# Patient Record
Sex: MALE | Race: WHITE | Employment: UNEMPLOYED | URBAN - METROPOLITAN AREA
[De-identification: names, ages, dates, MRNs, and addresses within clinical notes are randomized per-mention and may not be internally consistent; named-entity substitution may affect disease eponyms.]

---

## 2017-03-02 RX ORDER — BUDESONIDE AND FORMOTEROL FUMARATE DIHYDRATE 80; 4.5 UG/1; UG/1
2 AEROSOL RESPIRATORY (INHALATION) 2 TIMES DAILY
Qty: 1 INHALER | Refills: 4 | Status: SHIPPED | OUTPATIENT
Start: 2017-03-02 | End: 2017-05-24 | Stop reason: CLARIF

## 2017-05-15 ENCOUNTER — OFFICE VISIT (OUTPATIENT)
Dept: INTERNAL MEDICINE CLINIC | Age: 57
End: 2017-05-15

## 2017-05-15 VITALS
OXYGEN SATURATION: 94 % | HEIGHT: 70 IN | RESPIRATION RATE: 19 BRPM | TEMPERATURE: 97.1 F | SYSTOLIC BLOOD PRESSURE: 130 MMHG | HEART RATE: 78 BPM | WEIGHT: 124 LBS | DIASTOLIC BLOOD PRESSURE: 90 MMHG | BODY MASS INDEX: 17.75 KG/M2

## 2017-05-15 DIAGNOSIS — F17.200 TOBACCO DEPENDENCE: ICD-10-CM

## 2017-05-15 DIAGNOSIS — J44.9 CHRONIC OBSTRUCTIVE PULMONARY DISEASE, UNSPECIFIED COPD TYPE (HCC): Primary | ICD-10-CM

## 2017-05-15 DIAGNOSIS — H53.9 VISION CHANGES: ICD-10-CM

## 2017-05-15 DIAGNOSIS — R63.6 UNDERWEIGHT: ICD-10-CM

## 2017-05-15 DIAGNOSIS — K08.9 POOR DENTITION: ICD-10-CM

## 2017-05-15 DIAGNOSIS — G47.00 INSOMNIA, UNSPECIFIED TYPE: ICD-10-CM

## 2017-05-15 DIAGNOSIS — Z11.1 SCREENING-PULMONARY TB: ICD-10-CM

## 2017-05-15 DIAGNOSIS — N40.0 BENIGN PROSTATIC HYPERPLASIA WITHOUT LOWER URINARY TRACT SYMPTOMS, UNSPECIFIED MORPHOLOGY: ICD-10-CM

## 2017-05-15 DIAGNOSIS — F20.0 PARANOID SCHIZOPHRENIA (HCC): ICD-10-CM

## 2017-05-15 NOTE — PROGRESS NOTES
Reviewed record in preparation for visit and have obtained necessary documentation. Identified pt with two pt identifiers(name and ). Health Maintenance Due   Topic    Hepatitis C Screening     DTaP/Tdap/Td series (1 - Tdap)         Chief Complaint   Patient presents with    COPD          Learning Assessment:  :     Learning Assessment 2014   PRIMARY LEARNER Patient   PRIMARY LANGUAGE ENGLISH   LEARNER PREFERENCE PRIMARY DEMONSTRATION   ANSWERED BY patient   RELATIONSHIP SELF       Depression Screening:  :     No flowsheet data found. Fall Risk Assessment:  :     No flowsheet data found. Abuse Screening:  :     No flowsheet data found. Coordination of Care Questionnaire:  :     1) Have you been to an emergency room, urgent care clinic since your last visit? yes  Hospitalized since your last visit? no             2) Have you seen or consulted any other health care providers outside of [address] [place] [address] since your last visit? no  (Include any pap smears or colon screenings in this section.)    3) Do you have an Advance Directive on file? no    4) Are you interested in receiving information on Advance Directives? NO      Patient is accompanied by self I have received verbal consent from Vero Wallace to discuss any/all medical information while they are present in the room.

## 2017-05-15 NOTE — LETTER
5/19/2017 3:45 PM 
 
Mr. Mayito Valenzuela 58 
[phone] Dear Mayito Cee: 
 
Please find your most recent results below. Resulted Orders METABOLIC PANEL, COMPREHENSIVE Result Value Ref Range Glucose 54 (L) 65 - 99 mg/dL BUN 4 (L) 6 - 24 mg/dL Creatinine 0.68 (L) 0.76 - 1.27 mg/dL GFR est non- >59 mL/min/1.73 GFR est  >59 mL/min/1.73  
 BUN/Creatinine ratio 6 (L) 9 - 20 Sodium 140 134 - 144 mmol/L Potassium 4.5 3.5 - 5.2 mmol/L Chloride 96 96 - 106 mmol/L  
 CO2 29 18 - 29 mmol/L Calcium 9.0 8.7 - 10.2 mg/dL Protein, total 6.7 6.0 - 8.5 g/dL Albumin 4.1 3.5 - 5.5 g/dL GLOBULIN, TOTAL 2.6 1.5 - 4.5 g/dL A-G Ratio 1.6 1.2 - 2.2 Bilirubin, total 0.3 0.0 - 1.2 mg/dL Alk. phosphatase 99 39 - 117 IU/L  
 AST (SGOT) 18 0 - 40 IU/L  
 ALT (SGPT) 13 0 - 44 IU/L Narrative Performed at:  [address]  [id number] : Valarie Evans MD, Phone:  [phone] CBC W/O DIFF Result Value Ref Range WBC 6.4 3.4 - 10.8 x10E3/uL  
 RBC 4.83 4.14 - 5.80 x10E6/uL HGB 15.6 12.6 - 17.7 g/dL HCT 45.8 37.5 - 51.0 % MCV 95 79 - 97 fL  
 MCH 32.3 26.6 - 33.0 pg  
 MCHC 34.1 31.5 - 35.7 g/dL  
 RDW 14.7 12.3 - 15.4 % PLATELET 756 953 - 055 x10E3/uL Narrative Performed at:  [address]  [id number] : Valarie Evans MD, Phone:  [phone] LIPID PANEL Result Value Ref Range Cholesterol, total 102 100 - 199 mg/dL Triglyceride 41 0 - 149 mg/dL HDL Cholesterol 48 >39 mg/dL VLDL, calculated 8 5 - 40 mg/dL LDL, calculated 46 0 - 99 mg/dL Narrative Performed at:  [address]  [id number] : Valarie Evans MD, Phone:  [phone] TSH 3RD GENERATION Result Value Ref Range TSH 0.727 0.450 - 4.500 uIU/mL Narrative Performed at:  [address]  [id number] : Toni Walker MD, Phone:  [phone] PSA DIAGNOSTIC (PROSTATIC SPECIFIC AG) Result Value Ref Range Prostate Specific Ag 0.2 0.0 - 4.0 ng/mL Comment:  
   Roche ECLIA methodology. According to the American Urological Association, Serum PSA should 
decrease and remain at undetectable levels after radical 
prostatectomy. The AUA defines biochemical recurrence as an initial 
PSA value 0.2 ng/mL or greater followed by a subsequent confirmatory PSA value 0.2 ng/mL or greater. Values obtained with different assay methods or kits cannot be used 
interchangeably. Results cannot be interpreted as absolute evidence 
of the presence or absence of malignant disease. Narrative Performed at:  [address]  [id number] : Toni Walker MD, Phone:  [phone] CVD REPORT Result Value Ref Range INTERPRETATION Note Comment:  
   Supplement report is available. Narrative Performed at:  [address] [place] [address]  [id number] : Zenon Ortega PhD, Phone:  [phone] QUANTIFERON TB GOLD Result Value Ref Range QuantiFERON Incubation Incubated, specimen forwarded to [place], [place] for 
completion of the assay. Narrative Performed at:  [address]  [id number] : Toni Walker MD, Phone:  [phone] QUANTIFERON IN TUBE REFL Result Value Ref Range QuantiFERON TB Gold Negative Negative QUANTIFERON CRITERIA Comment Comment: To be considered positive a specimen should have a TB Ag minus Nil 
value greater than or equal to 0.35 IU/mL and in addition the TB Ag 
minus Nil value must be greater than or equal to 25% of the Nil value. There may be insufficient information in these values to 
differentiate between some negative and some indeterminate test 
values. QuantiFERON TB Ag Value 0.04 IU/mL QuantiFERON Nil Value 0.02 IU/mL QuantiFERON Mitogen Value >10.00 IU/mL  
 QFT TB Ag minus Nil Value 0.02 IU/mL Interpretation: Comment Comment:  
   The QuantiFERON TB Gold (in Tube) assay is intended for use as an aid 
in the diagnosis of TB infection. Negative results suggest that there 
is no TB infection. In patients with high suspicion of exposure, a 
negative test should be repeated. A positive test indicates infection 
with Mycobacterium tuberculosis. Among individuals without 
tuberculosis infection, a positive test may be due to exposure to 
[place], M. szulgai or M. marinum. On the Internet, go to 
cdc.gov/tb for further details. Narrative Performed at:  [address]  [id number] : Bri Sadler MD, Phone:  [phone] RECOMMENDATIONS: 
Negative TB test. 
 
Please call me if you have any questions: [phone] Sincerely, 
 
 
Charlie Bojorquez, DO

## 2017-05-15 NOTE — MR AVS SNAPSHOT
Visit Information Date & Time Provider Department Dept. Phone Encounter #  
 5/15/2017 10:15 AM Rama Malcomge [facility] Internal Medicine [phone] [id number] Follow-up Instructions Return in about 6 months (around 11/15/2017). Upcoming Health Maintenance Date Due Hepatitis C Screening 1960 DTaP/Tdap/Td series (1 - Tdap) 8/27/1980 INFLUENZA AGE 9 TO ADULT 8/1/2017 COLONOSCOPY 9/2/2024 Allergies as of 5/15/2017  Review Complete On: 5/15/2017 By: Yeni Cote, DO No Known Allergies Current Immunizations  Reviewed on 11/14/2016 Name Date Influenza Vaccine Intradermal PF 11/14/2016 PPD 1/31/2012 Pneumococcal Polysaccharide (PPSV-23) 11/14/2016 Not reviewed this visit You Were Diagnosed With   
  
 Codes Comments Chronic obstructive pulmonary disease, unspecified COPD type ([facility]    -  Primary ICD-10-CM: J44.9 ICD-9-CM: 781 Tobacco dependence     ICD-10-CM: I12.013 ICD-9-CM: 305.1 Underweight     ICD-10-CM: R63.6 ICD-9-CM: 783.22 Poor dentition     ICD-10-CM: K08.9 ICD-9-CM: 525.9 Insomnia, unspecified type     ICD-10-CM: G47.00 ICD-9-CM: 780.52 Paranoid schizophrenia ([facility]     ICD-10-CM: F20.0 ICD-9-CM: 295.30 Vision changes     ICD-10-CM: H53.9 ICD-9-CM: 368.9 Benign prostatic hyperplasia without lower urinary tract symptoms, unspecified morphology     ICD-10-CM: N40.0 ICD-9-CM: 600.00 Screening-pulmonary TB     ICD-10-CM: Z11.1 ICD-9-CM: V74.1 Vitals BP Pulse Temp Resp Height(growth percentile) Weight(growth percentile) 130/90 (BP 1 Location: Right arm, BP Patient Position: Sitting) 78 97.1 °F (36.2 °C) (Oral) 19 5' 10\" (1.778 m) 124 lb (56.2 kg) SpO2 BMI Smoking Status 94% 17.79 kg/m2 Current Every Day Smoker BMI and BSA Data Body Mass Index Body Surface Area  
 17.79 kg/m 2 1.67 m 2 Preferred Pharmacy Pharmacy Name Phone [address] [place] [address] [phone] Your Updated Medication List  
  
   
This list is accurate as of: 5/15/17 10:38 AM.  Always use your most recent med list.  
  
  
  
  
 * albuterol 2.5 mg /3 mL (0.083 %) nebulizer solution Commonly known as:  PROVENTIL VENTOLIN  
by Nebulization route every six (6) hours as needed for Wheezing. * VENTOLIN HFA 90 mcg/actuation inhaler Generic drug:  albuterol INHALE 2 PUFFS EVERY 4 TO 6 HOURS AS NEEDED FOR SHORTNESS OF BREATH  
  
 benztropine 2 mg tablet Commonly known as:  COGENTIN Take 2 mg by mouth nightly. budesonide-formoterol 80-4.5 mcg/actuation Hfaa inhaler Commonly known as:  SYMBICORT Take 2 Puffs by inhalation two (2) times a day. ibuprofen 800 mg tablet Commonly known as:  MOTRIN  
  
 PAXIL 40 mg tablet Generic drug:  PARoxetine Take 40 mg by mouth daily. RESTORIL 30 mg capsule Generic drug:  temazepam  
Take 30 mg by mouth nightly. RisperDAL 3 mg tablet Generic drug:  risperiDONE Take 3 mg by mouth nightly. XANAX 0.5 mg tablet Generic drug:  ALPRAZolam  
Take 0.5 mg by mouth two (2) times a day. * Notice: This list has 2 medication(s) that are the same as other medications prescribed for you. Read the directions carefully, and ask your doctor or other care provider to review them with you. We Performed the Following CBC W/O DIFF [38672 CPT(R)] LIPID PANEL [06909 CPT(R)] METABOLIC PANEL, COMPREHENSIVE [90209 CPT(R)] PROSTATE SPECIFIC AG (PSA) [id number] CPT(R)] QUANTIFERON TB GOLD [[id number] Custom] REFERRAL TO OPHTHALMOLOGY [REF57 Custom] TSH 3RD GENERATION [93952 CPT(R)] Follow-up Instructions Return in about 6 months (around 11/15/2017). Referral Information Referral ID Referred By Referred To  
  
 [id number] Guarav Hence [facility] [address], [address] Visits Status Start Date End Date 1 New Request 5/15/17 5/15/18 If your referral has a status of pending review or denied, additional information will be sent to support the outcome of this decision. Introducing [facility]! Justen Vera introduces Matchbook patient portal. Now you can access parts of your medical record, email your doctor's office, and request medication refills online. 1. In your internet browser, go to [email or web address] [email or web address] 2. Click on the First Time User? Click Here link in the Sign In box. You will see the New Member Sign Up page. 3. Enter your Matchbook Access Code exactly as it appears below. You will not need to use this code after youve completed the sign-up process. If you do not sign up before the expiration date, you must request a new code. · Matchbook Access Code: [email or web address] Expires: 8/13/2017 10:37 AM 
 
4. Enter the last four digits of your Social Security Number (xxxx) and Date of Birth (mm/dd/yyyy) as indicated and click Submit. You will be taken to the next sign-up page. 5. Create a Matchbook ID. This will be your Matchbook login ID and cannot be changed, so think of one that is secure and easy to remember. 6. Create a Matchbook password. You can change your password at any time. 7. Enter your Password Reset Question and Answer. This can be used at a later time if you forget your password. 8. Enter your e-mail address. You will receive e-mail notification when new information is available in [address] 9. Click Sign Up. You can now view and download portions of your medical record. 10. Click the Download Summary menu link to download a portable copy of your medical information. If you have questions, please visit the Frequently Asked Questions section of the Matchbook website. Remember, Matchbook is NOT to be used for urgent needs. For medical emergencies, dial 911. Now available from your iPhone and Android! Please provide this summary of care documentation to your next provider. Your primary care clinician is listed as Melissa Elias. If you have any questions after today's visit, please call [phone].

## 2017-05-16 LAB
ALBUMIN SERPL-MCNC: 4.1 G/DL (ref 3.5–5.5)
ALBUMIN/GLOB SERPL: 1.6 {RATIO} (ref 1.2–2.2)
ALP SERPL-CCNC: 99 IU/L (ref 39–117)
ALT SERPL-CCNC: 13 IU/L (ref 0–44)
AST SERPL-CCNC: 18 IU/L (ref 0–40)
BILIRUB SERPL-MCNC: 0.3 MG/DL (ref 0–1.2)
BUN SERPL-MCNC: 4 MG/DL (ref 6–24)
BUN/CREAT SERPL: 6 (ref 9–20)
CALCIUM SERPL-MCNC: 9 MG/DL (ref 8.7–10.2)
CHLORIDE SERPL-SCNC: 96 MMOL/L (ref 96–106)
CHOLEST SERPL-MCNC: 102 MG/DL (ref 100–199)
CO2 SERPL-SCNC: 29 MMOL/L (ref 18–29)
CREAT SERPL-MCNC: 0.68 MG/DL (ref 0.76–1.27)
ERYTHROCYTE [DISTWIDTH] IN BLOOD BY AUTOMATED COUNT: 14.7 % (ref 12.3–15.4)
GLOBULIN SER CALC-MCNC: 2.6 G/DL (ref 1.5–4.5)
GLUCOSE SERPL-MCNC: 54 MG/DL (ref 65–99)
HCT VFR BLD AUTO: 45.8 % (ref 37.5–51)
HDLC SERPL-MCNC: 48 MG/DL
HGB BLD-MCNC: 15.6 G/DL (ref 12.6–17.7)
INTERPRETATION, 910389: NORMAL
LDLC SERPL CALC-MCNC: 46 MG/DL (ref 0–99)
MCH RBC QN AUTO: 32.3 PG (ref 26.6–33)
MCHC RBC AUTO-ENTMCNC: 34.1 G/DL (ref 31.5–35.7)
MCV RBC AUTO: 95 FL (ref 79–97)
PLATELET # BLD AUTO: 191 X10E3/UL (ref 150–379)
POTASSIUM SERPL-SCNC: 4.5 MMOL/L (ref 3.5–5.2)
PROT SERPL-MCNC: 6.7 G/DL (ref 6–8.5)
PSA SERPL-MCNC: 0.2 NG/ML (ref 0–4)
RBC # BLD AUTO: 4.83 X10E6/UL (ref 4.14–5.8)
SODIUM SERPL-SCNC: 140 MMOL/L (ref 134–144)
TRIGL SERPL-MCNC: 41 MG/DL (ref 0–149)
TSH SERPL DL<=0.005 MIU/L-ACNC: 0.73 UIU/ML (ref 0.45–4.5)
VLDLC SERPL CALC-MCNC: 8 MG/DL (ref 5–40)
WBC # BLD AUTO: 6.4 X10E3/UL (ref 3.4–10.8)

## 2017-05-17 LAB
ANNOTATION COMMENT IMP: NORMAL
GAMMA INTERFERON BACKGROUND BLD IA-ACNC: 0.02 IU/ML
M TB IFN-G BLD-IMP: NEGATIVE
M TB IFN-G CD4+ BCKGRND COR BLD-ACNC: 0.02 IU/ML
M TB IFN-G CD4+ T-CELLS BLD-ACNC: 0.04 IU/ML
MITOGEN IGNF BLD-ACNC: >10 IU/ML
QUANTIFERON INCUBATION: NORMAL
SERVICE CMNT-IMP: NORMAL

## 2017-05-19 NOTE — PROGRESS NOTES
Letter mailed to patient with lab results and recommendations from provider. T B test negative and also, BS low, o/w stable labs.

## 2017-05-23 ENCOUNTER — PATIENT OUTREACH (OUTPATIENT)
Dept: INTERNAL MEDICINE CLINIC | Age: 57
End: 2017-05-23

## 2017-05-24 RX ORDER — FLUTICASONE FUROATE AND VILANTEROL 100; 25 UG/1; UG/1
1 POWDER RESPIRATORY (INHALATION) DAILY
Qty: 1 INHALER | Refills: 5 | Status: SHIPPED | OUTPATIENT
Start: 2017-05-24 | End: 2017-07-12

## 2017-05-30 NOTE — PROGRESS NOTES
HISTORY OF PRESENT ILLNESS  Helio Guillory is a 61 y.o. male. Pt. comes in for f/u. Has multiple medical problems. H/o asthma/COPD. Smokes daily. Has chronic cough with congestion and occasional sputum. Some SOB/MALDONADO. Reports poor vision. Followed by psychiatrist. Reports compliance with medications and diet. Med list and most recent labs/studies reviewed with pt. Trying to be active physically. Needs labs. Needs TB test. Reports no other new c/o. COPD   Associated symptoms include shortness of breath. Pertinent negatives include no chest pain, no abdominal pain and no headaches. Nicotine Dependence   Associated symptoms include shortness of breath. Pertinent negatives include no chest pain, no abdominal pain and no headaches. Mental Health Problem   Associated symptoms include shortness of breath. Pertinent negatives include no chest pain, no abdominal pain and no headaches. Asthma   Associated symptoms include shortness of breath. Pertinent negatives include no chest pain, no abdominal pain and no headaches. Follow Up Chronic Condition   Associated symptoms include shortness of breath. Pertinent negatives include no chest pain, no abdominal pain and no headaches. Review of Systems   Constitutional: Negative. Eyes: Positive for blurred vision. Respiratory: Positive for cough and shortness of breath. Negative for hemoptysis, sputum production and wheezing. Cardiovascular: Negative for chest pain, palpitations and leg swelling. Gastrointestinal: Negative for abdominal pain. Genitourinary: Negative. Musculoskeletal: Negative. Skin: Negative. Neurological: Negative for dizziness, tingling, tremors and headaches. Endo/Heme/Allergies: Negative. Psychiatric/Behavioral: Positive for depression. Negative for suicidal ideas. The patient is nervous/anxious and has insomnia. Physical Exam   Constitutional: He is oriented to person, place, and time.  He appears well-developed and well-nourished. No distress. HENT:   Head: Normocephalic and atraumatic. Mouth/Throat: Oropharynx is clear and moist.   Eyes: Conjunctivae and EOM are normal. Pupils are equal, round, and reactive to light. No scleral icterus. Neck: Normal range of motion. Neck supple. No JVD present. No thyromegaly present. Cardiovascular: Normal rate, regular rhythm, normal heart sounds and intact distal pulses. No murmur heard. Pulmonary/Chest: Effort normal. No respiratory distress. He has wheezes (few). He has no rales. He exhibits no tenderness. Abdominal: Soft. Bowel sounds are normal. He exhibits no distension. There is no tenderness. Musculoskeletal: Normal range of motion. He exhibits no edema or tenderness. Neurological: He is alert and oriented to person, place, and time. He has normal reflexes. He exhibits normal muscle tone. Coordination normal.   Skin: Skin is warm and dry. No rash noted. Psychiatric: He has a normal mood and affect. His behavior is normal.   Nursing note and vitals reviewed. ASSESSMENT and James Rosalia was seen today for copd, nicotine dependence and mental health problem.     Diagnoses and all orders for this visit:    Chronic obstructive pulmonary disease, unspecified COPD type ([address]  -     CBC W/O DIFF  -     LIPID PANEL  -     TSH 3RD GENERATION  -     METABOLIC PANEL, COMPREHENSIVE    Tobacco dependence    Underweight  -     CBC W/O DIFF  -     LIPID PANEL  -     TSH 3RD GENERATION  -     METABOLIC PANEL, COMPREHENSIVE    Poor dentition    Insomnia, unspecified type    Paranoid schizophrenia ([address]    Vision changes  -     REFERRAL TO OPHTHALMOLOGY    Benign prostatic hyperplasia without lower urinary tract symptoms, unspecified morphology  -     PSA - PROSTATE SPECIFIC AG    Screening-pulmonary TB  -     QUANTIFERON TB GOLD    Other orders  -     METABOLIC PANEL, COMPREHENSIVE  -     CBC W/O DIFF  -     LIPID PANEL  -     TSH 3RD GENERATION  -     PROSTATE SPECIFIC AG  - CVD REPORT  -     QUANTIFERON TB GOLD  -     QUANTIFERON IN TUBE REFL      Follow-up Disposition:  Return in about 6 months (around 11/15/2017).   lab results and schedule of future lab studies reviewed with patient  reviewed diet, exercise and weight control  reviewed medications and side effects in detail  Advised to quit smoking  Overall stable  F/u with other MD's as scheduled

## 2017-06-20 ENCOUNTER — OFFICE VISIT (OUTPATIENT)
Dept: INTERNAL MEDICINE CLINIC | Age: 57
End: 2017-06-20

## 2017-06-20 VITALS
TEMPERATURE: 98.1 F | BODY MASS INDEX: 17.98 KG/M2 | HEIGHT: 70 IN | WEIGHT: 125.6 LBS | RESPIRATION RATE: 22 BRPM | HEART RATE: 60 BPM | OXYGEN SATURATION: 96 % | SYSTOLIC BLOOD PRESSURE: 126 MMHG | DIASTOLIC BLOOD PRESSURE: 79 MMHG

## 2017-06-20 DIAGNOSIS — F20.0 PARANOID SCHIZOPHRENIA (HCC): ICD-10-CM

## 2017-06-20 DIAGNOSIS — F17.200 TOBACCO DEPENDENCE: ICD-10-CM

## 2017-06-20 DIAGNOSIS — N40.0 BENIGN PROSTATIC HYPERPLASIA WITHOUT LOWER URINARY TRACT SYMPTOMS, UNSPECIFIED MORPHOLOGY: ICD-10-CM

## 2017-06-20 DIAGNOSIS — J44.9 CHRONIC OBSTRUCTIVE PULMONARY DISEASE, UNSPECIFIED COPD TYPE (HCC): Primary | ICD-10-CM

## 2017-06-20 RX ORDER — GUAIFENESIN 100 MG/5ML
200 SOLUTION ORAL
COMMUNITY
End: 2017-07-12

## 2017-06-20 NOTE — PROGRESS NOTES
HISTORY OF PRESENT ILLNESS  Adam Cruz is a 61 y.o. male. This is a patient of Dr. Adilson Henderson who presents today for physical exam and completion of [facility] yearly physical form for [facility]. The patient states that he is generally feeling well at the time of today's visit. He denies chest pain, shortness of breath, dizziness, and GI/ problems. Patient's labs from May 2017 reviewed and stable including TB screen. Visit Vitals    /79    Pulse 60    Temp 98.1 °F (36.7 °C) (Oral)    Resp 22    Ht 5' 10\" (1.778 m)    Wt 125 lb 9.6 oz (57 kg)    SpO2 96%    BMI 18.02 kg/m2     HPI    Review of Systems   Constitutional: Negative for chills, fever and malaise/fatigue. HENT: Negative for congestion. Eyes: Negative for blurred vision. Respiratory: Negative for cough, shortness of breath and wheezing. Cardiovascular: Negative for chest pain, palpitations and leg swelling. Gastrointestinal: Negative for abdominal pain. Genitourinary: Negative. Musculoskeletal: Negative. Skin: Negative. Neurological: Negative for dizziness, tingling, tremors, weakness and headaches. Endo/Heme/Allergies: Negative. Psychiatric/Behavioral: Negative. Physical Exam   Constitutional: He is oriented to person, place, and time. He appears well-developed and well-nourished. No distress. HENT:   Head: Normocephalic and atraumatic. Eyes: Conjunctivae are normal.   Neck: Neck supple. Cardiovascular: Normal rate and regular rhythm. No murmur heard. Pulmonary/Chest: Effort normal and breath sounds normal.   Abdominal: Soft. Bowel sounds are normal. He exhibits no distension. There is no tenderness. Musculoskeletal: Normal range of motion. He exhibits no edema. Neurological: He is alert and oriented to person, place, and time. Skin: Skin is warm and dry. Psychiatric: He has a normal mood and affect. His behavior is normal.   Nursing note and vitals reviewed.       ASSESSMENT and PLAN ICD-10-CM ICD-9-CM   1. Chronic obstructive pulmonary disease, unspecified COPD type ([email or web address] J44.9 496   2. Tobacco dependence F17.200 305.1   3. Benign prostatic hyperplasia without lower urinary tract symptoms, unspecified morphology N40.0 600.00   4. Paranoid schizophrenia ([facility] [[email or web address] F20.0 295.30     Completed Man[facility] Bean [facility] form per request.  Continue to follow with psychiatry. Lab results and schedule of future lab studies reviewed with patient  Very strongly urged to quit smoking to reduce cardiovascular risk  Reviewed medications and side effects in detail  Patient encouraged to call or return to office if symptoms do not improve or worsen. Reviewed plan of care with patient who acknowledges understanding and agrees. Follow-up with Dr. Vinnie Matthews in 5 months, as scheduled, or sooner as needed.

## 2017-06-20 NOTE — PROGRESS NOTES
Patient is accompanied here today by Katrin Luis, casework. 1. Have you been to the ER, urgent care clinic since your last visit? Hospitalized since your last visit? No    2. Have you seen or consulted any other health care providers outside of the [address] [place] [address] since your last visit? Include any pap smears or colon screening. PSYCH. Used 2 patient I. D. 's  Reviewed record  In preparation for visit and have obtained necessary documentation.

## 2017-06-20 NOTE — MR AVS SNAPSHOT
Visit Information Date & Time Provider Department Dept. Phone Encounter #  
 6/20/2017  1:30 PM Anca Dickey, [address] [facility] Internal Medicine [phone] [id number] Your Appointments 11/17/2017 10:15 AM  
ROUTINE CARE with Ike Infante DO [facility] Internal Medicine ([facility]) Appt Note: 6 month f/u  
 [address] [place] [place] [id number]  
[phone]  
  
   
 [address] [facility] [address] Upcoming Health Maintenance Date Due Hepatitis C Screening 1960 DTaP/Tdap/Td series (1 - Tdap) 8/27/1980 INFLUENZA AGE 9 TO ADULT 8/1/2017 COLONOSCOPY 9/2/2024 Allergies as of 6/20/2017  Review Complete On: 6/20/2017 By: Anca Dickey NP No Known Allergies Current Immunizations  Reviewed on 11/14/2016 Name Date Influenza Vaccine Intradermal PF 11/14/2016 PPD 1/31/2012 Pneumococcal Polysaccharide (PPSV-23) 11/14/2016 Not reviewed this visit You Were Diagnosed With   
  
 Codes Comments Chronic obstructive pulmonary disease, unspecified COPD type ([facility].)    -  Primary ICD-10-CM: J44.9 ICD-9-CM: 195 Benign prostatic hyperplasia without lower urinary tract symptoms, unspecified morphology     ICD-10-CM: N40.0 ICD-9-CM: 600.00 Tobacco dependence     ICD-10-CM: Y45.651 ICD-9-CM: 305.1 Paranoid schizophrenia ([facility].)     ICD-10-CM: F20.0 ICD-9-CM: 295.30 Vitals BP Pulse Temp Resp Height(growth percentile) Weight(growth percentile) 126/79 60 98.1 °F (36.7 °C) (Oral) 22 5' 10\" (1.778 m) 125 lb 9.6 oz (57 kg) SpO2 BMI Smoking Status 96% 18.02 kg/m2 Current Every Day Smoker BMI and BSA Data Body Mass Index Body Surface Area 18.02 kg/m 2 1.68 m 2 Preferred Pharmacy Pharmacy Name Phone [address] [facility], [place] [address] [phone] Your Updated Medication List  
  
   
 This list is accurate as of: 6/20/17  1:47 PM.  Always use your most recent med list.  
  
  
  
  
 * albuterol 2.5 mg /3 mL (0.083 %) nebulizer solution Commonly known as:  PROVENTIL VENTOLIN  
by Nebulization route every six (6) hours as needed for Wheezing. * VENTOLIN HFA 90 mcg/actuation inhaler Generic drug:  albuterol INHALE 2 PUFFS EVERY 4 TO 6 HOURS AS NEEDED FOR SHORTNESS OF BREATH  
  
 benztropine 2 mg tablet Commonly known as:  COGENTIN Take 2 mg by mouth nightly. fluticasone-vilanterol 100-25 mcg/dose inhaler Commonly known as:  BREO ELLIPTA Take 1 Puff by inhalation daily. guaiFENesin 100 mg/5 mL liquid Commonly known as:  ROBITUSSIN Take 200 mg by mouth every four (4) hours as needed for Cough. ibuprofen 800 mg tablet Commonly known as:  MOTRIN  
800 mg every eight (8) hours as needed. PAXIL 40 mg tablet Generic drug:  PARoxetine Take 40 mg by mouth daily. RESTORIL 30 mg capsule Generic drug:  temazepam  
Take 30 mg by mouth nightly. RisperDAL 3 mg tablet Generic drug:  risperiDONE Take 3 mg by mouth nightly. XANAX 0.5 mg tablet Generic drug:  ALPRAZolam  
Take 0.5 mg by mouth two (2) times a day. * Notice: This list has 2 medication(s) that are the same as other medications prescribed for you. Read the directions carefully, and ask your doctor or other care provider to review them with you. Introducing [facility]! [facility] introduces [employer] patient portal. Now you can access parts of your medical record, email your doctor's office, and request medication refills online. 1. In your internet browser, go to [email or web address] 2. Click on the First Time User? Click Here link in the Sign In box. You will see the New Member Sign Up page. 3. Enter your iCopyright Access Code exactly as it appears below.  You will not need to use this code after youve completed the sign-up process. If you do not sign up before the expiration date, you must request a new code. · [employer] Access Code: [email or web address] Expires: 8/13/2017 10:37 AM 
 
4. Enter the last four digits of your Social Security Number (xxxx) and Date of Birth (mm/dd/yyyy) as indicated and click Submit. You will be taken to the next sign-up page. 5. Create a [employer] ID. This will be your [employer] login ID and cannot be changed, so think of one that is secure and easy to remember. 6. Create a [employer] password. You can change your password at any time. 7. Enter your Password Reset Question and Answer. This can be used at a later time if you forget your password. 8. Enter your e-mail address. You will receive e-mail notification when new information is available in [address] 9. Click Sign Up. You can now view and download portions of your medical record. 10. Click the Download Summary menu link to download a portable copy of your medical information. If you have questions, please visit the Frequently Asked Questions section of the [employer] website. Remember, [employer] is NOT to be used for urgent needs. For medical emergencies, dial 911. Now available from your iPhone and Android! Please provide this summary of care documentation to your next provider. Your primary care clinician is listed as Chandana Lashonda. If you have any questions after today's visit, please call [phone].

## 2017-07-04 ENCOUNTER — APPOINTMENT (OUTPATIENT)
Dept: GENERAL RADIOLOGY | Age: 57
DRG: 425 | End: 2017-07-04
Attending: EMERGENCY MEDICINE
Payer: MEDICAID

## 2017-07-04 ENCOUNTER — APPOINTMENT (OUTPATIENT)
Dept: CT IMAGING | Age: 57
DRG: 425 | End: 2017-07-04
Attending: EMERGENCY MEDICINE
Payer: MEDICAID

## 2017-07-04 ENCOUNTER — HOSPITAL ENCOUNTER (INPATIENT)
Age: 57
LOS: 3 days | Discharge: HOME | DRG: 425 | End: 2017-07-07
Attending: EMERGENCY MEDICINE | Admitting: INTERNAL MEDICINE
Payer: MEDICAID

## 2017-07-04 DIAGNOSIS — J44.1 COPD EXACERBATION (HCC): ICD-10-CM

## 2017-07-04 DIAGNOSIS — E87.1 HYPONATREMIA: Primary | ICD-10-CM

## 2017-07-04 DIAGNOSIS — G93.40 ACUTE ENCEPHALOPATHY: ICD-10-CM

## 2017-07-04 LAB
ALBUMIN SERPL BCP-MCNC: 3.4 G/DL (ref 3.5–5)
ALBUMIN/GLOB SERPL: 1 {RATIO} (ref 1.1–2.2)
ALP SERPL-CCNC: 99 U/L (ref 45–117)
ALT SERPL-CCNC: 21 U/L (ref 12–78)
AMPHET UR QL SCN: NEGATIVE
ANION GAP BLD CALC-SCNC: 12 MMOL/L (ref 5–15)
ANION GAP BLD CALC-SCNC: 15 MMOL/L (ref 5–15)
APPEARANCE UR: CLEAR
ARTERIAL PATENCY WRIST A: ABNORMAL
AST SERPL W P-5'-P-CCNC: 40 U/L (ref 15–37)
BACTERIA URNS QL MICRO: NEGATIVE /HPF
BARBITURATES UR QL SCN: NEGATIVE
BASE EXCESS BLD CALC-SCNC: 10 MMOL/L
BASOPHILS # BLD AUTO: 0 K/UL (ref 0–0.1)
BASOPHILS # BLD: 0 % (ref 0–1)
BDY SITE: ABNORMAL
BENZODIAZ UR QL: NEGATIVE
BILIRUB SERPL-MCNC: 1.2 MG/DL (ref 0.2–1)
BILIRUB UR QL: NEGATIVE
BUN SERPL-MCNC: 2 MG/DL (ref 6–20)
BUN SERPL-MCNC: 3 MG/DL (ref 6–20)
BUN/CREAT SERPL: 4 (ref 12–20)
BUN/CREAT SERPL: 5 (ref 12–20)
CALCIUM SERPL-MCNC: 8 MG/DL (ref 8.5–10.1)
CALCIUM SERPL-MCNC: 8.5 MG/DL (ref 8.5–10.1)
CANNABINOIDS UR QL SCN: NEGATIVE
CHLORIDE SERPL-SCNC: 76 MMOL/L (ref 97–108)
CHLORIDE SERPL-SCNC: 80 MMOL/L (ref 97–108)
CO2 SERPL-SCNC: 24 MMOL/L (ref 21–32)
CO2 SERPL-SCNC: 26 MMOL/L (ref 21–32)
COCAINE UR QL SCN: NEGATIVE
COLOR UR: ABNORMAL
CREAT SERPL-MCNC: 0.43 MG/DL (ref 0.7–1.3)
CREAT SERPL-MCNC: 0.69 MG/DL (ref 0.7–1.3)
DIFFERENTIAL METHOD BLD: ABNORMAL
DRUG SCRN COMMENT,DRGCM: NORMAL
EOSINOPHIL # BLD: 0 K/UL (ref 0–0.4)
EOSINOPHIL NFR BLD: 0 % (ref 0–7)
EPITH CASTS URNS QL MICRO: ABNORMAL /LPF
ERYTHROCYTE [DISTWIDTH] IN BLOOD BY AUTOMATED COUNT: 12.3 % (ref 11.5–14.5)
GAS FLOW.O2 O2 DELIVERY SYS: ABNORMAL L/MIN
GLOBULIN SER CALC-MCNC: 3.3 G/DL (ref 2–4)
GLUCOSE BLD STRIP.AUTO-MCNC: 132 MG/DL (ref 65–100)
GLUCOSE SERPL-MCNC: 118 MG/DL (ref 65–100)
GLUCOSE SERPL-MCNC: 163 MG/DL (ref 65–100)
GLUCOSE UR STRIP.AUTO-MCNC: NEGATIVE MG/DL
HCO3 BLD-SCNC: 33.8 MMOL/L (ref 22–26)
HCT VFR BLD AUTO: 38.6 % (ref 36.6–50.3)
HGB BLD-MCNC: 14.1 G/DL (ref 12.1–17)
HGB UR QL STRIP: ABNORMAL
HYALINE CASTS URNS QL MICRO: ABNORMAL /LPF (ref 0–5)
INR PPP: 1.1 (ref 0.9–1.1)
KETONES UR QL STRIP.AUTO: 15 MG/DL
LACTATE SERPL-SCNC: 1.6 MMOL/L (ref 0.4–2)
LEUKOCYTE ESTERASE UR QL STRIP.AUTO: NEGATIVE
LYMPHOCYTES # BLD AUTO: 6 % (ref 12–49)
LYMPHOCYTES # BLD: 0.7 K/UL (ref 0.8–3.5)
MAGNESIUM SERPL-MCNC: 1.6 MG/DL (ref 1.6–2.4)
MCH RBC QN AUTO: 32.6 PG (ref 26–34)
MCHC RBC AUTO-ENTMCNC: 36.5 G/DL (ref 30–36.5)
MCV RBC AUTO: 89.1 FL (ref 80–99)
METHADONE UR QL: NEGATIVE
MONOCYTES # BLD: 0.8 K/UL (ref 0–1)
MONOCYTES NFR BLD AUTO: 7 % (ref 5–13)
NEUTS SEG # BLD: 9.9 K/UL (ref 1.8–8)
NEUTS SEG NFR BLD AUTO: 87 % (ref 32–75)
NITRITE UR QL STRIP.AUTO: NEGATIVE
O2/TOTAL GAS SETTING VFR VENT: 0.21 %
OPIATES UR QL: NEGATIVE
OSMOLALITY SERPL: 237 MOSM/KG H2O
OSMOLALITY UR: 137 MOSM/KG H2O
PCO2 BLDC: 45.8 MMHG (ref 45–55)
PCP UR QL: NEGATIVE
PH BLDC: 7.48 [PH] (ref 7.32–7.42)
PH UR STRIP: 7 [PH] (ref 5–8)
PLATELET # BLD AUTO: 224 K/UL (ref 150–400)
PO2 BLDC: 37 MMHG (ref 40–50)
POTASSIUM SERPL-SCNC: 2.9 MMOL/L (ref 3.5–5.1)
POTASSIUM SERPL-SCNC: 3.7 MMOL/L (ref 3.5–5.1)
PROT SERPL-MCNC: 6.7 G/DL (ref 6.4–8.2)
PROT UR STRIP-MCNC: NEGATIVE MG/DL
PROTHROMBIN TIME: 11.3 SEC (ref 9–11.1)
RBC # BLD AUTO: 4.33 M/UL (ref 4.1–5.7)
RBC #/AREA URNS HPF: ABNORMAL /HPF (ref 0–5)
RBC MORPH BLD: ABNORMAL
SAO2 % BLD: 74 % (ref 92–97)
SERVICE CMNT-IMP: ABNORMAL
SODIUM SERPL-SCNC: 114 MMOL/L (ref 136–145)
SODIUM SERPL-SCNC: 119 MMOL/L (ref 136–145)
SODIUM UR-SCNC: 16 MMOL/L
SP GR UR REFRACTOMETRY: 1.01 (ref 1–1.03)
SPECIMEN TYPE: ABNORMAL
TROPONIN I SERPL-MCNC: <0.04 NG/ML
TROPONIN I SERPL-MCNC: <0.04 NG/ML
UROBILINOGEN UR QL STRIP.AUTO: 0.2 EU/DL (ref 0.2–1)
WBC # BLD AUTO: 11.4 K/UL (ref 4.1–11.1)
WBC URNS QL MICRO: ABNORMAL /HPF (ref 0–4)

## 2017-07-04 PROCEDURE — 85610 PROTHROMBIN TIME: CPT | Performed by: EMERGENCY MEDICINE

## 2017-07-04 PROCEDURE — 65610000006 HC RM INTENSIVE CARE

## 2017-07-04 PROCEDURE — 83935 ASSAY OF URINE OSMOLALITY: CPT | Performed by: INTERNAL MEDICINE

## 2017-07-04 PROCEDURE — 74011000250 HC RX REV CODE- 250: Performed by: EMERGENCY MEDICINE

## 2017-07-04 PROCEDURE — 83930 ASSAY OF BLOOD OSMOLALITY: CPT | Performed by: INTERNAL MEDICINE

## 2017-07-04 PROCEDURE — 84300 ASSAY OF URINE SODIUM: CPT | Performed by: INTERNAL MEDICINE

## 2017-07-04 PROCEDURE — 74011000258 HC RX REV CODE- 258: Performed by: INTERNAL MEDICINE

## 2017-07-04 PROCEDURE — 80048 BASIC METABOLIC PNL TOTAL CA: CPT | Performed by: INTERNAL MEDICINE

## 2017-07-04 PROCEDURE — 36415 COLL VENOUS BLD VENIPUNCTURE: CPT | Performed by: EMERGENCY MEDICINE

## 2017-07-04 PROCEDURE — 77030029684 HC NEB SM VOL KT MONA -A

## 2017-07-04 PROCEDURE — 74011000250 HC RX REV CODE- 250: Performed by: INTERNAL MEDICINE

## 2017-07-04 PROCEDURE — 96374 THER/PROPH/DIAG INJ IV PUSH: CPT

## 2017-07-04 PROCEDURE — 84484 ASSAY OF TROPONIN QUANT: CPT | Performed by: EMERGENCY MEDICINE

## 2017-07-04 PROCEDURE — 71010 XR CHEST PORT: CPT

## 2017-07-04 PROCEDURE — 82962 GLUCOSE BLOOD TEST: CPT

## 2017-07-04 PROCEDURE — 74011250636 HC RX REV CODE- 250/636: Performed by: NURSE PRACTITIONER

## 2017-07-04 PROCEDURE — 80053 COMPREHEN METABOLIC PANEL: CPT | Performed by: EMERGENCY MEDICINE

## 2017-07-04 PROCEDURE — 74011250636 HC RX REV CODE- 250/636: Performed by: EMERGENCY MEDICINE

## 2017-07-04 PROCEDURE — 83605 ASSAY OF LACTIC ACID: CPT | Performed by: EMERGENCY MEDICINE

## 2017-07-04 PROCEDURE — 74011250636 HC RX REV CODE- 250/636: Performed by: INTERNAL MEDICINE

## 2017-07-04 PROCEDURE — 93005 ELECTROCARDIOGRAM TRACING: CPT

## 2017-07-04 PROCEDURE — 51701 INSERT BLADDER CATHETER: CPT

## 2017-07-04 PROCEDURE — 94640 AIRWAY INHALATION TREATMENT: CPT

## 2017-07-04 PROCEDURE — 83735 ASSAY OF MAGNESIUM: CPT | Performed by: NURSE PRACTITIONER

## 2017-07-04 PROCEDURE — 77030005563 HC CATH URETH INT MMGH -A

## 2017-07-04 PROCEDURE — 70450 CT HEAD/BRAIN W/O DYE: CPT

## 2017-07-04 PROCEDURE — 85025 COMPLETE CBC W/AUTO DIFF WBC: CPT | Performed by: EMERGENCY MEDICINE

## 2017-07-04 PROCEDURE — 74011000258 HC RX REV CODE- 258: Performed by: NURSE PRACTITIONER

## 2017-07-04 PROCEDURE — 81001 URINALYSIS AUTO W/SCOPE: CPT | Performed by: EMERGENCY MEDICINE

## 2017-07-04 PROCEDURE — 80307 DRUG TEST PRSMV CHEM ANLYZR: CPT | Performed by: EMERGENCY MEDICINE

## 2017-07-04 PROCEDURE — 82803 BLOOD GASES ANY COMBINATION: CPT

## 2017-07-04 PROCEDURE — 99285 EMERGENCY DEPT VISIT HI MDM: CPT

## 2017-07-04 PROCEDURE — P9612 CATHETERIZE FOR URINE SPEC: HCPCS

## 2017-07-04 RX ORDER — SODIUM CHLORIDE 0.9 % (FLUSH) 0.9 %
5-10 SYRINGE (ML) INJECTION AS NEEDED
Status: DISCONTINUED | OUTPATIENT
Start: 2017-07-04 | End: 2017-07-07 | Stop reason: HOSPADM

## 2017-07-04 RX ORDER — SODIUM CHLORIDE 0.9 % (FLUSH) 0.9 %
5-10 SYRINGE (ML) INJECTION EVERY 8 HOURS
Status: DISCONTINUED | OUTPATIENT
Start: 2017-07-04 | End: 2017-07-07 | Stop reason: HOSPADM

## 2017-07-04 RX ORDER — IPRATROPIUM BROMIDE AND ALBUTEROL SULFATE 2.5; .5 MG/3ML; MG/3ML
3 SOLUTION RESPIRATORY (INHALATION)
Status: DISCONTINUED | OUTPATIENT
Start: 2017-07-04 | End: 2017-07-07 | Stop reason: HOSPADM

## 2017-07-04 RX ORDER — SODIUM CHLORIDE 9 MG/ML
75 INJECTION, SOLUTION INTRAVENOUS CONTINUOUS
Status: DISCONTINUED | OUTPATIENT
Start: 2017-07-04 | End: 2017-07-05

## 2017-07-04 RX ORDER — 3% SODIUM CHLORIDE 3 G/100ML
30 INJECTION, SOLUTION INTRAVENOUS CONTINUOUS
Status: DISCONTINUED | OUTPATIENT
Start: 2017-07-04 | End: 2017-07-05

## 2017-07-04 RX ORDER — VANCOMYCIN HYDROCHLORIDE
1250 ONCE
Status: COMPLETED | OUTPATIENT
Start: 2017-07-04 | End: 2017-07-04

## 2017-07-04 RX ORDER — IBUPROFEN 200 MG
1 TABLET ORAL DAILY
Status: DISCONTINUED | OUTPATIENT
Start: 2017-07-05 | End: 2017-07-07 | Stop reason: HOSPADM

## 2017-07-04 RX ORDER — HALOPERIDOL 5 MG/ML
2 INJECTION INTRAMUSCULAR
Status: DISCONTINUED | OUTPATIENT
Start: 2017-07-04 | End: 2017-07-07 | Stop reason: HOSPADM

## 2017-07-04 RX ORDER — LORAZEPAM 2 MG/ML
1 INJECTION INTRAMUSCULAR
Status: DISCONTINUED | OUTPATIENT
Start: 2017-07-04 | End: 2017-07-07 | Stop reason: HOSPADM

## 2017-07-04 RX ORDER — DEXAMETHASONE SODIUM PHOSPHATE 4 MG/ML
10 INJECTION, SOLUTION INTRA-ARTICULAR; INTRALESIONAL; INTRAMUSCULAR; INTRAVENOUS; SOFT TISSUE
Status: COMPLETED | OUTPATIENT
Start: 2017-07-04 | End: 2017-07-04

## 2017-07-04 RX ORDER — POTASSIUM CHLORIDE 7.45 MG/ML
10 INJECTION INTRAVENOUS
Status: COMPLETED | OUTPATIENT
Start: 2017-07-04 | End: 2017-07-05

## 2017-07-04 RX ORDER — ACETAMINOPHEN 650 MG/1
650 SUPPOSITORY RECTAL
Status: DISCONTINUED | OUTPATIENT
Start: 2017-07-04 | End: 2017-07-07 | Stop reason: HOSPADM

## 2017-07-04 RX ORDER — POTASSIUM CHLORIDE 14.9 MG/ML
10 INJECTION INTRAVENOUS
Status: DISCONTINUED | OUTPATIENT
Start: 2017-07-04 | End: 2017-07-04

## 2017-07-04 RX ORDER — ONDANSETRON 2 MG/ML
4 INJECTION INTRAMUSCULAR; INTRAVENOUS
Status: DISCONTINUED | OUTPATIENT
Start: 2017-07-04 | End: 2017-07-07 | Stop reason: HOSPADM

## 2017-07-04 RX ADMIN — LORAZEPAM 1 MG: 2 INJECTION INTRAMUSCULAR; INTRAVENOUS at 21:39

## 2017-07-04 RX ADMIN — ALBUTEROL SULFATE 1 DOSE: 2.5 SOLUTION RESPIRATORY (INHALATION) at 17:06

## 2017-07-04 RX ADMIN — DEXAMETHASONE SODIUM PHOSPHATE 10 MG: 4 INJECTION, SOLUTION INTRAMUSCULAR; INTRAVENOUS at 17:17

## 2017-07-04 RX ADMIN — POTASSIUM CHLORIDE 10 MEQ: 10 INJECTION, SOLUTION INTRAVENOUS at 21:51

## 2017-07-04 RX ADMIN — VANCOMYCIN HYDROCHLORIDE 1250 MG: 10 INJECTION, POWDER, LYOPHILIZED, FOR SOLUTION INTRAVENOUS at 21:25

## 2017-07-04 RX ADMIN — IPRATROPIUM BROMIDE AND ALBUTEROL SULFATE 3 ML: .5; 3 SOLUTION RESPIRATORY (INHALATION) at 20:39

## 2017-07-04 RX ADMIN — POTASSIUM CHLORIDE 10 MEQ: 10 INJECTION, SOLUTION INTRAVENOUS at 21:00

## 2017-07-04 RX ADMIN — ALBUTEROL SULFATE 1 DOSE: 2.5 SOLUTION RESPIRATORY (INHALATION) at 16:48

## 2017-07-04 RX ADMIN — Medication 10 ML: at 21:00

## 2017-07-04 RX ADMIN — POTASSIUM CHLORIDE 10 MEQ: 10 INJECTION, SOLUTION INTRAVENOUS at 22:51

## 2017-07-04 RX ADMIN — SODIUM CHLORIDE 75 ML/HR: 900 INJECTION, SOLUTION INTRAVENOUS at 20:44

## 2017-07-04 RX ADMIN — POTASSIUM CHLORIDE 10 MEQ: 10 INJECTION, SOLUTION INTRAVENOUS at 23:58

## 2017-07-04 RX ADMIN — SODIUM CHLORIDE 30 ML/HR: 3 INJECTION, SOLUTION INTRAVENOUS at 19:48

## 2017-07-04 RX ADMIN — SODIUM CHLORIDE 1000 MG: 900 INJECTION, SOLUTION INTRAVENOUS at 20:55

## 2017-07-04 RX ADMIN — ALBUTEROL SULFATE 1 DOSE: 2.5 SOLUTION RESPIRATORY (INHALATION) at 17:25

## 2017-07-04 RX ADMIN — PIPERACILLIN SODIUM AND TAZOBACTAM SODIUM 3.38 G: 3; .375 INJECTION, POWDER, LYOPHILIZED, FOR SOLUTION INTRAVENOUS at 21:01

## 2017-07-04 RX ADMIN — HALOPERIDOL LACTATE 2 MG: 5 INJECTION, SOLUTION INTRAMUSCULAR at 22:56

## 2017-07-04 NOTE — ED TRIAGE NOTES
Pt arrives via EMS from [facility] staff reports change in pt behavior for past hour. Per EMS pt is A&O x4. Pt arrives grunting, diaphoretic and unable to sit still.

## 2017-07-04 NOTE — ED NOTES
Patient standing up at end of stretcher. Appears to be walking to sink in attempt to use restroom. Assisted back into bed and assisted with urinal with zero success. Hospitalist at bedside.

## 2017-07-04 NOTE — IP AVS SNAPSHOT
[address] [facility] [address] [place] [address] 
[phone] Patient: Silvia Marc MRN: [id number] EFN:1/60/4346 You are allergic to the following No active allergies Recent Documentation Height Weight BMI Smoking Status 1.778 m 50.9 kg 16.1 kg/m2 Current Every Day Smoker Unresulted Labs Order Current Status POST EXPOSURE PROFILE Preliminary result Emergency Contacts Name Discharge Info Relation Home Work Mobile Jhonatan Munguia  Other Relative [6] [phone] Everardo Valdes YES [1] Sister [23] [phone] About your hospitalization You were admitted on:  July 4, 2017 You last received care in the:  [facility] 2N MED SURG You were discharged on:  July 7, 2017 Unit phone number:  [phone] Why you were hospitalized Your primary diagnosis was:  Hyponatremia Your diagnoses also included:  Benign Prostatic Hyperplasia Without Lower Urinary Tract Symptoms, Chronic Obstructive Pulmonary Disease (Hcc), Poor Dentition, Tobacco Dependence, Paranoid Schizophrenia (Hcc), Underweight, Depression, Acute Encephalopathy Providers Seen During Your Hospitalizations Provider Role Specialty Primary office phone Catarina Romo DO Attending Provider Emergency Medicine [phone] Gilbert Salazar MD Attending Provider Internal Medicine [phone] Your Primary Care Physician (PCP) Primary Care Physician Office Phone Office Fax Yogi [place] [phone] Follow-up Information Follow up With Details Comments Contact Info Geovany Hartman DO In 1 week F/u after hospitalization  [facility] [address] [place] [address] 
[phone] Current Discharge Medication List  
  
START taking these medications Dose & Instructions Dispensing Information Comments Morning Noon Evening Bedtime  
 azithromycin 250 mg tablet Commonly known as:  Jennifer Barraza Start taking on:  7/8/2017 Your last dose was: Your next dose is:    
   
   
 Dose:  250 mg Take 1 Tab by mouth daily. Quantity:  3 Tab Refills:  0  
     
   
   
   
  
 predniSONE 20 mg tablet Commonly known as:  Cristian Fast Start taking on:  7/8/2017 Your last dose was: Your next dose is:    
   
   
 Dose:  20 mg Take 1 Tab by mouth daily (with breakfast). Quantity:  3 Tab Refills:  0 CONTINUE these medications which have NOT CHANGED Dose & Instructions Dispensing Information Comments Morning Noon Evening Bedtime * albuterol 2.5 mg /3 mL (0.083 %) nebulizer solution Commonly known as:  PROVENTIL VENTOLIN Your last dose was: Your next dose is:    
   
   
 by Nebulization route every six (6) hours as needed for Wheezing. Refills:  0  
     
   
   
   
  
 * VENTOLIN HFA 90 mcg/actuation inhaler Generic drug:  albuterol Your last dose was: Your next dose is:    
   
   
 INHALE 2 PUFFS EVERY 4 TO 6 HOURS AS NEEDED FOR SHORTNESS OF BREATH Quantity:  1 Inhaler Refills:  0  
     
   
   
   
  
 benztropine 2 mg tablet Commonly known as:  COGENTIN Your last dose was: Your next dose is:    
   
   
 Dose:  2 mg Take 2 mg by mouth nightly. Refills:  0  
     
   
   
   
  
 fluticasone-vilanterol 100-25 mcg/dose inhaler Commonly known as:  BREO ELLIPTA Your last dose was: Your next dose is:    
   
   
 Dose:  1 Puff Take 1 Puff by inhalation daily. Quantity:  1 Inhaler Refills:  5  
     
   
   
   
  
 guaiFENesin 100 mg/5 mL liquid Commonly known as:  ROBITUSSIN Your last dose was: Your next dose is:    
   
   
 Dose:  200 mg Take 200 mg by mouth every four (4) hours as needed for Cough. Refills:  0  
     
   
   
   
  
 ibuprofen 800 mg tablet Commonly known as:  MOTRIN  
   
 Your last dose was: Your next dose is:    
   
   
 Dose:  800 mg  
800 mg every eight (8) hours as needed. Refills:  0 PAXIL 40 mg tablet Generic drug:  PARoxetine Your last dose was: Your next dose is:    
   
   
 Dose:  40 mg Take 40 mg by mouth daily. Refills:  0 RESTORIL 30 mg capsule Generic drug:  temazepam  
   
Your last dose was: Your next dose is:    
   
   
 Dose:  30 mg Take 30 mg by mouth nightly. Refills:  0 RisperDAL 3 mg tablet Generic drug:  risperiDONE Your last dose was: Your next dose is:    
   
   
 Dose:  3 mg Take 3 mg by mouth nightly. Refills:  0  
     
   
   
   
  
 XANAX 0.5 mg tablet Generic drug:  ALPRAZolam  
   
Your last dose was: Your next dose is:    
   
   
 Dose:  0.5 mg Take 0.5 mg by mouth two (2) times a day. Refills:  0  
     
   
   
   
  
 * Notice: This list has 2 medication(s) that are the same as other medications prescribed for you. Read the directions carefully, and ask your doctor or other care provider to review them with you. Where to Get Your Medications Information on where to get these meds will be given to you by the nurse or doctor. ! Ask your nurse or doctor about these medications  
  azithromycin 250 mg tablet  
 predniSONE 20 mg tablet Discharge Instructions Discharge Instructions PATIENT ID: Val Samson MRN: [id number] YOB: 1960 DATE OF ADMISSION: 7/4/2017  4:20 PM   
DATE OF DISCHARGE: 7/7/2017 PRIMARY CARE PROVIDER: Cassie Hill DO  
 
ATTENDING PHYSICIAN: Rashard Jolly MD 
DISCHARGING PROVIDER: Rashard Jolly MD   
To contact this individual call [phone] and ask the  to page. If unavailable ask to be transferred the Adult Hospitalist Department. DISCHARGE DIAGNOSES Hyponatremia CONSULTATIONS: None PROCEDURES/SURGERIES: * No surgery found * PENDING TEST RESULTS:  
At the time of discharge the following test results are still pending: None FOLLOW UP APPOINTMENTS:  
Follow-up Information Follow up With Details Comments Contact Alessandra Felisa Hammans, DO In 1 week F/u after hospitalization  [address] [place] [address] 
[phone] ADDITIONAL CARE RECOMMENDATIONS:  
Please limit your fluid intake to 8 cups per day. Please do not smoke. DIET: Regular Diet ACTIVITY: Activity as tolerated WOUND CARE: N/A 
 
EQUIPMENT needed: N/A 
 
 
 
DISCHARGE MEDICATIONS: 
 See Medication Reconciliation Form · It is important that you take the medication exactly as they are prescribed. · Keep your medication in the bottles provided by the pharmacist and keep a list of the medication names, dosages, and times to be taken in your wallet. · Do not take other medications without consulting your doctor. NOTIFY YOUR PHYSICIAN FOR ANY OF THE FOLLOWING:  
Fever over 101 degrees for 24 hours. Chest pain, shortness of breath, fever, chills, nausea, vomiting, diarrhea, change in mentation, falling, weakness, bleeding. Severe pain or pain not relieved by medications. Or, any other signs or symptoms that you may have questions about. DISPOSITION: 
x  Home With: 
 OT  PT  [facility]  RN  
  
 SNF/Inpatient Rehab/LTAC Independent/assisted living Hospice Other: CDMP Checked:  
Yes IK PROBLEM LIST Updated: 
Yes IK Signed:  
David Jay MD 
7/7/2017 
11:36 AM 
 
Discharge Orders None [facility] Announcement We are excited to announce that we are making your provider's discharge notes available to you in [facility] [employer]. You will see these notes when they are completed and signed by the physician that discharged you from your recent hospital stay.   If you have any questions or concerns about any information you see in [employer], please call the Health Information Department where you were seen or reach out to your Primary Care Provider for more information about your plan of care. Introducing [facility]! Meeta Perdue introduces [employer] patient portal. Now you can access parts of your medical record, email your doctor's office, and request medication refills online. 1. In your internet browser, go to [email or web address] 2. Click on the First Time User? Click Here link in the Sign In box. You will see the New Member Sign Up page. 3. Enter your [employer] Access Code exactly as it appears below. You will not need to use this code after youve completed the sign-up process. If you do not sign up before the expiration date, you must request a new code. · [employer] Access Code: [email or web address] Expires: 8/13/2017 10:37 AM 
 
4. Enter the last four digits of your Social Security Number (xxxx) and Date of Birth (mm/dd/yyyy) as indicated and click Submit. You will be taken to the next sign-up page. 5. Create a [employer] ID. This will be your [employer] login ID and cannot be changed, so think of one that is secure and easy to remember. 6. Create a [employer] password. You can change your password at any time. 7. Enter your Password Reset Question and Answer. This can be used at a later time if you forget your password. 8. Enter your e-mail address. You will receive e-mail notification when new information is available in [address] 9. Click Sign Up. You can now view and download portions of your medical record. 10. Click the Download Summary menu link to download a portable copy of your medical information. If you have questions, please visit the Frequently Asked Questions section of the [employer] website. Remember, [employer] is NOT to be used for urgent needs. For medical emergencies, dial 911. Now available from your iPhone and Android! General Information Please provide this summary of care documentation to your next provider. Patient Signature:  ____________________________________________________________ Date:  ____________________________________________________________  
  
Kelley Mineral Provider Signature:  ____________________________________________________________ Date:  ____________________________________________________________

## 2017-07-04 NOTE — CONSULTS
Patient with acute symptomatic hyponatremia. Will start low rate 3% saline. Obtain BMP at 8 pm or after 2 hours of 3% saline infusion whichever is first.  D/W Dr. Mary Isidro.

## 2017-07-04 NOTE — ROUTINE PROCESS
TRANSFER - OUT REPORT:    Verbal report given to [facility], RN(name) on Brandon Parra  being transferred to ICU(unit) for routine progression of care       Report consisted of patients Situation, Background, Assessment and   Recommendations(SBAR). Information from the following report(s) SBAR, ED Summary, [facility] and Recent Results was reviewed with the receiving nurse. Lines:   Peripheral IV 07/04/17 Right Forearm (Active)   Site Assessment Clean, dry, & intact 7/4/2017  4:52 PM   Phlebitis Assessment 0 7/4/2017  4:52 PM   Infiltration Assessment 0 7/4/2017  4:52 PM   Dressing Status Clean, dry, & intact 7/4/2017  4:52 PM   Dressing Type Transparent 7/4/2017  4:52 PM   Hub Color/Line Status Pink;Flushed;Patent 7/4/2017  4:52 PM   Action Taken Blood drawn 7/4/2017  4:52 PM        Opportunity for questions and clarification was provided.       Patient transported with:   Monitor  Registered Nurse

## 2017-07-04 NOTE — IP AVS SNAPSHOT
Current Discharge Medication List  
  
START taking these medications Dose & Instructions Dispensing Information Comments Morning Noon Evening Bedtime  
 azithromycin 250 mg tablet Commonly known as:  Amanda Sarah Start taking on:  7/8/2017 Your last dose was: Your next dose is:    
   
   
 Dose:  250 mg Take 1 Tab by mouth daily. Quantity:  3 Tab Refills:  0  
     
   
   
   
  
 predniSONE 20 mg tablet Commonly known as:  [facility] Start taking on:  7/8/2017 Your last dose was: Your next dose is:    
   
   
 Dose:  20 mg Take 1 Tab by mouth daily (with breakfast). Quantity:  3 Tab Refills:  0 CONTINUE these medications which have NOT CHANGED Dose & Instructions Dispensing Information Comments Morning Noon Evening Bedtime * albuterol 2.5 mg /3 mL (0.083 %) nebulizer solution Commonly known as:  PROVENTIL VENTOLIN Your last dose was: Your next dose is:    
   
   
 by Nebulization route every six (6) hours as needed for Wheezing. Refills:  0  
     
   
   
   
  
 * VENTOLIN HFA 90 mcg/actuation inhaler Generic drug:  albuterol Your last dose was: Your next dose is:    
   
   
 INHALE 2 PUFFS EVERY 4 TO 6 HOURS AS NEEDED FOR SHORTNESS OF BREATH Quantity:  1 Inhaler Refills:  0  
     
   
   
   
  
 benztropine 2 mg tablet Commonly known as:  COGENTIN Your last dose was: Your next dose is:    
   
   
 Dose:  2 mg Take 2 mg by mouth nightly. Refills:  0  
     
   
   
   
  
 fluticasone-vilanterol 100-25 mcg/dose inhaler Commonly known as:  BREO ELLIPTA Your last dose was: Your next dose is:    
   
   
 Dose:  1 Puff Take 1 Puff by inhalation daily. Quantity:  1 Inhaler Refills:  5  
     
   
   
   
  
 guaiFENesin 100 mg/5 mL liquid Commonly known as:  ROBITUSSIN Your last dose was: Your next dose is:    
   
   
 Dose:  200 mg Take 200 mg by mouth every four (4) hours as needed for Cough. Refills:  0  
     
   
   
   
  
 ibuprofen 800 mg tablet Commonly known as:  MOTRIN Your last dose was: Your next dose is:    
   
   
 Dose:  800 mg  
800 mg every eight (8) hours as needed. Refills:  0 PAXIL 40 mg tablet Generic drug:  PARoxetine Your last dose was: Your next dose is:    
   
   
 Dose:  40 mg Take 40 mg by mouth daily. Refills:  0 RESTORIL 30 mg capsule Generic drug:  temazepam  
   
Your last dose was: Your next dose is:    
   
   
 Dose:  30 mg Take 30 mg by mouth nightly. Refills:  0 RisperDAL 3 mg tablet Generic drug:  risperiDONE Your last dose was: Your next dose is:    
   
   
 Dose:  3 mg Take 3 mg by mouth nightly. Refills:  0  
     
   
   
   
  
 XANAX 0.5 mg tablet Generic drug:  ALPRAZolam  
   
Your last dose was: Your next dose is:    
   
   
 Dose:  0.5 mg Take 0.5 mg by mouth two (2) times a day. Refills:  0  
     
   
   
   
  
 * Notice: This list has 2 medication(s) that are the same as other medications prescribed for you. Read the directions carefully, and ask your doctor or other care provider to review them with you. Where to Get Your Medications Information on where to get these meds will be given to you by the nurse or doctor. ! Ask your nurse or doctor about these medications  
  azithromycin 250 mg tablet  
 predniSONE 20 mg tablet

## 2017-07-04 NOTE — ED PROVIDER NOTES
HPI Comments: 61 y.o. male with past medical history significant for paranoid schizophrenia, COPD who presents from [facility] via EMS with AMS. EMS reports that around 3 PM today the pt became nonverbal and would only make grunting noises. He voices no complaints. Pt is otherwise unable to provide any history    PCP: Deonte Duffy,     Full history, physical exam, and ROS unable to be obtained due to:  Confusion. 4:28 PM  Urmila Campos DO reviewed electronic medical record system for any past medical records that were available that may contribute to the patients current condition. Pt's PCP is Trista Butler NP and he was recently seen by her for a yearly physical.     Note written by Fabian Clarke, as dictated by Urmila Campos DO 7:13 PM     .         The history is provided by the patient, the EMS personnel and a relative. The history is limited by the condition of the patient. No  was used. Past Medical History:   Diagnosis Date    Asthma     seldom,use inhaler    Bronchitis     Chronic obstructive pulmonary disease ([facility] [address]     Depression     anxiety    Psychiatric disorder     paranoid schizophrenia    Psychiatric disorder     extrapyramidal disease    Psychotic disorder     mental retardation       Past Surgical History:   Procedure Laterality Date    HX ORTHOPAEDIC      right knee         Family History:   Problem Relation Age of Onset    Diabetes Mother     Heart Disease Father      CHF       Social History     Social History    Marital status: SINGLE     Spouse name: N/A    Number of children: N/A    Years of education: N/A     Occupational History    Not on file.      Social History Main Topics    Smoking status: Current Every Day Smoker     Packs/day: 1.00     Years: 25.00    Smokeless tobacco: Never Used    Alcohol use No      Comment: socially    Drug use: Yes     Special: Sedatives/Hypnotics, Prescription    Sexual activity: Not Currently     Birth control/ protection: None      Comment: lives in [place]     Other Topics Concern    Not on file     Social History Narrative         ALLERGIES: Review of patient's allergies indicates no known allergies. Review of Systems   Unable to perform ROS: Mental status change       Vitals:    07/04/17 1632 07/04/17 1647 07/04/17 1700 07/04/17 1730   BP: 138/83 125/74 130/79 117/81   Pulse: 84 85 87 93   Resp: 20 21 24 24   Temp: 97.4 °F (36.3 °C)  98.6 °F (37 °C)    SpO2: (!) 87% 96% 95% 94%   Weight: 63.5 kg (140 lb)      Height: 5' 10\" (1.778 m)               Physical Exam      Constitutional: Pt has soiled himself. Pt is delirous, confused. Barely conscious. Combative. HENT:   Head: Normocephalic and atraumatic. Nose: Nose normal.   Mouth/Throat: Oropharynx is clear and moist. No oropharyngeal exudate. Eyes: Pupils are 8mm and dilated. Conjunctivae and extraocular motions are normal. Right eye exhibits no discharge. Left eye exhibits no discharge. No scleral icterus. Neck: No tracheal deviation present. Supple neck. Cardiovascular: Normal rate, regular rhythm, normal heart sounds and intact distal pulses. Exam reveals no gallop and no friction rub. No murmur heard. Pulmonary/Chest: Inspiratory crackles in the right base. Wheezing right upper lobe. Breath sounds difficult to hear due to grunting. Tachypneic. Moderate resp distress. Abdominal: Soft. Pt  exhibits no distension and no mass. No tenderness. Pt  has no rebound and no guarding. Musculoskeletal:  Pt  exhibits no edema and no tenderness. Ext: Normal ROM in all four extremities; not tender to palpation; distal pulses are normal, no edema. Neurological: Moves all extremities. Skin: Slightly diaphoretic  Psychiatric:  Pt  has a normal mood and affect.  Behavior is normal.     Note written by Fabian Romero, as dictated by Cyrus Hodgson DO 7:15 PM     MDM  Number of Diagnoses or Management Options  Critical Care  Total time providing critical care: 30-74 minutes    35 minutes      ED Course       Procedures    ED EKG interpretation:  Rhythm: normal sinus rhythm; and regular . Rate (approx.): 83 bpm; Axis: normal; nl intervals; ST/T wave: no acute changes; large amount of artifact    Note written by Fabian Flores, as dictated by Audelia Aguiar DO 7:14 PM     Progress notes:  6:14 PM Pt's sister at bedside at this time. Discussed available lab and imaging results with her. Pt still groaning, not verbally communicating. Pt's sister says this is out of the ordinary and that he's usually alert, speaks clearly. Consults:  Hospitalist    Disposition: Admit to Dr. Karel Oshea, hospitalist service  7:15 PM  Patient is being admitted to the hospital.  The results of their tests and reasons for their admission have been discussed with them and/or available family. They convey agreement and understanding for the need to be admitted and for their admission diagnosis. Consultation will be made now with the inpatient physician for hospitalization. d/w Dr Meryle Erb - will see.   Agrees with hypertonic saline, 3% at 30 ml/hr for now    Nebs given  Lung sounds better  Steroids  Cxr reviewed    ICU admit    Recent Results (from the past 12 hour(s))   GLUCOSE, POC    Collection Time: 07/04/17  4:37 PM   Result Value Ref Range    Glucose (POC) 132 (H) 65 - 100 mg/dL    Performed by Kylee Chance    LACTIC ACID    Collection Time: 07/04/17  4:51 PM   Result Value Ref Range    Lactic acid 1.6 0.4 - 2.0 MMOL/L   CBC WITH AUTOMATED DIFF    Collection Time: 07/04/17  4:51 PM   Result Value Ref Range    WBC 11.4 (H) 4.1 - 11.1 K/uL    RBC 4.33 4.10 - 5.70 M/uL    HGB 14.1 12.1 - 17.0 g/dL    HCT 38.6 36.6 - 50.3 %    MCV 89.1 80.0 - 99.0 FL    MCH 32.6 26.0 - 34.0 PG    MCHC 36.5 30.0 - 36.5 g/dL    RDW 12.3 11.5 - 14.5 %    PLATELET 105 971 - 009 K/uL    NEUTROPHILS 87 (H) 32 - 75 %    LYMPHOCYTES 6 (L) 12 - 49 %    MONOCYTES 7 5 - 13 %    EOSINOPHILS 0 0 - 7 %    BASOPHILS 0 0 - 1 %    ABS. NEUTROPHILS 9.9 (H) 1.8 - 8.0 K/UL    ABS. LYMPHOCYTES 0.7 (L) 0.8 - 3.5 K/UL    ABS. MONOCYTES 0.8 0.0 - 1.0 K/UL    ABS. EOSINOPHILS 0.0 0.0 - 0.4 K/UL    ABS. BASOPHILS 0.0 0.0 - 0.1 K/UL    DF SMEAR SCANNED      RBC COMMENTS OVALOCYTES  PRESENT       METABOLIC PANEL, COMPREHENSIVE    Collection Time: 07/04/17  4:51 PM   Result Value Ref Range    Sodium 114 (LL) 136 - 145 mmol/L    Potassium 3.7 3.5 - 5.1 mmol/L    Chloride 76 (L) 97 - 108 mmol/L    CO2 26 21 - 32 mmol/L    Anion gap 12 5 - 15 mmol/L    Glucose 118 (H) 65 - 100 mg/dL    BUN 2 (L) 6 - 20 MG/DL    Creatinine 0.43 (L) 0.70 - 1.30 MG/DL    BUN/Creatinine ratio 5 (L) 12 - 20      GFR est AA >60 >60 ml/min/1.73m2    GFR est non-AA >60 >60 ml/min/1.73m2    Calcium 8.0 (L) 8.5 - 10.1 MG/DL    Bilirubin, total 1.2 (H) 0.2 - 1.0 MG/DL    ALT (SGPT) 21 12 - 78 U/L    AST (SGOT) 40 (H) 15 - 37 U/L    Alk.  phosphatase 99 45 - 117 U/L    Protein, total 6.7 6.4 - 8.2 g/dL    Albumin 3.4 (L) 3.5 - 5.0 g/dL    Globulin 3.3 2.0 - 4.0 g/dL    A-G Ratio 1.0 (L) 1.1 - 2.2     TROPONIN I    Collection Time: 07/04/17  4:51 PM   Result Value Ref Range    Troponin-I, Qt. <0.04 <0.05 ng/mL   URINALYSIS W/ RFLX MICROSCOPIC    Collection Time: 07/04/17  4:51 PM   Result Value Ref Range    Color YELLOW/STRAW      Appearance CLEAR CLEAR      Specific gravity 1.007 1.003 - 1.030      pH (UA) 7.0 5.0 - 8.0      Protein NEGATIVE  NEG mg/dL    Glucose NEGATIVE  NEG mg/dL    Ketone 15 (A) NEG mg/dL    Bilirubin NEGATIVE  NEG      Blood MODERATE (A) NEG      Urobilinogen 0.2 0.2 - 1.0 EU/dL    Nitrites NEGATIVE  NEG      Leukocyte Esterase NEGATIVE  NEG      WBC 0-4 0 - 4 /hpf    RBC 10-20 0 - 5 /hpf    Epithelial cells FEW FEW /lpf    Bacteria NEGATIVE  NEG /hpf    Hyaline cast 0-2 0 - 5 /lpf   PROTHROMBIN TIME + INR    Collection Time: 07/04/17  4:51 PM   Result Value Ref Range    INR 1.1 0.9 - 1.1      Prothrombin time 11.3 (H) 9.0 - 11.1 sec   OSMOLALITY, SERUM/PLASMA    Collection Time: 07/04/17  4:51 PM   Result Value Ref Range    Osmolality, serum/plasma 237 mOsm/kg H2O   MAGNESIUM    Collection Time: 07/04/17  4:51 PM   Result Value Ref Range    Magnesium 1.6 1.6 - 2.4 mg/dL   POC G3 CAPILLARY    Collection Time: 07/04/17  4:55 PM   Result Value Ref Range    FIO2 (POC) 0.21 %    pH, capillary (POC) 7.476 (H) 7.32 - 7.42      pCO2, capillary (POC) 45.8 45 - 55 MMHG    pO2, capillary (POC) 37 (L) 40 - 50 MMHG    HCO3 (POC) 33.8 (H) 22 - 26 MMOL/L    sO2 (POC) 74 (L) 92 - 97 %    Base excess (POC) 10 mmol/L    Site OTHER      Device: ROOM AIR      Allens test (POC) N/A      Specimen type (POC) CAPILLARY     EKG, 12 LEAD, INITIAL    Collection Time: 07/04/17  5:04 PM   Result Value Ref Range    Ventricular Rate 83 BPM    Atrial Rate 83 BPM    P-R Interval 156 ms    QRS Duration 84 ms    Q-T Interval 406 ms    QTC Calculation (Bezet) 477 ms    Calculated P Axis 101 degrees    Calculated R Axis 87 degrees    Calculated T Axis 75 degrees    Diagnosis       Normal sinus rhythm  When compared with ECG of 26-AUG-2013 19:10,  No significant change was found     DRUG SCREEN, URINE    Collection Time: 07/04/17  6:21 PM   Result Value Ref Range    AMPHETAMINE NEGATIVE  NEG      BARBITURATES NEGATIVE  NEG      BENZODIAZEPINE NEGATIVE  NEG      COCAINE NEGATIVE  NEG      METHADONE NEGATIVE  NEG      OPIATES NEGATIVE  NEG      PCP(PHENCYCLIDINE) NEGATIVE  NEG      THC (TH-CANNABINOL) NEGATIVE  NEG      Drug screen comment (NOTE)    OSMOLALITY, UR    Collection Time: 07/04/17  6:21 PM   Result Value Ref Range    Osmolality,urine 137 MOSM/kg H2O   SODIUM, UR, RANDOM    Collection Time: 07/04/17  6:21 PM   Result Value Ref Range    Sodium urine, random 16 MMOL/L   METABOLIC PANEL, BASIC    Collection Time: 07/04/17  7:04 PM   Result Value Ref Range    Sodium 119 (LL) 136 - 145 mmol/L    Potassium 2.9 (L) 3.5 - 5.1 mmol/L    Chloride 80 (L) 97 - 108 mmol/L    CO2 24 21 - 32 mmol/L    Anion gap 15 5 - 15 mmol/L    Glucose 163 (H) 65 - 100 mg/dL    BUN 3 (L) 6 - 20 MG/DL    Creatinine 0.69 (L) 0.70 - 1.30 MG/DL    BUN/Creatinine ratio 4 (L) 12 - 20      GFR est AA >60 >60 ml/min/1.73m2    GFR est non-AA >60 >60 ml/min/1.73m2    Calcium 8.5 8.5 - 10.1 MG/DL   TROPONIN I    Collection Time: 07/04/17  7:04 PM   Result Value Ref Range    Troponin-I, Qt. <0.04 <0.05 ng/mL

## 2017-07-04 NOTE — H&P
History & Physical  Dictated #[id number]      Assessment and Plan:     Principal Problem:    Hyponatremia (7/4/2017)    Active Problems:    Depression (1/31/2012)      Chronic obstructive pulmonary disease ([facility] [address] (3/29/2016)      Tobacco dependence (3/29/2016)      Underweight (3/29/2016)      Paranoid schizophrenia ([facility] [address] (3/29/2016)      Poor dentition (3/29/2016)      Benign prostatic hyperplasia without lower urinary tract symptoms (5/15/2017)    Leukocytosis:  Empiric ABx for a possible sepsis.               Signed By: Jaxson Alvarez MD     July 4, 2017

## 2017-07-05 PROBLEM — G93.40 ACUTE ENCEPHALOPATHY: Status: ACTIVE | Noted: 2017-07-05

## 2017-07-05 LAB
ANION GAP BLD CALC-SCNC: 10 MMOL/L (ref 5–15)
ANION GAP BLD CALC-SCNC: 4 MMOL/L (ref 5–15)
ANION GAP BLD CALC-SCNC: 5 MMOL/L (ref 5–15)
ANION GAP BLD CALC-SCNC: 8 MMOL/L (ref 5–15)
ATRIAL RATE: 83 BPM
BASOPHILS # BLD AUTO: 0 K/UL (ref 0–0.1)
BASOPHILS # BLD: 0 % (ref 0–1)
BUN SERPL-MCNC: 10 MG/DL (ref 6–20)
BUN SERPL-MCNC: 3 MG/DL (ref 6–20)
BUN SERPL-MCNC: 5 MG/DL (ref 6–20)
BUN SERPL-MCNC: 7 MG/DL (ref 6–20)
BUN/CREAT SERPL: 10 (ref 12–20)
BUN/CREAT SERPL: 11 (ref 12–20)
BUN/CREAT SERPL: 5 (ref 12–20)
BUN/CREAT SERPL: 8 (ref 12–20)
CALCIUM SERPL-MCNC: 8.2 MG/DL (ref 8.5–10.1)
CALCIUM SERPL-MCNC: 8.3 MG/DL (ref 8.5–10.1)
CALCIUM SERPL-MCNC: 8.7 MG/DL (ref 8.5–10.1)
CALCIUM SERPL-MCNC: 8.8 MG/DL (ref 8.5–10.1)
CALCULATED P AXIS, ECG09: 101 DEGREES
CALCULATED R AXIS, ECG10: 87 DEGREES
CALCULATED T AXIS, ECG11: 75 DEGREES
CHLORIDE SERPL-SCNC: 91 MMOL/L (ref 97–108)
CHLORIDE SERPL-SCNC: 94 MMOL/L (ref 97–108)
CHLORIDE SERPL-SCNC: 98 MMOL/L (ref 97–108)
CHLORIDE SERPL-SCNC: 98 MMOL/L (ref 97–108)
CO2 SERPL-SCNC: 24 MMOL/L (ref 21–32)
CO2 SERPL-SCNC: 30 MMOL/L (ref 21–32)
CO2 SERPL-SCNC: 34 MMOL/L (ref 21–32)
CO2 SERPL-SCNC: 35 MMOL/L (ref 21–32)
CREAT SERPL-MCNC: 0.6 MG/DL (ref 0.7–1.3)
CREAT SERPL-MCNC: 0.66 MG/DL (ref 0.7–1.3)
CREAT SERPL-MCNC: 0.66 MG/DL (ref 0.7–1.3)
CREAT SERPL-MCNC: 0.97 MG/DL (ref 0.7–1.3)
DIAGNOSIS, 93000: NORMAL
EOSINOPHIL # BLD: 0 K/UL (ref 0–0.4)
EOSINOPHIL NFR BLD: 0 % (ref 0–7)
ERYTHROCYTE [DISTWIDTH] IN BLOOD BY AUTOMATED COUNT: 12.5 % (ref 11.5–14.5)
GLUCOSE SERPL-MCNC: 100 MG/DL (ref 65–100)
GLUCOSE SERPL-MCNC: 118 MG/DL (ref 65–100)
GLUCOSE SERPL-MCNC: 121 MG/DL (ref 65–100)
GLUCOSE SERPL-MCNC: 93 MG/DL (ref 65–100)
HCT VFR BLD AUTO: 40.4 % (ref 36.6–50.3)
HGB BLD-MCNC: 14.7 G/DL (ref 12.1–17)
LYMPHOCYTES # BLD AUTO: 7 % (ref 12–49)
LYMPHOCYTES # BLD: 0.7 K/UL (ref 0.8–3.5)
MAGNESIUM SERPL-MCNC: 1.9 MG/DL (ref 1.6–2.4)
MCH RBC QN AUTO: 32.7 PG (ref 26–34)
MCHC RBC AUTO-ENTMCNC: 36.4 G/DL (ref 30–36.5)
MCV RBC AUTO: 89.8 FL (ref 80–99)
MONOCYTES # BLD: 1.1 K/UL (ref 0–1)
MONOCYTES NFR BLD AUTO: 10 % (ref 5–13)
NEUTS SEG # BLD: 8.7 K/UL (ref 1.8–8)
NEUTS SEG NFR BLD AUTO: 83 % (ref 32–75)
OSMOLALITY UR: 127 MOSM/KG H2O
P-R INTERVAL, ECG05: 156 MS
PHOSPHATE SERPL-MCNC: 3.6 MG/DL (ref 2.6–4.7)
PLATELET # BLD AUTO: 211 K/UL (ref 150–400)
POTASSIUM SERPL-SCNC: 3.5 MMOL/L (ref 3.5–5.1)
POTASSIUM SERPL-SCNC: 3.8 MMOL/L (ref 3.5–5.1)
POTASSIUM SERPL-SCNC: 4 MMOL/L (ref 3.5–5.1)
POTASSIUM SERPL-SCNC: 4.2 MMOL/L (ref 3.5–5.1)
Q-T INTERVAL, ECG07: 406 MS
QRS DURATION, ECG06: 84 MS
QTC CALCULATION (BEZET), ECG08: 477 MS
RBC # BLD AUTO: 4.5 M/UL (ref 4.1–5.7)
SODIUM SERPL-SCNC: 125 MMOL/L (ref 136–145)
SODIUM SERPL-SCNC: 132 MMOL/L (ref 136–145)
SODIUM SERPL-SCNC: 137 MMOL/L (ref 136–145)
SODIUM SERPL-SCNC: 137 MMOL/L (ref 136–145)
SODIUM UR-SCNC: 24 MMOL/L
VENTRICULAR RATE, ECG03: 83 BPM
WBC # BLD AUTO: 10.4 K/UL (ref 4.1–11.1)

## 2017-07-05 PROCEDURE — 74011250636 HC RX REV CODE- 250/636: Performed by: NURSE PRACTITIONER

## 2017-07-05 PROCEDURE — 65270000032 HC RM SEMIPRIVATE

## 2017-07-05 PROCEDURE — 83935 ASSAY OF URINE OSMOLALITY: CPT | Performed by: INTERNAL MEDICINE

## 2017-07-05 PROCEDURE — 77010033678 HC OXYGEN DAILY

## 2017-07-05 PROCEDURE — 74011250636 HC RX REV CODE- 250/636: Performed by: INTERNAL MEDICINE

## 2017-07-05 PROCEDURE — 80048 BASIC METABOLIC PNL TOTAL CA: CPT | Performed by: INTERNAL MEDICINE

## 2017-07-05 PROCEDURE — 77030032490 HC SLV COMPR SCD KNE COVD -B

## 2017-07-05 PROCEDURE — 74011250637 HC RX REV CODE- 250/637: Performed by: NURSE PRACTITIONER

## 2017-07-05 PROCEDURE — 85025 COMPLETE CBC W/AUTO DIFF WBC: CPT | Performed by: INTERNAL MEDICINE

## 2017-07-05 PROCEDURE — 94640 AIRWAY INHALATION TREATMENT: CPT

## 2017-07-05 PROCEDURE — 74011000250 HC RX REV CODE- 250: Performed by: NURSE PRACTITIONER

## 2017-07-05 PROCEDURE — 77030034850

## 2017-07-05 PROCEDURE — 36415 COLL VENOUS BLD VENIPUNCTURE: CPT | Performed by: INTERNAL MEDICINE

## 2017-07-05 PROCEDURE — 74011000258 HC RX REV CODE- 258: Performed by: INTERNAL MEDICINE

## 2017-07-05 PROCEDURE — 74011000258 HC RX REV CODE- 258: Performed by: NURSE PRACTITIONER

## 2017-07-05 PROCEDURE — 83735 ASSAY OF MAGNESIUM: CPT | Performed by: INTERNAL MEDICINE

## 2017-07-05 PROCEDURE — 74011000250 HC RX REV CODE- 250: Performed by: INTERNAL MEDICINE

## 2017-07-05 PROCEDURE — 84300 ASSAY OF URINE SODIUM: CPT | Performed by: INTERNAL MEDICINE

## 2017-07-05 PROCEDURE — 74011250637 HC RX REV CODE- 250/637: Performed by: INTERNAL MEDICINE

## 2017-07-05 PROCEDURE — 84100 ASSAY OF PHOSPHORUS: CPT | Performed by: INTERNAL MEDICINE

## 2017-07-05 RX ORDER — BUDESONIDE 0.5 MG/2ML
500 INHALANT ORAL
Status: DISCONTINUED | OUTPATIENT
Start: 2017-07-05 | End: 2017-07-07 | Stop reason: HOSPADM

## 2017-07-05 RX ORDER — ARFORMOTEROL TARTRATE 15 UG/2ML
15 SOLUTION RESPIRATORY (INHALATION)
Status: DISCONTINUED | OUTPATIENT
Start: 2017-07-05 | End: 2017-07-07 | Stop reason: HOSPADM

## 2017-07-05 RX ORDER — DEXTROSE MONOHYDRATE 50 MG/ML
75 INJECTION, SOLUTION INTRAVENOUS CONTINUOUS
Status: DISCONTINUED | OUTPATIENT
Start: 2017-07-05 | End: 2017-07-06

## 2017-07-05 RX ORDER — BENZTROPINE MESYLATE 1 MG/1
2 TABLET ORAL
Status: DISCONTINUED | OUTPATIENT
Start: 2017-07-05 | End: 2017-07-07 | Stop reason: HOSPADM

## 2017-07-05 RX ORDER — TEMAZEPAM 15 MG/1
30 CAPSULE ORAL
Status: DISCONTINUED | OUTPATIENT
Start: 2017-07-05 | End: 2017-07-07 | Stop reason: HOSPADM

## 2017-07-05 RX ORDER — GUAIFENESIN 100 MG/5ML
200 SOLUTION ORAL
Status: DISCONTINUED | OUTPATIENT
Start: 2017-07-05 | End: 2017-07-07 | Stop reason: HOSPADM

## 2017-07-05 RX ORDER — PAROXETINE HYDROCHLORIDE 20 MG/1
40 TABLET, FILM COATED ORAL DAILY
Status: DISCONTINUED | OUTPATIENT
Start: 2017-07-06 | End: 2017-07-07 | Stop reason: HOSPADM

## 2017-07-05 RX ORDER — RISPERIDONE 1 MG/1
3 TABLET, FILM COATED ORAL
Status: DISCONTINUED | OUTPATIENT
Start: 2017-07-05 | End: 2017-07-07 | Stop reason: HOSPADM

## 2017-07-05 RX ORDER — FUROSEMIDE 10 MG/ML
40 INJECTION INTRAMUSCULAR; INTRAVENOUS ONCE
Status: COMPLETED | OUTPATIENT
Start: 2017-07-05 | End: 2017-07-05

## 2017-07-05 RX ADMIN — DEXTROSE MONOHYDRATE 75 ML/HR: 5 INJECTION, SOLUTION INTRAVENOUS at 09:21

## 2017-07-05 RX ADMIN — VANCOMYCIN HYDROCHLORIDE 750 MG: 750 INJECTION, POWDER, LYOPHILIZED, FOR SOLUTION INTRAVENOUS at 05:49

## 2017-07-05 RX ADMIN — IPRATROPIUM BROMIDE AND ALBUTEROL SULFATE 3 ML: .5; 3 SOLUTION RESPIRATORY (INHALATION) at 02:08

## 2017-07-05 RX ADMIN — TEMAZEPAM 30 MG: 15 CAPSULE ORAL at 22:27

## 2017-07-05 RX ADMIN — BENZTROPINE MESYLATE 2 MG: 1 TABLET ORAL at 22:27

## 2017-07-05 RX ADMIN — IPRATROPIUM BROMIDE AND ALBUTEROL SULFATE 3 ML: .5; 3 SOLUTION RESPIRATORY (INHALATION) at 20:09

## 2017-07-05 RX ADMIN — BUDESONIDE 500 MCG: 0.5 INHALANT RESPIRATORY (INHALATION) at 20:09

## 2017-07-05 RX ADMIN — SODIUM CHLORIDE 0.4 MCG/KG/HR: 900 INJECTION, SOLUTION INTRAVENOUS at 02:37

## 2017-07-05 RX ADMIN — DESMOPRESSIN ACETATE 1 MCG: 4 SOLUTION INTRAVENOUS at 15:44

## 2017-07-05 RX ADMIN — Medication 10 ML: at 05:54

## 2017-07-05 RX ADMIN — FUROSEMIDE 40 MG: 10 INJECTION, SOLUTION INTRAMUSCULAR; INTRAVENOUS at 02:34

## 2017-07-05 RX ADMIN — Medication 10 ML: at 15:46

## 2017-07-05 RX ADMIN — IPRATROPIUM BROMIDE AND ALBUTEROL SULFATE 3 ML: .5; 3 SOLUTION RESPIRATORY (INHALATION) at 13:18

## 2017-07-05 RX ADMIN — Medication 10 ML: at 22:31

## 2017-07-05 RX ADMIN — IPRATROPIUM BROMIDE AND ALBUTEROL SULFATE 3 ML: .5; 3 SOLUTION RESPIRATORY (INHALATION) at 08:24

## 2017-07-05 RX ADMIN — PIPERACILLIN SODIUM AND TAZOBACTAM SODIUM 3.38 G: 3; .375 INJECTION, POWDER, LYOPHILIZED, FOR SOLUTION INTRAVENOUS at 05:49

## 2017-07-05 RX ADMIN — Medication 10 ML: at 15:45

## 2017-07-05 RX ADMIN — RISPERIDONE 3 MG: 1 TABLET ORAL at 22:27

## 2017-07-05 RX ADMIN — SODIUM CHLORIDE 500 MG: 900 INJECTION, SOLUTION INTRAVENOUS at 08:54

## 2017-07-05 NOTE — ROUTINE PROCESS
[id number] received verbal bedside report from [address]- alarms checked    [facility] Interdisciplinary team rounds were held 7/5/2017 with the following team members:Care Management, Nursing, Pharmacy, Physical Therapy, Physician and Respiratory Therapy and the patient. Plan of care discussed. See clinical pathway and/or care plan for interventions and desired outcomes. TRANSFER - OUT REPORT:    Verbal report given to toñito dalal(name) on Ankit Ragland  being transferred to [address] [place] [address](unit) for routine progression of care       Report consisted of patients Situation, Background, Assessment and   Recommendations(SBAR). Information from the following report(s) SBAR, Kardex, Intake/Output, MAR, Recent Results and Cardiac Rhythm nsr was reviewed with the receiving nurse. Lines:   Peripheral IV 07/04/17 Right Forearm (Active)   Site Assessment Clean, dry, & intact 7/5/2017 12:00 PM   Phlebitis Assessment 0 7/5/2017 12:00 PM   Infiltration Assessment 0 7/5/2017 12:00 PM   Dressing Status Clean, dry, & intact 7/5/2017 12:00 PM   Dressing Type Transparent 7/5/2017 12:00 PM   Hub Color/Line Status Pink;Capped; End cap changed; Flushed 7/5/2017 12:00 PM   Action Taken Open ports on tubing capped 7/5/2017 12:00 PM   Alcohol Cap Used Yes 7/5/2017 12:00 PM       Peripheral IV 07/04/17 Right Arm (Active)   Site Assessment Clean, dry, & intact 7/5/2017 12:00 PM   Phlebitis Assessment 0 7/5/2017 12:00 PM   Infiltration Assessment 0 7/5/2017 12:00 PM   Dressing Status Clean, dry, & intact 7/5/2017 12:00 PM   Dressing Type Transparent 7/5/2017 12:00 PM   Hub Color/Line Status Pink; Infusing 7/5/2017 12:00 PM   Action Taken Open ports on tubing capped 7/5/2017 12:00 PM   Alcohol Cap Used Yes 7/5/2017 12:00 PM        Opportunity for questions and clarification was provided.       Patient transported with:   O2 @ 2 liters

## 2017-07-05 NOTE — PROGRESS NOTES
Pharmacist Note - Vancomycin Dosing    Consult provided for this 61 y.o. male for indication of sepsis. Antibiotic regimen(s): Zosyn and Vancomycin    Recent Labs      17   0052  17   1904  17   1651   WBC   --    --   11.4*   CREA  0.60*  0.69*  0.43*   BUN  3*  3*  2*     Frequency of BMP: daily    Est CrCl: >100 ml/min  Temp (24hrs), Av °F (36.7 °C), Min:97.4 °F (36.3 °C), Max:98.6 °F (37 °C)    Goal trough = 15 - 20 mcg/mL    Therapy will be initiated with a loading dose of 1250 mg IV x 1 to be followed by a maintenance dose of 750 mg IV every 8 hours. Pharmacy to follow patient daily and order levels / make dose adjustments as appropriate.

## 2017-07-05 NOTE — PROGRESS NOTES
0800 assumed care of pt  0900 pharmcist in and inquiring about the NA- Dr Pratik Mckeon  called - Dr Kayli Conroy in and notified- fluids changed to d5w-precedex reduced to 0.3  0915 Dr Pratik Mckeon in and is aware of fluid change and the na level- another BMP will be drawn at 1100 today to reassess the na level  1030 Dr Tita Seveirno in  1100 bedside swallow done- restraints and precedex stopped  1240 call placed to Dr Pratik Mckeon to give her the lab results- Dr Ramon Spear notified  1300 Dr Pratik Mckeon called in and order to be placed for medication  [phone] message left for sister that pt had been move- no answer on brother's phone

## 2017-07-05 NOTE — CONSULTS
PULMONARY/CRITICAL CARE/SLEEP MEDICINE    Initial Physician Consultation Note    Name: Angelina Wadsworth   : 1960   MRN: [id number]   Date: 2017      Subjective:   Consult Note: 2017     This patient has been seen and evaluated as a new patient admitted to ICU. Medical records and data reviewed. Patient is a 61 y.o. male who presented to the hospital with AMS. He was found to have severe hyponatremia which is now corrected. He is hemodynamically stable and on minimal O2. Nephrology, Hospitalist, Psychiatry and Neurology are following      Assessment:   Metabolic encephalopathy  Severe hyponatremia  COPD  H/O Schizophrenia    Recommendations:     Management per consulting teams  On empiric zosyn- no cultures  Psych meds adjustment ongoing  On nicoderm, bronchodilators  Wean O2  Critical care needs seem to have resolved  Transfer out of ICU and we will be available to see him again as needed    Active Problem List:     Problem List  Date Reviewed: 2017          Codes Class    Acute encephalopathy ICD-10-CM: G93.40  ICD-9-CM: 348.30         * (Principal)Hyponatremia ICD-10-CM: E87.1  ICD-9-CM: 276.1         Benign prostatic hyperplasia without lower urinary tract symptoms ICD-10-CM: N40.0  ICD-9-CM: 600.00         Chronic obstructive pulmonary disease ([facility] ICD-10-CM: J44.9  ICD-9-CM: 496         Tobacco dependence ICD-10-CM: F17.200  ICD-9-CM: 305.1         Underweight ICD-10-CM: R63.6  ICD-9-CM: 783.22         Paranoid schizophrenia ([facility] ICD-10-CM: F20.0  ICD-9-CM: 295.30         Poor dentition ICD-10-CM: K08.9  ICD-9-CM: 525.9         Insomnia ICD-10-CM: G47.00  ICD-9-CM: 780.52         Bronchitis, chronic ([facility] ICD-10-CM: J42  ICD-9-CM: 491.9         Depression ICD-10-CM: F32.9  ICD-9-CM: 210               Past Medical History:      has a past medical history of Asthma; Bronchitis; Chronic obstructive pulmonary disease ([facility]  Depression; Psychiatric disorder; Psychiatric disorder; and Psychotic disorder. Past Surgical History:      has a past surgical history that includes orthopaedic. Home Medications:     Prior to Admission medications    Medication Sig Start Date End Date Taking? Authorizing Provider   guaiFENesin (ROBITUSSIN) 100 mg/5 mL liquid Take 200 mg by mouth every four (4) hours as needed for Cough. Yes Historical Provider   fluticasone-vilanterol (BREO ELLIPTA) 100-25 mcg/dose inhaler Take 1 Puff by inhalation daily. 5/24/17  Yes Tom Kaminski DO   ibuprofen (MOTRIN) 800 mg tablet 800 mg every eight (8) hours as needed. 8/22/16  Yes Historical Provider   VENTOLIN HFA 90 mcg/actuation inhaler INHALE 2 PUFFS EVERY 4 TO 6 HOURS AS NEEDED FOR SHORTNESS OF BREATH 10/6/16  Yes Francisco Pollard NP   albuterol (PROVENTIL VENTOLIN) 2.5 mg /3 mL (0.083 %) nebulizer solution by Nebulization route every six (6) hours as needed for Wheezing. Yes Historical Provider   ALPRAZolam Fay Craft) 0.5 mg tablet Take 0.5 mg by mouth two (2) times a day. Yes Historical Provider   PARoxetine (PAXIL) 40 mg tablet Take 40 mg by mouth daily. Yes Historical Provider   risperiDONE (RISPERDAL) 3 mg tablet Take 3 mg by mouth nightly. Yes Historical Provider   benztropine (COGENTIN) 2 mg tablet Take 2 mg by mouth nightly. Yes Historical Provider   temazepam (RESTORIL) 30 mg capsule Take 30 mg by mouth nightly. Yes Historical Provider       Allergies/Social/Family History:     No Known Allergies   Social History   Substance Use Topics    Smoking status: Current Every Day Smoker     Packs/day: 1.00     Years: 25.00    Smokeless tobacco: Never Used    Alcohol use No      Comment: socially      Family History   Problem Relation Age of Onset    Diabetes Mother     Heart Disease Father      CHF        Review of Systems:     Review of systems not obtained due to patient factors.     Objective:   Vital Signs:  Visit Vitals    /88 (BP 1 Location: Left arm, BP Patient Position: At rest)    Pulse 65    Temp 97 °F (36.1 °C)    Resp 23    Ht 5' 10\" (1.778 m)    Wt 50.9 kg (112 lb 3.4 oz)    SpO2 95%    BMI 16.1 kg/m2    O2 Flow Rate (L/min): 4 l/min O2 Device: Nasal cannula Temp (24hrs), Av.7 °F (36.5 °C), Min:97 °F (36.1 °C), Max:98.6 °F (37 °C)           Intake/Output:     Intake/Output Summary (Last 24 hours) at 17 1321  Last data filed at 17 1200   Gross per 24 hour   Intake          1904.42 ml   Output             7050 ml   Net         -5145.58 ml       Physical Exam:   General:  Alert, cooperative, no distress, appears stated age. Head:  Normocephalic, without obvious abnormality, atraumatic. Eyes:  Conjunctivae/corneas clear. PERRL, EOMs intact. Neck: Supple, symmetrical, trachea midline, no adenopathy, thyroid: no enlargment/tenderness/nodules, no carotid bruit and no JVD. Lungs:   Clear to auscultation bilaterally. Chest wall:  No tenderness or deformity. Heart:  Regular rate and rhythm, S1, S2 normal, no murmur, click, rub or gallop. Abdomen:   Soft, non-tender. Bowel sounds normal. No masses,  No organomegaly. Extremities: Extremities normal, atraumatic, no cyanosis or edema. Pulses: 2+ and symmetric all extremities. Skin: Skin color, texture, turgor normal. No rashes or lesions   Neurologic: Grossly nonfocal         LABS AND  DATA: Personally reviewed  Recent Labs      17   0452  17   1651   WBC  10.4  11.4*   HGB  14.7  14.1   HCT  40.4  38.6   PLT  211  224     Recent Labs      17   1109  17   0452  17   0052   17   1651   NA  137  132*  125*   < >  114*   K  4.0  3.8  4.2   < >  3.7   CL  98  94*  91*   < >  76*   CO2  34*  30  24   < >  26   BUN  7  5*  3*   < >  2*   CREA  0.66*  0.66*  0.60*   < >  0.43*   GLU  100  93  118*   < >  118*   CA  8.8  8.7  8.2*   < >  8.0*   MG   --    --   1.9   --   1.6   PHOS   --    --   3.6   --    --     < > = values in this interval not displayed.      Recent Labs      17   9199 SGOT  40*   AP  99   TP  6.7   ALB  3.4*   GLOB  3.3     Recent Labs      07/04/17   1651   INR  1.1   PTP  11.3*      Recent Labs      07/04/17   1655   FIO2I  0.21     Recent Labs      07/04/17   1904  07/04/17   1651   TROIQ  <0.04  <0.04       MEDS: Reviewed    Chest X-Ray: personally reviewed and report checked    EKG/ Tele      Thank you for allowing me to participate in this patient's care.     MD Tenzin Welch MD, [facility]  [facility]

## 2017-07-05 NOTE — CONSULTS
NEUROLOGY CONSULT NOTE    Name Nicole Paris Age 61 y.o. MRN [id number]  1960     Consulting Physician: Raúl Jovel MD      Chief Complaint:  AMS     Assessment:     Principal Problem:    Hyponatremia (2017)    Active Problems:    Depression (2012)      Chronic obstructive pulmonary disease ([facility] [address] (3/29/2016)      Tobacco dependence (3/29/2016)      Underweight (3/29/2016)      Paranoid schizophrenia ([facility] [address] (3/29/2016)      Poor dentition (3/29/2016)      Benign prostatic hyperplasia without lower urinary tract symptoms (5/15/2017)      Acute encephalopathy (2017)      61year old AAM h/o COPD, anxiety, EPS, schizophrenia, MR presenting with acute encephalopathy, likely metabolic related to severe hyponatremia. He is back to his baseline today without focal deficits. Head CT from admission reviewed and no acute abnormalities. Recommendations:   D/C LEV  Correction of hyponatremia- nephrology following  Please call with any further questions/concerns     Thank you very much for this referral. I appreciate the opportunity to participate in this patient's care. History of Present Illness: This is a 61 y.o.   male, we were asked to see for AMS. PMH notable for COPD, anxiety, EPS, schizophrenia, MR. He resides in a group home and was brought in acutely for altered mental status. Patient does not recall events leading to admission, therefore details are obtained primarily through review of the medical record. Patient was apparently found by staff at the facility non-responsive and grunting. No focal weakness identified but he remained non-verbal. Head CT on admission did not reveal acute ischemia or hemorrhage. Admission labs were notable for severe hyponatremia. He was empirically started on LEV, although no clear reports of seizure activity. This AM, he is talkative and appears back to his baseline.       No Known Allergies     Prior to Admission medications    Medication Sig Start Date End Date Taking? Authorizing Provider   guaiFENesin (ROBITUSSIN) 100 mg/5 mL liquid Take 200 mg by mouth every four (4) hours as needed for Cough. Yes Historical Provider   fluticasone-vilanterol (BREO ELLIPTA) 100-25 mcg/dose inhaler Take 1 Puff by inhalation daily. 5/24/17  Yes Tom Kaminski DO   ibuprofen (MOTRIN) 800 mg tablet 800 mg every eight (8) hours as needed. 8/22/16  Yes Historical Provider   VENTOLIN HFA 90 mcg/actuation inhaler INHALE 2 PUFFS EVERY 4 TO 6 HOURS AS NEEDED FOR SHORTNESS OF BREATH 10/6/16  Yes Anitha Agudelo NP   albuterol (PROVENTIL VENTOLIN) 2.5 mg /3 mL (0.083 %) nebulizer solution by Nebulization route every six (6) hours as needed for Wheezing. Yes Historical Provider   ALPRAZolam [place] Drone) 0.5 mg tablet Take 0.5 mg by mouth two (2) times a day. Yes Historical Provider   PARoxetine (PAXIL) 40 mg tablet Take 40 mg by mouth daily. Yes Historical Provider   risperiDONE (RISPERDAL) 3 mg tablet Take 3 mg by mouth nightly. Yes Historical Provider   benztropine (COGENTIN) 2 mg tablet Take 2 mg by mouth nightly. Yes Historical Provider   temazepam (RESTORIL) 30 mg capsule Take 30 mg by mouth nightly.    Yes Historical Provider       Past Medical History:   Diagnosis Date    Asthma     seldom,use inhaler    Bronchitis     Chronic obstructive pulmonary disease ([facility] [address]     Depression     anxiety    Psychiatric disorder     paranoid schizophrenia    Psychiatric disorder     extrapyramidal disease    Psychotic disorder     mental retardation        Past Surgical History:   Procedure Laterality Date    HX ORTHOPAEDIC      right knee        Social History   Substance Use Topics    Smoking status: Current Every Day Smoker     Packs/day: 1.00     Years: 25.00    Smokeless tobacco: Never Used    Alcohol use No      Comment: socially        Family History   Problem Relation Age of Onset    Diabetes Mother     Heart Disease Father      CHF Review of Systems:   Comprehensive review of systems performed and negative except for as listed above. Exam:     Visit Vitals    /88    Pulse 65    Temp 97.4 °F (36.3 °C)    Resp 23    Ht 5' 10\" (1.778 m)    Wt 50.9 kg (112 lb 3.4 oz)    SpO2 97%    BMI 16.1 kg/m2        General: Well developed, well nourished. Patient in no apparent distress   Head: Normocephalic, atraumatic, anicteric sclera   Lungs:  Clear to auscultation bilaterally, No wheezes or rubs   Cardiac: Regular rate and rhythm with no murmurs. Abd: Bowel sounds were audible. No tenderness on palpation   Ext: No pedal edema   Skin: No overt signs of rash     Neurological Exam:  Mental Status: Alert and oriented to \"hospital\" and month/date not year   Speech: Fluent no aphasia or dysarthria. Cranial Nerves:   Intact visual fields. Facial sensation is normal. Facial movement is symmetric. Palate is midline. Normal sternocleidomastoid strength. Tongue is midline. Hearing is intact bilaterally. Eyes: PERRL, EOM's full, no nystagmus, no ptosis. Motor:  Full and symmetric strength of upper and lower proximal and distal muscles. Normal bulk and tone. Reflexes:   Deep tendon reflexes 3+/4 and symmetrical.  Plantar response is downgoing b/l. Sensory:   Symmetrically intact  with no perceived deficits modalities involving small or large fibers. Gait:  Gait is deferred   Tremor:   No tremor noted. Cerebellar:  Finger to nose and heel over shin to knee was demonstrated competently. Neurovascular: No carotid bruits. No JVD       Imaging  CT Results (maximum last 3): Results from [place] encounter on 07/04/17   CT HEAD WO CONT   Narrative EXAM:  CT HEAD WO CONT    INDICATION:   ams    COMPARISON: None. TECHNIQUE: Unenhanced CT of the head was performed using 5 mm images. Brain and  bone windows were generated.   CT dose reduction was achieved through use of a  standardized protocol tailored for this examination and automatic exposure  control for dose modulation. FINDINGS:  The ventricles and sulci are normal in size, shape and configuration and  midline. There is no significant white matter disease. There is no intracranial  hemorrhage, extra-axial collection, mass, mass effect or midline shift. The  basilar cisterns are open. No acute infarct is identified. The bone windows  demonstrate no abnormalities. The visualized portions of the paranasal sinuses  and mastoid air cells are clear. Impression IMPRESSION: No significant abnormalities. MRI Results (maximum last 3): No results found for this or any previous visit. Lab Review  Lab Results   Component Value Date/Time    WBC 10.4 07/05/2017 04:52 AM    HCT 40.4 07/05/2017 04:52 AM    HGB 14.7 07/05/2017 04:52 AM    PLATELET 550 58/59/0446 04:52 AM     Lab Results   Component Value Date/Time    Sodium 137 07/05/2017 11:09 AM    Potassium 4.0 07/05/2017 11:09 AM    Chloride 98 07/05/2017 11:09 AM    CO2 34 07/05/2017 11:09 AM    Glucose 100 07/05/2017 11:09 AM    BUN 7 07/05/2017 11:09 AM    Creatinine 0.66 07/05/2017 11:09 AM    Calcium 8.8 07/05/2017 11:09 AM     No components found for: TROPQUANT  No results found for: MOISÉS    Signed:  Ankur Patient.  Samm Patrick, DO  7/5/2017  12:38 PM

## 2017-07-05 NOTE — PROGRESS NOTES
Name: Campos Troy MRN: [id number]   : 1960 Hospital: [facility]   Date: 2017        IMPRESSION:   · Hyponatremia from total na deficit. Patient does not have features of SIADH. His na is corrected fast without using hypertonic saline. · Normal renal function. PLAN:   · D/C saline and start hypoosmotic IVF infusion. · Check BMP at 11 AM- if na remains above 130- will give one dose of desmopressin. Subjective/Interval History:   I have reviewed the flowsheet and previous days notes. ROS:Review of systems not obtained due to patient factors. Objective:   Vital Signs:    Visit Vitals    /83    Pulse 77    Temp 97.1 °F (36.2 °C)    Resp 29    Ht 5' 10\" (1.778 m)    Wt 50.9 kg (112 lb 3.4 oz)    SpO2 91%    BMI 16.1 kg/m2       O2 Device: Nasal cannula   O2 Flow Rate (L/min): 4 l/min   Temp (24hrs), Av.9 °F (36.6 °C), Min:97.1 °F (36.2 °C), Max:98.6 °F (37 °C)       Intake/Output:   Last shift:         Last 3 shifts:  1901 -  0700  In: 1930 [I.V.:1580]  Out: 5800 [Urine:5800]    Intake/Output Summary (Last 24 hours) at 17 0921  Last data filed at 17 0700   Gross per 24 hour   Intake             1580 ml   Output             5800 ml   Net            -4220 ml        Physical Exam:  General:    Lethargic, emaciated, chronically ill looking. Not in acute distress. Head:   Normocephalic, without obvious abnormality, atraumatic. Eyes:   Conjunctivae/corneas clear. Nose:  Nares normal. No drainage or sinus tenderness. Throat:    Lips, mucosa, and tongue dry. Neck:  Supple, symmetrical,  no adenopathy, thyroid: non tender    no carotid bruit and no JVD. Lungs:   Decreased to auscultation bilaterally. No Wheezing or Rhonchi. No rales. Heart:   Regular rate and rhythm,  no murmur, rub or gallop. Abdomen:   Soft, non-tender. Not distended. Extremities: Extremities normal, atraumatic, No cyanosis. No edema.  No clubbing, wasted muscle mass.  Skin:     Texture, turgor normal. No rashes or lesions. Not Jaundiced  Psych:  Poor insight. Not depressed. Not anxious or agitated.       DATA:  Labs:  Recent Labs      07/05/17   0452  07/05/17   0052  07/04/17   1904  07/04/17   1651   NA  132*  125*  119*  114*   K  3.8  4.2  2.9*  3.7   CL  94*  91*  80*  76*   CO2  30  24  24  26   BUN  5*  3*  3*  2*   CREA  0.66*  0.60*  0.69*  0.43*   CA  8.7  8.2*  8.5  8.0*   ALB   --    --    --   3.4*   PHOS   --   3.6   --    --    MG   --   1.9   --   1.6     Recent Labs      07/05/17   0452  07/04/17   1651   WBC  10.4  11.4*   HGB  14.7  14.1   HCT  40.4  38.6   PLT  211  224     Recent Labs      07/05/17   0452  07/04/17   1821   ZECHARIAH  24  16       Total time spent with patient:  35 minutes    [] Critical Care Provided    Care Plan discussed with:   Yonathan Cervantes Team    Mike Fragoso MD

## 2017-07-05 NOTE — PROGRESS NOTES
Hospitalist f/u:  Patient's twin brother visiting today. Discussed with him. Patient had one episode of hyponatremia about a year ago, for which he was hospitalized. No new medications in the recent past.  He takes all his medications under supervision and has been at the current group home for about 10 years. It is unlikely that he received/ was given any illicit substances. Patient's current mentation is very close to his baseline.      Suzanne Oliver M.D.

## 2017-07-05 NOTE — PROGRESS NOTES
Hospitalist Progress Note  Meme Barone MD  Office: [phone]        Date of Service:  2017  NAME:  Tim Mcmullen  :  1960  MRN:  [id number]      Admission Summary:   The patient is a 79-year-old   -American male with an extensive psychiatric history of   paranoid schizophrenia, some mental retardation and anxiety and   extrapyramidal disease, on multiple psychiatric medications who was   brought into the emergency room from a group home due to altered   mental status. Per report from the group home, the patient was feeling   fine and walking until about an hour ago. No further history is obtained   from the patient himself or from his sister at the bedside. The last time   his sister saw him was about a month ago. The patient himself is not   answering any questions. He opens his eyes and looks at me but   does not voice anything. In the emergency room the patient was found   to have a sodium of 114 and the hospitalist team has been called for   admission. Interval history / Subjective:   Mentation much improved overnight and patient Na corrected very quickly. He never received 3% NaCl (or no more than 20 ml), because the repeat Na yesterday was 119. Patient had episodes of agitation overnight and received some IV Haldol. He continues to sleep a lot, but opens his eyes and said \"Good morning doctor\". He does not recall any events from yesterday or last night. Assessment & Plan:     Critical hyponatremia:  - possibly contributing to patient's altered mental status. - suppose some of the hyponatremia may be chronic, with an acute component;   - appreciate Nephrology consult and recommendations. - continue to monitor BMP;     Hypokalemia:   - resolved, continue to monitor.       Extensive psychiatric history:  - all psychiatric medications are on hold;  - Psychiatry consult to review medications and for additional recommendations;     Urinary retention, POA:  - bladder scan in the emergency room showed 900 mL, s/p straight cath. AMS, cannot r/o sepsis on admission:  - empirically started on broad-spectrum IV Abx, deescalate if cx negative.       Code status: full   DVT prophylaxis: B SCD's    Care Plan discussed with: Patient/Family and Nurse  Disposition: TBD   Patient lives in a group home. Hospital Problems  Date Reviewed: 7/4/2017          Codes Class Noted POA    * (Principal)Hyponatremia ICD-10-CM: E87.1  ICD-9-CM: 276.1  7/4/2017 Unknown        Benign prostatic hyperplasia without lower urinary tract symptoms ICD-10-CM: N40.0  ICD-9-CM: 600.00  5/15/2017 Yes        Chronic obstructive pulmonary disease ([facility] ICD-10-CM: J44.9  ICD-9-CM: 976  3/29/2016 Yes        Tobacco dependence ICD-10-CM: F17.200  ICD-9-CM: 305.1  3/29/2016 Yes        Underweight ICD-10-CM: R63.6  ICD-9-CM: 783.22  3/29/2016 Yes        [facility]an[facility] ICD-10-CM: F20.0  ICD-9-CM: 295.30  3/29/2016 Yes        Poor dentition ICD-10-CM: K08.9  ICD-9-CM: 525.9  3/29/2016 Yes        Depression ICD-10-CM: F32.9  ICD-9-CM: 921  1/31/2012 Yes                Review of Systems:   Review of systems not obtained due to patient factors. Vital Signs:    Last 24hrs VS reviewed since prior progress note.  Most recent are:  Visit Vitals    /83    Pulse 77    Temp 97.1 °F (36.2 °C)    Resp 29    Ht 5' 10\" (1.778 m)    Wt 50.9 kg (112 lb 3.4 oz)    SpO2 91%    BMI 16.1 kg/m2         Intake/Output Summary (Last 24 hours) at 07/05/17 9399  Last data filed at 07/05/17 0700   Gross per 24 hour   Intake             1580 ml   Output             5800 ml   Net            -4220 ml        Physical Examination:             Constitutional:  No acute distress, sleeping, but wakes up, opens his eyes, smiles;    Cachectic; asymmetry of the rib cage;    ENT:  poor dentition    Resp:  Coarse breath sounds B, diffuse rhonchi, no rales, no wheezing;    CV:  Regular rhythm, normal rate, no murmurs, gallops, rubs, no tachycardia;    GI:  Soft, non distended, non tender. normoactive bowel sounds;     Musculoskeletal:  No edema, warm;    Neurologic:  Moves all extremities. Skin:  dry, thin;       Data Review:    Review and/or order of clinical lab test  Review and/or order of tests in the radiology section of CPT  Review and/or order of tests in the medicine section of CPT      Labs:     Recent Labs      07/05/17   0452 07/04/17   1651   WBC  10.4  11.4*   HGB  14.7  14.1   HCT  40.4  38.6   PLT  211  224     Recent Labs      07/05/17   0452  07/05/17   0052  07/04/17   1904  07/04/17   1651   NA  132*  125*  119*  114*   K  3.8  4.2  2.9*  3.7   CL  94*  91*  80*  76*   CO2  30  24  24  26   BUN  5*  3*  3*  2*   CREA  0.66*  0.60*  0.69*  0.43*   GLU  93  118*  163*  118*   CA  8.7  8.2*  8.5  8.0*   MG   --   1.9   --   1.6   PHOS   --   3.6   --    --      Recent Labs      07/04/17 1651   SGOT  40*   ALT  21   AP  99   TBILI  1.2*   TP  6.7   ALB  3.4*   GLOB  3.3     Recent Labs      07/04/17   1651   INR  1.1   PTP  11.3*      No results for input(s): FE, TIBC, PSAT, FERR in the last 72 hours. No results found for: FOL, RBCF   No results for input(s): PH, PCO2, PO2 in the last 72 hours.   Recent Labs      07/04/17   1904 07/04/17 1651   TROIQ  <0.04  <0.04     Lab Results   Component Value Date/Time    Cholesterol, total 102 05/15/2017 11:11 AM    HDL Cholesterol 48 05/15/2017 11:11 AM    LDL, calculated 46 05/15/2017 11:11 AM    Triglyceride 41 05/15/2017 11:11 AM     Lab Results   Component Value Date/Time    Glucose (POC) 132 07/04/2017 04:37 PM     Lab Results   Component Value Date/Time    Color YELLOW/STRAW 07/04/2017 04:51 PM    Appearance CLEAR 07/04/2017 04:51 PM    Specific gravity 1.007 07/04/2017 04:51 PM    pH (UA) 7.0 07/04/2017 04:51 PM    Protein NEGATIVE  07/04/2017 04:51 PM    Glucose NEGATIVE  07/04/2017 04:51 PM    Ketone 15 07/04/2017 04:51 PM    Bilirubin NEGATIVE  07/04/2017 04:51 PM    Urobilinogen 0.2 07/04/2017 04:51 PM    Nitrites NEGATIVE  07/04/2017 04:51 PM    Leukocyte Esterase NEGATIVE  07/04/2017 04:51 PM    Epithelial cells FEW 07/04/2017 04:51 PM    Bacteria NEGATIVE  07/04/2017 04:51 PM    WBC 0-4 07/04/2017 04:51 PM    RBC 10-20 07/04/2017 04:51 PM         Medications Reviewed:     Current Facility-Administered Medications   Medication Dose Route Frequency    dexmedetomidine (PRECEDEX) 400 mcg in 0.9% sodium chloride 100 mL infusion  0.2-0.7 mcg/kg/hr IntraVENous TITRATE    vancomycin (VANCOCIN) 750 mg in 0.9% sodium chloride (MBP/ADV) 250 mL  750 mg IntraVENous Q8H    sodium chloride (NS) flush 5-10 mL  5-10 mL IntraVENous Q8H    sodium chloride (NS) flush 5-10 mL  5-10 mL IntraVENous PRN    acetaminophen (TYLENOL) suppository 650 mg  650 mg Rectal Q4H PRN    ondansetron (ZOFRAN) injection 4 mg  4 mg IntraVENous Q4H PRN    sodium chloride (NS) flush 5-10 mL  5-10 mL IntraVENous Q8H    sodium chloride (NS) flush 5-10 mL  5-10 mL IntraVENous PRN    3% sodium chloride (*HIGH ALERT*CONCENTRATED IV*) infusion  30 mL/hr IntraVENous CONTINUOUS    albuterol-ipratropium (DUO-NEB) 2.5 MG-0.5 MG/3 ML  3 mL Nebulization Q6H RT    levETIRAcetam (KEPPRA) 500 mg in 0.9% sodium chloride IVPB  500 mg IntraVENous Q12H    LORazepam (ATIVAN) injection 1 mg  1 mg IntraVENous Q2H PRN    nicotine (NICODERM CQ) 14 mg/24 hr patch 1 Patch  1 Patch TransDERmal DAILY    albuterol-ipratropium (DUO-NEB) 2.5 MG-0.5 MG/3 ML  3 mL Nebulization Q4H PRN    piperacillin-tazobactam (ZOSYN) 3.375 g in 0.9% sodium chloride (MBP/ADV) 100 mL  3.375 g IntraVENous Q8H    0.9% sodium chloride infusion  75 mL/hr IntraVENous CONTINUOUS    vancomycin pharmacy dosing   Other PRN    haloperidol lactate (HALDOL) injection 2 mg  2 mg IntraVENous Q6H PRN ______________________________________________________________________  EXPECTED LENGTH OF STAY: - - -  ACTUAL LENGTH OF STAY:          1                 Jonathan Bashir MD

## 2017-07-05 NOTE — PROGRESS NOTES
Pharmacist Note - Vancomycin Dosing    Consult provided for this 61 y.o. male for indication of sepsis. Antibiotic regimen(s): Zosyn, vancomycin    Recent Labs      17   1904  17   1651   WBC   --   11.4*   CREA  0.69*  0.43*   BUN  3*  2*     Frequency of BMP: Q2H (hypertonic saline)  Height: 177.8 cm  Weight: 58.5 kg  Est CrCl: 98 ml/min  Temp (24hrs), Av °F (36.7 °C), Min:97.4 °F (36.3 °C), Max:98.6 °F (37 °C)    Cultures:  None    Goal trough = 15 - 20 mcg/mL    Therapy will be initiated with a loading dose of 1250 mg IV x 1. Pharmacy to follow patient daily and order maintenance dose based on repeat BMP.

## 2017-07-05 NOTE — H&P
[address]   174 [facility], [address] [place] [address]   HISTORY AND PHYSICAL       Name:  Lucinda Frank   MR#:  [id number]   :  1960   Account #:  [de-identified]        Date of Adm:  2017       CHIEF COMPLAINT: Altered mental status. HISTORY OF PRESENT ILLNESS: The patient is a 50-year-old    male with an extensive psychiatric history of   paranoid schizophrenia, some mental retardation, anxiety and   extrapyramidal disease, on multiple psychiatric medications who was   brought into the emergency room from a group home due to altered   mental status. Per report from the group home, the patient was feeling   fine and walking until about an hour ago. No further history is obtained   from the patient himself or from his sister at the bedside. The last time   his sister saw him was about a month ago. The patient himself is not   answering any questions. He opens his eyes and looks at me but   does not speak at all. In the emergency room the patient was found   to have a sodium of 114 and the hospitalist team has been called for   admission. PAST MEDICAL HISTORY:   1. Depression. 2.  Anxiety. 3.  Paranoid schizophrenia. 4.  Extrapyramidal disease. 5.  Mental retardation. 6.  COPD. 7.  Asthma. 8.  Chronic bronchitis. ALLERGIES: NKA. MEDICATIONS ON ADMISSION:   1. BREO one puff inhaled daily. 2.  Motrin 800 mg every 8 hours as needed. 3.  Ventolin HFA 90 mcg, two puffs every 4 to 6 hours as needed for   shortness of breath. 4.  Albuterol 2.5 mg nebulized every 6 hours as needed for wheezing. 5.  Xanax 0.5 mg p.o. b.i.d.   6.  Paxil 40 mg p.o. daily. 7.  Risperdal 3 mg p.o. at bedtime. 8.  Cogentin 2 mg p.o. at bedtime. 9.  Temazepam 30 mg p.o. at bedtime. FAMILY HISTORY: Pertinent for diabetes in his mother and heart   disease and CHF in his father. SOCIAL HISTORY:  Unobtainable from the patient. Chart reviewed.     The patient is a current and chronic every day smoker and occasionally   drinks alcohol. REVIEW OF SYSTEMS: Unobtainable from the patient. PHYSICAL EXAMINATION:   VITAL SIGNS ON ADMISSION: Blood pressure 116/72. Pulse 94. Temperature 98. 6. Respiratory rate 27. O2 saturation 98%. BMI 20. GENERAL: The patient is awake, but essentially unresponsive. He   does not communicate. He opens his eyes in response to voice and   turns his head towards me but otherwise does not answer any   questions. He is resting in bed with his arms over his head and under   his head. He does not follow any commands. He is not combative at   the time of my examination. He is shaking slightly. Per sister, he is   probably cold. HEENT: Otherwise normocephalic, atraumatic. There is no scleral   icterus. Unable to assess extraocular movements. Unable to assess   mouth or oral cavity. NECK: Supple although again difficult to examine. There is no   lymphadenopathy and no thyromegaly. LUNGS: Clear to auscultation bilaterally until laterally coarse breath   sounds, no wheezing. CARDIOVASCULAR: Reveals tachycardia. Regular rhythm and rate. S1 and S2.   ABDOMEN: Soft. Nontender, nondistended. Bowel sounds are present   throughout. EXTREMITIES: No peripheral edema. SKIN: No rashes. Patient overall appears malnourished and   somewhat cachectic. DIAGNOSTIC DATA: CBC shows white blood cell count of 11.4. Hemoglobin is 14.1. Hematocrit is 38.6. Platelets 050 with a percent of   neutrophils 87%. CHEMISTRY: Sodium is 114. Potassium 3.7. Chloride 76. CO2 76. Anion gap 12. Blood glucose 118. BUN 2, creatinine 0.43. Total   bilirubin is 1.2. LFT's within normal range. Lactic acid 1.6. Troponin is   less than 0.04. Urinalysis shows clear yellow urine, unknown nitrites, negative   leukocyte esterase, 10 to 20 red blood cells, 0 to 4 white blood cells,   no bacteria. INR is 1.1.   Urine tox screen is negative. A CT scan of his head shows no significant abnormality. Chest x-ray shows no acute cardiopulmonary process. EKG shows normal sinus rhythm with a ventricular rate of 83 beats per   minute. No ST segment changes. ASSESSMENT AND PLAN:   1. Critical hyponatremia, possibly contributing to patient's altered   mental status. At this time no history is available and it is not clear   whether the hyponatremia is acute versus chronic. The patient will be   started on hypertonic saline at 30 mL per hour. Monitor BMP every 2   hours for now to prevent over-correction. Nephrology was consulted   by the ED physician. 2.  Hypokalemia, replete. 3.  Extensive psychiatric history. At the time of admission secondary to   altered mental status, I will hold all of his psychiatric medications. Psychiatry consult once patient wakes up slightly more to review   medications and give additional recommendations. 4.  Urinary retention. The patient's initial bladder scan in the emergency   room showed 900 mL. The patient underwent a straight cath. Continue to   monitor closely. 5.  Deep venous thrombosis prophylaxis with bilateral SCD's.   6.  The patient's condition is critical and at high risk for deterioration. He will be admitted to ICU for close monitoring.               MD Bria Amaral / Broderick Gonzalez   D:  07/04/2017   20:07   T:  07/04/2017   20:39   Job #:  [id number]

## 2017-07-05 NOTE — PROGRESS NOTES
2000 Bedside and Verbal shift change report given to Sonia Gibson (oncoming nurse) by Lima Fox, APOLONIA (offgoing nurse). Report included the following information SBAR, Kardex, ED Summary, Procedure Summary, Intake/Output, MAR and Recent Results. 31 Fior Peterson NP notified of events. Orders received for Keppra and Ativan. Primary Nurse Leona Perez and Lima Fox RN performed a dual skin assessment on this patient No impairment noted. Sanya score is 18.  2030 Dr. Meenakshi Zhang at bedside to evaluate pt. Notified of ?seizure. Orders received for antibiotics and K+ replacement. 2035 Dr. Rosas Mccrary notified of Na 119. Orders received to HOLD 3% Saline and to start NS @ 75cc/hr. 2145 Luana Mike NP notified of intermittent agitation, restlessness, pt getting out of bed. Orders received for restraints and to perform STAND assessment. Sitter at bedside. 31 Fior Peterson NP notified that pt failed STAND assessment. Orders received for PRN haldol. 31 Fior Peterson NP notified of continued restlessness, agitation, orders received for Precedex gtt. Also notified of Na 125. OK to STOP NS gtt per NP. Will call results to Nephrology. 6056 Dr. Rosas Mccrary updated on pt status, Na 125, wet lung sounds, continued restlessness and agitation. Orders received to STOP NS, 40mg Lasix IV one time, and Singh placement. 0800 Bedside and Verbal shift change report given to Gonzalo Santillan (oncoming nurse) by Claudette Picket, RN (offgoing nurse). Report included the following information SBAR, Kardex, ED Summary, Procedure Summary, Intake/Output, MAR and Recent Results.      Problem: General Medical Care Plan  Goal: *Optimize nutritional status  Outcome: Not Progressing Towards Goal  NPO, failed STAND  Goal: *Anxiety reduced or absent  Outcome: Not Progressing Towards Goal  Restlessness, agitated

## 2017-07-05 NOTE — PROGRESS NOTES
Day #2 of Vancomycin  Indication:  Empiric for AMS, r/o sepsis  Current regimen:  750 mg IV Q 8 hr  Abx regimen:  Zosyn + Vancomycin  ID Following ?: NO  Concomitant nephrotoxic drugs (requires more frequent monitoring): None  Frequency of BMP?: Every 6 hours    Recent Labs      17   1109  17   0452  17   0052   17   1651   WBC   --   10.4   --    --   11.4*   CREA  0.66*  0.66*  0.60*   < >  0.43*   BUN  7  5*  3*   < >  2*    < > = values in this interval not displayed. Est CrCl: ~80-90 ml/min; UO: >1 ml/kg/hr  Temp (24hrs), Av.7 °F (36.5 °C), Min:97 °F (36.1 °C), Max:98.6 °F (37 °C)    Cultures: None    Goal trough = 15 - 20 mcg/mL    Recent trough history (date/time/level/dose/action taken):  None    Plan: After evaluating the patient, I believe a dose of 1000 mg IV Q 12 hr would be more appropriate for this patient and would avoid a trough of almost 20 mcg/ml. Pharmacy will continue to monitor patient daily and will make dosage adjustments based upon changing renal function.

## 2017-07-05 NOTE — CONSULTS
1500 [facility]   611 [facility], [address] [place], [place] [address]   1930 [facility]       Name:  Milton Apley   MR#:  [id number]   :  1960   Account #:  [de-identified]    Date of Consultation:  2017   Date of Adm:  2017       REFERRING PHYSICIAN: Dr. Chiki Vieyra from the emergency department. REASON FOR CONSULTATION: Critical hyponatremia. HISTORY OF PRESENT ILLNESS: The patient is a 57-year-  old  man who has as a baseline mental retardation and   paranoid schizophrenia. The patient presented to the emergency room   with worsening shortness of breath. He was found on initial evaluation   to have a sodium of 114 which is very low and this is the first   hyponatremia measurement in comparison with previous data in the   electronic medical record. The patient is not able to provide any history. He is just moaning and hyperventilating. The patient has underlying   COPD. It is not clear what kind of medications he has been taking. He   is confused and agitated. His baseline mental status is also not   clear. Our service was consulted to address hyponatremia. I already   spoke to Dr. Chiki Vieyra and a drip with 3% saline was ordered based on   the patient's low weight. Unfortunately, the patient is so agitated, he is   not allowing the nurses and techs to initiate his drip. PAST MEDICAL HISTORY: COPD, mental retardation, asthma,   depression, anxiety, paranoid schizophrenia with extrapyramidal   disease. PAST SURGICAL HISTORY: Orthopedic surgery of right knee. ALLERGIES: NO KNOWN MEDICAL ALLERGIES. CODE STATUS: The patient is a FULL CODE. MEDICATION LIST AT HOME: Not known, but as per records from   , . I was not able to locate any information   about the patient's current medication list.    Social history, family history, review of systems are unobtainable. PHYSICAL EXAMINATION   GENERAL: The patient is a middle-aged [de-identified] man who is   emaciated, acutely ill. He is very agitated, hyperventilating, nonverbal.   VITAL SIGNS: Blood pressure 155/79, heart rate is 109, temperature   is 98.0 Fahrenheit. HEAD: Normocephalic. The patient has bitemporal muscle wasting. Eyes   are open widely. Mouth with poor oral dentition. NECK: Supple. LUNGS: Reveal diminished breath sounds, but otherwise clear. HEART: S1 and S2, regular rate and rhythm. ABDOMEN: Flat, soft. EXTREMITIES: With no edema. The patient has wasted muscle mass. LABORATORY DATA: Hemoglobin is 14.1, white blood count 11.4. Sodium is 114, potassium is 3.7, CO2 is 26, chloride 76, BUN is 2,   creatinine is 0.4. Total cholesterol 102, HDL 48, triglycerides only 41. Chest x-ray shows presently no acute cardiopulmonary process. The   lungs are well expanded. IMPRESSION   1. Critical hyponatremia in a patient with unknown medication regimen   at home. This is acute hyponatremia in comparison with previous data. Syndrome of inappropriate secretion of antidiuretic hormone is highly   possible since the patient has advanced chronic obstructive pulmonary   disease, but given his psychiatric history, the patient may be on a   medication inducing hyponatremia and mimicking syndrome of   inappropriate secretion of antidiuretic hormone. The patient's   hyponatremia seems to be acute. 2. Normal renal function. 3. Severe malnutrition which is reflected in low body weight, muscle   wasting and extremely low BUN and creatinine in combination with a   lipid profile with low indices as well. RECOMMENDATIONS: Start 3% saline at a low rate 30 mL and   monitor sodium every 2 hours. As soon as the patient's sodium level is   120, the 3% saline needs to be discontinued. The patient will have   hyponatremia workup with serum and urine sodium and serum and   urine osmolality. When his sodium is in a more stable range above 120,   the patient may be a candidate for Tolvaptan.  The rest of the patient's   management will be deferred to the hospitalists and the medical team.    Thank you very much for the opportunity to be a part of this patient's   care. Our service will follow up with you closely.         MD Reema Allen / [facility]   D:  07/04/2017   19:53   T:  07/04/2017   20:42   Job #:  [id number]

## 2017-07-05 NOTE — PROGRESS NOTES
Asked to see patient re hyponatremia and possible medication etiology. Pt sleeping quietly. Precedex being tapered. Did have brief verbal exchange asking for coffee. No hx per 42 [facility] record of hyponatremia. Spoke with staff at [facility] and reported that he has not been observed drinking excessive fluids. Clinical picture more of an acute hyponatremia. Could still be from excessive fluid intake though report from Adult Home needs to be considered. Not the picture seen with medication with such rapid correction of sodium. Medication may be involved in bladder retention with anticholinergic properties of benztropine and risperidone. Perhaps when he is able to hold a conversation more information may be gleaned.

## 2017-07-06 LAB
ANION GAP BLD CALC-SCNC: 6 MMOL/L (ref 5–15)
ANION GAP BLD CALC-SCNC: 8 MMOL/L (ref 5–15)
BASOPHILS # BLD AUTO: 0 K/UL (ref 0–0.1)
BASOPHILS # BLD: 0 % (ref 0–1)
BUN SERPL-MCNC: 10 MG/DL (ref 6–20)
BUN SERPL-MCNC: 11 MG/DL (ref 6–20)
BUN SERPL-MCNC: 6 MG/DL (ref 6–20)
BUN SERPL-MCNC: 8 MG/DL (ref 6–20)
BUN/CREAT SERPL: 10 (ref 12–20)
BUN/CREAT SERPL: 14 (ref 12–20)
BUN/CREAT SERPL: 14 (ref 12–20)
BUN/CREAT SERPL: 9 (ref 12–20)
CALCIUM SERPL-MCNC: 8.1 MG/DL (ref 8.5–10.1)
CALCIUM SERPL-MCNC: 8.1 MG/DL (ref 8.5–10.1)
CALCIUM SERPL-MCNC: 8.5 MG/DL (ref 8.5–10.1)
CALCIUM SERPL-MCNC: 8.6 MG/DL (ref 8.5–10.1)
CHLORIDE SERPL-SCNC: 100 MMOL/L (ref 97–108)
CHLORIDE SERPL-SCNC: 101 MMOL/L (ref 97–108)
CHLORIDE SERPL-SCNC: 96 MMOL/L (ref 97–108)
CHLORIDE SERPL-SCNC: 97 MMOL/L (ref 97–108)
CO2 SERPL-SCNC: 30 MMOL/L (ref 21–32)
CO2 SERPL-SCNC: 32 MMOL/L (ref 21–32)
CO2 SERPL-SCNC: 32 MMOL/L (ref 21–32)
CO2 SERPL-SCNC: 35 MMOL/L (ref 21–32)
CREAT SERPL-MCNC: 0.59 MG/DL (ref 0.7–1.3)
CREAT SERPL-MCNC: 0.74 MG/DL (ref 0.7–1.3)
CREAT SERPL-MCNC: 0.76 MG/DL (ref 0.7–1.3)
CREAT SERPL-MCNC: 0.85 MG/DL (ref 0.7–1.3)
EOSINOPHIL # BLD: 0 K/UL (ref 0–0.4)
EOSINOPHIL NFR BLD: 0 % (ref 0–7)
ERYTHROCYTE [DISTWIDTH] IN BLOOD BY AUTOMATED COUNT: 12.9 % (ref 11.5–14.5)
GLUCOSE SERPL-MCNC: 161 MG/DL (ref 65–100)
GLUCOSE SERPL-MCNC: 182 MG/DL (ref 65–100)
GLUCOSE SERPL-MCNC: 90 MG/DL (ref 65–100)
GLUCOSE SERPL-MCNC: 91 MG/DL (ref 65–100)
HCT VFR BLD AUTO: 36.7 % (ref 36.6–50.3)
HGB BLD-MCNC: 12.9 G/DL (ref 12.1–17)
LYMPHOCYTES # BLD AUTO: 16 % (ref 12–49)
LYMPHOCYTES # BLD: 1.5 K/UL (ref 0.8–3.5)
MAGNESIUM SERPL-MCNC: 2 MG/DL (ref 1.6–2.4)
MCH RBC QN AUTO: 32.4 PG (ref 26–34)
MCHC RBC AUTO-ENTMCNC: 35.1 G/DL (ref 30–36.5)
MCV RBC AUTO: 92.2 FL (ref 80–99)
MONOCYTES # BLD: 0.9 K/UL (ref 0–1)
MONOCYTES NFR BLD AUTO: 10 % (ref 5–13)
NEUTS SEG # BLD: 6.6 K/UL (ref 1.8–8)
NEUTS SEG NFR BLD AUTO: 74 % (ref 32–75)
PHOSPHATE SERPL-MCNC: 2.7 MG/DL (ref 2.6–4.7)
PLATELET # BLD AUTO: 177 K/UL (ref 150–400)
POTASSIUM SERPL-SCNC: 3.4 MMOL/L (ref 3.5–5.1)
POTASSIUM SERPL-SCNC: 3.5 MMOL/L (ref 3.5–5.1)
POTASSIUM SERPL-SCNC: 4 MMOL/L (ref 3.5–5.1)
POTASSIUM SERPL-SCNC: 4.1 MMOL/L (ref 3.5–5.1)
RBC # BLD AUTO: 3.98 M/UL (ref 4.1–5.7)
SODIUM SERPL-SCNC: 135 MMOL/L (ref 136–145)
SODIUM SERPL-SCNC: 137 MMOL/L (ref 136–145)
SODIUM SERPL-SCNC: 138 MMOL/L (ref 136–145)
SODIUM SERPL-SCNC: 139 MMOL/L (ref 136–145)
WBC # BLD AUTO: 9 K/UL (ref 4.1–11.1)

## 2017-07-06 PROCEDURE — 74011250637 HC RX REV CODE- 250/637: Performed by: INTERNAL MEDICINE

## 2017-07-06 PROCEDURE — 97116 GAIT TRAINING THERAPY: CPT | Performed by: PHYSICAL THERAPIST

## 2017-07-06 PROCEDURE — 74011636637 HC RX REV CODE- 636/637: Performed by: INTERNAL MEDICINE

## 2017-07-06 PROCEDURE — 84100 ASSAY OF PHOSPHORUS: CPT | Performed by: INTERNAL MEDICINE

## 2017-07-06 PROCEDURE — 74011250637 HC RX REV CODE- 250/637: Performed by: NURSE PRACTITIONER

## 2017-07-06 PROCEDURE — 51798 US URINE CAPACITY MEASURE: CPT

## 2017-07-06 PROCEDURE — 65270000032 HC RM SEMIPRIVATE

## 2017-07-06 PROCEDURE — 97161 PT EVAL LOW COMPLEX 20 MIN: CPT | Performed by: PHYSICAL THERAPIST

## 2017-07-06 PROCEDURE — 80048 BASIC METABOLIC PNL TOTAL CA: CPT | Performed by: INTERNAL MEDICINE

## 2017-07-06 PROCEDURE — 83735 ASSAY OF MAGNESIUM: CPT | Performed by: INTERNAL MEDICINE

## 2017-07-06 PROCEDURE — 36415 COLL VENOUS BLD VENIPUNCTURE: CPT | Performed by: INTERNAL MEDICINE

## 2017-07-06 PROCEDURE — 74011250636 HC RX REV CODE- 250/636: Performed by: INTERNAL MEDICINE

## 2017-07-06 PROCEDURE — 74011000250 HC RX REV CODE- 250: Performed by: INTERNAL MEDICINE

## 2017-07-06 PROCEDURE — 94640 AIRWAY INHALATION TREATMENT: CPT

## 2017-07-06 PROCEDURE — 85025 COMPLETE CBC W/AUTO DIFF WBC: CPT | Performed by: INTERNAL MEDICINE

## 2017-07-06 RX ORDER — AZITHROMYCIN 250 MG/1
250 TABLET, FILM COATED ORAL DAILY
Status: DISCONTINUED | OUTPATIENT
Start: 2017-07-07 | End: 2017-07-07 | Stop reason: HOSPADM

## 2017-07-06 RX ORDER — AZITHROMYCIN 250 MG/1
500 TABLET, FILM COATED ORAL
Status: COMPLETED | OUTPATIENT
Start: 2017-07-06 | End: 2017-07-06

## 2017-07-06 RX ORDER — PREDNISONE 20 MG/1
40 TABLET ORAL ONCE
Status: COMPLETED | OUTPATIENT
Start: 2017-07-06 | End: 2017-07-06

## 2017-07-06 RX ORDER — PREDNISONE 20 MG/1
20 TABLET ORAL
Status: DISCONTINUED | OUTPATIENT
Start: 2017-07-07 | End: 2017-07-07 | Stop reason: HOSPADM

## 2017-07-06 RX ORDER — POTASSIUM CHLORIDE 750 MG/1
40 TABLET, FILM COATED, EXTENDED RELEASE ORAL
Status: COMPLETED | OUTPATIENT
Start: 2017-07-06 | End: 2017-07-06

## 2017-07-06 RX ADMIN — Medication 10 ML: at 05:22

## 2017-07-06 RX ADMIN — IPRATROPIUM BROMIDE AND ALBUTEROL SULFATE 3 ML: .5; 3 SOLUTION RESPIRATORY (INHALATION) at 21:14

## 2017-07-06 RX ADMIN — Medication 10 ML: at 14:00

## 2017-07-06 RX ADMIN — BENZTROPINE MESYLATE 2 MG: 1 TABLET ORAL at 20:57

## 2017-07-06 RX ADMIN — IPRATROPIUM BROMIDE AND ALBUTEROL SULFATE 3 ML: .5; 3 SOLUTION RESPIRATORY (INHALATION) at 09:17

## 2017-07-06 RX ADMIN — POTASSIUM CHLORIDE 40 MEQ: 750 TABLET, FILM COATED, EXTENDED RELEASE ORAL at 09:57

## 2017-07-06 RX ADMIN — IPRATROPIUM BROMIDE AND ALBUTEROL SULFATE 3 ML: .5; 3 SOLUTION RESPIRATORY (INHALATION) at 00:50

## 2017-07-06 RX ADMIN — PAROXETINE HYDROCHLORIDE 40 MG: 20 TABLET, FILM COATED ORAL at 09:57

## 2017-07-06 RX ADMIN — TEMAZEPAM 30 MG: 15 CAPSULE ORAL at 20:57

## 2017-07-06 RX ADMIN — AZITHROMYCIN 500 MG: 250 TABLET, FILM COATED ORAL at 09:56

## 2017-07-06 RX ADMIN — Medication 10 ML: at 20:58

## 2017-07-06 RX ADMIN — RISPERIDONE 3 MG: 1 TABLET ORAL at 20:58

## 2017-07-06 RX ADMIN — BUDESONIDE 500 MCG: 0.5 INHALANT RESPIRATORY (INHALATION) at 21:14

## 2017-07-06 RX ADMIN — BUDESONIDE 500 MCG: 0.5 INHALANT RESPIRATORY (INHALATION) at 09:17

## 2017-07-06 RX ADMIN — IPRATROPIUM BROMIDE AND ALBUTEROL SULFATE 3 ML: .5; 3 SOLUTION RESPIRATORY (INHALATION) at 14:43

## 2017-07-06 RX ADMIN — PREDNISONE 40 MG: 20 TABLET ORAL at 09:57

## 2017-07-06 RX ADMIN — DEXTROSE MONOHYDRATE 75 ML/HR: 5 INJECTION, SOLUTION INTRAVENOUS at 00:27

## 2017-07-06 NOTE — PROGRESS NOTES
Pt. Has put out 5,950ml of clear, yellow urine since 5:20AM. Called Nephrologist at 11:15AM. Orders to remove velazco and start voiding trial.

## 2017-07-06 NOTE — PROGRESS NOTES
Name: Rayne Watkins MRN: [id number]   : 1960 [facility]   Date: 2017        IMPRESSION:   · Hyponatremia from total Na deficit. Patient does not have features of SIADH. His Na is corrected as per guidelines with the use of hypo osmotic solution and one dose of desmopressin. · Normal renal function. PLAN:   · D/C  hypoosmotic IVF infusion. · Check BMP in AM.     Subjective/Interval History:   I have reviewed the flowsheet and previous days notes. ROS:Review of systems not obtained due to patient factors. Objective:   Vital Signs:    Visit Vitals    /68 (BP 1 Location: Left arm)    Pulse (!) 107    Temp 98.4 °F (36.9 °C)    Resp 18    Ht 5' 10\" (1.778 m)    Wt 50.9 kg (112 lb 3.4 oz)    SpO2 94%    BMI 16.1 kg/m2       O2 Device: Nasal cannula   O2 Flow Rate (L/min): 4 l/min   Temp (24hrs), Av.1 °F (36.7 °C), Min:96.2 °F (35.7 °C), Max:100 °F (37.8 °C)       Intake/Output:   Last shift:       07 -  1900  In: -   Out: 2550 [Urine:2550]  Last 3 shifts: 1901 -  0700  In: 3408.4 [P.O.:1504; I.V.:1904.4]  Out: 9450 [Urine:9450]    Intake/Output Summary (Last 24 hours) at 17 1035  Last data filed at 17 0912   Gross per 24 hour   Intake           1662.8 ml   Output             6275 ml   Net          -4612.2 ml        Physical Exam:  General:    Looks calm and comfortable, emaciated, chronically ill looking. Head:   Normocephalic, without obvious abnormality, atraumatic. Eyes:   Conjunctivae/corneas clear. Nose:  Nares normal. No drainage or sinus tenderness. Throat:    Lips, mucosa, and tongue dry. Neck:  Supple, symmetrical,  no adenopathy, thyroid: non tender    no carotid bruit and no JVD. Lungs:   Decreased to auscultation bilaterally. No Wheezing or Rhonchi. No rales. Heart:   Regular rate and rhythm,  no murmur, rub or gallop. Abdomen:   Soft, non-tender. Not distended.     Extremities: Extremities normal, atraumatic, No cyanosis. No edema. No clubbing, wasted muscle mass. Skin:     Texture, turgor normal. No rashes or lesions. Not Jaundiced  Psych:  Poor insight. Not depressed. Not anxious or agitated. DATA:  Labs:  Recent Labs      07/06/17   0533  07/06/17   0025  07/05/17   1836   07/05/17   0052   07/04/17   1651   NA  135*  137  137   < >  125*   < >  114*   K  3.4*  3.5  3.5   < >  4.2   < >  3.7   CL  97  96*  98   < >  91*   < >  76*   CO2  32  35*  35*   < >  24   < >  26   BUN  6  10  10   < >  3*   < >  2*   CREA  0.59*  0.74  0.97   < >  0.60*   < >  0.43*   CA  8.1*  8.1*  8.3*   < >  8.2*   < >  8.0*   ALB   --    --    --    --    --    --   3.4*   PHOS   --   2.7   --    --   3.6   --    --    MG   --   2.0   --    --   1.9   --   1.6    < > = values in this interval not displayed.      Recent Labs      07/06/17   0025  07/05/17   0452  07/04/17   1651   WBC  9.0  10.4  11.4*   HGB  12.9  14.7  14.1   HCT  36.7  40.4  38.6   PLT  177  211  224     Recent Labs      07/05/17   0452  07/04/17   1821   ZECHARIAH  24  16       Total time spent with patient:  35 minutes    [] Critical Care Provided    Care Plan discussed with:   Percy Banks MD

## 2017-07-06 NOTE — PROGRESS NOTES
NUTRITION brief    Recommendations:   1. Include Ensure Enlive TID on discharge paperwork  2. Trial appetite stimulant  3. Daily MVI on discharge     Diet: Cardiac, 1800ml FR    Chart reviewed for low weight. Poor intake an issue since 2014. Question if at least partially due to psych hx. Spoke with [facility] staff who report pt consuming only 2 meals per day. Will eat breakfast so that he can drink 5 cups of coffee and then with either eat lunch OR dinner, never both. Supplements not covered by insurance, per facility staff, and pt refusing to use his own money to buy. Chart review shows non-compliance with diet per PCP notes. 67% IBW. BMI 16.1  Meets Criteria for Chronic Malnutrition   [x] Severe Malnutrition, as evidenced by:   [] Severe muscle wasting, loss of subcutaneous fat   [x] Nutritional intake of <75% of recommended intake for >1 month   [x] Weight loss of  >5% in 1 month, >7.5% in 3 months, >10% in 6 months, >20% in 1 year   [] Severe edema     With underweight status and pt meeting criteria for malnutrition question if appetite stimulant may be beneficial. Could consider trial of remeron with hx depression vs Megace/Marinol. Will add Ensure Enlive TID (1050kcal, 60g protein) for now. Plans for possible discharge tomorrow per  notes - if not discharged will continue to follow for PO intake and wt trends.      Estimated Nutrition Needs:   Kcals/day: 1785-2040kcal (35-40kcal/kg)  Protein: 71-82g (1.4-1.6g/kg)  Fluid: 1800ml (35ml/kg)  Weight Used: [address], [address]

## 2017-07-06 NOTE — PROGRESS NOTES
Problem: Mobility Impaired (Adult and Pediatric)  Goal: *Acute Goals and Plan of Care (Insert Text)  Physical Therapy Goals  Initiated 7/6/2017  1. Patient will start,stop and turn without loss of balance within 7 day(s). 2. Patient will ambulate with modified independence for 300 feet with the least restrictive device within 7 day(s). 3. Patient will ascend/descend 12 stairs with 1 handrail(s) with modified independence within 7 day(s). PHYSICAL THERAPY EVALUATION  Patient: Napoleon Hassan (47 y.o. male)  Date: 7/6/2017  Primary Diagnosis: Hyponatremia        Precautions: fall         ASSESSMENT :  Based on the objective data described below, the patient presents with good strength and mobility but somewhat unsteady gait. He reports no falls in the last year but did sustain loss of balance x 1 whil avoiding obstacle s when walking. He demonstrates path deviaitions and increased trunk sway when walking. He would benefit from a short course of in hospital PT but should not require [facility] PT or DME for discharge. .     Patient will benefit from skilled intervention to address the above impairments.   Patients rehabilitation potential is considered to be Excellent  Factors which may influence rehabilitation potential include:   [X]         None noted  [ ]         Mental ability/status  [ ]         Medical condition  [ ]         Home/family situation and support systems  [ ]         Safety awareness  [ ]         Pain tolerance/management  [ ]         Other:        PLAN :  Recommendations and Planned Interventions:  [ ]           Bed Mobility Training             [ ]    Neuromuscular Re-Education  [ ]           Transfer Training                   [ ]    Orthotic/Prosthetic Training  [X]           Gait Training                         [ ]    Modalities  [X]           Therapeutic Exercises           [ ]    Edema Management/Control  [X]           Therapeutic Activities            [X]    Patient and Family Training/Education  [ ]           Other (comment):     Frequency/Duration: Patient will be followed by physical therapy  5 times a week to address goals. Discharge Recommendations: None  Further Equipment Recommendations for Discharge: None       SUBJECTIVE:   Patient stated My TV won't work.       OBJECTIVE DATA SUMMARY:   HISTORY:    Past Medical History:   Diagnosis Date    Asthma       seldom,use inhaler    Bronchitis      Chronic obstructive pulmonary disease ([facility]      Depression       anxiety    Psychiatric disorder       paranoid schizophrenia    Psychiatric disorder       extrapyramidal disease    Psychotic disorder       mental retardation     Past Surgical History:   Procedure Laterality Date    HX ORTHOPAEDIC         right knee     Prior Level of Function/Home Situation: Independent  Personal factors and/or comorbidities impacting plan of care:            EXAMINATION/PRESENTATION/DECISION MAKING:   Critical Behavior:  Neurologic State: Alert  Orientation Level: Oriented X4  Cognition: Decreased attention/concentration, Memory loss     Hearing:     Skin:  Intact     Range Of Motion:  PROM: Within functional limits  Strength:    Strength: Within functional limits     Tone & Sensation:   Sensation: Intact     Coordination:  Fair     Functional Mobility:  Bed Mobility:  Rolling: Modified independent  Supine to Sit: Modified independent  Sit to Supine: Modified independent  Scooting: Modified independent  Transfers:  Sit to Stand: Modified independent  Stand to Sit: Modified independent  Stand Pivot Transfers: Modified independent     Bed to Chair: Modified independent  Balance:   Sitting: Intact  Standing: Impaired  Standing - Static: Fair  Standing - Dynamic : Fair  Ambulation/Gait Training:  Distance (ft): 150 Feet (ft)     Ambulation - Level of Assistance: Stand-by asssistance     Gait Description (WDL): Exceptions to WDL  Gait Abnormalities: Trunk sway increased; Path deviations; Altered arm swing        Base of Support: Widened     Speed/Sabina: Pace decreased (<100 feet/min)  Step Length:  (variable)  Swing Pattern:  (variable)     Functional Measure:  Tinetti test:      Sitting Balance: 1  Arises: 2  Attempts to Rise: 2  Immediate Standing Balance: 1  Standing Balance: 1  Nudged: 1  Eyes Closed: 1  Turn 360 Degrees - Continuous/Discontinuous: 1  Turn 360 Degrees - Steady/Unsteady: 0  Sitting Down: 2  Balance Score: 12  Indication of Gait: 1  R Step Length/Height: 1  L Step Length/Height: 1  R Foot Clearance: 1  L Foot Clearance: 1  Step Symmetry: 0  Step Continuity: 0  Path: 1  Trunk: 1  Walking Time: 0  Gait Score: 7  Total Score: 19                Tinetti Tool Score Risk of Falls  <19 = High Fall Risk  19-24 = Moderate Fall Risk  25-28 = Low Fall Risk  Tinetti ME. Performance-Oriented Assessment of Mobility Problems in Elderly Patients. [place] 66; [id number]. (Scoring Description: PT Bulletin Feb. 10, 1993)         G codes: In compliance with CMSs Claims Based Outcome Reporting, the following G-code set was chosen for this patient based on their primary functional limitation being treated: The outcome measure chosen to determine the severity of the functional limitation was the Tinetti with a score of 19/28 which was correlated with the impairment scale.       · Mobility - Walking and Moving Around:               - CURRENT STATUS:    CJ - 20%-39% impaired, limited or restricted               - GOAL STATUS:           CI - 1%-19% impaired, limited or restricted               - D/C STATUS:                       ---------------To be determined---------------      Physical Therapy Evaluation Charge Determination   History Examination Presentation Decision-Making   LOW Complexity : Zero comorbidities / personal factors that will impact the outcome / POC LOW Complexity : 1-2 Standardized tests and measures addressing body structure, function, activity limitation and / or participation in recreation  LOW Complexity : Stable, uncomplicated  LOW Complexity : FOTO score of       Based on the above components, the patient evaluation is determined to be of the following complexity level: LOW      Pain:  Pain Scale 1: Numeric (0 - 10)  Pain Intensity 1: 0  Activity Tolerance:   HR: 84 At rest on 2 lit/min O2  O2 SATs: 97% at rest on 2 lit/min O2     HR: 120 post ambulation on room air  O2 SATs: 89% post ambulation on room air     After treatment:   [X]         Patient left in no apparent distress sitting up in chair  [ ]         Patient left in no apparent distress in bed  [X]         Call bell left within reach  [X]         Nursing notified  [ ]         Caregiver present  [X]         Bed alarm activated      COMMUNICATION/EDUCATION:   The patients plan of care was discussed with: Respiratory Therapist.  [X]         Fall prevention education was provided and the patient/caregiver indicated understanding. [X]         Patient/family have participated as able in goal setting and plan of care. [X]         Patient/family agree to work toward stated goals and plan of care. [ ]         Patient understands intent and goals of therapy, but is neutral about his/her participation. [ ]         Patient is unable to participate in goal setting and plan of care.      Thank you for this referral.  Ryan Enciso, PT   Time Calculation: 25 mins

## 2017-07-06 NOTE — PROGRESS NOTES
Care Management Interventions  PCP Verified by CM: Yes  Last Visit to PCP: 06/20/17  Mode of Transport at Discharge: Other (see comment) ([facility])  Discharge Durable Medical Equipment: No  Physical Therapy Consult: Yes  Occupational Therapy Consult: No  Speech Therapy Consult: No  Current Support Network: Adult Group Home, Assisted Living [facility]  Confirm Follow Up Transport: Cab ([employer])  Plan discussed with Pt/Family/Caregiver: Yes  Freedom of Choice Offered: Yes  Discharge Location  Discharge Placement: Assisted Living ([facility])    CM reviewed chart. Pt has been a resident at [facility] for about 10 years. [facility] is at [place] [id number], Phone: [phone], Fax [phone]. CM called and left message for pt's sister. CM also called and spoke with Inocencia Martin from [facility]. Plan is for possible discharge tomorrow. Inocencia Martin from [facility] confirmed that pt could return to them at time of discharge, cm faxed updated clinicals today, they will want AVS and Prescriptions faxed to them at time of discharge. Inocencia Martin confirmed that pt transports by [facility] ([facility]), cm will call to set up transortation at time of discharge. Pt's Medicaid number is [id number]. CM will continue to follow.   Hattie Ureña, BSW, ACM

## 2017-07-06 NOTE — PROGRESS NOTES
Hospitalist Progress Note  Leighann Ch MD  Office: [phone]        Date of Service:  2017  NAME:  Russell June  :  1960  MRN:  [id number]      Admission Summary:   The patient is a 79-year-old   -American male with an extensive psychiatric history of   paranoid schizophrenia, some mental retardation and anxiety and   extrapyramidal disease, on multiple psychiatric medications who was   brought into the emergency room from a group home due to altered   mental status. Per report from the group home, the patient was feeling   fine and walking until about an hour ago. No further history is obtained   from the patient himself or from his sister at the bedside. The last time   his sister saw him was about a month ago. The patient himself is not   answering any questions. He opens his eyes and looks at me but   does not voice anything. In the emergency room the patient was found   to have a sodium of 114 and the hospitalist team has been called for   admission. Interval history / Subjective:   Patient is calm and cooperative. He slept well last night, but has been coughing and has some SOB. Assessment & Plan:     Critical hyponatremia:  - possibly contributing to patient's altered mental status. - suppose some of the hyponatremia may be chronic, with an acute component;   - appreciate Nephrology consult and recommendations. - continue to monitor BMP;   - : Na normalized, discontinue D5W, continue fluid restriction. Hypokalemia:   - resolved, continue to monitor.       Extensive psychiatric history:  - all psychiatric medications are on hold on admission;  - Psychiatry consult to review medications and for additional recommendations;   - appreciate Psychiatry consult and recommendations: considering acuity of hyponatremia and how quickly it corrected, it is unlikely that the medications alone are responsible for the hyponatremia. I restarted all the Psych medications last night 7/5. Urinary retention, POA:  - bladder scan in the emergency room showed 900 mL, s/p straight cath. - monitor. AMS, cannot r/o sepsis on admission:  - empirically started on broad-spectrum IV Abx, deescalate if cx negative. - 7/5: afebrile and no leukocytosis, no cultures, discontinued all IV Abx.  - 7/6: patient has some coughing and increased secretions, will treat with a Z-clayton and short course of prednisone for acute bronchitis. Acute COPD exacerbation with acute bronchitis:   - mild hypoxia, increased cough, increased secretions, slight wheezing: continue Nebs, short course of prednisone and Abx (Z-clayton).     Code status: full   DVT prophylaxis: B SCD's    Care Plan discussed with: Patient/Family and Nurse  Disposition: TBD   Patient lives in a group home. Anticipate discharge tomorrow. Hospital Problems  Date Reviewed: 7/4/2017          Codes Class Noted POA    Acute encephalopathy ICD-10-CM: G93.40  ICD-9-CM: 348.30  7/5/2017 Unknown        * (Principal)Hyponatremia ICD-10-CM: E87.1  ICD-9-CM: 276.1  7/4/2017 Unknown        Benign prostatic hyperplasia without lower urinary tract symptoms ICD-10-CM: N40.0  ICD-9-CM: 600.00  5/15/2017 Yes        Chronic obstructive pulmonary disease ([facility] ICD-10-CM: J44.9  ICD-9-CM: 526  3/29/2016 Yes        Tobacco dependence ICD-10-CM: F17.200  ICD-9-CM: 305.1  3/29/2016 Yes        Underweight ICD-10-CM: R63.6  ICD-9-CM: 783.22  3/29/2016 Yes        Paranoid schizophrenia ([facility] ICD-10-CM: F20.0  ICD-9-CM: 295.30  3/29/2016 Yes        Poor dentition ICD-10-CM: K08.9  ICD-9-CM: 525.9  3/29/2016 Yes        Depression ICD-10-CM: F32.9  ICD-9-CM: 544  1/31/2012 Yes                Review of Systems:   Review of systems not obtained due to patient factors. Vital Signs:    Last 24hrs VS reviewed since prior progress note.  Most recent are:  Visit Vitals    /68 (BP 1 Location: Left arm)    Pulse 99    Temp 98.4 °F (36.9 °C)    Resp 18    Ht 5' 10\" (1.778 m)    Wt 50.9 kg (112 lb 3.4 oz)    SpO2 93%    BMI 16.1 kg/m2         Intake/Output Summary (Last 24 hours) at 07/06/17 9869  Last data filed at 07/06/17 3002   Gross per 24 hour   Intake          1819.62 ml   Output             4200 ml   Net         -2380.38 ml        Physical Examination:             Constitutional:  No acute distress, sleeping, but wakes up, opens his eyes, smiles;    Cachectic; asymmetry of the rib cage;    ENT:  poor dentition    Resp:  Coarse breath sounds B, diffuse rhonchi, minimal expiratory wheezing;    CV:  Regular rhythm, normal rate, no murmurs, gallops, rubs, no tachycardia;    GI:  Soft, non distended, non tender. normoactive bowel sounds;     Musculoskeletal:  No edema, warm;    Neurologic:  Moves all extremities. Skin:  dry, thin;       Data Review:    Review and/or order of clinical lab test  Review and/or order of tests in the radiology section of CPT  Review and/or order of tests in the medicine section of CPT      Labs:     Recent Labs      07/06/17   0025  07/05/17   0452   WBC  9.0  10.4   HGB  12.9  14.7   HCT  36.7  40.4   PLT  177  211     Recent Labs      07/06/17   0533  07/06/17   0025  07/05/17   1836   07/05/17   0052   07/04/17   1651   NA  135*  137  137   < >  125*   < >  114*   K  3.4*  3.5  3.5   < >  4.2   < >  3.7   CL  97  96*  98   < >  91*   < >  76*   CO2  32  35*  35*   < >  24   < >  26   BUN  6  10  10   < >  3*   < >  2*   CREA  0.59*  0.74  0.97   < >  0.60*   < >  0.43*   GLU  91  90  121*   < >  118*   < >  118*   CA  8.1*  8.1*  8.3*   < >  8.2*   < >  8.0*   MG   --   2.0   --    --   1.9   --   1.6   PHOS   --   2.7   --    --   3.6   --    --     < > = values in this interval not displayed.      Recent Labs      07/04/17   1651   SGOT  40*   ALT  21   AP  99   TBILI  1.2*   TP  6.7   ALB  3.4*   GLOB  3.3     Recent Labs      07/04/17   1651 INR  1.1   PTP  11.3*      No results for input(s): FE, TIBC, PSAT, FERR in the last 72 hours. No results found for: FOL, RBCF   No results for input(s): PH, PCO2, PO2 in the last 72 hours.   Recent Labs      07/04/17   1904  07/04/17   1651   TROIQ  <0.04  <0.04     Lab Results   Component Value Date/Time    Cholesterol, total 102 05/15/2017 11:11 AM    HDL Cholesterol 48 05/15/2017 11:11 AM    LDL, calculated 46 05/15/2017 11:11 AM    Triglyceride 41 05/15/2017 11:11 AM     Lab Results   Component Value Date/Time    Glucose (POC) 132 07/04/2017 04:37 PM     Lab Results   Component Value Date/Time    Color YELLOW/STRAW 07/04/2017 04:51 PM    Appearance CLEAR 07/04/2017 04:51 PM    Specific gravity 1.007 07/04/2017 04:51 PM    pH (UA) 7.0 07/04/2017 04:51 PM    Protein NEGATIVE  07/04/2017 04:51 PM    Glucose NEGATIVE  07/04/2017 04:51 PM    Ketone 15 07/04/2017 04:51 PM    Bilirubin NEGATIVE  07/04/2017 04:51 PM    Urobilinogen 0.2 07/04/2017 04:51 PM    Nitrites NEGATIVE  07/04/2017 04:51 PM    Leukocyte Esterase NEGATIVE  07/04/2017 04:51 PM    Epithelial cells FEW 07/04/2017 04:51 PM    Bacteria NEGATIVE  07/04/2017 04:51 PM    WBC 0-4 07/04/2017 04:51 PM    RBC 10-20 07/04/2017 04:51 PM         Medications Reviewed:     Current Facility-Administered Medications   Medication Dose Route Frequency    predniSONE (DELTASONE) tablet 40 mg  40 mg Oral ONCE    [START ON 7/7/2017] predniSONE (DELTASONE) tablet 20 mg  20 mg Oral DAILY WITH BREAKFAST    azithromycin (ZITHROMAX) tablet 500 mg  500 mg Oral NOW    [START ON 7/7/2017] azithromycin (ZITHROMAX) tablet 250 mg  250 mg Oral DAILY    guaiFENesin (ROBITUSSIN) 100 mg/5 mL oral liquid 200 mg  200 mg Oral Q4H PRN    benztropine (COGENTIN) tablet 2 mg  2 mg Oral QHS    PARoxetine (PAXIL) tablet 40 mg  40 mg Oral DAILY    risperiDONE (RisperDAL) tablet 3 mg  3 mg Oral QHS    temazepam (RESTORIL) capsule 30 mg  30 mg Oral QHS    budesonide (PULMICORT) 500 mcg/2 ml nebulizer suspension  500 mcg Nebulization BID RT    And    arformoterol (BROVANA) neb solution 15 mcg  15 mcg Nebulization BID RT    sodium chloride (NS) flush 5-10 mL  5-10 mL IntraVENous Q8H    sodium chloride (NS) flush 5-10 mL  5-10 mL IntraVENous PRN    acetaminophen (TYLENOL) suppository 650 mg  650 mg Rectal Q4H PRN    ondansetron (ZOFRAN) injection 4 mg  4 mg IntraVENous Q4H PRN    sodium chloride (NS) flush 5-10 mL  5-10 mL IntraVENous Q8H    sodium chloride (NS) flush 5-10 mL  5-10 mL IntraVENous PRN    albuterol-ipratropium (DUO-NEB) 2.5 MG-0.5 MG/3 ML  3 mL Nebulization Q6H RT    LORazepam (ATIVAN) injection 1 mg  1 mg IntraVENous Q2H PRN    nicotine (NICODERM CQ) 14 mg/24 hr patch 1 Patch  1 Patch TransDERmal DAILY    albuterol-ipratropium (DUO-NEB) 2.5 MG-0.5 MG/3 ML  3 mL Nebulization Q4H PRN    haloperidol lactate (HALDOL) injection 2 mg  2 mg IntraVENous Q6H PRN     ______________________________________________________________________  EXPECTED LENGTH OF STAY: 3d 7h  ACTUAL LENGTH OF STAY:          2                 Garry Briscoe MD

## 2017-07-06 NOTE — PROGRESS NOTES
Bedside shift change report given to Salomon Ford RN (oncoming nurse) by Amaris Waldron RN (offgoing nurse). Report included the following information SBAR, Intake/Output, MAR and Recent Results.

## 2017-07-07 ENCOUNTER — PATIENT OUTREACH (OUTPATIENT)
Dept: INTERNAL MEDICINE CLINIC | Age: 57
End: 2017-07-07

## 2017-07-07 VITALS
TEMPERATURE: 98.3 F | BODY MASS INDEX: 16.06 KG/M2 | HEIGHT: 70 IN | OXYGEN SATURATION: 92 % | RESPIRATION RATE: 16 BRPM | WEIGHT: 112.21 LBS | SYSTOLIC BLOOD PRESSURE: 114 MMHG | HEART RATE: 96 BPM | DIASTOLIC BLOOD PRESSURE: 73 MMHG

## 2017-07-07 LAB
ANION GAP BLD CALC-SCNC: 5 MMOL/L (ref 5–15)
ANION GAP BLD CALC-SCNC: 8 MMOL/L (ref 5–15)
BASOPHILS # BLD AUTO: 0 K/UL (ref 0–0.1)
BASOPHILS # BLD: 0 % (ref 0–1)
BUN SERPL-MCNC: 11 MG/DL (ref 6–20)
BUN SERPL-MCNC: 7 MG/DL (ref 6–20)
BUN/CREAT SERPL: 12 (ref 12–20)
BUN/CREAT SERPL: 17 (ref 12–20)
CALCIUM SERPL-MCNC: 8.2 MG/DL (ref 8.5–10.1)
CALCIUM SERPL-MCNC: 8.3 MG/DL (ref 8.5–10.1)
CHLORIDE SERPL-SCNC: 102 MMOL/L (ref 97–108)
CHLORIDE SERPL-SCNC: 103 MMOL/L (ref 97–108)
CO2 SERPL-SCNC: 31 MMOL/L (ref 21–32)
CO2 SERPL-SCNC: 34 MMOL/L (ref 21–32)
CREAT SERPL-MCNC: 0.58 MG/DL (ref 0.7–1.3)
CREAT SERPL-MCNC: 0.66 MG/DL (ref 0.7–1.3)
EOSINOPHIL # BLD: 0 K/UL (ref 0–0.4)
EOSINOPHIL NFR BLD: 0 % (ref 0–7)
ERYTHROCYTE [DISTWIDTH] IN BLOOD BY AUTOMATED COUNT: 13.5 % (ref 11.5–14.5)
GLUCOSE SERPL-MCNC: 72 MG/DL (ref 65–100)
GLUCOSE SERPL-MCNC: 89 MG/DL (ref 65–100)
HBV SURFACE AG SER QL: <0.1 INDEX
HBV SURFACE AG SER QL: NEGATIVE
HCT VFR BLD AUTO: 39 % (ref 36.6–50.3)
HCV AB SERPL QL IA: NONREACTIVE
HCV COMMENT,HCGAC: NORMAL
HGB BLD-MCNC: 13.3 G/DL (ref 12.1–17)
HIV1 P24 AG SERPL QL IA: NONREACTIVE
HIV1+2 AB SERPL QL IA: NONREACTIVE
LYMPHOCYTES # BLD AUTO: 16 % (ref 12–49)
LYMPHOCYTES # BLD: 1.6 K/UL (ref 0.8–3.5)
MAGNESIUM SERPL-MCNC: 2.2 MG/DL (ref 1.6–2.4)
MCH RBC QN AUTO: 32.1 PG (ref 26–34)
MCHC RBC AUTO-ENTMCNC: 34.1 G/DL (ref 30–36.5)
MCV RBC AUTO: 94.2 FL (ref 80–99)
MONOCYTES # BLD: 1.2 K/UL (ref 0–1)
MONOCYTES NFR BLD AUTO: 12 % (ref 5–13)
NEUTS SEG # BLD: 7.2 K/UL (ref 1.8–8)
NEUTS SEG NFR BLD AUTO: 72 % (ref 32–75)
PHOSPHATE SERPL-MCNC: 2.9 MG/DL (ref 2.6–4.7)
PLATELET # BLD AUTO: 182 K/UL (ref 150–400)
POTASSIUM SERPL-SCNC: 3.3 MMOL/L (ref 3.5–5.1)
POTASSIUM SERPL-SCNC: 3.5 MMOL/L (ref 3.5–5.1)
RBC # BLD AUTO: 4.14 M/UL (ref 4.1–5.7)
SODIUM SERPL-SCNC: 141 MMOL/L (ref 136–145)
SODIUM SERPL-SCNC: 142 MMOL/L (ref 136–145)
WBC # BLD AUTO: 10 K/UL (ref 4.1–11.1)

## 2017-07-07 PROCEDURE — 74011000250 HC RX REV CODE- 250: Performed by: INTERNAL MEDICINE

## 2017-07-07 PROCEDURE — 36415 COLL VENOUS BLD VENIPUNCTURE: CPT | Performed by: INTERNAL MEDICINE

## 2017-07-07 PROCEDURE — 74011636637 HC RX REV CODE- 636/637: Performed by: INTERNAL MEDICINE

## 2017-07-07 PROCEDURE — 84100 ASSAY OF PHOSPHORUS: CPT | Performed by: INTERNAL MEDICINE

## 2017-07-07 PROCEDURE — 74011250637 HC RX REV CODE- 250/637: Performed by: INTERNAL MEDICINE

## 2017-07-07 PROCEDURE — 74011250637 HC RX REV CODE- 250/637: Performed by: NURSE PRACTITIONER

## 2017-07-07 PROCEDURE — 83735 ASSAY OF MAGNESIUM: CPT | Performed by: INTERNAL MEDICINE

## 2017-07-07 PROCEDURE — 94640 AIRWAY INHALATION TREATMENT: CPT

## 2017-07-07 PROCEDURE — 85025 COMPLETE CBC W/AUTO DIFF WBC: CPT | Performed by: INTERNAL MEDICINE

## 2017-07-07 PROCEDURE — 80048 BASIC METABOLIC PNL TOTAL CA: CPT | Performed by: INTERNAL MEDICINE

## 2017-07-07 RX ORDER — POTASSIUM CHLORIDE 750 MG/1
40 TABLET, FILM COATED, EXTENDED RELEASE ORAL
Status: COMPLETED | OUTPATIENT
Start: 2017-07-07 | End: 2017-07-07

## 2017-07-07 RX ORDER — PREDNISONE 20 MG/1
20 TABLET ORAL
Qty: 3 TAB | Refills: 0 | Status: SHIPPED | OUTPATIENT
Start: 2017-07-08 | End: 2017-07-12

## 2017-07-07 RX ORDER — AZITHROMYCIN 250 MG/1
250 TABLET, FILM COATED ORAL DAILY
Qty: 3 TAB | Refills: 0 | Status: SHIPPED | OUTPATIENT
Start: 2017-07-08 | End: 2017-07-12

## 2017-07-07 RX ADMIN — PREDNISONE 20 MG: 20 TABLET ORAL at 06:18

## 2017-07-07 RX ADMIN — Medication 10 ML: at 06:18

## 2017-07-07 RX ADMIN — PAROXETINE HYDROCHLORIDE 40 MG: 20 TABLET, FILM COATED ORAL at 08:22

## 2017-07-07 RX ADMIN — POTASSIUM CHLORIDE 40 MEQ: 750 TABLET, FILM COATED, EXTENDED RELEASE ORAL at 10:04

## 2017-07-07 RX ADMIN — ARFORMOTEROL TARTRATE 15 MCG: 15 SOLUTION RESPIRATORY (INHALATION) at 07:35

## 2017-07-07 RX ADMIN — BUDESONIDE 500 MCG: 0.5 INHALANT RESPIRATORY (INHALATION) at 07:35

## 2017-07-07 RX ADMIN — AZITHROMYCIN 250 MG: 250 TABLET, FILM COATED ORAL at 08:22

## 2017-07-07 NOTE — DISCHARGE SUMMARY
Discharge Summary       PATIENT ID: Campos Troy  MRN: [id number]   YOB: 1960    DATE OF ADMISSION: 7/4/2017  4:20 PM    DATE OF DISCHARGE: 7/7/2017   PRIMARY CARE PROVIDER: John Staton DO     ATTENDING PHYSICIAN: Larry Vaughn M.D.   DISCHARGING PROVIDER: Larry Vaughn MD    To contact this individual call [phone] and ask the  to page. If unavailable ask to be transferred the Adult Hospitalist Department. CONSULTATIONS: None    PROCEDURES/SURGERIES: * No surgery found *    ADMITTING DIAGNOSES & HOSPITAL COURSE:   The patient is a 69-year-old CM with an extensive psychiatric history of   paranoid schizophrenia, some mental retardation and anxiety and   extrapyramidal disease, on multiple psychiatric medications who was   brought into the emergency room from a group home due to altered   mental status.  Per report from the group home, the patient was feeling   fine and walking until about an hour ago.  No further history is obtained   from the patient himself or from his sister at the bedside.  The last time   his sister saw him was about a month ago.  The patient himself is not   answering any questions. Cole Calderon opens his eyes and looks at me but   does not voice anything.  In the emergency room the patient was found   to have a sodium of 114 and the hospitalist team has been called for   admission.      Critical hyponatremia:  - possibly contributing to patient's altered mental status.    - suppose some of the hyponatremia may be chronic, with an acute component;   - appreciate Nephrology consult and recommendations.   - continue to monitor BMP;   - 7/6: Na normalized, discontinue D5W, continue fluid restriction.      Hypokalemia:   - resolved, continue to monitor.      Extensive psychiatric history:  - all psychiatric medications are on hold on admission;  - Psychiatry consult to review medications and for additional recommendations;   - appreciate Psychiatry consult and recommendations: considering acuity of hyponatremia and how quickly it corrected, it is unlikely that the medications alone are responsible for the hyponatremia. I restarted all the Psych medications last night 7/5.       Urinary retention, POA:  - bladder scan in the emergency room showed 900 mL, s/p straight cath.    - monitor.     AMS, cannot r/o sepsis on admission:  - empirically started on broad-spectrum IV Abx, deescalate if cx negative. - 7/5: afebrile and no leukocytosis, no cultures, discontinued all IV Abx.  - 7/6: patient has some coughing and increased secretions, will treat with a Z-clayton and short course of prednisone for acute bronchitis.      Acute COPD exacerbation with acute bronchitis:   - mild hypoxia, increased cough, increased secretions, slight wheezing: continue Nebs, short course of prednisone and Abx (Z-clayton).     Patient was discharged back to group home in stable condition. DISCHARGE DIAGNOSES / PLAN:      · Critical hyponatremia: possibly acute, resolved. Resume all home medications. Fluid restriction. · Extensive psychiatric history: resume all home medications as prior to admission. · Urinary retention, POA: resolved. · AMS, attributed to metabolic encephalopathy from hyponatremia. Resolved, mentation at baseline according to brother. · Acute COPD exacerbation with acute bronchitis: short course of prednisone and Abx (Z-clayton) in addition to usual Nebs.           PENDING TEST RESULTS:   At the time of discharge the following test results are still pending: None    FOLLOW UP APPOINTMENTS:    Follow-up Information     Follow up With Details Comments [address], DO In 1 week F/u after hospitalization  [address] [facility] [address] [facility]  [phone]             ADDITIONAL CARE RECOMMENDATIONS:   Please limit your fluid intake to 8 cups per day. Please do not smoke.      DIET: Regular Diet    ACTIVITY: Activity as tolerated    WOUND CARE: N/A    EQUIPMENT needed: N/A      DISCHARGE MEDICATIONS:  Current Discharge Medication List      START taking these medications    Details   azithromycin (ZITHROMAX) 250 mg tablet Take 1 Tab by mouth daily. Qty: 3 Tab, Refills: 0      predniSONE (DELTASONE) 20 mg tablet Take 1 Tab by mouth daily (with breakfast). Qty: 3 Tab, Refills: 0         CONTINUE these medications which have NOT CHANGED    Details   guaiFENesin (ROBITUSSIN) 100 mg/5 mL liquid Take 200 mg by mouth every four (4) hours as needed for Cough. fluticasone-vilanterol (BREO ELLIPTA) 100-25 mcg/dose inhaler Take 1 Puff by inhalation daily. Qty: 1 Inhaler, Refills: 5      ibuprofen (MOTRIN) 800 mg tablet 800 mg every eight (8) hours as needed. VENTOLIN HFA 90 mcg/actuation inhaler INHALE 2 PUFFS EVERY 4 TO 6 HOURS AS NEEDED FOR SHORTNESS OF BREATH  Qty: 1 Inhaler, Refills: 0      albuterol (PROVENTIL VENTOLIN) 2.5 mg /3 mL (0.083 %) nebulizer solution by Nebulization route every six (6) hours as needed for Wheezing. ALPRAZolam (XANAX) 0.5 mg tablet Take 0.5 mg by mouth two (2) times a day. PARoxetine (PAXIL) 40 mg tablet Take 40 mg by mouth daily. risperiDONE (RISPERDAL) 3 mg tablet Take 3 mg by mouth nightly. benztropine (COGENTIN) 2 mg tablet Take 2 mg by mouth nightly. temazepam (RESTORIL) 30 mg capsule Take 30 mg by mouth nightly. NOTIFY YOUR PHYSICIAN FOR ANY OF THE FOLLOWING:   Fever over 101 degrees for 24 hours. Chest pain, shortness of breath, fever, chills, nausea, vomiting, diarrhea, change in mentation, falling, weakness, bleeding. Severe pain or pain not relieved by medications. Or, any other signs or symptoms that you may have questions about.     DISPOSITION:  x  Home With:   OT  PT  HH  RN       Long term SNF/Inpatient Rehab    Independent/assisted living    Hospice    Other:  Group home       PATIENT CONDITION AT DISCHARGE:     Functional status    Poor     Deconditioned    x Independent      Cognition   x Lucid     Forgetful     Dementia      Catheters/lines (plus indication)    Singh     PICC     PEG    x None      Code status   x  Full code     DNR      PHYSICAL EXAMINATION AT DISCHARGE:     07/07/17 0925 98.3 °F (36.8 °C) 96 114/73 -- At rest 16 92 %            Constitutional:  No acute distress, sleeping, but wakes up, opens his eyes, smiles;    Cachectic; asymmetry of the rib cage;    ENT:  poor dentition    Resp:  Coarse breath sounds B, diffuse rhonchi, minimal expiratory wheezing;    CV:  Regular rhythm, normal rate, no murmurs, gallops, rubs, no tachycardia;    GI:  Soft, non distended, non tender. normoactive bowel sounds;     Musculoskeletal:  No edema, warm;    Neurologic:  Moves all extremities.                                                Skin:  dry, thin;            CHRONIC MEDICAL DIAGNOSES:  Problem List as of 7/7/2017  Date Reviewed: 7/4/2017          Codes Class Noted - Resolved    Acute encephalopathy ICD-10-CM: G93.40  ICD-9-CM: 348.30  7/5/2017 - Present        * (Principal)Hyponatremia ICD-10-CM: E87.1  ICD-9-CM: 276.1  7/4/2017 - Present        Benign prostatic hyperplasia without lower urinary tract symptoms ICD-10-CM: N40.0  ICD-9-CM: 600.00  5/15/2017 - Present        Chronic obstructive pulmonary disease ([facility] ICD-10-CM: J44.9  ICD-9-CM: 247  3/29/2016 - Present        Tobacco dependence ICD-10-CM: F17.200  ICD-9-CM: 305.1  3/29/2016 - Present        Underweight ICD-10-CM: R63.6  ICD-9-CM: 783.22  3/29/2016 - Present        Paranoid schizophrenia ([facility] ICD-10-CM: F20.0  ICD-9-CM: 295.30  3/29/2016 - Present        Poor dentition ICD-10-CM: K08.9  ICD-9-CM: 525.9  3/29/2016 - Present        Insomnia ICD-10-CM: G47.00  ICD-9-CM: 780.52  3/29/2016 - Present        Bronchitis, chronic ([facility] ICD-10-CM: J42  ICD-9-CM: 491.9  5/22/2014 - Present        Depression ICD-10-CM: F32.9  ICD-9-CM: 381  1/31/2012 - Present              Greater than 35 minutes were spent with the patient on counseling and coordination of care    Signed:   Doreen Wilkerson MD  7/7/2017  11:38 AM

## 2017-07-07 NOTE — PROGRESS NOTES
Bedside shift change report given to Teddy Gil RN (oncoming nurse) by Ty Cee RN (offgoing nurse). Report included the following information SBAR, Kardex, Intake/Output, MAR and Recent Results.

## 2017-07-07 NOTE — DISCHARGE INSTRUCTIONS
Discharge Instructions       PATIENT ID: Angelina Wadsworth  MRN: [id number]   YOB: 1960    DATE OF ADMISSION: 7/4/2017  4:20 PM    DATE OF DISCHARGE: 7/7/2017    PRIMARY CARE PROVIDER: Jamal Flores DO     ATTENDING PHYSICIAN: Mulu Moreira MD  DISCHARGING PROVIDER: Mulu Moreira MD    To contact this individual call [phone] and ask the  to page. If unavailable ask to be transferred the Adult Hospitalist Department. DISCHARGE DIAGNOSES   Hyponatremia     CONSULTATIONS: None    PROCEDURES/SURGERIES: * No surgery found *    PENDING TEST RESULTS:   At the time of discharge the following test results are still pending: None    FOLLOW UP APPOINTMENTS:   Follow-up Information     Follow up With Details Comments [address], DO In 1 week F/u after hospitalization  [address] [facility] [address] [facility]  [phone]             ADDITIONAL CARE RECOMMENDATIONS:   Please limit your fluid intake to 8 cups per day. Please do not smoke. DIET: Regular Diet    ACTIVITY: Activity as tolerated    WOUND CARE: N/A    EQUIPMENT needed: N/A        DISCHARGE MEDICATIONS:   See Medication Reconciliation Form    · It is important that you take the medication exactly as they are prescribed. · Keep your medication in the bottles provided by the pharmacist and keep a list of the medication names, dosages, and times to be taken in your wallet. · Do not take other medications without consulting your doctor. NOTIFY YOUR PHYSICIAN FOR ANY OF THE FOLLOWING:   Fever over 101 degrees for 24 hours. Chest pain, shortness of breath, fever, chills, nausea, vomiting, diarrhea, change in mentation, falling, weakness, bleeding. Severe pain or pain not relieved by medications. Or, any other signs or symptoms that you may have questions about.       DISPOSITION:  x  Home With:   OT  PT  HH  RN       SNF/Inpatient Rehab/LTAC    Independent/assisted living    Hospice    Other:     CDMP Checked:   Yes ROHIT     PROBLEM LIST Updated:  Yes ROHIT       Signed:   Karis Morocho MD  7/7/2017  11:36 AM

## 2017-07-07 NOTE — PROGRESS NOTES
Call to make CHRIS RUBIN appointment by [facility]. Pt discharged today. Pt resides in [facility]. LPN Panel Manager calling that facility today re: another patient and will make RIGO call on this patient as well.     Appt made for 7/10 @ 11am w/ Chiquis Ceron NP.

## 2017-07-07 NOTE — PROGRESS NOTES
Received notification of patient's discharged -2017 dx hyponatremia  from Romulo Olivas (case management ) Per patient's EMR patient was discharged from [facility]. Per patient's EMR patient's past medical history includes COPD, paranoid schizophrenia, depression, anxiety, mental retardation extra pyramidal disease and chronic bronchitis. Patient lives at [address] [place] [address] . Outgoing call placed spoke to ST. LUCIA WONG medication nurse at [facility]. Patient identified utilizing name and . Reintroduced self and reason for the call given. ST. LUCIA WONG is well acquainted with this Lisa Bell verified patient's returned to the facility medications reviewed confirmed hospital follow up appointment on Monday 7/10/2017. ST. LUCIA WONG report patient is doing well and was transported home by . ST. LUCIA WONG confirmed receipt of the prescriptions of azithromycin 250 mg and prednisone 20 mg daily times 3 days. Will follow up with patient at office visit with Marilee Kaye NP on Monday.

## 2017-07-07 NOTE — PROGRESS NOTES
Discharge noted. Pt's mental health worker visiting pt and is agreeable to transport him back to his assisted living ([facility]). I had tried to arrange transportation via medicaid and was told he had no transportation with given medicaid number. 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 Pt agreeable with transportation arrangements. Received call from [facility] office representative indicating pt has medicaid via [address] [place] [address]  (claims telephone [phone]). Requested copy of card be faxed to me to place on chart. Attempted to call brother and sister ge on unsuccessfully. Left message on sister's cell phone.   Sudha Gallego, MEREDITHW

## 2017-07-10 ENCOUNTER — OFFICE VISIT (OUTPATIENT)
Dept: INTERNAL MEDICINE CLINIC | Age: 57
End: 2017-07-10

## 2017-07-10 VITALS
TEMPERATURE: 98.3 F | WEIGHT: 124 LBS | RESPIRATION RATE: 20 BRPM | SYSTOLIC BLOOD PRESSURE: 141 MMHG | HEART RATE: 78 BPM | BODY MASS INDEX: 17.75 KG/M2 | DIASTOLIC BLOOD PRESSURE: 95 MMHG | HEIGHT: 70 IN

## 2017-07-10 DIAGNOSIS — J44.9 CHRONIC OBSTRUCTIVE PULMONARY DISEASE, UNSPECIFIED COPD TYPE (HCC): ICD-10-CM

## 2017-07-10 DIAGNOSIS — F17.200 TOBACCO DEPENDENCE: ICD-10-CM

## 2017-07-10 DIAGNOSIS — F20.0 PARANOID SCHIZOPHRENIA (HCC): ICD-10-CM

## 2017-07-10 DIAGNOSIS — E87.1 HYPONATREMIA: Primary | ICD-10-CM

## 2017-07-10 NOTE — PATIENT INSTRUCTIONS

## 2017-07-10 NOTE — PROGRESS NOTES
Subjective:     Chief Complaint   Patient presents with   [facility] Follow Up       History of Present Illness    Darren Paulino is a 61y.o. year old male who presents for a hospital follow-up. He has a hx of COPD, depression, tobacco use, paranoid schizophrenia, and acute encephalopathy. On July 4, 2017 the patient was taken to the ED from [facility] for complaints of AMS and agitation. He was found to have a . Pt was given 3% saline via IV and was admitted to the ICU. Pt was consulted by Nephrology, neurology, and pulmonary while in the ICU. He was discharged home with PCP follow-up and fluid restriction. He denies any new complaints today. Pt states he is unsure why he was in the hospital.  Pt arrived with . Pt is alert and oriented x4. Per the social work, the patient spends a lot of time outside and drinks a lot of water. The patient is cachectic. He states that he ate grits and peaches for breakfast this morning. He states he takes all of his medications. + smoker. Current Outpatient Prescriptions on File Prior to Visit   Medication Sig Dispense Refill    azithromycin (ZITHROMAX) 250 mg tablet Take 1 Tab by mouth daily. 3 Tab 0    predniSONE (DELTASONE) 20 mg tablet Take 1 Tab by mouth daily (with breakfast). 3 Tab 0    guaiFENesin (ROBITUSSIN) 100 mg/5 mL liquid Take 200 mg by mouth every four (4) hours as needed for Cough.  fluticasone-vilanterol (BREO ELLIPTA) 100-25 mcg/dose inhaler Take 1 Puff by inhalation daily. 1 Inhaler 5    ibuprofen (MOTRIN) 800 mg tablet 800 mg every eight (8) hours as needed.  VENTOLIN HFA 90 mcg/actuation inhaler INHALE 2 PUFFS EVERY 4 TO 6 HOURS AS NEEDED FOR SHORTNESS OF BREATH 1 Inhaler 0    albuterol (PROVENTIL VENTOLIN) 2.5 mg /3 mL (0.083 %) nebulizer solution by Nebulization route every six (6) hours as needed for Wheezing.  ALPRAZolam (XANAX) 0.5 mg tablet Take 0.5 mg by mouth two (2) times a day.       PARoxetine (PAXIL) 40 mg tablet Take 40 mg by mouth daily.  risperiDONE (RISPERDAL) 3 mg tablet Take 3 mg by mouth nightly.  benztropine (COGENTIN) 2 mg tablet Take 2 mg by mouth nightly.  temazepam (RESTORIL) 30 mg capsule Take 30 mg by mouth nightly. No current facility-administered medications on file prior to visit. No Known Allergies   Past Medical History:   Diagnosis Date    Asthma     seldom,use inhaler    Bronchitis     Chronic obstructive pulmonary disease (HCC)     Depression     anxiety    Psychiatric disorder     paranoid schizophrenia    Psychiatric disorder     extrapyramidal disease    Psychotic disorder     mental retardation      Past Surgical History:   Procedure Laterality Date    HX ORTHOPAEDIC      right knee      Social History   Substance Use Topics    Smoking status: Current Every Day Smoker     Packs/day: 1.00     Years: 25.00    Smokeless tobacco: Never Used    Alcohol use No      Comment: socially      Family History   Problem Relation Age of Onset    Diabetes Mother     Heart Disease Father      CHF        Objective:     Vitals:    07/10/17 1102   BP: (!) 141/95   Pulse: 78   Resp: 20   Temp: 98.3 °F (36.8 °C)   TempSrc: Oral   Weight: 124 lb (56.2 kg)   Height: 5' 10\" (1.778 m)       Review of Systems   Constitutional: Negative. HENT: Negative. Eyes: Negative. Respiratory: Negative. Cardiovascular: Negative. Gastrointestinal: Negative. Genitourinary: Negative. Musculoskeletal: Negative. Skin: Negative. Neurological: Negative. Psychiatric/Behavioral: Negative. Physical Exam   Constitutional: He is oriented to person, place, and time. Vital signs are normal. He appears cachectic. He is cooperative. HENT:   Head: Normocephalic and atraumatic. Eyes: Pupils are equal, round, and reactive to light. Neck: Normal range of motion. Neck supple. Cardiovascular: Normal rate, regular rhythm and normal heart sounds. Pulmonary/Chest: Effort normal. He has wheezes in the right upper field, the right middle field, the left upper field and the left middle field. Abdominal: Soft. Bowel sounds are normal. He exhibits no distension. There is no tenderness. Musculoskeletal: Normal range of motion. He exhibits no edema or deformity. Neurological: He is alert and oriented to person, place, and time. Skin: Skin is warm and dry. Psychiatric: His mood appears anxious. Nursing note and vitals reviewed. Assessment/ Plan:   Currie Siemens was seen today for hospital follow up. Diagnoses and all orders for this visit:    Hyponatremia   Fluid restriction. No more than 72 ounces of water per day. Chronic obstructive pulmonary disease, unspecified COPD type ([address]    Tobacco dependence   Provided education on the importance of smoking cessation. Paranoid schizophrenia ([facility]    Patient encouraged to call or return to office if symptoms do not improve or worsen. Reviewed plan of care with patient. Patient verbalizes understanding. Patient without questions or concerns at this time.

## 2017-07-10 NOTE — PROGRESS NOTES
Chief Complaint   Patient presents with   [facility] Follow Up     Reviewed record  In preparation for visit and have obtained necessary documentation. 1. Have you been to the ER, urgent care clinic since your last visit? Hospitalized since your last visit? No    2. Have you seen or consulted any other health care providers outside of the [address] [place] [address] since your last visit? Include any pap smears or colon screening.  No      Used 2 patient I. D. 's

## 2017-07-12 ENCOUNTER — APPOINTMENT (OUTPATIENT)
Dept: GENERAL RADIOLOGY | Age: 57
End: 2017-07-12
Attending: EMERGENCY MEDICINE
Payer: MEDICAID

## 2017-07-12 ENCOUNTER — HOSPITAL ENCOUNTER (EMERGENCY)
Age: 57
Discharge: HOME | End: 2017-07-12
Attending: EMERGENCY MEDICINE
Payer: MEDICAID

## 2017-07-12 VITALS
RESPIRATION RATE: 24 BRPM | TEMPERATURE: 98 F | SYSTOLIC BLOOD PRESSURE: 101 MMHG | HEART RATE: 89 BPM | DIASTOLIC BLOOD PRESSURE: 75 MMHG | BODY MASS INDEX: 17.9 KG/M2 | OXYGEN SATURATION: 95 % | WEIGHT: 125 LBS | HEIGHT: 70 IN

## 2017-07-12 DIAGNOSIS — J44.1 CHRONIC OBSTRUCTIVE PULMONARY DISEASE WITH ACUTE EXACERBATION (HCC): Primary | ICD-10-CM

## 2017-07-12 PROCEDURE — 77030008771 HC TU NG SALEM SUMP -A

## 2017-07-12 PROCEDURE — 77030019563 HC DEV ATTCH FEED HOLL -A

## 2017-07-12 PROCEDURE — 93005 ELECTROCARDIOGRAM TRACING: CPT

## 2017-07-12 PROCEDURE — 77030034848

## 2017-07-12 PROCEDURE — 99285 EMERGENCY DEPT VISIT HI MDM: CPT

## 2017-07-12 PROCEDURE — 71020 XR CHEST PA LAT: CPT

## 2017-07-12 RX ORDER — RISPERIDONE 3 MG/1
3 TABLET, FILM COATED ORAL EVERY EVENING
COMMUNITY
End: 2017-08-04

## 2017-07-12 RX ORDER — PAROXETINE HYDROCHLORIDE 40 MG/1
40 TABLET, FILM COATED ORAL DAILY
COMMUNITY
End: 2017-08-04

## 2017-07-12 RX ORDER — ALBUTEROL SULFATE 90 UG/1
2 AEROSOL, METERED RESPIRATORY (INHALATION)
Status: ON HOLD | COMMUNITY
End: 2017-11-07

## 2017-07-12 RX ORDER — IBUPROFEN 800 MG/1
800 TABLET ORAL
COMMUNITY
End: 2017-07-20

## 2017-07-12 RX ORDER — FLUTICASONE FUROATE AND VILANTEROL 100; 25 UG/1; UG/1
1 POWDER RESPIRATORY (INHALATION) DAILY
COMMUNITY
End: 2017-07-13

## 2017-07-12 RX ORDER — PREDNISONE 10 MG/1
TABLET ORAL
Qty: 21 TAB | Refills: 0 | Status: SHIPPED | OUTPATIENT
Start: 2017-07-12 | End: 2017-07-20

## 2017-07-12 RX ORDER — GUAIFENESIN 100 MG/5ML
200 SOLUTION ORAL
Status: ON HOLD | COMMUNITY
End: 2017-08-16

## 2017-07-12 RX ORDER — BENZTROPINE MESYLATE 2 MG/1
2 TABLET ORAL
COMMUNITY
End: 2017-08-04

## 2017-07-12 RX ORDER — ALPRAZOLAM 0.5 MG/1
0.5 TABLET ORAL
COMMUNITY
End: 2017-08-04

## 2017-07-12 RX ORDER — FLUTICASONE FUROATE AND VILANTEROL 100; 25 UG/1; UG/1
1 POWDER RESPIRATORY (INHALATION) DAILY
Qty: 1 INHALER | Refills: 0 | Status: SHIPPED | OUTPATIENT
Start: 2017-07-12 | End: 2017-08-08 | Stop reason: CLARIF

## 2017-07-12 RX ORDER — PREDNISONE 20 MG/1
20 TABLET ORAL
COMMUNITY
Start: 2017-07-07 | End: 2017-07-12

## 2017-07-12 RX ORDER — TEMAZEPAM 30 MG/1
30 CAPSULE ORAL EVERY EVENING
COMMUNITY
End: 2017-08-04

## 2017-07-12 RX ORDER — ALBUTEROL SULFATE 0.83 MG/ML
2.5 SOLUTION RESPIRATORY (INHALATION)
COMMUNITY
End: 2017-11-08

## 2017-07-12 RX ORDER — AZITHROMYCIN 250 MG/1
250 TABLET, FILM COATED ORAL DAILY
COMMUNITY
Start: 2017-07-07 | End: 2017-07-12

## 2017-07-12 NOTE — DISCHARGE INSTRUCTIONS
We hope that we have addressed all of your medical concerns. The examination and treatment you received in the Emergency Department were for an emergent problem and were not intended as complete care. It is important that you follow up with your healthcare provider(s) for ongoing care. If your symptoms worsen or do not improve as expected, and you are unable to reach your usual health care provider(s), you should return to the Emergency Department. Today's healthcare is undergoing tremendous change, and patient satisfaction surveys are one of the many tools to assess the quality of medical care. You may receive a survey from the [employer] regarding your experience in the Emergency Department. I hope that your experience has been completely positive, particularly the medical care that I provided. As such, please participate in the survey; anything less than excellent does not meet my expectations or intentions. Replaced by [facility] and [address] [place] [address] participate in nationally recognized quality of care measures. If your blood pressure is greater than 120/80, as reported below, we urge that you seek medical care to address the potential of high blood pressure, commonly known as hypertension. Hypertension can be hereditary or can be caused by certain medical conditions, pain, stress, or \"white coat syndrome. \"       Please make an appointment with your health care provider(s) for follow up of your Emergency Department visit. VITALS:   Patient Vitals for the past 8 hrs:   Temp Pulse Resp BP SpO2   07/12/17 0947 97.8 °F (36.6 °C) 91 18 132/80 96 %          Thank you for allowing us to provide you with medical care today. We realize that you have many choices for your emergency care needs. Please choose us in the future for any continued health care needs. Carlos Rivera M.D.  [facility].   Office: [phone]            Recent Results (from the past 24 hour(s))   EKG, 12 LEAD, INITIAL    Collection Time: 07/12/17  9:49 AM   Result Value Ref Range    Ventricular Rate 91 BPM    Atrial Rate 91 BPM    P-R Interval 138 ms    QRS Duration 86 ms    Q-T Interval 380 ms    QTC Calculation (Bezet) 467 ms    Calculated P Axis 82 degrees    Calculated R Axis 77 degrees    Calculated T Axis 72 degrees    Diagnosis       Sinus rhythm with frequent premature ventricular complexes  Right atrial enlargement  When compared with ECG of 04-JUL-2017 17:04,  premature ventricular complexes are now present         Xr Chest Pa Lat    Result Date: 7/12/2017  INDICATION:  shortness of breath. EXAM: 2 VIEW CHEST RADIOGRAPH. COMPARISON: 7/4/2017. FINDINGS: Frontal and lateral views of the chest show mild prominence of lung markings in the lung bases, right greater than left. . There is no focal infiltrate or effusion. The heart, mediastinum and pulmonary vasculature are stable. The bony thorax is unremarkable for age. .. IMPRESSION:  Mild prominence of interstitial markings in the lung bases, without a larry CHF pattern. .  . Learning About Chronic Bronchitis  What is chronic bronchitis? Chronic bronchitis is long-term swelling and the buildup of mucus in the airways of your lungs. The airways (bronchial tubes) get inflamed and make a lot of mucus. This can narrow or block the airways, making it hard for you to breathe. It is a form of COPD (chronic obstructive pulmonary disease). Chronic bronchitis is usually caused by smoking. But chemical fumes, dust, or air pollution also can cause it over time. What can you expect when you have chronic bronchitis? Chronic bronchitis gets worse over time. You cannot undo the damage to your lungs.   Over time, you may find that:  · You get short of breath even when you do simple things like get dressed or fix a meal.  · It is hard to eat or exercise. · You lose weight and feel weaker. Over many years, the swelling and mucus from chronic bronchitis make it more likely that you will get lung infections. But there are things you can do to prevent more damage and feel better. What are the symptoms? The main symptoms of chronic bronchitis are:  · A cough that will not go away. · Mucus that comes up when you cough. · Shortness of breath that gets worse when you exercise. At times, your symptoms may suddenly flare up and get much worse. This is a called an exacerbation (say \"egg-AIXA-jani-BAY-daniela\"). When this happens, your usual symptoms quickly get worse and stay bad. This can be dangerous. You may have to go to the hospital.  How can you keep chronic bronchitis from getting worse? Don't smoke. That is the best way to keep chronic bronchitis from getting worse. If you already smoke, it is never too late to stop. If you need help quitting, talk to your doctor about stop-smoking programs and medicines. These can increase your chances of quitting for good. You can do other things to keep chronic bronchitis from getting worse:  · Avoid bad air. Air pollution, chemical fumes, and dust also can make chronic bronchitis worse. · Get a flu shot every year. A shot may keep the flu from turning into something more serious, like pneumonia. A flu shot also may lower your chances of having a flare-up. · Get a pneumococcal shot. A shot can prevent some of the serious complications of pneumonia. Ask your doctor how often you should get this shot. How is chronic bronchitis treated? Chronic bronchitis is treated with medicines and oxygen. You also can take steps at home to stay healthy and keep your condition from getting worse. Medicines and oxygen therapy  · You may be taking medicines such as:  ¨ Bronchodilators. These help open your airways and make breathing easier.  Bronchodilators are either short-acting (work for 6 to 9 hours) or long-acting (work for 25 hours). You inhale most bronchodilators, so they start to act quickly. Always carry your quick-relief inhaler with you in case you need it while you are away from home. ¨ Corticosteroids. These reduce airway inflammation. They come in pill or inhaled form. You must take these medicines every day for them to work well. ¨ Antibiotics. These medicines are used when you have a bacterial lung infection. · Take your medicines exactly as prescribed. Call your doctor if you think you are having a problem with your medicine. · Oxygen therapy boosts the amount of oxygen in your blood and helps you breathe easier. Use the flow rate your doctor has recommended, and do not change it without talking to your doctor first.  Other care at home  · If your doctor recommends it, get more exercise. Walking is a good choice. Bit by bit, increase the amount you walk every day. Try for at least 30 minutes on most days of the week. · Learn breathing methods--such as breathing through pursed lips--to help you become less short of breath. · If your doctor has not set you up with a pulmonary rehabilitation program, talk to him or her about whether rehab is right for you. Rehab includes exercise programs, education about your disease and how to manage it, help with diet and other changes, and emotional support. · Eat regular, healthy meals. Use bronchodilators about 1 hour before you eat to make it easier to eat. Eat several small meals instead of three large ones. Drink beverages at the end of the meal. Avoid foods that are hard to chew. Follow-up care is a key part of your treatment and safety. Be sure to make and go to all appointments, and call your doctor if you are having problems. It's also a good idea to know your test results and keep a list of the medicines you take. Where can you learn more? Go to http://leila-kodak.info/.   Enter J562 in the search box to learn more about \"Learning About Chronic Bronchitis. \"  Current as of: March 25, 2017  Content Version: 11.3  © 6179-1772 [employer], [facility]. Care instructions adapted under license by [employer] (which disclaims liability or warranty for this information). If you have questions about a medical condition or this instruction, always ask your healthcare professional. [place] [address] any warranty or liability for your use of this information.

## 2017-07-12 NOTE — ED TRIAGE NOTES
Pt  arrives from [facility] via EMS for complaint of difficulty breathing. Pt. is currently on Prednisone and E-mycin, unknown why.   Note congested cough upon arrival.

## 2017-07-12 NOTE — ED NOTES
Patients  has let patients medicaid lapse. RN spoke with lizzy reynolds  and patients  is coming to pick patient up at [phone].

## 2017-07-12 NOTE — ED PROVIDER NOTES
HPI Comments: 61 y.o. male with past medical history significant for bronchitis, mental retardation, asthma, anxiety, paranoid schizophrenia, COPD, and extrapyramidal disease who presents via EMS with chief complaint of difficulty breathing. Pt states that he has had increased difficulty breathing and SOB for the past few weeks. The SOB feels similar to his previous bouts of bronchitis. He has also had a productive cough with white phlegm. He normally takes Breo for his bronchitis but ran out and has not been taking it. He denies any fever, chills, nausea, or vomiting. He has taken prednisone before. There are no other acute medical concerns at this time. PCP: Blanca Monroy DO    Note written by Fabian Rojas, as dictated by Tennille Gonzalez MD 10:58 AM      The history is provided by the patient. No  was used. Past Medical History:   Diagnosis Date    Asthma     seldom,use inhaler    Bronchitis     Chronic obstructive pulmonary disease ([address]     Depression     anxiety    Psychiatric disorder     paranoid schizophrenia    Psychiatric disorder     extrapyramidal disease    Psychotic disorder     mental retardation       Past Surgical History:   Procedure Laterality Date    HX ORTHOPAEDIC      right knee         Family History:   Problem Relation Age of Onset    Diabetes Mother     Heart Disease Father      CHF       Social History     Social History    Marital status: SINGLE     Spouse name: N/A    Number of children: N/A    Years of education: N/A     Occupational History    Not on file.      Social History Main Topics    Smoking status: Current Every Day Smoker     Packs/day: 1.00     Years: 25.00    Smokeless tobacco: Never Used    Alcohol use No      Comment: socially    Drug use: Yes     Special: Sedatives/Hypnotics, Prescription    Sexual activity: Not Currently     Birth control/ protection: None      Comment: lives in [place]     Other Topics Concern    Not on file     Social History Narrative         ALLERGIES: Review of patient's allergies indicates no known allergies. Review of Systems   Constitutional: Negative for chills, diaphoresis and fever. HENT: Negative for congestion, postnasal drip, rhinorrhea and sore throat. Eyes: Negative for photophobia, discharge, redness and visual disturbance. Respiratory: Positive for cough and shortness of breath. Negative for chest tightness and wheezing. Cardiovascular: Negative for chest pain, palpitations and leg swelling. Gastrointestinal: Negative for abdominal distention, abdominal pain, blood in stool, constipation, diarrhea, nausea and vomiting. Genitourinary: Negative for difficulty urinating, dysuria, frequency, hematuria and urgency. Musculoskeletal: Negative for arthralgias, back pain, joint swelling and myalgias. Skin: Negative for color change and rash. Neurological: Negative for dizziness, speech difficulty, weakness, light-headedness, numbness and headaches. Psychiatric/Behavioral: Negative for confusion. The patient is not nervous/anxious. All other systems reviewed and are negative. Vitals:    07/12/17 0947   BP: 132/80   Pulse: 91   Resp: 18   Temp: 97.8 °F (36.6 °C)   SpO2: 96%   Weight: 56.7 kg (125 lb)   Height: 5' 10\" (1.778 m)            Physical Exam   Constitutional: He is oriented to person, place, and time. He appears well-developed and well-nourished. No distress. HENT:   Head: Normocephalic and atraumatic. Right Ear: External ear normal.   Left Ear: External ear normal.   Nose: Nose normal.   Mouth/Throat: Oropharynx is clear and moist.   Eyes: Conjunctivae and EOM are normal. Pupils are equal, round, and reactive to light. No scleral icterus. Neck: Normal range of motion. Neck supple. No JVD present. No tracheal deviation present. No thyromegaly present. Cardiovascular: Normal rate, regular rhythm and normal heart sounds.   Exam reveals no gallop and no friction rub. No murmur heard. Pulmonary/Chest: Effort normal and breath sounds normal. No respiratory distress. He has no wheezes. He has no rales. He exhibits no tenderness. Abdominal: Soft. Bowel sounds are normal. He exhibits no distension and no mass. There is no tenderness. There is no rebound and no guarding. Musculoskeletal: Normal range of motion. He exhibits no edema or tenderness. Lymphadenopathy:     He has no cervical adenopathy. Neurological: He is alert and oriented to person, place, and time. He has normal strength. He displays no atrophy and no tremor. No cranial nerve deficit. He exhibits normal muscle tone. Coordination and gait normal.   Skin: Skin is warm and dry. No rash noted. He is not diaphoretic. No erythema. Psychiatric: He has a normal mood and affect. His behavior is normal. Judgment and thought content normal.   Nursing note and vitals reviewed. Note written by Fabian Damian, as dictated by Mar Chow MD 11:00 AM      MDM  Number of Diagnoses or Management Options  Chronic obstructive pulmonary disease with acute exacerbation [facility]):   Diagnosis management comments: Impression: 23-year-old male presenting to the emergency department with complaints of shortness of breath, on further questioning this is his baseline bronchitis states he is out of his breo. Patient has no further complaints at this time. Plan of care will be chest x-ray if negative will discharge home and refill his Prelone medication. I do not feel he needs antibiotics or further laboratory studies at this     ED Course       Procedures  ED EKG interpretation:  Rhythm: normal sinus rhythm with frequent PVC's. Rate (approx.): 91;  Axis: normal; ST/T wave: normal; R atrial enlargement    Note written by Fabian Damian, as dictated by Mar Chow MD 9:53 AM

## 2017-07-12 NOTE — ED NOTES
Patient moved to [address] to wait for ride. Jessica Reeves nurse notified. Patient verbalizes understanding of discharge instructions. Pt alert and oriented, appears in no acute distress, respirations equal and unlabored. Ambulatory upon discharge with steady gait.

## 2017-07-12 NOTE — PROGRESS NOTES
Spoke with nursing and she had already called [facility] and they informed nurse that Medicaid had lapsed. Nurse called [facility], where pt lives and was advised that pt has [address] alternative medicaid product. However, upon calling [address], was told that [employer] better health policy had also lapsed.     is now picking up pt at 12:30pm.    CM reported this to [facility] of [phone] to advise of pt living in adult home without active medicaid coverage. Filed report with Freddie Renee, APS .     Kimmie Benedict, MSW

## 2017-07-13 ENCOUNTER — APPOINTMENT (OUTPATIENT)
Dept: CT IMAGING | Age: 57
DRG: 720 | End: 2017-07-13
Attending: EMERGENCY MEDICINE
Payer: MEDICAID

## 2017-07-13 ENCOUNTER — HOSPITAL ENCOUNTER (INPATIENT)
Age: 57
LOS: 7 days | Discharge: HOME | DRG: 720 | End: 2017-07-20
Attending: EMERGENCY MEDICINE | Admitting: FAMILY MEDICINE
Payer: MEDICAID

## 2017-07-13 ENCOUNTER — APPOINTMENT (OUTPATIENT)
Dept: GENERAL RADIOLOGY | Age: 57
DRG: 720 | End: 2017-07-13
Attending: EMERGENCY MEDICINE
Payer: MEDICAID

## 2017-07-13 ENCOUNTER — APPOINTMENT (OUTPATIENT)
Dept: ULTRASOUND IMAGING | Age: 57
DRG: 720 | End: 2017-07-13
Attending: FAMILY MEDICINE
Payer: MEDICAID

## 2017-07-13 ENCOUNTER — APPOINTMENT (OUTPATIENT)
Dept: CT IMAGING | Age: 57
DRG: 720 | End: 2017-07-13
Attending: FAMILY MEDICINE
Payer: MEDICAID

## 2017-07-13 ENCOUNTER — PATIENT OUTREACH (OUTPATIENT)
Dept: INTERNAL MEDICINE CLINIC | Age: 57
End: 2017-07-13

## 2017-07-13 DIAGNOSIS — J44.9 CHRONIC OBSTRUCTIVE PULMONARY DISEASE, UNSPECIFIED COPD TYPE (HCC): ICD-10-CM

## 2017-07-13 DIAGNOSIS — G93.40 ACUTE ENCEPHALOPATHY: ICD-10-CM

## 2017-07-13 DIAGNOSIS — R56.9 SEIZURE (HCC): Primary | ICD-10-CM

## 2017-07-13 DIAGNOSIS — R41.0 DELIRIUM: ICD-10-CM

## 2017-07-13 DIAGNOSIS — E87.1 HYPONATREMIA: ICD-10-CM

## 2017-07-13 DIAGNOSIS — J96.02 ACUTE RESPIRATORY FAILURE WITH HYPERCAPNIA (HCC): ICD-10-CM

## 2017-07-13 PROBLEM — J96.00 ACUTE RESPIRATORY FAILURE (HCC): Status: ACTIVE | Noted: 2017-07-13

## 2017-07-13 LAB
ALBUMIN SERPL BCP-MCNC: 2.9 G/DL (ref 3.5–5)
ALBUMIN/GLOB SERPL: 1.2 {RATIO} (ref 1.1–2.2)
ALP SERPL-CCNC: 73 U/L (ref 45–117)
ALT SERPL-CCNC: 51 U/L (ref 12–78)
AMMONIA PLAS-SCNC: 71 UMOL/L
AMPHET UR QL SCN: NEGATIVE
ANION GAP BLD CALC-SCNC: 11 MMOL/L (ref 5–15)
ANION GAP BLD CALC-SCNC: 14 MMOL/L (ref 5–15)
APPEARANCE UR: ABNORMAL
ARTERIAL PATENCY WRIST A: ABNORMAL
ARTERIAL PATENCY WRIST A: YES
AST SERPL W P-5'-P-CCNC: 91 U/L (ref 15–37)
ATRIAL RATE: 91 BPM
ATRIAL RATE: 96 BPM
BACTERIA URNS QL MICRO: NEGATIVE /HPF
BARBITURATES UR QL SCN: NEGATIVE
BASE EXCESS BLD CALC-SCNC: 3 MMOL/L
BASE EXCESS BLD CALC-SCNC: 7 MMOL/L
BASOPHILS # BLD AUTO: 0 K/UL (ref 0–0.1)
BASOPHILS # BLD: 0 % (ref 0–1)
BDY SITE: ABNORMAL
BDY SITE: ABNORMAL
BENZODIAZ UR QL: NEGATIVE
BILIRUB SERPL-MCNC: 1.2 MG/DL (ref 0.2–1)
BILIRUB UR QL: NEGATIVE
BNP SERPL-MCNC: 206 PG/ML (ref 0–100)
BUN SERPL-MCNC: 4 MG/DL (ref 6–20)
BUN SERPL-MCNC: 5 MG/DL (ref 6–20)
BUN/CREAT SERPL: 10 (ref 12–20)
BUN/CREAT SERPL: 12 (ref 12–20)
CA-I BLD-SCNC: 1.1 MMOL/L (ref 1.12–1.32)
CALCIUM SERPL-MCNC: 6.7 MG/DL (ref 8.5–10.1)
CALCIUM SERPL-MCNC: 6.9 MG/DL (ref 8.5–10.1)
CALCULATED P AXIS, ECG09: 82 DEGREES
CALCULATED P AXIS, ECG09: 90 DEGREES
CALCULATED R AXIS, ECG10: 77 DEGREES
CALCULATED R AXIS, ECG10: 87 DEGREES
CALCULATED T AXIS, ECG11: 72 DEGREES
CALCULATED T AXIS, ECG11: 77 DEGREES
CANNABINOIDS UR QL SCN: NEGATIVE
CHLORIDE SERPL-SCNC: 68 MMOL/L (ref 97–108)
CHLORIDE SERPL-SCNC: 71 MMOL/L (ref 97–108)
CO2 SERPL-SCNC: 24 MMOL/L (ref 21–32)
CO2 SERPL-SCNC: 27 MMOL/L (ref 21–32)
COCAINE UR QL SCN: NEGATIVE
COLOR UR: ABNORMAL
CREAT SERPL-MCNC: 0.41 MG/DL (ref 0.7–1.3)
CREAT SERPL-MCNC: 0.43 MG/DL (ref 0.7–1.3)
D DIMER PPP FEU-MCNC: 0.92 MG/L FEU (ref 0–0.65)
DIAGNOSIS, 93000: NORMAL
DIAGNOSIS, 93000: NORMAL
DIFFERENTIAL METHOD BLD: ABNORMAL
DRUG SCRN COMMENT,DRGCM: NORMAL
EOSINOPHIL # BLD: 0 K/UL (ref 0–0.4)
EOSINOPHIL NFR BLD: 0 % (ref 0–7)
EPITH CASTS URNS QL MICRO: ABNORMAL /LPF
ETHANOL SERPL-MCNC: <10 MG/DL
GAS FLOW.O2 O2 DELIVERY SYS: ABNORMAL L/MIN
GAS FLOW.O2 O2 DELIVERY SYS: ABNORMAL L/MIN
GAS FLOW.O2 SETTING OXYMISER: 14 BPM
GAS FLOW.O2 SETTING OXYMISER: 14 BPM
GLOBULIN SER CALC-MCNC: 2.5 G/DL (ref 2–4)
GLUCOSE SERPL-MCNC: 119 MG/DL (ref 65–100)
GLUCOSE SERPL-MCNC: 89 MG/DL (ref 65–100)
GLUCOSE UR STRIP.AUTO-MCNC: 250 MG/DL
HCO3 BLD-SCNC: 30.5 MMOL/L (ref 22–26)
HCO3 BLD-SCNC: 34.3 MMOL/L (ref 22–26)
HCT VFR BLD AUTO: 31.5 % (ref 36.6–50.3)
HGB BLD-MCNC: 11.7 G/DL (ref 12.1–17)
HGB UR QL STRIP: ABNORMAL
INSPIRATION.DURATION SETTING TIME VENT: 1.1 SEC
KETONES UR QL STRIP.AUTO: 15 MG/DL
LACTATE SERPL-SCNC: 1 MMOL/L (ref 0.4–2)
LACTATE SERPL-SCNC: 2.2 MMOL/L (ref 0.4–2)
LEUKOCYTE ESTERASE UR QL STRIP.AUTO: NEGATIVE
LYMPHOCYTES # BLD AUTO: 3 % (ref 12–49)
LYMPHOCYTES # BLD: 0.5 K/UL (ref 0.8–3.5)
MCH RBC QN AUTO: 31.6 PG (ref 26–34)
MCHC RBC AUTO-ENTMCNC: 37.1 G/DL (ref 30–36.5)
MCV RBC AUTO: 85.1 FL (ref 80–99)
METHADONE UR QL: NEGATIVE
MONOCYTES # BLD: 1.2 K/UL (ref 0–1)
MONOCYTES NFR BLD AUTO: 8 % (ref 5–13)
NEUTS SEG # BLD: 13.5 K/UL (ref 1.8–8)
NEUTS SEG NFR BLD AUTO: 89 % (ref 32–75)
NITRITE UR QL STRIP.AUTO: NEGATIVE
O2/TOTAL GAS SETTING VFR VENT: 0.5 %
O2/TOTAL GAS SETTING VFR VENT: 50 %
OPIATES UR QL: NEGATIVE
OSMOLALITY SERPL: 227 MOSM/KG H2O
P-R INTERVAL, ECG05: 138 MS
P-R INTERVAL, ECG05: 160 MS
PCO2 BLD: 77.3 MMHG (ref 35–45)
PCO2 BLD: 84.9 MMHG (ref 35–45)
PCP UR QL: NEGATIVE
PEEP RESPIRATORY: 5 CMH2O
PEEP RESPIRATORY: 5 CMH2O
PH BLD: 7.2 [PH] (ref 7.35–7.45)
PH BLD: 7.21 [PH] (ref 7.35–7.45)
PH UR STRIP: 6 [PH] (ref 5–8)
PIP ISTAT,IPIP: 30
PLATELET # BLD AUTO: 191 K/UL (ref 150–400)
PLATELET COMMENTS,PCOM: ABNORMAL
PO2 BLD: 43 MMHG (ref 80–100)
PO2 BLD: 96 MMHG (ref 80–100)
POTASSIUM SERPL-SCNC: 2.7 MMOL/L (ref 3.5–5.1)
POTASSIUM SERPL-SCNC: 3 MMOL/L (ref 3.5–5.1)
PROT SERPL-MCNC: 5.4 G/DL (ref 6.4–8.2)
PROT UR STRIP-MCNC: 100 MG/DL
Q-T INTERVAL, ECG07: 380 MS
Q-T INTERVAL, ECG07: 438 MS
QRS DURATION, ECG06: 100 MS
QRS DURATION, ECG06: 86 MS
QTC CALCULATION (BEZET), ECG08: 467 MS
QTC CALCULATION (BEZET), ECG08: 553 MS
RBC # BLD AUTO: 3.7 M/UL (ref 4.1–5.7)
RBC #/AREA URNS HPF: ABNORMAL /HPF (ref 0–5)
RBC MORPH BLD: ABNORMAL
SAO2 % BLD: 65 % (ref 92–97)
SAO2 % BLD: 95 % (ref 92–97)
SODIUM SERPL-SCNC: 106 MMOL/L (ref 136–145)
SODIUM SERPL-SCNC: 109 MMOL/L (ref 136–145)
SODIUM UR-SCNC: 9 MMOL/L
SP GR UR REFRACTOMETRY: 1.02 (ref 1–1.03)
SPECIMEN TYPE: ABNORMAL
SPECIMEN TYPE: ABNORMAL
SPERM URNS QL MICRO: ABNORMAL
TOTAL RESP. RATE, ITRR: 14
TOTAL RESP. RATE, ITRR: 27
TROPONIN I SERPL-MCNC: 0.06 NG/ML
TROPONIN I SERPL-MCNC: <0.04 NG/ML
TSH SERPL DL<=0.05 MIU/L-ACNC: 0.75 UIU/ML (ref 0.36–3.74)
UA: UC IF INDICATED,UAUC: ABNORMAL
UROBILINOGEN UR QL STRIP.AUTO: 0.2 EU/DL (ref 0.2–1)
VENTILATION MODE VENT: ABNORMAL
VENTILATION MODE VENT: ABNORMAL
VENTRICULAR RATE, ECG03: 91 BPM
VENTRICULAR RATE, ECG03: 96 BPM
VT SETTING VENT: 400 ML
WBC # BLD AUTO: 15.2 K/UL (ref 4.1–11.1)
WBC URNS QL MICRO: ABNORMAL /HPF (ref 0–4)

## 2017-07-13 PROCEDURE — 83930 ASSAY OF BLOOD OSMOLALITY: CPT | Performed by: FAMILY MEDICINE

## 2017-07-13 PROCEDURE — 5A1945Z RESPIRATORY VENTILATION, 24-96 CONSECUTIVE HOURS: ICD-10-PCS | Performed by: EMERGENCY MEDICINE

## 2017-07-13 PROCEDURE — 85025 COMPLETE CBC W/AUTO DIFF WBC: CPT | Performed by: EMERGENCY MEDICINE

## 2017-07-13 PROCEDURE — 94002 VENT MGMT INPAT INIT DAY: CPT

## 2017-07-13 PROCEDURE — 74011000258 HC RX REV CODE- 258: Performed by: EMERGENCY MEDICINE

## 2017-07-13 PROCEDURE — 87086 URINE CULTURE/COLONY COUNT: CPT | Performed by: FAMILY MEDICINE

## 2017-07-13 PROCEDURE — 51702 INSERT TEMP BLADDER CATH: CPT

## 2017-07-13 PROCEDURE — 71275 CT ANGIOGRAPHY CHEST: CPT

## 2017-07-13 PROCEDURE — 80048 BASIC METABOLIC PNL TOTAL CA: CPT | Performed by: INTERNAL MEDICINE

## 2017-07-13 PROCEDURE — 65620000000 HC RM CCU GENERAL

## 2017-07-13 PROCEDURE — 83880 ASSAY OF NATRIURETIC PEPTIDE: CPT | Performed by: EMERGENCY MEDICINE

## 2017-07-13 PROCEDURE — 93005 ELECTROCARDIOGRAM TRACING: CPT

## 2017-07-13 PROCEDURE — 74011250636 HC RX REV CODE- 250/636: Performed by: EMERGENCY MEDICINE

## 2017-07-13 PROCEDURE — 96376 TX/PRO/DX INJ SAME DRUG ADON: CPT

## 2017-07-13 PROCEDURE — 36600 WITHDRAWAL OF ARTERIAL BLOOD: CPT

## 2017-07-13 PROCEDURE — 80307 DRUG TEST PRSMV CHEM ANLYZR: CPT | Performed by: EMERGENCY MEDICINE

## 2017-07-13 PROCEDURE — 84484 ASSAY OF TROPONIN QUANT: CPT | Performed by: EMERGENCY MEDICINE

## 2017-07-13 PROCEDURE — 82803 BLOOD GASES ANY COMBINATION: CPT

## 2017-07-13 PROCEDURE — 74011000250 HC RX REV CODE- 250: Performed by: EMERGENCY MEDICINE

## 2017-07-13 PROCEDURE — 74011636320 HC RX REV CODE- 636/320: Performed by: EMERGENCY MEDICINE

## 2017-07-13 PROCEDURE — 84443 ASSAY THYROID STIM HORMONE: CPT | Performed by: FAMILY MEDICINE

## 2017-07-13 PROCEDURE — 74011000250 HC RX REV CODE- 250: Performed by: FAMILY MEDICINE

## 2017-07-13 PROCEDURE — 74011000258 HC RX REV CODE- 258: Performed by: FAMILY MEDICINE

## 2017-07-13 PROCEDURE — 95816 EEG AWAKE AND DROWSY: CPT | Performed by: FAMILY MEDICINE

## 2017-07-13 PROCEDURE — 74011250636 HC RX REV CODE- 250/636: Performed by: FAMILY MEDICINE

## 2017-07-13 PROCEDURE — 36415 COLL VENOUS BLD VENIPUNCTURE: CPT | Performed by: EMERGENCY MEDICINE

## 2017-07-13 PROCEDURE — 84300 ASSAY OF URINE SODIUM: CPT | Performed by: FAMILY MEDICINE

## 2017-07-13 PROCEDURE — 80053 COMPREHEN METABOLIC PANEL: CPT | Performed by: EMERGENCY MEDICINE

## 2017-07-13 PROCEDURE — 96365 THER/PROPH/DIAG IV INF INIT: CPT

## 2017-07-13 PROCEDURE — 87040 BLOOD CULTURE FOR BACTERIA: CPT | Performed by: EMERGENCY MEDICINE

## 2017-07-13 PROCEDURE — 99285 EMERGENCY DEPT VISIT HI MDM: CPT

## 2017-07-13 PROCEDURE — 74011250636 HC RX REV CODE- 250/636

## 2017-07-13 PROCEDURE — 80048 BASIC METABOLIC PNL TOTAL CA: CPT | Performed by: FAMILY MEDICINE

## 2017-07-13 PROCEDURE — 71010 XR CHEST PORT: CPT

## 2017-07-13 PROCEDURE — 0BH17EZ INSERTION OF ENDOTRACHEAL AIRWAY INTO TRACHEA, VIA NATURAL OR ARTIFICIAL OPENING: ICD-10-PCS | Performed by: EMERGENCY MEDICINE

## 2017-07-13 PROCEDURE — 71250 CT THORAX DX C-: CPT

## 2017-07-13 PROCEDURE — 74000 XR ABD PORT  1 V: CPT

## 2017-07-13 PROCEDURE — 83605 ASSAY OF LACTIC ACID: CPT | Performed by: EMERGENCY MEDICINE

## 2017-07-13 PROCEDURE — 81001 URINALYSIS AUTO W/SCOPE: CPT | Performed by: EMERGENCY MEDICINE

## 2017-07-13 PROCEDURE — 76705 ECHO EXAM OF ABDOMEN: CPT

## 2017-07-13 PROCEDURE — 82140 ASSAY OF AMMONIA: CPT | Performed by: FAMILY MEDICINE

## 2017-07-13 PROCEDURE — 74011250636 HC RX REV CODE- 250/636: Performed by: INTERNAL MEDICINE

## 2017-07-13 PROCEDURE — 70450 CT HEAD/BRAIN W/O DYE: CPT

## 2017-07-13 PROCEDURE — 96375 TX/PRO/DX INJ NEW DRUG ADDON: CPT

## 2017-07-13 PROCEDURE — 85379 FIBRIN DEGRADATION QUANT: CPT | Performed by: EMERGENCY MEDICINE

## 2017-07-13 PROCEDURE — 87086 URINE CULTURE/COLONY COUNT: CPT | Performed by: EMERGENCY MEDICINE

## 2017-07-13 PROCEDURE — 82570 ASSAY OF URINE CREATININE: CPT | Performed by: FAMILY MEDICINE

## 2017-07-13 PROCEDURE — 74011000258 HC RX REV CODE- 258: Performed by: INTERNAL MEDICINE

## 2017-07-13 PROCEDURE — 31500 INSERT EMERGENCY AIRWAY: CPT

## 2017-07-13 RX ORDER — LORAZEPAM 2 MG/ML
2 INJECTION INTRAMUSCULAR
Status: DISPENSED | OUTPATIENT
Start: 2017-07-13 | End: 2017-07-14

## 2017-07-13 RX ORDER — ZIPRASIDONE MESYLATE 20 MG/ML
10 INJECTION, POWDER, LYOPHILIZED, FOR SOLUTION INTRAMUSCULAR
Status: DISPENSED | OUTPATIENT
Start: 2017-07-13 | End: 2017-07-14

## 2017-07-13 RX ORDER — IPRATROPIUM BROMIDE AND ALBUTEROL SULFATE 2.5; .5 MG/3ML; MG/3ML
3 SOLUTION RESPIRATORY (INHALATION)
Status: DISCONTINUED | OUTPATIENT
Start: 2017-07-13 | End: 2017-07-14

## 2017-07-13 RX ORDER — RISPERIDONE 3 MG/1
3 TABLET, FILM COATED ORAL EVERY EVENING
Status: CANCELLED | OUTPATIENT
Start: 2017-07-13

## 2017-07-13 RX ORDER — BUDESONIDE 0.5 MG/2ML
500 INHALANT ORAL
Status: DISCONTINUED | OUTPATIENT
Start: 2017-07-13 | End: 2017-07-20 | Stop reason: HOSPADM

## 2017-07-13 RX ORDER — ALBUTEROL SULFATE 0.83 MG/ML
5 SOLUTION RESPIRATORY (INHALATION)
Status: DISCONTINUED | OUTPATIENT
Start: 2017-07-13 | End: 2017-07-14

## 2017-07-13 RX ORDER — LORAZEPAM 2 MG/ML
2 INJECTION INTRAMUSCULAR AS NEEDED
Status: DISCONTINUED | OUTPATIENT
Start: 2017-07-13 | End: 2017-07-20 | Stop reason: HOSPADM

## 2017-07-13 RX ORDER — CALCIUM GLUCONATE 94 MG/ML
1 INJECTION, SOLUTION INTRAVENOUS ONCE
Status: DISCONTINUED | OUTPATIENT
Start: 2017-07-13 | End: 2017-07-13 | Stop reason: CLARIF

## 2017-07-13 RX ORDER — WATER FOR INJECTION,STERILE
VIAL (ML) INJECTION
Status: DISPENSED
Start: 2017-07-13 | End: 2017-07-14

## 2017-07-13 RX ORDER — SODIUM CHLORIDE 9 MG/ML
75 INJECTION, SOLUTION INTRAVENOUS CONTINUOUS
Status: DISCONTINUED | OUTPATIENT
Start: 2017-07-13 | End: 2017-07-14

## 2017-07-13 RX ORDER — SODIUM CHLORIDE AND POTASSIUM CHLORIDE .9; .15 G/100ML; G/100ML
SOLUTION INTRAVENOUS CONTINUOUS
Status: DISCONTINUED | OUTPATIENT
Start: 2017-07-13 | End: 2017-07-14

## 2017-07-13 RX ORDER — ETOMIDATE 2 MG/ML
10 INJECTION INTRAVENOUS
Status: COMPLETED | OUTPATIENT
Start: 2017-07-13 | End: 2017-07-13

## 2017-07-13 RX ORDER — IPRATROPIUM BROMIDE 0.5 MG/2.5ML
0.5 SOLUTION RESPIRATORY (INHALATION)
Status: DISCONTINUED | OUTPATIENT
Start: 2017-07-13 | End: 2017-07-14

## 2017-07-13 RX ORDER — PROPOFOL 10 MG/ML
5-50 VIAL (ML) INTRAVENOUS
Status: DISCONTINUED | OUTPATIENT
Start: 2017-07-13 | End: 2017-07-13

## 2017-07-13 RX ORDER — SUCCINYLCHOLINE CHLORIDE 20 MG/ML
80 INJECTION INTRAMUSCULAR; INTRAVENOUS
Status: COMPLETED | OUTPATIENT
Start: 2017-07-13 | End: 2017-07-13

## 2017-07-13 RX ORDER — SODIUM CHLORIDE 0.9 % (FLUSH) 0.9 %
10 SYRINGE (ML) INJECTION
Status: COMPLETED | OUTPATIENT
Start: 2017-07-13 | End: 2017-07-13

## 2017-07-13 RX ORDER — POTASSIUM CHLORIDE 7.45 MG/ML
10 INJECTION INTRAVENOUS ONCE
Status: COMPLETED | OUTPATIENT
Start: 2017-07-13 | End: 2017-07-16

## 2017-07-13 RX ORDER — SODIUM CHLORIDE 0.9 % (FLUSH) 0.9 %
5-10 SYRINGE (ML) INJECTION EVERY 8 HOURS
Status: DISCONTINUED | OUTPATIENT
Start: 2017-07-13 | End: 2017-07-20 | Stop reason: HOSPADM

## 2017-07-13 RX ORDER — LORAZEPAM 2 MG/ML
INJECTION INTRAMUSCULAR
Status: COMPLETED
Start: 2017-07-13 | End: 2017-07-13

## 2017-07-13 RX ORDER — MIDAZOLAM HYDROCHLORIDE 1 MG/ML
2 INJECTION, SOLUTION INTRAMUSCULAR; INTRAVENOUS
Status: DISPENSED | OUTPATIENT
Start: 2017-07-13 | End: 2017-07-14

## 2017-07-13 RX ORDER — SODIUM CHLORIDE 0.9 % (FLUSH) 0.9 %
5-10 SYRINGE (ML) INJECTION AS NEEDED
Status: DISCONTINUED | OUTPATIENT
Start: 2017-07-13 | End: 2017-07-20 | Stop reason: HOSPADM

## 2017-07-13 RX ORDER — PROPOFOL 10 MG/ML
INJECTION, EMULSION INTRAVENOUS
Status: COMPLETED
Start: 2017-07-13 | End: 2017-07-13

## 2017-07-13 RX ORDER — PROPOFOL 10 MG/ML
5-50 VIAL (ML) INTRAVENOUS
Status: DISCONTINUED | OUTPATIENT
Start: 2017-07-13 | End: 2017-07-16

## 2017-07-13 RX ORDER — PAROXETINE HYDROCHLORIDE 20 MG/1
40 TABLET, FILM COATED ORAL DAILY
Status: CANCELLED | OUTPATIENT
Start: 2017-07-14

## 2017-07-13 RX ADMIN — SODIUM CHLORIDE 1000 ML: 900 INJECTION, SOLUTION INTRAVENOUS at 17:16

## 2017-07-13 RX ADMIN — SODIUM CHLORIDE 1000 ML: 900 INJECTION, SOLUTION INTRAVENOUS at 18:10

## 2017-07-13 RX ADMIN — PROPOFOL 30 MCG/KG/MIN: 10 INJECTION, EMULSION INTRAVENOUS at 19:15

## 2017-07-13 RX ADMIN — PROPOFOL 40 MCG/KG/MIN: 10 INJECTION, EMULSION INTRAVENOUS at 21:42

## 2017-07-13 RX ADMIN — IOPAMIDOL 140 ML: 755 INJECTION, SOLUTION INTRAVENOUS at 22:29

## 2017-07-13 RX ADMIN — SODIUM CHLORIDE 100 ML: 900 INJECTION, SOLUTION INTRAVENOUS at 22:30

## 2017-07-13 RX ADMIN — Medication 10 ML: at 18:08

## 2017-07-13 RX ADMIN — SODIUM CHLORIDE 75 ML/HR: 900 INJECTION, SOLUTION INTRAVENOUS at 18:35

## 2017-07-13 RX ADMIN — ETOMIDATE 10 MG: 2 INJECTION, SOLUTION INTRAVENOUS at 15:37

## 2017-07-13 RX ADMIN — PIPERACILLIN SODIUM,TAZOBACTAM SODIUM 3.38 G: 3; .375 INJECTION, POWDER, FOR SOLUTION INTRAVENOUS at 21:00

## 2017-07-13 RX ADMIN — PROPOFOL 20 MCG/KG/MIN: 10 INJECTION, EMULSION INTRAVENOUS at 18:04

## 2017-07-13 RX ADMIN — PROPOFOL 30 MCG/KG/MIN: 10 INJECTION, EMULSION INTRAVENOUS at 16:24

## 2017-07-13 RX ADMIN — LORAZEPAM 2 MG: 2 INJECTION INTRAMUSCULAR; INTRAVENOUS at 15:05

## 2017-07-13 RX ADMIN — IPRATROPIUM BROMIDE AND ALBUTEROL SULFATE 3 ML: .5; 3 SOLUTION RESPIRATORY (INHALATION) at 20:49

## 2017-07-13 RX ADMIN — LORAZEPAM 2 MG: 2 INJECTION INTRAMUSCULAR; INTRAVENOUS at 15:14

## 2017-07-13 RX ADMIN — METHYLPREDNISOLONE SODIUM SUCCINATE 125 MG: 125 INJECTION, POWDER, FOR SOLUTION INTRAMUSCULAR; INTRAVENOUS at 18:36

## 2017-07-13 RX ADMIN — PROPOFOL 20 MCG/KG/MIN: 10 INJECTION, EMULSION INTRAVENOUS at 16:20

## 2017-07-13 RX ADMIN — PROPOFOL 40 MCG/KG/MIN: 10 INJECTION, EMULSION INTRAVENOUS at 22:38

## 2017-07-13 RX ADMIN — Medication 10 ML: at 23:08

## 2017-07-13 RX ADMIN — Medication 10 ML: at 22:30

## 2017-07-13 RX ADMIN — PROPOFOL 10 MCG/KG/MIN: 10 INJECTION, EMULSION INTRAVENOUS at 17:16

## 2017-07-13 RX ADMIN — Medication 10 ML: at 18:09

## 2017-07-13 RX ADMIN — CALCIUM GLUCONATE 1 G: 94 INJECTION, SOLUTION INTRAVENOUS at 19:17

## 2017-07-13 RX ADMIN — POTASSIUM CHLORIDE AND SODIUM CHLORIDE: 900; 150 INJECTION, SOLUTION INTRAVENOUS at 23:30

## 2017-07-13 RX ADMIN — SUCCINYLCHOLINE CHLORIDE 80 MG: 20 INJECTION, SOLUTION INTRAMUSCULAR; INTRAVENOUS at 15:40

## 2017-07-13 RX ADMIN — POTASSIUM CHLORIDE 10 MEQ: 10 INJECTION, SOLUTION INTRAVENOUS at 20:59

## 2017-07-13 NOTE — PROGRESS NOTES
Patient noted on the the [place] report discharged from [facility] 2017 dx SOB. Per patient EMR patient past medical history includes COPD, asthma, depression, paranoid schizophrenia. It is noted patient was discharge from [facility] -2017 dx hyponatremia. Per EMR sodium level was 141 mmol/L. Patient did attend hospital follow up appointment with Kate Busch NP. Per patient's EMR patient lives at [facility]. It is noted patient's insurance has . Outgoing call placed spoke to patient's medication nurse Asia Kitchen. Patient identified utilizing name and . Asia Kitchen reports patient is taking the Medrol dose pack as directed, but does not have the Breo inhaler. Per Asia Kitchen patient is not having any difficulty at the present time but she will contact the pharmacy. Asia Kitchen states the patient  is working to reactivate [employer]. Reviewed medications due to patient's lapse in insurance. Asia Kitchen request to delay patient follow up appointment. This writer will contact Rohan.       Goals      Identification of barriers to adherence to a plan of care such as inability to afford medications, lack of insurance, lack of transportation, etc.      Supportive resources in place to maintain patient in the community (ie., home health, equipment, DME, refer to, etc.)

## 2017-07-13 NOTE — CONSULTS
[facility] [address]   [address] [facility]       Name:  Mahad Betancourt   MR#:  [id number]   :  1960   Account #:  [de-identified]    Date of Consultation:  2017   Date of Adm:  2017       REFERRING PHYSICIAN: Dr. Aileen Don. REASON FOR CONSULTATION: Critical hypokalemia and   hyponatremia. HISTORY OF PRESENT ILLNESS: The patient is well known to me   from a previous admission. He is a 25-year-old  man who   has chronic paranoid schizophrenia with extrapyramidal disease and   anxiety. He was seen by me 2 weeks ago in a renal consultation for acute  of hyponatremia. At   that time, although patient's sodium was very low, he responded very   well to normal saline only and we never needed to use hypertonic   solution saline. Patient corrected so fast that he needed the use of   hypoosmotic IV solution and use desmopressin. This admission was   prompted by the notice that the patient is not acting himself. He was   staring only and mumbling. He was off his baseline mental status. Patient was sent to the emergency room. He was found to be in   respiratory distress, cyanotic and with low oxygen saturation that   required emergency intubation. I am not able to obtain any history from   patient himself. All of the data is gathered for accompanying medical   records for Dr. Aileen Don and from patient's registered nurse. The initial   electrolytes obtained in the emergency room revealed sodium of 106   and potassium of 2.7. Patient is being admitted in the intensive care   unit. PAST MEDICAL HISTORY: As outlined above, which includes   schizophrenia, mitral regurgitation, severe COPD, chronic alcohol   drinker, chronic smoker. PAST SURGICAL HISTORY: None. FAMILY HISTORY: Mother with CAD. Father with CHF and heart   disease. SOCIAL HISTORY: Patient is single. He is not employed. He smokes   one pack cigarettes per day.  It is not clear if he is using alcohol now   since he is a facility resident. ALLERGIES: NOT KNOWN MEDICAL ALLERGIES. MEDICATION LIST: Should consist of:    1. Prednisone. 2. Robitussin. 3. Ellipta. 4. Ibuprofen. 5. Ventolin. 6. Zithromax. 7. Proventil. 8. Xanax. 9. Paxil. 10. Risperdal.   11. Cogentin. 12. Restoril. REVIEW OF SYSTEMS: Not obtainable due to patient's condition and   his intubated status. PHYSICAL EXAMINATION   GENERAL: Middle-aged white man who is emaciated, thin, acutely ill. He is intubated, being sedated. VITAL SIGNS: Blood pressure is 129/69, heart rate is 99, respirations   14, temperature 99.0 Fahrenheit. HEENT: Eyes are closed. Mouth, the patient has an ET tube in place. Ears with no abnormal discharge. Head is with bitemporal muscle   wasting. NECK: Supple. LUNGS: Clear diminished breath sounds. Patient is tight. He is   hypoventilating. HEART: S1 and S2. Regular rate and rhythm. ABDOMEN: Flat, soft, with positive bowel sounds. EXTREMITIES: No edema. Patient has long toenails with   onychomycosis. Peripheral pulses are preserved. NEUROLOGICAL: Patient is agitated. He is not complete sedated. LABORATORY DATA: Sodium is 106, potassium is 2.7, CO2 is 27,   BUN is 5, creatinine 0.4. Hemoglobin is 11.4, white blood count is 15.2. Urinalysis with glucose of 250, ketones of 15, protein of 100, specific   gravity 1.016. During his previous admission, patient's sodium was   very low, 16, and his urine osmolality was 127. We still are waiting for   urine sodium and osmolality to result today. Albumin is 2.9, calcium is   6.5, corrected calcium is 7.3, lactic acid is 2.2. IMPRESSION: Critical hyponatremia in a patient with poor nutritional   status with ketones in the urine would suggest starvation ketosis and likely complex   etiology including syndrome of inappropriate antidiuretic hormone   given patient's severe chronic obstructive pulmonary disease. Severe hypokalemia of undetermined etiology. I do not see any   diuretics in patient's regimen. Acute respiratory failure on vent with underlying severe chronic   obstructive pulmonary disease. RECOMMENDATIONS: DO NOT USE HYPERTONIC SALINE! Patient will be started on moderate fluid resuscitation with normotonic   saline with 20 mEq of potassium chloride. He will be given 1 dose   of potassium chloride as well. Patient will have his BMP checked   at 8:00 and 10:00 tonight. The goal is gradual correction of his   hyponatremia with not more than 12 mEq over the next 12 hours. Should patient have significant increase in his sodium, the IV fluids   needs to be changed to hypoosmotic solution. Two weeks ago, patient   even required use of desmopressin. Patient is critically ill. He is at risk   of acute deterioration. Limited workup of hyponatremia and serum and urine   osmolality. Serum and urine sodium will be sent at the same time. Thank you very much for the opportunity to participate in this patient's   care.           Audrey Rodgers MD      LTD / Grover Chaudhary   D:  07/13/2017   18:31   T:  07/13/2017   19:14   Job #:  [id number]

## 2017-07-13 NOTE — IP AVS SNAPSHOT
[address] [facility] [address] [place] [address] 
[phone] Patient: Pamela Herzog MRN: [id number]:[id number] Current Discharge Medication List  
  
START taking these medications Dose & Instructions Dispensing Information Comments Morning Noon Evening Bedtime  
 predniSONE 10 mg tablet Commonly known as:  Santos Esteves Replaces:  predniSONE 10 mg dose pack Your last dose was: Your next dose is:    
   
   
 Prednisone 10 mg:Take 4 tablets for 3 days,then 3 tablets for 3 days,then 2 tablets for 3 days,1 tablet for 3 days,1/2 tablet for 3 days. Quantity:  32 Tab Refills:  0 CONTINUE these medications which have NOT CHANGED Dose & Instructions Dispensing Information Comments Morning Noon Evening Bedtime  
 benztropine 2 mg tablet Commonly known as:  COGENTIN Your last dose was: Your next dose is:    
   
   
 Dose:  2 mg Take 2 mg by mouth nightly as needed. Refills:  0  
     
   
   
   
  
 fluticasone-vilanterol 100-25 mcg/dose inhaler Commonly known as:  BREO ELLIPTA Your last dose was: Your next dose is:    
   
   
 Dose:  1 Puff Take 1 Puff by inhalation daily. Quantity:  1 Inhaler Refills:  0 PAXIL 40 mg tablet Generic drug:  PARoxetine Your last dose was: Your next dose is:    
   
   
 Dose:  40 mg Take 40 mg by mouth daily. Indications: major depressive disorder Refills:  0  
     
   
   
   
  
 Q-TUSSIN 100 mg/5 mL liquid Generic drug:  guaiFENesin Your last dose was: Your next dose is:    
   
   
 Dose:  200 mg Take 200 mg by mouth every four (4) hours as needed for Cough. Refills:  0 RESTORIL 30 mg capsule Generic drug:  temazepam  
   
Your last dose was: Your next dose is:    
   
   
 Dose:  30 mg Take 30 mg by mouth every evening. Refills:  0 RisperDAL 3 mg tablet Generic drug:  risperiDONE Your last dose was: Your next dose is:    
   
   
 Dose:  3 mg Take 3 mg by mouth every evening. Refills:  0  
     
   
   
   
  
 * VENTOLIN HFA 90 mcg/actuation inhaler Generic drug:  albuterol Your last dose was: Your next dose is:    
   
   
 Dose:  2 Puff Take 2 Puffs by inhalation every four (4) hours as needed for Shortness of Breath. Refills:  0  
     
   
   
   
  
 * albuterol 2.5 mg /3 mL (0.083 %) nebulizer solution Commonly known as:  PROVENTIL VENTOLIN Your last dose was: Your next dose is:    
   
   
 Dose:  2.5 mg  
2.5 mg by Nebulization route every six (6) hours as needed for Wheezing. Refills:  0  
     
   
   
   
  
 XANAX 0.5 mg tablet Generic drug:  ALPRAZolam  
   
Your last dose was: Your next dose is:    
   
   
 Dose:  0.5 mg Take 0.5 mg by mouth two (2) times daily as needed for Anxiety. Refills:  0  
     
   
   
   
  
 * Notice: This list has 2 medication(s) that are the same as other medications prescribed for you. Read the directions carefully, and ask your doctor or other care provider to review them with you. STOP taking these medications   
 ibuprofen 800 mg tablet Commonly known as:  MOTRIN  
   
  
 predniSONE 10 mg dose pack Commonly known as:  STERAPRED DS Replaced by:  predniSONE 10 mg tablet Where to Get Your Medications Information on where to get these meds will be given to you by the nurse or doctor. ! Ask your nurse or doctor about these medications  
  predniSONE 10 mg tablet

## 2017-07-13 NOTE — ED NOTES
Patient moaning/grunting continuously, very diaphoretic, clenching both arms. Will respond to name when called.

## 2017-07-13 NOTE — H&P
[address]   [address], [address]   HISTORY AND PHYSICAL       Name:  Tab Ashby   MR#:  [id number]   :  1960   Account #:  [de-identified]        Date of Adm:  2017       CHIEF COMPLAINT:  Altered mental status. HISTORY OF PRESENT ILLNESS:   The patient is a 69-year-old   gentleman with a past medical history of paranoid schizophrenia, MR,   extrapyramidal disease, anxiety, COPD and hypernatremia, recently   admitted for the same, history of tobacco abuse, depression,   encephalopathy, who presents to the hospital with the above-  mentioned symptoms. The patient is currently intubated. No history could be obtained from   the patient. I see from the previous charts that the patient was taken to   unknown ED from [facility] with complaints of altered mental   status and . The patient was found to have a sodium of 114, was   given 3% saline by IV and was admitted to the ICU. He was   discharged home on fluid restriction and was seen by his primary care   physician. The patient was seen yesterday for COPD exacerbation   and was placed on prednisone and erythromycin and was alert x3 at   that point in time. Today, per the ER chart, the patient was at [facility] and \"sitting at the nurses station standing up and mumbling. \"    The patient was sent to the ER, on route his saturation was 96%. On   arrival to the ER the patient was found to be miming smoking a   cigarette, very confused, shaking and having seizure-like activity and   was groaning and not answering questions. The patient coded and   was intubated and the hospitalist service was requested to admit the   patient. Currently, the patient is intubated and no further history could   be obtained. I could not find any relative at bedside. PAST MEDICAL HISTORY:  See above. HOME MEDICATIONS:  Per ER chart. Currently the patient is on:   1. Breo-Ellipta. 2. Prednisone.    3. Paxil 40 mg daily. 4. Risperdal.   5. Restoril 30 mg in the evening. 6. Xanax 0.5 mg b.i.d.   7. Cogentin 2 mg nightly as needed. 8. Ibuprofen as needed. 9.    10. Albuterol. SOCIAL HISTORY:  The patient is a smoker. He smokes 1 pack per   day, and has been doing that for about 35 years. Social alcohol. No IV   drug abuse per chart. REVIEW OF SYSTEMS:  Could not be obtained from the patient. ALLERGIES:  NO KNOWN DRUG ALLERGIES PER CHART. FAMILY HISTORY:  Could not be obtained from the patient. PHYSICAL EXAMINATION   VITAL SIGNS:  Temperature 99, pulse 99, respiratory rate 14, blood   pressure . Pulse ox 97% intubated. GENERAL:  Currently intubated. HEENT:  Pupils are equal and reactive to light. Dry mucous   membranes. NECK:  Supple. CHEST:  Diffuse wheezes throughout the lung fields. HEART:  S1, S2 were heard. ABDOMEN:  Soft, nontender, nondistended. Bowel sounds are   physiological.   EXTREMITIES:  No clubbing, cyanosis or edema. NEUROPSYCH:  Limited exam:  DTRs 1+/4. The rest of the exam   could not be performed due to the patient being intubated. LABS:  Sodium 106, potassium 3.7, chloride 68, bicarb 27, BUN 5,   creatinine 0.53. Calcium 6.7, bilirubin total 1.2, albumin 2.9, ALT 51,   AST 91, alk phos 73. A CBC is pending. A urine shows 10 to 20 WBCs   but no signs of infection. D-dimer is 0.92. Alcohol level is less than   10. Drug screen urine:  Pending. Blood culture and urine culture has   been ordered. ABG was not obtained initially, and this is a post   intubation ABG. It shows a pH of 7.204, PCO2 of 77.3, PO2 of 96. CT   of the chest shows prominent chronic lung changes, some lung   nodularity and incidental gas and distention. CT of the head shows   no acute findings. EKG shows sinus rhythm with premature ventricular contractions. The   rest of the labs are pending. Troponin is pending and other labs are   pending. ASSESSMENT/PLAN   1.  Altered mental status. It could be secondary to acute respiratory   failure with hypercapnia versus severe hyponatremia versus other   etiology. The patient will be admitted to the Intensive Care Unit. I will   start the patient on IV steroids, IV antibiotics. Currently he is intubated,   DuoNebs and will get a pulmonary/intensivist consult. Will monitor   ABG. Provide oxygen support and continue to closely monitor. Further   intervention will be per hospital course. I spoke with Dr. Zac Allen from   nephrology who will evaluate the patient and recommend not starting   the patient on 3 percent saline at this point in time. Will provide   neurovascular checks. Will get an MRI of the brain. Will hold all   psych medications as per Dr. Zac Allen. Those were the medications that   caused the patient to be significantly hyponatremic. I will get ammonia   level, TSH and continue to closely monitor. Further intervention will be   per hospital course. Will reassess as needed. 2. Acute respiratory failure with hypercapnia. See above. The patient   will be on IV antibiotics, IV steroids, currently intubated, on DuoNebs   and will monitor. Further intervention will be per hospital course. Will   repeat an ABG. A pulmonary/intensivist consult has been obtained. Continue to closely monitor. Reassess as needed. 3. Severe hyponatremia. See above. I spoke with Dr. Zac Allen who   recommends holding 3% saline at this point in time. Will start the   patient on normal saline, neurovascular checks, BNP every 4 hours. Will provide fluid restriction and further intervention will be per   hospital course. Await Pathology input. Continue to monitor. 4. Hypokalemia. Will defer to nephrology to replace the same in light of   severe hyponatremia. 5. Hypocalcemia. Will replace calcium. Will get an ionized calcium   level. 6. Gastrointestinal and deep vein thrombosis prophylaxis.   The patient   will be on sequential compression devices and Pepcid.         Valarie Mendenhall MD      MM / DV   D:  07/13/2017   18:04   T:  07/13/2017   19:06   Job #:  [id number]

## 2017-07-13 NOTE — ED NOTES
Bedside and Verbal shift change report given to Cha Sexton RN (oncoming nurse) by Iliana Villarreal RN (offgoing nurse). Report included the following information ED Summary.

## 2017-07-13 NOTE — ED NOTES
1534: Pt became unresponsive & began agonal breathing, + posturing, incontinent of urine & exhibited jerking motions lasting approx 20 seconds. Being bagged at this time. 1535: Dr. Fredy Galindo at bedside preparing to intubate pt to protect airway. Resp at bedside. 1536: Etomidate & succinylcholine given, Dr. Fredy Galindo intubated with 7.5 ET tube. + ausculation, + color change. Taped 24\" at the teeth. 1546: Pt transported to CT on monitor with resp & 2 RNs accompanying.

## 2017-07-13 NOTE — ED PROVIDER NOTES
HPI Comments: 61 y.o. male with past medical history significant for paranoid schizophrenia, MR, extrapyramidal disease, anxiety who presents from [facility] via EMS with chief complaint of altered mental status. EMS reports they were called because the patient had been \"sitting at the nurse's manor, staring up and mumbling. \" EMS reports patient was 96% on RA en route. Patient in the room is groaning and not answering questions. Patient is trying to bite blood pressure cords and is miming smoking a cigarette. There are no other acute medical concerns at this time. Social hx: current smoker, social alcohol drinker  PCP: Naye Ugarte DO    Per chart review: Patient was seen yesterday for COPD exacerbation and placed on Prednisone and Azithromycin. Patient was alert and oriented to person, place, and time. Full history, physical exam, and ROS unable to be obtained due to:  patient altered. Note written by Fabian Welch, as dictated by Avelino Mckinley MD 2:52 PM     The history is provided by the EMS personnel. The history is limited by the condition of the patient. No  was used. Past Medical History:   Diagnosis Date    Asthma     seldom,use inhaler    Bronchitis     Chronic obstructive pulmonary disease ([address]     Depression     anxiety    Psychiatric disorder     paranoid schizophrenia    Psychiatric disorder     extrapyramidal disease    Psychotic disorder     mental retardation       Past Surgical History:   Procedure Laterality Date    HX ORTHOPAEDIC      right knee         Family History:   Problem Relation Age of Onset    Diabetes Mother     Heart Disease Father      CHF       Social History     Social History    Marital status: SINGLE     Spouse name: N/A    Number of children: N/A    Years of education: N/A     Occupational History    Not on file.      Social History Main Topics    Smoking status: Current Every Day Smoker     Packs/day: 1.00 Years: 25.00    Smokeless tobacco: Never Used    Alcohol use No      Comment: socially    Drug use: Yes     Special: Sedatives/Hypnotics, Prescription    Sexual activity: Not Currently     Birth control/ protection: None      Comment: lives in [place]     Other Topics Concern    Not on file     Social History Narrative         ALLERGIES: Review of patient's allergies indicates no known allergies. Review of Systems   Unable to perform ROS: Mental status change       Vitals:    07/13/17 1505   BP: (!) 191/106   Pulse: (!) 114   Resp: 26   Temp: 97.7 °F (36.5 °C)   SpO2: 94%   Weight: 59 kg (130 lb)   Height: 6' (1.829 m)            Physical Exam   Constitutional: No distress. HENT:   Head: Normocephalic and atraumatic. Nose: Nose normal.   Eyes: Conjunctivae are normal. No scleral icterus. Pupils 5 mm b/l   Neck: Normal range of motion. Neck supple. No JVD present. No tracheal deviation present. No thyromegaly present. Cardiovascular: Normal rate, regular rhythm and normal heart sounds. No murmur heard. Pulmonary/Chest: Effort normal. No respiratory distress. He has wheezes (expiratory). He has no rales. Abdominal: Soft. Bowel sounds are normal. He exhibits no mass. There is no tenderness. There is no rebound and no guarding. Musculoskeletal: Normal range of motion. He exhibits no edema. Neurological: He is alert. No cranial nerve deficit. Coordination normal.   Patient is delirious and confused  Answers simple questions, like his name  Miming smoking a cigarette  Appears to be praying and looking up  Good strength in all extremities   Skin: Skin is warm. No rash noted. He is diaphoretic. No erythema. Psychiatric: He has a normal mood and affect. His behavior is normal.   Nursing note and vitals reviewed.   Note written by Fabian Jackson, as dictated by Laure Fitch MD 2:56 PM      MDM  Number of Diagnoses or Management Options  new and requires workup  established and worsening  new and requires workup  new and requires workup  new and requires workup     Amount and/or Complexity of Data Reviewed  Clinical lab tests: ordered and reviewed  Tests in the radiology section of CPT®: ordered and reviewed  Tests in the medicine section of CPT®: ordered and reviewed  Discussion of test results with the performing providers: yes  Decide to obtain previous medical records or to obtain history from someone other than the patient: yes  Obtain history from someone other than the patient: yes (Review the   Ed visit from yesterday)  Review and summarize past medical records: yes  Discuss the patient with other providers: yes  Independent visualization of images, tracings, or specimens: yes    Risk of Complications, Morbidity, and/or Mortality  Presenting problems: high  Diagnostic procedures: high  Management options: high  General comments: Total critical care time was 104 min    Critical Care  Total time providing critical care:  minutes    ED Course       Intubation  Date/Time: 7/13/2017 3:42 PM  Performed by: Binu Bland  Authorized by: Binu Bland     Consent:     Consent obtained:  Emergent situation  Pre-procedure details:     Patient status:  Unresponsive    Mallampati score:  II    Pretreatment meds: Etomidate 10; smaller dose used because 4mg ativan already admnistered.     Paralytics:  Succinylcholine (80)  Procedure details:     Preoxygenation:  Nonrebreather mask    CPR in progress: yes      Intubation method:  Oral    Oral intubation technique:  Video-assisted    Laryngoscope blade:  Mac 2    Tube size (mm):  7.5    Tube type:  Cuffed    Number of attempts:  1    Cricoid pressure: yes      Tube visualized through cords: yes    Placement assessment:     ETT to teeth:  23 cm to gums    Tube secured with:  ETT barton    Breath sounds:  Equal (bilaterally)    Placement verification: CXR verification      Placement verification comment:  Read by Dr. Mlii Bucio findings:  ETT in proper place  Comments:      Positive ausculation and color change        Note written by Fabian Saini, as dictated by Dakota Lyle MD 3:42 PM       CONSULT NOTE:  4:22 PM Dakota Lyle MD spoke with Dr. Ashly Samson, Consult for Hospitalist.  Discussed available diagnostic tests and clinical findings. He is in agreement with care plans as outlined. Dr. Ashly Samson will admit for altered mental status and s/p seizure. ED EKG interpretation:  Rhythm: sinus rhythm with frequent PVC's. Rate (approx.): 96; Prolonged QT.   Note written by Fabian Saini, as dictated by Dakota Lyle MD 4:42 PM

## 2017-07-13 NOTE — PROGRESS NOTES
07/13/17 1600   Ventilator Initiate/Discontinue   Ventilator Initiate Yes   [employer] ID # [id number]   Vent Settings   FIO2 (%) 50 %   CMV Rate Set 14   Back-Up Rate 14   Vt Set (ml) 400 ml   PEEP/VENT (cm H2O) 5 cm H20   I:E Ratio 1:3.5   Insp Time (sec) 1 sec   Insp Flow (l/min) 45 l/min   Flow Trigger 3.0   Ventilator Measurements   Resp Rate Observed 14   Vt Exhaled (Machine Breath) (ml) 407 ml   Ve Observed (l/min) 5.3 l/min   PIP Observed (cm H2O) 32 cm H2O   Plateau Pressure (cm H2O) 14 cm H2O   MAP (cm H2O) 9   Static Compliance (ml/cm H20) 0.44 ml/cm H20   Vent Method/Mode   Ventilation Method Conventional   Ventilator  Mode Assist control;Volume control   $$ Ventilator Initial Initial Vent Day

## 2017-07-14 ENCOUNTER — APPOINTMENT (OUTPATIENT)
Dept: GENERAL RADIOLOGY | Age: 57
DRG: 720 | End: 2017-07-14
Attending: FAMILY MEDICINE
Payer: MEDICAID

## 2017-07-14 ENCOUNTER — APPOINTMENT (OUTPATIENT)
Dept: GENERAL RADIOLOGY | Age: 57
DRG: 720 | End: 2017-07-14
Attending: HOSPITALIST
Payer: MEDICAID

## 2017-07-14 PROBLEM — R56.9 SEIZURE (HCC): Status: ACTIVE | Noted: 2017-07-14

## 2017-07-14 LAB
ANION GAP BLD CALC-SCNC: 10 MMOL/L (ref 5–15)
ANION GAP BLD CALC-SCNC: 5 MMOL/L (ref 5–15)
ANION GAP BLD CALC-SCNC: 9 MMOL/L (ref 5–15)
ARTERIAL PATENCY WRIST A: ABNORMAL
BASE EXCESS BLD CALC-SCNC: 10 MMOL/L
BASOPHILS # BLD AUTO: 0 K/UL (ref 0–0.1)
BASOPHILS # BLD: 0 % (ref 0–1)
BDY SITE: ABNORMAL
BUN SERPL-MCNC: 3 MG/DL (ref 6–20)
BUN SERPL-MCNC: 4 MG/DL (ref 6–20)
BUN SERPL-MCNC: 5 MG/DL (ref 6–20)
BUN/CREAT SERPL: 6 (ref 12–20)
BUN/CREAT SERPL: 8 (ref 12–20)
BUN/CREAT SERPL: 8 (ref 12–20)
BUN/CREAT SERPL: 9 (ref 12–20)
BUN/CREAT SERPL: 9 (ref 12–20)
CALCIUM SERPL-MCNC: 7.4 MG/DL (ref 8.5–10.1)
CALCIUM SERPL-MCNC: 7.7 MG/DL (ref 8.5–10.1)
CALCIUM SERPL-MCNC: 7.9 MG/DL (ref 8.5–10.1)
CALCIUM SERPL-MCNC: 8 MG/DL (ref 8.5–10.1)
CALCIUM SERPL-MCNC: 8 MG/DL (ref 8.5–10.1)
CHLORIDE SERPL-SCNC: 81 MMOL/L (ref 97–108)
CHLORIDE SERPL-SCNC: 83 MMOL/L (ref 97–108)
CHLORIDE SERPL-SCNC: 84 MMOL/L (ref 97–108)
CHLORIDE SERPL-SCNC: 85 MMOL/L (ref 97–108)
CHLORIDE SERPL-SCNC: 85 MMOL/L (ref 97–108)
CO2 SERPL-SCNC: 25 MMOL/L (ref 21–32)
CO2 SERPL-SCNC: 29 MMOL/L (ref 21–32)
CO2 SERPL-SCNC: 30 MMOL/L (ref 21–32)
CO2 SERPL-SCNC: 32 MMOL/L (ref 21–32)
CO2 SERPL-SCNC: 33 MMOL/L (ref 21–32)
CREAT SERPL-MCNC: 0.32 MG/DL (ref 0.7–1.3)
CREAT SERPL-MCNC: 0.39 MG/DL (ref 0.7–1.3)
CREAT SERPL-MCNC: 0.49 MG/DL (ref 0.7–1.3)
CREAT SERPL-MCNC: 0.51 MG/DL (ref 0.7–1.3)
CREAT SERPL-MCNC: 0.54 MG/DL (ref 0.7–1.3)
DIFFERENTIAL METHOD BLD: ABNORMAL
EOSINOPHIL # BLD: 0 K/UL (ref 0–0.4)
EOSINOPHIL NFR BLD: 0 % (ref 0–7)
GAS FLOW.O2 O2 DELIVERY SYS: ABNORMAL L/MIN
GAS FLOW.O2 SETTING OXYMISER: 14 BPM
GLUCOSE SERPL-MCNC: 113 MG/DL (ref 65–100)
GLUCOSE SERPL-MCNC: 121 MG/DL (ref 65–100)
GLUCOSE SERPL-MCNC: 145 MG/DL (ref 65–100)
GLUCOSE SERPL-MCNC: 174 MG/DL (ref 65–100)
GLUCOSE SERPL-MCNC: 96 MG/DL (ref 65–100)
HCO3 BLD-SCNC: 34.8 MMOL/L (ref 22–26)
HCT VFR BLD AUTO: 36.7 % (ref 36.6–50.3)
HGB BLD-MCNC: 13.7 G/DL (ref 12.1–17)
INSPIRATION.DURATION SETTING TIME VENT: 1.1 SEC
LYMPHOCYTES # BLD AUTO: 2 % (ref 12–49)
LYMPHOCYTES # BLD: 0.4 K/UL (ref 0.8–3.5)
MCH RBC QN AUTO: 32.2 PG (ref 26–34)
MCHC RBC AUTO-ENTMCNC: 37.3 G/DL (ref 30–36.5)
MCV RBC AUTO: 86.2 FL (ref 80–99)
MONOCYTES # BLD: 0.4 K/UL (ref 0–1)
MONOCYTES NFR BLD AUTO: 2 % (ref 5–13)
NEUTS BAND NFR BLD MANUAL: 4 %
NEUTS SEG # BLD: 19.5 K/UL (ref 1.8–8)
NEUTS SEG NFR BLD AUTO: 92 % (ref 32–75)
O2/TOTAL GAS SETTING VFR VENT: 50 %
PCO2 BLD: 59.8 MMHG (ref 35–45)
PEEP RESPIRATORY: 5 CMH2O
PH BLD: 7.37 [PH] (ref 7.35–7.45)
PIP ISTAT,IPIP: 24
PLATELET # BLD AUTO: 169 K/UL (ref 150–400)
PLATELET COMMENTS,PCOM: ABNORMAL
PO2 BLD: 171 MMHG (ref 80–100)
POTASSIUM SERPL-SCNC: 3.2 MMOL/L (ref 3.5–5.1)
POTASSIUM SERPL-SCNC: 3.4 MMOL/L (ref 3.5–5.1)
POTASSIUM SERPL-SCNC: 3.6 MMOL/L (ref 3.5–5.1)
POTASSIUM SERPL-SCNC: 3.6 MMOL/L (ref 3.5–5.1)
POTASSIUM SERPL-SCNC: 3.9 MMOL/L (ref 3.5–5.1)
RBC # BLD AUTO: 4.26 M/UL (ref 4.1–5.7)
RBC MORPH BLD: ABNORMAL
SAO2 % BLD: 99 % (ref 92–97)
SODIUM SERPL-SCNC: 118 MMOL/L (ref 136–145)
SODIUM SERPL-SCNC: 119 MMOL/L (ref 136–145)
SODIUM SERPL-SCNC: 123 MMOL/L (ref 136–145)
SODIUM SERPL-SCNC: 123 MMOL/L (ref 136–145)
SODIUM SERPL-SCNC: 126 MMOL/L (ref 136–145)
SPECIMEN TYPE: ABNORMAL
TOTAL RESP. RATE, ITRR: 14
TROPONIN I SERPL-MCNC: 0.05 NG/ML
VENTILATION MODE VENT: ABNORMAL
WBC # BLD AUTO: 20.3 K/UL (ref 4.1–11.1)

## 2017-07-14 PROCEDURE — 77030032490 HC SLV COMPR SCD KNE COVD -B

## 2017-07-14 PROCEDURE — 74011250636 HC RX REV CODE- 250/636: Performed by: FAMILY MEDICINE

## 2017-07-14 PROCEDURE — 74011250636 HC RX REV CODE- 250/636: Performed by: INTERNAL MEDICINE

## 2017-07-14 PROCEDURE — 74011250637 HC RX REV CODE- 250/637: Performed by: INTERNAL MEDICINE

## 2017-07-14 PROCEDURE — 94640 AIRWAY INHALATION TREATMENT: CPT

## 2017-07-14 PROCEDURE — 84484 ASSAY OF TROPONIN QUANT: CPT | Performed by: FAMILY MEDICINE

## 2017-07-14 PROCEDURE — 71010 XR CHEST PORT: CPT

## 2017-07-14 PROCEDURE — 74011000250 HC RX REV CODE- 250: Performed by: INTERNAL MEDICINE

## 2017-07-14 PROCEDURE — 65620000000 HC RM CCU GENERAL

## 2017-07-14 PROCEDURE — 74011250636 HC RX REV CODE- 250/636: Performed by: EMERGENCY MEDICINE

## 2017-07-14 PROCEDURE — 80048 BASIC METABOLIC PNL TOTAL CA: CPT | Performed by: FAMILY MEDICINE

## 2017-07-14 PROCEDURE — 36600 WITHDRAWAL OF ARTERIAL BLOOD: CPT

## 2017-07-14 PROCEDURE — 74011000258 HC RX REV CODE- 258: Performed by: FAMILY MEDICINE

## 2017-07-14 PROCEDURE — 87070 CULTURE OTHR SPECIMN AEROBIC: CPT | Performed by: INTERNAL MEDICINE

## 2017-07-14 PROCEDURE — 94003 VENT MGMT INPAT SUBQ DAY: CPT

## 2017-07-14 PROCEDURE — 74011250636 HC RX REV CODE- 250/636: Performed by: HOSPITALIST

## 2017-07-14 PROCEDURE — 85025 COMPLETE CBC W/AUTO DIFF WBC: CPT | Performed by: FAMILY MEDICINE

## 2017-07-14 PROCEDURE — 36415 COLL VENOUS BLD VENIPUNCTURE: CPT | Performed by: FAMILY MEDICINE

## 2017-07-14 PROCEDURE — 74011250637 HC RX REV CODE- 250/637: Performed by: FAMILY MEDICINE

## 2017-07-14 PROCEDURE — 82803 BLOOD GASES ANY COMBINATION: CPT

## 2017-07-14 PROCEDURE — 74011000250 HC RX REV CODE- 250: Performed by: FAMILY MEDICINE

## 2017-07-14 RX ORDER — ALBUTEROL SULFATE 0.83 MG/ML
2.5 SOLUTION RESPIRATORY (INHALATION)
Status: DISCONTINUED | OUTPATIENT
Start: 2017-07-14 | End: 2017-07-20 | Stop reason: HOSPADM

## 2017-07-14 RX ORDER — POTASSIUM CHLORIDE 20MEQ/15ML
40 LIQUID (ML) ORAL ONCE
Status: COMPLETED | OUTPATIENT
Start: 2017-07-14 | End: 2017-07-14

## 2017-07-14 RX ORDER — DESMOPRESSIN ACETATE 4 UG/ML
2 INJECTION, SOLUTION INTRAVENOUS; SUBCUTANEOUS ONCE
Status: COMPLETED | OUTPATIENT
Start: 2017-07-14 | End: 2017-07-14

## 2017-07-14 RX ORDER — CHLORHEXIDINE GLUCONATE 1.2 MG/ML
15 RINSE ORAL 2 TIMES DAILY
Status: DISCONTINUED | OUTPATIENT
Start: 2017-07-14 | End: 2017-07-20 | Stop reason: HOSPADM

## 2017-07-14 RX ORDER — DEXTROSE MONOHYDRATE 50 MG/ML
100 INJECTION, SOLUTION INTRAVENOUS CONTINUOUS
Status: DISCONTINUED | OUTPATIENT
Start: 2017-07-14 | End: 2017-07-14

## 2017-07-14 RX ORDER — IPRATROPIUM BROMIDE AND ALBUTEROL SULFATE 2.5; .5 MG/3ML; MG/3ML
3 SOLUTION RESPIRATORY (INHALATION)
Status: DISCONTINUED | OUTPATIENT
Start: 2017-07-14 | End: 2017-07-18

## 2017-07-14 RX ORDER — ENOXAPARIN SODIUM 100 MG/ML
40 INJECTION SUBCUTANEOUS EVERY 24 HOURS
Status: DISCONTINUED | OUTPATIENT
Start: 2017-07-14 | End: 2017-07-20 | Stop reason: HOSPADM

## 2017-07-14 RX ORDER — ARFORMOTEROL TARTRATE 15 UG/2ML
15 SOLUTION RESPIRATORY (INHALATION)
Status: DISCONTINUED | OUTPATIENT
Start: 2017-07-14 | End: 2017-07-20 | Stop reason: HOSPADM

## 2017-07-14 RX ADMIN — PIPERACILLIN SODIUM,TAZOBACTAM SODIUM 3.38 G: 3; .375 INJECTION, POWDER, FOR SOLUTION INTRAVENOUS at 04:38

## 2017-07-14 RX ADMIN — PROPOFOL 30 MCG/KG/MIN: 10 INJECTION, EMULSION INTRAVENOUS at 04:36

## 2017-07-14 RX ADMIN — DESMOPRESSIN ACETATE 2 MCG: 4 SOLUTION INTRAVENOUS at 11:54

## 2017-07-14 RX ADMIN — IPRATROPIUM BROMIDE AND ALBUTEROL SULFATE 3 ML: .5; 3 SOLUTION RESPIRATORY (INHALATION) at 07:21

## 2017-07-14 RX ADMIN — PHENYLEPHRINE HYDROCHLORIDE 30 MCG/MIN: 10 INJECTION INTRAVENOUS at 10:03

## 2017-07-14 RX ADMIN — ENOXAPARIN SODIUM 40 MG: 40 INJECTION SUBCUTANEOUS at 13:34

## 2017-07-14 RX ADMIN — IPRATROPIUM BROMIDE AND ALBUTEROL SULFATE 3 ML: .5; 3 SOLUTION RESPIRATORY (INHALATION) at 11:10

## 2017-07-14 RX ADMIN — METHYLPREDNISOLONE SODIUM SUCCINATE 125 MG: 125 INJECTION, POWDER, FOR SOLUTION INTRAMUSCULAR; INTRAVENOUS at 03:06

## 2017-07-14 RX ADMIN — METHYLPREDNISOLONE SODIUM SUCCINATE 60 MG: 125 INJECTION, POWDER, FOR SOLUTION INTRAMUSCULAR; INTRAVENOUS at 18:38

## 2017-07-14 RX ADMIN — CHLORHEXIDINE GLUCONATE 15 ML: 1.2 RINSE ORAL at 20:02

## 2017-07-14 RX ADMIN — BUDESONIDE 500 MCG: 0.5 INHALANT RESPIRATORY (INHALATION) at 07:21

## 2017-07-14 RX ADMIN — IPRATROPIUM BROMIDE AND ALBUTEROL SULFATE 3 ML: .5; 3 SOLUTION RESPIRATORY (INHALATION) at 19:35

## 2017-07-14 RX ADMIN — BUDESONIDE 500 MCG: 0.5 INHALANT RESPIRATORY (INHALATION) at 19:35

## 2017-07-14 RX ADMIN — PIPERACILLIN SODIUM,TAZOBACTAM SODIUM 3.38 G: 3; .375 INJECTION, POWDER, FOR SOLUTION INTRAVENOUS at 21:21

## 2017-07-14 RX ADMIN — PROPOFOL 30 MCG/KG/MIN: 10 INJECTION, EMULSION INTRAVENOUS at 12:21

## 2017-07-14 RX ADMIN — Medication 10 ML: at 04:38

## 2017-07-14 RX ADMIN — PROPOFOL 30 MCG/KG/MIN: 10 INJECTION, EMULSION INTRAVENOUS at 18:38

## 2017-07-14 RX ADMIN — POTASSIUM CHLORIDE 40 MEQ: 40 SOLUTION ORAL at 20:32

## 2017-07-14 RX ADMIN — IPRATROPIUM BROMIDE AND ALBUTEROL SULFATE 3 ML: .5; 3 SOLUTION RESPIRATORY (INHALATION) at 03:40

## 2017-07-14 RX ADMIN — PIPERACILLIN SODIUM,TAZOBACTAM SODIUM 3.38 G: 3; .375 INJECTION, POWDER, FOR SOLUTION INTRAVENOUS at 13:34

## 2017-07-14 RX ADMIN — METHYLPREDNISOLONE SODIUM SUCCINATE 125 MG: 125 INJECTION, POWDER, FOR SOLUTION INTRAMUSCULAR; INTRAVENOUS at 11:54

## 2017-07-14 RX ADMIN — Medication 10 ML: at 13:34

## 2017-07-14 RX ADMIN — CHLORHEXIDINE GLUCONATE 15 ML: 1.2 RINSE ORAL at 10:07

## 2017-07-14 RX ADMIN — IPRATROPIUM BROMIDE AND ALBUTEROL SULFATE 3 ML: .5; 3 SOLUTION RESPIRATORY (INHALATION) at 15:23

## 2017-07-14 RX ADMIN — ALBUTEROL SULFATE 2.5 MG: 2.5 SOLUTION RESPIRATORY (INHALATION) at 18:04

## 2017-07-14 RX ADMIN — IPRATROPIUM BROMIDE AND ALBUTEROL SULFATE 3 ML: .5; 3 SOLUTION RESPIRATORY (INHALATION) at 00:36

## 2017-07-14 RX ADMIN — Medication 10 ML: at 21:21

## 2017-07-14 RX ADMIN — DEXTROSE MONOHYDRATE 75 ML/HR: 5 INJECTION, SOLUTION INTRAVENOUS at 01:23

## 2017-07-14 NOTE — PROGRESS NOTES
Renal Progress Note    NAME:  Etienne Ramirez   :   1960   MRN:   [id number]     Date/Time:  2017 10:53 AM      Assessment:   1. Hyponatremia ---> ? Aetiology (improving) . The serum osmo was 227 with a Urine Na of 9                                           and Urine Osmo 127.     2.   Respiratory Failure  3. COPD  4. Hypotension - ? aetiology       Plan:   The aetiology of hyponatremia is not clear. Though the urine sodium was only 9, the Urine osmo was 127. I would have expected a higher Urine Osmo with volume depletion and ADH excess. Poor solute intake could explain the \" relatively \" low urine osmo. This is not unusual in older/elderly patients, who have a \" tea and toast \" diet. COPD may be associated with ADH release ---> hyponatremia. The usual rate of correction is 6 to 10 meq/litre per day. He is \" ahead \" of the rate now. It would be important to \" decrease\" the rate of correction. I would discontinue N/Saline now and give a dose of DDAVP (2 micrograms). Check the renal panel at 1 pm and 3 pm this afternoon. He is on IV steroids. This may affect the interpretation of the serum cortisol level.           Subjective:         Review of Systems:  Y  N       Y  N  []         []          Fever/chills                                               []         []          Chest Pain  []         []          Cough                                                       []         []          Diarrhea   []         []          Sputum                                                     []         []          Constipation  []         []          SOB/MALDONADO                                                []         []          Nausea/Vomit  []         []          Abd Pain                                                    []         []          Tolerating PT  []         []          Dysuria                                                      []         []          Tolerating Diet     [x] Unable to obtain  ROS due to  []        mental status change  [x]        sedated   [x]        intubated    Medications reviewed:  Current Facility-Administered Medications   Medication Dose Route Frequency    chlorhexidine (PERIDEX) 0.12 % mouthwash 15 mL  15 mL Oral BID    dextrose 5% infusion  75 mL/hr IntraVENous CONTINUOUS    PHENYLephrine (NEOSYNEPHRINE) 30,000 mcg in 0.9% sodium chloride 250 mL infusion   mcg/min IntraVENous TITRATE    desmopressin (DDAVP) 4 mcg/mL injection 2 mcg  2 mcg IntraVENous ONCE    LORazepam (ATIVAN) injection 2 mg  2 mg IntraVENous PRN    propofol (DIPRIVAN) infusion  5-50 mcg/kg/min IntraVENous TITRATE    sodium chloride (NS) flush 5-10 mL  5-10 mL IntraVENous Q8H    sodium chloride (NS) flush 5-10 mL  5-10 mL IntraVENous PRN    budesonide (PULMICORT) 500 mcg/2 ml nebulizer suspension  500 mcg Nebulization BID RT    methylPREDNISolone (PF) (SOLU-MEDROL) injection 125 mg  125 mg IntraVENous Q8H    albuterol-ipratropium (DUO-NEB) 2.5 MG-0.5 MG/3 ML  3 mL Nebulization Q4H RT    piperacillin-tazobactam (ZOSYN) 3.375 g in 0.9% sodium chloride (MBP/ADV) 100 mL  3.375 g IntraVENous Q8H    sodium chloride 0.9 % bolus infusion 1,000 mL  1,000 mL IntraVENous ONCE        Objective:   Vitals:  Visit Vitals    BP (!) 61/47    Pulse 80    Temp 97 °F (36.1 °C)    Resp 14    Ht 6' (1.829 m)    Wt 53 kg (116 lb 13.5 oz)    SpO2 98%    BMI 15.85 kg/m2     Temp (24hrs), Av.1 °F (36.7 °C), Min:97 °F (36.1 °C), Max:99 °F (37.2 °C)      O2 Device: Endotracheal tube, Ventilator    Last 24hr Input/Output:    Intake/Output Summary (Last 24 hours) at 17 1053  Last data filed at 17 1000   Gross per 24 hour   Intake           961.44 ml   Output             6350 ml   Net         -5388.56 ml        PHYSICAL EXAM:      General:    On the vent. Head:   Bitemporal wasting. Lungs:    No rales. CVS                 No S 3 gallop    Abdomen:    Not distended. Extremities:  No leg oedema. .  Neurologic: Non-verbal.        []        Telemetry Reviewed     []        NSR []        PAC/PVCs   []        Afib  []        Paced   []        NSVT   []        Singh []        NGT  []        Intubated on vent    Lab Data Reviewed:    Recent Results (from the past 24 hour(s))   ETHYL ALCOHOL    Collection Time: 07/13/17  3:04 PM   Result Value Ref Range    ALCOHOL(ETHYL),SERUM <10 <10 MG/DL   URINALYSIS W/ REFLEX CULTURE    Collection Time: 07/13/17  4:11 PM   Result Value Ref Range    Color YELLOW/STRAW      Appearance CLOUDY (A) CLEAR      Specific gravity 1.016 1.003 - 1.030      pH (UA) 6.0 5.0 - 8.0      Protein 100 (A) NEG mg/dL    Glucose 250 (A) NEG mg/dL    Ketone 15 (A) NEG mg/dL    Bilirubin NEGATIVE  NEG      Blood LARGE (A) NEG      Urobilinogen 0.2 0.2 - 1.0 EU/dL    Nitrites NEGATIVE  NEG      Leukocyte Esterase NEGATIVE  NEG      WBC 0-4 0 - 4 /hpf    RBC 10-20 0 - 5 /hpf    Epithelial cells FEW FEW /lpf    Bacteria NEGATIVE  NEG /hpf    UA:UC IF INDICATED URINE CULTURE ORDERED (A) CNI      Spermatozoa CLUMPING IS PRESENT.      EKG, 12 LEAD, INITIAL    Collection Time: 07/13/17  4:34 PM   Result Value Ref Range    Ventricular Rate 96 BPM    Atrial Rate 96 BPM    P-R Interval 160 ms    QRS Duration 100 ms    Q-T Interval 438 ms    QTC Calculation (Bezet) 553 ms    Calculated P Axis 90 degrees    Calculated R Axis 87 degrees    Calculated T Axis 77 degrees    Diagnosis       Sinus rhythm with frequent premature ventricular complexes  Prolonged QT  When compared with ECG of 12-JUL-2017 09:49,  QT has lengthened  Confirmed by Jade Rueda MD, Arben Line [id number]) on 7/13/2017 7:15:01 PM     DRUG SCREEN, URINE    Collection Time: 07/13/17  4:41 PM   Result Value Ref Range    AMPHETAMINES NEGATIVE  NEG      BARBITURATES NEGATIVE  NEG      BENZODIAZEPINE NEGATIVE  NEG      COCAINE NEGATIVE  NEG      METHADONE NEGATIVE  NEG      OPIATES NEGATIVE  NEG      PCP(PHENCYCLIDINE) NEGATIVE  NEG      THC (TH-CANNABINOL) NEGATIVE  NEG      Drug screen comment (NOTE)    CBC WITH AUTOMATED DIFF    Collection Time: 07/13/17  4:41 PM   Result Value Ref Range    WBC 15.2 (H) 4.1 - 11.1 K/uL    RBC 3.70 (L) 4.10 - 5.70 M/uL    HGB 11.7 (L) 12.1 - 17.0 g/dL    HCT 31.5 (L) 36.6 - 50.3 %    MCV 85.1 80.0 - 99.0 FL    MCH 31.6 26.0 - 34.0 PG    MCHC 37.1 (H) 30.0 - 36.5 g/dL    PLATELET 876 975 - 269 K/uL    NEUTROPHILS 89 (H) 32 - 75 %    LYMPHOCYTES 3 (L) 12 - 49 %    MONOCYTES 8 5 - 13 %    EOSINOPHILS 0 0 - 7 %    BASOPHILS 0 0 - 1 %    ABS. NEUTROPHILS 13.5 (H) 1.8 - 8.0 K/UL    ABS. LYMPHOCYTES 0.5 (L) 0.8 - 3.5 K/UL    ABS. MONOCYTES 1.2 (H) 0.0 - 1.0 K/UL    ABS. EOSINOPHILS 0.0 0.0 - 0.4 K/UL    ABS. BASOPHILS 0.0 0.0 - 0.1 K/UL    DF SMEAR SCANNED      PLATELET COMMENTS LARGE PLATELETS      RBC COMMENTS NORMOCYTIC, NORMOCHROMIC     METABOLIC PANEL, COMPREHENSIVE    Collection Time: 07/13/17  4:41 PM   Result Value Ref Range    Sodium 106 (LL) 136 - 145 mmol/L    Potassium 2.7 (LL) 3.5 - 5.1 mmol/L    Chloride 68 (L) 97 - 108 mmol/L    CO2 27 21 - 32 mmol/L    Anion gap 11 5 - 15 mmol/L    Glucose 119 (H) 65 - 100 mg/dL    BUN 5 (L) 6 - 20 MG/DL    Creatinine 0.43 (L) 0.70 - 1.30 MG/DL    BUN/Creatinine ratio 12 12 - 20      GFR est AA >60 >60 ml/min/1.73m2    GFR est non-AA >60 >60 ml/min/1.73m2    Calcium 6.7 (L) 8.5 - 10.1 MG/DL    Bilirubin, total 1.2 (H) 0.2 - 1.0 MG/DL    ALT (SGPT) 51 12 - 78 U/L    AST (SGOT) 91 (H) 15 - 37 U/L    Alk.  phosphatase 73 45 - 117 U/L    Protein, total 5.4 (L) 6.4 - 8.2 g/dL    Albumin 2.9 (L) 3.5 - 5.0 g/dL    Globulin 2.5 2.0 - 4.0 g/dL    A-G Ratio 1.2 1.1 - 2.2     BNP    Collection Time: 07/13/17  4:41 PM   Result Value Ref Range     (H) 0 - 100 pg/mL   TROPONIN I    Collection Time: 07/13/17  4:41 PM   Result Value Ref Range    Troponin-I, Qt. <0.04 <0.05 ng/mL   D DIMER    Collection Time: 07/13/17  4:41 PM   Result Value Ref Range    D-dimer 0.92 (H) 0.00 - 0.65 mg/L FEU   LACTIC ACID    Collection Time: 07/13/17  4:50 PM   Result Value Ref Range    Lactic acid 2.2 (HH) 0.4 - 2.0 MMOL/L   CULTURE, BLOOD, PAIRED    Collection Time: 07/13/17  4:50 PM   Result Value Ref Range    Special Requests: NO SPECIAL REQUESTS      Culture result: NO GROWTH AFTER 11 HOURS     POC G3 - PUL    Collection Time: 07/13/17  4:53 PM   Result Value Ref Range    FIO2 (POC) 50 %    pH (POC) 7.204 (LL) 7.35 - 7.45      pCO2 (POC) 77.3 (H) 35.0 - 45.0 MMHG    pO2 (POC) 96 80 - 100 MMHG    HCO3 (POC) 30.5 (H) 22 - 26 MMOL/L    sO2 (POC) 95 92 - 97 %    Base excess (POC) 3 mmol/L    Site LEFT BRACHIAL      Device: VENT      Mode ASSIST CONTROL      Tidal volume 400 ml    Set Rate 14 bpm    PEEP/CPAP (POC) 5 cmH2O    Allens test (POC) YES      Specimen type (POC) ARTERIAL      Total resp.  rate 14     SODIUM, UR, RANDOM    Collection Time: 07/13/17  6:39 PM   Result Value Ref Range    Sodium urine, random 9 MMOL/L   OSMOLALITY, SERUM/PLASMA    Collection Time: 07/13/17  6:39 PM   Result Value Ref Range    Osmolality, serum/plasma 227 mOsm/kg H2O   TROPONIN I    Collection Time: 07/13/17  6:39 PM   Result Value Ref Range    Troponin-I, Qt. 0.06 (H) <8.93 ng/mL   METABOLIC PANEL, BASIC    Collection Time: 07/13/17  6:39 PM   Result Value Ref Range    Sodium 109 (LL) 136 - 145 mmol/L    Potassium 3.0 (L) 3.5 - 5.1 mmol/L    Chloride 71 (L) 97 - 108 mmol/L    CO2 24 21 - 32 mmol/L    Anion gap 14 5 - 15 mmol/L    Glucose 89 65 - 100 mg/dL    BUN 4 (L) 6 - 20 MG/DL    Creatinine 0.41 (L) 0.70 - 1.30 MG/DL    BUN/Creatinine ratio 10 (L) 12 - 20      GFR est AA >60 >60 ml/min/1.73m2    GFR est non-AA >60 >60 ml/min/1.73m2    Calcium 6.9 (L) 8.5 - 10.1 MG/DL   AMMONIA    Collection Time: 07/13/17  6:39 PM   Result Value Ref Range    Ammonia 71 (H) <32 UMOL/L   TSH 3RD GENERATION    Collection Time: 07/13/17  6:39 PM   Result Value Ref Range    TSH 0.75 0.36 - 3.74 uIU/mL   POC EG7 Collection Time: 07/13/17  7:07 PM   Result Value Ref Range    Calcium, ionized (POC) 1.10 (L) 1.12 - 1.32 mmol/L    FIO2 (POC) 0.50 %    pH (POC) 7.214 (LL) 7.35 - 7.45      pCO2 (POC) 84.9 (H) 35.0 - 45.0 MMHG    pO2 (POC) 43 (LL) 80 - 100 MMHG    HCO3 (POC) 34.3 (H) 22 - 26 MMOL/L    Base excess (POC) 7 mmol/L    sO2 (POC) 65 (L) 92 - 97 %    Site OTHER      Device: VENT      Mode ASSIST CONTROL      Set Rate 14 bpm    PEEP/CPAP (POC) 5 cmH2O    PIP (POC) 30      Allens test (POC) N/A      Inspiratory Time 1.10 sec    Specimen type (POC) VENOUS BLOOD      Total resp. rate 27     LACTIC ACID    Collection Time: 07/13/17  8:48 PM   Result Value Ref Range    Lactic acid 1.0 0.4 - 2.0 MMOL/L   TROPONIN I    Collection Time: 07/14/17 12:10 AM   Result Value Ref Range    Troponin-I, Qt. 0.05 (H) <8.84 ng/mL   METABOLIC PANEL, BASIC    Collection Time: 07/14/17 12:10 AM   Result Value Ref Range    Sodium 119 (LL) 136 - 145 mmol/L    Potassium 3.2 (L) 3.5 - 5.1 mmol/L    Chloride 81 (L) 97 - 108 mmol/L    CO2 29 21 - 32 mmol/L    Anion gap 9 5 - 15 mmol/L    Glucose 96 65 - 100 mg/dL    BUN 3 (L) 6 - 20 MG/DL    Creatinine 0.32 (L) 0.70 - 1.30 MG/DL    BUN/Creatinine ratio 9 (L) 12 - 20      GFR est AA >60 >60 ml/min/1.73m2    GFR est non-AA >60 >60 ml/min/1.73m2    Calcium 7.4 (L) 8.5 - 10.1 MG/DL   CBC WITH AUTOMATED DIFF    Collection Time: 07/14/17  4:20 AM   Result Value Ref Range    WBC 20.3 (H) 4.1 - 11.1 K/uL    RBC 4.26 4.10 - 5.70 M/uL    HGB 13.7 12.1 - 17.0 g/dL    HCT 36.7 36.6 - 50.3 %    MCV 86.2 80.0 - 99.0 FL    MCH 32.2 26.0 - 34.0 PG    MCHC 37.3 (H) 30.0 - 36.5 g/dL    PLATELET 030 807 - 691 K/uL    NEUTROPHILS 92 (H) 32 - 75 %    BAND NEUTROPHILS 4 %    LYMPHOCYTES 2 (L) 12 - 49 %    MONOCYTES 2 (L) 5 - 13 %    EOSINOPHILS 0 0 - 7 %    BASOPHILS 0 0 - 1 %    ABS. NEUTROPHILS 19.5 (H) 1.8 - 8.0 K/UL    ABS. LYMPHOCYTES 0.4 (L) 0.8 - 3.5 K/UL    ABS. MONOCYTES 0.4 0.0 - 1.0 K/UL    ABS.  EOSINOPHILS 0.0 0.0 - 0.4 K/UL    ABS.  BASOPHILS 0.0 0.0 - 0.1 K/UL    DF MANUAL      PLATELET COMMENTS LARGE PLATELETS      RBC COMMENTS NORMOCYTIC, NORMOCHROMIC     METABOLIC PANEL, BASIC    Collection Time: 07/14/17  4:20 AM   Result Value Ref Range    Sodium 118 (LL) 136 - 145 mmol/L    Potassium 3.9 3.5 - 5.1 mmol/L    Chloride 83 (L) 97 - 108 mmol/L    CO2 25 21 - 32 mmol/L    Anion gap 10 5 - 15 mmol/L    Glucose 113 (H) 65 - 100 mg/dL    BUN 3 (L) 6 - 20 MG/DL    Creatinine 0.39 (L) 0.70 - 1.30 MG/DL    BUN/Creatinine ratio 8 (L) 12 - 20      GFR est AA >60 >60 ml/min/1.73m2    GFR est non-AA >60 >60 ml/min/1.73m2    Calcium 7.9 (L) 8.5 - 91.4 MG/DL   METABOLIC PANEL, BASIC    Collection Time: 07/14/17  8:48 AM   Result Value Ref Range    Sodium 123 (L) 136 - 145 mmol/L    Potassium 3.6 3.5 - 5.1 mmol/L    Chloride 85 (L) 97 - 108 mmol/L    CO2 33 (H) 21 - 32 mmol/L    Anion gap 5 5 - 15 mmol/L    Glucose 121 (H) 65 - 100 mg/dL    BUN 3 (L) 6 - 20 MG/DL    Creatinine 0.49 (L) 0.70 - 1.30 MG/DL    BUN/Creatinine ratio 6 (L) 12 - 20      GFR est AA >60 >60 ml/min/1.73m2    GFR est non-AA >60 >60 ml/min/1.73m2    Calcium 8.0 (L) 8.5 - 10.1 MG/DL       Total time spent with patient:  []        15   []        25   []        35   []         __ minutes    []        Critical Care Provided    Care Plan discussed with:    CCU Nursing      []        Patient   []        Family    []        Care Manager   []        Consultant/Specialist :      []          >50% of visit spent in counseling and coordination of care   (Discussed []        CODE status,  []        Care Plan, []        D/C Planning)    ___________________________________________________    Attending Physician: Marcelino Hernandez MD

## 2017-07-14 NOTE — PROGRESS NOTES
Care Management Interventions  PCP Verified by CM: Yes (Dr Merritt Koenig)  Palliative Care Consult (Criteria: CHF and RRAT>21): Yes (patient's rrat indicative )  Mode of Transport at Discharge:  (tbd)  Transition of Care Consult (CM Consult): Group Home (tbd may need home health)  MyChart Signup: No  Discharge Durable Medical Equipment: No  Physical Therapy Consult: No  Occupational Therapy Consult: No  Speech Therapy Consult: No  Current Support Network: Adult Group Home ([facility] ph [phone] Manuelito Solano contact )  Confirm Follow Up Transport:  (tbd)  Plan discussed with Pt/Family/Caregiver: Yes (sister Darci Book  [phone] )  Discharge Location  Discharge Placement:  (tbd)    reviewed chart and notes that patient was in the ED this Wed 7/12/2017. Patient's sister Huyen Tipton has spoken with patient's nurse Jillian Grover and was given update. I have a call into [facility] phone [phone] and Manuelito Solano who has been working to reinstate benefits. The alternate ph for above is  [phone].  did follow up and spoke with Donnelly nurse navigator at [facility]. Her ph is [phone] and she notes that a  and Manuelito Solano were working on re-instating patient back with his benefits. Patient in the past has had medicare and medicaid. I have now re-called [facility] and spoke with Phoebe Dickson and Manuelito Solano unavailable at this time. She suggests that I follow up on Monday July 17th and ask for Angelina in the business office. I did attempt to call sister with no avail. Care management will continue to monitor for transitions of care needs.

## 2017-07-14 NOTE — CONSULTS
Initial Pulmonary / Critical Care Consultation    Assessment / Plan:      1. Acute on chronic resp failure with hypercapnia - underlying copd - intubated for hypercapnia and ams - some bronchospasm on exam.  CTA with basilar asdz vs atx - oxygenation OK - on a PCV mode of ventilation currently  2. AMS - hypercapnia and symptomatic hyponatremia (Na 106)  3. Hyponatremia - Na 106 (was 142 on 7/7/17) - renal following - Urine Na low previous urine osm low - ? Low solute / water toxicity - no clearly SIADH - has emphysema but no obvious mass on chest CT - slowing rate of correction and saline is decreased and a dose of DDAVP was given by renal -  4. Paranoid schizophrenia  5. Hypotension  6. Tobacco abuse    --Vent support  --vent bundle  --mental status and bronchospasm limit SBT today - hope to start sbt in am  --EEG pnd  --MRI brain ordered  --Na correction per renal  --Nebs  --solumedrol  --on zosyn empirically  --protonix / peridex / lovenox  --wean bhavya as able    The patient is critically ill and is at significant risk of decompensation. Critical Care time spent exclusive of procedures 30 minutes. 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 admitted 7/13/2017 with AMS and resp distress. Was found to be profoundly hyponatremic and was intubated for resp distress and airway protection. ? If he may have had Sz (also has a h/o extrapyramidal effects and antipsychotic therapy). The patient is intubated and sedated and is unable to provide any history. The history was obtained from review of the medical record and d/w RN.     No Known Allergies  Past Medical History:   Diagnosis Date    Asthma     seldom,use inhaler    Bronchitis     Chronic obstructive pulmonary disease (HCC)     Depression     anxiety    Psychiatric disorder     paranoid schizophrenia    Psychiatric disorder     extrapyramidal disease    Psychotic disorder     mental retardation      Past Surgical History:   Procedure Laterality Date    HX ORTHOPAEDIC      right knee      Prior to Admission medications    Medication Sig Start Date End Date Taking? Authorizing Provider   fluticasone-vilanterol (BREO ELLIPTA) 100-25 mcg/dose inhaler Take 1 Puff by inhalation daily. 7/12/17  Yes Jaqueline Moreno MD   predniSONE (STERAPRED DS) 10 mg dose pack Take 6 tablets po on day 1  Take 5 tablets po on day 2  Take 4 tablets po on day 3  Take 3 tablets po on day 4  Take 2 tablets po on day 5   Take 1 tablet po on day 6 7/12/17  Yes Jaqueline Moreno MD   PARoxetine (PAXIL) 40 mg tablet Take 40 mg by mouth daily. Indications: major depressive disorder   Yes Historical Provider   risperiDONE (RISPERDAL) 3 mg tablet Take 3 mg by mouth every evening. Yes Historical Provider   temazepam (RESTORIL) 30 mg capsule Take 30 mg by mouth every evening. Yes Historical Provider   ALPRAZolam (XANAX) 0.5 mg tablet Take 0.5 mg by mouth two (2) times daily as needed for Anxiety. Yes Historical Provider   benztropine (COGENTIN) 2 mg tablet Take 2 mg by mouth nightly as needed. Yes Historical Provider   ibuprofen (MOTRIN) 800 mg tablet Take 800 mg by mouth every eight (8) hours as needed for Pain. Yes Historical Provider   guaiFENesin (Q-TUSSIN) 100 mg/5 mL liquid Take 200 mg by mouth every four (4) hours as needed for Cough. Yes Historical Provider   albuterol (VENTOLIN HFA) 90 mcg/actuation inhaler Take 2 Puffs by inhalation every four (4) hours as needed for Shortness of Breath. Yes Historical Provider   albuterol (PROVENTIL VENTOLIN) 2.5 mg /3 mL (0.083 %) nebulizer solution 2.5 mg by Nebulization route every six (6) hours as needed for Wheezing.    Yes Historical Provider      Family History   Problem Relation Age of Onset    Diabetes Mother     Heart Disease Father      CHF     Social History   Substance Use Topics    Smoking status: Current Every Day Smoker     Packs/day: 1.00     Years: 25.00    Smokeless tobacco: Never Used    Alcohol use No      Comment: socially      ROS:  Review of systems not obtained due to patient factors. Objective:  Patient Vitals for the past 4 hrs:   BP Pulse Resp SpO2   17 1118 - - - 98 %   17 1110 - 80 14 98 %   17 1000 (!) 61/47 80 14 98 %   17 0900 109/80 71 22 98 %   17 0800 (!) 84/59 74 20 97 %     Temp (24hrs), Av.1 °F (36.7 °C), Min:97 °F (36.1 °C), Max:99 °F (37.2 °C)    CVP:          Intake/Output Summary (Last 24 hours) at 17 1147  Last data filed at 17 1000   Gross per 24 hour   Intake           961.44 ml   Output             6350 ml   Net         -5388.56 ml     Blood Sugar:  Glucose   Date Value Ref Range Status   2017 121 (H) 65 - 100 mg/dL Final   2017 113 (H) 65 - 100 mg/dL Final   2017 96 65 - 100 mg/dL Final   2017 89 65 - 100 mg/dL Final   2017 119 (H) 65 - 100 mg/dL Final     Exam:  Sedate  RASS -3  Oral ETT  No accessory use  Symmetrical chest expansion  Coarse bs with exp wheezes  RRR  Soft NT  Warm and dry  No edema    Lab data was reviewed. Radiology images were independently viewed and available reports were reviewed.     CXR - ETT, basilar atx    CTA - no PE, emphysema, basilar atx vs patchy asdz    Lab:  Recent Labs      17   0848  17   0420  17   0010  17   2048  17   1839  17   1650  17   1641   WBC   --   20.3*   --    --    --    --   15.2*   HGB   --   13.7   --    --    --    --   11.7*   PLT   --   169   --    --    --    --   191   NA  123*  118*  119*   --   109*   --   106*   K  3.6  3.9  3.2*   --   3.0*   --   2.7*   CL  85*  83*  81*   --   71*   --   68*   CO2  33*  25  29   --   24   --   27   BUN  3*  3*  3*   -- 4*   --   5*   CREA  0.49*  0.39*  0.32*   --   0.41*   --   0.43*   GLU  121*  113*  96   --   89   --   119*   CA  8.0*  7.9*  7.4*   --   6.9*   --   6.7*   TROIQ   --    --   0.05*   --   0.06*   --   <0.04   TBILI   --    --    --    --    --    --   1.2*   SGOT   --    --    --    --    --    --   91*   LAC   --    --    --   1.0   --   2.2*   --      ABG:  Recent Labs      07/13/17   1907  07/13/17   1653   PHI  7.214*  7.204*   PCO2I  84.9*  77.3*   PO2I  43*  96   HCO3I  34.3*  30.5*   SO2I  65*  95   FIO2I  0.50  50     All Micro Results     Procedure Component Value Units Date/Time    CULTURE, BLOOD, PAIRED [[id number]] Collected:  07/13/17 1650    Order Status:  Completed Specimen:  Blood Updated:  07/14/17 0509     Special Requests: NO SPECIAL REQUESTS        Culture result: NO GROWTH AFTER 11 HOURS       CULTURE, URINE [[id number]] Collected:  07/13/17 1839    Order Status:  Completed Specimen:  Urine from Clean catch Updated:  07/13/17 2130    CULTURE, URINE [[id number]] Collected:  07/13/17 1611    Order Status:  Completed Updated:  07/13/17 1858    CULTURE, BLOOD, PAIRED [[id number]]     Order Status:  Sent Specimen:  Blood             Diana Breen MD

## 2017-07-14 NOTE — ED NOTES
NG tube noted to be draining brown colored drainage. Patient is on low-intermittent suction. Patient is resting in bed, minimal agitation, restraints in place, will continue to monitor patient.

## 2017-07-14 NOTE — PROGRESS NOTES
Hospitalist Progress Note  Alethea Britton MD  Office: [phone]        Date of Service:  2017  NAME:  Erick Rubin  :  1960  MRN:  [id number]      Admission Summary:   The patient is a 42-year-old gentleman with a past medical history of paranoid schizophrenia, MR,   extrapyramidal disease, anxiety, COPD and hypernatremia, recently   admitted for the same, history of tobacco abuse, depression,   encephalopathy, who presents to the hospital with AMS    Interval history / Subjective:   He was intubated in ER      Assessment & Plan: 1. Metabolic encephalopathy due to possibly hyponatremia  Vs AHRF : Hyponatremia being corrected as per nephrology guideline improving slowly MRI when able and stable consider EEG     2. Sepsis from PNA with patchy airspace disease will broaden abx coverage support BP with bhavya     2. Acute respiratory failure with hypercapnia. currently intubated, on DuoNebs   and will monitor. steroid and abx wean per PCCM   - wbc bump from steroid     3. Severe hyponatremia unknown cause  Slow correction of 6-10 meq / day per nephrology     4. Hypokalemia. Will defer to nephrology to replace the same in light of   severe hyponatremia. 5. Hypocalcemia. Will replace calcium.       Code status: Full  DVT prophylaxis: [facility]    Care Plan discussed with: Patient/Family and Nurse  Disposition: Home w/Family and TBD     Guarded prognosis     Hospital Problems  Date Reviewed: 7/10/2017          Codes Class Noted POA    * (Principal)Acute respiratory failure ([address] ICD-10-CM: J96.00  ICD-9-CM: 518.81  2017 Unknown                Review of Systems:   A comprehensive review of systems was negative. Vital Signs:    Last 24hrs VS reviewed since prior progress note.  Most recent are:  Visit Vitals    BP (!) 61/47    Pulse 80    Temp 97 °F (36.1 °C)    Resp 14    Ht 6' (1.829 m)    Wt 53 kg (116 lb 13.5 oz)    SpO2 98%  BMI 15.85 kg/m2         Intake/Output Summary (Last 24 hours) at 07/14/17 1111  Last data filed at 07/14/17 1000   Gross per 24 hour   Intake           961.44 ml   Output             6350 ml   Net         -5388.56 ml        Physical Examination:             Constitutional:  Sedated on vent   ENT:  Oral mucous moist   Resp:  CTA bilaterally   CV:  Regular rhythm, low BP    GI:  Soft, non distended, bs+    Musculoskeletal:  No edema, warm, 2+ pulses throughout    Neurologic:  sedated on vent     Psych:  none       Data Review:    I personally reviewed  Image and labs      Labs:     Recent Labs      07/14/17   0420  07/13/17   1641   WBC  20.3*  15.2*   HGB  13.7  11.7*   HCT  36.7  31.5*   PLT  169  191     Recent Labs      07/14/17   0848  07/14/17   0420  07/14/17   0010   NA  123*  118*  119*   K  3.6  3.9  3.2*   CL  85*  83*  81*   CO2  33*  25  29   BUN  3*  3*  3*   CREA  0.49*  0.39*  0.32*   GLU  121*  113*  96   CA  8.0*  7.9*  7.4*     Recent Labs      07/13/17   1641   SGOT  91*   ALT  51   AP  73   TBILI  1.2*   TP  5.4*   ALB  2.9*   GLOB  2.5     No results for input(s): INR, PTP, APTT in the last 72 hours. No lab exists for component: INREXT   No results for input(s): FE, TIBC, PSAT, FERR in the last 72 hours. No results found for: FOL, RBCF   No results for input(s): PH, PCO2, PO2 in the last 72 hours.   Recent Labs      07/14/17   0010  07/13/17   1839  07/13/17   1641   TROIQ  0.05*  0.06*  <0.04     Lab Results   Component Value Date/Time    Cholesterol, total 102 05/15/2017 11:11 AM    HDL Cholesterol 48 05/15/2017 11:11 AM    LDL, calculated 46 05/15/2017 11:11 AM    Triglyceride 41 05/15/2017 11:11 AM     Lab Results   Component Value Date/Time    Glucose (POC) 132 07/04/2017 04:37 PM     Lab Results   Component Value Date/Time    Color YELLOW/STRAW 07/13/2017 04:11 PM    Appearance CLOUDY 07/13/2017 04:11 PM    Specific gravity 1.016 07/13/2017 04:11 PM    pH (UA) 6.0 07/13/2017 04:11 PM    Protein 100 07/13/2017 04:11 PM    Glucose 250 07/13/2017 04:11 PM    Ketone 15 07/13/2017 04:11 PM    Bilirubin NEGATIVE  07/13/2017 04:11 PM    Urobilinogen 0.2 07/13/2017 04:11 PM    Nitrites NEGATIVE  07/13/2017 04:11 PM    Leukocyte Esterase NEGATIVE  07/13/2017 04:11 PM    Epithelial cells FEW 07/13/2017 04:11 PM    Bacteria NEGATIVE  07/13/2017 04:11 PM    WBC 0-4 07/13/2017 04:11 PM    RBC 10-20 07/13/2017 04:11 PM         Medications Reviewed:     Current Facility-Administered Medications   Medication Dose Route Frequency    chlorhexidine (PERIDEX) 0.12 % mouthwash 15 mL  15 mL Oral BID    dextrose 5% infusion  75 mL/hr IntraVENous CONTINUOUS    PHENYLephrine (NEOSYNEPHRINE) 30,000 mcg in 0.9% sodium chloride 250 mL infusion   mcg/min IntraVENous TITRATE    desmopressin (DDAVP) 4 mcg/mL injection 2 mcg  2 mcg IntraVENous ONCE    LORazepam (ATIVAN) injection 2 mg  2 mg IntraVENous PRN    propofol (DIPRIVAN) infusion  5-50 mcg/kg/min IntraVENous TITRATE    sodium chloride (NS) flush 5-10 mL  5-10 mL IntraVENous Q8H    sodium chloride (NS) flush 5-10 mL  5-10 mL IntraVENous PRN    budesonide (PULMICORT) 500 mcg/2 ml nebulizer suspension  500 mcg Nebulization BID RT    methylPREDNISolone (PF) (SOLU-MEDROL) injection 125 mg  125 mg IntraVENous Q8H    albuterol-ipratropium (DUO-NEB) 2.5 MG-0.5 MG/3 ML  3 mL Nebulization Q4H RT    piperacillin-tazobactam (ZOSYN) 3.375 g in 0.9% sodium chloride (MBP/ADV) 100 mL  3.375 g IntraVENous Q8H    sodium chloride 0.9 % bolus infusion 1,000 mL  1,000 mL IntraVENous ONCE     ______________________________________________________________________  EXPECTED LENGTH OF STAY: 4d 21h  ACTUAL LENGTH OF STAY:          1                 Pascual Ding MD

## 2017-07-14 NOTE — PROGRESS NOTES
TRANSFER - IN REPORT:    Verbal report received from ED(name) on Campos Troy  being received from [facility] (unit) for wood    Report consisted of patients Situation, Background, Assessment and   Recommendations(SBAR). Information from the following report(s) SBAR, Kardex, MAR, Accordion and Recent Results was reviewed with the receiving nurse. Opportunity for questions and clarification was provided. Assessment completed upon patients arrival to unit and care assumed.

## 2017-07-14 NOTE — PROGRESS NOTES
0100: Spoke with Dr. Moira Crowell about systolic B/P in the 67'W and repeat sodium of 119. Orders changed to D5 75ml/hr. Primary Nurse Alondra Villatoro RN and Rick Bustamante RN performed a dual skin assessment on this patient No impairment noted  Sanya score is 18    0800: Bedside and Verbal shift change report given to Librado Moreland (oncoming nurse) by Libia Hernandez (offgoing nurse).  Report included the following information SBAR, Kardex, ED Summary, MAR, Accordion, Recent Results, Med Rec Status and Cardiac Rhythm SR.

## 2017-07-14 NOTE — ROUTINE PROCESS
Patient left department with RN on cardiac monitor for transportation to inpatient bed. Patient's VS at the time of transfer were /79, 100 on ventilator, no sigs pain at this time, 98.2 via temp sensing velazco, HR 78 rhythm on the monitor. Pt left the floor with fluids infusing. TRANSFER - OUT REPORT:    Verbal report given to Beth Peña RN (name) on Lili Drum  being transferred to [facility](unit) for routine progression of care       Report consisted of patients Situation, Background, Assessment and   Recommendations(SBAR). Information from the following report(s) SBAR, Kardex, ED Summary, [facility] and Recent Results was reviewed with the receiving nurse. Opportunity for questions and clarification was provided.

## 2017-07-14 NOTE — INTERDISCIPLINARY ROUNDS
IDR/SLIDR Summary          Patient: Darren Paulino MRN: [id number]    Age: 61 y.o. YOB: 1960 Room/Bed: [address] [place] [address]   Admit Diagnosis: Acute respiratory failure (HCC)  Principal Diagnosis: Acute respiratory failure ([address]   Goals: NA correction  Readmission: YES  Quality Measure: Not applicable  VTE Prophylaxis: Mechanical  Influenza Vaccine screening completed? NO  Pneumococcal Vaccine screening completed? YES  Mobility needs: No   Nutrition plan:No  Consults:Respiratory    Financial concerns:Yes  Escalated to CM? YES  RRAT Score: 10   Interventions: Case management  Testing due for pt today?  YES  LOS: 1 days Expected length of stay 4.9 days  Discharge plan: back to [facility]   PCP: Felisa Hammans, DO  Transportation needs: Yes    Days before discharge:two or more days before discharge   Discharge disposition: SNF    Signed:     Mine Dickey RN  7/14/2017  6:15 AM

## 2017-07-14 NOTE — PROGRESS NOTES
0730: Report received from [place], [facility]. Called family to update since they have not been to hospital on this admission. No answers. 0930: EEG at bedside. 0945: Pt's BP 69/52 (55) Dr. Loulou Tran, hospitalist, at bedside. Orders received for neosynephrine. 1330: Pt's sister, Rocío Fuentes, called. Update given. 1930: Bedside shift change report given to Pierre Tristan RN (oncoming nurse) by David Mary RN (offgoing nurse). Report included the following information SBAR, Kardex, ED Summary, Procedure Summary, Intake/Output, MAR, Recent Results and Cardiac Rhythm NSR.

## 2017-07-14 NOTE — CDMP QUERY
Please clarify if this patient is being treated/managed for:    => Metabolic Encephalopathy in the setting of  acute respiratory failure and severe hyponatremia. =>Other Explanation of clinical findings  =>Unable to Determine (no explanation of clinical findings)    The medical record reflects the following clinical findings, treatment, and risk factors:    Risk Factors:  COPD,  hyponatremia  Clinical Indicators: presented with AMS, confused, mumbling and groaning. Treatment: Pt intubated,  3% NS IV     Please clarify and document your clinical opinion in the progress notes and discharge summary including the definitive and/or presumptive diagnosis, (suspected or probable), related to the above clinical findings. Please include clinical findings supporting your diagnosis.     Thank you,  Oz Martinez RN BSN [address]  # [phone]

## 2017-07-15 ENCOUNTER — APPOINTMENT (OUTPATIENT)
Dept: GENERAL RADIOLOGY | Age: 57
DRG: 720 | End: 2017-07-15
Attending: INTERNAL MEDICINE
Payer: MEDICAID

## 2017-07-15 LAB
ALBUMIN SERPL BCP-MCNC: 2.5 G/DL (ref 3.5–5)
ALBUMIN SERPL BCP-MCNC: 2.7 G/DL (ref 3.5–5)
ALBUMIN SERPL BCP-MCNC: 2.7 G/DL (ref 3.5–5)
ALBUMIN/GLOB SERPL: 1 {RATIO} (ref 1.1–2.2)
ALP SERPL-CCNC: 67 U/L (ref 45–117)
ALT SERPL-CCNC: 47 U/L (ref 12–78)
ANION GAP BLD CALC-SCNC: 2 MMOL/L (ref 5–15)
ANION GAP BLD CALC-SCNC: 6 MMOL/L (ref 5–15)
ANION GAP BLD CALC-SCNC: 8 MMOL/L (ref 5–15)
ARTERIAL PATENCY WRIST A: YES
AST SERPL W P-5'-P-CCNC: 46 U/L (ref 15–37)
BACTERIA SPEC CULT: NORMAL
BACTERIA SPEC CULT: NORMAL
BASE EXCESS BLD CALC-SCNC: 11 MMOL/L
BASOPHILS # BLD AUTO: 0 K/UL (ref 0–0.1)
BASOPHILS # BLD: 0 % (ref 0–1)
BDY SITE: ABNORMAL
BILIRUB SERPL-MCNC: 0.5 MG/DL (ref 0.2–1)
BUN SERPL-MCNC: 4 MG/DL (ref 6–20)
BUN SERPL-MCNC: 4 MG/DL (ref 6–20)
BUN SERPL-MCNC: 5 MG/DL (ref 6–20)
BUN/CREAT SERPL: 10 (ref 12–20)
BUN/CREAT SERPL: 8 (ref 12–20)
BUN/CREAT SERPL: 9 (ref 12–20)
CALCIUM SERPL-MCNC: 7.6 MG/DL (ref 8.5–10.1)
CALCIUM SERPL-MCNC: 7.8 MG/DL (ref 8.5–10.1)
CALCIUM SERPL-MCNC: 7.9 MG/DL (ref 8.5–10.1)
CC UR VC: NORMAL
CC UR VC: NORMAL
CHLORIDE SERPL-SCNC: 87 MMOL/L (ref 97–108)
CHLORIDE SERPL-SCNC: 93 MMOL/L (ref 97–108)
CHLORIDE SERPL-SCNC: 93 MMOL/L (ref 97–108)
CO2 SERPL-SCNC: 32 MMOL/L (ref 21–32)
COLLECT DURATION TIME UR: 24 HR
CORTIS AM PEAK SERPL-MCNC: 10.9 UG/DL (ref 4.3–22.4)
CREAT 24H UR-MRATE: 2485 MG/24HR (ref 950–2490)
CREAT SERPL-MCNC: 0.45 MG/DL (ref 0.7–1.3)
CREAT SERPL-MCNC: 0.48 MG/DL (ref 0.7–1.3)
CREAT SERPL-MCNC: 0.5 MG/DL (ref 0.7–1.3)
DIFFERENTIAL METHOD BLD: ABNORMAL
EOSINOPHIL # BLD: 0 K/UL (ref 0–0.4)
EOSINOPHIL NFR BLD: 0 % (ref 0–7)
ERYTHROCYTE [DISTWIDTH] IN BLOOD BY AUTOMATED COUNT: 12.6 % (ref 11.5–14.5)
GAS FLOW.O2 O2 DELIVERY SYS: ABNORMAL L/MIN
GAS FLOW.O2 SETTING OXYMISER: 14 BPM
GLOBULIN SER CALC-MCNC: 2.8 G/DL (ref 2–4)
GLUCOSE SERPL-MCNC: 111 MG/DL (ref 65–100)
GLUCOSE SERPL-MCNC: 149 MG/DL (ref 65–100)
GLUCOSE SERPL-MCNC: 95 MG/DL (ref 65–100)
HCO3 BLD-SCNC: 35.2 MMOL/L (ref 22–26)
HCT VFR BLD AUTO: 34 % (ref 36.6–50.3)
HGB BLD-MCNC: 12.3 G/DL (ref 12.1–17)
LYMPHOCYTES # BLD AUTO: 2 % (ref 12–49)
LYMPHOCYTES # BLD: 0.4 K/UL (ref 0.8–3.5)
MAGNESIUM SERPL-MCNC: 2.3 MG/DL (ref 1.6–2.4)
MCH RBC QN AUTO: 32.1 PG (ref 26–34)
MCHC RBC AUTO-ENTMCNC: 36.2 G/DL (ref 30–36.5)
MCV RBC AUTO: 88.8 FL (ref 80–99)
MONOCYTES # BLD: 1 K/UL (ref 0–1)
MONOCYTES NFR BLD AUTO: 5 % (ref 5–13)
NEUTS SEG # BLD: 19.1 K/UL (ref 1.8–8)
NEUTS SEG NFR BLD AUTO: 93 % (ref 32–75)
O2/TOTAL GAS SETTING VFR VENT: 0.5 %
PCO2 BLD: 55.1 MMHG (ref 35–45)
PEEP RESPIRATORY: 5 CMH2O
PH BLD: 7.41 [PH] (ref 7.35–7.45)
PHOSPHATE SERPL-MCNC: 1.8 MG/DL (ref 2.6–4.7)
PHOSPHATE SERPL-MCNC: 3 MG/DL (ref 2.6–4.7)
PHOSPHATE SERPL-MCNC: 3 MG/DL (ref 2.6–4.7)
PIP ISTAT,IPIP: 32
PLATELET # BLD AUTO: 202 K/UL (ref 150–400)
PO2 BLD: 90 MMHG (ref 80–100)
POTASSIUM SERPL-SCNC: 4 MMOL/L (ref 3.5–5.1)
POTASSIUM SERPL-SCNC: 4.1 MMOL/L (ref 3.5–5.1)
POTASSIUM SERPL-SCNC: 4.8 MMOL/L (ref 3.5–5.1)
PROT SERPL-MCNC: 5.5 G/DL (ref 6.4–8.2)
RBC # BLD AUTO: 3.83 M/UL (ref 4.1–5.7)
RBC MORPH BLD: ABNORMAL
SAO2 % BLD: 97 % (ref 92–97)
SERVICE CMNT-IMP: NORMAL
SERVICE CMNT-IMP: NORMAL
SODIUM SERPL-SCNC: 125 MMOL/L (ref 136–145)
SODIUM SERPL-SCNC: 127 MMOL/L (ref 136–145)
SODIUM SERPL-SCNC: 133 MMOL/L (ref 136–145)
SPECIMEN TYPE: ABNORMAL
SPECIMEN VOL ?TM UR: 4100 ML
TOTAL RESP. RATE, ITRR: 14
VENTILATION MODE VENT: ABNORMAL
VT SETTING VENT: 500 ML
WBC # BLD AUTO: 20.5 K/UL (ref 4.1–11.1)

## 2017-07-15 PROCEDURE — 74011250637 HC RX REV CODE- 250/637: Performed by: INTERNAL MEDICINE

## 2017-07-15 PROCEDURE — 85025 COMPLETE CBC W/AUTO DIFF WBC: CPT | Performed by: INTERNAL MEDICINE

## 2017-07-15 PROCEDURE — 74011000250 HC RX REV CODE- 250: Performed by: FAMILY MEDICINE

## 2017-07-15 PROCEDURE — 74011250636 HC RX REV CODE- 250/636: Performed by: INTERNAL MEDICINE

## 2017-07-15 PROCEDURE — 84100 ASSAY OF PHOSPHORUS: CPT | Performed by: INTERNAL MEDICINE

## 2017-07-15 PROCEDURE — 74011250636 HC RX REV CODE- 250/636: Performed by: HOSPITALIST

## 2017-07-15 PROCEDURE — 80053 COMPREHEN METABOLIC PANEL: CPT | Performed by: INTERNAL MEDICINE

## 2017-07-15 PROCEDURE — 83735 ASSAY OF MAGNESIUM: CPT | Performed by: INTERNAL MEDICINE

## 2017-07-15 PROCEDURE — 74011250636 HC RX REV CODE- 250/636: Performed by: EMERGENCY MEDICINE

## 2017-07-15 PROCEDURE — 36600 WITHDRAWAL OF ARTERIAL BLOOD: CPT

## 2017-07-15 PROCEDURE — 74011250636 HC RX REV CODE- 250/636: Performed by: STUDENT IN AN ORGANIZED HEALTH CARE EDUCATION/TRAINING PROGRAM

## 2017-07-15 PROCEDURE — 80069 RENAL FUNCTION PANEL: CPT | Performed by: INTERNAL MEDICINE

## 2017-07-15 PROCEDURE — 74011000250 HC RX REV CODE- 250: Performed by: INTERNAL MEDICINE

## 2017-07-15 PROCEDURE — 94640 AIRWAY INHALATION TREATMENT: CPT

## 2017-07-15 PROCEDURE — 74011000258 HC RX REV CODE- 258: Performed by: FAMILY MEDICINE

## 2017-07-15 PROCEDURE — 36415 COLL VENOUS BLD VENIPUNCTURE: CPT | Performed by: INTERNAL MEDICINE

## 2017-07-15 PROCEDURE — 74011000250 HC RX REV CODE- 250: Performed by: STUDENT IN AN ORGANIZED HEALTH CARE EDUCATION/TRAINING PROGRAM

## 2017-07-15 PROCEDURE — 82533 TOTAL CORTISOL: CPT | Performed by: INTERNAL MEDICINE

## 2017-07-15 PROCEDURE — 74011250636 HC RX REV CODE- 250/636: Performed by: FAMILY MEDICINE

## 2017-07-15 PROCEDURE — 82803 BLOOD GASES ANY COMBINATION: CPT

## 2017-07-15 PROCEDURE — 71010 XR CHEST PORT: CPT

## 2017-07-15 PROCEDURE — 94003 VENT MGMT INPAT SUBQ DAY: CPT

## 2017-07-15 PROCEDURE — 65620000000 HC RM CCU GENERAL

## 2017-07-15 RX ORDER — DEXTROSE MONOHYDRATE 50 MG/ML
75 INJECTION, SOLUTION INTRAVENOUS CONTINUOUS
Status: DISCONTINUED | OUTPATIENT
Start: 2017-07-15 | End: 2017-07-16

## 2017-07-15 RX ORDER — SCOLOPAMINE TRANSDERMAL SYSTEM 1 MG/1
1.5 PATCH, EXTENDED RELEASE TRANSDERMAL
Status: DISCONTINUED | OUTPATIENT
Start: 2017-07-15 | End: 2017-07-20 | Stop reason: HOSPADM

## 2017-07-15 RX ORDER — DESMOPRESSIN ACETATE 4 UG/ML
2 INJECTION, SOLUTION INTRAVENOUS; SUBCUTANEOUS ONCE
Status: COMPLETED | OUTPATIENT
Start: 2017-07-15 | End: 2017-07-15

## 2017-07-15 RX ADMIN — PROPOFOL 30 MCG/KG/MIN: 10 INJECTION, EMULSION INTRAVENOUS at 01:00

## 2017-07-15 RX ADMIN — IPRATROPIUM BROMIDE AND ALBUTEROL SULFATE 3 ML: .5; 3 SOLUTION RESPIRATORY (INHALATION) at 11:16

## 2017-07-15 RX ADMIN — Medication 10 ML: at 13:13

## 2017-07-15 RX ADMIN — Medication 10 ML: at 23:13

## 2017-07-15 RX ADMIN — Medication 10 ML: at 17:10

## 2017-07-15 RX ADMIN — PHENYLEPHRINE HYDROCHLORIDE 30 MCG/MIN: 10 INJECTION INTRAVENOUS at 01:39

## 2017-07-15 RX ADMIN — BUDESONIDE 500 MCG: 0.5 INHALANT RESPIRATORY (INHALATION) at 19:38

## 2017-07-15 RX ADMIN — PROPOFOL 30 MCG/KG/MIN: 10 INJECTION, EMULSION INTRAVENOUS at 05:17

## 2017-07-15 RX ADMIN — PROPOFOL 45 MCG/KG/MIN: 10 INJECTION, EMULSION INTRAVENOUS at 22:07

## 2017-07-15 RX ADMIN — Medication 10 ML: at 18:52

## 2017-07-15 RX ADMIN — Medication 10 ML: at 05:21

## 2017-07-15 RX ADMIN — DEXTROSE MONOHYDRATE 100 ML/HR: 5 INJECTION, SOLUTION INTRAVENOUS at 17:00

## 2017-07-15 RX ADMIN — IPRATROPIUM BROMIDE AND ALBUTEROL SULFATE 3 ML: .5; 3 SOLUTION RESPIRATORY (INHALATION) at 07:28

## 2017-07-15 RX ADMIN — PIPERACILLIN SODIUM,TAZOBACTAM SODIUM 3.38 G: 3; .375 INJECTION, POWDER, FOR SOLUTION INTRAVENOUS at 13:12

## 2017-07-15 RX ADMIN — IPRATROPIUM BROMIDE AND ALBUTEROL SULFATE 3 ML: .5; 3 SOLUTION RESPIRATORY (INHALATION) at 15:07

## 2017-07-15 RX ADMIN — SODIUM PHOSPHATE, MONOBASIC, MONOHYDRATE: 276; 142 INJECTION, SOLUTION INTRAVENOUS at 08:42

## 2017-07-15 RX ADMIN — METHYLPREDNISOLONE SODIUM SUCCINATE 60 MG: 125 INJECTION, POWDER, FOR SOLUTION INTRAMUSCULAR; INTRAVENOUS at 13:15

## 2017-07-15 RX ADMIN — PHENYLEPHRINE HYDROCHLORIDE 20 MCG/MIN: 10 INJECTION INTRAVENOUS at 18:41

## 2017-07-15 RX ADMIN — PROPOFOL 40 MCG/KG/MIN: 10 INJECTION, EMULSION INTRAVENOUS at 12:44

## 2017-07-15 RX ADMIN — METHYLPREDNISOLONE SODIUM SUCCINATE 60 MG: 125 INJECTION, POWDER, FOR SOLUTION INTRAMUSCULAR; INTRAVENOUS at 02:02

## 2017-07-15 RX ADMIN — PIPERACILLIN SODIUM,TAZOBACTAM SODIUM 3.38 G: 3; .375 INJECTION, POWDER, FOR SOLUTION INTRAVENOUS at 05:14

## 2017-07-15 RX ADMIN — ENOXAPARIN SODIUM 40 MG: 40 INJECTION SUBCUTANEOUS at 12:46

## 2017-07-15 RX ADMIN — DEXTROSE MONOHYDRATE 100 ML/HR: 5 INJECTION, SOLUTION INTRAVENOUS at 22:57

## 2017-07-15 RX ADMIN — IPRATROPIUM BROMIDE AND ALBUTEROL SULFATE 3 ML: .5; 3 SOLUTION RESPIRATORY (INHALATION) at 19:38

## 2017-07-15 RX ADMIN — CHLORHEXIDINE GLUCONATE 15 ML: 1.2 RINSE ORAL at 08:46

## 2017-07-15 RX ADMIN — BUDESONIDE 500 MCG: 0.5 INHALANT RESPIRATORY (INHALATION) at 07:28

## 2017-07-15 RX ADMIN — PIPERACILLIN SODIUM,TAZOBACTAM SODIUM 3.38 G: 3; .375 INJECTION, POWDER, FOR SOLUTION INTRAVENOUS at 22:07

## 2017-07-15 RX ADMIN — CHLORHEXIDINE GLUCONATE 15 ML: 1.2 RINSE ORAL at 17:10

## 2017-07-15 RX ADMIN — DESMOPRESSIN ACETATE 2 MCG: 4 SOLUTION INTRAVENOUS at 17:03

## 2017-07-15 NOTE — PROGRESS NOTES
Hospitalist Progress Note  Kenny Lee MD  Office: [phone]  Cell: [phone]      Date of Service:  7/15/2017  NAME:  Campos Troy  :  1960  MRN:  [id number]      Admission Summary:   61year old male with history of paranoid schizophrenia, MR, extrapyramidal disease, COPD, hyponatremia, anxiety, tobacco dependence presented on 17 with altered mental status. He had recently been hospitalized from 17-17 with similar severe hyponatremia. He had seemingly developed seizure-like activity at his group home ([facility]) and was subsequently coded and intubated. He was subsequently brought to [facility] for further management. Interval history / Subjective:    He is intubated and sedated. Unable to provide any meaningful history. Assessment & Plan:         1. Acute on chronic hypercapneic respiratory failure   - with underlying COPD, possibly with element of COPD exacerbation / bronchospasm contributing   - CT chest  - Prominent chronic lung changes, some lung nodularity and pleural plaquing. Incidental gastric distention.   - CTA chest  - No evidence of acute pulmonary embolus. Patchy bilateral lower lobe airspace disease may represent atelectasis, pneumonia, or aspiration. Bilateral bronchial wall thickening is compatible with asthma or bronchitis; radiographic follow-up is recommended to assure resolution. Emphysema. - currently intubated, failed SBT on , repeat today   - continue bronchodilator therapy with DuoNeb, Pulmicort   - on empiric Solu-Medrol   - empiric antibiotic coverage with Zosyn    2. Acute metabolic encephalopathy   - suspect this is secondary to his hyponatremia, possibly with hyponatremia-induced seizures   - CT head  - No acute findings suspected. - MRI brain ordered   - EEG completed, pending interpretation   - correcting hyponatremia as below    3.   Critical hyponatremia   - underlying etiology is unclear, likely multifactorial with poor solute intake, likely some element of excess ADH release; given previous rapid improvement on prior admission, doubtful that his psychiatric medications are the culprit alone   - Na+ 106 upon admission   - slow correction of Na+, targeting 6-10 mEq increase per day   - AM cortisol 10.9, but he is also on Solu-Medrol   - nephrology following    4. Hypotension, presumed sepsis syndrome   - suspect this is secondary to propofol and relative volume depletion, though alternatively could be secondary to sepsis in setting of pneumonia with leukocytosis   - blood cultures 7/13 - no growth thus far   - respiratory culture 7/14 - pending   - on empiric coverage with Zosyn    5. Hypokalemia   - replete as needed    6. Paranoid schizophrenia   - he was on risperidone, paroxetine, benztropine prior to admission   - holding now while he is intubated, sedated    7. Moderate protein-calorie malnutrition   - BMI 14.5   - nutrition consult    8.   Leukocytosis   - suspect some of this may be due to steroid effect   - see above, also treating empirically for sepsis   - taper steroids (reduced to 60 mg Q12H)    Code status: FULL  DVT prophylaxis: Lovenox    Care Plan discussed with: Nurse  Disposition: TBD, his prognosis is guarded     Hospital Problems  Date Reviewed: 7/10/2017          Codes Class Noted POA    * (Principal)Acute respiratory failure ([facility] ICD-10-CM: J96.00  ICD-9-CM: 518.81  7/13/2017 Unknown        Acute encephalopathy ICD-10-CM: G93.40  ICD-9-CM: 348.30  7/5/2017 Yes        Hyponatremia ICD-10-CM: E87.1  ICD-9-CM: 276.1  7/4/2017 Yes        Chronic obstructive pulmonary disease ([facility] ICD-10-CM: J44.9  ICD-9-CM: 496  3/29/2016 Yes        Tobacco dependence ICD-10-CM: F17.200  ICD-9-CM: 305.1  3/29/2016 Yes        Paranoid schizophrenia ([facility] ICD-10-CM: F20.0  ICD-9-CM: 295.30  3/29/2016 Yes                Review of Systems:   Review of systems not obtained due to patient factors. Vital Signs:    Last 24hrs VS reviewed since prior progress note. Most recent are:  Visit Vitals    /70 (BP 1 Location: Left arm, BP Patient Position: At rest)    Pulse 61    Temp 98.6 °F (37 °C)    Resp 17    Ht 6' 0.01\" (1.829 m)    Wt 48.8 kg (107 lb 9.4 oz)    SpO2 97%    BMI 14.59 kg/m2         Intake/Output Summary (Last 24 hours) at 07/15/17 0739  Last data filed at 07/15/17 0700   Gross per 24 hour   Intake           841.29 ml   Output             2175 ml   Net         -1333.71 ml        Physical Examination:             Constitutional:  Intubated, sedated   ENT:  Oral mucous moist, oropharynx benign. Neck supple,    Resp:  CTA bilaterally. No wheezing/rhonchi/rales. No accessory muscle use   CV:  Regular rhythm, normal rate, no murmurs, gallops, rubs    GI:  Soft, non distended, non tender. normoactive bowel sounds, no hepatosplenomegaly     Musculoskeletal:  No edema, warm, 2+ pulses throughout    Neurologic: Intubated, sedated     Skin:  Good turgor, no rashes or ulcers  Hematologic/Lymphatic/Immunlogic:  No jaundice nor lymph node swelling       Data Review:    Review and/or order of clinical lab test      Labs:     Recent Labs      07/15/17   0413  07/14/17   0420   WBC  20.5*  20.3*   HGB  12.3  13.7   HCT  34.0*  36.7   PLT  202  169     Recent Labs      07/15/17   0413  07/14/17   1830  07/14/17   1416   NA  125*  123*  126*   K  4.1  3.6  3.4*   CL  87*  84*  85*   CO2  32  30  32   BUN  4*  5*  4*   CREA  0.45*  0.54*  0.51*   GLU  111*  174*  145*   CA  7.8*  7.7*  8.0*   MG  2.3   --    --    PHOS  1.8*   --    --      Recent Labs      07/15/17   0413  07/13/17   1641   SGOT  46*  91*   ALT  47  51   AP  67  73   TBILI  0.5  1.2*   TP  5.5*  5.4*   ALB  2.7*  2.9*   GLOB  2.8  2.5     No results for input(s): INR, PTP, APTT in the last 72 hours. No lab exists for component: INREXT   No results for input(s): FE, TIBC, PSAT, FERR in the last 72 hours.    No results found for: FOL, RBCF   No results for input(s): PH, PCO2, PO2 in the last 72 hours.   Recent Labs      07/14/17   0010  07/13/17   1839  07/13/17   1641   TROIQ  0.05*  0.06*  <0.04     Lab Results   Component Value Date/Time    Cholesterol, total 102 05/15/2017 11:11 AM    HDL Cholesterol 48 05/15/2017 11:11 AM    LDL, calculated 46 05/15/2017 11:11 AM    Triglyceride 41 05/15/2017 11:11 AM     Lab Results   Component Value Date/Time    Glucose (POC) 132 07/04/2017 04:37 PM     Lab Results   Component Value Date/Time    Color YELLOW/STRAW 07/13/2017 04:11 PM    Appearance CLOUDY 07/13/2017 04:11 PM    Specific gravity 1.016 07/13/2017 04:11 PM    pH (UA) 6.0 07/13/2017 04:11 PM    Protein 100 07/13/2017 04:11 PM    Glucose 250 07/13/2017 04:11 PM    Ketone 15 07/13/2017 04:11 PM    Bilirubin NEGATIVE  07/13/2017 04:11 PM    Urobilinogen 0.2 07/13/2017 04:11 PM    Nitrites NEGATIVE  07/13/2017 04:11 PM    Leukocyte Esterase NEGATIVE  07/13/2017 04:11 PM    Epithelial cells FEW 07/13/2017 04:11 PM    Bacteria NEGATIVE  07/13/2017 04:11 PM    WBC 0-4 07/13/2017 04:11 PM    RBC 10-20 07/13/2017 04:11 PM         Medications Reviewed:     Current Facility-Administered Medications   Medication Dose Route Frequency    chlorhexidine (PERIDEX) 0.12 % mouthwash 15 mL  15 mL Oral BID    PHENYLephrine (NEOSYNEPHRINE) 30,000 mcg in 0.9% sodium chloride 250 mL infusion   mcg/min IntraVENous TITRATE    arformoterol (BROVANA) neb solution 15 mcg  15 mcg Nebulization BID RT    albuterol-ipratropium (DUO-NEB) 2.5 MG-0.5 MG/3 ML  3 mL Nebulization QID RT    albuterol (PROVENTIL VENTOLIN) nebulizer solution 2.5 mg  2.5 mg Nebulization Q4H PRN    methylPREDNISolone (PF) (SOLU-MEDROL) injection 60 mg  60 mg IntraVENous Q8H    enoxaparin (LOVENOX) injection 40 mg  40 mg SubCUTAneous Q24H    LORazepam (ATIVAN) injection 2 mg  2 mg IntraVENous PRN    propofol (DIPRIVAN) infusion  5-50 mcg/kg/min IntraVENous TITRATE    sodium chloride (NS) flush 5-10 mL  5-10 mL IntraVENous Q8H    sodium chloride (NS) flush 5-10 mL  5-10 mL IntraVENous PRN    budesonide (PULMICORT) 500 mcg/2 ml nebulizer suspension  500 mcg Nebulization BID RT    piperacillin-tazobactam (ZOSYN) 3.375 g in 0.9% sodium chloride (MBP/ADV) 100 mL  3.375 g IntraVENous Q8H     ______________________________________________________________________  EXPECTED LENGTH OF STAY: 4d 21h  ACTUAL LENGTH OF STAY:          2                 Arianne Rodriguez MD

## 2017-07-15 NOTE — PROGRESS NOTES
Subjective   No acute events overnight. Did well on an SBT this morning but requiring suctioning at least once an hour. Remains on propofol 40 and bhavya 20. Wakes up and looks at me but doesn't answer questions or follow commands. Temp:  [97 °F (36.1 °C)-99.1 °F (37.3 °C)]   Pulse (Heart Rate):  []   BP: ()/()   Resp Rate:  [12-44]   O2 Sat (%):  [87 %-100 %]   Weight:  [48.8 kg (107 lb 9.4 oz)-69.4 kg (153 lb)]   07/15 0701 - 07/15 1900  In: 146.4 [I.V.:146.4]  Out: 550 [Urine:550]  07/13 1901 - 07/15 0700  In: 1777.7 [I.V.:1717.7]  Out: 9252 [Urine:7075]      Objective:  General Appearance:  Comfortable and in no acute distress. Vital signs: (most recent): Blood pressure 132/77, pulse (!) 53, temperature 98.2 °F (36.8 °C), resp. rate 15, height 6' 0.01\" (1.829 m), weight 48.8 kg (107 lb 9.4 oz), SpO2 99 %. HEENT: Normal HEENT exam.    Lungs:  (Sedated on vent. Lungs coarse with few wheezes throughout)  Heart: Normal rate. Regular rhythm. No murmur. Extremities: There is no dependent edema. Neurological: (RASS -2 to -3). Skin:  Warm and dry. Abdomen: Abdomen is soft and non-distended. There is no abdominal tenderness. Principal Problem:    Acute respiratory failure ([address] (7/13/2017)    Active Problems:    Chronic obstructive pulmonary disease ([address] (3/29/2016)      Tobacco dependence (3/29/2016)      Paranoid schizophrenia ([address] (3/29/2016)      Hyponatremia (7/4/2017)      Acute encephalopathy (7/5/2017)    Labs, imaging personally reviewed. Assessment & Plan    61year old male with COPD, paranoid schizophrenia admitted with acute respiratory failure requiring intubation. 1. Acute on chronic resp failure with hypercapnia - underlying copd - intubated for hypercapnia and ams - some bronchospasm on exam.  CTA with basilar asdz vs atx - oxygenation OK - on a PSV mode of ventilation currently  2.  AMS - hypercapnia and symptomatic hyponatremia (Na 106)  3. Hyponatremia - Na 106 (was 142 on 7/7/17) - renal following - Urine Na low previous urine osm low - ? Low solute / water toxicity - no clearly SIADH - has emphysema but no obvious mass on chest CT - slowing rate of correction and saline is decreased and a dose of DDAVP was given by renal -  4. Paranoid schizophrenia  5. Hypotension  6. Tobacco abuse     --Vent support. Wean to PSV as tolerated. Repeat SBT in AM--secretions preventing extubation today  --vent bundle  --EEG pnd  --MRI brain ordered  --Na correction per renal  --Nebs  --solumedrol  --on zosyn empirically  --protonix / peridex / lovenox  --wean bhavya as able  --start tube feeds if not able to extubate tomorrow     The patient is critically ill and is at significant risk of decompensation. Critical Care time spent exclusive of procedures 32 minutes.

## 2017-07-15 NOTE — PROGRESS NOTES
Renal Progress Note    NAME:  Speedy Moore   :   1960   MRN:   [id number]     Date/Time:  7/15/2017 10:53 AM      Assessment:   1. Hyponatremia ---> ? Aetiology (improving) . The serum osmo was 227 with a Urine Na of 9                                           and Urine Osmo 127.     2.   Respiratory Failure  3. COPD  4. Hypotension - ? Aetiology  5. Hypophosphatemia - on supplements       Plan:   The aetiology of hyponatremia is not clear. Though the urine sodium was only 9, the Urine osmo was 127. I would have expected a higher Urine Osmo with volume depletion and ADH excess. Poor solute intake could explain the \" relatively \" low urine osmo. This is not unusual in older/elderly patients, who have a \" tea and toast \" diet. The TSH and serum cortisol levels are within acceptable/normal limits. COPD may be associated with ADH release ---> hyponatremia. The patient \" is dropping into \" the usual rate of correction (i.e. 6 to 10 meq/litre per day). He received D 5 W and  DDAVP yesterday. Will repeat the renal panel this afternoon (2pm).                 Subjective:         Review of Systems:  Y  N       Y  N  []         []          Fever/chills                                               []         []          Chest Pain  []         []          Cough                                                       []         []          Diarrhea   []         []          Sputum                                                     []         []          Constipation  []         []          SOB/MALDONADO                                                []         []          Nausea/Vomit  []         []          Abd Pain                                                    []         []          Tolerating PT  []         []          Dysuria                                                      []         []          Tolerating Diet     [x]        Unable to obtain  ROS due to  []        mental status change  [x] sedated   [x]        intubated    Medications reviewed:  Current Facility-Administered Medications   Medication Dose Route Frequency    methylPREDNISolone (PF) (SOLU-MEDROL) injection 60 mg  60 mg IntraVENous Q12H    sodium phosphate 20 mmol in 0.9% sodium chloride 250 mL infusion   IntraVENous ONCE    chlorhexidine (PERIDEX) 0.12 % mouthwash 15 mL  15 mL Oral BID    PHENYLephrine (NEOSYNEPHRINE) 30,000 mcg in 0.9% sodium chloride 250 mL infusion   mcg/min IntraVENous TITRATE    arformoterol (BROVANA) neb solution 15 mcg  15 mcg Nebulization BID RT    albuterol-ipratropium (DUO-NEB) 2.5 MG-0.5 MG/3 ML  3 mL Nebulization QID RT    albuterol (PROVENTIL VENTOLIN) nebulizer solution 2.5 mg  2.5 mg Nebulization Q4H PRN    enoxaparin (LOVENOX) injection 40 mg  40 mg SubCUTAneous Q24H    LORazepam (ATIVAN) injection 2 mg  2 mg IntraVENous PRN    propofol (DIPRIVAN) infusion  5-50 mcg/kg/min IntraVENous TITRATE    sodium chloride (NS) flush 5-10 mL  5-10 mL IntraVENous Q8H    sodium chloride (NS) flush 5-10 mL  5-10 mL IntraVENous PRN    budesonide (PULMICORT) 500 mcg/2 ml nebulizer suspension  500 mcg Nebulization BID RT    piperacillin-tazobactam (ZOSYN) 3.375 g in 0.9% sodium chloride (MBP/ADV) 100 mL  3.375 g IntraVENous Q8H        Objective:   Vitals:  Visit Vitals    /69    Pulse 72    Temp 98.6 °F (37 °C)    Resp 14    Ht 6' 0.01\" (1.829 m)    Wt 48.8 kg (107 lb 9.4 oz)    SpO2 96%    BMI 14.59 kg/m2     Temp (24hrs), Av.5 °F (36.9 °C), Min:98.1 °F (36.7 °C), Max:99.1 °F (37.3 °C)    O2 Flow Rate (L/min): 50 l/min O2 Device: Endotracheal tube, Ventilator    Last 24hr Input/Output:    Intake/Output Summary (Last 24 hours) at 07/15/17 1043  Last data filed at 07/15/17 1000   Gross per 24 hour   Intake           899.27 ml   Output             1625 ml   Net          -725.73 ml        PHYSICAL EXAM:      General:    On the vent. Head:   Bitemporal wasting. Feeding tube     Lungs:    No rales. Rhonchi - noted      CVS                 No S 3 gallop    Abdomen:    Not distended. Extremities:  No leg oedema. .  Neurologic: Non-verbal.        []        Telemetry Reviewed     []        NSR []        PAC/PVCs   []        Afib  []        Paced   []        NSVT   []        Singh []        NGT  []        Intubated on vent    Lab Data Reviewed:    Recent Results (from the past 24 hour(s))   POC G3 - PUL    Collection Time: 07/14/17 11:06 AM   Result Value Ref Range    FIO2 (POC) 50 %    pH (POC) 7.373 7.35 - 7.45      pCO2 (POC) 59.8 (H) 35.0 - 45.0 MMHG    pO2 (POC) 171 (H) 80 - 100 MMHG    HCO3 (POC) 34.8 (H) 22 - 26 MMOL/L    sO2 (POC) 99 (H) 92 - 97 %    Base excess (POC) 10 mmol/L    Site LEFT RADIAL      Device: VENT      Mode ASSIST CONTROL      Set Rate 14 bpm    PEEP/CPAP (POC) 5 cmH2O    PIP (POC) 24      Allens test (POC) N/A      Inspiratory Time 1.10 sec    Specimen type (POC) ARTERIAL      Total resp.  rate 14     METABOLIC PANEL, BASIC    Collection Time: 07/14/17  2:16 PM   Result Value Ref Range    Sodium 126 (L) 136 - 145 mmol/L    Potassium 3.4 (L) 3.5 - 5.1 mmol/L    Chloride 85 (L) 97 - 108 mmol/L    CO2 32 21 - 32 mmol/L    Anion gap 9 5 - 15 mmol/L    Glucose 145 (H) 65 - 100 mg/dL    BUN 4 (L) 6 - 20 MG/DL    Creatinine 0.51 (L) 0.70 - 1.30 MG/DL    BUN/Creatinine ratio 8 (L) 12 - 20      GFR est AA >60 >60 ml/min/1.73m2    GFR est non-AA >60 >60 ml/min/1.73m2    Calcium 8.0 (L) 8.5 - 10.1 MG/DL   CULTURE, RESPIRATORY/SPUTUM/BRONCH W GRAM STAIN    Collection Time: 07/14/17  3:34 PM   Result Value Ref Range    Special Requests: NO SPECIAL REQUESTS      GRAM STAIN RARE NO ORGANISMS SEEN      GRAM STAIN RARE EPITHELIAL CELLS SEEN      GRAM STAIN NO ORGANISMS SEEN      Culture result: HEAVY NORMAL RESPIRATORY RAJESH SO FAR     METABOLIC PANEL, BASIC    Collection Time: 07/14/17  6:30 PM   Result Value Ref Range Sodium 123 (L) 136 - 145 mmol/L    Potassium 3.6 3.5 - 5.1 mmol/L    Chloride 84 (L) 97 - 108 mmol/L    CO2 30 21 - 32 mmol/L    Anion gap 9 5 - 15 mmol/L    Glucose 174 (H) 65 - 100 mg/dL    BUN 5 (L) 6 - 20 MG/DL    Creatinine 0.54 (L) 0.70 - 1.30 MG/DL    BUN/Creatinine ratio 9 (L) 12 - 20      GFR est AA >60 >60 ml/min/1.73m2    GFR est non-AA >60 >60 ml/min/1.73m2    Calcium 7.7 (L) 8.5 - 10.1 MG/DL   CORTISOL, AM    Collection Time: 07/15/17  4:13 AM   Result Value Ref Range    Cortisol, a.m. 10.9 4.3 - 22.4 ug/dL   CBC WITH AUTOMATED DIFF    Collection Time: 07/15/17  4:13 AM   Result Value Ref Range    WBC 20.5 (H) 4.1 - 11.1 K/uL    RBC 3.83 (L) 4.10 - 5.70 M/uL    HGB 12.3 12.1 - 17.0 g/dL    HCT 34.0 (L) 36.6 - 50.3 %    MCV 88.8 80.0 - 99.0 FL    MCH 32.1 26.0 - 34.0 PG    MCHC 36.2 30.0 - 36.5 g/dL    RDW 12.6 11.5 - 14.5 %    PLATELET 869 871 - 808 K/uL    NEUTROPHILS 93 (H) 32 - 75 %    LYMPHOCYTES 2 (L) 12 - 49 %    MONOCYTES 5 5 - 13 %    EOSINOPHILS 0 0 - 7 %    BASOPHILS 0 0 - 1 %    ABS. NEUTROPHILS 19.1 (H) 1.8 - 8.0 K/UL    ABS. LYMPHOCYTES 0.4 (L) 0.8 - 3.5 K/UL    ABS. MONOCYTES 1.0 0.0 - 1.0 K/UL    ABS. EOSINOPHILS 0.0 0.0 - 0.4 K/UL    ABS.  BASOPHILS 0.0 0.0 - 0.1 K/UL    DF SMEAR SCANNED      RBC COMMENTS NORMOCYTIC, NORMOCHROMIC     MAGNESIUM    Collection Time: 07/15/17  4:13 AM   Result Value Ref Range    Magnesium 2.3 1.6 - 2.4 mg/dL   METABOLIC PANEL, COMPREHENSIVE    Collection Time: 07/15/17  4:13 AM   Result Value Ref Range    Sodium 125 (L) 136 - 145 mmol/L    Potassium 4.1 3.5 - 5.1 mmol/L    Chloride 87 (L) 97 - 108 mmol/L    CO2 32 21 - 32 mmol/L    Anion gap 6 5 - 15 mmol/L    Glucose 111 (H) 65 - 100 mg/dL    BUN 4 (L) 6 - 20 MG/DL    Creatinine 0.45 (L) 0.70 - 1.30 MG/DL    BUN/Creatinine ratio 9 (L) 12 - 20      GFR est AA >60 >60 ml/min/1.73m2    GFR est non-AA >60 >60 ml/min/1.73m2    Calcium 7.8 (L) 8.5 - 10.1 MG/DL    Bilirubin, total 0.5 0.2 - 1.0 MG/DL    ALT (SGPT) 47 12 - 78 U/L    AST (SGOT) 46 (H) 15 - 37 U/L    Alk. phosphatase 67 45 - 117 U/L    Protein, total 5.5 (L) 6.4 - 8.2 g/dL    Albumin 2.7 (L) 3.5 - 5.0 g/dL    Globulin 2.8 2.0 - 4.0 g/dL    A-G Ratio 1.0 (L) 1.1 - 2.2     PHOSPHORUS    Collection Time: 07/15/17  4:13 AM   Result Value Ref Range    Phosphorus 1.8 (L) 2.6 - 4.7 MG/DL   POC G3 - PUL    Collection Time: 07/15/17  5:43 AM   Result Value Ref Range    FIO2 (POC) 0.50 %    pH (POC) 7.414 7.35 - 7.45      pCO2 (POC) 55.1 (H) 35.0 - 45.0 MMHG    pO2 (POC) 90 80 - 100 MMHG    HCO3 (POC) 35.2 (H) 22 - 26 MMOL/L    sO2 (POC) 97 92 - 97 %    Base excess (POC) 11 mmol/L    Site RIGHT RADIAL      Device: VENT      Mode ASSIST CONTROL      Tidal volume 500 ml    Set Rate 14 bpm    PEEP/CPAP (POC) 5 cmH2O    PIP (POC) 32      Allens test (POC) YES      Specimen type (POC) ARTERIAL      Total resp.  rate 14         Total time spent with patient:  []        15   []        25   []        35   []         __ minutes    []        Critical Care Provided    Care Plan discussed with:        []        Patient   []        Family    []        Care Manager   []        Consultant/Specialist :      []          >50% of visit spent in counseling and coordination of care   (Discussed []        CODE status,  []        Care Plan, []        D/C Planning)    ___________________________________________________    Attending Physician: Kirk Castro MD

## 2017-07-15 NOTE — ROUTINE PROCESS
08:00 SBAR from [place]     08:15 Suctioned for copious secretions    09:00 Brother Nicho at bedside, given update. He is aware his brother has many thick secretions and may not get extubated today. He is ok with that just wants him kept safe. 09:30 Suctioned for copious secretions, attempted SBT, vitals stable and volumes wnl but his secretions both oral and ETT    10:08 He coughs so violently he disconnects the vent. Propofol increased     14:00 He coughed the NG tube out to 60 cm, replaced without difficulty. june disconnects vent with violent coughing    17:10 On spontaneous but copious secretions continue and requiring much propofol so returned to rate on vent  DDAVP and D5 W per orders    18:50 Scopolamine patch for oral secretions    19:30 Bedside shift change report given to Tayo (oncoming nurse) by Vijay Hansen (offgoing nurse). Report included the following information SBAR, Kardex, Procedure Summary, Intake/Output, MAR, Accordion, Recent Results, Med Rec Status, Cardiac Rhythm NSR/SB and Alarm Parameters .

## 2017-07-15 NOTE — PROGRESS NOTES
Patient completed SBT; results below. Patient back on previous vent settings. Patient became anxious during SBT reaching for ET tube. Also has moderate to copious secretions.        07/15/17 0904 07/15/17 0905   Patient Observations   Pulse (Heart Rate) 70 --    Resp Rate 23 --    O2 Sat (%) 96 % --    Vent Settings   FIO2 (%) 50 % --    Pressure Support (cm H2O) 5 cm H2O --    PEEP/VENT (cm H2O) 5 cm H20 --    Ventilator Measurements   Resp Rate Observed 23 --    Weaning Parameters   Spontaneous Breathing Trial Complete Yes --    Resp Rate Observed 23 --    Ve 7.8 --     --    RSBI 62 --    Vent Method/Mode   Ventilation Method --  Conventional   Ventilator  Mode Spontaneous Assist control;Volume control  (patient back on previous settings)

## 2017-07-15 NOTE — PROGRESS NOTES
Problem: Ventilator Management  Goal: *Patient maintains clear airway/free of aspiration  Outcome: Not Progressing Towards Goal  Copious secretions today    Problem: Falls - Risk of  Goal: *Absence of falls  Outcome: Progressing Towards Goal  Safe, on restraints and unable to fall    Problem: Infection - Risk of, Urinary Catheter-Associated Urinary Tract Infection  Goal: *Absence of infection signs and symptoms  Outcome: Not Progressing Towards Goal  Wbc 20,000

## 2017-07-15 NOTE — PROGRESS NOTES
1950 rcd report from . Kyle 89 is intubated sedated on Profopol  Sister at bedside  Pt is agitated, coughing violently, secretions thick tenacious clear-white secretions orally and via ET  Suctioned  Diprivan titrated for sedation  Alex drip titrated     2305 Dr Hannah Morris ordered D5W decreased rate to 50cc/H d/t Na 127    0246 Dr Hannah Morris ordered D5W increased rate 75cc/H d/t Na 132    0407 BP stable Alex weaned off              0800 Bedside and Verbal shift change report given to [facility]. (oncoming nurse) by Rosalba Austin (offgoing nurse). Report included the following information SBAR, Kardex, Intake/Output, MAR, Recent Results and Cardiac Rhythm ,DODIE Navarro.

## 2017-07-15 NOTE — PROGRESS NOTES
Bedside shift change report given to Vickie rodriguez, by Joshua Stanford. Report included the following information SBAR, Kardex, ED Summary, Intake/Output, MAR, Recent Results, Med Rec Status and Cardiac Rhythm NSR.   2200 Cardiac: NSR, PAC/PVC/s. Tolerating mechanical vetillation; intermittently agitated with noxious stimuli. 0700 No other changes. Bedisde shift change report given Bertha, by Vickie rodriguez. Report included the following information SBAR, Kardex, Intake/Output, MAR, Recent Results and Cardiac Rhythm NSR, PACs/PVCs.   0800 Patient more responsive, sitting up; required redirection. Refer to Lab reports, vitals flow sheet for additional information.

## 2017-07-16 ENCOUNTER — APPOINTMENT (OUTPATIENT)
Dept: GENERAL RADIOLOGY | Age: 57
DRG: 720 | End: 2017-07-16
Attending: INTERNAL MEDICINE
Payer: MEDICAID

## 2017-07-16 LAB
ALBUMIN SERPL BCP-MCNC: 2.4 G/DL (ref 3.5–5)
ALBUMIN SERPL BCP-MCNC: 2.5 G/DL (ref 3.5–5)
ALBUMIN SERPL BCP-MCNC: 2.7 G/DL (ref 3.5–5)
ALBUMIN SERPL BCP-MCNC: 2.7 G/DL (ref 3.5–5)
ALBUMIN/GLOB SERPL: 1 {RATIO} (ref 1.1–2.2)
ALP SERPL-CCNC: 58 U/L (ref 45–117)
ALT SERPL-CCNC: 40 U/L (ref 12–78)
ANION GAP BLD CALC-SCNC: 3 MMOL/L (ref 5–15)
ANION GAP BLD CALC-SCNC: 4 MMOL/L (ref 5–15)
ANION GAP BLD CALC-SCNC: 4 MMOL/L (ref 5–15)
ANION GAP BLD CALC-SCNC: 5 MMOL/L (ref 5–15)
ANION GAP BLD CALC-SCNC: 5 MMOL/L (ref 5–15)
ANION GAP BLD CALC-SCNC: 7 MMOL/L (ref 5–15)
ARTERIAL PATENCY WRIST A: ABNORMAL
AST SERPL W P-5'-P-CCNC: 24 U/L (ref 15–37)
BACTERIA SPEC CULT: NORMAL
BASE EXCESS BLD CALC-SCNC: 13 MMOL/L
BASE EXCESS BLDV CALC-SCNC: 12 MMOL/L
BASE EXCESS BLDV CALC-SCNC: 13 MMOL/L
BASOPHILS # BLD AUTO: 0 K/UL (ref 0–0.1)
BASOPHILS # BLD: 0 % (ref 0–1)
BDY SITE: ABNORMAL
BILIRUB SERPL-MCNC: 0.5 MG/DL (ref 0.2–1)
BUN SERPL-MCNC: 4 MG/DL (ref 6–20)
BUN SERPL-MCNC: 4 MG/DL (ref 6–20)
BUN SERPL-MCNC: 5 MG/DL (ref 6–20)
BUN/CREAT SERPL: 12 (ref 12–20)
BUN/CREAT SERPL: 13 (ref 12–20)
BUN/CREAT SERPL: 6 (ref 12–20)
BUN/CREAT SERPL: 8 (ref 12–20)
BUN/CREAT SERPL: 9 (ref 12–20)
BUN/CREAT SERPL: 9 (ref 12–20)
CALCIUM SERPL-MCNC: 7.3 MG/DL (ref 8.5–10.1)
CALCIUM SERPL-MCNC: 7.4 MG/DL (ref 8.5–10.1)
CALCIUM SERPL-MCNC: 7.7 MG/DL (ref 8.5–10.1)
CALCIUM SERPL-MCNC: 7.9 MG/DL (ref 8.5–10.1)
CHLORIDE SERPL-SCNC: 86 MMOL/L (ref 97–108)
CHLORIDE SERPL-SCNC: 91 MMOL/L (ref 97–108)
CHLORIDE SERPL-SCNC: 91 MMOL/L (ref 97–108)
CHLORIDE SERPL-SCNC: 93 MMOL/L (ref 97–108)
CHLORIDE SERPL-SCNC: 94 MMOL/L (ref 97–108)
CHLORIDE SERPL-SCNC: 94 MMOL/L (ref 97–108)
CO2 SERPL-SCNC: 32 MMOL/L (ref 21–32)
CO2 SERPL-SCNC: 33 MMOL/L (ref 21–32)
CO2 SERPL-SCNC: 34 MMOL/L (ref 21–32)
CO2 SERPL-SCNC: 35 MMOL/L (ref 21–32)
CO2 SERPL-SCNC: 35 MMOL/L (ref 21–32)
CO2 SERPL-SCNC: 36 MMOL/L (ref 21–32)
CREAT SERPL-MCNC: 0.38 MG/DL (ref 0.7–1.3)
CREAT SERPL-MCNC: 0.41 MG/DL (ref 0.7–1.3)
CREAT SERPL-MCNC: 0.47 MG/DL (ref 0.7–1.3)
CREAT SERPL-MCNC: 0.54 MG/DL (ref 0.7–1.3)
CREAT SERPL-MCNC: 0.63 MG/DL (ref 0.7–1.3)
CREAT SERPL-MCNC: 0.64 MG/DL (ref 0.7–1.3)
EOSINOPHIL # BLD: 0 K/UL (ref 0–0.4)
EOSINOPHIL NFR BLD: 0 % (ref 0–7)
ERYTHROCYTE [DISTWIDTH] IN BLOOD BY AUTOMATED COUNT: 13.4 % (ref 11.5–14.5)
GAS FLOW.O2 O2 DELIVERY SYS: ABNORMAL L/MIN
GLOBULIN SER CALC-MCNC: 2.6 G/DL (ref 2–4)
GLUCOSE SERPL-MCNC: 116 MG/DL (ref 65–100)
GLUCOSE SERPL-MCNC: 117 MG/DL (ref 65–100)
GLUCOSE SERPL-MCNC: 130 MG/DL (ref 65–100)
GLUCOSE SERPL-MCNC: 133 MG/DL (ref 65–100)
GLUCOSE SERPL-MCNC: 141 MG/DL (ref 65–100)
GLUCOSE SERPL-MCNC: 328 MG/DL (ref 65–100)
GRAM STN SPEC: NORMAL
HCO3 BLD-SCNC: 37.7 MMOL/L (ref 22–26)
HCO3 BLDV-SCNC: 37.8 MMOL/L (ref 23–28)
HCO3 BLDV-SCNC: 38.6 MMOL/L (ref 23–28)
HCT VFR BLD AUTO: 35.1 % (ref 36.6–50.3)
HGB BLD-MCNC: 12.2 G/DL (ref 12.1–17)
LYMPHOCYTES # BLD AUTO: 4 % (ref 12–49)
LYMPHOCYTES # BLD: 0.5 K/UL (ref 0.8–3.5)
MCH RBC QN AUTO: 32.1 PG (ref 26–34)
MCHC RBC AUTO-ENTMCNC: 34.8 G/DL (ref 30–36.5)
MCV RBC AUTO: 92.4 FL (ref 80–99)
MONOCYTES # BLD: 0.8 K/UL (ref 0–1)
MONOCYTES NFR BLD AUTO: 6 % (ref 5–13)
NEUTS SEG # BLD: 13 K/UL (ref 1.8–8)
NEUTS SEG NFR BLD AUTO: 90 % (ref 32–75)
O2/TOTAL GAS SETTING VFR VENT: 50 %
PCO2 BLD: 62.1 MMHG (ref 35–45)
PCO2 BLDV: 66.1 MMHG (ref 41–51)
PCO2 BLDV: 68 MMHG (ref 41–51)
PEEP RESPIRATORY: 5 CMH2O
PH BLD: 7.39 [PH] (ref 7.35–7.45)
PH BLDV: 7.36 [PH] (ref 7.32–7.42)
PH BLDV: 7.37 [PH] (ref 7.32–7.42)
PHOSPHATE SERPL-MCNC: 1.5 MG/DL (ref 2.6–4.7)
PHOSPHATE SERPL-MCNC: 1.5 MG/DL (ref 2.6–4.7)
PHOSPHATE SERPL-MCNC: 2.2 MG/DL (ref 2.6–4.7)
PHOSPHATE SERPL-MCNC: 2.2 MG/DL (ref 2.6–4.7)
PHOSPHATE SERPL-MCNC: 2.3 MG/DL (ref 2.6–4.7)
PHOSPHATE SERPL-MCNC: 2.3 MG/DL (ref 2.6–4.7)
PLATELET # BLD AUTO: 157 K/UL (ref 150–400)
PO2 BLD: 87 MMHG (ref 80–100)
PO2 BLDV: 46 MMHG (ref 25–40)
PO2 BLDV: 49 MMHG (ref 25–40)
POTASSIUM SERPL-SCNC: 3.6 MMOL/L (ref 3.5–5.1)
POTASSIUM SERPL-SCNC: 3.7 MMOL/L (ref 3.5–5.1)
POTASSIUM SERPL-SCNC: 3.8 MMOL/L (ref 3.5–5.1)
POTASSIUM SERPL-SCNC: 3.9 MMOL/L (ref 3.5–5.1)
PRESSURE SUPPORT SETTING VENT: 5 CMH2O
PROT SERPL-MCNC: 5.1 G/DL (ref 6.4–8.2)
RBC # BLD AUTO: 3.8 M/UL (ref 4.1–5.7)
SAO2 % BLD: 96 % (ref 92–97)
SAO2 % BLDV: 78 % (ref 65–88)
SAO2 % BLDV: 81 % (ref 65–88)
SERVICE CMNT-IMP: NORMAL
SODIUM SERPL-SCNC: 123 MMOL/L (ref 136–145)
SODIUM SERPL-SCNC: 130 MMOL/L (ref 136–145)
SODIUM SERPL-SCNC: 131 MMOL/L (ref 136–145)
SODIUM SERPL-SCNC: 132 MMOL/L (ref 136–145)
SODIUM SERPL-SCNC: 132 MMOL/L (ref 136–145)
SODIUM SERPL-SCNC: 134 MMOL/L (ref 136–145)
SPECIMEN TYPE: ABNORMAL
TOTAL RESP. RATE, ITRR: 23
TOTAL RESP. RATE, ITRR: 24
TOTAL RESP. RATE, ITRR: 27
VENTILATION MODE VENT: ABNORMAL
WBC # BLD AUTO: 14.4 K/UL (ref 4.1–11.1)

## 2017-07-16 PROCEDURE — 94640 AIRWAY INHALATION TREATMENT: CPT

## 2017-07-16 PROCEDURE — 94003 VENT MGMT INPAT SUBQ DAY: CPT

## 2017-07-16 PROCEDURE — 74011000250 HC RX REV CODE- 250: Performed by: INTERNAL MEDICINE

## 2017-07-16 PROCEDURE — 85025 COMPLETE CBC W/AUTO DIFF WBC: CPT | Performed by: STUDENT IN AN ORGANIZED HEALTH CARE EDUCATION/TRAINING PROGRAM

## 2017-07-16 PROCEDURE — 71010 XR CHEST PORT: CPT

## 2017-07-16 PROCEDURE — 74011000250 HC RX REV CODE- 250: Performed by: FAMILY MEDICINE

## 2017-07-16 PROCEDURE — 74011250636 HC RX REV CODE- 250/636: Performed by: INTERNAL MEDICINE

## 2017-07-16 PROCEDURE — 77030029684 HC NEB SM VOL KT MONA -A

## 2017-07-16 PROCEDURE — 74011250636 HC RX REV CODE- 250/636: Performed by: EMERGENCY MEDICINE

## 2017-07-16 PROCEDURE — 36600 WITHDRAWAL OF ARTERIAL BLOOD: CPT

## 2017-07-16 PROCEDURE — 77010033678 HC OXYGEN DAILY

## 2017-07-16 PROCEDURE — 74011250636 HC RX REV CODE- 250/636: Performed by: FAMILY MEDICINE

## 2017-07-16 PROCEDURE — 82803 BLOOD GASES ANY COMBINATION: CPT

## 2017-07-16 PROCEDURE — 65620000000 HC RM CCU GENERAL

## 2017-07-16 PROCEDURE — 74011250637 HC RX REV CODE- 250/637: Performed by: NURSE PRACTITIONER

## 2017-07-16 PROCEDURE — 74011000258 HC RX REV CODE- 258: Performed by: FAMILY MEDICINE

## 2017-07-16 PROCEDURE — 74011000250 HC RX REV CODE- 250: Performed by: STUDENT IN AN ORGANIZED HEALTH CARE EDUCATION/TRAINING PROGRAM

## 2017-07-16 PROCEDURE — 74011250637 HC RX REV CODE- 250/637: Performed by: STUDENT IN AN ORGANIZED HEALTH CARE EDUCATION/TRAINING PROGRAM

## 2017-07-16 PROCEDURE — 36415 COLL VENOUS BLD VENIPUNCTURE: CPT | Performed by: STUDENT IN AN ORGANIZED HEALTH CARE EDUCATION/TRAINING PROGRAM

## 2017-07-16 PROCEDURE — 74011250636 HC RX REV CODE- 250/636: Performed by: STUDENT IN AN ORGANIZED HEALTH CARE EDUCATION/TRAINING PROGRAM

## 2017-07-16 PROCEDURE — 80069 RENAL FUNCTION PANEL: CPT | Performed by: INTERNAL MEDICINE

## 2017-07-16 PROCEDURE — 84100 ASSAY OF PHOSPHORUS: CPT | Performed by: STUDENT IN AN ORGANIZED HEALTH CARE EDUCATION/TRAINING PROGRAM

## 2017-07-16 PROCEDURE — 80053 COMPREHEN METABOLIC PANEL: CPT | Performed by: STUDENT IN AN ORGANIZED HEALTH CARE EDUCATION/TRAINING PROGRAM

## 2017-07-16 RX ORDER — RISPERIDONE 1 MG/1
3 TABLET, FILM COATED ORAL EVERY EVENING
Status: DISCONTINUED | OUTPATIENT
Start: 2017-07-16 | End: 2017-07-20 | Stop reason: HOSPADM

## 2017-07-16 RX ORDER — DEXTROSE MONOHYDRATE 50 MG/ML
125 INJECTION, SOLUTION INTRAVENOUS CONTINUOUS
Status: DISCONTINUED | OUTPATIENT
Start: 2017-07-16 | End: 2017-07-19

## 2017-07-16 RX ORDER — PAROXETINE HYDROCHLORIDE 20 MG/1
40 TABLET, FILM COATED ORAL DAILY
Status: DISCONTINUED | OUTPATIENT
Start: 2017-07-17 | End: 2017-07-20 | Stop reason: HOSPADM

## 2017-07-16 RX ORDER — FAMOTIDINE 20 MG/1
20 TABLET, FILM COATED ORAL 2 TIMES DAILY
Status: DISCONTINUED | OUTPATIENT
Start: 2017-07-16 | End: 2017-07-17

## 2017-07-16 RX ORDER — MIDAZOLAM HYDROCHLORIDE 5 MG/ML
2 INJECTION INTRAMUSCULAR; INTRAVENOUS ONCE
Status: DISCONTINUED | OUTPATIENT
Start: 2017-07-16 | End: 2017-07-16

## 2017-07-16 RX ADMIN — PIPERACILLIN SODIUM,TAZOBACTAM SODIUM 3.38 G: 3; .375 INJECTION, POWDER, FOR SOLUTION INTRAVENOUS at 13:50

## 2017-07-16 RX ADMIN — IPRATROPIUM BROMIDE AND ALBUTEROL SULFATE 3 ML: .5; 3 SOLUTION RESPIRATORY (INHALATION) at 16:15

## 2017-07-16 RX ADMIN — POTASSIUM PHOSPHATE, MONOBASIC AND POTASSIUM PHOSPHATE, DIBASIC: 224; 236 INJECTION, SOLUTION INTRAVENOUS at 20:47

## 2017-07-16 RX ADMIN — IPRATROPIUM BROMIDE AND ALBUTEROL SULFATE 3 ML: .5; 3 SOLUTION RESPIRATORY (INHALATION) at 19:34

## 2017-07-16 RX ADMIN — METHYLPREDNISOLONE SODIUM SUCCINATE 30 MG: 40 INJECTION, POWDER, FOR SOLUTION INTRAMUSCULAR; INTRAVENOUS at 13:50

## 2017-07-16 RX ADMIN — ENOXAPARIN SODIUM 40 MG: 40 INJECTION SUBCUTANEOUS at 12:31

## 2017-07-16 RX ADMIN — CHLORHEXIDINE GLUCONATE 15 ML: 1.2 RINSE ORAL at 08:00

## 2017-07-16 RX ADMIN — PROPOFOL 45 MCG/KG/MIN: 10 INJECTION, EMULSION INTRAVENOUS at 04:07

## 2017-07-16 RX ADMIN — Medication 10 ML: at 05:11

## 2017-07-16 RX ADMIN — DEXTROSE MONOHYDRATE 75 ML/HR: 5 INJECTION, SOLUTION INTRAVENOUS at 07:44

## 2017-07-16 RX ADMIN — DEXTROSE MONOHYDRATE 75 ML/HR: 5 INJECTION, SOLUTION INTRAVENOUS at 16:02

## 2017-07-16 RX ADMIN — BUDESONIDE 500 MCG: 0.5 INHALANT RESPIRATORY (INHALATION) at 07:46

## 2017-07-16 RX ADMIN — BUDESONIDE 500 MCG: 0.5 INHALANT RESPIRATORY (INHALATION) at 19:34

## 2017-07-16 RX ADMIN — METHYLPREDNISOLONE SODIUM SUCCINATE 60 MG: 125 INJECTION, POWDER, FOR SOLUTION INTRAMUSCULAR; INTRAVENOUS at 01:48

## 2017-07-16 RX ADMIN — PIPERACILLIN SODIUM,TAZOBACTAM SODIUM 3.38 G: 3; .375 INJECTION, POWDER, FOR SOLUTION INTRAVENOUS at 05:10

## 2017-07-16 RX ADMIN — FAMOTIDINE 20 MG: 10 INJECTION, SOLUTION INTRAVENOUS at 09:51

## 2017-07-16 RX ADMIN — Medication 10 ML: at 23:22

## 2017-07-16 RX ADMIN — PIPERACILLIN SODIUM,TAZOBACTAM SODIUM 3.38 G: 3; .375 INJECTION, POWDER, FOR SOLUTION INTRAVENOUS at 21:33

## 2017-07-16 RX ADMIN — IPRATROPIUM BROMIDE AND ALBUTEROL SULFATE 3 ML: .5; 3 SOLUTION RESPIRATORY (INHALATION) at 11:41

## 2017-07-16 RX ADMIN — IPRATROPIUM BROMIDE AND ALBUTEROL SULFATE 3 ML: .5; 3 SOLUTION RESPIRATORY (INHALATION) at 07:46

## 2017-07-16 RX ADMIN — FAMOTIDINE 20 MG: 20 TABLET ORAL at 22:16

## 2017-07-16 RX ADMIN — RISPERIDONE 3 MG: 3 TABLET ORAL at 22:17

## 2017-07-16 RX ADMIN — DEXTROSE MONOHYDRATE 125 ML/HR: 5 INJECTION, SOLUTION INTRAVENOUS at 22:00

## 2017-07-16 NOTE — PROGRESS NOTES
Hospitalist Progress Note  Adrienne Hunt MD  Office: [phone]  Cell: [phone]      Date of Service:  2017  NAME:  Jacek Stephenson  :  1960  MRN:  [id number]      Admission Summary:   61year old male with history of paranoid schizophrenia, MR, extrapyramidal disease, COPD, hyponatremia, anxiety, tobacco dependence presented on 17 with altered mental status. He had recently been hospitalized from 17-17 with similar severe hyponatremia. He had seemingly developed seizure-like activity at his group home ([facility]) and was subsequently coded and intubated. He was subsequently brought to [facility] [place] for further management. Interval history / Subjective:    He remains intubated and sedated. Failed SBT due to excessive secretions yesterday, but was wakeful when sedation weaned, attempting to pull NG tube / ETT. Assessment & Plan:     1. Acute on chronic hypercapneic respiratory failure   - with underlying COPD, possibly with element of COPD exacerbation / bronchospasm contributing   - CT chest  - Prominent chronic lung changes, some lung nodularity and pleural plaquing. Incidental gastric distention.   - CTA chest  - No evidence of acute pulmonary embolus. Patchy bilateral lower lobe airspace disease may represent atelectasis, pneumonia, or aspiration. Bilateral bronchial wall thickening is compatible with asthma or bronchitis; radiographic follow-up is recommended to assure resolution. Emphysema. - currently intubated, failed SBT on , 7/15; daily SBT until extubated   - continue bronchodilator therapy with DuoNeb, Pulmicort   - on empiric Solu-Medrol, continue to wean   - started on scopolamine for secretions   - empiric antibiotic coverage with Zosyn    2.   Acute metabolic encephalopathy   - suspect this is secondary to his hyponatremia, possibly with hyponatremia-induced seizures   - CT head  - No acute findings suspected. - MRI brain ordered, but suspect this is secondary to seizures, if he is unable to be extubated soon, pursue MRI brain   - EEG completed, pending interpretation   - correcting hyponatremia as below    3. Critical hyponatremia   - underlying etiology is unclear, likely multifactorial with poor solute intake, likely some element of excess ADH release; given previous rapid improvement on prior admission, doubtful that his psychiatric medications are the culprit alone   - Na+ 106 upon admission, improving   - slow correction of Na+, targeting 6-10 mEq increase per day   - AM cortisol 10.9, but he is also on Solu-Medrol   - nephrology following    4. Hypotension, presumed sepsis syndrome   - suspect this is secondary to propofol, though alternatively could be secondary to sepsis in setting of pneumonia with leukocytosis   - blood cultures 7/13 - no growth thus far   - respiratory culture 7/14 - heavy normal respiratory denise thus far   - on empiric coverage with Zosyn    5. Hypokalemia   - replete as needed    6. Paranoid schizophrenia   - he was on risperidone, paroxetine, benztropine prior to admission   - holding now while he is intubated, sedated    7. Moderate protein-calorie malnutrition   - BMI 14.5   - nutrition consult    8.   Leukocytosis   - suspect some of this may be due to steroid effect, improving   - see above, also treating empirically for sepsis   - taper steroids (reduced to 60 mg Q12H)    Code status: FULL  DVT prophylaxis: Lovenox    Care Plan discussed with: Nurse  Disposition: TBD, his prognosis is guarded     Hospital Problems  Date Reviewed: 7/10/2017          Codes Class Noted POA    * (Principal)Acute respiratory failure ([facility] [address] ICD-10-CM: J96.00  ICD-9-CM: 518.81  7/13/2017 Unknown        Acute encephalopathy ICD-10-CM: G93.40  ICD-9-CM: 348.30  7/5/2017 Yes        Hyponatremia ICD-10-CM: E87.1  ICD-9-CM: 276.1  7/4/2017 Yes        Chronic obstructive pulmonary disease [facility] ICD-10-CM: J44.9  ICD-9-CM: 644  3/29/2016 Yes        Tobacco dependence ICD-10-CM: F17.200  ICD-9-CM: 305.1  3/29/2016 Yes        Paran[facility] ([facility] ICD-10-CM: F20.0  ICD-9-CM: 295.30  3/29/2016 Yes                Review of Systems:   Review of systems not obtained due to patient factors. Vital Signs:    Last 24hrs VS reviewed since prior progress note. Most recent are:  Visit Vitals    BP (!) 89/67    Pulse (!) 52    Temp 96.6 °F (35.9 °C)    Resp 14    Ht 6' 0.01\" (1.829 m)    Wt 51.5 kg (113 lb 8.6 oz)    SpO2 97%    BMI 15.39 kg/m2         Intake/Output Summary (Last 24 hours) at 07/16/17 0250  Last data filed at 07/16/17 0600   Gross per 24 hour   Intake           1659.7 ml   Output             1960 ml   Net           -300.3 ml        Physical Examination:             Constitutional:  Intubated, sedated   ENT:  Oral mucous moist, oropharynx benign. Neck supple,    Resp:  CTA bilaterally. No wheezing/rhonchi/rales. No accessory muscle use   CV:  Regular rhythm, normal rate, no murmurs, gallops, rubs    GI:  Soft, non distended, non tender.  normoactive bowel sounds, no hepatosplenomegaly     Musculoskeletal:  No edema, warm, 2+ pulses throughout    Neurologic: Intubated, sedated     Skin:  Good turgor, no rashes or ulcers  Hematologic/Lymphatic/Immunlogic:  No jaundice nor lymph node swelling       Data Review:    Review and/or order of clinical lab test      Labs:     Recent Labs      07/16/17   0445  07/15/17   0413   WBC  14.4*  20.5*   HGB  12.2  12.3   HCT  35.1*  34.0*   PLT  157  202     Recent Labs      07/16/17   0445  07/16/17   0105  07/15/17   2030   07/15/17   0413   NA  132*  132*  127*   < >  125*   K  3.8  3.8  4.8   < >  4.1   CL  93*  94*  93*   < >  87*   CO2  32  33*  32   < >  32   BUN  5*  5*  5*   < >  4*   CREA  0.38*  0.41*  0.50*   < >  0.45*   GLU  133*  130*  149*   < >  111*   CA  7.7*  7.4*  7.6*   < >  7.8*   MG   --    --    --    --   2.3   PHOS 2.2*  2.2*  3.0   < >  1.8*    < > = values in this interval not displayed. Recent Labs      07/16/17   0445  07/16/17   0105  07/15/17   2030   07/15/17   0413  07/13/17   1641   SGOT  24   --    --    --   46*  91*   ALT  40   --    --    --   47  51   AP  58   --    --    --   67  73   TBILI  0.5   --    --    --   0.5  1.2*   TP  5.1*   --    --    --   5.5*  5.4*   ALB  2.5*  2.5*  2.5*   < >  2.7*  2.9*   GLOB  2.6   --    --    --   2.8  2.5    < > = values in this interval not displayed. No results for input(s): INR, PTP, APTT in the last 72 hours. No lab exists for component: INREXT, INREXT   No results for input(s): FE, TIBC, PSAT, FERR in the last 72 hours. No results found for: FOL, RBCF   No results for input(s): PH, PCO2, PO2 in the last 72 hours.   Recent Labs      07/14/17   0010  07/13/17   1839  07/13/17   1641   TROIQ  0.05*  0.06*  <0.04     Lab Results   Component Value Date/Time    Cholesterol, total 102 05/15/2017 11:11 AM    HDL Cholesterol 48 05/15/2017 11:11 AM    LDL, calculated 46 05/15/2017 11:11 AM    Triglyceride 41 05/15/2017 11:11 AM     Lab Results   Component Value Date/Time    Glucose (POC) 132 07/04/2017 04:37 PM     Lab Results   Component Value Date/Time    Color YELLOW/STRAW 07/13/2017 04:11 PM    Appearance CLOUDY 07/13/2017 04:11 PM    Specific gravity 1.016 07/13/2017 04:11 PM    pH (UA) 6.0 07/13/2017 04:11 PM    Protein 100 07/13/2017 04:11 PM    Glucose 250 07/13/2017 04:11 PM    Ketone 15 07/13/2017 04:11 PM    Bilirubin NEGATIVE  07/13/2017 04:11 PM    Urobilinogen 0.2 07/13/2017 04:11 PM    Nitrites NEGATIVE  07/13/2017 04:11 PM    Leukocyte Esterase NEGATIVE  07/13/2017 04:11 PM    Epithelial cells FEW 07/13/2017 04:11 PM    Bacteria NEGATIVE  07/13/2017 04:11 PM    WBC 0-4 07/13/2017 04:11 PM    RBC 10-20 07/13/2017 04:11 PM         Medications Reviewed:     Current Facility-Administered Medications   Medication Dose Route Frequency    methylPREDNISolone (PF) (SOLU-MEDROL) injection 60 mg  60 mg IntraVENous Q12H    dextrose 5% infusion  75 mL/hr IntraVENous CONTINUOUS    scopolamine (TRANSDERM-SCOP) 1.5 mg  1.5 mg TransDERmal Q72H    chlorhexidine (PERIDEX) 0.12 % mouthwash 15 mL  15 mL Oral BID    PHENYLephrine (NEOSYNEPHRINE) 30,000 mcg in 0.9% sodium chloride 250 mL infusion   mcg/min IntraVENous TITRATE    arformoterol (BROVANA) neb solution 15 mcg  15 mcg Nebulization BID RT    albuterol-ipratropium (DUO-NEB) 2.5 MG-0.5 MG/3 ML  3 mL Nebulization QID RT    albuterol (PROVENTIL VENTOLIN) nebulizer solution 2.5 mg  2.5 mg Nebulization Q4H PRN    enoxaparin (LOVENOX) injection 40 mg  40 mg SubCUTAneous Q24H    LORazepam (ATIVAN) injection 2 mg  2 mg IntraVENous PRN    propofol (DIPRIVAN) infusion  5-50 mcg/kg/min IntraVENous TITRATE    sodium chloride (NS) flush 5-10 mL  5-10 mL IntraVENous Q8H    sodium chloride (NS) flush 5-10 mL  5-10 mL IntraVENous PRN    budesonide (PULMICORT) 500 mcg/2 ml nebulizer suspension  500 mcg Nebulization BID RT    piperacillin-tazobactam (ZOSYN) 3.375 g in 0.9% sodium chloride (MBP/ADV) 100 mL  3.375 g IntraVENous Q8H     ______________________________________________________________________  EXPECTED LENGTH OF STAY: 4d 21h  ACTUAL LENGTH OF STAY:          MD Vidhya

## 2017-07-16 NOTE — PROGRESS NOTES
1930 rcd report from [facility]  Denies and SOB    2000 sister here visiting    Pt is frequently asking for Ginger Ale to drink through the night   congested cough, not much sputum expectorated    0800 Bedside and Verbal shift change report given to Oneyda Stahl Dr (oncoming nurse) by Abdirahman Donnelly (offgoing nurse). Report included the following information SBAR, Kardex, Intake/Output, MAR, Recent Results and Cardiac Rhythm NSR.

## 2017-07-16 NOTE — PROGRESS NOTES
Renal Progress Note    NAME:  Russell June   :   1960   MRN:   [id number]     Date/Time:  2017       Assessment:   1. Hyponatremia ---> ? Aetiology (improving) . The serum osmo was 227 with a Urine Na of 9 and Urine Osmo 127.     2.   Respiratory Failure --> extubated  3. COPD  4. Hypotension - ? Aetiology  5. Hypophosphatemia - improving       Plan:   The aetiology of hyponatremia is not clear. Though the urine sodium was only 9, the Urine osmo was 127. I would have expected a higher Urine Osmo with volume depletion and ADH excess. Poor solute intake could explain the \" relatively \" low urine osmo. This is not unusual in older/elderly patients, who have a \" tea and toast \" diet. The TSH and serum cortisol levels are within acceptable/normal limits. COPD may be associated with ADH release ---> hyponatremia. The patient has dropped into the recommended rate of correction (i.e. 6 to 10 meq/litre per day). He received D 5 W and  DDAVP yesterday in an effort to \" slow the rate of correction\" . The latest (corrected) serum sodium was 127. Will discontinue the D 5 W now. The renal panel will be repeated. Subjective: The events were noted. He received DDAVP and D 5 W over the past 24 hours. The serum sodium levels were reviewed (7/15 - ) ---> 135 --> 133 --> 127 --> 132 --> 132 --> 123  (corrected sodium approximately 127). He was extubated earlier this morning.  He offered no new complaints      Review of Systems:  Y  N       Y  N  []         []          Fever/chills                                               []         []          Chest Pain  []         []          Cough                                                       []         []          Diarrhea   []         []          Sputum                                                     []         []          Constipation  []         []          SOB/MALDONADO []         []          Nausea/Vomit  []         []          Abd Pain                                                    []         []          Tolerating PT  []         []          Dysuria                                                      []         []          Tolerating Diet     []        Unable to obtain  ROS due to  []        mental status change  []        sedated   []        intubated    Medications reviewed:  Current Facility-Administered Medications   Medication Dose Route Frequency    famotidine (PF) (PEPCID) 20 mg in sodium chloride 0.9 % 10 mL injection  20 mg IntraVENous Q12H    methylPREDNISolone (PF) (SOLU-MEDROL) injection 30 mg  30 mg IntraVENous Q12H    scopolamine (TRANSDERM-SCOP) 1.5 mg  1.5 mg TransDERmal Q72H    chlorhexidine (PERIDEX) 0.12 % mouthwash 15 mL  15 mL Oral BID    PHENYLephrine (NEOSYNEPHRINE) 30,000 mcg in 0.9% sodium chloride 250 mL infusion   mcg/min IntraVENous TITRATE    arformoterol (BROVANA) neb solution 15 mcg  15 mcg Nebulization BID RT    albuterol-ipratropium (DUO-NEB) 2.5 MG-0.5 MG/3 ML  3 mL Nebulization QID RT    albuterol (PROVENTIL VENTOLIN) nebulizer solution 2.5 mg  2.5 mg Nebulization Q4H PRN    enoxaparin (LOVENOX) injection 40 mg  40 mg SubCUTAneous Q24H    LORazepam (ATIVAN) injection 2 mg  2 mg IntraVENous PRN    propofol (DIPRIVAN) infusion  5-50 mcg/kg/min IntraVENous TITRATE    sodium chloride (NS) flush 5-10 mL  5-10 mL IntraVENous Q8H    sodium chloride (NS) flush 5-10 mL  5-10 mL IntraVENous PRN    budesonide (PULMICORT) 500 mcg/2 ml nebulizer suspension  500 mcg Nebulization BID RT    piperacillin-tazobactam (ZOSYN) 3.375 g in 0.9% sodium chloride (MBP/ADV) 100 mL  3.375 g IntraVENous Q8H        Objective:   Vitals:  Visit Vitals    /57    Pulse 64    Temp 96.8 °F (36 °C)    Resp 22    Ht 6' 0.01\" (1.829 m)    Wt 51.5 kg (113 lb 8.6 oz)    SpO2 98%    BMI 15.39 kg/m2 Temp (24hrs), Av.6 °F (36.4 °C), Min:96.6 °F (35.9 °C), Max:98.2 °F (36.8 °C)    O2 Flow Rate (L/min): 4 l/min O2 Device: Nasal cannula    Last 24hr Input/Output:    Intake/Output Summary (Last 24 hours) at 17 1118  Last data filed at 17 0700   Gross per 24 hour   Intake          1576.72 ml   Output             1610 ml   Net           -33.28 ml        PHYSICAL EXAM:      General:    Propped up in bed. He was calm. Head:   Bitemporal wasting. Feeding tube     Lungs:    No rales. No wheezes. CVS                 No S 3 gallop    Abdomen:    Not distended. Extremities:  No leg oedema. .  Neurologic:  Responding appropriately.         []        Telemetry Reviewed     []        NSR []        PAC/PVCs   []        Afib  []        Paced   []        NSVT   []        Singh []        NGT  []        Intubated on vent    Lab Data Reviewed:    Recent Results (from the past 24 hour(s))   RENAL FUNCTION PANEL    Collection Time: 07/15/17  3:17 PM   Result Value Ref Range    Sodium 133 (L) 136 - 145 mmol/L    Potassium 4.0 3.5 - 5.1 mmol/L    Chloride 93 (L) 97 - 108 mmol/L    CO2 32 21 - 32 mmol/L    Anion gap 8 5 - 15 mmol/L    Glucose 95 65 - 100 mg/dL    BUN 4 (L) 6 - 20 MG/DL    Creatinine 0.48 (L) 0.70 - 1.30 MG/DL    BUN/Creatinine ratio 8 (L) 12 - 20      GFR est AA >60 >60 ml/min/1.73m2    GFR est non-AA >60 >60 ml/min/1.73m2    Calcium 7.9 (L) 8.5 - 10.1 MG/DL    Phosphorus 3.0 2.6 - 4.7 MG/DL    Albumin 2.7 (L) 3.5 - 5.0 g/dL   RENAL FUNCTION PANEL    Collection Time: 07/15/17  8:30 PM   Result Value Ref Range    Sodium 127 (L) 136 - 145 mmol/L    Potassium 4.8 3.5 - 5.1 mmol/L    Chloride 93 (L) 97 - 108 mmol/L    CO2 32 21 - 32 mmol/L    Anion gap 2 (L) 5 - 15 mmol/L    Glucose 149 (H) 65 - 100 mg/dL    BUN 5 (L) 6 - 20 MG/DL    Creatinine 0.50 (L) 0.70 - 1.30 MG/DL    BUN/Creatinine ratio 10 (L) 12 - 20      GFR est AA >60 >60 ml/min/1.73m2    GFR est non-AA >60 >60 ml/min/1.73m2    Calcium 7.6 (L) 8.5 - 10.1 MG/DL    Phosphorus 3.0 2.6 - 4.7 MG/DL    Albumin 2.5 (L) 3.5 - 5.0 g/dL   RENAL FUNCTION PANEL    Collection Time: 07/16/17  1:05 AM   Result Value Ref Range    Sodium 132 (L) 136 - 145 mmol/L    Potassium 3.8 3.5 - 5.1 mmol/L    Chloride 94 (L) 97 - 108 mmol/L    CO2 33 (H) 21 - 32 mmol/L    Anion gap 5 5 - 15 mmol/L    Glucose 130 (H) 65 - 100 mg/dL    BUN 5 (L) 6 - 20 MG/DL    Creatinine 0.41 (L) 0.70 - 1.30 MG/DL    BUN/Creatinine ratio 12 12 - 20      GFR est AA >60 >60 ml/min/1.73m2    GFR est non-AA >60 >60 ml/min/1.73m2    Calcium 7.4 (L) 8.5 - 10.1 MG/DL    Phosphorus 2.2 (L) 2.6 - 4.7 MG/DL    Albumin 2.5 (L) 3.5 - 5.0 g/dL   CBC WITH AUTOMATED DIFF    Collection Time: 07/16/17  4:45 AM   Result Value Ref Range    WBC 14.4 (H) 4.1 - 11.1 K/uL    RBC 3.80 (L) 4.10 - 5.70 M/uL    HGB 12.2 12.1 - 17.0 g/dL    HCT 35.1 (L) 36.6 - 50.3 %    MCV 92.4 80.0 - 99.0 FL    MCH 32.1 26.0 - 34.0 PG    MCHC 34.8 30.0 - 36.5 g/dL    RDW 13.4 11.5 - 14.5 %    PLATELET 495 574 - 413 K/uL    NEUTROPHILS 90 (H) 32 - 75 %    LYMPHOCYTES 4 (L) 12 - 49 %    MONOCYTES 6 5 - 13 %    EOSINOPHILS 0 0 - 7 %    BASOPHILS 0 0 - 1 %    ABS. NEUTROPHILS 13.0 (H) 1.8 - 8.0 K/UL    ABS. LYMPHOCYTES 0.5 (L) 0.8 - 3.5 K/UL    ABS. MONOCYTES 0.8 0.0 - 1.0 K/UL    ABS. EOSINOPHILS 0.0 0.0 - 0.4 K/UL    ABS.  BASOPHILS 0.0 0.0 - 0.1 K/UL   METABOLIC PANEL, COMPREHENSIVE    Collection Time: 07/16/17  4:45 AM   Result Value Ref Range    Sodium 132 (L) 136 - 145 mmol/L    Potassium 3.8 3.5 - 5.1 mmol/L    Chloride 93 (L) 97 - 108 mmol/L    CO2 32 21 - 32 mmol/L    Anion gap 7 5 - 15 mmol/L    Glucose 133 (H) 65 - 100 mg/dL    BUN 5 (L) 6 - 20 MG/DL    Creatinine 0.38 (L) 0.70 - 1.30 MG/DL    BUN/Creatinine ratio 13 12 - 20      GFR est AA >60 >60 ml/min/1.73m2    GFR est non-AA >60 >60 ml/min/1.73m2    Calcium 7.7 (L) 8.5 - 10.1 MG/DL    Bilirubin, total 0.5 0.2 - 1.0 MG/DL    ALT (SGPT) 40 12 - 78 U/L    AST (SGOT) 24 15 - 37 U/L    Alk. phosphatase 58 45 - 117 U/L    Protein, total 5.1 (L) 6.4 - 8.2 g/dL    Albumin 2.5 (L) 3.5 - 5.0 g/dL    Globulin 2.6 2.0 - 4.0 g/dL    A-G Ratio 1.0 (L) 1.1 - 2.2     PHOSPHORUS    Collection Time: 07/16/17  4:45 AM   Result Value Ref Range    Phosphorus 2.2 (L) 2.6 - 4.7 MG/DL   POC VENOUS BLOOD GAS    Collection Time: 07/16/17  9:09 AM   Result Value Ref Range    Device: VENT      FIO2 (POC) 50 %    pH, venous (POC) 7.363 7.32 - 7.42      pCO2, venous (POC) 68.0 (H) 41 - 51 MMHG    pO2, venous (POC) 46 (H) 25 - 40 mmHg    HCO3, venous (POC) 38.6 (H) 23.0 - 28.0 MMOL/L    sO2, venous (POC) 78 65 - 88 %    Base excess, venous (POC) 13 mmol/L    Mode CPAP/SPON      PEEP/CPAP (POC) 5 cmH2O    Pressure support 5 cmH2O    Allens test (POC) N/A      Total resp. rate 27      Site RIGHT RADIAL      Specimen type (POC) VENOUS BLOOD     POC VENOUS BLOOD GAS    Collection Time: 07/16/17  9:13 AM   Result Value Ref Range    Device: VENT      FIO2 (POC) 50 %    pH, venous (POC) 7.365 7.32 - 7.42      pCO2, venous (POC) 66.1 (H) 41 - 51 MMHG    pO2, venous (POC) 49 (H) 25 - 40 mmHg    HCO3, venous (POC) 37.8 (H) 23.0 - 28.0 MMOL/L    sO2, venous (POC) 81 65 - 88 %    Base excess, venous (POC) 12 mmol/L    Mode CPAP/SPON      PEEP/CPAP (POC) 5 cmH2O    Pressure support 5 cmH2O    Allens test (POC) N/A      Total resp.  rate 24      Site RIGHT RADIAL      Specimen type (POC) VENOUS BLOOD     POC G3 - PUL    Collection Time: 07/16/17  9:20 AM   Result Value Ref Range    FIO2 (POC) 50 %    pH (POC) 7.391 7.35 - 7.45      pCO2 (POC) 62.1 (H) 35.0 - 45.0 MMHG    pO2 (POC) 87 80 - 100 MMHG    HCO3 (POC) 37.7 (H) 22 - 26 MMOL/L    sO2 (POC) 96 92 - 97 %    Base excess (POC) 13 mmol/L    Site LEFT RADIAL      Device: VENT      Mode CPAP/SPON      PEEP/CPAP (POC) 5 cmH2O    Pressure support 5 cmH2O    Allens test (POC) N/A      Specimen type (POC) ARTERIAL Total resp.  rate 23     RENAL FUNCTION PANEL    Collection Time: 07/16/17 10:15 AM   Result Value Ref Range    Sodium 123 (L) 136 - 145 mmol/L    Potassium 3.7 3.5 - 5.1 mmol/L    Chloride 86 (L) 97 - 108 mmol/L    CO2 34 (H) 21 - 32 mmol/L    Anion gap 3 (L) 5 - 15 mmol/L    Glucose 328 (H) 65 - 100 mg/dL    BUN 5 (L) 6 - 20 MG/DL    Creatinine 0.54 (L) 0.70 - 1.30 MG/DL    BUN/Creatinine ratio 9 (L) 12 - 20      GFR est AA >60 >60 ml/min/1.73m2    GFR est non-AA >60 >60 ml/min/1.73m2    Calcium 7.3 (L) 8.5 - 10.1 MG/DL    Phosphorus 2.3 (L) 2.6 - 4.7 MG/DL    Albumin 2.4 (L) 3.5 - 5.0 g/dL       Total time spent with patient:  []        15   []        25   []        35   []         __ minutes    []        Critical Care Provided    Care Plan discussed with:        []        Patient   []        Family    []        Care Manager   []        Consultant/Specialist :      []          >50% of visit spent in counseling and coordination of care   (Discussed []        CODE status,  []        Care Plan, []        D/C Planning)    ___________________________________________________    Attending Physician: Nidhi Coffey MD

## 2017-07-16 NOTE — PROGRESS NOTES
Problem: Discharge Planning  Goal: *Discharge to safe environment  Outcome: Not Progressing Towards Goal  Not ready for DC

## 2017-07-16 NOTE — PROGRESS NOTES
Subjective   No acute events overnight. Secretions have decreased significantly. Passed SBT again this morning. On my interview is awake, calm, answering questions and following commands. Temp:  [96.6 °F (35.9 °C)-99.1 °F (37.3 °C)]   Pulse (Heart Rate):  []   BP: ()/()   Resp Rate:  [12-44]   O2 Sat (%):  [87 %-100 %]   Weight:  [48.8 kg (107 lb 9.4 oz)-69.4 kg (153 lb)]      07/14 1901 - 07/16 0700  In: 2272.2 [I.V.:2212.2]  Out: 2400 [Urine:2905]      Objective:  General Appearance:  Comfortable and in no acute distress. Vital signs: (most recent): Blood pressure 97/61, pulse 70, temperature 96.8 °F (36 °C), resp. rate 21, height 6' 0.01\" (1.829 m), weight 51.5 kg (113 lb 8.6 oz), SpO2 96 %. HEENT: Normal HEENT exam.    Lungs:  (Few coarse breath sounds, good air movement)  Heart: Normal rate. Regular rhythm. No murmur. Extremities: There is no dependent edema. Neurological: (RASS 0). Skin:  Warm and dry. Abdomen: Abdomen is soft and non-distended. There is no abdominal tenderness. Principal Problem:    Acute respiratory failure ([address] (7/13/2017)    Active Problems:    Chronic obstructive pulmonary disease ([address] (3/29/2016)      Tobacco dependence (3/29/2016)      Paranoid schizophrenia ([facility] (3/29/2016)      Hyponatremia (7/4/2017)      Acute encephalopathy (7/5/2017)    Labs, imaging personally reviewed. Assessment & Plan    61year old male with suspected COPD, paranoid schizophrenia admitted with acute respiratory failure requiring intubation. 1. Acute on chronic resp failure with hypercapnia - underlying copd - intubated for hypercapnia and ams - some bronchospasm on exam.  CTA with basilar asdz vs atx - oxygenation OK - on a PSV mode of ventilation currently  2. AMS - hypercapnia and symptomatic hyponatremia (Na 106)  3. Hyponatremia - Na 106 (was 142 on 7/7/17) - renal following - Urine Na low previous urine osm low - ?  Low solute / water toxicity - no clearly SIADH - has emphysema but no obvious mass on chest CT - slowing rate of correction and saline is decreased and a dose of DDAVP was given by renal -  4. Paranoid schizophrenia  5. Hypotension  6. Tobacco abuse     --Passed SBT, secretions have improved. Will extubate  --Na correction per renal, started on D5 due to rapid correction  --Nebs  --solumedrol, decrease to 30 mg BID today  --on zosyn empirically, day #4  --protonix / peridex / lovenox  --wean bhavya as able, on 10 mcg now  --may need to DC scopolamine after extubation     The patient is critically ill and is at significant risk of decompensation. Critical Care time spent exclusive of procedures 30 minutes.

## 2017-07-16 NOTE — PROGRESS NOTES
08:00 SBAR from Miles VILLA    08:30 Suctioned for thick secretions, not as copious as yesterday, propofol decreased for SBT. He does not follow commands    09:00 Propofol decreased     10:15 Propofol off    10:25 Extubated to NC, he speaks and is not sure where he is but is calm. 12:20 Chemistry redrawn, called to Dr. Addy Calix. See orders    12:25 Passed swallow eval, asking for food. 18:00 Uneventful afternoon    19:30 Bedside shift change report given to Miles Armijo (oncoming nurse) by Jannetta Koyanagi (offgoing nurse). Report included the following information SBAR, Kardex, Procedure Summary, Intake/Output, MAR, Accordion, Recent Results, Med Rec Status, Cardiac Rhythm NSR and Alarm Parameters .

## 2017-07-17 LAB
ANION GAP BLD CALC-SCNC: 5 MMOL/L (ref 5–15)
BASOPHILS # BLD AUTO: 0 K/UL (ref 0–0.1)
BASOPHILS # BLD: 0 % (ref 0–1)
BUN SERPL-MCNC: 3 MG/DL (ref 6–20)
BUN/CREAT SERPL: 6 (ref 12–20)
CALCIUM SERPL-MCNC: 7.6 MG/DL (ref 8.5–10.1)
CHLORIDE SERPL-SCNC: 98 MMOL/L (ref 97–108)
CO2 SERPL-SCNC: 35 MMOL/L (ref 21–32)
CREAT SERPL-MCNC: 0.52 MG/DL (ref 0.7–1.3)
EOSINOPHIL # BLD: 0 K/UL (ref 0–0.4)
EOSINOPHIL NFR BLD: 0 % (ref 0–7)
ERYTHROCYTE [DISTWIDTH] IN BLOOD BY AUTOMATED COUNT: 13.5 % (ref 11.5–14.5)
GLUCOSE SERPL-MCNC: 103 MG/DL (ref 65–100)
HCT VFR BLD AUTO: 33.4 % (ref 36.6–50.3)
HGB BLD-MCNC: 11.5 G/DL (ref 12.1–17)
LYMPHOCYTES # BLD AUTO: 5 % (ref 12–49)
LYMPHOCYTES # BLD: 0.6 K/UL (ref 0.8–3.5)
MAGNESIUM SERPL-MCNC: 2 MG/DL (ref 1.6–2.4)
MCH RBC QN AUTO: 32.3 PG (ref 26–34)
MCHC RBC AUTO-ENTMCNC: 34.4 G/DL (ref 30–36.5)
MCV RBC AUTO: 93.8 FL (ref 80–99)
MONOCYTES # BLD: 0.7 K/UL (ref 0–1)
MONOCYTES NFR BLD AUTO: 6 % (ref 5–13)
NEUTS SEG # BLD: 9.6 K/UL (ref 1.8–8)
NEUTS SEG NFR BLD AUTO: 89 % (ref 32–75)
PHOSPHATE SERPL-MCNC: 2.1 MG/DL (ref 2.6–4.7)
PLATELET # BLD AUTO: 189 K/UL (ref 150–400)
POTASSIUM SERPL-SCNC: 3.3 MMOL/L (ref 3.5–5.1)
RBC # BLD AUTO: 3.56 M/UL (ref 4.1–5.7)
SODIUM SERPL-SCNC: 138 MMOL/L (ref 136–145)
WBC # BLD AUTO: 10.8 K/UL (ref 4.1–11.1)

## 2017-07-17 PROCEDURE — 80048 BASIC METABOLIC PNL TOTAL CA: CPT | Performed by: STUDENT IN AN ORGANIZED HEALTH CARE EDUCATION/TRAINING PROGRAM

## 2017-07-17 PROCEDURE — 36415 COLL VENOUS BLD VENIPUNCTURE: CPT | Performed by: STUDENT IN AN ORGANIZED HEALTH CARE EDUCATION/TRAINING PROGRAM

## 2017-07-17 PROCEDURE — 74011250637 HC RX REV CODE- 250/637: Performed by: NURSE PRACTITIONER

## 2017-07-17 PROCEDURE — 74011250636 HC RX REV CODE- 250/636: Performed by: INTERNAL MEDICINE

## 2017-07-17 PROCEDURE — 74011000258 HC RX REV CODE- 258: Performed by: FAMILY MEDICINE

## 2017-07-17 PROCEDURE — 65660000000 HC RM CCU STEPDOWN

## 2017-07-17 PROCEDURE — 74011000250 HC RX REV CODE- 250: Performed by: FAMILY MEDICINE

## 2017-07-17 PROCEDURE — 94640 AIRWAY INHALATION TREATMENT: CPT

## 2017-07-17 PROCEDURE — 85025 COMPLETE CBC W/AUTO DIFF WBC: CPT | Performed by: STUDENT IN AN ORGANIZED HEALTH CARE EDUCATION/TRAINING PROGRAM

## 2017-07-17 PROCEDURE — 74011250637 HC RX REV CODE- 250/637: Performed by: FAMILY MEDICINE

## 2017-07-17 PROCEDURE — 74011250636 HC RX REV CODE- 250/636: Performed by: FAMILY MEDICINE

## 2017-07-17 PROCEDURE — 84100 ASSAY OF PHOSPHORUS: CPT | Performed by: PEDIATRICS

## 2017-07-17 PROCEDURE — 77030032490 HC SLV COMPR SCD KNE COVD -B

## 2017-07-17 PROCEDURE — 83735 ASSAY OF MAGNESIUM: CPT | Performed by: STUDENT IN AN ORGANIZED HEALTH CARE EDUCATION/TRAINING PROGRAM

## 2017-07-17 PROCEDURE — 74011000250 HC RX REV CODE- 250: Performed by: INTERNAL MEDICINE

## 2017-07-17 PROCEDURE — 74011250637 HC RX REV CODE- 250/637: Performed by: STUDENT IN AN ORGANIZED HEALTH CARE EDUCATION/TRAINING PROGRAM

## 2017-07-17 RX ORDER — POTASSIUM CHLORIDE 14.9 MG/ML
10 INJECTION INTRAVENOUS
Status: COMPLETED | OUTPATIENT
Start: 2017-07-17 | End: 2017-07-17

## 2017-07-17 RX ORDER — DESMOPRESSIN ACETATE 4 UG/ML
2 INJECTION, SOLUTION INTRAVENOUS; SUBCUTANEOUS ONCE
Status: COMPLETED | OUTPATIENT
Start: 2017-07-17 | End: 2017-07-17

## 2017-07-17 RX ORDER — PREDNISONE 20 MG/1
40 TABLET ORAL
Status: DISCONTINUED | OUTPATIENT
Start: 2017-07-18 | End: 2017-07-20 | Stop reason: HOSPADM

## 2017-07-17 RX ORDER — TEMAZEPAM 15 MG/1
30 CAPSULE ORAL
Status: DISCONTINUED | OUTPATIENT
Start: 2017-07-17 | End: 2017-07-20 | Stop reason: HOSPADM

## 2017-07-17 RX ADMIN — RISPERIDONE 3 MG: 3 TABLET ORAL at 17:44

## 2017-07-17 RX ADMIN — PIPERACILLIN SODIUM,TAZOBACTAM SODIUM 3.38 G: 3; .375 INJECTION, POWDER, FOR SOLUTION INTRAVENOUS at 21:32

## 2017-07-17 RX ADMIN — PAROXETINE HYDROCHLORIDE 40 MG: 20 TABLET, FILM COATED ORAL at 08:47

## 2017-07-17 RX ADMIN — IPRATROPIUM BROMIDE AND ALBUTEROL SULFATE 3 ML: .5; 3 SOLUTION RESPIRATORY (INHALATION) at 08:16

## 2017-07-17 RX ADMIN — DEXTROSE MONOHYDRATE 125 ML/HR: 5 INJECTION, SOLUTION INTRAVENOUS at 07:21

## 2017-07-17 RX ADMIN — IPRATROPIUM BROMIDE AND ALBUTEROL SULFATE 3 ML: .5; 3 SOLUTION RESPIRATORY (INHALATION) at 20:00

## 2017-07-17 RX ADMIN — FAMOTIDINE 20 MG: 20 TABLET ORAL at 08:47

## 2017-07-17 RX ADMIN — ENOXAPARIN SODIUM 40 MG: 40 INJECTION SUBCUTANEOUS at 14:31

## 2017-07-17 RX ADMIN — TEMAZEPAM 30 MG: 15 CAPSULE ORAL at 21:33

## 2017-07-17 RX ADMIN — IPRATROPIUM BROMIDE AND ALBUTEROL SULFATE 3 ML: .5; 3 SOLUTION RESPIRATORY (INHALATION) at 15:41

## 2017-07-17 RX ADMIN — BUDESONIDE 500 MCG: 0.5 INHALANT RESPIRATORY (INHALATION) at 08:16

## 2017-07-17 RX ADMIN — METHYLPREDNISOLONE SODIUM SUCCINATE 30 MG: 40 INJECTION, POWDER, FOR SOLUTION INTRAMUSCULAR; INTRAVENOUS at 02:14

## 2017-07-17 RX ADMIN — POTASSIUM CHLORIDE 10 MEQ: 200 INJECTION, SOLUTION INTRAVENOUS at 06:03

## 2017-07-17 RX ADMIN — Medication 10 ML: at 15:24

## 2017-07-17 RX ADMIN — POTASSIUM CHLORIDE 10 MEQ: 200 INJECTION, SOLUTION INTRAVENOUS at 08:47

## 2017-07-17 RX ADMIN — Medication 10 ML: at 21:33

## 2017-07-17 RX ADMIN — POTASSIUM CHLORIDE 10 MEQ: 200 INJECTION, SOLUTION INTRAVENOUS at 07:21

## 2017-07-17 RX ADMIN — BUDESONIDE 500 MCG: 0.5 INHALANT RESPIRATORY (INHALATION) at 20:00

## 2017-07-17 RX ADMIN — PIPERACILLIN SODIUM,TAZOBACTAM SODIUM 3.38 G: 3; .375 INJECTION, POWDER, FOR SOLUTION INTRAVENOUS at 15:24

## 2017-07-17 RX ADMIN — DEXTROSE MONOHYDRATE 125 ML/HR: 5 INJECTION, SOLUTION INTRAVENOUS at 02:09

## 2017-07-17 RX ADMIN — IPRATROPIUM BROMIDE AND ALBUTEROL SULFATE 3 ML: .5; 3 SOLUTION RESPIRATORY (INHALATION) at 12:47

## 2017-07-17 RX ADMIN — PIPERACILLIN SODIUM,TAZOBACTAM SODIUM 3.38 G: 3; .375 INJECTION, POWDER, FOR SOLUTION INTRAVENOUS at 05:08

## 2017-07-17 RX ADMIN — CHLORHEXIDINE GLUCONATE 15 ML: 1.2 RINSE ORAL at 17:44

## 2017-07-17 RX ADMIN — DESMOPRESSIN ACETATE 2 MCG: 4 SOLUTION INTRAVENOUS at 07:20

## 2017-07-17 NOTE — PROGRESS NOTES
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 Patient admitted to drinking \"a lot of water\". In the past he was drinking 12-24 beers a day. That explains the brisk diuresis and overcorrection of hyponatremia after patient's two admissions. · Normal renal function. PLAN:   · No need of further hypo osmotic IVF's. · D/C frequent blood draws. · Patient was counseled regarding risks of \"water poisoning\". Given his psychiatric history- patient is at risk of readmission for the same condition unless he is in a highly controlled environment. Subjective/Interval History:   I have reviewed the flowsheet and previous days notes. ROS:A comprehensive review of systems was negative. Objective:   Vital Signs:    Visit Vitals    /75    Pulse 77    Temp 99.8 °F (37.7 °C)    Resp 21    Ht 6' 0.01\" (1.829 m)    Wt 50.5 kg (111 lb 5.3 oz)    SpO2 96%    BMI 15.1 kg/m2       O2 Device: Nasal cannula   O2 Flow Rate (L/min): 4 l/min   Temp (24hrs), Av.8 °F (37.1 °C), Min:98 °F (36.7 °C), Max:99.8 °F (37.7 °C)       Intake/Output:   Last shift:       07 - 1900  In: 850 [I.V.:850]  Out: 1350 [Urine:1350]  Last 3 shifts: 07/15 1901 -  0700  In: 5002.2 [P.O.:1790; I.V.:3212.2]  Out: 7835 [Urine:7835]    Intake/Output Summary (Last 24 hours) at 17 1159  Last data filed at 17 0800   Gross per 24 hour   Intake           4492.5 ml   Output             8560 ml   Net          -4067.5 ml        Physical Exam:  General:    Alert, cooperative, no distress, appears stated age. Thin. Head:   Normocephalic, without obvious abnormality, atraumatic. Eyes:   Conjunctivae/corneas clear. Lungs:   Clear to auscultation bilaterally. No Wheezing or Rhonchi. No rales. Chest wall:  No tenderness or deformity. No Accessory muscle use.   Heart:   Regular rate and rhythm,  no murmur, rub or gallop. Abdomen:   Soft, non-tender. Not distended. Bowel sounds normal. No masses  Extremities: Extremities normal, atraumatic, No cyanosis. No edema. No clubbing  Skin:     Texture, turgor normal. No rashes or lesions. Not Jaundiced  Psych:  Poor insight. Not depressed. Not anxious or agitated. Neurologic: Alert and oriented X 3. DATA:  Labs:  Recent Labs      07/17/17   0350  07/16/17   1900  07/16/17   1500  07/16/17   1124   07/15/17   0413   NA  138  134*  131*  130*   < >  125*   K  3.3*  3.6  3.8  3.9   < >  4.1   CL  98  94*  91*  91*   < >  87*   CO2  35*  36*  35*  35*   < >  32   BUN  3*  5*  4*  4*   < >  4*   CREA  0.52*  0.64*  0.63*  0.47*   < >  0.45*   CA  7.6*  7.7*  7.9*  7.7*   < >  7.8*   ALB   --   2.7*  2.7*  2.5*   < >  2.7*   PHOS  2.1*  1.5*  1.5*  2.3*   < >  1.8*   MG  2.0   --    --    --    --   2.3    < > = values in this interval not displayed. Recent Labs      07/17/17   0350  07/16/17   0445  07/15/17   0413   WBC  10.8  14.4*  20.5*   HGB  11.5*  12.2  12.3   HCT  33.4*  35.1*  34.0*   PLT  189  157  202     No results for input(s): ZECHARIAH, KU, CLU, CREAU in the last 72 hours.     No lab exists for component: PROU    Total time spent with patient:  35 minutes    [] Critical Care Provided    Care Plan discussed with:   Audra Ortega MD

## 2017-07-17 NOTE — PROGRESS NOTES
0730 Bedside and Verbal shift change report given to Nayeli Orta (oncoming nurse) by Yared Faust (offgoing nurse). Report included the following information SBAR, Kardex, ED Summary, Procedure Summary, Intake/Output, MAR, Accordion and Recent Results. Iraida with Dr. Mini Arthur, Pt OK to go to telemetry    1200 Dr. Gayle Hidalgo @ bedside, orders received.

## 2017-07-17 NOTE — PROGRESS NOTES
Problem: Pressure Injury - Risk of  Goal: *Prevention of pressure ulcer  Outcome: Progressing Towards Goal  Turn Q2H

## 2017-07-17 NOTE — PROGRESS NOTES
Clinical Pharmacy Note: Stress Ulcer Prophylaxis Protocol (ICU patients)    Please note that stress ulcer prophylaxis is NOT indicated for patients outside of the ICU. Piyush Morris has an order for famotidine for stress ulcer prophylaxis and therefore it has been automatically discontinued per the [facility] Stress Ulcer Prophylaxis Protocol for eligible patients based on the following criteria:     Patient is tolerating enteral nutrition (tube feeds, full liquids, mechanical soft/regular diet)   Has resolution of the indications / risk factors (i.e., no longer mechanically ventilated, resolution of coagulopathy, no longer requiring vasopressor therapy, steroids dose is less than 250 mg-equivalents of hydrocortisone)  OR   Has transfer orders out of the ICU  o This applies to patients still receiving high doses of steroids as there is no data to support routinely using SUP in patients receiving steroids outside of the critical care environment  o Exceptions: patients with an intracranial hemorrhage or thermal injury to >35% of their body surface are    Of note, the following populations are excluded from this protocol:   Continuing a proton pump inhibitor (PPI) from prior to admission   Receiving a PPI for an indication other than SUP  o GI bleeding, symptomatic GERD, gastritis, PUD, proximal GI fistula, erosive esophagitis, Burgoss esophagitis, hypersecretory conditions (e.g., Zollinger-White Syndrome), H.pylori eradication, NSAID-associated ulcer (prevention or treatment)   Cardiac surgery patients   Receiving a PPI to prevent GI bleeding while on dual antiplatelet therapy   Receiving a PPI for prevention of marginal ulcers after gastric bypass surgery (Milli-en-Y, sleeve gastrectomy, etc)    Please call with any questions.

## 2017-07-17 NOTE — PROGRESS NOTES
CM reviewed chart and noted patient lives at [address] [place] [address] [phone] and Carol Garcia, nurse, is the main . CM talked with Angelina,direct # [phone], at the group home who is assisting with having patient's medicaid reinstated through the [facility] [employer]. Angelina said that  Carol Garcia (nurse) and patient's   have completed all paper work for reinstatement and it was sent to the [facility] for reinstatement. Patient  Informed CM today that he has lived at [facility] for [facility]  years. He was independent prior to admission and plan to return to the adult home when medically stable. Snf will not be possible as he has no insurance. His choice for rehab if needed would be inpatient rehab with 100% financial assistance. If he return directly back to the adult home, he will need cab ticket as he has no insurance at this time. He is now extubated and on 02. He does not use 02 at the adult home. Patient's main family contact is patient's sister, Federico Castanon [phone]. Cm called and left message for her. Patient told Cm that [facility] does come to visit. Joshua Gottron is patient's nurse navigator in Dr Hermilo Alvarado office. Her number is [phone]. He has regular appointments with Dr Marisol Castelan. .        CM will continue to follow for transition of care needs. No therapy evaluations ordered at this time. Mallika Noe

## 2017-07-17 NOTE — INTERDISCIPLINARY ROUNDS
IDR/SLIDR Summary          Patient: Jovanna Sawant MRN: [id number]    Age: 61 y.o. YOB: 1960 Room/Bed: [address] [place] [address]   Admit Diagnosis: Acute respiratory failure (HCC)  Principal Diagnosis: Acute respiratory failure (HCC)   Goals: normal electrolytes  Readmission: YES  Quality Measure: Not applicable  VTE Prophylaxis: Mechanical  Influenza Vaccine screening completed? YES  Pneumococcal Vaccine screening completed? YES  Mobility needs: No   Nutrition plan:Yes  Consults:Case Management    Financial concerns:Yes  Escalated to ? YES  RRAT Score: 12   Interventions:H2H  Testing due for pt today? NO  LOS: 4 days Expected length of stay ?  days  Discharge plan: back to [facility]   PCP: Maribel Matute DO  Transportation needs: Yes    Days before discharge:two or more days before discharge   Discharge disposition: Rehab    Signed:     Kaylah Machado RN  7/17/2017  2:45 AM

## 2017-07-17 NOTE — PROGRESS NOTES
Hospitalist Progress Note  Emiliano Ortiz MD  Office: [phone]  Cell: [phone]      Date of Service:  2017  NAME:  Jen Jenkins  :  1960  MRN:  [id number]      Admission Summary:   61year old male with history of paranoid schizophrenia, MR, extrapyramidal disease, COPD, hyponatremia, anxiety, tobacco dependence presented on 17 with altered mental status. He had recently been hospitalized from 17-17 with similar severe hyponatremia. He had seemingly developed seizure-like activity at his group home ([facility]) and was subsequently coded and intubated. He was subsequently brought to [facility] for further management. Interval history / Subjective:   S/P intubation. Was extubated today. Feeling fine     Assessment & Plan:     1. Acute on chronic hypercapneic respiratory failure   - with underlying COPD, possibly with element of COPD exacerbation / bronchospasm contributing   - CT chest  - Prominent chronic lung changes, some lung nodularity and pleural plaquing. Incidental gastric distention.   - CTA chest  - No evidence of acute pulmonary embolus. Patchy bilateral lower lobe airspace disease may represent atelectasis, pneumonia, or aspiration. Bilateral bronchial wall thickening is compatible with asthma or bronchitis; radiographic follow-up is recommended to assure resolution. Emphysema. - extubated    - continue bronchodilator therapy with DuoNeb, Pulmicort   - on empiric Solu-Medrol, continue to wean   - started on scopolamine for secretions   - empiric antibiotic coverage with Zosyn    2. Acute metabolic encephalopathy   - suspect this is secondary to his hyponatremia, possibly with hyponatremia-induced seizures   - CT head  - No acute findings suspected.    - MRI brain ordered, but suspect this is secondary to seizures, if he is unable to be extubated soon, pursue MRI brain   - EEG completed, pending interpretation   - corrected hyponatremia     3. Critical hyponatremia   - underlying etiology is unclear, likely multifactorial with poor solute intake, likely some element of excess ADH release; given previous rapid improvement on prior admission, doubtful that his psychiatric medications are the culprit alone   - Na+ 106 upon admission, improving   - slow correction of Na+, targeting 6-10 mEq increase per day   - AM cortisol 10.9, but he is also on Solu-Medrol   - nephrology following and d/c iv fluid today    4. Hypotension, presumed sepsis syndrome   - suspect this is secondary to propofol, though alternatively could be secondary to sepsis in setting of pneumonia with leukocytosis   - blood cultures 7/13 - no growth thus far   - respiratory culture 7/14 - heavy normal respiratory denise thus far   - on empiric coverage with Zosyn    5. Hypokalemia   - replete as needed    6. Paranoid schizophrenia   - he was on risperidone, paroxetine, benztropine prior to admission   - holding now while he is intubated, sedated    7. Moderate protein-calorie malnutrition   - BMI 14.5   - nutrition consult    8.   Leukocytosis   - suspect some of this may be due to steroid effect, improving   - see above, also treating empirically for sepsis   - taper steroids (reduced to 60 mg Q12H)    Code status: FULL  DVT prophylaxis: Lovenox    Care Plan discussed with: Nurse  Disposition: TBD, his prognosis is guarded     Hospital Problems  Date Reviewed: 7/10/2017          Codes Class Noted POA    * (Principal)Acute respiratory failure ([facility] ICD-10-CM: J96.00  ICD-9-CM: 518.81  7/13/2017 Unknown        Acute encephalopathy ICD-10-CM: G93.40  ICD-9-CM: 348.30  7/5/2017 Yes        Hyponatremia ICD-10-CM: E87.1  ICD-9-CM: 276.1  7/4/2017 Yes        Chronic obstructive pulmonary disease ([facility] ICD-10-CM: J44.9  ICD-9-CM: 496  3/29/2016 Yes        Tobacco dependence ICD-10-CM: P27.981  ICD-9-CM: 305.1  3/29/2016 Yes        Paranoid schizophrenia [facility]) ICD-10-CM: F20.0  ICD-9-CM: 295.30  3/29/2016 Yes                Review of Systems:   Review of systems not obtained due to patient factors. Vital Signs:    Last 24hrs VS reviewed since prior progress note. Most recent are:  Visit Vitals    /78 (BP 1 Location: Right arm, BP Patient Position: At rest)    Pulse 85    Temp 98.4 °F (36.9 °C)    Resp 22    Ht 6' 0.01\" (1.829 m)    Wt 50.5 kg (111 lb 5.3 oz)    SpO2 94%    BMI 15.1 kg/m2         Intake/Output Summary (Last 24 hours) at 07/17/17 1836  Last data filed at 07/17/17 1608   Gross per 24 hour   Intake             4270 ml   Output             8985 ml   Net            -4715 ml        Physical Examination:             Constitutional:  Intubated, sedated   ENT:  Oral mucous moist, oropharynx benign. Neck supple,    Resp:  CTA bilaterally. No wheezing/rhonchi/rales. No accessory muscle use   CV:  Regular rhythm, normal rate, no murmurs, gallops, rubs    GI:  Soft, non distended, non tender. normoactive bowel sounds, no hepatosplenomegaly     Musculoskeletal:  No edema, warm, 2+ pulses throughout    Neurologic: Intubated, sedated     Skin:  Good turgor, no rashes or ulcers  Hematologic/Lymphatic/Immunlogic:  No jaundice nor lymph node swelling       Data Review:    Review and/or order of clinical lab test      Labs:     Recent Labs      07/17/17   0350  07/16/17   0445   WBC  10.8  14.4*   HGB  11.5*  12.2   HCT  33.4*  35.1*   PLT  189  157     Recent Labs      07/17/17   0350  07/16/17   1900  07/16/17   1500   07/15/17   0413   NA  138  134*  131*   < >  125*   K  3.3*  3.6  3.8   < >  4.1   CL  98  94*  91*   < >  87*   CO2  35*  36*  35*   < >  32   BUN  3*  5*  4*   < >  4*   CREA  0.52*  0.64*  0.63*   < >  0.45*   GLU  103*  141*  116*   < >  111*   CA  7.6*  7.7*  7.9*   < >  7.8*   MG  2.0   --    --    --   2.3   PHOS  2.1*  1.5*  1.5*   < >  1.8*    < > = values in this interval not displayed.      Recent Labs 07/16/17   1900  07/16/17   1500  07/16/17   1124   07/16/17   0445   07/15/17   0413   SGOT   --    --    --    --   24   --   46*   ALT   --    --    --    --   40   --   47   AP   --    --    --    --   58   --   67   TBILI   --    --    --    --   0.5   --   0.5   TP   --    --    --    --   5.1*   --   5.5*   ALB  2.7*  2.7*  2.5*   < >  2.5*   < >  2.7*   GLOB   --    --    --    --   2.6   --   2.8    < > = values in this interval not displayed. No results for input(s): INR, PTP, APTT in the last 72 hours. No lab exists for component: INREXT, INREXT   No results for input(s): FE, TIBC, PSAT, FERR in the last 72 hours. No results found for: FOL, RBCF   No results for input(s): PH, PCO2, PO2 in the last 72 hours. No results for input(s): CPK, CKNDX, TROIQ in the last 72 hours.     No lab exists for component: CPKMB  Lab Results   Component Value Date/Time    Cholesterol, total 102 05/15/2017 11:11 AM    HDL Cholesterol 48 05/15/2017 11:11 AM    LDL, calculated 46 05/15/2017 11:11 AM    Triglyceride 41 05/15/2017 11:11 AM     Lab Results   Component Value Date/Time    Glucose (POC) 132 07/04/2017 04:37 PM     Lab Results   Component Value Date/Time    Color YELLOW/STRAW 07/13/2017 04:11 PM    Appearance CLOUDY 07/13/2017 04:11 PM    Specific gravity 1.016 07/13/2017 04:11 PM    pH (UA) 6.0 07/13/2017 04:11 PM    Protein 100 07/13/2017 04:11 PM    Glucose 250 07/13/2017 04:11 PM    Ketone 15 07/13/2017 04:11 PM    Bilirubin NEGATIVE  07/13/2017 04:11 PM    Urobilinogen 0.2 07/13/2017 04:11 PM    Nitrites NEGATIVE  07/13/2017 04:11 PM    Leukocyte Esterase NEGATIVE  07/13/2017 04:11 PM    Epithelial cells FEW 07/13/2017 04:11 PM    Bacteria NEGATIVE  07/13/2017 04:11 PM    WBC 0-4 07/13/2017 04:11 PM    RBC 10-20 07/13/2017 04:11 PM         Medications Reviewed:     Current Facility-Administered Medications   Medication Dose Route Frequency    [START ON 7/18/2017] predniSONE (DELTASONE) tablet 40 mg  40 mg Oral DAILY WITH BREAKFAST    dextrose 5% infusion  125 mL/hr IntraVENous CONTINUOUS    PARoxetine (PAXIL) tablet 40 mg  40 mg Oral DAILY    risperiDONE (RisperDAL) tablet 3 mg  3 mg Oral QPM    scopolamine (TRANSDERM-SCOP) 1.5 mg  1.5 mg TransDERmal Q72H    chlorhexidine (PERIDEX) 0.12 % mouthwash 15 mL  15 mL Oral BID    arformoterol (BROVANA) neb solution 15 mcg  15 mcg Nebulization BID RT    albuterol-ipratropium (DUO-NEB) 2.5 MG-0.5 MG/3 ML  3 mL Nebulization QID RT    albuterol (PROVENTIL VENTOLIN) nebulizer solution 2.5 mg  2.5 mg Nebulization Q4H PRN    enoxaparin (LOVENOX) injection 40 mg  40 mg SubCUTAneous Q24H    LORazepam (ATIVAN) injection 2 mg  2 mg IntraVENous PRN    sodium chloride (NS) flush 5-10 mL  5-10 mL IntraVENous Q8H    sodium chloride (NS) flush 5-10 mL  5-10 mL IntraVENous PRN    budesonide (PULMICORT) 500 mcg/2 ml nebulizer suspension  500 mcg Nebulization BID RT    piperacillin-tazobactam (ZOSYN) 3.375 g in 0.9% sodium chloride (MBP/ADV) 100 mL  3.375 g IntraVENous Q8H     ______________________________________________________________________  EXPECTED LENGTH OF STAY: 4d 21h  ACTUAL LENGTH OF STAY:          4                 Nemesio Murphy MD

## 2017-07-17 NOTE — PROGRESS NOTES
1610: TRANSFER - IN REPORT:    [facility] on Ivania Crenshaw  being received from CCU (unit) for routine progression of care      Report consisted of patients Situation, Background, Assessment and   Recommendations(SBAR). Information from the following report(s) SBAR, Kardex, Intake/Output, MAR, Recent Results, Med Rec Status and Cardiac Rhythm NSR was reviewed with the receiving nurse. Opportunity for questions and clarification was provided. Assessment completed upon patients arrival to unit and care assumed. 1930: Bedside and Verbal shift change report given to APOLONIA Valdivia (oncoming nurse) by APOLONIA LOGAN (offgoing nurse). Report included the following information SBAR, Kardex, Intake/Output, MAR, Recent Results, Med Rec Status and Cardiac Rhythm NSR. Problem: Pressure Injury - Risk of  Goal: *Prevention of pressure ulcer  Outcome: Progressing Towards Goal  Pt turns and repositions self frequently. No s/sx of pressure ulcer noted. Problem: Falls - Risk of  Goal: *Absence of falls  Outcome: Progressing Towards Goal  Pt's bed in low/locked position. All personal items and call bell within reach. Side rails up x 3. Bathroom light on at all times. Goal: *Knowledge of fall prevention  Outcome: Progressing Towards Goal  Pt aware to call out when in need of assistance.

## 2017-07-17 NOTE — PROGRESS NOTES
Subjective   Extubated   Offers no complaints   On NC o2      Temp:  [96.6 °F (35.9 °C)-99.8 °F (37.7 °C)]   Pulse (Heart Rate):  []   BP: ()/(52-84)   Resp Rate:  [12-25]   O2 Sat (%):  [87 %-100 %]   Weight:  [48.8 kg (107 lb 9.4 oz)-51.5 kg (113 lb 8.6 oz)]   07/17 0701 - 07/17 1900  In: 850 [I.V.:850]  Out: 1350 [HMXTE:0951]  07/15 1901 - 07/17 0700  In: 5002.2 [P.O.:1790; I.V.:3212.2]  Out: 7835 [Urine:7835]      Objective:  General Appearance:  Comfortable and in no acute distress. Vital signs: (most recent): Blood pressure 136/80, pulse 93, temperature 99.8 °F (37.7 °C), resp. rate 19, height 6' 0.01\" (1.829 m), weight 50.5 kg (111 lb 5.3 oz), SpO2 94 %. HEENT: Normal HEENT exam.    Lungs:  Normal effort. No stridor. No wheezes or rhonchi. (Few coarse breath sounds, good air movement)  Heart: Normal rate. Regular rhythm. No murmur. Extremities: There is no dependent edema. Neurological: Patient is alert. (RASS 0). Skin:  Warm and dry. Abdomen: Abdomen is soft and non-distended. There is no abdominal tenderness. Principal Problem:    Acute respiratory failure ([address] (7/13/2017)    Active Problems:    Chronic obstructive pulmonary disease ([address] (3/29/2016)      Tobacco dependence (3/29/2016)      Paranoid schizophrenia ([address] (3/29/2016)      Hyponatremia (7/4/2017)      Acute encephalopathy (7/5/2017)    Labs, imaging personally reviewed. Assessment & Plan    61year old male with suspected COPD, paranoid schizophrenia admitted with acute respiratory failure requiring intubation. 1. Acute on chronic resp failure with hypercapnia - underlying copd - intubated for hypercapnia and ams - some bronchospasm on exam.  CTA with basilar asdz vs atx - oxygenation OK -   2. AMS - hypercapnia and symptomatic hyponatremia (Na 106)  3. Hyponatremia - Na 106 (was 142 on 7/7/17) - renal following - Urine Na low previous urine osm low - ?  Low solute / water toxicity - no clearly SIADH - has emphysema but no obvious mass on chest CT - slowing rate of correction and saline is decreased and a dose of DDAVP was given by renal -  4. Paranoid schizophrenia  5. Hypotension  6. Tobacco abuse  7.  ETOH excess currently admits to 6 beers daily     --    -  --solumedrol, decrease to 40 mg daily  --on zosyn empirically  --protonix / peridex / lovenox  Mobilize   Transfer out of ICU per hospitalist  --

## 2017-07-18 LAB
ANION GAP BLD CALC-SCNC: 6 MMOL/L (ref 5–15)
BACTERIA SPEC CULT: NORMAL
BASOPHILS # BLD AUTO: 0 K/UL (ref 0–0.1)
BASOPHILS # BLD: 0 % (ref 0–1)
BUN SERPL-MCNC: 6 MG/DL (ref 6–20)
BUN/CREAT SERPL: 10 (ref 12–20)
CALCIUM SERPL-MCNC: 8.1 MG/DL (ref 8.5–10.1)
CHLORIDE SERPL-SCNC: 97 MMOL/L (ref 97–108)
CO2 SERPL-SCNC: 33 MMOL/L (ref 21–32)
CREAT SERPL-MCNC: 0.6 MG/DL (ref 0.7–1.3)
EOSINOPHIL # BLD: 0.2 K/UL (ref 0–0.4)
EOSINOPHIL NFR BLD: 1 % (ref 0–7)
ERYTHROCYTE [DISTWIDTH] IN BLOOD BY AUTOMATED COUNT: 13.7 % (ref 11.5–14.5)
GLUCOSE SERPL-MCNC: 105 MG/DL (ref 65–100)
HCT VFR BLD AUTO: 41.5 % (ref 36.6–50.3)
HGB BLD-MCNC: 14.2 G/DL (ref 12.1–17)
LYMPHOCYTES # BLD AUTO: 20 % (ref 12–49)
LYMPHOCYTES # BLD: 2 K/UL (ref 0.8–3.5)
MCH RBC QN AUTO: 32.6 PG (ref 26–34)
MCHC RBC AUTO-ENTMCNC: 34.2 G/DL (ref 30–36.5)
MCV RBC AUTO: 95.2 FL (ref 80–99)
MONOCYTES # BLD: 0.9 K/UL (ref 0–1)
MONOCYTES NFR BLD AUTO: 8 % (ref 5–13)
NEUTS SEG # BLD: 7.4 K/UL (ref 1.8–8)
NEUTS SEG NFR BLD AUTO: 71 % (ref 32–75)
PLATELET # BLD AUTO: 214 K/UL (ref 150–400)
POTASSIUM SERPL-SCNC: 3.5 MMOL/L (ref 3.5–5.1)
RBC # BLD AUTO: 4.36 M/UL (ref 4.1–5.7)
SERVICE CMNT-IMP: NORMAL
SODIUM SERPL-SCNC: 136 MMOL/L (ref 136–145)
WBC # BLD AUTO: 10.4 K/UL (ref 4.1–11.1)

## 2017-07-18 PROCEDURE — 74011000258 HC RX REV CODE- 258: Performed by: FAMILY MEDICINE

## 2017-07-18 PROCEDURE — 74011000250 HC RX REV CODE- 250: Performed by: INTERNAL MEDICINE

## 2017-07-18 PROCEDURE — 74011250636 HC RX REV CODE- 250/636: Performed by: FAMILY MEDICINE

## 2017-07-18 PROCEDURE — 74011250637 HC RX REV CODE- 250/637: Performed by: FAMILY MEDICINE

## 2017-07-18 PROCEDURE — G8978 MOBILITY CURRENT STATUS: HCPCS

## 2017-07-18 PROCEDURE — 97165 OT EVAL LOW COMPLEX 30 MIN: CPT

## 2017-07-18 PROCEDURE — 80048 BASIC METABOLIC PNL TOTAL CA: CPT | Performed by: INTERNAL MEDICINE

## 2017-07-18 PROCEDURE — 36415 COLL VENOUS BLD VENIPUNCTURE: CPT | Performed by: INTERNAL MEDICINE

## 2017-07-18 PROCEDURE — 74011636637 HC RX REV CODE- 636/637: Performed by: INTERNAL MEDICINE

## 2017-07-18 PROCEDURE — G8979 MOBILITY GOAL STATUS: HCPCS

## 2017-07-18 PROCEDURE — 97161 PT EVAL LOW COMPLEX 20 MIN: CPT

## 2017-07-18 PROCEDURE — 85025 COMPLETE CBC W/AUTO DIFF WBC: CPT | Performed by: INTERNAL MEDICINE

## 2017-07-18 PROCEDURE — 94640 AIRWAY INHALATION TREATMENT: CPT

## 2017-07-18 PROCEDURE — 97116 GAIT TRAINING THERAPY: CPT

## 2017-07-18 PROCEDURE — 97535 SELF CARE MNGMENT TRAINING: CPT

## 2017-07-18 PROCEDURE — 74011250637 HC RX REV CODE- 250/637: Performed by: INTERNAL MEDICINE

## 2017-07-18 PROCEDURE — 74011000250 HC RX REV CODE- 250: Performed by: HOSPITALIST

## 2017-07-18 PROCEDURE — 77010033678 HC OXYGEN DAILY

## 2017-07-18 PROCEDURE — 74011250636 HC RX REV CODE- 250/636: Performed by: INTERNAL MEDICINE

## 2017-07-18 PROCEDURE — 74011250637 HC RX REV CODE- 250/637: Performed by: NURSE PRACTITIONER

## 2017-07-18 PROCEDURE — 74011250637 HC RX REV CODE- 250/637: Performed by: STUDENT IN AN ORGANIZED HEALTH CARE EDUCATION/TRAINING PROGRAM

## 2017-07-18 PROCEDURE — 65660000000 HC RM CCU STEPDOWN

## 2017-07-18 PROCEDURE — 74011000250 HC RX REV CODE- 250: Performed by: FAMILY MEDICINE

## 2017-07-18 RX ORDER — IPRATROPIUM BROMIDE AND ALBUTEROL SULFATE 2.5; .5 MG/3ML; MG/3ML
3 SOLUTION RESPIRATORY (INHALATION)
Status: DISCONTINUED | OUTPATIENT
Start: 2017-07-18 | End: 2017-07-20

## 2017-07-18 RX ADMIN — PIPERACILLIN SODIUM,TAZOBACTAM SODIUM 3.38 G: 3; .375 INJECTION, POWDER, FOR SOLUTION INTRAVENOUS at 06:32

## 2017-07-18 RX ADMIN — Medication 10 ML: at 13:19

## 2017-07-18 RX ADMIN — BUDESONIDE 500 MCG: 0.5 INHALANT RESPIRATORY (INHALATION) at 19:53

## 2017-07-18 RX ADMIN — CHLORHEXIDINE GLUCONATE 15 ML: 1.2 RINSE ORAL at 17:32

## 2017-07-18 RX ADMIN — IPRATROPIUM BROMIDE AND ALBUTEROL SULFATE 3 ML: .5; 3 SOLUTION RESPIRATORY (INHALATION) at 07:38

## 2017-07-18 RX ADMIN — PREDNISONE 40 MG: 20 TABLET ORAL at 08:50

## 2017-07-18 RX ADMIN — TEMAZEPAM 30 MG: 15 CAPSULE ORAL at 22:16

## 2017-07-18 RX ADMIN — Medication 10 ML: at 22:02

## 2017-07-18 RX ADMIN — IPRATROPIUM BROMIDE AND ALBUTEROL SULFATE 3 ML: .5; 3 SOLUTION RESPIRATORY (INHALATION) at 19:52

## 2017-07-18 RX ADMIN — CHLORHEXIDINE GLUCONATE 15 ML: 1.2 RINSE ORAL at 08:51

## 2017-07-18 RX ADMIN — RISPERIDONE 3 MG: 3 TABLET ORAL at 17:31

## 2017-07-18 RX ADMIN — IPRATROPIUM BROMIDE AND ALBUTEROL SULFATE 3 ML: .5; 3 SOLUTION RESPIRATORY (INHALATION) at 13:07

## 2017-07-18 RX ADMIN — PIPERACILLIN SODIUM,TAZOBACTAM SODIUM 3.38 G: 3; .375 INJECTION, POWDER, FOR SOLUTION INTRAVENOUS at 22:01

## 2017-07-18 RX ADMIN — PAROXETINE HYDROCHLORIDE 40 MG: 20 TABLET, FILM COATED ORAL at 08:51

## 2017-07-18 RX ADMIN — ENOXAPARIN SODIUM 40 MG: 40 INJECTION SUBCUTANEOUS at 12:36

## 2017-07-18 RX ADMIN — PIPERACILLIN SODIUM,TAZOBACTAM SODIUM 3.38 G: 3; .375 INJECTION, POWDER, FOR SOLUTION INTRAVENOUS at 13:17

## 2017-07-18 RX ADMIN — BUDESONIDE 500 MCG: 0.5 INHALANT RESPIRATORY (INHALATION) at 07:38

## 2017-07-18 RX ADMIN — Medication 10 ML: at 06:32

## 2017-07-18 NOTE — PROGRESS NOTES
Spiritual Care Partner Volunteer visited patient in [place] [id number] on 7/18/17. Documented by:  Alfredo Cuevas M.Div.    Paging Service [facility] ([phone])

## 2017-07-18 NOTE — PROGRESS NOTES
Pulmonary, Critical Care, and Sleep Medicine~Progress Note    Name: Cyrus Martinez MRN: [id number]   : 1960 [facility]   Date: 2017 10:50 AM Admission: 2017     IMPRESSION:   · Acute on chronic hypercapnic resp failure  · AE of COPD   · Hyponatremia, beer potomania? · Metabolic encephalopathy  · Paranoid schizophrenia   · EtOH abuse       PLAN:   · Duonebs/pulmicort/brovana  · Zosyn   · lovenox  · O2 titration above 90%   · Prednisone taper  · Question if he will be complaint with long term NIV with his paranoid schizophrenia      Daily Progression:    Breathing better. No acute complaints     OBJECTIVE:     Vital Signs:       Visit Vitals    BP (!) 145/99 (BP 1 Location: Right arm, BP Patient Position: At rest)    Pulse 68    Temp 97.5 °F (36.4 °C)    Resp 20    Ht 6' 0.01\" (1.829 m)    Wt 53.5 kg (117 lb 15.1 oz)    SpO2 98%    BMI 15.99 kg/m2      Temp (24hrs), Av.7 °F (37.1 °C), Min:97.5 °F (36.4 °C), Max:100.4 °F (38 °C)     Intake/Output:     Last shift:  07 - 1900  In: 340 [P.O.:240; I.V.:100]  Out: 800 [Urine:800]    Last 3 shifts: 1901 -  07  In: 9278 [P.O.:2630;  I.V.:2460]  Out: 60368 [Urine:01259]        Intake/Output Summary (Last 24 hours) at 17 1050  Last data filed at 17 0800   Gross per 24 hour   Intake             1480 ml   Output             4100 ml   Net            -2620 ml       Physical Exam:                                        Exam Findings Other   General: No resp distress noted, appears stated age    [de-identified]:  No ulcers, JVD not elevated, no cervical LAD    Chest: No pectus deformity, normal chest rise b/l    HEART:  RRR, no murmurs/rubs/gallops    Lungs:  CTA b/l, no rhonchi/crackles/wheeze, diminished BS at bases    ABD: Soft/NT, non rigid mildly distended    EXT: No cyanosis/clubbing/edema, normal peripheral pulses    Skin: No rashes or ulcers, no mottling    Neuro: A/O        Medications:  Current Facility-Administered Medications   Medication Dose Route Frequency    albuterol-ipratropium (DUO-NEB) 2.5 MG-0.5 MG/3 ML  3 mL Nebulization TID RT    predniSONE (DELTASONE) tablet 40 mg  40 mg Oral DAILY WITH BREAKFAST    temazepam (RESTORIL) capsule 30 mg  30 mg Oral QHS PRN    dextrose 5% infusion  125 mL/hr IntraVENous CONTINUOUS    PARoxetine (PAXIL) tablet 40 mg  40 mg Oral DAILY    risperiDONE (RisperDAL) tablet 3 mg  3 mg Oral QPM    scopolamine (TRANSDERM-SCOP) 1.5 mg  1.5 mg TransDERmal Q72H    chlorhexidine (PERIDEX) 0.12 % mouthwash 15 mL  15 mL Oral BID    arformoterol (BROVANA) neb solution 15 mcg  15 mcg Nebulization BID RT    albuterol (PROVENTIL VENTOLIN) nebulizer solution 2.5 mg  2.5 mg Nebulization Q4H PRN    enoxaparin (LOVENOX) injection 40 mg  40 mg SubCUTAneous Q24H    LORazepam (ATIVAN) injection 2 mg  2 mg IntraVENous PRN    sodium chloride (NS) flush 5-10 mL  5-10 mL IntraVENous Q8H    sodium chloride (NS) flush 5-10 mL  5-10 mL IntraVENous PRN    budesonide (PULMICORT) 500 mcg/2 ml nebulizer suspension  500 mcg Nebulization BID RT    piperacillin-tazobactam (ZOSYN) 3.375 g in 0.9% sodium chloride (MBP/ADV) 100 mL  3.375 g IntraVENous Q8H       Labs:  ABG Recent Labs      07/16/17   0920  07/16/17   0913  07/16/17   0909   PHI  7.391   --    --    PCO2I  62.1*   --    --    PO2I  87   --    --    HCO3I  37.7*   --    --    SO2I  96   --    --    FIO2I  50  50  50        CBC Recent Labs      07/18/17   0358  07/17/17   0350  07/16/17   0445   WBC  10.4  10.8  14.4*   HGB  14.2  11.5*  12.2   HCT  41.5  33.4*  35.1*   PLT  214  189  157   MCV  95.2  93.8  92.4   MCH  32.6  32.3  38.6        Metabolic  Panel Recent Labs      07/18/17   0358  07/17/17   0350  07/16/17   1900  07/16/17   1500  07/16/17   1124   07/16/17   0445   NA  136  138  134*  131*  130*   < >  132*   K  3.5  3.3*  3.6  3.8  3.9   < >  3.8 CL  97  98  94*  91*  91*   < >  93*   CO2  33*  35*  36*  35*  35*   < >  32   GLU  105*  103*  141*  116*  117*   < >  133*   BUN  6  3*  5*  4*  4*   < >  5*   CREA  0.60*  0.52*  0.64*  0.63*  0.47*   < >  0.38*   CA  8.1*  7.6*  7.7*  7.9*  7.7*   < >  7.7*   MG   --   2.0   --    --    --    --    --    PHOS   --   2.1*  1.5*  1.5*  2.3*   < >  2.2*   ALB   --    --   2.7*  2.7*  2.5*   < >  2.5*   SGOT   --    --    --    --    --    --   24   ALT   --    --    --    --    --    --   40    < > = values in this interval not displayed.         Pertinent Labs                Rohan Chaudhary, [id number]  7/18/2017

## 2017-07-18 NOTE — PROGRESS NOTES
ADULT PROTOCOL: JET AEROSOL ASSESSMENT    Patient  Tai Willis     61 y.o.   male     7/18/2017  8:02 AM    Breath Sounds Pre Procedure: Right Breath Sounds: Diminished                               Left Breath Sounds: Diminished    Breath Sounds Post Procedure: Right Breath Sounds: Diminished                                 Left Breath Sounds: Diminished    Breathing pattern: Pre procedure Breathing Pattern: Regular          Post procedure Breathing Pattern: Regular    Heart Rate: Pre procedure Pulse: 80           Post procedure Pulse: 72    Resp Rate: Pre procedure Respirations: 18           Post procedure Respirations: 18    Peak Flow: Pre bronchodilator             Post bronchodilator       FVC/FEV1:      Incentive Spirometry:  Actual Volume (ml): 750 ml          Cough: Pre procedure Cough: Non-productive               Post procedure Cough: Non-productive    Suctioned: NO    Sputum: Pre procedure                   Post procedure      Oxygen: O2 Device: Nasal cannula   Flow rate (L/min) 3L     Changed: NO    SpO2: Pre procedure SpO2: 94 %   with oxygen              Post procedure SpO2: 95 %  with oxygen    Nebulizer Therapy: Current medications Aerosolized Medications: DuoNeb, Pulmicort      Changed: NO    Smoking History:     Problem List:   Patient Active Problem List   Diagnosis Code    Depression F32.9    Bronchitis, chronic ([facility]) J42    Chronic obstructive pulmonary disease (HCC) J44.9    Tobacco dependence F17.200    Underweight R63.6    Paranoid schizophrenia ([facility] F20.0    Poor dentition K08.9    Insomnia G47.00    Benign prostatic hyperplasia without lower urinary tract symptoms N40.0    Hyponatremia E87.1    Acute encephalopathy G93.40    Acute respiratory failure ([facility]) J96.00    Seizure ([facility] R56.9       Respiratory Therapist: Rick Rushing RT

## 2017-07-18 NOTE — PROGRESS NOTES
Problem: Self Care Deficits Care Plan (Adult)  Goal: *Acute Goals and Plan of Care (Insert Text)  Occupational Therapy Goals  Initiated 7/18/2017  1. Patient will perform showering ADLs with modified independence within 7 day(s). 2. Patient will perform functional mobility in room to gather ADL items high and low 4/4 without LOB with modified independence within 7 day(s). 3. Patient will perform shower transfer with supervision/set-up within 7 day(s). 4. Patient will perform standing cardiopulmonary therapeutic exercise/activities to increase independence with ADLs with modified independence for 5 minutes within 7 day(s). OCCUPATIONAL THERAPY EVALUATION  Patient: Yobani Lr (89 y.o. male)  Date: 7/18/2017  Primary Diagnosis: Acute respiratory failure (HCC)        Precautions:   Seizure RECOMMEND BED ALARM      ASSESSMENT :  Based on the objective data described below, the patient presents with independent to min A ADLs. ADLs limited by cardiopulmonary tolerance (93% sats room air, HR , intubated 3 days), standing tolerance, standing balance, overall endurance/weakness and cognition (STM loss after 5 mins, safety awareness). Recommend shower orders. Recommend with nursing patient to complete as able in order to maintain strength, endurance and independence: ADLs with supervision/setup, OOB to chair 3x/day and mobilizing to the bathroom for toileting with 1 assist. Thank you for your assistance. Patient will benefit from skilled intervention to address the above impairments.   Patients rehabilitation potential is considered to be Good  Factors which may influence rehabilitation potential include:   [X]             None noted  [ ]             Mental ability/status  [ ]             Medical condition  [ ]             Home/family situation and support systems  [ ]             Safety awareness  [ ]             Pain tolerance/management  [ ]             Other:        PLAN :  Recommendations and Planned Interventions:  [X]               Self Care Training                  [X]        Therapeutic Activities  [X]               Functional Mobility Training    [ ]        Cognitive Retraining  [X]               Therapeutic Exercises           [X]        Endurance Activities  [X]               Balance Training                   [ ]        Neuromuscular Re-Education  [ ]               Visual/Perceptual Training     [X]   Home Safety Training  [X]               Patient Education                 [X]        Family Training/Education  [ ]               Other (comment):     Frequency/Duration: Patient will be followed by occupational therapy 5 times a week to address goals. Discharge Recommendations: None  Further Equipment Recommendations for Discharge: none noted       SUBJECTIVE:   Patient stated I felt like I did good.  (LOB requiring A)      OBJECTIVE DATA SUMMARY:   HISTORY:   Past Medical History:   Diagnosis Date    Asthma       seldom,use inhaler    Bronchitis      Chronic obstructive pulmonary disease ([facility] [address]      Depression       anxiety    Psychiatric disorder       paranoid schizophrenia    Psychiatric disorder       extrapyramidal disease    Psychotic disorder       mental retardation     Past Surgical History:   Procedure Laterality Date    HX ORTHOPAEDIC         right knee        Prior Level of Function/Home Situation: independent with all basic ADLs, lives at group home in which states staff A with all instrumental ADLs, favorite and only activity is walking the neighborhood and smoking cigarrettes  Expanded or extensive additional review of patient history:      Home Situation  Home Environment: Other (comment) ([address] [facility])  # Steps to Enter: 0  One/Two Story Residence: One story  Living Alone: No  Support Systems: Other (comments) (Luma [facility] ([address] [facility]))  Patient Expects to be Discharged to[de-identified] Other (comment) ([facility] (Group Home))  Current DME Used/Available at Home: None  Tub or Shower Type: Shower  [X]  Right hand dominant             [ ]  Left hand dominant     EXAMINATION OF PERFORMANCE DEFICITS:  Cognitive/Behavioral Status:  Neurologic State: Appropriate for age; Alert  Orientation Level: Oriented X4  Cognition: Appropriate decision making  Perception: Appears intact  Perseveration: No perseveration noted  Safety/Judgement: Awareness of environment; Fall prevention     Skin: intact PIV     Edema: intact as seen     Hearing: Auditory  Auditory Impairment: None     Vision/Perceptual:                           Acuity: Impaired near vision; Impaired far vision          Range of Motion:     AROM: Within functional limits                          Strength:  +3/5  Strength: Generally decreased, functional                 Coordination:  Coordination: Generally decreased, functional  Fine Motor Skills-Upper: Left Intact; Right Intact    Gross Motor Skills-Upper: Left Intact; Right Intact     Tone & Sensation:     Tone: Normal  Sensation: Intact                       Balance:  Sitting: Impaired; Without support  Sitting - Static: Good (unsupported)  Sitting - Dynamic: Fair (occasional) (two loss of balances during turning min A to correct)  Standing: Intact; With support     Functional Mobility and Transfers for ADLs:  Bed Mobility:  Supine to Sit: Independent  Sit to Supine: Independent  Scooting: Independent (With definitive use of hands)     Transfers:  Sit to Stand: Supervision  Stand to Sit: Supervision  Toilet Transfer : Supervision  Shower Transfer: Minimum assistance (LOB coming out requiring physical A )     ADL Assessment:  Feeding: Independent     Oral Facial Hygiene/Grooming: Supervision standing at sink     Bathing: min A infer from functional mobility     Upper Body Dressing: Supervision no LOB gathering     Lower Body Dressing: min A; tailor sitting L and flexion R, sitting EOB; fair standing balance     Toileting: Supervision standing to urinate         Patient left sitting EOB while I retrieved a drink for him. Upon return patient standing on opposite side of bed with IV reaching over and about to urinate in urinal. Patient was trying to obtain privacy. Patient did not recall instruction to ring out and always have staff with him when [address]. At end of session patient did not recall to have staff with him, even with pointing out safety of IV. ADL Intervention and task modifications:     Patient instructed and indicated understanding the benefits of maintaining activity tolerance, functional mobility, and independence with self care tasks during acute stay  to ensure safe return home and to baseline. Encouraged patient to increase frequency and duration OOB, be out of bed for all meals, perform daily ADLs (as approved by RN/MD regarding bathing etc), and performing functional mobility to/from bathroom. Cognitive Retraining  Safety/Judgement: Awareness of environment; Fall prevention       Pain:  Pain Scale 1: Numeric (0 - 10)  Pain Intensity 1: 0              Activity Tolerance:   93% sats room air, HR   Please refer to the flowsheet for vital signs taken during this treatment. After treatment:   [ ] Patient left in no apparent distress sitting up in chair  [X] Patient left in no apparent distress in bed  [X] Call bell left within reach  [X] Nursing notified  [ ] Caregiver present  [ ] Bed alarm activated      COMMUNICATION/EDUCATION:   The patients plan of care was discussed with: Registered Nurse.  [X] Home safety education was provided and the patient/caregiver indicated understanding. [X] Patient/family have participated as able in goal setting and plan of care. [X] Patient/family agree to work toward stated goals and plan of care. [ ] Patient understands intent and goals of therapy, but is neutral about his/her participation. [ ] Patient is unable to participate in goal setting and plan of care.   This patients plan of care is appropriate for delegation to [facility].      Thank you for this referral.  Judith Ragland  Time Calculation: 24 mins

## 2017-07-18 NOTE — PROGRESS NOTES
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 Patient admitted to drinking \"a lot of water\". In the past he was drinking 12-24 beers a day. That explains the brisk diuresis and overcorrection of hyponatremia after patient's two admissions. Now all electrolytes are at target. · Normal renal function. PLAN:   · No need of further renal follow up during this hospitalization. · Patient was counseled regarding risks of \"water poisoning\". Given his psychiatric history- patient is at risk of readmission for the same condition unless he is in a highly controlled environment. Subjective/Interval History:   I have reviewed the flowsheet and previous days notes. ROS:A comprehensive review of systems was negative. Objective:   Vital Signs:    Visit Vitals    BP (!) 150/97 (BP 1 Location: Right arm, BP Patient Position: Post activity; At rest)    Pulse 100    Temp 97.7 °F (36.5 °C)    Resp 20    Ht 6' 0.01\" (1.829 m)    Wt 53.5 kg (117 lb 15.1 oz)    SpO2 98%    BMI 15.99 kg/m2       O2 Device: Nasal cannula   O2 Flow Rate (L/min): 3 l/min   Temp (24hrs), Av.5 °F (36.9 °C), Min:97.5 °F (36.4 °C), Max:100.4 °F (38 °C)       Intake/Output:   Last shift:      701 - 1900  In: 340 [P.O.:240; I.V.:100]  Out: 800 [Urine:800]  Last 3 shifts: 1901 -  07  In: 1687 [P.O.:2630; I.V.:2460]  Out: 52632 [Urine:03291]    Intake/Output Summary (Last 24 hours) at 17 1541  Last data filed at 17 0800   Gross per 24 hour   Intake             1480 ml   Output             4100 ml   Net            -2620 ml        Physical Exam:  General:    Alert, cooperative, no distress, appears stated age. Thin. Head:   Normocephalic, without obvious abnormality, atraumatic. Eyes:   Conjunctivae/corneas clear. Lungs:   Clear to auscultation bilaterally.   No Wheezing or Rhonchi. No rales. Chest wall:  No tenderness or deformity. No Accessory muscle use. Heart:   Regular rate and rhythm,  no murmur, rub or gallop. Abdomen:   Soft, non-tender. Not distended. Bowel sounds normal. No masses  Extremities: Extremities normal, atraumatic, No cyanosis. No edema. No clubbing  Skin:     Texture, turgor normal. No rashes or lesions. Not Jaundiced  Psych:  Poor insight. Not depressed. Not anxious or agitated. Neurologic: Alert and oriented X 3. DATA:  Labs:  Recent Labs      07/18/17   0358  07/17/17   0350  07/16/17   1900  07/16/17   1500  07/16/17   1124   NA  136  138  134*  131*  130*   K  3.5  3.3*  3.6  3.8  3.9   CL  97  98  94*  91*  91*   CO2  33*  35*  36*  35*  35*   BUN  6  3*  5*  4*  4*   CREA  0.60*  0.52*  0.64*  0.63*  0.47*   CA  8.1*  7.6*  7.7*  7.9*  7.7*   ALB   --    --   2.7*  2.7*  2.5*   PHOS   --   2.1*  1.5*  1.5*  2.3*   MG   --   2.0   --    --    --      Recent Labs      07/18/17   0358  07/17/17   0350  07/16/17   0445   WBC  10.4  10.8  14.4*   HGB  14.2  11.5*  12.2   HCT  41.5  33.4*  35.1*   PLT  214  189  157     No results for input(s): ZECHARIAH, KU, CLU, CREAU in the last 72 hours.     No lab exists for component: PROU    Total time spent with patient:  35 minutes    [] Critical Care Provided    Care Plan discussed with:   Rohit Lucero MD

## 2017-07-18 NOTE — PROGRESS NOTES
Respiratory Educator      Pt provided with COPD education. Pt is a 60 yo with hospital primary admission for Acute respiratory failure, history of COPD, chronic bronchitis. Purpose is to review with pt current home resp. regimen and to better manage his COPD with goal to prevent readmission for the same. S: Pt appears timid and nervous. No signs or symptoms of resp. distress at this time. O/A: Pt is awake lying in bed. On RA with O2 Sats of 97%, HR: 81, RR: 18, BS: diminished. Cough non-productive and dry. Pt appears nervous and answers questions directly without elaboration. When asked how his breathing felt, pt stated, \"fine\". Pt lives in a group home and is a tobacco user. When asked how much he smokes, he stated \"1 carton a week\". Pt states his respiratory medications are:   Breo Ellipta once a day   Albuterol inhaler (states he uses once a day in the morning)   Albuterol neb. (States he does not use the nebulizer. Pt was able to demonstrate correct use of inhalers using placebo devices. When asked if he thought of quitting smoking, pt stated \"No\". P: Presented information for COPD management and exacerbation prevention  1. Discussed in basic terms what COPD is and how it affects the lungs  2. Discussed how his resp. meds help prevent exacerbations  3. Discussed when to use his resp. meds and how to apply they. 4. Discussed the harm of smoking (Pt stated he did not know this information). Pt stated he should stop smoking and I advised he ask his Dr for help in stopping smoking. 5. Discussed importance of adhering to Drs orders    Unable to determine if if pt was able to comprehend total discussion. Will f/up to reinforce today's discussion.     Yady Galloway, RRT  Respiratory Care  Pulmonary Disease Case Manager

## 2017-07-18 NOTE — PROGRESS NOTES
1930: Bedside and Verbal shift change report given to Goran Palafox RN (oncoming nurse) by APOLONIA LOGAN (offgoing nurse). Report included the following information SBAR, Kardex, Intake/Output, MAR, Recent Results and Cardiac Rhythm NSR.   0700: Uneventful shift. VSS. Patient denies pain and does not have any complaints at this time. 0730: Bedside and Verbal shift change report given to Elroy Chisholm RN (oncoming nurse) by Goran Palafox RN (offgoing nurse). Report included the following information SBAR, Kardex, Intake/Output, MAR, Recent Results and Cardiac Rhythm NSR. Problem: Pressure Injury - Risk of  Goal: *Prevention of pressure ulcer  Outcome: Progressing Towards Goal    07/17/17 1955   Wound Prevention and Protection Methods   Orientation of Wound Prevention Posterior   Location of Wound Prevention Sacrum/Coccyx   Dressing Present  No   Read Only, Retired: Wound Treatment (non-mechanical)   Wound Offloading (Prevention Methods) Bed, pressure redistribution/air;Bed, pressure reduction mattress;Turning;Repositioning         Problem: Falls - Risk of  Goal: *Absence of falls  Outcome: Progressing Towards Goal  Patient free of falls at this time. Fall prevention reinforced, call bell and belongings within reach, bed in low, locked position.

## 2017-07-18 NOTE — PROGRESS NOTES
CM spoke with Ofelia Mc and Ketty Auguste at [facility] Adult [facility] - they are still awaiting response from [facility] regarding patient's Medicaid benefits - they state all verifications have been turned in - provided Ofelia Mc with unit contact information if needed. Anticipate patient will return to Adult Home when medically ready for discharge.  CORNELIA Stout

## 2017-07-18 NOTE — PROGRESS NOTES
Hospitalist Progress Note  Molly Mabry MD  Office: [phone]        Date of Service:  2017  NAME:  Jen Jenkins  :  1960  MRN:  [id number]      Admission Summary:   61year old male with history of paranoid schizophrenia, MR, extrapyramidal disease, COPD, hyponatremia, anxiety, tobacco dependence presented on 17 with altered mental status. He had recently been hospitalized from 17-17 with similar severe hyponatremia. He had seemingly developed seizure-like activity at his group home ([facility]) and was subsequently coded and intubated. He was subsequently brought to [facility] for further management. Interval history / Subjective:   Seen in room 453,alert and oriented. No complaints. Assessment & Plan:     1. Acute on chronic hypercapneic respiratory failure   - with underlying COPD, possibly with element of COPD exacerbation / bronchospasm contributing   - CT chest  - Prominent chronic lung changes, some lung nodularity and pleural plaquing. Incidental gastric distention.   - CTA chest  - No evidence of acute pulmonary embolus. Patchy bilateral lower lobe airspace disease may represent atelectasis, pneumonia, or aspiration. Bilateral bronchial wall thickening is compatible with asthma or bronchitis; radiographic follow-up is recommended to assure resolution. Emphysema. - extubated    - continue bronchodilator therapy with DuoNeb, Pulmicort,predniosne. - started on scopolamine for secretions   - empiric antibiotic coverage with Zosyn    2. Acute metabolic encephalopathy   - suspect this is secondary to his hyponatremia, possibly with hyponatremia-induced seizures   - CT head  - No acute findings suspected. - EEG completed, pending interpretation   - corrected hyponatremia   -Alert and oriented     3.   Critical hyponatremia   - underlying etiology is unclear, likely multifactorial with poor solute intake, likely some element of excess ADH release; given previous rapid improvement on prior admission, doubtful that his psychiatric medications are the culprit alone   - Na+ 106 upon admission, improving   - slow correction of Na+, targeting 6-10 mEq increase per day   - AM cortisol 10.9, but he is also on Solu-Medrol   - nephrology following and d/c iv fluid  -Na normal stable for few days. 4.  Hypotension, presumed sepsis syndrome   - suspect this is secondary to propofol, though alternatively could be secondary to sepsis in setting of pneumonia with leukocytosis   - blood cultures 7/13 - no growth thus far   - respiratory culture 7/14 - heavy normal respiratory denise thus far   - on empiric coverage with Zosyn    5. Hypokalemia   - replete as needed    6. Paranoid schizophrenia   - he was on risperidone, paroxetine, benztropine prior to admission   - holding now while he is intubated, sedated    7. Moderate protein-calorie malnutrition   - BMI 14.5   - nutrition consult    8. Leukocytosis. Resolved   - suspect some of this may be due to steroid effect, improving   - see above, also treating empirically for sepsis      Code status: FULL  DVT prophylaxis: Lovenox    Care Plan discussed with: Nurse  Disposition: TBD,came from [place] [address] Problems  Date Reviewed: 7/10/2017          Codes Class Noted POA    * (Principal)Acute respiratory failure ([facility] ICD-10-CM: J96.00  ICD-9-CM: 518.81  7/13/2017 Unknown        Acute encephalopathy ICD-10-CM: G93.40  ICD-9-CM: 348.30  7/5/2017 Yes        Hyponatremia ICD-10-CM: E87.1  ICD-9-CM: 276.1  7/4/2017 Yes        Chronic obstructive pulmonary disease ([facility] ICD-10-CM: J44.9  ICD-9-CM: 496  3/29/2016 Yes        Tobacco dependence ICD-10-CM: F17.200  ICD-9-CM: 305.1  3/29/2016 Yes        Paranoid schizophrenia ([facility] ICD-10-CM: F20.0  ICD-9-CM: 295.30  3/29/2016 Yes                Review of Systems:   Review of systems not obtained due to patient factors. Vital Signs:    Last 24hrs VS reviewed since prior progress note. Most recent are:  Visit Vitals    BP (!) 150/97 (BP 1 Location: Right arm, BP Patient Position: Post activity; At rest)    Pulse (!) 122    Temp 97.7 °F (36.5 °C)    Resp 20    Ht 6' 0.01\" (1.829 m)    Wt 53.5 kg (117 lb 15.1 oz)    SpO2 93%    BMI 15.99 kg/m2         Intake/Output Summary (Last 24 hours) at 07/18/17 1809  Last data filed at 07/18/17 0800   Gross per 24 hour   Intake             1280 ml   Output             3100 ml   Net            -1820 ml        Physical Examination:             Constitutional:  Alert and oriented. Not in distress. ENT:  Oral mucous moist, oropharynx benign. Neck supple,    Resp:  CTA bilaterally. No wheezing/rhonchi/rales. No accessory muscle use   CV:  Regular rhythm, normal rate, no murmurs, gallops, rubs    GI:  Soft, non distended, non tender. normoactive bowel sounds, no hepatosplenomegaly     Musculoskeletal:  No edema, warm, 2+ pulses throughout    Neurologic: Alert and oriented. Non focal exam.     Skin:  Good turgor, no rashes or ulcers  Hematologic/Lymphatic/Immunlogic:  No jaundice nor lymph node swelling       Data Review:    Review and/or order of clinical lab test      Labs:     Recent Labs      07/18/17   0358  07/17/17   0350   WBC  10.4  10.8   HGB  14.2  11.5*   HCT  41.5  33.4*   PLT  214  189     Recent Labs      07/18/17   0358  07/17/17   0350  07/16/17   1900  07/16/17   1500   NA  136  138  134*  131*   K  3.5  3.3*  3.6  3.8   CL  97  98  94*  91*   CO2  33*  35*  36*  35*   BUN  6  3*  5*  4*   CREA  0.60*  0.52*  0.64*  0.63*   GLU  105*  103*  141*  116*   CA  8.1*  7.6*  7.7*  7.9*   MG   --   2.0   --    --    PHOS   --   2.1*  1.5*  1.5*     Recent Labs      07/16/17   1900  07/16/17   1500  07/16/17   1124   07/16/17   0445   SGOT   --    --    --    --   24   ALT   --    --    --    --   40   AP   --    --    --    --   58   TBILI   --    --    -- --   0.5   TP   --    --    --    --   5.1*   ALB  2.7*  2.7*  2.5*   < >  2.5*   GLOB   --    --    --    --   2.6    < > = values in this interval not displayed. No results for input(s): INR, PTP, APTT in the last 72 hours. No lab exists for component: INREXT, INREXT   No results for input(s): FE, TIBC, PSAT, FERR in the last 72 hours. No results found for: FOL, RBCF   No results for input(s): PH, PCO2, PO2 in the last 72 hours. No results for input(s): CPK, CKNDX, TROIQ in the last 72 hours.     No lab exists for component: CPKMB  Lab Results   Component Value Date/Time    Cholesterol, total 102 05/15/2017 11:11 AM    HDL Cholesterol 48 05/15/2017 11:11 AM    LDL, calculated 46 05/15/2017 11:11 AM    Triglyceride 41 05/15/2017 11:11 AM     Lab Results   Component Value Date/Time    Glucose (POC) 132 07/04/2017 04:37 PM     Lab Results   Component Value Date/Time    Color YELLOW/STRAW 07/13/2017 04:11 PM    Appearance CLOUDY 07/13/2017 04:11 PM    Specific gravity 1.016 07/13/2017 04:11 PM    pH (UA) 6.0 07/13/2017 04:11 PM    Protein 100 07/13/2017 04:11 PM    Glucose 250 07/13/2017 04:11 PM    Ketone 15 07/13/2017 04:11 PM    Bilirubin NEGATIVE  07/13/2017 04:11 PM    Urobilinogen 0.2 07/13/2017 04:11 PM    Nitrites NEGATIVE  07/13/2017 04:11 PM    Leukocyte Esterase NEGATIVE  07/13/2017 04:11 PM    Epithelial cells FEW 07/13/2017 04:11 PM    Bacteria NEGATIVE  07/13/2017 04:11 PM    WBC 0-4 07/13/2017 04:11 PM    RBC 10-20 07/13/2017 04:11 PM         Medications Reviewed:     Current Facility-Administered Medications   Medication Dose Route Frequency    albuterol-ipratropium (DUO-NEB) 2.5 MG-0.5 MG/3 ML  3 mL Nebulization TID RT    predniSONE (DELTASONE) tablet 40 mg  40 mg Oral DAILY WITH BREAKFAST    temazepam (RESTORIL) capsule 30 mg  30 mg Oral QHS PRN    dextrose 5% infusion  125 mL/hr IntraVENous CONTINUOUS    PARoxetine (PAXIL) tablet 40 mg  40 mg Oral DAILY    risperiDONE (RisperDAL) tablet 3 mg  3 mg Oral QPM    scopolamine (TRANSDERM-SCOP) 1.5 mg  1.5 mg TransDERmal Q72H    chlorhexidine (PERIDEX) 0.12 % mouthwash 15 mL  15 mL Oral BID    arformoterol (BROVANA) neb solution 15 mcg  15 mcg Nebulization BID RT    albuterol (PROVENTIL VENTOLIN) nebulizer solution 2.5 mg  2.5 mg Nebulization Q4H PRN    enoxaparin (LOVENOX) injection 40 mg  40 mg SubCUTAneous Q24H    LORazepam (ATIVAN) injection 2 mg  2 mg IntraVENous PRN    sodium chloride (NS) flush 5-10 mL  5-10 mL IntraVENous Q8H    sodium chloride (NS) flush 5-10 mL  5-10 mL IntraVENous PRN    budesonide (PULMICORT) 500 mcg/2 ml nebulizer suspension  500 mcg Nebulization BID RT    piperacillin-tazobactam (ZOSYN) 3.375 g in 0.9% sodium chloride (MBP/ADV) 100 mL  3.375 g IntraVENous Q8H     ______________________________________________________________________  EXPECTED LENGTH OF STAY: 4d 21h  ACTUAL LENGTH OF STAY:          5                 Darrion Mcbride MD

## 2017-07-18 NOTE — PROGRESS NOTES
Problem: Mobility Impaired (Adult and Pediatric)  Goal: *Acute Goals and Plan of Care (Insert Text)  Physical Therapy Goals  Initiated 7/18/2017  1. Patient will transfer from bed to chair and chair to bed with independence using the least restrictive device within 7 day(s). 2. Patient will perform sit to stand with independence within 7 day(s). 3. Patient will ambulate with supervision/set-up for 250 feet with the least restrictive device within 7 day(s). 4. Patient will improve Tinetti score by 4-5 points in order to illustrate decrease in fall risk within 7 day(s). PHYSICAL THERAPY EVALUATION  Patient: Terrell Santos (53 y.o. male)  Date: 7/18/2017  Primary Diagnosis: Acute respiratory failure (HCC)  Precautions:  Seizure, H/o ETOH and tobacco abuse      ASSESSMENT :  Based on the objective data described below, the patient presents with near-baseline functional mobility but illustrates minor balance deficits, problem-solving deficits, and generalized weakness from intubation time. Pt reports living at [facility] for the past 17 years and independent functional mobility at the group home. Pt intubated 7/13 for acute respiratory failure and was extubated 7/16. Upon arrival into pt's room, pt had removed his nasal cannula (3 L/min), however, O2 sats were 92% at rest in bed. Pt tachycardic and appeared to be in A. Fib throughout session (maximal HR noted: 140 bpm) - Pt asymptomatic. Pt achieved EOB sitting, independently. Vital signs stable with positional changes. Pt stood from EOB with supervision and ambulated a total distance of 150 ft with overall minimal assist (however required moderate assist to prevent fall following one LOB while turning corner). Pt's O2 sats remained between 90-92% on room air throughout mobility training. Pt with slow gait angelica. Noted tandem ambulation with occasional scissoring/cross-over.  Pt returned to bed at conclusion of session per pt's request. Will continue to follow in order to progress strength and to maximize safe/independent functional mobility. Anticipate return to group home once medically stable. Patient will benefit from skilled intervention to address the above impairments. Patients rehabilitation potential is considered to be Fair  Factors which may influence rehabilitation potential include:   [ ]         None noted  [X]         Mental ability/status  [X]         Medical condition  [ ]         Home/family situation and support systems  [ ]         Safety awareness  [ ]         Pain tolerance/management  [ ]         Other:        PLAN :  Recommendations and Planned Interventions:  [ ]           Bed Mobility Training             [ ]    Neuromuscular Re-Education  [X]           Transfer Training                   [ ]    Orthotic/Prosthetic Training  [X]           Gait Training                         [ ]    Modalities  [X]           Therapeutic Exercises           [ ]    Edema Management/Control  [X]           Therapeutic Activities            [X]    Patient and Family Training/Education  [ ]           Other (comment):     Frequency/Duration: Patient will be followed by physical therapy  5 times a week to address goals. Discharge Recommendations: [facility]  Further Equipment Recommendations for Discharge: None       SUBJECTIVE:   Patient stated I actually feel really strong.       OBJECTIVE DATA SUMMARY:   HISTORY:    Past Medical History:   Diagnosis Date    Asthma       seldom,use inhaler    Bronchitis      Chronic obstructive pulmonary disease [address] [address]      Depression       anxiety    Psychiatric disorder       paranoid schizophrenia    Psychiatric disorder       extrapyramidal disease    Psychotic disorder       mental retardation     Past Surgical History:   Procedure Laterality Date    HX ORTHOPAEDIC         right knee     Prior Level of Function/Home Situation: Resides at [facility] ([address]).  Reports independent functional mobility   Personal factors and/or comorbidities impacting plan of care: COPD, Anxiety, Psychiatric & Psychotic disorders     Home Situation  Home Environment: Other (comment) ([address])  # Steps to Enter: 0  One/Two Story Residence: One story  Living Alone: No  Support Systems: Other (comments) ([facility] (Group Home))  Patient Expects to be Discharged to[de-identified] Other (comment) ([facility] ([facility]))  Current DME Used/Available at Home: None     EXAMINATION/PRESENTATION/DECISION MAKING:   Critical Behavior:  Neurologic State: Appropriate for age, Alert  Orientation Level: Oriented X4  Cognition: Appropriate decision making  Safety/Judgement: Awareness of environment, Fall prevention  Hearing: Auditory  Auditory Impairment: None  Range Of Motion:  AROM: Within functional limits  Strength:    Strength: Generally decreased, functional  Tone & Sensation:   Tone: Normal  Sensation: Intact  Coordination:  Coordination: Generally decreased, functional     Functional Mobility:  Bed Mobility:  Supine to Sit: Independent  Sit to Supine: Independent  Scooting: Independent (With definitive use of hands)  Transfers:  Sit to Stand: Supervision  Stand to Sit: Supervision  Balance:   Sitting: Intact; Without support  Standing: Intact; With support  Ambulation/Gait Training:  Distance (ft): 150 Feet (ft)  Assistive Device: Gait belt  Ambulation - Level of Assistance: Assist x1;Minimal assistance (x 3 episodes of staggering; x 1 LOB episode)  Gait Description (WDL): Exceptions to WDL  Gait Abnormalities: Shuffling gait;Scissoring;Trunk sway increased;Lurching  Base of Support: Narrowed (At times, tandem gait with intermittent scissoring)  Speed/Sabina: Fluctuations (Increased forward lean with gait speed fluctuations)      Functional Measure:  Tinetti test:      Sitting Balance: 1  Arises: 1  Attempts to Rise: 2  Immediate Standing Balance: 1  Standing Balance: 1  Nudged: 1  Eyes Closed: 0  Turn 360 Degrees - Continuous/Discontinuous: 1  Turn 360 Degrees - Steady/Unsteady: 0  Sitting Down: 1  Balance Score: 9  Indication of Gait: 1  R Step Length/Height: 1  L Step Length/Height: 1  R Foot Clearance: 1  L Foot Clearance: 1  Step Symmetry: 1  Step Continuity: 1  Path: 0  Trunk: 1  Walking Time: 1  Gait Score: 9  Total Score: 18         Tinetti Test and G-code impairment scale:  Percentage of Impairment CH     0%    CI     1-19% CJ     20-39% CK     40-59% CL     60-79% CM     80-99% CN      100%   Tinetti  Score 0-28 28 23-27 17-22 12-16 6-11 1-5 0          Tinetti Tool Score Risk of Falls  <19 = High Fall Risk  19-24 = Moderate Fall Risk  25-28 = Low Fall Risk  Tinetti ME. Performance-Oriented Assessment of Mobility Problems in Elderly Patients. [facility]; [id number]. (Scoring Description: PT Bulletin Feb. 10, 1993)     Older adults: Kelly Nick et al, 2009; n = 1000 [facility] elderly evaluated with ABC, WILMA, ADL, and IADL)  · Mean WILMA score for males aged 69-68 years = 26.21(3.40)  · Mean WILMA score for females age 69-68 years = 25.16(4.30)  · Mean WILMA score for males over 80 years = 23.29(6.02)  · Mean WILMA score for females over 80 years = 17.20(8.32)      G codes: In compliance with CMSs Claims Based Outcome Reporting, the following G-code set was chosen for this patient based on their primary functional limitation being treated: The outcome measure chosen to determine the severity of the functional limitation was the Tinetti with a score of 18/28 which was correlated with the impairment scale.       · Mobility - Walking and Moving Around:               - CURRENT STATUS:    CJ - 20%-39% impaired, limited or restricted               - GOAL STATUS:           CI - 1%-19% impaired, limited or restricted               - D/C STATUS:                       ---------------To be determined---------------      Physical Therapy Evaluation Charge Determination   History Examination Presentation Decision-Making   HIGH Complexity :3+ comorbidities / personal factors will impact the outcome/ POC  MEDIUM Complexity : 3 Standardized tests and measures addressing body structure, function, activity limitation and / or participation in recreation  LOW Complexity : Stable, uncomplicated  Other outcome measures Tinetti  LOW       Based on the above components, the patient evaluation is determined to be of the following complexity level: LOW      Pain:  Pain Scale 1: Numeric (0 - 10)  Pain Intensity 1: 0     Activity Tolerance:   Please refer to the flowsheet for vital signs taken during this treatment. After treatment:   [X]         Patient left in no apparent distress sitting up in chair  [ ]         Patient left in no apparent distress in bed  [X]         Call bell left within reach  [X]         Nursing notified  [ ]         Caregiver present  [ ]         Bed alarm activated      COMMUNICATION/EDUCATION:   The patients plan of care was discussed with: Registered Nurse and Rehabilitation Attendant. [X]         Fall prevention education was provided and the patient/caregiver indicated understanding. [X]         Patient/family have participated as able in goal setting and plan of care. [X]         Patient/family agree to work toward stated goals and plan of care. [ ]         Patient understands intent and goals of therapy, but is neutral about his/her participation. [ ]         Patient is unable to participate in goal setting and plan of care.      Thank you for this referral.  Rashi Lowry PT, DPT   Time Calculation: 27 mins

## 2017-07-19 LAB
ANION GAP BLD CALC-SCNC: 7 MMOL/L (ref 5–15)
BUN SERPL-MCNC: 5 MG/DL (ref 6–20)
BUN/CREAT SERPL: 8 (ref 12–20)
CALCIUM SERPL-MCNC: 8.3 MG/DL (ref 8.5–10.1)
CHLORIDE SERPL-SCNC: 100 MMOL/L (ref 97–108)
CO2 SERPL-SCNC: 31 MMOL/L (ref 21–32)
CREAT SERPL-MCNC: 0.6 MG/DL (ref 0.7–1.3)
GLUCOSE SERPL-MCNC: 68 MG/DL (ref 65–100)
POTASSIUM SERPL-SCNC: 4.6 MMOL/L (ref 3.5–5.1)
SODIUM SERPL-SCNC: 138 MMOL/L (ref 136–145)

## 2017-07-19 PROCEDURE — 74011250636 HC RX REV CODE- 250/636: Performed by: FAMILY MEDICINE

## 2017-07-19 PROCEDURE — 74011250637 HC RX REV CODE- 250/637: Performed by: STUDENT IN AN ORGANIZED HEALTH CARE EDUCATION/TRAINING PROGRAM

## 2017-07-19 PROCEDURE — 36415 COLL VENOUS BLD VENIPUNCTURE: CPT | Performed by: HOSPITALIST

## 2017-07-19 PROCEDURE — 94640 AIRWAY INHALATION TREATMENT: CPT

## 2017-07-19 PROCEDURE — 74011250636 HC RX REV CODE- 250/636: Performed by: INTERNAL MEDICINE

## 2017-07-19 PROCEDURE — 97535 SELF CARE MNGMENT TRAINING: CPT

## 2017-07-19 PROCEDURE — 74011000250 HC RX REV CODE- 250: Performed by: HOSPITALIST

## 2017-07-19 PROCEDURE — 74011000258 HC RX REV CODE- 258: Performed by: FAMILY MEDICINE

## 2017-07-19 PROCEDURE — 74011636637 HC RX REV CODE- 636/637: Performed by: INTERNAL MEDICINE

## 2017-07-19 PROCEDURE — 74011000250 HC RX REV CODE- 250: Performed by: FAMILY MEDICINE

## 2017-07-19 PROCEDURE — 65270000029 HC RM PRIVATE

## 2017-07-19 PROCEDURE — 51798 US URINE CAPACITY MEASURE: CPT

## 2017-07-19 PROCEDURE — 80048 BASIC METABOLIC PNL TOTAL CA: CPT | Performed by: HOSPITALIST

## 2017-07-19 PROCEDURE — 74011250637 HC RX REV CODE- 250/637: Performed by: NURSE PRACTITIONER

## 2017-07-19 PROCEDURE — 97116 GAIT TRAINING THERAPY: CPT

## 2017-07-19 RX ADMIN — PREDNISONE 40 MG: 20 TABLET ORAL at 09:09

## 2017-07-19 RX ADMIN — BUDESONIDE 500 MCG: 0.5 INHALANT RESPIRATORY (INHALATION) at 07:03

## 2017-07-19 RX ADMIN — PIPERACILLIN SODIUM,TAZOBACTAM SODIUM 3.38 G: 3; .375 INJECTION, POWDER, FOR SOLUTION INTRAVENOUS at 21:23

## 2017-07-19 RX ADMIN — CHLORHEXIDINE GLUCONATE 15 ML: 1.2 RINSE ORAL at 09:09

## 2017-07-19 RX ADMIN — IPRATROPIUM BROMIDE AND ALBUTEROL SULFATE 3 ML: .5; 3 SOLUTION RESPIRATORY (INHALATION) at 07:03

## 2017-07-19 RX ADMIN — PIPERACILLIN SODIUM,TAZOBACTAM SODIUM 3.38 G: 3; .375 INJECTION, POWDER, FOR SOLUTION INTRAVENOUS at 14:29

## 2017-07-19 RX ADMIN — RISPERIDONE 3 MG: 3 TABLET ORAL at 20:18

## 2017-07-19 RX ADMIN — Medication 10 ML: at 21:24

## 2017-07-19 RX ADMIN — ENOXAPARIN SODIUM 40 MG: 40 INJECTION SUBCUTANEOUS at 14:26

## 2017-07-19 RX ADMIN — IPRATROPIUM BROMIDE AND ALBUTEROL SULFATE 3 ML: .5; 3 SOLUTION RESPIRATORY (INHALATION) at 13:04

## 2017-07-19 RX ADMIN — Medication 10 ML: at 14:29

## 2017-07-19 RX ADMIN — CHLORHEXIDINE GLUCONATE 15 ML: 1.2 RINSE ORAL at 20:20

## 2017-07-19 RX ADMIN — PIPERACILLIN SODIUM,TAZOBACTAM SODIUM 3.38 G: 3; .375 INJECTION, POWDER, FOR SOLUTION INTRAVENOUS at 05:59

## 2017-07-19 RX ADMIN — IPRATROPIUM BROMIDE AND ALBUTEROL SULFATE 3 ML: .5; 3 SOLUTION RESPIRATORY (INHALATION) at 20:27

## 2017-07-19 RX ADMIN — PAROXETINE HYDROCHLORIDE 40 MG: 20 TABLET, FILM COATED ORAL at 09:09

## 2017-07-19 RX ADMIN — Medication 10 ML: at 05:59

## 2017-07-19 RX ADMIN — TEMAZEPAM 30 MG: 15 CAPSULE ORAL at 22:25

## 2017-07-19 RX ADMIN — BUDESONIDE 500 MCG: 0.5 INHALANT RESPIRATORY (INHALATION) at 20:27

## 2017-07-19 NOTE — PROGRESS NOTES
Problem: Mobility Impaired (Adult and Pediatric)  Goal: *Acute Goals and Plan of Care (Insert Text)  Physical Therapy Goals  Initiated 7/18/2017  1. Patient will transfer from bed to chair and chair to bed with independence using the least restrictive device within 7 day(s). 2. Patient will perform sit to stand with independence within 7 day(s). 3. Patient will ambulate with supervision/set-up for 250 feet with the least restrictive device within 7 day(s). 4. Patient will improve Tinetti score by 4-5 points in order to illustrate decrease in fall risk within 7 day(s). PHYSICAL THERAPY TREATMENT  Patient: Ant Jang (57 y.o. male)  Date: 7/19/2017  Diagnosis: Acute respiratory failure ([address] Acute respiratory failure ([address]       Precautions: Seizure      ASSESSMENT:  Pt is progressing well with all goals. Pt agreeable and eager to complete bed mobility, transfers, and gait training with gait belt only (no DME). Pt on 3 L NC and SpO2 94-95% pre and post activity, respectively. Pt with 1 LOB during ambulation though no external support required to recover. Pt in chair in NAD when left awaiting lunch. Pt anticipated to return to [facility] once medically stable. Pt may benefit from HHPT, TBD. Progression toward goals:  [X]    Improving appropriately and progressing toward goals  [ ]    Improving slowly and progressing toward goals  [ ]    Not making progress toward goals and plan of care will be adjusted       PLAN:  Patient continues to benefit from skilled intervention to address the above impairments. Continue treatment per established plan of care. Discharge Recommendations:  Home Health, None and To Be Determined  Further Equipment Recommendations for Discharge:  NA       SUBJECTIVE:   Patient stated Ok, yeah. ..      OBJECTIVE DATA SUMMARY:   Critical Behavior:  Neurologic State: Alert  Orientation Level: Oriented to place, Oriented to person, Oriented to situation, Disoriented to time  Cognition: Follows commands, Poor safety awareness, Decreased attention/concentration  Safety/Judgement: Awareness of environment, Fall prevention  Functional Mobility Training:  Bed Mobility:  Supine to Sit: Independent  Sit to Supine:  (NT)  Scooting: Independent  Transfers:  Sit to Stand: Stand-by asssistance  Stand to Sit: Stand-by asssistance  Balance:  Sitting: Intact  Sitting - Static: Good (unsupported)  Sitting - Dynamic: Good (unsupported)  Standing: Impaired; Without support  Standing - Static: Good  Standing - Dynamic : Fair  Ambulation/Gait Training:  Distance (ft): 150 Feet (ft)  Assistive Device: Gait belt  Ambulation - Level of Assistance: Contact guard assistance  Gait Abnormalities: Decreased step clearance; Path deviations (1 LOB, no external assist to correct)  Base of Support: Center of gravity altered;Narrowed  Speed/Sabina: Fluctuations  Pain:  Pain Scale 1: Numeric (0 - 10)  Pain Intensity 1: 0  Activity Tolerance:   VSS  After treatment:   [X]    Patient left in no apparent distress sitting up in chair  [ ]    Patient left in no apparent distress in bed  [X]    Call bell left within reach  [X]    Nursing notified  [ ]    Caregiver present  [ ]    Bed alarm activated      COMMUNICATION/COLLABORATION:   The patients plan of care was discussed with: Registered Nurse     Daniel Lopez   Time Calculation: 13 mins

## 2017-07-19 NOTE — PROGRESS NOTES
Problem: Pressure Injury - Risk of  Goal: *Prevention of pressure ulcer  Outcome: Progressing Towards Goal  Turns self at appropriate intervals; skin assessed Q4H; blood glucose controlled        Problem: Falls - Risk of  Goal: *Absence of falls  Outcome: Progressing Towards Goal  Up with standby assistance to manage pump and pole. Call bell and personal effects within reach. Bed locked and in low position. Non skid footwear in place. Goal: *Knowledge of fall prevention  Outcome: Progressing Towards Goal  Patient uses call bell appropriately to request assistance. 0700: patient had an uneventful night. Up to bathroom for hygiene routine. Positive dyspnea with the activity. Recovered well on 2L NC and sitting in chair. 0730: Bedside and Verbal shift change report given to Nerissa Good (oncoming nurse) by Nya Mcmanus (offgoing nurse). Report included the following information SBAR, Kardex, Intake/Output, MAR, Recent Results and Cardiac Rhythm NSR/ST.

## 2017-07-19 NOTE — PROGRESS NOTES
1115: Patient ambulating with PT  1800: Confirmed with Dr. Thuy Bear that patient doesn't need remote tele  1815: TRANSFER - OUT REPORT:    Verbal report given to Pepe Orozco RN (name) on Lesley Cables  being transferred to  (unit) for routine progression of care       Report consisted of patients Situation, Background, Assessment and   Recommendations(SBAR). Information from the following report(s) SBAR, Kardex, ED Summary, Intake/Output, MAR, Accordion, Recent Results and Cardiac Rhythm NSR-ST was reviewed with the receiving nurse. Lines:   Peripheral IV 07/13/17 Right Arm (Active)   Site Assessment Clean, dry, & intact 7/19/2017  3:03 PM   Phlebitis Assessment 0 7/19/2017  3:03 PM   Infiltration Assessment 0 7/19/2017  3:03 PM   Dressing Status Clean, dry, & intact 7/19/2017  3:03 PM   Dressing Type Transparent;Tape 7/19/2017  3:03 PM   Hub Color/Line Status Pink;Flushed; Infusing 7/19/2017  3:03 PM   Action Taken Open ports on tubing capped 7/18/2017  8:00 AM   Alcohol Cap Used Yes 7/19/2017  3:03 PM        Opportunity for questions and clarification was provided. Problem: Pressure Injury - Risk of  Goal: *Prevention of pressure ulcer  Outcome: Progressing Towards Goal  Patient able to make frequent position changes independently; pressure redistribution mattress    Problem: Falls - Risk of  Goal: *Absence of falls  Outcome: Progressing Towards Goal  Patient remains free from falls; pt door left open; call bell and belongings within reach; side rails up x2  Goal: *Knowledge of fall prevention  Outcome: Progressing Towards Goal  Patient ambulates with stand by assist; Patient instructed to call for assistance.

## 2017-07-19 NOTE — PROGRESS NOTES
Pulmonary, Critical Care, and Sleep Medicine~Progress Note    Name: Jen Jenkins MRN: [id number]   : 1960 [facility]   Date: 2017 10:50 AM Admission: 2017     IMPRESSION:   · Acute on chronic hypercapnic resp failure  · AE of COPD   · Hyponatremia, beer potomania? · Metabolic encephalopathy  · Paranoid schizophrenia   · EtOH abuse       PLAN:   · Duonebs/pulmicort/brovana  · Zosyn   · lovenox  · O2 titration above 90%   · Prednisone taper  · Question if he will be complaint with long term NIV with his paranoid schizophrenia  · We will be available again to see if needed       Daily Progression:    Breathing better. No acute complaints     OBJECTIVE:     Vital Signs:       Visit Vitals    BP (!) 134/99 (BP 1 Location: Right arm, BP Patient Position: At rest)    Pulse 91    Temp 98.2 °F (36.8 °C)    Resp 16    Ht 6' 0.01\" (1.829 m)    Wt 53.1 kg (117 lb 1 oz)    SpO2 95%    BMI 15.87 kg/m2      Temp (24hrs), Av.1 °F (36.7 °C), Min:97.5 °F (36.4 °C), Max:98.7 °F (37.1 °C)     Intake/Output:     Last shift:      Last 3 shifts: 1901 -  0700  In:  [P.O.:1850;  I.V.:200]  Out: 4125 [Urine:4125]          Intake/Output Summary (Last 24 hours) at 17 0715  Last data filed at 17 0309   Gross per 24 hour   Intake              770 ml   Output             1025 ml   Net             -255 ml       Physical Exam:                                        Exam Findings Other   General: No resp distress noted, appears stated age    [de-identified]:  No ulcers, JVD not elevated, no cervical LAD    Chest: No pectus deformity, normal chest rise b/l    HEART:  RRR, no murmurs/rubs/gallops    Lungs:  CTA b/l, no rhonchi/crackles/wheeze, diminished BS at bases    ABD: Soft/NT, non rigid mildly distended    EXT: No cyanosis/clubbing/edema, normal peripheral pulses    Skin: No rashes or ulcers, no mottling Neuro: A/O        Medications:  Current Facility-Administered Medications   Medication Dose Route Frequency    albuterol-ipratropium (DUO-NEB) 2.5 MG-0.5 MG/3 ML  3 mL Nebulization TID RT    predniSONE (DELTASONE) tablet 40 mg  40 mg Oral DAILY WITH BREAKFAST    temazepam (RESTORIL) capsule 30 mg  30 mg Oral QHS PRN    dextrose 5% infusion  125 mL/hr IntraVENous CONTINUOUS    PARoxetine (PAXIL) tablet 40 mg  40 mg Oral DAILY    risperiDONE (RisperDAL) tablet 3 mg  3 mg Oral QPM    scopolamine (TRANSDERM-SCOP) 1.5 mg  1.5 mg TransDERmal Q72H    chlorhexidine (PERIDEX) 0.12 % mouthwash 15 mL  15 mL Oral BID    arformoterol (BROVANA) neb solution 15 mcg  15 mcg Nebulization BID RT    albuterol (PROVENTIL VENTOLIN) nebulizer solution 2.5 mg  2.5 mg Nebulization Q4H PRN    enoxaparin (LOVENOX) injection 40 mg  40 mg SubCUTAneous Q24H    LORazepam (ATIVAN) injection 2 mg  2 mg IntraVENous PRN    sodium chloride (NS) flush 5-10 mL  5-10 mL IntraVENous Q8H    sodium chloride (NS) flush 5-10 mL  5-10 mL IntraVENous PRN    budesonide (PULMICORT) 500 mcg/2 ml nebulizer suspension  500 mcg Nebulization BID RT    piperacillin-tazobactam (ZOSYN) 3.375 g in 0.9% sodium chloride (MBP/ADV) 100 mL  3.375 g IntraVENous Q8H       Labs:  ABG No results for input(s): PHI, PCO2I, PO2I, HCO3I, SO2I, FIO2I in the last 72 hours.      CBC Recent Labs      07/18/17   0358  07/17/17   0350   WBC  10.4  10.8   HGB  14.2  11.5*   HCT  41.5  33.4*   PLT  214  189   MCV  95.2  93.8   MCH  32.6  02.0        Metabolic  Panel Recent Labs      07/19/17   0608  07/18/17   0358  07/17/17   0350  07/16/17   1900  07/16/17   1500  07/16/17   1124   NA  138  136  138  134*  131*  130*   K  4.6  3.5  3.3*  3.6  3.8  3.9   CL  100  97  98  94*  91*  91*   CO2  31  33*  35*  36*  35*  35*   GLU  68  105*  103*  141*  116*  117*   BUN  5*  6  3*  5*  4*  4*   CREA  0.60*  0.60*  0.52*  0.64*  0.63*  0.47*   CA  8.3*  8.1*  7.6*  7.7*  7.9* 7.7*   MG   --    --   2.0   --    --    --    PHOS   --    --   2.1*  1.5*  1.5*  2.3*   ALB   --    --    --   2.7*  2.7*  2.5*        Pertinent Labs                RUDY Barr  7/19/2017

## 2017-07-19 NOTE — PROGRESS NOTES
NUTRITION- DIETETIC tECHnICIAN    Pt seen for:       [x]                  Rescreen  []                  Food preferences/tolerances  []                  Food Allergies  []                  PO intake check  []                  Supplements  []                  Diet order clarification  []                  Education  []                  Other     Rescreen:    [x]                  Not at Nutrition Risk, rescreen per screening protocol  []                  At Nutrition Risk- RD referral         SUBJECTIVE/OBJECTIVE:     Information obtained from:  patient & chart     Diet:  Regular    Intake: excellent    Patient Vitals for the past 100 hrs:   % Diet Eaten   07/19/17 0925 100 %   07/18/17 0800 80 %   07/17/17 1907 100 %   07/17/17 1608 100 %   07/17/17 1200 100 %   07/17/17 0800 100 %       Weight Changes: Wt Readings from Last 3 Encounters:   07/19/17 53.1 kg (117 lb 1 oz)   07/12/17 56.7 kg (125 lb)   07/10/17 56.2 kg (124 lb)       Problems Identified      [x]                  Patient eating 100%. Was at nutritional risk on previous visit earlier this month and weight now 117 lbs. Patient refused oral nutritional supplements but is willing to accept snacks between all meals. Suggest adding Magic Cup frozen supplement (9 grams protein 290 kcal) with lunch and dinner to assist with total kcal.  Patient assures me he is eating 3 meals per day.    []                  Specified food preferences   []                  Dislikes supplements              []                  Allergies:   []                  Difficulty chewing      []                  Dentition    []                  Nausea/Vomiting   []                  Constipation   []                  Diarrhea    PLAN:     [x]                   Continue current diet and encourage intake  [x]                   Add rotating snacks between all meals   [x]                   Add Magic Cup at lunch and dinner daily  []                   Modify diet for food allergies  [] Adjust texture due to difficulty chewing   []                   Educated patient  []                   RD Referral  [x]                   Rescreen per screening protocol          Lila León DTR

## 2017-07-19 NOTE — PROGRESS NOTES
Hospitalist Progress Note  Kusum Arrington MD  Office: [phone]        Date of Service:  2017  NAME:  Speedy Moore  :  1960  MRN:  [id number]      Admission Summary:   61year old male with history of paranoid schizophrenia, MR, extrapyramidal disease, COPD, hyponatremia, anxiety, tobacco dependence presented on 17 with altered mental status. He had recently been hospitalized from 17-17 with similar severe hyponatremia. He had seemingly developed seizure-like activity at his group home ([facility]) and was subsequently coded and intubated. He was subsequently brought to [facility] for further management. Interval history / Subjective:   Up working with PT. Asking if he can go home. Taken off oxygen     Assessment & Plan:     1. Acute on chronic hypercapneic respiratory failure   - with underlying COPD, possibly with element of COPD exacerbation / bronchospasm contributing   - CT chest  - Prominent chronic lung changes, some lung nodularity and pleural plaquing. Incidental gastric distention.   - CTA chest  - No evidence of acute pulmonary embolus. Patchy bilateral lower lobe airspace disease may represent atelectasis, pneumonia, or aspiration. Bilateral bronchial wall thickening is compatible with asthma or bronchitis; radiographic follow-up is recommended to assure resolution. Emphysema. - extubated    - continue bronchodilator therapy with DuoNeb, Pulmicort,predniosne. - started on scopolamine for secretions   - empiric antibiotic coverage with Zosyn  -Wean off oxygen. 2.  Acute metabolic encephalopathy   - suspect this is secondary to his hyponatremia, possibly with hyponatremia-induced seizures   - CT head  - No acute findings suspected. - EEG completed, pending interpretation   - corrected hyponatremia   -Alert and oriented     3.   Critical hyponatremia   - underlying etiology is unclear, likely multifactorial with poor solute intake, likely some element of excess ADH release; given previous rapid improvement on prior admission, doubtful that his psychiatric medications are the culprit alone   - Na+ 106 upon admission, improving   - slow correction of Na+, targeting 6-10 mEq increase per day   - AM cortisol 10.9, but he is also on Solu-Medrol   - nephrology following and d/c iv fluid  -Na normal stable for few days. 4.  Hypotension, presumed sepsis syndrome   - suspect this is secondary to propofol, though alternatively could be secondary to sepsis in setting of pneumonia with leukocytosis   - blood cultures 7/13 - no growth thus far   - respiratory culture 7/14 - heavy normal respiratory denise thus far   - on empiric coverage with Zosyn    5. Hypokalemia   - replete as needed    6. Paranoid schizophrenia   - he was on risperidone, paroxetine, benztropine prior to admission   - holding now while he is intubated, sedated    7. Moderate protein-calorie malnutrition   - BMI 14.5   - nutrition consult    8. Leukocytosis. Resolved   - suspect some of this may be due to steroid effect, improving   - see above, also treating empirically for sepsis      Code status: FULL  DVT prophylaxis: Lovenox    Care Plan discussed with: Nurse  Disposition: [facility] [place] Adult home hopefully tomorrow.      Hospital Problems  Date Reviewed: 7/10/2017          Codes Class Noted POA    * (Principal)Acute respiratory failure ([facility] ICD-10-CM: J96.00  ICD-9-CM: 518.81  7/13/2017 Unknown        Acute encephalopathy ICD-10-CM: G93.40  ICD-9-CM: 348.30  7/5/2017 Yes        Hyponatremia ICD-10-CM: E87.1  ICD-9-CM: 276.1  7/4/2017 Yes        Chronic obstructive pulmonary disease ([facility] ICD-10-CM: J44.9  ICD-9-CM: 403  3/29/2016 Yes        Tobacco dependence ICD-10-CM: F17.200  ICD-9-CM: 305.1  3/29/2016 Yes        Paranoid schizophrenia ([facility] ICD-10-CM: F20.0  ICD-9-CM: 295.30  3/29/2016 Yes                Review of Systems: Review of systems not obtained due to patient factors. Vital Signs:    Last 24hrs VS reviewed since prior progress note. Most recent are:  Visit Vitals    /84 (BP 1 Location: Right arm, BP Patient Position: At rest)    Pulse 97    Temp 98.1 °F (36.7 °C)    Resp 16    Ht 6' 0.01\" (1.829 m)    Wt 53.1 kg (117 lb 1 oz)    SpO2 97%    BMI 15.87 kg/m2         Intake/Output Summary (Last 24 hours) at 07/19/17 1603  Last data filed at 07/19/17 1424   Gross per 24 hour   Intake             1680 ml   Output             2250 ml   Net             -570 ml        Physical Examination:             Constitutional:  Alert and oriented. Not in distress. ENT:  Oral mucous moist, oropharynx benign. Neck supple,    Resp:  CTA bilaterally. No wheezing/rhonchi/rales. No accessory muscle use   CV:  Regular rhythm, normal rate, no murmurs, gallops, rubs    GI:  Soft, non distended, non tender. normoactive bowel sounds, no hepatosplenomegaly     Musculoskeletal:  No edema, warm, 2+ pulses throughout    Neurologic: Alert and oriented. Non focal exam.     Skin:  Good turgor, no rashes or ulcers  Hematologic/Lymphatic/Immunlogic:  No jaundice nor lymph node swelling       Data Review:    Review and/or order of clinical lab test      Labs:     Recent Labs      07/18/17   0358  07/17/17   0350   WBC  10.4  10.8   HGB  14.2  11.5*   HCT  41.5  33.4*   PLT  214  189     Recent Labs      07/19/17   0608  07/18/17   0358  07/17/17   0350  07/16/17   1900   NA  138  136  138  134*   K  4.6  3.5  3.3*  3.6   CL  100  97  98  94*   CO2  31  33*  35*  36*   BUN  5*  6  3*  5*   CREA  0.60*  0.60*  0.52*  0.64*   GLU  68  105*  103*  141*   CA  8.3*  8.1*  7.6*  7.7*   MG   --    --   2.0   --    PHOS   --    --   2.1*  1.5*     Recent Labs      07/16/17   1900   ALB  2.7*     No results for input(s): INR, PTP, APTT in the last 72 hours.     No lab exists for component: INREXT, INREXT   No results for input(s): FE, TIBC, PSAT, FERR in the last 72 hours. No results found for: FOL, RBCF   No results for input(s): PH, PCO2, PO2 in the last 72 hours. No results for input(s): CPK, CKNDX, TROIQ in the last 72 hours.     No lab exists for component: CPKMB  Lab Results   Component Value Date/Time    Cholesterol, total 102 05/15/2017 11:11 AM    HDL Cholesterol 48 05/15/2017 11:11 AM    LDL, calculated 46 05/15/2017 11:11 AM    Triglyceride 41 05/15/2017 11:11 AM     Lab Results   Component Value Date/Time    Glucose (POC) 132 07/04/2017 04:37 PM     Lab Results   Component Value Date/Time    Color YELLOW/STRAW 07/13/2017 04:11 PM    Appearance CLOUDY 07/13/2017 04:11 PM    Specific gravity 1.016 07/13/2017 04:11 PM    pH (UA) 6.0 07/13/2017 04:11 PM    Protein 100 07/13/2017 04:11 PM    Glucose 250 07/13/2017 04:11 PM    Ketone 15 07/13/2017 04:11 PM    Bilirubin NEGATIVE  07/13/2017 04:11 PM    Urobilinogen 0.2 07/13/2017 04:11 PM    Nitrites NEGATIVE  07/13/2017 04:11 PM    Leukocyte Esterase NEGATIVE  07/13/2017 04:11 PM    Epithelial cells FEW 07/13/2017 04:11 PM    Bacteria NEGATIVE  07/13/2017 04:11 PM    WBC 0-4 07/13/2017 04:11 PM    RBC 10-20 07/13/2017 04:11 PM         Medications Reviewed:     Current Facility-Administered Medications   Medication Dose Route Frequency    albuterol-ipratropium (DUO-NEB) 2.5 MG-0.5 MG/3 ML  3 mL Nebulization TID RT    predniSONE (DELTASONE) tablet 40 mg  40 mg Oral DAILY WITH BREAKFAST    temazepam (RESTORIL) capsule 30 mg  30 mg Oral QHS PRN    dextrose 5% infusion  125 mL/hr IntraVENous CONTINUOUS    PARoxetine (PAXIL) tablet 40 mg  40 mg Oral DAILY    risperiDONE (RisperDAL) tablet 3 mg  3 mg Oral QPM    scopolamine (TRANSDERM-SCOP) 1.5 mg  1.5 mg TransDERmal Q72H    chlorhexidine (PERIDEX) 0.12 % mouthwash 15 mL  15 mL Oral BID    arformoterol (BROVANA) neb solution 15 mcg  15 mcg Nebulization BID RT    albuterol (PROVENTIL VENTOLIN) nebulizer solution 2.5 mg  2.5 mg Nebulization Q4H PRN    enoxaparin (LOVENOX) injection 40 mg  40 mg SubCUTAneous Q24H    LORazepam (ATIVAN) injection 2 mg  2 mg IntraVENous PRN    sodium chloride (NS) flush 5-10 mL  5-10 mL IntraVENous Q8H    sodium chloride (NS) flush 5-10 mL  5-10 mL IntraVENous PRN    budesonide (PULMICORT) 500 mcg/2 ml nebulizer suspension  500 mcg Nebulization BID RT    piperacillin-tazobactam (ZOSYN) 3.375 g in 0.9% sodium chloride (MBP/ADV) 100 mL  3.375 g IntraVENous Q8H     ______________________________________________________________________  EXPECTED LENGTH OF STAY: 4d 21h  ACTUAL LENGTH OF STAY:          6                 Chavo Dacosta MD

## 2017-07-19 NOTE — PROGRESS NOTES
Primary Nurse Femi Anand RN and Jennifer Lind RN performed a dual skin assessment on this patient No impairment noted  Sanya score is 20

## 2017-07-19 NOTE — PROGRESS NOTES
Bedside and Written shift change report given to [facility]   (oncoming nurse) by Elijah Perez (offgoing nurse).  Report included the following information Kardex, OR Summary, Intake/Output, MAR, Accordion, Med Rec Status and Cardiac Rhythm ST.

## 2017-07-19 NOTE — PROGRESS NOTES
Problem: Self Care Deficits Care Plan (Adult)  Goal: *Acute Goals and Plan of Care (Insert Text)  Occupational Therapy Goals  Initiated 7/18/2017  1. Patient will perform showering ADLs with modified independence within 7 day(s). 2. Patient will perform functional mobility in room to gather ADL items high and low 4/4 without LOB with modified independence within 7 day(s). 3. Patient will perform shower transfer with supervision/set-up within 7 day(s). 4. Patient will perform standing cardiopulmonary therapeutic exercise/activities to increase independence with ADLs with modified independence for 5 minutes within 7 day(s). OCCUPATIONAL THERAPY TREATMENT  Patient: Lesley Hernandez (60 y.o. male)  Date: 7/19/2017  Diagnosis: Acute respiratory failure (HCC) Acute respiratory failure (HCC)       Precautions: Seizure      ASSESSMENT:  Patient demonstrated standing at sink to shave with modified independence, functional mobility in room with supervision no LOB this session, standing tolerance 17 mins, sats room air per physician request 94%, , /101 all during standing activity; at rest 97% room air, , /94. Patient to discharge back to group home and is very excited to do so. Progression toward goals:  [X]       Improving appropriately and progressing toward goals  [ ]       Improving slowly and progressing toward goals  [ ]       Not making progress toward goals and plan of care will be adjusted       PLAN:  Patient continues to benefit from skilled intervention to address the above impairments. Continue treatment per established plan of care. Discharge Recommendations:  None  Further Equipment Recommendations for Discharge:  None noted       SUBJECTIVE:   Patient stated Nonnie Parker would be good.       OBJECTIVE DATA SUMMARY:   Cognitive/Behavioral Status:  Neurologic State: Alert  Orientation Level: Oriented to place;Oriented to person;Oriented to situation;Disoriented to time  Cognition: Follows commands;Poor safety awareness;Decreased attention/concentration              Functional Mobility and Transfers for ADLs:  Bed Mobility:  Supine to Sit: Independent  Sit to Supine:  (NT)  Scooting: Independent     Transfers:  Sit to Stand: Supervision  Functional Transfers  Bathroom Mobility: Supervision/set up (no LOB this time with door or lights, increased time only) IV management only     Balance:  Sitting: Intact  Sitting - Static: Good (unsupported)  Sitting - Dynamic: Good (unsupported)  Standing: Impaired; Without support  Standing - Static: Good  Standing - Dynamic : Fair     ADL Intervention:        Grooming  Washing Face: Modified independent  Washing Hands: Modified independent  Brushing Teeth: Modified independent  Shaving: Modified independent   Patient demonstrated standing to speak with physician 3 mins and at sink 14 mins. Neuro Re-Education:           Therapeutic Exercises:      Pain:  Pain Scale 1: Numeric (0 - 10)  Pain Intensity 1: 0              Activity Tolerance:   sats room air per physician request 94%, , /101 all during standing activity; at rest 97% room air, , /94  Please refer to the flowsheet for vital signs taken during this treatment.   After treatment:   [X] Patient left in no apparent distress sitting up in chair  [ ] Patient left in no apparent distress in bed  [X] Call bell left within reach  [X] Nursing notified  [ ] Caregiver present  [ ] Bed alarm activated      COMMUNICATION/COLLABORATION:   The patients plan of care was discussed with: Registered Nurse and Franky Glass  Time Calculation: 29 mins

## 2017-07-20 VITALS
HEIGHT: 72 IN | HEART RATE: 105 BPM | OXYGEN SATURATION: 95 % | SYSTOLIC BLOOD PRESSURE: 140 MMHG | BODY MASS INDEX: 15.86 KG/M2 | WEIGHT: 117.06 LBS | RESPIRATION RATE: 18 BRPM | DIASTOLIC BLOOD PRESSURE: 87 MMHG | TEMPERATURE: 97.2 F

## 2017-07-20 PROBLEM — G93.40 ACUTE ENCEPHALOPATHY: Status: RESOLVED | Noted: 2017-07-05 | Resolved: 2017-07-20

## 2017-07-20 PROBLEM — J96.00 ACUTE RESPIRATORY FAILURE (HCC): Status: RESOLVED | Noted: 2017-07-13 | Resolved: 2017-07-20

## 2017-07-20 PROBLEM — E87.1 HYPONATREMIA: Status: RESOLVED | Noted: 2017-07-04 | Resolved: 2017-07-20

## 2017-07-20 PROCEDURE — 74011000250 HC RX REV CODE- 250: Performed by: HOSPITALIST

## 2017-07-20 PROCEDURE — 97116 GAIT TRAINING THERAPY: CPT

## 2017-07-20 PROCEDURE — 94640 AIRWAY INHALATION TREATMENT: CPT

## 2017-07-20 PROCEDURE — 74011250637 HC RX REV CODE- 250/637: Performed by: STUDENT IN AN ORGANIZED HEALTH CARE EDUCATION/TRAINING PROGRAM

## 2017-07-20 PROCEDURE — 74011000258 HC RX REV CODE- 258: Performed by: FAMILY MEDICINE

## 2017-07-20 PROCEDURE — 74011000250 HC RX REV CODE- 250: Performed by: FAMILY MEDICINE

## 2017-07-20 PROCEDURE — 74011636637 HC RX REV CODE- 636/637: Performed by: INTERNAL MEDICINE

## 2017-07-20 PROCEDURE — 74011250636 HC RX REV CODE- 250/636: Performed by: FAMILY MEDICINE

## 2017-07-20 RX ORDER — IPRATROPIUM BROMIDE AND ALBUTEROL SULFATE 2.5; .5 MG/3ML; MG/3ML
3 SOLUTION RESPIRATORY (INHALATION)
Status: DISCONTINUED | OUTPATIENT
Start: 2017-07-20 | End: 2017-07-20 | Stop reason: HOSPADM

## 2017-07-20 RX ORDER — PREDNISONE 10 MG/1
TABLET ORAL
Qty: 32 TAB | Refills: 0 | Status: SHIPPED | OUTPATIENT
Start: 2017-07-20 | End: 2017-08-04 | Stop reason: ALTCHOICE

## 2017-07-20 RX ADMIN — BUDESONIDE 500 MCG: 0.5 INHALANT RESPIRATORY (INHALATION) at 07:39

## 2017-07-20 RX ADMIN — PREDNISONE 40 MG: 20 TABLET ORAL at 07:23

## 2017-07-20 RX ADMIN — Medication 10 ML: at 06:19

## 2017-07-20 RX ADMIN — PAROXETINE HYDROCHLORIDE 40 MG: 20 TABLET, FILM COATED ORAL at 10:02

## 2017-07-20 RX ADMIN — CHLORHEXIDINE GLUCONATE 15 ML: 1.2 RINSE ORAL at 10:05

## 2017-07-20 RX ADMIN — PIPERACILLIN SODIUM,TAZOBACTAM SODIUM 3.38 G: 3; .375 INJECTION, POWDER, FOR SOLUTION INTRAVENOUS at 06:19

## 2017-07-20 RX ADMIN — IPRATROPIUM BROMIDE AND ALBUTEROL SULFATE 3 ML: .5; 3 SOLUTION RESPIRATORY (INHALATION) at 07:39

## 2017-07-20 NOTE — DISCHARGE SUMMARY
Discharge Summary       PATIENT ID: Jovanna Sawant  MRN: [id number]   YOB: 1960    DATE OF ADMISSION: 7/13/2017  2:42 PM    DATE OF DISCHARGE: 7/20/2017  PRIMARY CARE PROVIDER: Maribel Matute DO     ATTENDING PHYSICIAN: Darrion Mcbride MD  DISCHARGING PROVIDER: Darrion Mcbride MD    To contact this individual call [phone] and ask the  to page. If unavailable ask to be transferred the Adult Hospitalist Department. CONSULTATIONS: IP CONSULT TO PULMONOLOGY    PROCEDURES/SURGERIES: * No surgery found *    ADMITTING [address] [place] [address] COURSE:         Admission Summary:   61year old male with history of paranoid schizophrenia, MR, extrapyramidal disease, COPD, hyponatremia, anxiety, tobacco dependence presented on 7/13/17 with altered mental status. He had recently been hospitalized from 7/4/17-7/7/17 with similar severe hyponatremia. He had seemingly developed seizure-like activity at his group home ([facility]) and was subsequently coded and intubated. He was subsequently brought to [facility] for further management. Interval history / Subjective:   Feeling well,no complaints. Breathing fine. Aunenrique Saha to go home      Assessment & Plan:      1. Acute on chronic hypercapneic respiratory failure. Resolved   - with underlying COPD, possibly with element of COPD exacerbation / bronchospasm contributing   - CT chest 7/13 - Prominent chronic lung changes, some lung nodularity and pleural plaquing. Incidental gastric distention.   - CTA chest 7/13 - No evidence of acute pulmonary embolus. Patchy bilateral lower lobe airspace disease may represent atelectasis, pneumonia, or aspiration. Bilateral bronchial wall thickening is compatible with asthma or bronchitis; radiographic follow-up is recommended to assure resolution. Emphysema. - extubated 7/17   - continue bronchodilator therapy with DuoNeb, Pulmicort,predniosne. - Weaned off oxygen. Discharge on his usual nebs and taper off prednisone. 2.  Acute metabolic encephalopathy. Resolved   - suspect this is secondary to his hyponatremia, possibly with hyponatremia-induced seizures   - CT head 7/13 - No acute findings suspected. - EEG completed, pending interpretation   - corrected hyponatremia   -Alert and oriented 7/18     3. 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 resolved. Nephrology followed  Patient advised the dangerous of excessive water/fluids. Recommended PCP follow up in a week with blood work     4. Hypotension, presumed sepsis syndrome. Resolved   - Suspect this might have been from effect of propofol, though alternatively could be secondary to sepsis in setting of pneumonia with leukocytosis   - blood cultures 7/13 - no growth thus far   - respiratory culture 7/14 - heavy normal respiratory denise thus far   - Completed 7 days of zosyn. 5. Hypokalemia. Resolved   - replete as needed     6. Paranoid schizophrenia   - Resume home medications     7. Moderate protein-calorie malnutrition   - BMI 14.5   - nutrition consult     8. Leukocytosis. Resolved      Disposition:Discharged to  [address] [place] [address] Adult home today               [address] [place] [address] E / PLAN:      See above       PENDING TEST RESULTS:   At the time of discharge the following test results are still pending: none    FOLLOW UP APPOINTMENTS:    Follow-up Information     Follow up With Details Comments Contact Alessandra Hill DO In 1 week  [address] [facility]  [address] [facility]  [phone]             ADDITIONAL CARE RECOMMENDATIONS:     DIET: Cardiac Diet and Diabetic Diet    ACTIVITY: Activity as tolerated    WOUND CARE: NA    EQUIPMENT needed: NA      DISCHARGE MEDICATIONS:  Current Discharge Medication List      START taking these medications    Details   predniSONE (DELTASONE) 10 mg tablet Prednisone 10 mg:Take 4 tablets for 3 days,then 3 tablets for 3 days,then 2 tablets for 3 days,1 tablet for 3 days,1/2 tablet for 3 days.   Qty: 32 Tab, Refills: 0         CONTINUE these medications which have NOT CHANGED    Details   fluticasone-vilanterol (BREO ELLIPTA) 100-25 mcg/dose inhaler Take 1 Puff by inhalation daily. Qty: 1 Inhaler, Refills: 0      PARoxetine (PAXIL) 40 mg tablet Take 40 mg by mouth daily. Indications: major depressive disorder      risperiDONE (RISPERDAL) 3 mg tablet Take 3 mg by mouth every evening. temazepam (RESTORIL) 30 mg capsule Take 30 mg by mouth every evening. ALPRAZolam (XANAX) 0.5 mg tablet Take 0.5 mg by mouth two (2) times daily as needed for Anxiety. benztropine (COGENTIN) 2 mg tablet Take 2 mg by mouth nightly as needed. guaiFENesin (Q-TUSSIN) 100 mg/5 mL liquid Take 200 mg by mouth every four (4) hours as needed for Cough. albuterol (VENTOLIN HFA) 90 mcg/actuation inhaler Take 2 Puffs by inhalation every four (4) hours as needed for Shortness of Breath. albuterol (PROVENTIL VENTOLIN) 2.5 mg /3 mL (0.083 %) nebulizer solution 2.5 mg by Nebulization route every six (6) hours as needed for Wheezing. STOP taking these medications       predniSONE (STERAPRED DS) 10 mg dose pack Comments:   Reason for Stopping:         ibuprofen (MOTRIN) 800 mg tablet Comments:   Reason for Stopping:                 NOTIFY YOUR PHYSICIAN FOR ANY OF THE FOLLOWING:   Fever over 101 degrees for 24 hours. Chest pain, shortness of breath, fever, chills, nausea, vomiting, diarrhea, change in mentation, falling, weakness, bleeding. Severe pain or pain not relieved by medications. Or, any other signs or symptoms that you may have questions about.     DISPOSITION:   x Home With:group home,[facility]   OT  PT  HH  RN       Long term SNF/Inpatient Rehab    Independent/assisted living    Hospice    Other:       PATIENT CONDITION AT DISCHARGE:     Functional status    Poor     Deconditioned    x Independent      Cognition    x Lucid     Forgetful     Dementia      Catheters/lines (plus indication)    Samantha PICC     PEG    x None      Code status    x Full code     DNR      PHYSICAL EXAMINATION AT DISCHARGE:     Visit Vitals    /79 (BP 1 Location: Right arm, BP Patient Position: At rest)    Pulse (!) 108    Temp 97.7 °F (36.5 °C)    Resp 18    Ht 6' 0.01\" (1.829 m)    Wt 53.1 kg (117 lb 1 oz)    SpO2 94%    BMI 15.87 kg/m2    O2 Flow Rate (L/min): 3 l/min O2 Device: Room air    Temp (24hrs), Av.8 °F (36.6 °C), Min:97.3 °F (36.3 °C), Max:98.7 °F (37.1 °C)    701 -  190  In: 1940 [P.O.:1940]  Out: 2155 [Urine:2155]   1901 -  0700  In: 4923 [P.O.:1680]  Out: 0081 [BRHDJ:6039]        Constitutional:  Alert and oriented. Not in distress. ENT:  Oral mucous moist, oropharynx benign. Neck supple,    Resp:  CTA bilaterally. No wheezing/rhonchi/rales. No accessory muscle use   CV:  Regular rhythm, normal rate, no murmurs, gallops, rubs    GI:  Soft, non distended, non tender. normoactive bowel sounds, no hepatosplenomegaly     Musculoskeletal:  No edema, warm, 2+ pulses throughout    Neurologic: Alert and oriented. Non focal exam.                                             Skin:  Good turgor, no rashes or ulcers  Hematologic/Lymphatic/Immunlogic:  No jaundice nor lymph node swelling           CHRONIC MEDICAL DIAGNOSES:  Problem List as of 2017  Date Reviewed: 7/10/2017          Codes Class Noted - Resolved    Seizure ([facility] ICD-10-CM: R56.9  ICD-9-CM: 780.39  2017 - Present        Benign prostatic hyperplasia without lower urinary tract symptoms ICD-10-CM: N40.0  ICD-9-CM: 600.00  5/15/2017 - Present        Underweight ICD-10-CM: R63.6  ICD-9-CM: 783.22  3/29/2016 - Present        Poor dentition ICD-10-CM: K08.9  ICD-9-CM: 525.9  3/29/2016 - Present        Insomnia ICD-10-CM: G47.00  ICD-9-CM: 780.52  3/29/2016 - Present        Bronchitis, chronic ([facility] ICD-10-CM: Tadeo Police  ICD-9-CM: 491.9  2014 - Present        Depression ICD-10-CM: F32.9  ICD-9-CM: 900  2012 - Present * (Principal)RESOLVED: Acute respiratory failure ([facility] ICD-10-CM: J96.00  ICD-9-CM: 518.81  7/13/2017 - 7/20/2017        RESOLVED: Acute encephalopathy ICD-10-CM: G93.40  ICD-9-CM: 348.30  7/5/2017 - 7/20/2017        RESOLVED: Hyponatremia ICD-10-CM: E87.1  ICD-9-CM: 276.1  7/4/2017 - 7/20/2017        RESOLVED: Chronic obstructive pulmonary disease ([facility] ICD-10-CM: J44.9  ICD-9-CM: 496  3/29/2016 - 7/20/2017        RESOLVED: Tobacco dependence ICD-10-CM: F17.200  ICD-9-CM: 305.1  3/29/2016 - 7/20/2017        RESOLVED: Paranoid schizophrenia ([facility] ICD-10-CM: F20.0  ICD-9-CM: 295.30  3/29/2016 - 7/20/2017              Greater than 30 minutes were spent with the patient on counseling and coordination of care    Signed:    Teto Holt MD  7/20/2017  1:02 PM

## 2017-07-20 NOTE — PROGRESS NOTES
Hospitalist Progress Note  Tahir Mclean MD  Office: [phone]        Date of Service:  2017  NAME:  Ant Jang  :  1960  MRN:  [id number]      Admission Summary:   61year old male with history of paranoid schizophrenia, MR, extrapyramidal disease, COPD, hyponatremia, anxiety, tobacco dependence presented on 17 with altered mental status. He had recently been hospitalized from 17-17 with similar severe hyponatremia. He had seemingly developed seizure-like activity at his group home ([facility]) and was subsequently coded and intubated. He was subsequently brought to [facility] for further management. Interval history / Subjective:   Feeling well,no complaints. Breathing fine. Pallavi Distance to go home     Assessment & Plan:     1. Acute on chronic hypercapneic respiratory failure   - with underlying COPD, possibly with element of COPD exacerbation / bronchospasm contributing   - CT chest  - Prominent chronic lung changes, some lung nodularity and pleural plaquing. Incidental gastric distention.   - CTA chest  - No evidence of acute pulmonary embolus. Patchy bilateral lower lobe airspace disease may represent atelectasis, pneumonia, or aspiration. Bilateral bronchial wall thickening is compatible with asthma or bronchitis; radiographic follow-up is recommended to assure resolution. Emphysema. - extubated    - continue bronchodilator therapy with DuoNeb, Pulmicort,predniosne. - started on scopolamine for secretions   - empiric antibiotic coverage with Zosyn,7 days will stop on discharge.  -Wean off oxygen. 2.  Acute metabolic encephalopathy   - suspect this is secondary to his hyponatremia, possibly with hyponatremia-induced seizures   - CT head  - No acute findings suspected. - EEG completed, pending interpretation   - corrected hyponatremia   -Alert and oriented     3.   Critical hyponatremia   - underlying etiology is unclear, likely multifactorial with poor solute intake, likely some element of excess ADH release; given previous rapid improvement on prior admission, doubtful that his psychiatric medications are the culprit alone   - Na+ 106 upon admission, improving   - slow correction of Na+, targeting 6-10 mEq increase per day   - AM cortisol 10.9, but he is also on Solu-Medrol   - nephrology following and d/c iv fluid  -Na normal stable for few days. 4.  Hypotension, presumed sepsis syndrome   - suspect this is secondary to propofol, though alternatively could be secondary to sepsis in setting of pneumonia with leukocytosis   - blood cultures 7/13 - no growth thus far   - respiratory culture 7/14 - heavy normal respiratory denise thus far   - on empiric coverage with Zosyn    5. Hypokalemia   - replete as needed    6. Paranoid schizophrenia   - he was on risperidone, paroxetine, benztropine prior to admission   - holding now while he is intubated, sedated    7. Moderate protein-calorie malnutrition   - BMI 14.5   - nutrition consult    8. Leukocytosis. Resolved   - suspect some of this may be due to steroid effect, improving   - see above, also treating empirically for sepsis      Code status: FULL  DVT prophylaxis: Lovenox    Care Plan discussed with: Nurse  Disposition: [address] [place] [address] Adult home today . Taken off oxygen. Monitor his spo2 was mid 90 on room air,not sure why he was put back on oxygen,no recorded hypoxia on vitals.      Hospital Problems  Date Reviewed: 7/10/2017          Codes Class Noted POA    * (Principal)Acute respiratory failure ([facility] ICD-10-CM: J96.00  ICD-9-CM: 518.81  7/13/2017 Unknown        Acute encephalopathy ICD-10-CM: G93.40  ICD-9-CM: 348.30  7/5/2017 Yes        Hyponatremia ICD-10-CM: E87.1  ICD-9-CM: 276.1  7/4/2017 Yes        Chronic obstructive pulmonary disease ([facility] ICD-10-CM: J44.9  ICD-9-CM: 994  3/29/2016 Yes        Tobacco dependence ICD-10-CM: F17.200  ICD-9-CM: 305.1  3/29/2016 Yes        Paranoid schizophrenia ([facility] ICD-10-CM: F20.0  ICD-9-CM: 295.30  3/29/2016 Yes                Review of Systems:   Review of systems not obtained due to patient factors. Vital Signs:    Last 24hrs VS reviewed since prior progress note. Most recent are:  Visit Vitals    /62 (BP 1 Location: Left arm, BP Patient Position: At rest)    Pulse 88    Temp 97.9 °F (36.6 °C)    Resp 20    Ht 6' 0.01\" (1.829 m)    Wt 53.1 kg (117 lb 1 oz)    SpO2 95%    BMI 15.87 kg/m2         Intake/Output Summary (Last 24 hours) at 07/20/17 0803  Last data filed at 07/20/17 0724   Gross per 24 hour   Intake             1270 ml   Output             4105 ml   Net            -2835 ml        Physical Examination:             Constitutional:  Alert and oriented. Not in distress. ENT:  Oral mucous moist, oropharynx benign. Neck supple,    Resp:  CTA bilaterally. No wheezing/rhonchi/rales. No accessory muscle use   CV:  Regular rhythm, normal rate, no murmurs, gallops, rubs    GI:  Soft, non distended, non tender. normoactive bowel sounds, no hepatosplenomegaly     Musculoskeletal:  No edema, warm, 2+ pulses throughout    Neurologic: Alert and oriented. Non focal exam.     Skin:  Good turgor, no rashes or ulcers  Hematologic/Lymphatic/Immunlogic:  No jaundice nor lymph node swelling       Data Review:    Review and/or order of clinical lab test      Labs:     Recent Labs      07/18/17   0358   WBC  10.4   HGB  14.2   HCT  41.5   PLT  214     Recent Labs      07/19/17   0608  07/18/17   0358   NA  138  136   K  4.6  3.5   CL  100  97   CO2  31  33*   BUN  5*  6   CREA  0.60*  0.60*   GLU  68  105*   CA  8.3*  8.1*     No results for input(s): SGOT, GPT, ALT, AP, TBIL, TBILI, TP, ALB, GLOB, GGT, AML, LPSE in the last 72 hours. No lab exists for component: AMYP, HLPSE  No results for input(s): INR, PTP, APTT in the last 72 hours.     No lab exists for component: INREXT, INREXT   No results for input(s): FE, TIBC, PSAT, FERR in the last 72 hours. No results found for: FOL, RBCF   No results for input(s): PH, PCO2, PO2 in the last 72 hours. No results for input(s): CPK, CKNDX, TROIQ in the last 72 hours.     No lab exists for component: CPKMB  Lab Results   Component Value Date/Time    Cholesterol, total 102 05/15/2017 11:11 AM    HDL Cholesterol 48 05/15/2017 11:11 AM    LDL, calculated 46 05/15/2017 11:11 AM    Triglyceride 41 05/15/2017 11:11 AM     Lab Results   Component Value Date/Time    Glucose (POC) 132 07/04/2017 04:37 PM     Lab Results   Component Value Date/Time    Color YELLOW/STRAW 07/13/2017 04:11 PM    Appearance CLOUDY 07/13/2017 04:11 PM    Specific gravity 1.016 07/13/2017 04:11 PM    pH (UA) 6.0 07/13/2017 04:11 PM    Protein 100 07/13/2017 04:11 PM    Glucose 250 07/13/2017 04:11 PM    Ketone 15 07/13/2017 04:11 PM    Bilirubin NEGATIVE  07/13/2017 04:11 PM    Urobilinogen 0.2 07/13/2017 04:11 PM    Nitrites NEGATIVE  07/13/2017 04:11 PM    Leukocyte Esterase NEGATIVE  07/13/2017 04:11 PM    Epithelial cells FEW 07/13/2017 04:11 PM    Bacteria NEGATIVE  07/13/2017 04:11 PM    WBC 0-4 07/13/2017 04:11 PM    RBC 10-20 07/13/2017 04:11 PM         Medications Reviewed:     Current Facility-Administered Medications   Medication Dose Route Frequency    albuterol-ipratropium (DUO-NEB) 2.5 MG-0.5 MG/3 ML  3 mL Nebulization TID RT    predniSONE (DELTASONE) tablet 40 mg  40 mg Oral DAILY WITH BREAKFAST    temazepam (RESTORIL) capsule 30 mg  30 mg Oral QHS PRN    PARoxetine (PAXIL) tablet 40 mg  40 mg Oral DAILY    risperiDONE (RisperDAL) tablet 3 mg  3 mg Oral QPM    scopolamine (TRANSDERM-SCOP) 1.5 mg  1.5 mg TransDERmal Q72H    chlorhexidine (PERIDEX) 0.12 % mouthwash 15 mL  15 mL Oral BID    arformoterol (BROVANA) neb solution 15 mcg  15 mcg Nebulization BID RT    albuterol (PROVENTIL VENTOLIN) nebulizer solution 2.5 mg  2.5 mg Nebulization Q4H PRN    enoxaparin (LOVENOX) injection 40 mg  40 mg SubCUTAneous Q24H    LORazepam (ATIVAN) injection 2 mg  2 mg IntraVENous PRN    sodium chloride (NS) flush 5-10 mL  5-10 mL IntraVENous Q8H    sodium chloride (NS) flush 5-10 mL  5-10 mL IntraVENous PRN    budesonide (PULMICORT) 500 mcg/2 ml nebulizer suspension  500 mcg Nebulization BID RT    piperacillin-tazobactam (ZOSYN) 3.375 g in 0.9% sodium chloride (MBP/ADV) 100 mL  3.375 g IntraVENous Q8H     ______________________________________________________________________  EXPECTED LENGTH OF STAY: 4d 21h  ACTUAL LENGTH OF STAY:          7                 Airam Flynn MD

## 2017-07-20 NOTE — PROGRESS NOTES
I have reviewed discharge instructions with the patient. The patient verbalized understanding. RX x 1 given to patient. Discharged via wheelchair to [facility] ([phone]) in satisfactory condition.

## 2017-07-20 NOTE — PROGRESS NOTES
TRANSFER - IN REPORT:    Verbal report received from [employer]) on Daune Ellyn  being received from [facility](unit) for routine progression of care      Report consisted of patients Situation, Background, Assessment and   Recommendations(SBAR). Information from the following report(s) SBAR, Kardex, Intake/Output, MAR and Recent Results was reviewed with the receiving nurse. Opportunity for questions and clarification was provided. Assessment completed upon patients arrival to unit and care assumed.

## 2017-07-20 NOTE — DISCHARGE INSTRUCTIONS
Discharge Instructions       PATIENT ID: Tim Mcmullen  MRN: [id number]   YOB: 1960    DATE OF ADMISSION: 7/13/2017  2:42 PM    DATE OF DISCHARGE: 7/20/2017    PRIMARY CARE PROVIDER: Jeri Martin DO     ATTENDING PHYSICIAN: Cherie Herron MD  DISCHARGING PROVIDER: Cherie Herron MD    To contact this individual call [phone] and ask the  to page. If unavailable ask to be transferred the Adult Hospitalist Department. DISCHARGE DIAGNOSES  Acute respiratory failure  COPD exacerbation  Hyponatremia      CONSULTATIONS: IP CONSULT TO PULMONOLOGY    PROCEDURES/SURGERIES: * No surgery found *    PENDING TEST RESULTS:   At the time of discharge the following test results are still pending: none    FOLLOW UP APPOINTMENTS:   Follow-up Information     Follow up With Details Comments Contact Info    Jeri Martin DO In 1 week  [address] [place] [address] E  [phone]             ADDITIONAL CARE RECOMMENDATIONS:  You were found to have critically low,severe hyponatremia(low sodium) due most probably your consumption of excessive water. Please watch and do not drink excessive water and fluids. We recommended you have blood work to check Labs in 1 week when you see your primary doctor. Patient was counseled regarding risks of \"water poisoning\". Given his psychiatric history- patient is at risk of readmission for the same condition unless he is in a highly controlled environment. DIET: Cardiac Diet    ACTIVITY: Activity as tolerated    WOUND CARE: NA    EQUIPMENT needed: NA      DISCHARGE MEDICATIONS:   See Medication Reconciliation Form    · It is important that you take the medication exactly as they are prescribed. · Keep your medication in the bottles provided by the pharmacist and keep a list of the medication names, dosages, and times to be taken in your wallet. · Do not take other medications without consulting your doctor.        NOTIFY [address] [place] [address] THE FOLLOWING:   Fever over 101 degrees for 24 hours. Chest pain, shortness of breath, fever, chills, nausea, vomiting, diarrhea, change in mentation, falling, weakness, bleeding. Severe pain or pain not relieved by medications. Or, any other signs or symptoms that you may have questions about. DISPOSITION:  x  Home With:group home   OT  PT  [facility]  RN       SNF/Inpatient Rehab/LTAC    Independent/assisted living    Hospice    Other:           Signed:    Torrie Kenney MD  7/20/2017  12:54 PM

## 2017-07-20 NOTE — PROGRESS NOTES
CM reviewed chart and received discharge orders. Plan is to return to [facility]. CM called [facility] and spoke with aCrol Garcia who confirmed that patient could return to them today. She asked that CM faxed discharge instructions to them at [phone] and have the nurse call report to her at [phone]. Pt's Medicaid is currently inactive, but [facility] and  are working to [facility] it. Pt currently does not have logisitcare benefit, so cab ticket will be provided.     Molly Shearer, RAJINDERW, ACM

## 2017-07-20 NOTE — PROGRESS NOTES
Discharge summary/instructions called to Liza Rock @ [facility] ([phone]). All questions were answered. Copy of discharge instructions & RX x 1 sent w/ patient to [facility].

## 2017-07-20 NOTE — PROGRESS NOTES
Bedside shift change report given to Keely Galindo RN (oncoming nurse) by Speedy Hermosillo RN (offgoing nurse). Report included the following information SBAR, Kardex, Intake/Output, MAR and Recent Results.

## 2017-07-20 NOTE — ROUTINE PROCESS
TRANSFER - OUT REPORT:    Verbal report given to BAYLEE Garcia(name) on Napoleon Epp  being transferred to (unit) for routine progression of care       Report consisted of patients Situation, Background, Assessment and   Recommendations(SBAR). Information from the following report(s) SBAR was reviewed with the receiving CM.

## 2017-07-20 NOTE — PROGRESS NOTES
Bedside and Verbal shift change report given to Marie (oncoming nurse) by Nidia Landaverde (offgoing nurse). Report included the following information SBAR, Kardex and MAR.

## 2017-07-20 NOTE — PROCEDURES
[address] [place] [address]   EEG       Name:  Hermelinda Pizarro   MR#:  [id number]   :  1960   Account #:  [de-identified]    Date of Procedure:  2017   Date of Adm:  2017       HISTORY: This is a 48-year-old with past medical history of   depression, schizophrenia, hyponatremia, extrapyramidal disease and   anxiety, who became unresponsive with agonal breathing,   incontinence and jerking motions. EEG is performed to evaluate for   evidence of seizure as an etiology. MEDICATIONS: The patient was on propofol for the study. Propofol was noted to be lowered at the start of the   study, but still present. CONDITIONS: This is a routine 21-channel digital EEG recording, with   1 channel devoted to limited EKG, performed in accordance with the   International 10-20 system. The study was done on an intubated,   sedated patient. Photic stimulation was performed as an activating   procedure. DESCRIPTION: As the record opens, there appear to be symmetric   vertex waves and sleep spindles seen, consistent with stage 2 sleep and is continuous throughout the duration of the recording, with periods of relative suppression between these bursts of what   appear to be sleep spindles. There is 1 right temporal occipital and   parietooccipital sharp wave seen. No other focal abnormalities, epileptiform   discharges or electrographic seizures seen. INTERPRETATION: This is a normal asleep electroencephalogram in   the setting of propofol infusion, with 1 nonspecific sharp wave seen. CLINICAL CORRELATION: Sharp wave could indicate underlying   structural or functional abnormality, or could represent an epileptogenic   focus, but again, this is a nonspecific finding. Clinical correlation is   advised.         Renee Dewey MD      MR / [facility] FOZIA MORENO   D:  2017   17:06   T:  2017   09:43   Job #:  [id number]

## 2017-07-20 NOTE — PROGRESS NOTES
Problem: Mobility Impaired (Adult and Pediatric)  Goal: *Acute Goals and Plan of Care (Insert Text)  Physical Therapy Goals  Initiated 7/18/2017  1. Patient will transfer from bed to chair and chair to bed with independence using the least restrictive device within 7 day(s). 2. Patient will perform sit to stand with independence within 7 day(s). 3. Patient will ambulate with supervision/set-up for 250 feet with the least restrictive device within 7 day(s). 4. Patient will improve Tinetti score by 4-5 points in order to illustrate decrease in fall risk within 7 day(s). PHYSICAL THERAPY TREATMENT  INCLUDING 6 MINUTE WALK TEST RESULTS  Patient: Jacek Stephenson (13 y.o. male)  Date: 7/20/2017  Diagnosis: Acute respiratory failure (HCC) Acute respiratory failure ([address]       Precautions: Seizure      ASSESSMENT:  Chart reviewed. Patient cleared to be seen by nursing staff. Pt completed 6 MWT, keeping HR <133. Focus on pacing techniques and monitoring HR at home. Pt ambulating x 371 feet, SBA without device. Pt completed task on RA, keeping sats >93% throughout. Patient has a narrowed FELISHA, but no unsteadiness at this time. Cleared to return home approaching functional baseline. Progression toward goals  [X]    Improving appropriately and progressing toward goals  [ ]    Improving slowly and progressing toward goals  [ ]    Not making progress toward goals and plan of care will be adjusted       PLAN:  Patient continues to benefit from skilled intervention to address the above impairments. Continue treatment per established plan of care. Discharge Recommendations:  None, return to group home  Further Equipment Recommendations for Discharge:  none       SUBJECTIVE:   Patient stated [facility], [facility]! It's good to see you.       OBJECTIVE DATA SUMMARY:   Critical Behavior:  Neurologic State: Alert  Orientation Level: Oriented X4  Cognition: Appropriate decision making, Appropriate for age attention/concentration, Appropriate safety awareness, Follows commands  Safety/Judgement: Awareness of environment, Fall prevention  Functional Mobility Training:  Bed Mobility:     Supine to Sit: Independent  Sit to Supine: Independent     Transfers:  Sit to Stand: Supervision  Stand to Sit: Supervision        Balance:  Sitting: Intact  Standing: Intact  Ambulation/Gait Training:  Distance (ft): 371 Feet (ft)  Assistive Device: Gait belt  Ambulation - Level of Assistance: Supervision  Gait Abnormalities: Decreased step clearance  Base of Support: Narrowed  Speed/Sabina: Slow        6 MWT results:  Distance Walked in Feet (ft): 371 ft. Pre Heart Rate: 126   Pre O2 Saturation: 93       Post Heart Rate: 122   Post O2 Saturation: 94   Assistive device used: Assistive Device: Gait belt          Normative data: 30-57 years old = 0 feet; Men 39-80 years old = 1889 feet; Women 3680 years dcn=5053 feet  Modified 10 point Buffy RPE scale utilized where 0 = no breathlessness at all; 10 = maximum exertion  Please refer to the flowsheet for any additional vital signs taken during this treatment. Pain:  Pain Scale 1: Numeric (0 - 10)  Pain Intensity 1: 0  Activity Tolerance:  HR continues to be elevated, however not changing with 6 MWT. O2 >93% on RA.  Highest    After treatment:   [X]    Patient left in no apparent distress sitting up in chair  [ ]    Patient left in no apparent distress in bed  [X]    Call bell left within reach  [X]    Nursing notified  [ ]    Caregiver present  [X]    Bed alarm activated      COMMUNICATION/COLLABORATION:   The patients plan of care was discussed with: Registered Nurse     Linn Turner PT, DPT   Time Calculation: 15 mins

## 2017-08-04 ENCOUNTER — PATIENT OUTREACH (OUTPATIENT)
Dept: INTERNAL MEDICINE CLINIC | Age: 57
End: 2017-08-04

## 2017-08-04 RX ORDER — QUETIAPINE FUMARATE 50 MG/1
50 TABLET, FILM COATED ORAL
Status: ON HOLD | COMMUNITY
End: 2017-11-08

## 2017-08-04 RX ORDER — RISPERIDONE 3 MG/1
3 TABLET, FILM COATED ORAL DAILY
Status: ON HOLD | COMMUNITY
End: 2017-11-08

## 2017-08-04 RX ORDER — BUDESONIDE AND FORMOTEROL FUMARATE DIHYDRATE 80; 4.5 UG/1; UG/1
2 AEROSOL RESPIRATORY (INHALATION) 2 TIMES DAILY
COMMUNITY
End: 2017-08-08 | Stop reason: SDUPTHER

## 2017-08-04 RX ORDER — PAROXETINE HYDROCHLORIDE 20 MG/1
TABLET, FILM COATED ORAL DAILY
Status: ON HOLD | COMMUNITY
End: 2017-08-16

## 2017-08-04 RX ORDER — TRAZODONE HYDROCHLORIDE 100 MG/1
50 TABLET ORAL
Status: ON HOLD | COMMUNITY
End: 2017-11-08

## 2017-08-04 NOTE — PROGRESS NOTES
Hannah Leiva 61 y.o. was found out to have been hospitalized on 8/4/2017. Patient discharged from [facility] for [facility] and abdominal pain from dates: 7/27/17-8/2/17. Nurse Navigator(NN) contacted Carol Garcia at [facility] by telephone to perform post hospital discharge assessment. Verified as patient with 2 identifiers. Discussed the events leading up to the hospitalization. Recent prior hospitalizations:  1) Hospitalization 7/4/17-7/7/17: Pt was at group home and then had seemingly sudden onset of AMS. Was brought to ED. Sodium found to be 114 and pt was hospitalized for treatment. Admission dx's included:  1. Critical hyponatremia:  - possibly contributing to patient's altered mental status. - suppose some of the hyponatremia may be chronic, with an acute component;   - appreciate Nephrology consult and recommendations. - continue to monitor BMP;   - 7/6: Na normalized, discontinue D5W, continue fluid restriction. 2. Hypokalemia:   - resolved, continue to monitor. 3. Extensive psychiatric history:  - all psychiatric medications are on hold on admission;  - Psychiatry consult to review medications and for additional recommendations;   - appreciate Psychiatry consult and recommendations: considering acuity of hyponatremia and how quickly it corrected, it is unlikely that the medications alone are responsible for the hyponatremia. I restarted all the Psych medications last night 7/5.    4. Urinary retention, POA:  - bladder scan in the emergency room showed 900 mL, s/p straight cath. - monitor. 5. AMS, cannot r/o sepsis on admission:  - empirically started on broad-spectrum IV Abx, deescalate if cx negative. - 7/5: afebrile and no leukocytosis, no cultures, discontinued all IV Abx.  - 7/6: patient has some coughing and increased secretions, will treat with a Z-clayton and short course of prednisone for acute bronchitis.    6. Acute COPD exacerbation with acute bronchitis:   - mild hypoxia, increased cough, increased secretions, slight wheezing: continue Nebs, short course of prednisone and Abx (Z-clayton). 2) ED 7/12/17: SOB- Dx'd with COPD w/acute exacerbation. CXR completed and EKG. EKG with frequent PVC's and right atrial enlargement noted from EKG on 7/4/17. Pt had noted that he was out of his Breo Ellipta. This may have exacerbated the SOB. It was found that the pt's insurance had lapsed. Pt was considered stabilized and was discharged with script from [facility] and Steroid Dose Pack. Apparently the Breo Ellipta used in the ED was not given for pt to use upon discharge. With the insurance lapsing, the pt could not afford the medication. Pt continued to NOT get the medication he needed for his respiratory disease which lead to    3) Hospitalization 7/13/17-7/20/17: Pt with seizure like activity at group [place] and coded and intubated. Admission dx's included:  1. Acute on chronic hypercapneic respiratory failure. Resolved   - with underlying COPD, possibly with element of COPD exacerbation / bronchospasm contributing   - CT chest 7/13 - Prominent chronic lung changes, some lung nodularity and pleural plaquing. Incidental gastric distention.   - CTA chest 7/13 - No evidence of acute pulmonary embolus. Patchy bilateral lower lobe airspace disease may represent atelectasis, pneumonia, or aspiration. Bilateral bronchial wall thickening is compatible with asthma or bronchitis; radiographic follow-up is recommended to assure resolution. Emphysema. - extubated 7/17   - continue bronchodilator therapy with DuoNeb, Pulmicort,predniosne. - Weaned off oxygen. Discharge on his usual nebs and taper off prednisone. 2.  Acute metabolic encephalopathy. Resolved   - suspect this is secondary to his hyponatremia, possibly with hyponatremia-induced seizures   - CT head 7/13 - No acute findings suspected.    - EEG completed, pending interpretation   - corrected hyponatremia   -Alert and oriented 7/18  3. 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 resolved. Nephrology followed  Patient advised the dangerous of excessive water/fluids. Recommended PCP follow up in a week with blood work  4. Hypotension, presumed sepsis syndrome. Resolved   - Suspect this might have been from effect of propofol, though alternatively could be secondary to sepsis in setting of pneumonia with leukocytosis   - blood cultures 7/13 - no growth thus far   - respiratory culture 7/14 - heavy normal respiratory denise thus far   - Completed 7 days of zosyn. 5. Hypokalemia. Resolved   - replete as needed  6. Paranoid schizophrenia   - Resume home medications  7. Moderate protein-calorie malnutrition   - BMI 14.5   - nutrition consult  8. Leukocytosis. Resolved    Pt had f/u appointment with PCP scheduled, but it was canceled due to the insurance issue not yet having been resolved. Office mate Magda Mendoza LPN Panel Manager was in communications with Raeann Gutierrez at [facility] and keeping up to date with pt's status as he had returned to the facility. There had been ongoing issues with the patient wishing to drink water and other liquids in excess. Pt was becoming very upset and frustrated over being fluid managed due to his hyponatremia. During one conversation Magda had with Raeann Gutierrez, she was apprised of the pt's disappearance from the facility. She was told he was upset over the drinking issue and had left and did not return as he normally did. Raeann Gutierrez had filed a missing person's report. The insurance issue had since been dealt with, but the patient was [facility]. PCP office was waiting to hear back from Raeann Gutirerez w/an update when we noted that pt had an appointment for f/u from [facility] admission. Raeann Gutierrez states pt last saw Dr. Rc Rodney on 7/21/17. Pt sees him at his office and MD is only there on Fridays. Pt then eloped after that date.  Pt was missing several days until admitted in [facility] on 7/27/17 for SI and abd pain. Pt originally had reported to EMS he needed to go to ED because he was hungry. Then when that did not work, he stated his kidneys hurt x 1 year and pointed to his LLQ and RLQ. Also reports SI with the reason for SI \"well, because I am suicidal.\" Pt does have hx of paranoid schizophrenia and is an every day smoker. When labs were drawn, needless to say, all electrolytes were off. Na 125, K 2.6, Cl 91, GFR >60, WBC 17.8, UDS was negative. CXR - no acute cardiopulmonary process. COPD. They determined the electrolytes were off from not eating for several days. They were unsure of the reasoning for not having eaten. It was decided to admit to [facility] for the SI and would replete the NA and K while admitted. Asked questions of John Patricio regarding some of the CM statements from the [facility] charting. 1) The SS worker Viry Gooden suggested the [employer] was not that involved with the patient. John Patricio confirms this is accurate. 2) There was documentation about Dr. Darryl Khan and Dr. Priscila Braden making weekly rounds at the facility. Writer knows Dr. Darryl Khan does not do this, he sees the pt at the [facility] office location. But questioned how pt visits Dr. Priscila Braden. John Patricio informs he has office visits that are on Friday's only. There is someone that does come by the facility for group, but the actual MD is seen in his clinic. 3) Asked if John Patricio was aware that the twin brother and Viry Gooden were looking for another group home for the pt. John Patricio states pt has been there for 18 years. But if they wish to go elsewhere, that is their option. The argument is primarily over what the pt's wants to eat and drink. As a facility, they MUST abide by what the doctor suggests. If the doctor states he may eat and drink as he wishes, he can have whatever he wants. However, if the doctor says, no coffee or pepsi and limit water, they must, out of liability follow those instructions.  Writer voices understanding because if the MD states limit water and the patient has no cognitive ability to reason or rationalize that he should not be drinking water 24/7 and they allow it, they are responsible for the repercussions of whatever damage or hospitalization would take place. Thelma Ferraro states the patient is upset and has complained and fussed and the brother is wanting him to go somewhere they will not limit the patient. Virginia Carnes for her time and input. Will see pt when here at office on Monday. Red Flags:  AMS, irritability, chest pain, palpitations, weakness, spasms, cramps, N/V, tingling, numbness, fatigue     Discharge Instructions :  Reviewed discharge instructions with facility. Thelma Ferraro has reviewed D/C paperwork and verbalizes understanding and follow-up care. PCP/Specialist Follow Up:   Patient scheduled to follow up with Juan Luis Gao NP on 8/7/17. Opportunity given to ask questions. No other clinical/social/functional needs noted. Plan:  Reviewed plan of care. Verbalized understanding and agreement with plan. Thelmatuan Ferraro agrees to contact the PCP office for questions related to the pt's healthcare. NN contact information given to call prn. Goal:  Goals Addressed      Attends follow-up appointments as directed. PCP appt 8/7/17  [facility] 8//4/17       COMPLETED: Identification of barriers to adherence to a plan of care such as inability to afford medications, lack of insurance, lack of transportation, etc.                  Pt's insurance had lapsed somehow. That has since been addressed. Pt's medicaid is back in good standing. Pt's transportation is the 's that work with him. Transportation can also be arranged by [address] [place] [address] since pt is Medicaid if necessary. [facility] 8/4/17 *Goal Complete       Prevent complications post hospitalization.             Supportive resources in place to maintain patient in the community (ie., home health, equipment, DME, refer to, etc.)                  Pt has twin brother, Amadou Garcia CM (some involvement), [facility] staff, Psych MD and a counselor that rounds at the facility weekly. Pt does ask and want for drinks and foods that are not approved by the MD. Because of that, pt gets upset and will leave facility. Recently ran away for several days. Ricardo Enigma at [facility] had to put in a missing person's report because pt was gone missing several days because they were not allowing excessive water intake due to low sodium levels, coffee, or soda. These were doctor's orders and required to be followed by facility. Per [facility] CM/SW documentation, pt's twin brother now wants to move pt to another facility. It is a result of this issue.  [facility] 8/4/17            Medical History:     Past Medical History:   Diagnosis Date    Asthma     seldom,use inhaler    Bronchitis     Chronic obstructive pulmonary disease ([facility]     Depression     anxiety    Psychiatric disorder     paranoid schizophrenia    Psychiatric disorder     extrapyramidal disease    Psychotic disorder     mental retardation       Labs Reviewed:  [facility] documentation reviewed

## 2017-08-07 ENCOUNTER — OFFICE VISIT (OUTPATIENT)
Dept: INTERNAL MEDICINE CLINIC | Age: 57
End: 2017-08-07

## 2017-08-07 VITALS
RESPIRATION RATE: 24 BRPM | SYSTOLIC BLOOD PRESSURE: 98 MMHG | HEART RATE: 120 BPM | OXYGEN SATURATION: 93 % | TEMPERATURE: 97.5 F | WEIGHT: 116.4 LBS | DIASTOLIC BLOOD PRESSURE: 62 MMHG | HEIGHT: 72 IN | BODY MASS INDEX: 15.77 KG/M2

## 2017-08-07 DIAGNOSIS — F17.219 CIGARETTE NICOTINE DEPENDENCE WITH NICOTINE-INDUCED DISORDER: ICD-10-CM

## 2017-08-07 DIAGNOSIS — E88.09 HYPOALBUMINEMIA DUE TO PROTEIN-CALORIE MALNUTRITION (HCC): ICD-10-CM

## 2017-08-07 DIAGNOSIS — F32.A DEPRESSION, UNSPECIFIED DEPRESSION TYPE: ICD-10-CM

## 2017-08-07 DIAGNOSIS — E46 HYPOALBUMINEMIA DUE TO PROTEIN-CALORIE MALNUTRITION (HCC): ICD-10-CM

## 2017-08-07 DIAGNOSIS — F25.1 SCHIZOAFFECTIVE DISORDER, DEPRESSIVE TYPE (HCC): ICD-10-CM

## 2017-08-07 DIAGNOSIS — J43.9 PULMONARY EMPHYSEMA, UNSPECIFIED EMPHYSEMA TYPE (HCC): Primary | ICD-10-CM

## 2017-08-07 DIAGNOSIS — R63.6 UNDERWEIGHT: ICD-10-CM

## 2017-08-07 RX ORDER — IBUPROFEN 800 MG/1
TABLET ORAL
Status: ON HOLD | COMMUNITY
End: 2017-08-16

## 2017-08-07 RX ORDER — TEMAZEPAM 30 MG/1
30 CAPSULE ORAL
Status: ON HOLD | COMMUNITY
End: 2017-11-08

## 2017-08-07 RX ORDER — BENZTROPINE MESYLATE 2 MG/1
2 TABLET ORAL DAILY
Status: ON HOLD | COMMUNITY
End: 2017-08-16

## 2017-08-07 RX ORDER — ALPRAZOLAM 0.5 MG/1
TABLET ORAL
Status: ON HOLD | COMMUNITY
End: 2017-08-16

## 2017-08-07 RX ORDER — PREDNISONE 10 MG/1
20 TABLET ORAL SEE ADMIN INSTRUCTIONS
Qty: 21 TAB | Refills: 0 | Status: ON HOLD | OUTPATIENT
Start: 2017-08-07 | End: 2017-08-16

## 2017-08-07 NOTE — PROGRESS NOTES
HISTORY OF PRESENT ILLNESS  Lesley Hernandez is a 61 y.o. male. Pt. comes in with his  for f/u. Has multiple medical problems. H/oCOPD. Smokes daily. Recent hospitalization at [facility] with respiratory failure. He was intubated. I reviewed records in [facility]. Also had critically low sodium and potassium. Imaging studies are consistent with COPD and chronic changes in the lungs. Reports having chronic dyspnea and MALDONADO. Also has been wheezing. Remains on Breo twice daily and Ventolin as needed. Reports being hospitalized at [facility] for psychiatric issues and depression as well. Followed by psychiatrist for schizoaffective disorder. Reports compliance with medications and diet. Med list and most recent labs/studies reviewed with pt. albumin was 2.7. Trying to be active physically. Needs labs. Reports no other new c/o. Hospital Follow Up   Associated symptoms include shortness of breath. Pertinent negatives include no chest pain, no abdominal pain and no headaches. Altered mental status    Pertinent negatives include no tingling. His past medical history is significant for COPD. Abnormal Lab Results   Associated symptoms include shortness of breath. Pertinent negatives include no chest pain, no abdominal pain and no headaches. COPD   Associated symptoms include shortness of breath. Pertinent negatives include no chest pain, no abdominal pain and no headaches. Depression   Associated symptoms include shortness of breath. Pertinent negatives include no chest pain, no abdominal pain and no headaches. Review of Systems   Constitutional: Negative. Eyes: Negative. Respiratory: Positive for cough, shortness of breath and wheezing. Negative for hemoptysis and sputum production. Cardiovascular: Negative for chest pain, palpitations and leg swelling. Gastrointestinal: Negative for abdominal pain. Genitourinary: Negative. Musculoskeletal: Negative. Negative for falls.    Skin: Negative. Neurological: Negative for dizziness, tingling, tremors and headaches. Endo/Heme/Allergies: Negative. Psychiatric/Behavioral: Positive for depression. Negative for substance abuse and suicidal ideas. The patient is nervous/anxious and has insomnia. Physical Exam   Constitutional: He is oriented to person, place, and time. He appears well-developed and well-nourished. No distress. Cachectic man  Smell of cigarettes   HENT:   Head: Normocephalic and atraumatic. Mouth/Throat: Oropharynx is clear and moist.   Eyes: Conjunctivae and EOM are normal. Pupils are equal, round, and reactive to light. No scleral icterus. Neck: Normal range of motion. Neck supple. No JVD present. No thyromegaly present. Cardiovascular: Normal rate, regular rhythm, normal heart sounds and intact distal pulses. No murmur heard. Pulmonary/Chest: Effort normal. No respiratory distress. He has wheezes (few). He has no rales. He exhibits no tenderness. Diminished lung sounds   Abdominal: Soft. Bowel sounds are normal. He exhibits no distension. There is no tenderness. Musculoskeletal: Normal range of motion. He exhibits no edema or tenderness. Neurological: He is alert and oriented to person, place, and time. Coordination normal.   Skin: Skin is warm and dry. No rash noted. Psychiatric: He has a normal mood and affect. His behavior is normal.   Nursing note and vitals reviewed. ASSESSMENT and PLAN  Diagnoses and all orders for this visit:    1. Pulmonary emphysema, unspecified emphysema type ([address]  -     METABOLIC PANEL, COMPREHENSIVE  -     REFERRAL TO PULMONARY DISEASE    2. Underweight    3. Hypoalbuminemia due to protein-calorie malnutrition (HCC)  -     METABOLIC PANEL, COMPREHENSIVE    4. Depression, unspecified depression type    5. Schizoaffective disorder, depressive type ([address]    6.  Cigarette nicotine dependence with nicotine-induced disorder    Other orders  -     predniSONE (STERAPRED DS) 10 mg dose pack; Take 2 Tabs by mouth See Admin Instructions. See administration instruction per 10mg dose pack      Follow-up Disposition:  Return in about 2 weeks (around 8/21/2017).    lab results and schedule of future lab studies reviewed with patient  reviewed diet, exercise and weight control  reviewed medications and side effects in detail  Advised patient to quit smoking  Advised patient to increase calorie intake by using Ensure or other supplements  Field and sent note to AL  Patient referred to pulmonologist  We will recheck labs

## 2017-08-07 NOTE — MR AVS SNAPSHOT
Visit Information Date & Time Provider Department Dept. Phone Encounter #  
 8/7/2017 10:30 AM Adalbertodemetria DianaBonnie [facility] [phone] [id number] Follow-up Instructions Return in about 2 weeks (around 8/21/2017). Your Appointments 9/1/2017 10:30 AM  
ROUTINE CARE with Alex Ortiz DO [facility] ([address] [place] [address]) Appt Note: follow up from retreat [address] [facility]  
[phone]  
  
   
 [address]  
  
    
 10/10/2017 11:00 AM  
ROUTINE CARE with Meet Deleon NP [facility] ([address] [place] [address]) Appt Note: follow up [address] [facility]  
[phone]  
  
   
 [address]  
  
    
 11/17/2017 10:15 AM  
ROUTINE CARE with Alex Ortiz DO [facility] ([address] [place] [address]) Appt Note: 6 month f/u  
 [address] [facility]  
[phone] Upcoming Health Maintenance Date Due Hepatitis C Screening 1960 DTaP/Tdap/Td series (1 - Tdap) 8/27/1980 INFLUENZA AGE 9 TO ADULT 8/1/2017 COLONOSCOPY 9/2/2024 Allergies as of 8/7/2017  Review Complete On: 8/7/2017 By: Alex Ortiz DO No Known Allergies Current Immunizations  Reviewed on 7/17/2017 Name Date Influenza Vaccine Intradermal PF 11/14/2016 PPD 1/31/2012 Pneumococcal Polysaccharide (PPSV-23) 11/14/2016 Not reviewed this visit You Were Diagnosed With   
  
 Codes Comments Pulmonary emphysema, unspecified emphysema type ([facility]    -  Primary ICD-10-CM: J43.9 ICD-9-CM: 492.8 Underweight     ICD-10-CM: R63.6 ICD-9-CM: 783.22 Hypoalbuminemia due to protein-calorie malnutrition ([facility]     ICD-10-CM: E46 
ICD-9-CM: 263.9 Depression, unspecified depression type     ICD-10-CM: F32.9 ICD-9-CM: 236 Schizoaffective disorder, depressive type ([facility]     ICD-10-CM: F25.1 ICD-9-CM: 295.70 Cigarette nicotine dependence with nicotine-induced disorder     ICD-10-CM: F17.219 ICD-9-CM: 292.9 Vitals BP Pulse Temp Resp Height(growth percentile) Weight(growth percentile) 98/62 (BP 1 Location: Right arm, BP Patient Position: Sitting) (!) 120 97.5 °F (36.4 °C) (Oral) 24 6' 0.01\" (1.829 m) 116 lb 6.4 oz (52.8 kg) SpO2 BMI Smoking Status 93% 15.78 kg/m2 Current Every Day Smoker BMI and BSA Data Body Mass Index Body Surface Area 15.78 kg/m 2 1.64 m 2 Preferred Pharmacy Pharmacy Name Phone [address] [facility], [place] [address] [phone] Your Updated Medication List  
  
   
This list is accurate as of: 8/7/17 11:15 AM.  Always use your most recent med list.  
  
  
  
  
 ALPRAZolam 0.5 mg tablet Commonly known as:  Will Tommy Take  by mouth two (2) times daily as needed for Anxiety. Indications: anxiety  
  
 benztropine 2 mg tablet Commonly known as:  COGENTIN Take 2 mg by mouth daily. At bedtime   Indications: extrapyramidal disease  
  
 fluticasone-vilanterol 100-25 mcg/dose inhaler Commonly known as:  BREO ELLIPTA Take 1 Puff by inhalation daily. ibuprofen 800 mg tablet Commonly known as:  MOTRIN Take  by mouth. PAXIL 20 mg tablet Generic drug:  PARoxetine Take  by mouth daily. predniSONE 10 mg dose pack Commonly known as:  STERAPRED DS Take 2 Tabs by mouth See Admin Instructions. See administration instruction per 10mg dose pack Q-TUSSIN 100 mg/5 mL liquid Generic drug:  guaiFENesin Take 200 mg by mouth every four (4) hours as needed for Cough. risperiDONE 3 mg tablet Commonly known as:  RisperDAL Take 3 mg by mouth daily. SEROquel 50 mg tablet Generic drug:  QUEtiapine Take 50 mg by mouth nightly as needed (for agitation or anxiety (Use 1st)). SYMBICORT 80-4.5 mcg/actuation Hfaa inhaler Generic drug:  budesonide-formoterol Take 2 Puffs by inhalation two (2) times a day. temazepam 30 mg capsule Commonly known as:  RESTORIL Take  by mouth nightly as needed for Sleep.  
  
 traZODone 100 mg tablet Commonly known as:  Rico Harrier Take 50 mg by mouth nightly as needed for Sleep. * VENTOLIN HFA 90 mcg/actuation inhaler Generic drug:  albuterol Take 2 Puffs by inhalation every four (4) hours as needed for Shortness of Breath. * albuterol 2.5 mg /3 mL (0.083 %) nebulizer solution Commonly known as:  PROVENTIL VENTOLIN  
2.5 mg by Nebulization route every six (6) hours as needed for Wheezing. * Notice: This list has 2 medication(s) that are the same as other medications prescribed for you. Read the directions carefully, and ask your doctor or other care provider to review them with you. Prescriptions Sent to Pharmacy Refills  
 predniSONE (STERAPRED DS) 10 mg dose pack 0 Sig: Take 2 Tabs by mouth See Admin Instructions. See administration instruction per 10mg dose pack Class: Normal  
 Pharmacy: [address] [place] [address] #: [phone] Route: Oral  
  
We Performed the Following METABOLIC PANEL, COMPREHENSIVE [88659 CPT(R)] REFERRAL TO PULMONARY DISEASE [WMA20 Custom] Comments:  
 Please evaluate patient for advanced COPD/recent Resp failure. Kristopher Zimmerman Follow-up Instructions Return in about 2 weeks (around 8/21/2017). Referral Information Referral ID Referred By Referred To  
  
 [id number] Fran Chavez [facility] [address] [place] [facility] Visits Status Start Date End Date 1 New Request 8/7/17 8/7/18 If your referral has a status of pending review or denied, additional information will be sent to support the outcome of this decision. Please provide this summary of care documentation to your next provider. Your primary care clinician is listed as Theador Hence. If you have any questions after today's visit, please call [phone].

## 2017-08-07 NOTE — LETTER
8/8/2017 9:46 AM 
 
Mr. Cyrus Martinez 
[address] 
[address]nd Floor [facility] [address] Dear Cyrus Martinez: 
 
Please find your most recent results below. Resulted Orders METABOLIC PANEL, COMPREHENSIVE Result Value Ref Range Glucose 179 (H) 65 - 99 mg/dL BUN 6 6 - 24 mg/dL Creatinine 0.63 (L) 0.76 - 1.27 mg/dL GFR est non- >59 mL/min/1.73 GFR est  >59 mL/min/1.73  
 BUN/Creatinine ratio 10 9 - 20 Sodium 135 134 - 144 mmol/L Potassium 4.1 3.5 - 5.2 mmol/L Chloride 90 (L) 96 - 106 mmol/L  
 CO2 34 (H) 18 - 29 mmol/L Calcium 9.4 8.7 - 10.2 mg/dL Protein, total 6.0 6.0 - 8.5 g/dL Albumin 3.8 3.5 - 5.5 g/dL GLOBULIN, TOTAL 2.2 1.5 - 4.5 g/dL A-G Ratio 1.7 1.2 - 2.2 Bilirubin, total 0.4 0.0 - 1.2 mg/dL Alk. phosphatase 91 39 - 117 IU/L  
 AST (SGOT) 17 0 - 40 IU/L  
 ALT (SGPT) 22 0 - 44 IU/L Narrative Performed at:  [address]  [id number] : Maame Urrutia MD, Phone:  [phone] RECOMMENDATIONS: 
 
All labs are stable Please call me if you have any questions: [phone] Sincerely, 
 
 
Ira Biggs DO

## 2017-08-08 DIAGNOSIS — J45.902 ASTHMA WITH STATUS ASTHMATICUS, UNSPECIFIED ASTHMA SEVERITY: Primary | ICD-10-CM

## 2017-08-08 LAB
ALBUMIN SERPL-MCNC: 3.8 G/DL (ref 3.5–5.5)
ALBUMIN/GLOB SERPL: 1.7 {RATIO} (ref 1.2–2.2)
ALP SERPL-CCNC: 91 IU/L (ref 39–117)
ALT SERPL-CCNC: 22 IU/L (ref 0–44)
AST SERPL-CCNC: 17 IU/L (ref 0–40)
BILIRUB SERPL-MCNC: 0.4 MG/DL (ref 0–1.2)
BUN SERPL-MCNC: 6 MG/DL (ref 6–24)
BUN/CREAT SERPL: 10 (ref 9–20)
CALCIUM SERPL-MCNC: 9.4 MG/DL (ref 8.7–10.2)
CHLORIDE SERPL-SCNC: 90 MMOL/L (ref 96–106)
CO2 SERPL-SCNC: 34 MMOL/L (ref 18–29)
CREAT SERPL-MCNC: 0.63 MG/DL (ref 0.76–1.27)
GLOBULIN SER CALC-MCNC: 2.2 G/DL (ref 1.5–4.5)
GLUCOSE SERPL-MCNC: 179 MG/DL (ref 65–99)
POTASSIUM SERPL-SCNC: 4.1 MMOL/L (ref 3.5–5.2)
PROT SERPL-MCNC: 6 G/DL (ref 6–8.5)
SODIUM SERPL-SCNC: 135 MMOL/L (ref 134–144)

## 2017-08-08 RX ORDER — BUDESONIDE AND FORMOTEROL FUMARATE DIHYDRATE 80; 4.5 UG/1; UG/1
2 AEROSOL RESPIRATORY (INHALATION) 2 TIMES DAILY
Qty: 1 INHALER | Refills: 3 | Status: SHIPPED | OUTPATIENT
Start: 2017-08-08 | End: 2017-08-23

## 2017-08-08 RX ORDER — BUDESONIDE AND FORMOTEROL FUMARATE DIHYDRATE 80; 4.5 UG/1; UG/1
2 AEROSOL RESPIRATORY (INHALATION) 2 TIMES DAILY
Qty: 1 INHALER | Refills: 7 | Status: ON HOLD | OUTPATIENT
Start: 2017-08-08 | End: 2017-10-25 | Stop reason: SDUPTHER

## 2017-08-08 NOTE — TELEPHONE ENCOUNTER
Pharmacy called - patient's insurance switched back to medicaid and they don't cover the brio. currently on brio ellipta 110/25 mcg inhaler, needs to be switched back to symbicort 80/4.5 mcg, 2 puffs twice daily.

## 2017-08-16 ENCOUNTER — APPOINTMENT (OUTPATIENT)
Dept: GENERAL RADIOLOGY | Age: 57
DRG: 140 | End: 2017-08-16
Attending: EMERGENCY MEDICINE
Payer: MEDICAID

## 2017-08-16 ENCOUNTER — HOSPITAL ENCOUNTER (INPATIENT)
Age: 57
LOS: 7 days | Discharge: HOME | DRG: 140 | End: 2017-08-23
Attending: EMERGENCY MEDICINE | Admitting: HOSPITALIST
Payer: MEDICAID

## 2017-08-16 DIAGNOSIS — J96.01 ACUTE RESPIRATORY FAILURE WITH HYPOXIA AND HYPERCAPNIA (HCC): ICD-10-CM

## 2017-08-16 DIAGNOSIS — J44.1 COPD EXACERBATION (HCC): Primary | ICD-10-CM

## 2017-08-16 DIAGNOSIS — J96.02 ACUTE RESPIRATORY FAILURE WITH HYPOXIA AND HYPERCAPNIA (HCC): ICD-10-CM

## 2017-08-16 PROBLEM — R06.02 SHORTNESS OF BREATH: Status: ACTIVE | Noted: 2017-08-16

## 2017-08-16 LAB
ALBUMIN SERPL-MCNC: 3.6 G/DL (ref 3.5–5)
ALBUMIN/GLOB SERPL: 1.2 {RATIO} (ref 1.1–2.2)
ALP SERPL-CCNC: 94 U/L (ref 45–117)
ALT SERPL-CCNC: 35 U/L (ref 12–78)
ANION GAP SERPL CALC-SCNC: 6 MMOL/L (ref 5–15)
APTT PPP: 26.7 SEC (ref 22.1–32.5)
ARTERIAL PATENCY WRIST A: YES
AST SERPL-CCNC: 45 U/L (ref 15–37)
BASE EXCESS BLD CALC-SCNC: 12 MMOL/L
BASOPHILS # BLD: 0 K/UL (ref 0–0.1)
BASOPHILS NFR BLD: 0 % (ref 0–1)
BDY SITE: ABNORMAL
BILIRUB SERPL-MCNC: 0.8 MG/DL (ref 0.2–1)
BUN SERPL-MCNC: 3 MG/DL (ref 6–20)
BUN/CREAT SERPL: 8 (ref 12–20)
CA-I BLD-SCNC: 1.18 MMOL/L (ref 1.12–1.32)
CALCIUM SERPL-MCNC: 8.4 MG/DL (ref 8.5–10.1)
CHLORIDE SERPL-SCNC: 81 MMOL/L (ref 97–108)
CK MB CFR SERPL CALC: 8.6 % (ref 0–2.5)
CK MB SERPL-MCNC: 43.3 NG/ML (ref 5–25)
CK SERPL-CCNC: 501 U/L (ref 39–308)
CO2 SERPL-SCNC: 36 MMOL/L (ref 21–32)
CREAT SERPL-MCNC: 0.36 MG/DL (ref 0.7–1.3)
EOSINOPHIL # BLD: 0 K/UL (ref 0–0.4)
EOSINOPHIL NFR BLD: 0 % (ref 0–7)
ERYTHROCYTE [DISTWIDTH] IN BLOOD BY AUTOMATED COUNT: 13.2 % (ref 11.5–14.5)
GAS FLOW.O2 O2 DELIVERY SYS: ABNORMAL L/MIN
GAS FLOW.O2 SETTING OXYMISER: 6 L/M
GLOBULIN SER CALC-MCNC: 3 G/DL (ref 2–4)
GLUCOSE SERPL-MCNC: 91 MG/DL (ref 65–100)
HCO3 BLD-SCNC: 38.3 MMOL/L (ref 22–26)
HCT VFR BLD AUTO: 42.6 % (ref 36.6–50.3)
HGB BLD-MCNC: 15.3 G/DL (ref 12.1–17)
INR PPP: 1.2 (ref 0.9–1.1)
LACTATE SERPL-SCNC: 1.2 MMOL/L (ref 0.4–2)
LYMPHOCYTES # BLD: 1.5 K/UL (ref 0.8–3.5)
LYMPHOCYTES NFR BLD: 10 % (ref 12–49)
MAGNESIUM SERPL-MCNC: 1.9 MG/DL (ref 1.6–2.4)
MCH RBC QN AUTO: 33.8 PG (ref 26–34)
MCHC RBC AUTO-ENTMCNC: 35.9 G/DL (ref 30–36.5)
MCV RBC AUTO: 94.2 FL (ref 80–99)
MONOCYTES # BLD: 0.8 K/UL (ref 0–1)
MONOCYTES NFR BLD: 5 % (ref 5–13)
NEUTS SEG # BLD: 13.5 K/UL (ref 1.8–8)
NEUTS SEG NFR BLD: 85 % (ref 32–75)
OSMOLALITY UR: 57 MOSM/KG H2O
PCO2 BLD: 71 MMHG (ref 35–45)
PH BLD: 7.34 [PH] (ref 7.35–7.45)
PLATELET # BLD AUTO: 228 K/UL (ref 150–400)
PO2 BLD: 60 MMHG (ref 80–100)
POTASSIUM SERPL-SCNC: 4.2 MMOL/L (ref 3.5–5.1)
PROT SERPL-MCNC: 6.6 G/DL (ref 6.4–8.2)
PROTHROMBIN TIME: 12.4 SEC (ref 9–11.1)
RBC # BLD AUTO: 4.52 M/UL (ref 4.1–5.7)
SAO2 % BLD: 88 % (ref 92–97)
SODIUM SERPL-SCNC: 123 MMOL/L (ref 136–145)
SODIUM UR-SCNC: 7 MMOL/L
SPECIMEN TYPE: ABNORMAL
THERAPEUTIC RANGE,PTTT: NORMAL SECS (ref 58–77)
TOTAL RESP. RATE, ITRR: 19
TROPONIN I SERPL-MCNC: <0.04 NG/ML
TSH SERPL DL<=0.05 MIU/L-ACNC: 0.92 UIU/ML (ref 0.36–3.74)
WBC # BLD AUTO: 15.9 K/UL (ref 4.1–11.1)

## 2017-08-16 PROCEDURE — 94660 CPAP INITIATION&MGMT: CPT

## 2017-08-16 PROCEDURE — 93005 ELECTROCARDIOGRAM TRACING: CPT

## 2017-08-16 PROCEDURE — 77030013140 HC MSK NEB VYRM -A

## 2017-08-16 PROCEDURE — 74011250636 HC RX REV CODE- 250/636: Performed by: EMERGENCY MEDICINE

## 2017-08-16 PROCEDURE — 84484 ASSAY OF TROPONIN QUANT: CPT | Performed by: EMERGENCY MEDICINE

## 2017-08-16 PROCEDURE — 94640 AIRWAY INHALATION TREATMENT: CPT

## 2017-08-16 PROCEDURE — 74011250637 HC RX REV CODE- 250/637: Performed by: HOSPITALIST

## 2017-08-16 PROCEDURE — 82803 BLOOD GASES ANY COMBINATION: CPT

## 2017-08-16 PROCEDURE — 87040 BLOOD CULTURE FOR BACTERIA: CPT | Performed by: HOSPITALIST

## 2017-08-16 PROCEDURE — 65660000001 HC RM ICU INTERMED STEPDOWN

## 2017-08-16 PROCEDURE — 74011000250 HC RX REV CODE- 250: Performed by: EMERGENCY MEDICINE

## 2017-08-16 PROCEDURE — 36600 WITHDRAWAL OF ARTERIAL BLOOD: CPT

## 2017-08-16 PROCEDURE — 74011000250 HC RX REV CODE- 250: Performed by: HOSPITALIST

## 2017-08-16 PROCEDURE — 84300 ASSAY OF URINE SODIUM: CPT | Performed by: HOSPITALIST

## 2017-08-16 PROCEDURE — 80053 COMPREHEN METABOLIC PANEL: CPT | Performed by: EMERGENCY MEDICINE

## 2017-08-16 PROCEDURE — 85025 COMPLETE CBC W/AUTO DIFF WBC: CPT | Performed by: EMERGENCY MEDICINE

## 2017-08-16 PROCEDURE — 83935 ASSAY OF URINE OSMOLALITY: CPT | Performed by: HOSPITALIST

## 2017-08-16 PROCEDURE — 36415 COLL VENOUS BLD VENIPUNCTURE: CPT | Performed by: EMERGENCY MEDICINE

## 2017-08-16 PROCEDURE — 74011250636 HC RX REV CODE- 250/636: Performed by: HOSPITALIST

## 2017-08-16 PROCEDURE — 74011000258 HC RX REV CODE- 258: Performed by: HOSPITALIST

## 2017-08-16 PROCEDURE — 99285 EMERGENCY DEPT VISIT HI MDM: CPT

## 2017-08-16 PROCEDURE — 84443 ASSAY THYROID STIM HORMONE: CPT | Performed by: HOSPITALIST

## 2017-08-16 PROCEDURE — 85730 THROMBOPLASTIN TIME PARTIAL: CPT | Performed by: EMERGENCY MEDICINE

## 2017-08-16 PROCEDURE — 71010 XR CHEST PORT: CPT

## 2017-08-16 PROCEDURE — 83605 ASSAY OF LACTIC ACID: CPT | Performed by: EMERGENCY MEDICINE

## 2017-08-16 PROCEDURE — 82550 ASSAY OF CK (CPK): CPT | Performed by: EMERGENCY MEDICINE

## 2017-08-16 PROCEDURE — 85610 PROTHROMBIN TIME: CPT | Performed by: EMERGENCY MEDICINE

## 2017-08-16 PROCEDURE — 96374 THER/PROPH/DIAG INJ IV PUSH: CPT

## 2017-08-16 PROCEDURE — 83735 ASSAY OF MAGNESIUM: CPT | Performed by: FAMILY MEDICINE

## 2017-08-16 RX ORDER — SODIUM CHLORIDE 9 MG/ML
25 INJECTION, SOLUTION INTRAVENOUS CONTINUOUS
Status: DISCONTINUED | OUTPATIENT
Start: 2017-08-16 | End: 2017-08-18

## 2017-08-16 RX ORDER — IBUPROFEN 800 MG/1
800 TABLET ORAL
COMMUNITY
End: 2017-08-23

## 2017-08-16 RX ORDER — ENOXAPARIN SODIUM 100 MG/ML
40 INJECTION SUBCUTANEOUS EVERY 24 HOURS
Status: DISCONTINUED | OUTPATIENT
Start: 2017-08-16 | End: 2017-08-23 | Stop reason: HOSPADM

## 2017-08-16 RX ORDER — ALPRAZOLAM 0.5 MG/1
0.5 TABLET ORAL 2 TIMES DAILY
Status: DISCONTINUED | OUTPATIENT
Start: 2017-08-16 | End: 2017-08-23 | Stop reason: HOSPADM

## 2017-08-16 RX ORDER — ALPRAZOLAM 0.5 MG/1
0.5 TABLET ORAL
Status: ON HOLD | COMMUNITY
End: 2017-11-03

## 2017-08-16 RX ORDER — IPRATROPIUM BROMIDE AND ALBUTEROL SULFATE 2.5; .5 MG/3ML; MG/3ML
3 SOLUTION RESPIRATORY (INHALATION)
Status: DISCONTINUED | OUTPATIENT
Start: 2017-08-16 | End: 2017-08-23 | Stop reason: HOSPADM

## 2017-08-16 RX ORDER — SODIUM CHLORIDE 0.9 % (FLUSH) 0.9 %
5-10 SYRINGE (ML) INJECTION AS NEEDED
Status: DISCONTINUED | OUTPATIENT
Start: 2017-08-16 | End: 2017-08-23 | Stop reason: HOSPADM

## 2017-08-16 RX ORDER — PAROXETINE HYDROCHLORIDE 20 MG/1
20 TABLET, FILM COATED ORAL DAILY
Status: ON HOLD | COMMUNITY
End: 2017-11-08

## 2017-08-16 RX ORDER — RISPERIDONE 1 MG/1
3 TABLET, FILM COATED ORAL DAILY
Status: DISCONTINUED | OUTPATIENT
Start: 2017-08-17 | End: 2017-08-23 | Stop reason: HOSPADM

## 2017-08-16 RX ORDER — TRAZODONE HYDROCHLORIDE 50 MG/1
50 TABLET ORAL
Status: DISCONTINUED | OUTPATIENT
Start: 2017-08-16 | End: 2017-08-23 | Stop reason: HOSPADM

## 2017-08-16 RX ORDER — GUAIFENESIN 100 MG/5ML
200 SOLUTION ORAL
Status: DISCONTINUED | OUTPATIENT
Start: 2017-08-16 | End: 2017-08-23 | Stop reason: HOSPADM

## 2017-08-16 RX ORDER — QUETIAPINE FUMARATE 25 MG/1
50 TABLET, FILM COATED ORAL
Status: DISCONTINUED | OUTPATIENT
Start: 2017-08-16 | End: 2017-08-23 | Stop reason: HOSPADM

## 2017-08-16 RX ORDER — ALBUTEROL SULFATE 0.83 MG/ML
2.5 SOLUTION RESPIRATORY (INHALATION)
Status: COMPLETED | OUTPATIENT
Start: 2017-08-16 | End: 2017-08-16

## 2017-08-16 RX ORDER — TEMAZEPAM 15 MG/1
30 CAPSULE ORAL
Status: DISCONTINUED | OUTPATIENT
Start: 2017-08-16 | End: 2017-08-23 | Stop reason: HOSPADM

## 2017-08-16 RX ORDER — SODIUM CHLORIDE 0.9 % (FLUSH) 0.9 %
5-10 SYRINGE (ML) INJECTION EVERY 8 HOURS
Status: DISCONTINUED | OUTPATIENT
Start: 2017-08-16 | End: 2017-08-23 | Stop reason: HOSPADM

## 2017-08-16 RX ORDER — PAROXETINE HYDROCHLORIDE 20 MG/1
20 TABLET, FILM COATED ORAL DAILY
Status: DISCONTINUED | OUTPATIENT
Start: 2017-08-17 | End: 2017-08-23 | Stop reason: HOSPADM

## 2017-08-16 RX ORDER — BENZTROPINE MESYLATE 1 MG/1
2 TABLET ORAL DAILY
Status: DISCONTINUED | OUTPATIENT
Start: 2017-08-17 | End: 2017-08-23 | Stop reason: HOSPADM

## 2017-08-16 RX ORDER — BENZTROPINE MESYLATE 2 MG/1
2 TABLET ORAL
Status: ON HOLD | COMMUNITY
End: 2017-11-08

## 2017-08-16 RX ORDER — BUDESONIDE 0.5 MG/2ML
500 INHALANT ORAL
Status: DISCONTINUED | OUTPATIENT
Start: 2017-08-16 | End: 2017-08-23 | Stop reason: HOSPADM

## 2017-08-16 RX ADMIN — SODIUM CHLORIDE 75 ML/HR: 900 INJECTION, SOLUTION INTRAVENOUS at 17:47

## 2017-08-16 RX ADMIN — ALBUTEROL SULFATE 2.5 MG: 2.5 SOLUTION RESPIRATORY (INHALATION) at 15:34

## 2017-08-16 RX ADMIN — ALBUTEROL SULFATE 2.5 MG: 2.5 SOLUTION RESPIRATORY (INHALATION) at 15:15

## 2017-08-16 RX ADMIN — METHYLPREDNISOLONE SODIUM SUCCINATE 125 MG: 125 INJECTION, POWDER, FOR SOLUTION INTRAMUSCULAR; INTRAVENOUS at 15:03

## 2017-08-16 RX ADMIN — Medication 10 ML: at 19:06

## 2017-08-16 RX ADMIN — ALBUTEROL SULFATE 2.5 MG: 2.5 SOLUTION RESPIRATORY (INHALATION) at 15:06

## 2017-08-16 RX ADMIN — AZITHROMYCIN MONOHYDRATE 500 MG: 500 INJECTION, POWDER, LYOPHILIZED, FOR SOLUTION INTRAVENOUS at 17:47

## 2017-08-16 RX ADMIN — Medication 10 ML: at 20:30

## 2017-08-16 RX ADMIN — ALPRAZOLAM 0.5 MG: 0.5 TABLET ORAL at 19:00

## 2017-08-16 RX ADMIN — METHYLPREDNISOLONE SODIUM SUCCINATE 60 MG: 40 INJECTION, POWDER, FOR SOLUTION INTRAMUSCULAR; INTRAVENOUS at 20:28

## 2017-08-16 RX ADMIN — BUDESONIDE 500 MCG: 0.5 INHALANT RESPIRATORY (INHALATION) at 21:10

## 2017-08-16 RX ADMIN — ENOXAPARIN SODIUM 40 MG: 40 INJECTION SUBCUTANEOUS at 19:06

## 2017-08-16 RX ADMIN — CEFTRIAXONE 2 G: 2 INJECTION, POWDER, FOR SOLUTION INTRAMUSCULAR; INTRAVENOUS at 19:06

## 2017-08-16 NOTE — ED PROVIDER NOTES
HPI Comments: 61 y.o. male with past medical history significant for paranoid schiozphrenia, mental retardation, asthma, depression, bronchitis, and COPD who presents from [address] [place] [address] Road via EMS with chief complaint of SOB. Pt states sudden onset one hour ago of SOB with accompanying wheezing and cough. Per EMS, nursing staff found Pt this afternoon in fetal position and tachypneic. EMS states Pt had O2 sats of 72% on room air which increased to 98% on a non-rebreather. Pt also received a DuoNeb en route, with improvement of symptoms. Pt denies having CP or vomiting. There are no other acute medical concerns at this time. 2:40 PM  Karina Viera MD reviewed electronic medical record system for any past medical records that were available that may contribute to the patients current condition. Pt was admitted to [facility] from 7/13/17 to 7/20/17 for acute on chronic hypercapneic respiratory failure, acute metabolic encephalopathy, critical hyponatremia, hypotension, and paranoid schizophrenia. Pt was discharged with nebs and Prednisone. 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 Social hx: Yes tobacco    PCP: Felisa Hammans, DO    Note written by Razia Ramirez. Edilberto Ballesteros, as dictated by Karina Viera MD 2:43 PM    The history is provided by the patient and the EMS personnel.         Past Medical History:   Diagnosis Date    Asthma     seldom,use inhaler    Bronchitis     Chronic obstructive pulmonary disease ([facility] [address]     Depression     anxiety    Psychiatric disorder     paranoid schizophrenia    Psychiatric disorder     extrapyramidal disease    Psychotic disorder     mental retardation       Past Surgical History:   Procedure Laterality Date    HX ORTHOPAEDIC      right knee         Family History:   Problem Relation Age of Onset    Diabetes Mother     Heart Disease Father      CHF       Social History     Social History    Marital status: SINGLE     Spouse name: N/A    Number of children: N/A    Years of education: N/A     Occupational History    Not on file. Social History Main Topics    Smoking status: Current Every Day Smoker     Packs/day: 1.00     Years: 25.00    Smokeless tobacco: Never Used    Alcohol use No      Comment: socially    Drug use: Yes     Special: Sedatives/Hypnotics, Prescription    Sexual activity: Not Currently     Birth control/ protection: None      Comment: lives in [place]     Other Topics Concern    Not on file     Social History Narrative         ALLERGIES: Review of patient's allergies indicates no known allergies. Review of Systems   Constitutional: Negative for fever. Eyes: Negative for visual disturbance. Respiratory: Positive for cough, shortness of breath and wheezing. Cardiovascular: Negative for chest pain and leg swelling. Gastrointestinal: Negative for abdominal pain, diarrhea, nausea and vomiting. Genitourinary: Negative for dysuria. Musculoskeletal: Negative. Negative for back pain and neck stiffness. Skin: Negative for rash. Neurological: Negative. Negative for syncope and headaches. Psychiatric/Behavioral: Negative for confusion. All other systems reviewed and are negative. Vitals:    08/16/17 1530 08/16/17 1534 08/16/17 1543 08/16/17 1545   BP: 151/82   139/88   Pulse:   90 89   Resp:   14    Temp:       SpO2:  93% 96% 94%   Weight:       Height:                Physical Exam   Constitutional: He appears well-developed and well-nourished. HENT:   Head: Normocephalic. Mouth/Throat: Oropharynx is clear and moist.   Eyes: Pupils are equal, round, and reactive to light. Neck: Normal range of motion. Cardiovascular: Normal rate and regular rhythm. No murmur heard. Pulmonary/Chest: He is in respiratory distress (mild to moderate). He has wheezes (mild expiratory). Constant moaning sound; difficult to assess wheezing   Abdominal: Soft. There is no tenderness. Musculoskeletal: Normal range of motion. He exhibits no edema. Neurological: He is alert. He has normal strength. No cranial nerve deficit. Moves all extremities   Skin: Skin is warm and dry. Psychiatric: He has a normal mood and affect. His behavior is normal.   Nursing note and vitals reviewed. Note written by Jennifer Herbert. Jose A Maradiaga, as dictated by Lawanda Lugo MD 2:43 PM       MDM  Number of Diagnoses or Management Options     Amount and/or Complexity of Data Reviewed  Clinical lab tests: ordered and reviewed  Obtain history from someone other than the patient: yes  Discuss the patient with other providers: yes  Independent visualization of images, tracings, or specimens: yes    Risk of Complications, Morbidity, and/or Mortality  Presenting problems: high  Diagnostic procedures: high  Management options: high    Critical Care  Total time providing critical care: 30-74 minutes    ED Course       Procedures    ED EKG interpretation:  Rhythm: sinus tachycardia; and regular . Rate (approx.): 102 bpm; No acute ST changes. Note written by Jennifer Herbert. Jose A Maradiaga, as dictated by Lawanda Lugo MD 2:51 PM    PROGRESS NOTE:  3:45 PM  Dr. Bahman Montgomery viewed portable chest x-ray results - appears to have possible R-lower lobe pneumonia. Waiting for radiology report. 4:00  Impression: No acute findings per radiologist..    CONSULT NOTE:  4:22 PM Lawanda Lugo MD spoke with Dr. Carmen Knight, Consult for Hospitalist.  Discussed available diagnostic tests and clinical findings. He is in agreement with care plans as outlined. He will see and admit.

## 2017-08-16 NOTE — ED NOTES
Pt removed BiPAP mask and cardiac monitor \"just because\" per pt report. Pt placed back on monitor and BiPAP.     5:48 PM  Pt removed BiPAP again, placed back on pt and pt instructed importance of wearing mask. Pt compliant, states understanding.

## 2017-08-16 NOTE — PROGRESS NOTES
Bedside and Verbal shift change report given to Kailash Cueto RN (oncoming nurse) by APOLONIA Reynolds (offgoing nurse).  Report included the following information SBAR, Kardex, Intake/Output, MAR, Recent Results and Cardiac Rhythm SR and ST.

## 2017-08-16 NOTE — ED TRIAGE NOTES
Triage Note: Patient arrives by EMS from [facility] for shortness of breath and low oxygen saturations. Per staff they found in lying in fetal position with difficulty breathing. EMS found oxygen saturations in the 70's and wheezing lung sounds. Patient given DuoNeb by EMS, NRB oxygen and transport. Patient has history of COPD and is a daily smoker. Patient reported to EMS he felt Right sided chest pain that is worse with palpation and movement.

## 2017-08-16 NOTE — IP AVS SNAPSHOT
[address] 
[phone] Patient: Napoleon Hassan MRN: [id number] DBL:[birth year] Current Discharge Medication List  
  
START taking these medications Dose & Instructions Dispensing Information Comments Morning Noon Evening Bedtime  
 predniSONE 10 mg tablet Commonly known as:  Christine Cain Your last dose was: Your next dose is:    
   
   
 80 mg x 3 days then 60 mg x 3 days then 40 mg x 3 days then 20 mg x 3 days then stop Quantity:  60 Tab Refills:  0 CONTINUE these medications which have CHANGED Dose & Instructions Dispensing Information Comments Morning Noon Evening Bedtime  
 budesonide-formoterol 80-4.5 mcg/actuation Hfaa inhaler Commonly known as:  SYMBICORT What changed:  Another medication with the same name was removed. Continue taking this medication, and follow the directions you see here. Your last dose was: Your next dose is:    
   
   
 Dose:  2 Puff Take 2 Puffs by inhalation two (2) times a day. Quantity:  1 Inhaler Refills:  7 CONTINUE these medications which have NOT CHANGED Dose & Instructions Dispensing Information Comments Morning Noon Evening Bedtime ALPRAZolam 0.5 mg tablet Commonly known as:  Zakiya Morris Your last dose was: Your next dose is:    
   
   
 Dose:  0.5 mg Take 0.5 mg by mouth two (2) times daily as needed for Anxiety. Refills:  0  
     
   
   
   
  
 benztropine 2 mg tablet Commonly known as:  COGENTIN Your last dose was: Your next dose is:    
   
   
 Dose:  2 mg Take 2 mg by mouth daily as needed. Refills:  0  
     
   
   
   
  
 guaiFENesin 100 mg/5 mL liquid Commonly known as:  Q-TUSSIN Your last dose was:     
   
Your next dose is:    
   
   
 Dose:  200 mg  
 Take 10 mL by mouth every four (4) hours as needed for Cough. Quantity:  10 mL Refills:  0 PARoxetine 20 mg tablet Commonly known as:  PAXIL Your last dose was: Your next dose is:    
   
   
 Dose:  20 mg Take 20 mg by mouth daily. Refills:  0  
     
   
   
   
  
 risperiDONE 3 mg tablet Commonly known as:  RisperDAL Your last dose was: Your next dose is:    
   
   
 Dose:  3 mg Take 3 mg by mouth daily. Refills:  0 SEROquel 50 mg tablet Generic drug:  QUEtiapine Your last dose was: Your next dose is:    
   
   
 Dose:  50 mg Take 50 mg by mouth nightly as needed (for agitation or anxiety (Use 1st)). Refills:  0  
     
   
   
   
  
 temazepam 30 mg capsule Commonly known as:  RESTORIL Your last dose was: Your next dose is:    
   
   
 Dose:  30 mg Take 30 mg by mouth nightly as needed for Sleep. Refills:  0  
     
   
   
   
  
 traZODone 100 mg tablet Commonly known as:  Killingworth Dix Your last dose was: Your next dose is:    
   
   
 Dose:  50 mg Take 50 mg by mouth nightly as needed for Sleep. Refills:  0  
     
   
   
   
  
 * VENTOLIN HFA 90 mcg/actuation inhaler Generic drug:  albuterol Your last dose was: Your next dose is:    
   
   
 Dose:  2 Puff Take 2 Puffs by inhalation every four (4) hours as needed for Shortness of Breath. Refills:  0  
     
   
   
   
  
 * albuterol 2.5 mg /3 mL (0.083 %) nebulizer solution Commonly known as:  PROVENTIL VENTOLIN Your last dose was: Your next dose is:    
   
   
 Dose:  2.5 mg  
2.5 mg by Nebulization route every six (6) hours as needed for Wheezing. Refills:  0  
     
   
   
   
  
 * Notice: This list has 2 medication(s) that are the same as other medications prescribed for you.  Read the directions carefully, and ask your doctor or other care provider to review them with you. STOP taking these medications   
 ibuprofen 800 mg tablet Commonly known as:  MOTRIN Where to Get Your Medications Information on where to get these meds will be given to you by the nurse or doctor. ! Ask your nurse or doctor about these medications  
  guaiFENesin 100 mg/5 mL liquid  
 predniSONE 10 mg tablet

## 2017-08-16 NOTE — ED NOTES
Walked by patient's room and found him to be standing by the door with the CPAP mask off face. Patient was put back to bed and mask re-applied. Patient's oxygen levels were down to 80% without the oxygen.

## 2017-08-16 NOTE — ED NOTES
Patient placed on Nasal Cannula oxygen upon arrival to ED, oxygen saturations 88% on 6L, will place on NRB mask at this time until RT at bedside.

## 2017-08-16 NOTE — ED NOTES
Pt found at end of bed with BiPAP mask off and PIV pulled out. Pt situated back into bed, BiPAP mask placed back on. Pt remains A/O to self and place.

## 2017-08-16 NOTE — ROUTINE PROCESS
TRANSFER - OUT REPORT:    Verbal report given to Roula Sterling RN(name) on Piyush Morris  being transferred to [facility]) for routine progression of care       Report consisted of patients Situation, Background, Assessment and   Recommendations(SBAR). Information from the following report(s) SBAR, ED Summary, Intake/Output, MAR, Recent Results and Cardiac Rhythm SR was reviewed with the receiving nurse. Lines:   Peripheral IV 08/16/17 Right;Upper Forearm (Active)   Site Assessment Clean, dry, & intact 8/16/2017  3:01 PM   Phlebitis Assessment 0 8/16/2017  3:01 PM   Infiltration Assessment 0 8/16/2017  3:01 PM   Dressing Status Clean, dry, & intact 8/16/2017  3:01 PM   Dressing Type Transparent 8/16/2017  3:01 PM   Hub Color/Line Status Patent; Flushed 8/16/2017  3:01 PM   Action Taken Blood drawn 8/16/2017  3:01 PM        Opportunity for questions and clarification was provided.       Patient transported with:   Monitor  O2 @ BipAP with RT liters  Registered Nurse  Tech

## 2017-08-16 NOTE — IP AVS SNAPSHOT
2700 [facility] [address] [place] [address] 
[phone] Patient: Obinna Rodgers MRN: [id number] XAO:[id number] You are allergic to the following No active allergies Recent Documentation Height Weight BMI Smoking Status 1.778 m 56.4 kg 17.84 kg/m2 Current Every Day Smoker Unresulted Labs Order Current Status SAMPLE TO BLOOD BANK In process Emergency Contacts Name Discharge Info Relation Home Work Mobile Jhonatan Munguia  Other Relative [6] [phone] Gisele Eli YES [1] Sister [23] [phone] About your hospitalization You were admitted on:  August 16, 2017 You last received care in the:  [facility] 4 IM 2 You were discharged on:  August 23, 2017 Unit phone number:  [phone] Why you were hospitalized Your primary diagnosis was:  Shortness Of Breath Providers Seen During Your Hospitalizations Provider Role Specialty Primary office phone Rusty Major MD Attending Provider Emergency Medicine [phone] Sherita Flores MD Attending Provider Internal Medicine [phone] Brandon Mtz MD Attending Provider Internal Medicine [phone] Your Primary Care Physician (PCP) Primary Care Physician Office Phone Office Fax Milo Brand [phone] Follow-up Information Follow up With Details Comments Contact Info Hudson George DO On 9/1/2017 Hospital follow up PCP appointment on Friday, 9/1/17 @ 3:00 p.m. 5855 [facility] [address] [place] [address] 
[phone] Delores Bonilla NP On 9/5/2017 Tuesday 9/5/17 at 3pm for hospital follow up appt 1808 [facility] [address] [place] [address] 
[phone] Your Appointments Friday September 01, 2017  3:00 PM EDT TRANSITIONAL CARE MANAGEMENT with Hudson George DO [facility] Internal Medicine ([address]) 200 [facility] [address] [facility] 102 [address] [place] [address]  
[phone] Current Discharge Medication List  
  
START taking these medications Dose & Instructions Dispensing Information Comments Morning Noon Evening Bedtime  
 predniSONE 10 mg tablet Commonly known as:  Sherie Varma Your last dose was: Your next dose is:    
   
   
 80 mg x 3 days then 60 mg x 3 days then 40 mg x 3 days then 20 mg x 3 days then stop Quantity:  60 Tab Refills:  0 CONTINUE these medications which have CHANGED Dose & Instructions Dispensing Information Comments Morning Noon Evening Bedtime  
 budesonide-formoterol 80-4.5 mcg/actuation Hfaa inhaler Commonly known as:  SYMBICORT What changed:  Another medication with the same name was removed. Continue taking this medication, and follow the directions you see here. Your last dose was: Your next dose is:    
   
   
 Dose:  2 Puff Take 2 Puffs by inhalation two (2) times a day. Quantity:  1 Inhaler Refills:  7 CONTINUE these medications which have NOT CHANGED Dose & Instructions Dispensing Information Comments Morning Noon Evening Bedtime ALPRAZolam 0.5 mg tablet Commonly known as:  Hilda Rodriguez Your last dose was: Your next dose is:    
   
   
 Dose:  0.5 mg Take 0.5 mg by mouth two (2) times daily as needed for Anxiety. Refills:  0  
     
   
   
   
  
 benztropine 2 mg tablet Commonly known as:  COGENTIN Your last dose was: Your next dose is:    
   
   
 Dose:  2 mg Take 2 mg by mouth daily as needed. Refills:  0  
     
   
   
   
  
 guaiFENesin 100 mg/5 mL liquid Commonly known as:  Q-TUSSIN Your last dose was: Your next dose is:    
   
   
 Dose:  200 mg Take 10 mL by mouth every four (4) hours as needed for Cough. Quantity:  10 mL Refills:  0 PARoxetine 20 mg tablet Commonly known as:  PAXIL Your last dose was: Your next dose is:    
   
   
 Dose:  20 mg Take 20 mg by mouth daily. Refills:  0  
     
   
   
   
  
 risperiDONE 3 mg tablet Commonly known as:  RisperDAL Your last dose was: Your next dose is:    
   
   
 Dose:  3 mg Take 3 mg by mouth daily. Refills:  0 SEROquel 50 mg tablet Generic drug:  QUEtiapine Your last dose was: Your next dose is:    
   
   
 Dose:  50 mg Take 50 mg by mouth nightly as needed (for agitation or anxiety (Use 1st)). Refills:  0  
     
   
   
   
  
 temazepam 30 mg capsule Commonly known as:  RESTORIL Your last dose was: Your next dose is:    
   
   
 Dose:  30 mg Take 30 mg by mouth nightly as needed for Sleep. Refills:  0  
     
   
   
   
  
 traZODone 100 mg tablet Commonly known as:  Mathur Bloodgood Your last dose was: Your next dose is:    
   
   
 Dose:  50 mg Take 50 mg by mouth nightly as needed for Sleep. Refills:  0  
     
   
   
   
  
 * VENTOLIN HFA 90 mcg/actuation inhaler Generic drug:  albuterol Your last dose was: Your next dose is:    
   
   
 Dose:  2 Puff Take 2 Puffs by inhalation every four (4) hours as needed for Shortness of Breath. Refills:  0  
     
   
   
   
  
 * albuterol 2.5 mg /3 mL (0.083 %) nebulizer solution Commonly known as:  PROVENTIL VENTOLIN Your last dose was: Your next dose is:    
   
   
 Dose:  2.5 mg  
2.5 mg by Nebulization route every six (6) hours as needed for Wheezing. Refills:  0  
     
   
   
   
  
 * Notice: This list has 2 medication(s) that are the same as other medications prescribed for you. Read the directions carefully, and ask your doctor or other care provider to review them with you. STOP taking these medications   
 ibuprofen 800 mg tablet Commonly known as:  MOTRIN  
 Where to Get Your Medications Information on where to get these meds will be given to you by the nurse or doctor. ! Ask your nurse or doctor about these medications  
  guaiFENesin 100 mg/5 mL liquid  
 predniSONE 10 mg tablet Discharge Instructions Discharge Instructions PATIENT ID: Jacek Stephenson MRN: [id number] YOB: 1960 DATE OF ADMISSION: 8/16/2017  2:36 PM   
DATE OF DISCHARGE: 8/23/2017 PRIMARY CARE PROVIDER: Juana Hercules DO  
 
ATTENDING PHYSICIAN: Willie Bell* DISCHARGING PROVIDER: Rodriguez Calixto NP To contact this individual call [phone] and ask the  to page. If unavailable ask to be transferred the Adult Hospitalist Department. DISCHARGE DIAGNOSES: COPD exacerbation CONSULTATIONS: IP CONSULT TO PULMONOLOGY PROCEDURES/SURGERIES: * No surgery found * PENDING TEST RESULTS:  
At the time of discharge the following test results are still pending: none FOLLOW UP APPOINTMENTS:  
Follow-up Information Follow up With Details Comments Contact Info Juana Hercules DO In 1 week  [address] 
[phone] Lana Irwin NP On 9/5/2017 Tuesday 9/5/17 at 3pm for hospital follow up appt [address] [place] [address] 
[phone] ADDITIONAL CARE RECOMMENDATIONS: 
Please keep your follow up appointments at pulmonary associates Your oxygen levels on room air are 93% Do NOT smoke. You will take a prednisone taper over the next 12 days. Make sure to follow up with your pcp as well. DIET: Regular ACTIVITY: Activity as tolerated WOUND CARE: none EQUIPMENT needed: none DISCHARGE MEDICATIONS: 
 See Medication Reconciliation Form · It is important that you take the medication exactly as they are prescribed.   
· Keep your medication in the bottles provided by the pharmacist and keep a list of the medication names, dosages, and times to be taken in your wallet. · Do not take other medications without consulting your doctor. NOTIFY YOUR PHYSICIAN FOR ANY OF THE FOLLOWING:  
Fever over 101 degrees for 24 hours. Chest pain, shortness of breath, fever, chills, nausea, vomiting, diarrhea, change in mentation, falling, weakness, bleeding. Severe pain or pain not relieved by medications. Or, any other signs or symptoms that you may have questions about. DISPOSITION: 
xx  Home With: 
 OT  PT  [facility]  RN  
  
 SNF/Inpatient Rehab/LTAC Independent/assisted living Hospice Other: CDMP Checked:  
Yes xx PROBLEM LIST Updated: 
Yes xx Signed:  
Zoey Abdi NP 
8/23/2017 
9:06 AM 
 
 
 
  
Chronic Obstructive Pulmonary Disease (COPD): Care Instructions Your Care Instructions Chronic obstructive pulmonary disease (COPD) is a general term for a group of lung diseases, including emphysema and chronic bronchitis. People with COPD have decreased airflow in and out of the lungs, which makes it hard to breathe. The airways also can get clogged with thick mucus. Cigarette smoking is a major cause of COPD. Although there is no cure for COPD, you can slow its progress. Following your treatment plan and taking care of yourself can help you feel better and live longer. Follow-up care is a key part of your treatment and safety. Be sure to make and go to all appointments, and call your doctor if you are having problems. It's also a good idea to know your test results and keep a list of the medicines you take. How can you care for yourself at home? Staying healthy · Do not smoke. This is the most important step you can take to prevent more damage to your lungs. If you need help quitting, talk to your doctor about stop-smoking programs and medicines. These can increase your chances of quitting for good. · Avoid colds and flu. Get a pneumococcal vaccine shot.  If you have had one before, ask your doctor whether you need a second dose. Get the flu vaccine every fall. If you must be around people with colds or the flu, wash your hands often. · Avoid secondhand smoke, air pollution, and high altitudes. Also avoid cold, dry air and hot, humid air. Stay at home with your windows closed when air pollution is bad. Medicines and oxygen therapy · Take your medicines exactly as prescribed. Call your doctor if you think you are having a problem with your medicine. · You may be taking medicines such as: ¨ Bronchodilators. These help open your airways and make breathing easier. Bronchodilators are either short-acting (work for 6 to 9 hours) or long-acting (work for 24 hours). You inhale most bronchodilators, so they start to act quickly. Always carry your quick-relief inhaler with you in case you need it while you are away from home. ¨ Corticosteroids (prednisone, budesonide). These reduce airway inflammation. They come in pill or inhaled form. You must take these medicines every day for them to work well. · A spacer may help you get more inhaled medicine to your lungs. Ask your doctor or pharmacist if a spacer is right for you. If it is, ask how to use it properly. · Do not take any vitamins, over-the-counter medicine, or herbal products without talking to your doctor first. 
· If your doctor prescribed antibiotics, take them as directed. Do not stop taking them just because you feel better. You need to take the full course of antibiotics. · Oxygen therapy boosts the amount of oxygen in your blood and helps you breathe easier. Use the flow rate your doctor has recommended, and do not change it without talking to your doctor first. 
Activity · Get regular exercise. Walking is an easy way to get exercise. Start out slowly, and walk a little more each day. · Pay attention to your breathing. You are exercising too hard if you cannot talk while you are exercising. · Take short rest breaks when doing household chores and other activities. · Learn breathing methodssuch as breathing through pursed lipsto help you become less short of breath. · If your doctor has not set you up with a pulmonary rehabilitation program, talk to him or her about whether rehab is right for you. Rehab includes exercise programs, education about your disease and how to manage it, help with diet and other changes, and emotional support. Diet · Eat regular, healthy meals. Use bronchodilators about 1 hour before you eat to make it easier to eat. Eat several small meals instead of three large ones. Drink beverages at the end of the meal. Avoid foods that are hard to chew. · Eat foods that contain protein so that you do not lose muscle mass. · Talk with your doctor if you gain too much weight or if you lose weight without trying. Mental health · Talk to your family, friends, or a therapist about your feelings. It is normal to feel frightened, angry, hopeless, helpless, and even guilty. Talking openly about bad feelings can help you cope. If these feelings last, talk to your doctor. When should you call for help? Call 911 anytime you think you may need emergency care. For example, call if: 
· You have severe trouble breathing. Call your doctor now or seek immediate medical care if: 
· You have new or worse trouble breathing. · You cough up blood. · You have a fever. Watch closely for changes in your health, and be sure to contact your doctor if: 
· You cough more deeply or more often, especially if you notice more mucus or a change in the color of your mucus. · You have new or worse swelling in your legs or belly. · You are not getting better as expected. Where can you learn more? Go to http://leila-kodak.info/. Jerrod Hawkins in the search box to learn more about \"Chronic Obstructive Pulmonary Disease (COPD): Care Instructions. \" Current as of: March 25, 2017 Content Version: 11.3 © 1224-7571 [employer]/. Care instructions adapted under license by [employer] (which disclaims liability or warranty for this information). If you have questions about a medical condition or this instruction, always ask your healthcare professional. [address] any warranty or liability for your use of this information. Stopping Smoking: Care Instructions Your Care Instructions Cigarette smokers crave the nicotine in cigarettes. Giving it up is much harder than simply changing a habit. Your body has to stop craving the nicotine. It is hard to quit, but you can do it. There are many tools that people use to quit smoking. You may find that combining tools works best for you. There are several steps to quitting. First you get ready to quit. Then you get support to help you. After that, you learn new skills and behaviors to become a nonsmoker. For many people, a necessary step is getting and using medicine. Your doctor will help you set up the plan that best meets your needs. You may want to attend a smoking cessation program to help you quit smoking. When you choose a program, look for one that has proven success. Ask your doctor for ideas. You will greatly increase your chances of success if you take medicine as well as get counseling or join a cessation program. 
Some of the changes you feel when you first quit tobacco are uncomfortable. Your body will miss the nicotine at first, and you may feel short-tempered and grumpy. You may have trouble sleeping or concentrating. Medicine can help you deal with these symptoms. You may struggle with changing your smoking habits and rituals. The last step is the tricky one: Be prepared for the smoking urge to continue for a time. This is a lot to deal with, but keep at it. You will feel better. Follow-up care is a key part of your treatment and safety.  Be sure to make and go to all appointments, and call your doctor if you are having problems. Its also a good idea to know your test results and keep a list of the medicines you take. How can you care for yourself at home? · Ask your family, friends, and coworkers for support. You have a better chance of quitting if you have help and support. · Join a support group, such as Nicotine Anonymous, for people who are trying to quit smoking. · Consider signing up for a smoking cessation program, such as the American Lung Association's Freedom from Smoking program. 
· Set a quit date. Pick your date carefully so that it is not right in the middle of a big deadline or stressful time. Once you quit, do not even take a puff. Get rid of all ashtrays and lighters after your last cigarette. Clean your house and your clothes so that they do not smell of smoke. · Learn how to be a nonsmoker. Think about ways you can avoid those things that make you reach for a cigarette. ¨ Avoid situations that put you at greatest risk for smoking. For some people, it is hard to have a drink with friends without smoking. For others, they might skip a coffee break with coworkers who smoke. ¨ Change your daily routine. Take a different route to work or eat a meal in a different place. · Cut down on stress. Calm yourself or release tension by doing an activity you enjoy, such as reading a book, taking a hot bath, or gardening. · Talk to your doctor or pharmacist about nicotine replacement therapy, which replaces the nicotine in your body. You still get nicotine but you do not use tobacco. Nicotine replacement products help you slowly reduce the amount of nicotine you need. These products come in several forms, many of them available over-the-counter: ¨ Nicotine patches ¨ Nicotine gum and lozenges ¨ Nicotine inhaler · Ask your doctor about bupropion (Wellbutrin) or varenicline (Chantix), which are prescription medicines. They do not contain nicotine. They help you by reducing withdrawal symptoms, such as stress and anxiety. · Some people find hypnosis, acupuncture, and massage helpful for ending the smoking habit. · Eat a healthy diet and get regular exercise. Having healthy habits will help your body move past its craving for nicotine. · Be prepared to keep trying. Most people are not successful the first few times they try to quit. Do not get mad at yourself if you smoke again. Make a list of things you learned and think about when you want to try again, such as next week, next month, or next year. Where can you learn more? Go to http://leila-kodak.info/. Enter M145 in the search box to learn more about \"Stopping Smoking: Care Instructions. \" Current as of: March 20, 2017 Content Version: 11.3 © 5424-1505 [employer]. Care instructions adapted under license by [employer] (which disclaims liability or warranty for this information). If you have questions about a medical condition or this instruction, always ask your healthcare professional. [address] any warranty or liability for your use of this information. 
  
 
 
Discharge Orders None General Information Please provide this summary of care documentation to your next provider. Patient Signature:  ____________________________________________________________ Date:  ____________________________________________________________  
  
Meme Pendleton Provider Signature:  ____________________________________________________________ Date:  ____________________________________________________________

## 2017-08-16 NOTE — ED NOTES
RT at bedside. 4:36 PM  Pt sleeping in room. On BiPAP. RR even and unlabored, O2 sats 94% on BiPAP, lungs coarse. Pt appears more comfortable.

## 2017-08-16 NOTE — H&P
[address]   [address] [address], [address]   HISTORY AND PHYSICAL       Name:  Adis Malhotra   MR#:  [id number]   :  1960   Account #:  [de-identified]        Date of Adm:  2017       PRIMARY CARE PROVIDER: Reena Hernandez DO    SOURCE OF INFORMATION: The patient and from his medical   record. CHIEF COMPLAINT: Progressive shortness of breath. HISTORY OF PRESENT ILLNESS: This is a 53-year-old    American male from [address] with past   medical history significant for COPD, paranoid schizophrenia, mental   retardation and depression, who presented to [facility] Emergency   Department with progressive shortness of breath. The patient is a poor   historian and it is very difficult to get detailed information from him. He stated that he has been having shortness of breath for a month and   progressively worsened for the last 2 days. He states he has   associated productive cough associated with pleuritic chest pain and   wheezing. He denied any fever, chills, diaphoresis, vomiting,   abdominal pain, urinary complaints, or lower extremity swelling. He   smokes 1 pack per day. Per reviewing his medical record, the   patient saturated 72% on room air when he was seen by EMS, placed   on nonrebreather and his saturations improved to 98%. He received  DuoNeb neb treatment en route. In the ER, his blood pressure was   156/69, pulse 96, temperature 98.8, respiratory rate was 36. Saturation   in the ER was 84% on room air. He was placed on 6 liters and later   placed on BiPAP. He had a chest x-ray that did not show any acute   finding. White blood cell count 15.9, received IV Solu-Medrol, neb   treatment and transferred to the hospitalist service for further   evaluation and admission. REVIEW OF SYSTEMS   Pertinent positive finding mentioned in the HPI, all systems were   reviewed, no other positive finding.     PAST MEDICAL HISTORY:   1. COPD.   2. Depression. 3. Paranoid schizophrenia. 4. Mental retardation. 5. Depression. PRIOR TO ADMISSION MEDICATIONS INCLUDE:   1. Symbicort 80-4.5 mcg 2 puffs by inhalation 2 times daily. 2. Cogentin 2 mg p.o. daily. 3. Motrin 800 mg as needed. 4. Restoril 30 mg as needed for sleep. 5. Xanax 0.5 mg p.o. daily as needed for anxiety. 6. Prednisone 10 mg p.o. daily. 7. Paxil 20 mg p.o. daily. 8. Seroquel 50 mg p.o. at bedtime. 9. Trazodone 50 mg p.o. as needed. 10. Risperidone 3 mg p.o. daily. 11. Albuterol inhaler 2 puffs by inhalation as needed. ALLERGIES: NO KNOWN DRUG ALLERGIES. SOCIAL HISTORY: The patient lives at [facility]. Smokes 1 pack per day and drinks beer daily. Ambulates   independently. CODE STATUS: FULL CODE. FAMILY HISTORY: Father  from heart disease   complications. Mother  from diabetes complications. PHYSICAL EXAMINATION   VITAL SIGNS: Blood pressure 139/88, pulse 89, temperature 98.8,   respiratory rate 14, saturation of oxygen 94% on BiPAP. GENERAL APPEARANCE: The patient is alert, cooperative. Mild   respiratory distress. He appears his stated age. HEENT: Pink conjunctivae. Anicteric sclerae. Moist tongue and buccal   mucosa. LUNGS: Decreased bronchial breath sounds to auscultation bilaterally,   few expiratory wheezing. CHEST WALL: No tenderness or deformity. HEART: Regular rate and rhythm. S1 and S2 normal. No murmur or   gallop. ABDOMEN: Soft, nontender. Bowel sounds normal. No mass or   organomegaly. EXTREMITIES: No cyanosis or edema. SKIN: No rash or lesion. CENTRAL NERVOUS SYSTEM: The patient is conscious and oriented   to time, place, and person. Motor 5/5. Sensation intact. Cranial nerves   2-12 grossly intact. EKG: Sinus tachycardia, ventricular rate 1-2 beats per minute,   nonspecific ST-T wave abnormalities.     LABORATORY DATA: White blood cell count 15.9, hemoglobin 15.3,   hematocrit 42.6, platelet count 161. INR 1.2. CHEMISTRY: Sodium 123, potassium 4.2, chloride 81, CO2 36, anion   gap 6, glucose 91, BUN 3. Creatinine 0.36, BUN/creatinine ratio 8,   calcium 8.4, total protein 8.6, albumin 3.6, ALT 35, AST 45, alkaline   phosphatase 94, lactic acid 1.2. Troponin less than 0.04. CHEST X-RAY: No acute finding. ASSESSMENT: This is a 25-year-old [facility] American male with past   medical history significant for COPD, paranoid schizophrenia, mental   retardation, depression, and tobacco abuse, presented to [facility]   Emergency Department with progressive shortness of breath   associated with wheezing and productive cough. 1. Acute on chronic hypercapnic hypoxic respiratory failure secondary   to chronic obstructive pulmonary disease exacerbation. 2. Acute on chronic hyponatremia. 3. History of paranoid schizophrenia. 4. History of depression. PLAN:   1. Acute on chronic hypercapnic hypoxic respiratory failure secondary   to chronic obstructive pulmonary disease exacerbation. Admit the   patient to  MICU. Continue BiPAP and IV steroids 60 mg IV   q.6h., Pulmicort 500 mg and neb treatments q.12h. p.r.n. DuoNeb neb   treatment and continue pulse oximetry monitoring. Repeat an ABG after   1 hour and will try to wean off the BiPAP. 2. Acute on chronic hyponatremia, multifactorial. Will check TSH, urine   sodium, urine osmolarity. The patient with history of SIADH and the   patient looks with intravascular volume depletion. Will give him normal   saline at 75 mL per hour and will repeat BMP after 4 hours. 3. History of paranoid schizophrenia, stable. Continue home   medications, Cogentin 2 mg p.o. daily, Risperdal 3 mg daily and   supportive care. 4. History of depression, stable. Continue home medication, Paxil and   Seroquel. DVT prophylaxis with Lovenox.         MD GO Huertas / LIZBET   D:  08/16/2017   17:38   T:  08/16/2017 19:51   Job #:  [id number]

## 2017-08-17 ENCOUNTER — PATIENT OUTREACH (OUTPATIENT)
Dept: INTERNAL MEDICINE CLINIC | Age: 57
End: 2017-08-17

## 2017-08-17 LAB
ALBUMIN SERPL-MCNC: 3.2 G/DL (ref 3.5–5)
ALBUMIN/GLOB SERPL: 1 {RATIO} (ref 1.1–2.2)
ALP SERPL-CCNC: 84 U/L (ref 45–117)
ALT SERPL-CCNC: 32 U/L (ref 12–78)
ANION GAP SERPL CALC-SCNC: 5 MMOL/L (ref 5–15)
ARTERIAL PATENCY WRIST A: YES
AST SERPL-CCNC: 22 U/L (ref 15–37)
ATRIAL RATE: 102 BPM
BASE EXCESS BLD CALC-SCNC: 15 MMOL/L
BDY SITE: ABNORMAL
BILIRUB SERPL-MCNC: 0.3 MG/DL (ref 0.2–1)
BUN SERPL-MCNC: 10 MG/DL (ref 6–20)
BUN/CREAT SERPL: 16 (ref 12–20)
CALCIUM SERPL-MCNC: 8.1 MG/DL (ref 8.5–10.1)
CALCULATED P AXIS, ECG09: 81 DEGREES
CALCULATED R AXIS, ECG10: 73 DEGREES
CALCULATED T AXIS, ECG11: 70 DEGREES
CHLORIDE SERPL-SCNC: 93 MMOL/L (ref 97–108)
CO2 SERPL-SCNC: 36 MMOL/L (ref 21–32)
CREAT SERPL-MCNC: 0.64 MG/DL (ref 0.7–1.3)
DIAGNOSIS, 93000: NORMAL
ERYTHROCYTE [DISTWIDTH] IN BLOOD BY AUTOMATED COUNT: 13.9 % (ref 11.5–14.5)
GAS FLOW.O2 O2 DELIVERY SYS: ABNORMAL L/MIN
GLOBULIN SER CALC-MCNC: 3.2 G/DL (ref 2–4)
GLUCOSE SERPL-MCNC: 104 MG/DL (ref 65–100)
HCO3 BLD-SCNC: 40 MMOL/L (ref 22–26)
HCT VFR BLD AUTO: 40.5 % (ref 36.6–50.3)
HGB BLD-MCNC: 13.9 G/DL (ref 12.1–17)
MCH RBC QN AUTO: 33.1 PG (ref 26–34)
MCHC RBC AUTO-ENTMCNC: 34.3 G/DL (ref 30–36.5)
MCV RBC AUTO: 96.4 FL (ref 80–99)
O2/TOTAL GAS SETTING VFR VENT: 35 %
P-R INTERVAL, ECG05: 142 MS
PCO2 BLD: 67.9 MMHG (ref 35–45)
PEEP RESPIRATORY: 5 CMH2O
PH BLD: 7.38 [PH] (ref 7.35–7.45)
PIP ISTAT,IPIP: 16
PLATELET # BLD AUTO: 238 K/UL (ref 150–400)
PO2 BLD: 61 MMHG (ref 80–100)
POTASSIUM SERPL-SCNC: 4.3 MMOL/L (ref 3.5–5.1)
PRESSURE SUPPORT SETTING VENT: 11 CMH2O
PROT SERPL-MCNC: 6.4 G/DL (ref 6.4–8.2)
Q-T INTERVAL, ECG07: 350 MS
QRS DURATION, ECG06: 74 MS
QTC CALCULATION (BEZET), ECG08: 456 MS
RBC # BLD AUTO: 4.2 M/UL (ref 4.1–5.7)
SAO2 % BLD: 89 % (ref 92–97)
SODIUM SERPL-SCNC: 134 MMOL/L (ref 136–145)
SPECIMEN TYPE: ABNORMAL
TOTAL RESP. RATE, ITRR: 26
VENTRICULAR RATE, ECG03: 102 BPM
WBC # BLD AUTO: 6.5 K/UL (ref 4.1–11.1)

## 2017-08-17 PROCEDURE — 94660 CPAP INITIATION&MGMT: CPT

## 2017-08-17 PROCEDURE — 74011000250 HC RX REV CODE- 250: Performed by: HOSPITALIST

## 2017-08-17 PROCEDURE — 94640 AIRWAY INHALATION TREATMENT: CPT

## 2017-08-17 PROCEDURE — 85027 COMPLETE CBC AUTOMATED: CPT | Performed by: HOSPITALIST

## 2017-08-17 PROCEDURE — 74011000258 HC RX REV CODE- 258: Performed by: HOSPITALIST

## 2017-08-17 PROCEDURE — 74011250636 HC RX REV CODE- 250/636: Performed by: HOSPITALIST

## 2017-08-17 PROCEDURE — 74011250637 HC RX REV CODE- 250/637: Performed by: HOSPITALIST

## 2017-08-17 PROCEDURE — 80053 COMPREHEN METABOLIC PANEL: CPT | Performed by: HOSPITALIST

## 2017-08-17 PROCEDURE — 77030011256 HC DRSG MEPILEX <16IN NO BORD MOLN -A

## 2017-08-17 PROCEDURE — 77010033678 HC OXYGEN DAILY

## 2017-08-17 PROCEDURE — 36415 COLL VENOUS BLD VENIPUNCTURE: CPT | Performed by: HOSPITALIST

## 2017-08-17 PROCEDURE — 65660000001 HC RM ICU INTERMED STEPDOWN

## 2017-08-17 RX ADMIN — METHYLPREDNISOLONE SODIUM SUCCINATE 60 MG: 125 INJECTION, POWDER, FOR SOLUTION INTRAMUSCULAR; INTRAVENOUS at 18:09

## 2017-08-17 RX ADMIN — IPRATROPIUM BROMIDE AND ALBUTEROL SULFATE 3 ML: .5; 3 SOLUTION RESPIRATORY (INHALATION) at 19:37

## 2017-08-17 RX ADMIN — SODIUM CHLORIDE 75 ML/HR: 900 INJECTION, SOLUTION INTRAVENOUS at 07:56

## 2017-08-17 RX ADMIN — PAROXETINE HYDROCHLORIDE 20 MG: 20 TABLET, FILM COATED ORAL at 09:04

## 2017-08-17 RX ADMIN — Medication 10 ML: at 21:04

## 2017-08-17 RX ADMIN — BUDESONIDE 500 MCG: 0.5 INHALANT RESPIRATORY (INHALATION) at 19:37

## 2017-08-17 RX ADMIN — BUDESONIDE 500 MCG: 0.5 INHALANT RESPIRATORY (INHALATION) at 08:20

## 2017-08-17 RX ADMIN — ALPRAZOLAM 0.5 MG: 0.5 TABLET ORAL at 09:04

## 2017-08-17 RX ADMIN — METHYLPREDNISOLONE SODIUM SUCCINATE 60 MG: 40 INJECTION, POWDER, FOR SOLUTION INTRAMUSCULAR; INTRAVENOUS at 02:32

## 2017-08-17 RX ADMIN — AZITHROMYCIN MONOHYDRATE 500 MG: 500 INJECTION, POWDER, LYOPHILIZED, FOR SOLUTION INTRAVENOUS at 21:03

## 2017-08-17 RX ADMIN — IPRATROPIUM BROMIDE AND ALBUTEROL SULFATE 3 ML: .5; 3 SOLUTION RESPIRATORY (INHALATION) at 15:27

## 2017-08-17 RX ADMIN — Medication 10 ML: at 14:44

## 2017-08-17 RX ADMIN — ENOXAPARIN SODIUM 40 MG: 40 INJECTION SUBCUTANEOUS at 18:16

## 2017-08-17 RX ADMIN — BENZTROPINE MESYLATE 2 MG: 1 TABLET ORAL at 09:04

## 2017-08-17 RX ADMIN — Medication 10 ML: at 06:41

## 2017-08-17 RX ADMIN — RISPERIDONE 3 MG: 1 TABLET ORAL at 09:04

## 2017-08-17 RX ADMIN — ALPRAZOLAM 0.5 MG: 0.5 TABLET ORAL at 18:09

## 2017-08-17 RX ADMIN — CEFTRIAXONE 2 G: 2 INJECTION, POWDER, FOR SOLUTION INTRAMUSCULAR; INTRAVENOUS at 18:08

## 2017-08-17 RX ADMIN — METHYLPREDNISOLONE SODIUM SUCCINATE 60 MG: 40 INJECTION, POWDER, FOR SOLUTION INTRAMUSCULAR; INTRAVENOUS at 09:04

## 2017-08-17 NOTE — CDMP QUERY
There is noted documentation of \"history of paranoid schizophrenia, stable\" on the medical record. Could this be further specified as:     =>Chronic paranoid schizophrenia in the setting of history of treated with home meds cogentin and risperdal  =>Other Explanation of clinical findings  =>Unable to Determine (no explanation of clinical findings)    The medical record reflects the following clinical findings, treatment, and risk factors:    Risk Factors: h/o paranoid schizophrenia    Clinical Indicators: H&P: \"3. History of paranoid schizophrenia, stable. Continue home   medications, Cogentin 2 mg p.o. daily, Risperdal 3 mg daily and   supportive care\"    Treatment: Continue home meds cogentin and risperdal     Please clarify and document your clinical opinion in the progress notes and discharge summary including the definitive and/or presumptive diagnosis, (suspected or probable), related to the above clinical findings. Please include clinical findings supporting your diagnosis.     Thank Calin Najera [facility]  [phone]

## 2017-08-17 NOTE — PROGRESS NOTES
Patient lives at [facility], [phone]. Patient's main family contact is patient's sister, Alberto Allison [phone]. Marty Ortega is patient's nurse navigator in Dr Natalie Ortiz office. Her number is [phone]. He has regular appointments with Dr Cyndi Parrish. Will follow for discharge needs. Note NN's note regarding patient's inability to make rational decisions.

## 2017-08-17 NOTE — PROGRESS NOTES
Bedside shift change report given to [address] [place] [address] Anival (oncoming nurse) by Marilyn Lopez RN (offgoing nurse). Report included the following information SBAR, Kardex, MAR and Recent Results.

## 2017-08-17 NOTE — CDMP QUERY
Patient is noted to have a BMI of 17.14. Please clarify if this patient is:     =>Underweight  =>Cachexia  =>Failure to Thrive  =>Other explanation of clinical findings  =>Unable to determine (no explanation for clinical findings)    Presentation: 5'10\", 119 lbs = BMI 17.14    Please clarify and document your clinical opinion in the progress notes and discharge summary, including the definitive and or presumptive diagnosis, (suspected or probable), related to the above clinical findings. Please include clinical findings supporting your diagnosis.      Thank Aram Otoole [facility]  [phone]

## 2017-08-17 NOTE — PROGRESS NOTES
Hospitalist Progress Note  Aspen Amado MD  Office: [phone]  Cell: [phone]      Date of Service:  2017  NAME:  Aaliyah Martel  :  1960  MRN:  [id number]      Admission Summary: This is a 53-year-old [facility] American male from [address] with past medical history significant for COPD, paranoid schizophrenia, mental retardation and depression, who presented to [facility] Emergency Department with progressive shortness of breath. Interval history / Subjective:   He said he still has shortness of breath     Assessment & Plan:     Acute on chronic hypercapnic hypoxic respiratory failure secondary acute COPD exacerbation  -improving, off BiPAP, ABG pH  7.378 pCO2 67.9 pO2 61, now on 5 l/m via nasal canula  -continue iv solumedrol, pulmicort, prn duo neb, oxygen support and monitor pulse ox    Acute on chronic hyponatremia. -improved with normal saline, cut back IVF  -TSH normal  -normal creatinine  -monitor renal function    History of chronic paranoid schizophrenia. -stable, continue risperidone and cogentin    History of depression. -stable, continue paxil    Underweight  -BMI ~17  -consult to nutritionist      Code status: Full Code  DVT prophylaxis: Lovenox    Care Plan discussed with: Patient/Family, Nurse and   Disposition: TBD     Hospital Problems  Date Reviewed: 2017          Codes Class Noted POA    * (Principal)Shortness of breath ICD-10-CM: R06.02  ICD-9-CM: 786.05  2017 Unknown              Vital Signs:    Last 24hrs VS reviewed since prior progress note.  Most recent are:  Visit Vitals    BP (!) 134/92 (BP 1 Location: Left arm, BP Patient Position: At rest)    Pulse 97    Temp 98.2 °F (36.8 °C)    Resp 18    Ht 5' 10\" (1.778 m)    Wt 54.2 kg (119 lb 7.8 oz)    SpO2 98%    BMI 17.14 kg/m2         Intake/Output Summary (Last 24 hours) at 17 9918  Last data filed at 08/17/17 0017   Gross per 24 hour   Intake           491.25 ml   Output             1750 ml   Net         -1258.75 ml        Physical Examination:             Constitutional:  No acute distress, cooperative, pleasant    ENT:  Oral mucous moist, oropharynx benign. Neck supple,    Resp:  Expiratory wheezing bilaterally and decrease bronchial breath sound bilaterally, no rhonchi/rales. No accessory muscle use   CV:  Regular rhythm, normal rate, no murmurs, gallops, rubs    GI:  Soft, non distended, non tender. normoactive bowel sounds, no hepatosplenomegaly     Musculoskeletal:  No edema    Neurologic:  Conscious and alert, oriented to place and person, Moves all extremities. CN II-XII reviewed     Psych: Not anxious nor agitated. Data Review:    Review and/or order of clinical lab test      Labs:     Recent Labs      08/17/17   0239  08/16/17   1503   WBC  6.5  15.9*   HGB  13.9  15.3   HCT  40.5  42.6   PLT  238  228     Recent Labs      08/17/17   0239  08/16/17   1503   NA  134*  123*   K  4.3  4.2   CL  93*  81*   CO2  36*  36*   BUN  10  3*   CREA  0.64*  0.36*   GLU  104*  91   CA  8.1*  8.4*   MG   --   1.9     Recent Labs      08/17/17   0239  08/16/17   1503   SGOT  22  45*   ALT  32  35   AP  84  94   TBILI  0.3  0.8   TP  6.4  6.6   ALB  3.2*  3.6   GLOB  3.2  3.0     Recent Labs      08/16/17   1503   INR  1.2*   PTP  12.4*   APTT  26.7      No results for input(s): FE, TIBC, PSAT, FERR in the last 72 hours. No results found for: FOL, RBCF   No results for input(s): PH, PCO2, PO2 in the last 72 hours.   Recent Labs      08/16/17   1503   CPK  501*   CKNDX  8.6*   TROIQ  <0.04     Lab Results   Component Value Date/Time    Cholesterol, total 102 05/15/2017 11:11 AM    HDL Cholesterol 48 05/15/2017 11:11 AM    LDL, calculated 46 05/15/2017 11:11 AM    Triglyceride 41 05/15/2017 11:11 AM     Lab Results   Component Value Date/Time    Glucose (POC) 132 07/04/2017 04:37 PM Lab Results   Component Value Date/Time    Color YELLOW/STRAW 07/13/2017 04:11 PM    Appearance CLOUDY 07/13/2017 04:11 PM    Specific gravity 1.016 07/13/2017 04:11 PM    pH (UA) 6.0 07/13/2017 04:11 PM    Protein 100 07/13/2017 04:11 PM    Glucose 250 07/13/2017 04:11 PM    Ketone 15 07/13/2017 04:11 PM    Bilirubin NEGATIVE  07/13/2017 04:11 PM    Urobilinogen 0.2 07/13/2017 04:11 PM    Nitrites NEGATIVE  07/13/2017 04:11 PM    Leukocyte Esterase NEGATIVE  07/13/2017 04:11 PM    Epithelial cells FEW 07/13/2017 04:11 PM    Bacteria NEGATIVE  07/13/2017 04:11 PM    WBC 0-4 07/13/2017 04:11 PM    RBC 10-20 07/13/2017 04:11 PM         Medications Reviewed:     Current Facility-Administered Medications   Medication Dose Route Frequency    ALPRAZolam (XANAX) tablet 0.5 mg  0.5 mg Oral BID    benztropine (COGENTIN) tablet 2 mg  2 mg Oral DAILY    guaiFENesin (ROBITUSSIN) 100 mg/5 mL oral liquid 200 mg  200 mg Oral Q4H PRN    PARoxetine (PAXIL) tablet 20 mg  20 mg Oral DAILY    QUEtiapine (SEROquel) tablet 50 mg  50 mg Oral QHS PRN    risperiDONE (RisperDAL) tablet 3 mg  3 mg Oral DAILY    temazepam (RESTORIL) capsule 30 mg  30 mg Oral QHS PRN    traZODone (DESYREL) tablet 50 mg  50 mg Oral QHS PRN    sodium chloride (NS) flush 5-10 mL  5-10 mL IntraVENous Q8H    sodium chloride (NS) flush 5-10 mL  5-10 mL IntraVENous PRN    budesonide (PULMICORT) 500 mcg/2 ml nebulizer suspension  500 mcg Nebulization BID RT    methylPREDNISolone (PF) (SOLU-MEDROL) injection 60 mg  60 mg IntraVENous Q6H    enoxaparin (LOVENOX) injection 40 mg  40 mg SubCUTAneous Q24H    cefTRIAXone (ROCEPHIN) 2 g in 0.9% sodium chloride (MBP/ADV) 50 mL  2 g IntraVENous Q24H    azithromycin (ZITHROMAX) 500 mg in 0.9% sodium chloride (MBP/ADV) 250 mL  500 mg IntraVENous Q24H    albuterol-ipratropium (DUO-NEB) 2.5 MG-0.5 MG/3 ML  3 mL Nebulization Q4H PRN    0.9% sodium chloride infusion  75 mL/hr IntraVENous CONTINUOUS ______________________________________________________________________  EXPECTED LENGTH OF STAY: - - -  ACTUAL LENGTH OF STAY:          1                 Eileen Ramirez MD

## 2017-08-17 NOTE — PROGRESS NOTES
Admission Medication Reconciliation:    Information obtained from: Medication list from [facility] [address] [facility] and nurse    Significant PMH/Disease States:   Past Medical History:   Diagnosis Date    Asthma     seldom,use inhaler    Bronchitis     Chronic obstructive pulmonary disease ([address]     Depression     anxiety    Psychiatric disorder     paranoid schizophrenia    Psychiatric disorder     extrapyramidal disease    Psychotic disorder     mental retardation       Chief Complaint for this Admission:  SOB    Allergies:  Review of patient's allergies indicates no known allergies. Prior to Admission Medications:   Prior to Admission Medications   Prescriptions Last Dose Informant Patient Reported? Taking? ALPRAZolam (XANAX) 0.5 mg tablet Unknown at Unknown time  Yes No   Sig: Take 0.5 mg by mouth two (2) times daily as needed for Anxiety. PARoxetine (PAXIL) 20 mg tablet 2017 at Unknown time  Yes Yes   Sig: Take 20 mg by mouth daily. QUEtiapine (SEROQUEL) 50 mg tablet Unknown at Unknown time  Yes No   Sig: Take 50 mg by mouth nightly as needed (for agitation or anxiety (Use 1st)). albuterol (PROVENTIL VENTOLIN) 2.5 mg /3 mL (0.083 %) nebulizer solution Unknown at Unknown time  Yes No   Si.5 mg by Nebulization route every six (6) hours as needed for Wheezing. albuterol (VENTOLIN HFA) 90 mcg/actuation inhaler Unknown at Unknown time  Yes No   Sig: Take 2 Puffs by inhalation every four (4) hours as needed for Shortness of Breath. benztropine (COGENTIN) 2 mg tablet Unknown at Unknown time  Yes No   Sig: Take 2 mg by mouth daily as needed. budesonide-formoterol (SYMBICORT) 80-4.5 mcg/actuation HFAA inhaler Unknown at Unknown time  No No   Sig: Take 2 Puffs by inhalation two (2) times a day. budesonide-formoterol (SYMBICORT) 80-4.5 mcg/actuation HFAA inhaler   No No   Sig: Take 2 Puffs by inhalation two (2) times a day.    ibuprofen (MOTRIN) 800 mg tablet Unknown at Unknown time Yes No   Sig: Take 800 mg by mouth every eight (8) hours as needed for Pain. risperiDONE (RISPERDAL) 3 mg tablet 8/9/2017 at Unknown time  Yes Yes   Sig: Take 3 mg by mouth daily. temazepam (RESTORIL) 30 mg capsule Unknown at Unknown time  Yes No   Sig: Take 30 mg by mouth nightly as needed for Sleep.   traZODone (DESYREL) 100 mg tablet Unknown at Unknown time  Yes No   Sig: Take 50 mg by mouth nightly as needed for Sleep. Facility-Administered Medications: None         Comments/Recommendations: This medication history was obtained from medication list sent by [facility] for [address] [facility] and Mr. Jamir Lima nurse ([phone]). Of note, the nurse stated she does not know how adherent Mr. Barnabas Boast is to his medications as he leaves and comes back into the facility freely. The nurse did say that she knew for sure Mr. Barnabas Boast had taken his risperidone and paroxetine at some point this week, but does not know how often he takes the rest of his medications. Medications added: none  Medications deleted: guaifenesin, prednisone (finished steroid burst on 8/12/17)    Thank you for allowing me to participate in the care of this patient. Please contact the pharmacy () or the medication reconciliation pharmacy () with any questions.       Issa Snyder, Pharmacy Intern

## 2017-08-17 NOTE — PROGRESS NOTES
Pt's twin brother Kortney Mathew called to touch base with writer regarding the patient's location. States that he had called the hospital and they have informed him they do not show the patient in the hospital. Then he states that he spoke with Nichol Valdez at [facility] who says the pt is indeed in the hospital and he is on the 4th floor. She gave him this writer's name and phone number to contact. Since Nichol Valdez directed Cristina Hidalgo to this writer, and writer is aware that Cristina Hidalgo was involved with the patient's care upon discharge from [facility], [address] [place] [address] shared information with him. Explained that pt was listed as a no information patient and therefore the people answering the phone are not allowed to acknowledge that the patient is admitted to the hospital. That was the run around he was receiving. Explained that the patient was not doing well when he first arrived to the hospital, but appears to be stabilized in IMCU at the time. He asked did he require the tubes down his throat like he had when in [facility]. Writer explained that pt did not, but had Bipap where there was a mask that forced a breath of air into the lungs. Voices understanding. Wynatasha Hidalgo notes that his sister will be visiting pt in the hospital this evening. Asks will this no information tag be an issue. Explained that [address] [place] [address] did not feel it would be considering the patient would be able to recognize sister and state that he would like her to know information. She could then make a password so they would be able to get information. Asked how she would know the room number. Explained which floor the pt would be on. She should be able to ask for the room and staff may just tell her. Otherwise they can ask if he wishes to have her as a visitor. At which point he would be able to identify her as his sister. Brother notes he will be visiting tomorrow evening himself. Cristina Lemonam voices appreciation for the assistance.  Giovanny Olsen writer's direct number in any other questions or concerns came up. Also explained where Khush[facility] Richards is located, with PCP and PCP office is connected to the side of the hospital. Gave also the name of the PCP that manages patient outpatient. Explained that writer was willing to assist as needed. Michaelle Nunez is aware that family has not been aware of the limitations the patient needs to abide by for his condition and disease processes. There has actually has been arguments with the group home over this with the patient wanting more water and other items that have been limited per the discharge paperwork and per the PCP orders. Writer is hopeful that with family visiting pt in hospital, they will learn more of what the pt needs to be doing outpatient to be able to avoid another readmission. Now that writer has brother's updated number, Khushbu Richards will also review pt's discharge paperwork per Manju's direction so he can understand what will be needed for the patient to improve his health. This may help close some of the gap since brother has encouraged that pt should be able to drink and eat what he wishes per charting notes and reports writer has come across. In the group home, they follow the doctor's orders, however the pt has the freedom to leave and be gone during the day and roam around. He can go down the street and drink a gallon of water or beer or do whatever he wishes while he is not under their supervision. This is where the problems arise as the patient is not cognitively, nor psychologically capable of making appropriate, rational decisions that are in his best interest. There is little to no impulse control in these matters. Will continue to follow.

## 2017-08-18 LAB
ARTERIAL PATENCY WRIST A: NO
BASE EXCESS BLD CALC-SCNC: 7 MMOL/L
BDY SITE: ABNORMAL
GAS FLOW.O2 O2 DELIVERY SYS: ABNORMAL L/MIN
GAS FLOW.O2 SETTING OXYMISER: 2 L/M
HCO3 BLD-SCNC: 30.8 MMOL/L (ref 22–26)
PCO2 BLD: 41.4 MMHG (ref 35–45)
PH BLD: 7.48 [PH] (ref 7.35–7.45)
PO2 BLD: 49 MMHG (ref 80–100)
SAO2 % BLD: 86 % (ref 92–97)
SPECIMEN TYPE: ABNORMAL

## 2017-08-18 PROCEDURE — 74011250637 HC RX REV CODE- 250/637: Performed by: HOSPITALIST

## 2017-08-18 PROCEDURE — 82803 BLOOD GASES ANY COMBINATION: CPT

## 2017-08-18 PROCEDURE — 36600 WITHDRAWAL OF ARTERIAL BLOOD: CPT

## 2017-08-18 PROCEDURE — 74011250636 HC RX REV CODE- 250/636: Performed by: HOSPITALIST

## 2017-08-18 PROCEDURE — 94640 AIRWAY INHALATION TREATMENT: CPT

## 2017-08-18 PROCEDURE — 94660 CPAP INITIATION&MGMT: CPT

## 2017-08-18 PROCEDURE — 65660000001 HC RM ICU INTERMED STEPDOWN

## 2017-08-18 PROCEDURE — 74011000250 HC RX REV CODE- 250: Performed by: HOSPITALIST

## 2017-08-18 PROCEDURE — 74011000258 HC RX REV CODE- 258: Performed by: HOSPITALIST

## 2017-08-18 RX ORDER — SODIUM CHLORIDE 0.9 % (FLUSH) 0.9 %
5-10 SYRINGE (ML) INJECTION EVERY 8 HOURS
Status: DISCONTINUED | OUTPATIENT
Start: 2017-08-18 | End: 2017-08-23 | Stop reason: HOSPADM

## 2017-08-18 RX ORDER — SODIUM CHLORIDE 0.9 % (FLUSH) 0.9 %
5-10 SYRINGE (ML) INJECTION AS NEEDED
Status: DISCONTINUED | OUTPATIENT
Start: 2017-08-18 | End: 2017-08-23 | Stop reason: HOSPADM

## 2017-08-18 RX ORDER — AZITHROMYCIN 250 MG/1
500 TABLET, FILM COATED ORAL DAILY
Status: DISCONTINUED | OUTPATIENT
Start: 2017-08-18 | End: 2017-08-19

## 2017-08-18 RX ADMIN — BUDESONIDE 500 MCG: 0.5 INHALANT RESPIRATORY (INHALATION) at 08:30

## 2017-08-18 RX ADMIN — Medication 10 ML: at 21:27

## 2017-08-18 RX ADMIN — ENOXAPARIN SODIUM 40 MG: 40 INJECTION SUBCUTANEOUS at 18:18

## 2017-08-18 RX ADMIN — METHYLPREDNISOLONE SODIUM SUCCINATE 60 MG: 125 INJECTION, POWDER, FOR SOLUTION INTRAMUSCULAR; INTRAVENOUS at 00:57

## 2017-08-18 RX ADMIN — BUDESONIDE 500 MCG: 0.5 INHALANT RESPIRATORY (INHALATION) at 20:45

## 2017-08-18 RX ADMIN — ALPRAZOLAM 0.5 MG: 0.5 TABLET ORAL at 09:50

## 2017-08-18 RX ADMIN — METHYLPREDNISOLONE SODIUM SUCCINATE 60 MG: 125 INJECTION, POWDER, FOR SOLUTION INTRAMUSCULAR; INTRAVENOUS at 16:34

## 2017-08-18 RX ADMIN — AZITHROMYCIN 500 MG: 250 TABLET, FILM COATED ORAL at 21:24

## 2017-08-18 RX ADMIN — BENZTROPINE MESYLATE 2 MG: 1 TABLET ORAL at 09:50

## 2017-08-18 RX ADMIN — TEMAZEPAM 30 MG: 15 CAPSULE ORAL at 21:24

## 2017-08-18 RX ADMIN — PAROXETINE HYDROCHLORIDE 20 MG: 20 TABLET, FILM COATED ORAL at 09:49

## 2017-08-18 RX ADMIN — Medication 10 ML: at 14:00

## 2017-08-18 RX ADMIN — QUETIAPINE FUMARATE 50 MG: 25 TABLET, FILM COATED ORAL at 23:27

## 2017-08-18 RX ADMIN — TRAZODONE HYDROCHLORIDE 50 MG: 50 TABLET ORAL at 23:27

## 2017-08-18 RX ADMIN — Medication 10 ML: at 06:00

## 2017-08-18 RX ADMIN — ALPRAZOLAM 0.5 MG: 0.5 TABLET ORAL at 18:18

## 2017-08-18 RX ADMIN — GUAIFENESIN 200 MG: 100 SOLUTION ORAL at 09:50

## 2017-08-18 RX ADMIN — RISPERIDONE 3 MG: 1 TABLET ORAL at 09:50

## 2017-08-18 RX ADMIN — METHYLPREDNISOLONE SODIUM SUCCINATE 60 MG: 125 INJECTION, POWDER, FOR SOLUTION INTRAMUSCULAR; INTRAVENOUS at 09:50

## 2017-08-18 RX ADMIN — CEFTRIAXONE 2 G: 2 INJECTION, POWDER, FOR SOLUTION INTRAMUSCULAR; INTRAVENOUS at 18:18

## 2017-08-18 NOTE — PROGRESS NOTES
Hospitalist Progress Note  Hugo Alexandre MD  Office: [phone]  Cell: [phone]      Date of Service:  2017  NAME:  Pamela Herzog  :  1960  MRN:  [id number]      Admission Summary: This is a 79-year-old [facility] American male from [address] with past medical history significant for COPD, paranoid schizophrenia, mental retardation and depression, who presented to [facility] Department with progressive shortness of breath. Interval history / Subjective:   He said he still has shortness of breath     Assessment & Plan:     Acute on chronic hypercapnic hypoxic respiratory failure secondary acute COPD exacerbation  -had episode of shortness of breath  afternoon, has been on BiPAP intermittently last night, now BiPAP off, on 2 l/m via nasal canula, SaO2 95-96%,  repeated ABG pH  7.479 pCO2 41.4 pO2 49 look venous, repeat ABG  -continue iv solumedrol, pulmicort, prn duo neb, oxygen support and monitor pulse ox    Acute on chronic hyponatremia. -improved with normal saline, stop IVF  -TSH normal  -normal creatinine  -monitor renal function    History of chronic paranoid schizophrenia. -stable, continue risperidone and cogentin    History of depression. -stable, continue paxil    Underweight  -BMI ~17  -consult to nutritionist      Code status: Full Code  DVT prophylaxis: Lovenox    Care Plan discussed with: Patient/Family, Nurse and   Disposition: TBD     Hospital Problems  Date Reviewed: 2017          Codes Class Noted POA    * (Principal)Shortness of breath ICD-10-CM: R06.02  ICD-9-CM: 786.05  2017 Unknown              Vital Signs:    Last 24hrs VS reviewed since prior progress note.  Most recent are:  Visit Vitals    BP (!) 149/99 (BP 1 Location: Left arm, BP Patient Position: At rest)    Pulse 74    Temp 98.1 °F (36.7 °C)    Resp 18    Ht 5' 10\" (1.778 m)  Wt 54.7 kg (120 lb 9.5 oz)    SpO2 95%    BMI 17.3 kg/m2         Intake/Output Summary (Last 24 hours) at 08/18/17 1156  Last data filed at 08/18/17 1129   Gross per 24 hour   Intake           1682.5 ml   Output             4400 ml   Net          -2717.5 ml        Physical Examination:             Constitutional:  No acute distress, cooperative, pleasant    ENT:  Oral mucous moist, oropharynx benign. Neck supple,    Resp:  Expiratory wheezing bilaterally and decrease bronchial breath sound bilaterally, no rhonchi/rales. No accessory muscle use   CV:  Regular rhythm, normal rate, no murmurs, gallops, rubs    GI:  Soft, non distended, non tender. normoactive bowel sounds, no hepatosplenomegaly     Musculoskeletal:  No edema    Neurologic:  Conscious and alert, oriented to place and person, Moves all extremities. CN II-XII reviewed     Psych: Not anxious nor agitated. Data Review:    Review and/or order of clinical lab test      Labs:     Recent Labs      08/17/17   0239  08/16/17   1503   WBC  6.5  15.9*   HGB  13.9  15.3   HCT  40.5  42.6   PLT  238  228     Recent Labs      08/17/17   0239  08/16/17   1503   NA  134*  123*   K  4.3  4.2   CL  93*  81*   CO2  36*  36*   BUN  10  3*   CREA  0.64*  0.36*   GLU  104*  91   CA  8.1*  8.4*   MG   --   1.9     Recent Labs      08/17/17   0239  08/16/17   1503   SGOT  22  45*   ALT  32  35   AP  84  94   TBILI  0.3  0.8   TP  6.4  6.6   ALB  3.2*  3.6   GLOB  3.2  3.0     Recent Labs      08/16/17   1503   INR  1.2*   PTP  12.4*   APTT  26.7      No results for input(s): FE, TIBC, PSAT, FERR in the last 72 hours. No results found for: FOL, RBCF   No results for input(s): PH, PCO2, PO2 in the last 72 hours.   Recent Labs      08/16/17   1503   CPK  501*   CKNDX  8.6*   TROIQ  <0.04     Lab Results   Component Value Date/Time    Cholesterol, total 102 05/15/2017 11:11 AM    HDL Cholesterol 48 05/15/2017 11:11 AM    LDL, calculated 46 05/15/2017 11:11 AM Triglyceride 41 05/15/2017 11:11 AM     Lab Results   Component Value Date/Time    Glucose (POC) 132 07/04/2017 04:37 PM     Lab Results   Component Value Date/Time    Color YELLOW/STRAW 07/13/2017 04:11 PM    Appearance CLOUDY 07/13/2017 04:11 PM    Specific gravity 1.016 07/13/2017 04:11 PM    pH (UA) 6.0 07/13/2017 04:11 PM    Protein 100 07/13/2017 04:11 PM    Glucose 250 07/13/2017 04:11 PM    Ketone 15 07/13/2017 04:11 PM    Bilirubin NEGATIVE  07/13/2017 04:11 PM    Urobilinogen 0.2 07/13/2017 04:11 PM    Nitrites NEGATIVE  07/13/2017 04:11 PM    Leukocyte Esterase NEGATIVE  07/13/2017 04:11 PM    Epithelial cells FEW 07/13/2017 04:11 PM    Bacteria NEGATIVE  07/13/2017 04:11 PM    WBC 0-4 07/13/2017 04:11 PM    RBC 10-20 07/13/2017 04:11 PM         Medications Reviewed:     Current Facility-Administered Medications   Medication Dose Route Frequency    methylPREDNISolone (PF) (SOLU-MEDROL) injection 60 mg  60 mg IntraVENous Q8H    ALPRAZolam (XANAX) tablet 0.5 mg  0.5 mg Oral BID    benztropine (COGENTIN) tablet 2 mg  2 mg Oral DAILY    guaiFENesin (ROBITUSSIN) 100 mg/5 mL oral liquid 200 mg  200 mg Oral Q4H PRN    PARoxetine (PAXIL) tablet 20 mg  20 mg Oral DAILY    QUEtiapine (SEROquel) tablet 50 mg  50 mg Oral QHS PRN    risperiDONE (RisperDAL) tablet 3 mg  3 mg Oral DAILY    temazepam (RESTORIL) capsule 30 mg  30 mg Oral QHS PRN    traZODone (DESYREL) tablet 50 mg  50 mg Oral QHS PRN    sodium chloride (NS) flush 5-10 mL  5-10 mL IntraVENous Q8H    sodium chloride (NS) flush 5-10 mL  5-10 mL IntraVENous PRN    budesonide (PULMICORT) 500 mcg/2 ml nebulizer suspension  500 mcg Nebulization BID RT    enoxaparin (LOVENOX) injection 40 mg  40 mg SubCUTAneous Q24H    cefTRIAXone (ROCEPHIN) 2 g in 0.9% sodium chloride (MBP/ADV) 50 mL  2 g IntraVENous Q24H    azithromycin (ZITHROMAX) 500 mg in 0.9% sodium chloride (MBP/ADV) 250 mL  500 mg IntraVENous Q24H    albuterol-ipratropium (DUO-NEB) 2.5 MG-0.5 MG/3 ML  3 mL Nebulization Q4H PRN    0.9% sodium chloride infusion  25 mL/hr IntraVENous CONTINUOUS     ______________________________________________________________________  EXPECTED LENGTH OF STAY: 3d 21h  ACTUAL LENGTH OF STAY:          2                 Sharon Keane MD

## 2017-08-18 NOTE — PROGRESS NOTES
Bedside shift change report given to Rohan Barraza RN (oncoming nurse) by APOLONIA Salcedo (offgoing nurse). Report included the following information SBAR.

## 2017-08-18 NOTE — PROGRESS NOTES
Problem: Gas Exchange - Impaired  Goal: *Absence of hypoxia  Outcome: Progressing Towards Goal  Pt up ad polo. Pt is on 2L nasal cannula sats at 93-95%. Pt tolerating BiPAP intermittently. Encouraging patient to cough and deep breath. Bedside shift change report given to APOLONIA Aragon (oncoming nurse) by Liu De Dios RN (offgoing nurse). Report included the following information SBAR, Intake/Output, MAR and Cardiac Rhythm NSR.

## 2017-08-18 NOTE — PROGRESS NOTES
NUTRITION COMPLETE ASSESSMENT    RECOMMENDATIONS:   1. Continue current diet, Supplements (specify)  2. Please document po intake in flow sheets; encourage po intake meals, supplements  3. Consider adding daily MVI  4. Weekly weights     Interventions/Plan:   Food/Nutrient Delivery:           RD to add Ensure TID; monitor po intake, wt status    Assessment:   Reason for Assessment:   [x] Provider Consult    Diet: Regular  Supplements: None  Nutritionally Significant Medications: [x] Reviewed & Includes: Methyprednisone, NS @25mL/hr  Meal Intake: No data found. Subjective:  Pt poor historian; spoke with RN    Objective:  Pt seen for MD consult. Pt admitted with SOB. PMHx:  COPD, paranoid schizophrenia, mental retardation and depression. Reviewed chart, spoke with RN. Per previous RD notes pt with hx of low BMI; typically eating 2 meals/day at assisted living facility- no supplements due to cost (insurance won't cover); question whether low weight r/t psych hx. RN states pt consumed 50-75% bfast; no po intake documented in EHR since admission. RD to add Ensure TID (was drinking during previous admission) which provides 1050 kcal, 60g protein meeting 55% caloric, 78% protein needs if 100% consumed. Pt would benefit from daily MVI. RD to follow po intake meals, supplements, wt status. Estimated Nutrition Needs:   Kcals/day: 1915 Kcals/day (1961-6269 kcal/day (35-40 kcal/kg))  Protein: 77 g (77-88g/day (1.4-1.6g/kg))  Fluid: 2000 ml (1mL/kg)     Based On: Kcal/kg - specify (Comment) (35-40 kcal/kg)  Weight Used: Actual wt    Pt expected to meet estimated nutrient needs:  []   Yes     []  No  [x] Unable to predict at this time    Nutrition Diagnosis:   1. Underweight related to fair intake, psych issues?  as evidenced by pt with BMI of 17    Goals:        Pt will consume 50-75% meals, supplements over next 5-7 days     Monitoring & Evaluation:    - Total energy intake   - Weight/weight change   - Previous Nutrition Goals Met:  N/A  Previous Recommendations:      N/A    Education & Discharge Needs:   [x] None Identified   [] Identified and addressed    [] Participated in care plan, discharge planning, and/or interdisciplinary rounds        Cultural, Advent and ethnic food preferences identified:   None    Skin Integrity: [x]Intact  []Other  Edema: [x]None []Other  Last BM: None docuemented  Food Allergies: [x]None []Other    Anthropometrics:    Weight Loss Metrics 8/18/2017 8/7/2017 7/19/2017 7/13/2017 7/12/2017 7/10/2017 7/5/2017   Today's Wt 120 lb 9.5 oz 116 lb 6.4 oz 117 lb 1 oz - 125 lb 124 lb 112 lb 3.4 oz   BMI 17.3 kg/m2 15.78 kg/m2 - 15.87 kg/m2 17.94 kg/m2 17.79 kg/m2 16.1 kg/m2      Last 3 Recorded Weights in this Encounter    08/16/17 1851 08/17/17 0312 08/18/17 0354   Weight: 54.2 kg (119 lb 7.8 oz) 54.2 kg (119 lb 7.8 oz) 54.7 kg (120 lb 9.5 oz)      Weight Source: Standing scale (comment)  Height: 5' 10\" (177.8 cm),    Body mass index is 17.3 kg/(m^2). ,     ,      Labs:    Lab Results   Component Value Date/Time    Sodium 134 08/17/2017 02:39 AM    Potassium 4.3 08/17/2017 02:39 AM    Chloride 93 08/17/2017 02:39 AM    CO2 36 08/17/2017 02:39 AM    Glucose 104 08/17/2017 02:39 AM    BUN 10 08/17/2017 02:39 AM    Creatinine 0.64 08/17/2017 02:39 AM    Calcium 8.1 08/17/2017 02:39 AM    Magnesium 1.9 08/16/2017 03:03 PM    Phosphorus 2.1 07/17/2017 03:50 AM    Albumin 3.2 08/17/2017 02:39 AM     Lab Results   Component Value Date/Time    Hemoglobin A1c 5.2 05/19/2015 02:15 PM     Lab Results   Component Value Date/Time    Glucose 104 08/17/2017 02:39 AM    Glucose (POC) 132 07/04/2017 04:37 PM      Lab Results   Component Value Date/Time    ALT (SGPT) 32 08/17/2017 02:39 AM    AST (SGOT) 22 08/17/2017 02:39 AM    Alk.  phosphatase 84 08/17/2017 02:39 AM    Bilirubin, total 0.3 08/17/2017 02:39 AM        Kimmie Gerard RD

## 2017-08-18 NOTE — PROGRESS NOTES
Clinical Pharmacy Note: Re: IV to PO Automatic Conversion - Antibiotic    Please note: Vera Zee Azithromycin has been changed from IV to PO based on the following criteria:    The patient:  1. Has received IV therapy for at least 48 hours   2. Has a functioning GI tract  - Taking scheduled oral medications  - Tolerating tube feeds at goal rate or a full liquid, soft, or regular diet         3. Is clinically stable        - Temperature < 100.4F for at least 24 hours        - WBC is trending down    This IV to PO conversion is based on the [employer] approved automatic conversion policy for eligible patients. Please call with questions.

## 2017-08-19 ENCOUNTER — APPOINTMENT (OUTPATIENT)
Dept: CT IMAGING | Age: 57
DRG: 140 | End: 2017-08-19
Attending: HOSPITALIST
Payer: MEDICAID

## 2017-08-19 LAB
ALBUMIN SERPL-MCNC: 3.1 G/DL (ref 3.5–5)
ALBUMIN/GLOB SERPL: 1 {RATIO} (ref 1.1–2.2)
ALP SERPL-CCNC: 92 U/L (ref 45–117)
ALT SERPL-CCNC: 26 U/L (ref 12–78)
ANION GAP SERPL CALC-SCNC: 7 MMOL/L (ref 5–15)
ARTERIAL PATENCY WRIST A: NO
AST SERPL-CCNC: 12 U/L (ref 15–37)
BASE EXCESS BLD CALC-SCNC: 10 MMOL/L
BDY SITE: ABNORMAL
BILIRUB SERPL-MCNC: 0.5 MG/DL (ref 0.2–1)
BUN SERPL-MCNC: 10 MG/DL (ref 6–20)
BUN/CREAT SERPL: 13 (ref 12–20)
CALCIUM SERPL-MCNC: 8.4 MG/DL (ref 8.5–10.1)
CHLORIDE SERPL-SCNC: 97 MMOL/L (ref 97–108)
CO2 SERPL-SCNC: 34 MMOL/L (ref 21–32)
CREAT SERPL-MCNC: 0.8 MG/DL (ref 0.7–1.3)
ERYTHROCYTE [DISTWIDTH] IN BLOOD BY AUTOMATED COUNT: 15.8 % (ref 11.5–14.5)
GAS FLOW.O2 O2 DELIVERY SYS: ABNORMAL L/MIN
GAS FLOW.O2 SETTING OXYMISER: 3 L/M
GLOBULIN SER CALC-MCNC: 3.1 G/DL (ref 2–4)
GLUCOSE SERPL-MCNC: 154 MG/DL (ref 65–100)
HCO3 BLD-SCNC: 35.1 MMOL/L (ref 22–26)
HCT VFR BLD AUTO: 44.4 % (ref 36.6–50.3)
HGB BLD-MCNC: 14.6 G/DL (ref 12.1–17)
MAGNESIUM SERPL-MCNC: 2.2 MG/DL (ref 1.6–2.4)
MCH RBC QN AUTO: 33.5 PG (ref 26–34)
MCHC RBC AUTO-ENTMCNC: 32.9 G/DL (ref 30–36.5)
MCV RBC AUTO: 101.8 FL (ref 80–99)
O2/TOTAL GAS SETTING VFR VENT: 28 %
PCO2 BLD: 58.3 MMHG (ref 35–45)
PH BLD: 7.39 [PH] (ref 7.35–7.45)
PLATELET # BLD AUTO: 224 K/UL (ref 150–400)
PO2 BLD: 54 MMHG (ref 80–100)
POTASSIUM SERPL-SCNC: 4.3 MMOL/L (ref 3.5–5.1)
PROT SERPL-MCNC: 6.2 G/DL (ref 6.4–8.2)
RBC # BLD AUTO: 4.36 M/UL (ref 4.1–5.7)
SAO2 % BLD: 86 % (ref 92–97)
SODIUM SERPL-SCNC: 138 MMOL/L (ref 136–145)
SPECIMEN TYPE: ABNORMAL
TOTAL RESP. RATE, ITRR: 20
WBC # BLD AUTO: 17.1 K/UL (ref 4.1–11.1)

## 2017-08-19 PROCEDURE — 93306 TTE W/DOPPLER COMPLETE: CPT | Performed by: HOSPITALIST

## 2017-08-19 PROCEDURE — 36600 WITHDRAWAL OF ARTERIAL BLOOD: CPT

## 2017-08-19 PROCEDURE — 74011000250 HC RX REV CODE- 250: Performed by: HOSPITALIST

## 2017-08-19 PROCEDURE — 83735 ASSAY OF MAGNESIUM: CPT | Performed by: HOSPITALIST

## 2017-08-19 PROCEDURE — 80053 COMPREHEN METABOLIC PANEL: CPT | Performed by: HOSPITALIST

## 2017-08-19 PROCEDURE — 94640 AIRWAY INHALATION TREATMENT: CPT

## 2017-08-19 PROCEDURE — 36415 COLL VENOUS BLD VENIPUNCTURE: CPT | Performed by: HOSPITALIST

## 2017-08-19 PROCEDURE — 74011250636 HC RX REV CODE- 250/636: Performed by: HOSPITALIST

## 2017-08-19 PROCEDURE — 82803 BLOOD GASES ANY COMBINATION: CPT

## 2017-08-19 PROCEDURE — 74011000258 HC RX REV CODE- 258: Performed by: HOSPITALIST

## 2017-08-19 PROCEDURE — 65660000001 HC RM ICU INTERMED STEPDOWN

## 2017-08-19 PROCEDURE — 74011250637 HC RX REV CODE- 250/637: Performed by: HOSPITALIST

## 2017-08-19 PROCEDURE — 71250 CT THORAX DX C-: CPT

## 2017-08-19 PROCEDURE — 85027 COMPLETE CBC AUTOMATED: CPT | Performed by: HOSPITALIST

## 2017-08-19 PROCEDURE — 93306 TTE W/DOPPLER COMPLETE: CPT

## 2017-08-19 RX ADMIN — QUETIAPINE FUMARATE 50 MG: 25 TABLET, FILM COATED ORAL at 21:22

## 2017-08-19 RX ADMIN — ALPRAZOLAM 0.5 MG: 0.5 TABLET ORAL at 08:46

## 2017-08-19 RX ADMIN — Medication 10 ML: at 14:00

## 2017-08-19 RX ADMIN — RISPERIDONE 3 MG: 1 TABLET ORAL at 08:46

## 2017-08-19 RX ADMIN — PAROXETINE HYDROCHLORIDE 20 MG: 20 TABLET, FILM COATED ORAL at 08:46

## 2017-08-19 RX ADMIN — BENZTROPINE MESYLATE 2 MG: 1 TABLET ORAL at 08:46

## 2017-08-19 RX ADMIN — ALPRAZOLAM 0.5 MG: 0.5 TABLET ORAL at 17:16

## 2017-08-19 RX ADMIN — METHYLPREDNISOLONE SODIUM SUCCINATE 60 MG: 125 INJECTION, POWDER, FOR SOLUTION INTRAMUSCULAR; INTRAVENOUS at 17:16

## 2017-08-19 RX ADMIN — Medication 10 ML: at 21:23

## 2017-08-19 RX ADMIN — BUDESONIDE 500 MCG: 0.5 INHALANT RESPIRATORY (INHALATION) at 08:21

## 2017-08-19 RX ADMIN — ENOXAPARIN SODIUM 40 MG: 40 INJECTION SUBCUTANEOUS at 19:08

## 2017-08-19 RX ADMIN — Medication 10 ML: at 06:00

## 2017-08-19 RX ADMIN — CEFTRIAXONE 2 G: 2 INJECTION, POWDER, FOR SOLUTION INTRAMUSCULAR; INTRAVENOUS at 19:07

## 2017-08-19 RX ADMIN — GUAIFENESIN 200 MG: 100 SOLUTION ORAL at 08:46

## 2017-08-19 RX ADMIN — Medication 10 ML: at 21:24

## 2017-08-19 RX ADMIN — METHYLPREDNISOLONE SODIUM SUCCINATE 60 MG: 125 INJECTION, POWDER, FOR SOLUTION INTRAMUSCULAR; INTRAVENOUS at 02:52

## 2017-08-19 RX ADMIN — BUDESONIDE 500 MCG: 0.5 INHALANT RESPIRATORY (INHALATION) at 20:25

## 2017-08-19 RX ADMIN — METHYLPREDNISOLONE SODIUM SUCCINATE 60 MG: 125 INJECTION, POWDER, FOR SOLUTION INTRAMUSCULAR; INTRAVENOUS at 08:47

## 2017-08-19 NOTE — PROGRESS NOTES
Bedside shift change report given to Sujey Hahn RN (oncoming nurse) by Abdiel Herrera (offgoing nurse). Report included the following information SBAR, Kardex, Intake/Output, MAR, Recent Results and Cardiac Rhythm NSR. Problem: Gas Exchange - Impaired  Goal: *Absence of hypoxia  Outcome: Progressing Towards Goal  Patient's oxygen saturations remained >90%. Patient tolerated BiPAP for several hours throughout the night.

## 2017-08-19 NOTE — PROGRESS NOTES
Hospitalist Progress Note  Ralph Izquierdo MD  Office: [phone]  Cell: [phone]      Date of Service:  2017  NAME:  Val Samson  :  1960  MRN:  [id number]      Admission Summary: This is a 14-year-old [facility] American male from [address] with past medical history significant for COPD, paranoid schizophrenia, mental retardation and depression, who presented to [facility] Emergency Department with progressive shortness of breath. Interval history / Subjective:   Still shortness of breath, had episode of tachycardia on monitor 150s this morning, no chest pain     Assessment & Plan:     Acute on chronic hypercapnic hypoxic respiratory failure secondary acute COPD exacerbation  -on BiPAP intermittently, on 2 l/m via nasal canula, SaO2 95-96%,  repeated ABG   -continue iv solumedrol, pulmicort, prn duo neb, oxygen support and monitor pulse ox  -expiratory wheezing both lungs field  -CT chest and echo    Acute on chronic hyponatremia. -improved with normal saline, stop IVF  -TSH normal  -normal creatinine  -monitor renal function    History of chronic paranoid schizophrenia. -stable, continue risperidone and cogentin    History of depression. -stable, continue paxil    Underweight  -BMI ~17  -consult to nutritionist      Code status: Full Code  DVT prophylaxis: Lovenox    Care Plan discussed with: Patient/Family, Nurse and   Disposition: TBD     Hospital Problems  Date Reviewed: 2017          Codes Class Noted POA    * (Principal)Shortness of breath ICD-10-CM: R06.02  ICD-9-CM: 786.05  2017 Unknown              Vital Signs:    Last 24hrs VS reviewed since prior progress note.  Most recent are:  Visit Vitals    BP (!) 131/100 (BP 1 Location: Left arm, BP Patient Position: At rest)    Pulse 90    Temp 97.8 °F (36.6 °C)    Resp 17    Ht 5' 10\" (1.778 m)    Wt 55.4 kg (122 lb 2.2 oz)    SpO2 98%    BMI 17.52 kg/m2         Intake/Output Summary (Last 24 hours) at 08/19/17 0932  Last data filed at 08/19/17 0325   Gross per 24 hour   Intake              960 ml   Output             4250 ml   Net            -3290 ml        Physical Examination:             Constitutional:  No acute distress, cooperative, pleasant    ENT:  Oral mucous moist, oropharynx benign. Neck supple,    Resp:  Expiratory wheezing bilaterally and decrease bronchial breath sound bilaterally, no rhonchi/rales. No accessory muscle use   CV:  Regular rhythm, normal rate, no murmurs, gallops, rubs    GI:  Soft, non distended, non tender. normoactive bowel sounds, no hepatosplenomegaly     Musculoskeletal:  No edema    Neurologic:  Conscious and alert, oriented to place and person, Moves all extremities. CN II-XII reviewed     Psych: Not anxious nor agitated. Data Review:    Review and/or order of clinical lab test      Labs:     Recent Labs      08/17/17   0239  08/16/17   1503   WBC  6.5  15.9*   HGB  13.9  15.3   HCT  40.5  42.6   PLT  238  228     Recent Labs      08/17/17   0239  08/16/17   1503   NA  134*  123*   K  4.3  4.2   CL  93*  81*   CO2  36*  36*   BUN  10  3*   CREA  0.64*  0.36*   GLU  104*  91   CA  8.1*  8.4*   MG   --   1.9     Recent Labs      08/17/17   0239  08/16/17   1503   SGOT  22  45*   ALT  32  35   AP  84  94   TBILI  0.3  0.8   TP  6.4  6.6   ALB  3.2*  3.6   GLOB  3.2  3.0     Recent Labs      08/16/17   1503   INR  1.2*   PTP  12.4*   APTT  26.7      No results for input(s): FE, TIBC, PSAT, FERR in the last 72 hours. No results found for: FOL, RBCF   No results for input(s): PH, PCO2, PO2 in the last 72 hours.   Recent Labs      08/16/17   1503   CPK  501*   CKNDX  8.6*   TROIQ  <0.04     Lab Results   Component Value Date/Time    Cholesterol, total 102 05/15/2017 11:11 AM    HDL Cholesterol 48 05/15/2017 11:11 AM    LDL, calculated 46 05/15/2017 11:11 AM    Triglyceride 41 05/15/2017 11:11 AM     Lab Results   Component Value Date/Time    Glucose (POC) 132 07/04/2017 04:37 PM     Lab Results   Component Value Date/Time    Color YELLOW/STRAW 07/13/2017 04:11 PM    Appearance CLOUDY 07/13/2017 04:11 PM    Specific gravity 1.016 07/13/2017 04:11 PM    pH (UA) 6.0 07/13/2017 04:11 PM    Protein 100 07/13/2017 04:11 PM    Glucose 250 07/13/2017 04:11 PM    Ketone 15 07/13/2017 04:11 PM    Bilirubin NEGATIVE  07/13/2017 04:11 PM    Urobilinogen 0.2 07/13/2017 04:11 PM    Nitrites NEGATIVE  07/13/2017 04:11 PM    Leukocyte Esterase NEGATIVE  07/13/2017 04:11 PM    Epithelial cells FEW 07/13/2017 04:11 PM    Bacteria NEGATIVE  07/13/2017 04:11 PM    WBC 0-4 07/13/2017 04:11 PM    RBC 10-20 07/13/2017 04:11 PM         Medications Reviewed:     Current Facility-Administered Medications   Medication Dose Route Frequency    sodium chloride (NS) flush 5-10 mL  5-10 mL IntraVENous Q8H    sodium chloride (NS) flush 5-10 mL  5-10 mL IntraVENous PRN    methylPREDNISolone (PF) (SOLU-MEDROL) injection 60 mg  60 mg IntraVENous Q8H    ALPRAZolam (XANAX) tablet 0.5 mg  0.5 mg Oral BID    benztropine (COGENTIN) tablet 2 mg  2 mg Oral DAILY    guaiFENesin (ROBITUSSIN) 100 mg/5 mL oral liquid 200 mg  200 mg Oral Q4H PRN    PARoxetine (PAXIL) tablet 20 mg  20 mg Oral DAILY    QUEtiapine (SEROquel) tablet 50 mg  50 mg Oral QHS PRN    risperiDONE (RisperDAL) tablet 3 mg  3 mg Oral DAILY    temazepam (RESTORIL) capsule 30 mg  30 mg Oral QHS PRN    traZODone (DESYREL) tablet 50 mg  50 mg Oral QHS PRN    sodium chloride (NS) flush 5-10 mL  5-10 mL IntraVENous Q8H    sodium chloride (NS) flush 5-10 mL  5-10 mL IntraVENous PRN    budesonide (PULMICORT) 500 mcg/2 ml nebulizer suspension  500 mcg Nebulization BID RT    enoxaparin (LOVENOX) injection 40 mg  40 mg SubCUTAneous Q24H    cefTRIAXone (ROCEPHIN) 2 g in 0.9% sodium chloride (MBP/ADV) 50 mL  2 g IntraVENous Q24H    albuterol-ipratropium (DUO-NEB) 2.5 MG-0.5 MG/3 ML  3 mL Nebulization Q4H PRN     ______________________________________________________________________  EXPECTED LENGTH OF STAY: 3d 21h  ACTUAL LENGTH OF STAY:          3                 Diana Krishna MD

## 2017-08-20 ENCOUNTER — APPOINTMENT (OUTPATIENT)
Dept: GENERAL RADIOLOGY | Age: 57
DRG: 140 | End: 2017-08-20
Attending: HOSPITALIST
Payer: MEDICAID

## 2017-08-20 LAB
ARTERIAL PATENCY WRIST A: ABNORMAL
BASE EXCESS BLD CALC-SCNC: 14 MMOL/L
BDY SITE: ABNORMAL
GAS FLOW.O2 O2 DELIVERY SYS: ABNORMAL L/MIN
GAS FLOW.O2 SETTING OXYMISER: 3 L/M
HCO3 BLD-SCNC: 37.7 MMOL/L (ref 22–26)
PCO2 BLD: 55 MMHG (ref 35–45)
PH BLD: 7.44 [PH] (ref 7.35–7.45)
PO2 BLD: 59 MMHG (ref 80–100)
SAO2 % BLD: 91 % (ref 92–97)
SPECIMEN TYPE: ABNORMAL
TOTAL RESP. RATE, ITRR: 20

## 2017-08-20 PROCEDURE — 77010033678 HC OXYGEN DAILY

## 2017-08-20 PROCEDURE — 74011250636 HC RX REV CODE- 250/636: Performed by: HOSPITALIST

## 2017-08-20 PROCEDURE — 74011000258 HC RX REV CODE- 258: Performed by: HOSPITALIST

## 2017-08-20 PROCEDURE — 65660000001 HC RM ICU INTERMED STEPDOWN

## 2017-08-20 PROCEDURE — 36600 WITHDRAWAL OF ARTERIAL BLOOD: CPT

## 2017-08-20 PROCEDURE — 94640 AIRWAY INHALATION TREATMENT: CPT

## 2017-08-20 PROCEDURE — 74011000250 HC RX REV CODE- 250: Performed by: HOSPITALIST

## 2017-08-20 PROCEDURE — 71010 XR CHEST PORT: CPT

## 2017-08-20 PROCEDURE — 74011250637 HC RX REV CODE- 250/637: Performed by: HOSPITALIST

## 2017-08-20 PROCEDURE — 82803 BLOOD GASES ANY COMBINATION: CPT

## 2017-08-20 RX ADMIN — Medication 10 ML: at 21:11

## 2017-08-20 RX ADMIN — Medication 10 ML: at 06:00

## 2017-08-20 RX ADMIN — Medication 10 ML: at 14:00

## 2017-08-20 RX ADMIN — PAROXETINE HYDROCHLORIDE 20 MG: 20 TABLET, FILM COATED ORAL at 09:47

## 2017-08-20 RX ADMIN — GUAIFENESIN 200 MG: 100 SOLUTION ORAL at 15:33

## 2017-08-20 RX ADMIN — METHYLPREDNISOLONE SODIUM SUCCINATE 60 MG: 125 INJECTION, POWDER, FOR SOLUTION INTRAMUSCULAR; INTRAVENOUS at 02:41

## 2017-08-20 RX ADMIN — METHYLPREDNISOLONE SODIUM SUCCINATE 60 MG: 125 INJECTION, POWDER, FOR SOLUTION INTRAMUSCULAR; INTRAVENOUS at 18:06

## 2017-08-20 RX ADMIN — CEFTRIAXONE 2 G: 2 INJECTION, POWDER, FOR SOLUTION INTRAMUSCULAR; INTRAVENOUS at 18:06

## 2017-08-20 RX ADMIN — ENOXAPARIN SODIUM 40 MG: 40 INJECTION SUBCUTANEOUS at 18:07

## 2017-08-20 RX ADMIN — RISPERIDONE 3 MG: 1 TABLET ORAL at 09:46

## 2017-08-20 RX ADMIN — ALPRAZOLAM 0.5 MG: 0.5 TABLET ORAL at 18:07

## 2017-08-20 RX ADMIN — BENZTROPINE MESYLATE 2 MG: 1 TABLET ORAL at 09:46

## 2017-08-20 RX ADMIN — BUDESONIDE 500 MCG: 0.5 INHALANT RESPIRATORY (INHALATION) at 08:36

## 2017-08-20 RX ADMIN — ALPRAZOLAM 0.5 MG: 0.5 TABLET ORAL at 09:47

## 2017-08-20 RX ADMIN — QUETIAPINE FUMARATE 50 MG: 25 TABLET, FILM COATED ORAL at 21:10

## 2017-08-20 RX ADMIN — IPRATROPIUM BROMIDE AND ALBUTEROL SULFATE 3 ML: .5; 3 SOLUTION RESPIRATORY (INHALATION) at 10:33

## 2017-08-20 RX ADMIN — TEMAZEPAM 30 MG: 15 CAPSULE ORAL at 02:41

## 2017-08-20 RX ADMIN — BUDESONIDE 500 MCG: 0.5 INHALANT RESPIRATORY (INHALATION) at 20:28

## 2017-08-20 RX ADMIN — GUAIFENESIN 200 MG: 100 SOLUTION ORAL at 09:47

## 2017-08-20 RX ADMIN — METHYLPREDNISOLONE SODIUM SUCCINATE 60 MG: 125 INJECTION, POWDER, FOR SOLUTION INTRAMUSCULAR; INTRAVENOUS at 09:48

## 2017-08-20 NOTE — PROGRESS NOTES
Hospitalist Progress Note  Mojgan Park MD  Office: [phone]  Cell: [phone]      Date of Service:  2017  NAME:  Angelina Wadsworth  :  1960  MRN:  [id number]      Admission Summary: This is a 79-year-old [facility] American male from [address] with past medical history significant for COPD, paranoid schizophrenia, mental retardation and depression, who presented to [facility] Emergency Department with progressive shortness of breath. Interval history / Subjective:   Still shortness of breath, had episode of tachycardia on monitor 150s this morning, no chest pain     Assessment & Plan:     Acute on chronic hypercapnic hypoxic respiratory failure secondary acute COPD exacerbation  -on BiPAP intermittently, on 2 l/m via nasal canula, SaO2 95-96%,  Repeat ABG   -continue iv solumedrol, pulmicort, prn duo neb, oxygen support and monitor pulse ox  -expiratory wheezing both lungs field, will switch soon his IV steroid to po  -CT chest extensive emphysema, with interval resolution of bibasilar groundglass opacities/airspace disease. No pulmonary edema, pleural effusion or new area of  focal consolidation. Diffuse bronchial wall thickening and a few scattered areas  of airspace disease which may be chronic  -Echo LV EF 60-65% no wall motion abnormality  -Chest r ray on  no acute finding      Acute on chronic hyponatremia. -improved with normal saline, stop IVF  -TSH normal  -normal creatinine  -monitor renal function    History of chronic paranoid schizophrenia. -stable, continue risperidone and cogentin    History of depression.   -stable, continue paxil    Underweight  -BMI ~17  -consult to nutritionist      Code status: Full Code  DVT prophylaxis: Lovenox    Care Plan discussed with: Patient/Family, Nurse and   Disposition: TBD     Hospital Problems  Date Reviewed: 2017 Codes Class Noted POA    * (Principal)Shortness of breath ICD-10-CM: R06.02  ICD-9-CM: 786.05  8/16/2017 Unknown              Vital Signs:    Last 24hrs VS reviewed since prior progress note. Most recent are:  Visit Vitals    /83 (BP 1 Location: Right arm, BP Patient Position: At rest;Supine)    Pulse 87    Temp 97.5 °F (36.4 °C)    Resp 20    Ht 5' 10\" (1.778 m)    Wt 54.2 kg (119 lb 7.8 oz)    SpO2 96%    BMI 17.14 kg/m2         Intake/Output Summary (Last 24 hours) at 08/20/17 1320  Last data filed at 08/20/17 7158   Gross per 24 hour   Intake              960 ml   Output             3025 ml   Net            -2065 ml        Physical Examination:             Constitutional:  No acute distress, cooperative, pleasant    ENT:  Oral mucous moist, oropharynx benign. Neck supple,    Resp:  Expiratory wheezing bilaterally and decrease bronchial breath sound bilaterally, no rhonchi/rales. No accessory muscle use   CV:  Regular rhythm, normal rate, no murmurs, gallops, rubs    GI:  Soft, non distended, non tender. normoactive bowel sounds, no hepatosplenomegaly     Musculoskeletal:  No edema    Neurologic:  Conscious and alert, oriented to place and person, Moves all extremities. CN II-XII reviewed     Psych: Not anxious nor agitated. Data Review:    Review and/or order of clinical lab test      Labs:     Recent Labs      08/19/17   0955   WBC  17.1*   HGB  14.6   HCT  44.4   PLT  224     Recent Labs      08/19/17   0955   NA  138   K  4.3   CL  97   CO2  34*   BUN  10   CREA  0.80   GLU  154*   CA  8.4*   MG  2.2     Recent Labs      08/19/17   0955   SGOT  12*   ALT  26   AP  92   TBILI  0.5   TP  6.2*   ALB  3.1*   GLOB  3.1     No results for input(s): INR, PTP, APTT in the last 72 hours. No lab exists for component: INREXT, INREXT   No results for input(s): FE, TIBC, PSAT, FERR in the last 72 hours.    No results found for: FOL, RBCF   No results for input(s): PH, PCO2, PO2 in the last 72 hours. No results for input(s): CPK, CKNDX, TROIQ in the last 72 hours.     No lab exists for component: CPKMB  Lab Results   Component Value Date/Time    Cholesterol, total 102 05/15/2017 11:11 AM    HDL Cholesterol 48 05/15/2017 11:11 AM    LDL, calculated 46 05/15/2017 11:11 AM    Triglyceride 41 05/15/2017 11:11 AM     Lab Results   Component Value Date/Time    Glucose (POC) 132 07/04/2017 04:37 PM     Lab Results   Component Value Date/Time    Color YELLOW/STRAW 07/13/2017 04:11 PM    Appearance CLOUDY 07/13/2017 04:11 PM    Specific gravity 1.016 07/13/2017 04:11 PM    pH (UA) 6.0 07/13/2017 04:11 PM    Protein 100 07/13/2017 04:11 PM    Glucose 250 07/13/2017 04:11 PM    Ketone 15 07/13/2017 04:11 PM    Bilirubin NEGATIVE  07/13/2017 04:11 PM    Urobilinogen 0.2 07/13/2017 04:11 PM    Nitrites NEGATIVE  07/13/2017 04:11 PM    Leukocyte Esterase NEGATIVE  07/13/2017 04:11 PM    Epithelial cells FEW 07/13/2017 04:11 PM    Bacteria NEGATIVE  07/13/2017 04:11 PM    WBC 0-4 07/13/2017 04:11 PM    RBC 10-20 07/13/2017 04:11 PM         Medications Reviewed:     Current Facility-Administered Medications   Medication Dose Route Frequency    sodium chloride (NS) flush 5-10 mL  5-10 mL IntraVENous Q8H    sodium chloride (NS) flush 5-10 mL  5-10 mL IntraVENous PRN    methylPREDNISolone (PF) (SOLU-MEDROL) injection 60 mg  60 mg IntraVENous Q8H    ALPRAZolam (XANAX) tablet 0.5 mg  0.5 mg Oral BID    benztropine (COGENTIN) tablet 2 mg  2 mg Oral DAILY    guaiFENesin (ROBITUSSIN) 100 mg/5 mL oral liquid 200 mg  200 mg Oral Q4H PRN    PARoxetine (PAXIL) tablet 20 mg  20 mg Oral DAILY    QUEtiapine (SEROquel) tablet 50 mg  50 mg Oral QHS PRN    risperiDONE (RisperDAL) tablet 3 mg  3 mg Oral DAILY    temazepam (RESTORIL) capsule 30 mg  30 mg Oral QHS PRN    traZODone (DESYREL) tablet 50 mg  50 mg Oral QHS PRN    sodium chloride (NS) flush 5-10 mL  5-10 mL IntraVENous Q8H    sodium chloride (NS) flush 5-10 mL  5-10 mL IntraVENous PRN    budesonide (PULMICORT) 500 mcg/2 ml nebulizer suspension  500 mcg Nebulization BID RT    enoxaparin (LOVENOX) injection 40 mg  40 mg SubCUTAneous Q24H    cefTRIAXone (ROCEPHIN) 2 g in 0.9% sodium chloride (MBP/ADV) 50 mL  2 g IntraVENous Q24H    albuterol-ipratropium (DUO-NEB) 2.5 MG-0.5 MG/3 ML  3 mL Nebulization Q4H PRN     ______________________________________________________________________  EXPECTED LENGTH OF STAY: 3d 21h  ACTUAL LENGTH OF STAY:          4                 Christopher Beck MD

## 2017-08-20 NOTE — PROGRESS NOTES
~ 2000 - Bedside shift change report given to Keely Cruz RN (oncoming nurse) by Timothy Silvestre RN (offgoing nurse). Report included the following information SBAR, Kardex, MAR and Recent Results.

## 2017-08-20 NOTE — PROGRESS NOTES
Bedside shift change report given to [facility] RN (oncoming nurse) by Piyush Joe (offgoing nurse). Report included the following information SBAR, Kardex, Intake/Output, MAR, Recent Results and Cardiac Rhythm NSR/Sinus Tach.

## 2017-08-20 NOTE — PROGRESS NOTES
~ 1600 - Bedside shift change report given to Magda Spann RN (oncoming nurse) by Yudelka Laurent RN (offgoing nurse). Report included the following information SBAR, Kardex, MAR and Recent Results.

## 2017-08-21 LAB
BACTERIA SPEC CULT: NORMAL
SERVICE CMNT-IMP: NORMAL

## 2017-08-21 PROCEDURE — 94640 AIRWAY INHALATION TREATMENT: CPT

## 2017-08-21 PROCEDURE — 74011250636 HC RX REV CODE- 250/636: Performed by: HOSPITALIST

## 2017-08-21 PROCEDURE — 74011000258 HC RX REV CODE- 258: Performed by: HOSPITALIST

## 2017-08-21 PROCEDURE — 82103 ALPHA-1-ANTITRYPSIN TOTAL: CPT | Performed by: INTERNAL MEDICINE

## 2017-08-21 PROCEDURE — 74011000250 HC RX REV CODE- 250: Performed by: HOSPITALIST

## 2017-08-21 PROCEDURE — 74011250637 HC RX REV CODE- 250/637: Performed by: HOSPITALIST

## 2017-08-21 PROCEDURE — 36415 COLL VENOUS BLD VENIPUNCTURE: CPT | Performed by: INTERNAL MEDICINE

## 2017-08-21 PROCEDURE — 65660000001 HC RM ICU INTERMED STEPDOWN

## 2017-08-21 PROCEDURE — 94660 CPAP INITIATION&MGMT: CPT

## 2017-08-21 RX ADMIN — BUDESONIDE 500 MCG: 0.5 INHALANT RESPIRATORY (INHALATION) at 21:03

## 2017-08-21 RX ADMIN — TEMAZEPAM 30 MG: 15 CAPSULE ORAL at 20:26

## 2017-08-21 RX ADMIN — METHYLPREDNISOLONE SODIUM SUCCINATE 60 MG: 125 INJECTION, POWDER, FOR SOLUTION INTRAMUSCULAR; INTRAVENOUS at 08:26

## 2017-08-21 RX ADMIN — PAROXETINE HYDROCHLORIDE 20 MG: 20 TABLET, FILM COATED ORAL at 08:26

## 2017-08-21 RX ADMIN — METHYLPREDNISOLONE SODIUM SUCCINATE 60 MG: 125 INJECTION, POWDER, FOR SOLUTION INTRAMUSCULAR; INTRAVENOUS at 00:46

## 2017-08-21 RX ADMIN — TRAZODONE HYDROCHLORIDE 50 MG: 50 TABLET ORAL at 23:05

## 2017-08-21 RX ADMIN — Medication 10 ML: at 07:03

## 2017-08-21 RX ADMIN — METHYLPREDNISOLONE SODIUM SUCCINATE 60 MG: 125 INJECTION, POWDER, FOR SOLUTION INTRAMUSCULAR; INTRAVENOUS at 17:51

## 2017-08-21 RX ADMIN — ALPRAZOLAM 0.5 MG: 0.5 TABLET ORAL at 08:26

## 2017-08-21 RX ADMIN — TEMAZEPAM 30 MG: 15 CAPSULE ORAL at 02:30

## 2017-08-21 RX ADMIN — Medication 10 ML: at 14:22

## 2017-08-21 RX ADMIN — RISPERIDONE 3 MG: 1 TABLET ORAL at 08:26

## 2017-08-21 RX ADMIN — ALPRAZOLAM 0.5 MG: 0.5 TABLET ORAL at 17:51

## 2017-08-21 RX ADMIN — Medication 10 ML: at 23:05

## 2017-08-21 RX ADMIN — BUDESONIDE 500 MCG: 0.5 INHALANT RESPIRATORY (INHALATION) at 08:53

## 2017-08-21 RX ADMIN — ENOXAPARIN SODIUM 40 MG: 40 INJECTION SUBCUTANEOUS at 18:41

## 2017-08-21 RX ADMIN — BENZTROPINE MESYLATE 2 MG: 1 TABLET ORAL at 08:26

## 2017-08-21 RX ADMIN — CEFTRIAXONE 2 G: 2 INJECTION, POWDER, FOR SOLUTION INTRAMUSCULAR; INTRAVENOUS at 18:41

## 2017-08-21 NOTE — PROGRESS NOTES
Hospitalist Progress Note  Geovany Matthew MD  Office: [phone]  Cell: [phone]      Date of Service:  2017  NAME:  Ant Jang  :  1960  MRN:  [id number]      Admission Summary: This is a 69-year-old [facility] American male from [address] with past medical history significant for COPD, paranoid schizophrenia, mental retardation and depression, who presented to [facility] Emergency Department with progressive shortness of breath. Interval history / Subjective:   Feels better, on and off cough, no left side chest pain     Assessment & Plan:     Acute on chronic hypercapnic hypoxic respiratory failure secondary acute COPD exacerbation  -on BiPAP intermittently used for 4 hours last night, desaturates to 87% on RA and planced on 2 l/m via nasal canula,   -continue iv solumedrol, pulmicort, prn duo neb, oxygen support and monitor pulse ox  -expiratory wheezing both lungs field improving  -CT chest extensive emphysema, with interval resolution of bibasilar groundglass opacities/airspace disease. No pulmonary edema, pleural effusion or new area of  focal consolidation. Diffuse bronchial wall thickening and a few scattered areas  of airspace disease which may be chronic  -Echo LV EF 60-65% no wall motion abnormality  -Chest r ray on  no acute finding  -ABG on  pH 7.44 pO2 59 [id number]  -consult to pulmonologist      Acute on chronic hyponatremia.   -resolved with normal saline, IVF is stopped  -TSH normal  -normal creatinine  -monitor renal function    History of chronic paranoid schizophrenia. -stable, continue risperidone and cogentin    History of depression.   -stable, continue paxil    Underweight  -BMI ~17  -consult to nutritionist       Code status: Full Code  DVT prophylaxis: Lovenox    Care Plan discussed with: Patient/Family, Nurse and   Disposition: [facility] Problems  Date Reviewed: 8/16/2017          Codes Class Noted POA    * (Principal)Shortness of breath ICD-10-CM: R06.02  ICD-9-CM: 786.05  8/16/2017 Unknown              Vital Signs:    Last 24hrs VS reviewed since prior progress note. Most recent are:  Visit Vitals    BP (!) 145/91 (BP 1 Location: Left arm, BP Patient Position: At rest)    Pulse 86    Temp 97.9 °F (36.6 °C)    Resp 20    Ht 5' 10\" (1.778 m)    Wt 53.8 kg (118 lb 9.7 oz)    SpO2 91%    BMI 17.02 kg/m2         Intake/Output Summary (Last 24 hours) at 08/21/17 0941  Last data filed at 08/21/17 0933   Gross per 24 hour   Intake              530 ml   Output             3395 ml   Net            -2865 ml        Physical Examination:             Constitutional:  No acute distress, cooperative, pleasant    ENT:  Oral mucous moist, oropharynx benign. Neck supple,    Resp:  Expiratory wheezing bilaterally and decrease bronchial breath sound bilaterally, no rhonchi/rales. No accessory muscle use   CV:  Regular rhythm, normal rate, no murmurs, gallops, rubs    GI:  Soft, non distended, non tender. normoactive bowel sounds, no hepatosplenomegaly     Musculoskeletal:  No edema    Neurologic:  Conscious and alert, oriented to place and person, Moves all extremities. CN II-XII reviewed     Psych: Not anxious nor agitated. Data Review:    Review and/or order of clinical lab test      Labs:     Recent Labs      08/19/17   0955   WBC  17.1*   HGB  14.6   HCT  44.4   PLT  224     Recent Labs      08/19/17   0955   NA  138   K  4.3   CL  97   CO2  34*   BUN  10   CREA  0.80   GLU  154*   CA  8.4*   MG  2.2     Recent Labs      08/19/17   0955   SGOT  12*   ALT  26   AP  92   TBILI  0.5   TP  6.2*   ALB  3.1*   GLOB  3.1     No results for input(s): INR, PTP, APTT in the last 72 hours. No lab exists for component: INREXT, INREXT   No results for input(s): FE, TIBC, PSAT, FERR in the last 72 hours.    No results found for: FOL, RBCF   No results for input(s): PH, PCO2, PO2 in the last 72 hours. No results for input(s): CPK, CKNDX, TROIQ in the last 72 hours.     No lab exists for component: CPKMB  Lab Results   Component Value Date/Time    Cholesterol, total 102 05/15/2017 11:11 AM    HDL Cholesterol 48 05/15/2017 11:11 AM    LDL, calculated 46 05/15/2017 11:11 AM    Triglyceride 41 05/15/2017 11:11 AM     Lab Results   Component Value Date/Time    Glucose (POC) 132 07/04/2017 04:37 PM     Lab Results   Component Value Date/Time    Color YELLOW/STRAW 07/13/2017 04:11 PM    Appearance CLOUDY 07/13/2017 04:11 PM    Specific gravity 1.016 07/13/2017 04:11 PM    pH (UA) 6.0 07/13/2017 04:11 PM    Protein 100 07/13/2017 04:11 PM    Glucose 250 07/13/2017 04:11 PM    Ketone 15 07/13/2017 04:11 PM    Bilirubin NEGATIVE  07/13/2017 04:11 PM    Urobilinogen 0.2 07/13/2017 04:11 PM    Nitrites NEGATIVE  07/13/2017 04:11 PM    Leukocyte Esterase NEGATIVE  07/13/2017 04:11 PM    Epithelial cells FEW 07/13/2017 04:11 PM    Bacteria NEGATIVE  07/13/2017 04:11 PM    WBC 0-4 07/13/2017 04:11 PM    RBC 10-20 07/13/2017 04:11 PM         Medications Reviewed:     Current Facility-Administered Medications   Medication Dose Route Frequency    sodium chloride (NS) flush 5-10 mL  5-10 mL IntraVENous Q8H    sodium chloride (NS) flush 5-10 mL  5-10 mL IntraVENous PRN    methylPREDNISolone (PF) (SOLU-MEDROL) injection 60 mg  60 mg IntraVENous Q8H    ALPRAZolam (XANAX) tablet 0.5 mg  0.5 mg Oral BID    benztropine (COGENTIN) tablet 2 mg  2 mg Oral DAILY    guaiFENesin (ROBITUSSIN) 100 mg/5 mL oral liquid 200 mg  200 mg Oral Q4H PRN    PARoxetine (PAXIL) tablet 20 mg  20 mg Oral DAILY    QUEtiapine (SEROquel) tablet 50 mg  50 mg Oral QHS PRN    risperiDONE (RisperDAL) tablet 3 mg  3 mg Oral DAILY    temazepam (RESTORIL) capsule 30 mg  30 mg Oral QHS PRN    traZODone (DESYREL) tablet 50 mg  50 mg Oral QHS PRN    sodium chloride (NS) flush 5-10 mL  5-10 mL IntraVENous Q8H    sodium chloride (NS) flush 5-10 mL  5-10 mL IntraVENous PRN    budesonide (PULMICORT) 500 mcg/2 ml nebulizer suspension  500 mcg Nebulization BID RT    enoxaparin (LOVENOX) injection 40 mg  40 mg SubCUTAneous Q24H    cefTRIAXone (ROCEPHIN) 2 g in 0.9% sodium chloride (MBP/ADV) 50 mL  2 g IntraVENous Q24H    albuterol-ipratropium (DUO-NEB) 2.5 MG-0.5 MG/3 ML  3 mL Nebulization Q4H PRN     ______________________________________________________________________  EXPECTED LENGTH OF STAY: 3d 21h  ACTUAL LENGTH OF STAY:          5                 Lola Sterling MD

## 2017-08-21 NOTE — PROGRESS NOTES
Bedside and Verbal shift change report given to Aracelis Walker RN (oncoming nurse) by Troy William RN (offgoing nurse). Report included the following information SBAR, Kardex, Intake/Output, MAR and Recent Results.

## 2017-08-21 NOTE — PROGRESS NOTES
Problem: Falls - Risk of  Goal: *Absence of Falls  Document Joanie Fall Risk and appropriate interventions in the flowsheet. Outcome: Progressing Towards Goal  Bed is in the lowest position and wheels are locked, call bell is within reach, bathroom light is on during evening hours, gripper socks are on, patient is located near the nurses station and patient has been instructed to call out for assistance if needed. As of now, patient is free from falls and will continue to be monitored.

## 2017-08-21 NOTE — CONSULTS
[facility]  Pulmonary, Critical Care, and Sleep Medicine    Initial Patient Consult    Name: Tai Willis MRN: [id number]   : 1960 Hospital: [facility]   Date: 2017        IMPRESSION:   · Acute resp failure- COPD exacerbation-likely viral. Off bipap. · Schizophrenia  · Depression  · Former smoker- quit 2 weeks after 30 pk yr. RECOMMENDATIONS:   · Nebs  · Get bedside susanne  · Get A1AT level. · Pulm toilet  · Can stop abx     Subjective: This patient has been seen and evaluated at the request of Dr. Nichole Mendosa for SOB. Patient is a 61 y.o. male former smoker admitted  with COPD exacerbation from SNF. Pt has been on and off bipap. Currently off bipap. States breathing is better. No CP or abd pain. Past Medical History:   Diagnosis Date    Asthma     seldom,use inhaler    Bronchitis     Chronic obstructive pulmonary disease ([facility] [address]     Depression     anxiety    Psychiatric disorder     paranoid schizophrenia    Psychiatric disorder     extrapyramidal disease    Psychotic disorder     mental retardation      Past Surgical History:   Procedure Laterality Date    HX ORTHOPAEDIC      right knee      Prior to Admission medications    Medication Sig Start Date End Date Taking? Authorizing Provider   PARoxetine (PAXIL) 20 mg tablet Take 20 mg by mouth daily. Yes Historical Provider   risperiDONE (RISPERDAL) 3 mg tablet Take 3 mg by mouth daily. Yes Historical Provider   ALPRAZolam (XANAX) 0.5 mg tablet Take 0.5 mg by mouth two (2) times daily as needed for Anxiety. Historical Provider   benztropine (COGENTIN) 2 mg tablet Take 2 mg by mouth daily as needed. Historical Provider   ibuprofen (MOTRIN) 800 mg tablet Take 800 mg by mouth every eight (8) hours as needed for Pain. Historical Provider   budesonide-formoterol (SYMBICORT) 80-4.5 mcg/actuation HFAA inhaler Take 2 Puffs by inhalation two (2) times a day.  17   Abelino Braden NP budesonide-formoterol (SYMBICORT) 80-4.5 mcg/actuation HFAA inhaler Take 2 Puffs by inhalation two (2) times a day. 8/8/17   Abbie Galaviz NP   temazepam (RESTORIL) 30 mg capsule Take 30 mg by mouth nightly as needed for Sleep. Historical Provider   QUEtiapine (SEROQUEL) 50 mg tablet Take 50 mg by mouth nightly as needed (for agitation or anxiety (Use 1st)). Historical Provider   traZODone (DESYREL) 100 mg tablet Take 50 mg by mouth nightly as needed for Sleep. Historical Provider   albuterol (VENTOLIN HFA) 90 mcg/actuation inhaler Take 2 Puffs by inhalation every four (4) hours as needed for Shortness of Breath. Historical Provider   albuterol (PROVENTIL VENTOLIN) 2.5 mg /3 mL (0.083 %) nebulizer solution 2.5 mg by Nebulization route every six (6) hours as needed for Wheezing.     Historical Provider     No Known Allergies   Social History   Substance Use Topics    Smoking status: Current Every Day Smoker     Packs/day: 1.00     Years: 25.00    Smokeless tobacco: Never Used    Alcohol use No      Comment: socially      Family History   Problem Relation Age of Onset    Diabetes Mother     Heart Disease Father      CHF        Current Facility-Administered Medications   Medication Dose Route Frequency    sodium chloride (NS) flush 5-10 mL  5-10 mL IntraVENous Q8H    methylPREDNISolone (PF) (SOLU-MEDROL) injection 60 mg  60 mg IntraVENous Q8H    ALPRAZolam (XANAX) tablet 0.5 mg  0.5 mg Oral BID    benztropine (COGENTIN) tablet 2 mg  2 mg Oral DAILY    PARoxetine (PAXIL) tablet 20 mg  20 mg Oral DAILY    risperiDONE (RisperDAL) tablet 3 mg  3 mg Oral DAILY    sodium chloride (NS) flush 5-10 mL  5-10 mL IntraVENous Q8H    budesonide (PULMICORT) 500 mcg/2 ml nebulizer suspension  500 mcg Nebulization BID RT    enoxaparin (LOVENOX) injection 40 mg  40 mg SubCUTAneous Q24H    cefTRIAXone (ROCEPHIN) 2 g in 0.9% sodium chloride (MBP/ADV) 50 mL  2 g IntraVENous Q24H       Review of Systems:  A comprehensive review of systems was negative except for that written in the HPI. Objective:   Vital Signs:    Visit Vitals    BP (!) 145/91 (BP 1 Location: Left arm, BP Patient Position: At rest)    Pulse 86    Temp 97.9 °F (36.6 °C)    Resp 20    Ht 5' 10\" (1.778 m)    Wt 53.8 kg (118 lb 9.7 oz)    SpO2 91%    BMI 17.02 kg/m2       O2 Device: Nasal cannula   O2 Flow Rate (L/min): 2 l/min   Temp (24hrs), Av.9 °F (36.6 °C), Min:97.5 °F (36.4 °C), Max:98.1 °F (36.7 °C)       Intake/Output:   Last shift:       07 - 1900  In: -   Out: 2161 [Urine:1325]  Last 3 shifts: 1901 -  0700  In: 5819 [P.O.:1440; I.V.:50]  Out: 6020 [Urine:6020]    Intake/Output Summary (Last 24 hours) at 17 1055  Last data filed at 17 1052   Gross per 24 hour   Intake              530 ml   Output             4320 ml   Net            -3790 ml      Physical Exam:   General:  Alert, cooperative, no distress, appears stated age. Head:  Normocephalic, without obvious abnormality, atraumatic. Eyes:  Conjunctivae/corneas clear. Nose: Nares normal. Septum midline. Neck: Supple, symmetrical, trachea midline,    Back:   Symmetric, no curvature. ROM normal.   Lungs:   Clear to auscultation bilaterally. Heart:  Regular rate and rhythm, S1, S2 normal, no murmur, click, rub or gallop. Abdomen:   Soft, non-tender. Bowel sounds normal. No masses,  No organomegaly. Extremities: Extremities normal, atraumatic, no cyanosis or edema. Pulses: 2+ and symmetric all extremities.    Skin: Skin color, texture, turgor normal. No rashes or lesions   Lymph nodes: Cervical, supraclavicular, and axillary nodes normal.   Neurologic: Grossly nonfocal     Data review:     Recent Results (from the past 24 hour(s))   POC G3 - PUL    Collection Time: 17  1:52 PM   Result Value Ref Range    pH (POC) 7.444 7.35 - 7.45      pCO2 (POC) 55.0 (H) 35.0 - 45.0 MMHG    pO2 (POC) 59 (L) 80 - 100 MMHG HCO3 (POC) 37.7 (H) 22 - 26 MMOL/L    sO2 (POC) 91 (L) 92 - 97 %    Base excess (POC) 14 mmol/L    Site RIGHT BRACHIAL      Device: NASAL CANNULA      Flow rate (POC) 3 L/M    Allens test (POC) N/A      Specimen type (POC) ARTERIAL      Total resp.  rate 20         Imaging:  I have personally reviewed the patients radiographs and have reviewed the reports:  CT chest with centrilobular emphysema no masses, nodules or LAD        Gagan Hancock MD

## 2017-08-21 NOTE — PROGRESS NOTES
Bedside shift change report given to Tatiana Root RN (oncoming nurse) by Radha Alexander RN (offgoing nurse). Report included the following information SBAR, Kardex, MAR, Accordion, Recent Results and Cardiac Rhythm NSR.

## 2017-08-22 LAB
A1AT SERPL-MCNC: 122 MG/DL (ref 90–200)
ANION GAP SERPL CALC-SCNC: 5 MMOL/L (ref 5–15)
BASOPHILS # BLD: 0 K/UL (ref 0–0.1)
BASOPHILS NFR BLD: 0 % (ref 0–1)
BUN SERPL-MCNC: 11 MG/DL (ref 6–20)
BUN/CREAT SERPL: 15 (ref 12–20)
CALCIUM SERPL-MCNC: 8.3 MG/DL (ref 8.5–10.1)
CHLORIDE SERPL-SCNC: 98 MMOL/L (ref 97–108)
CO2 SERPL-SCNC: 35 MMOL/L (ref 21–32)
CREAT SERPL-MCNC: 0.72 MG/DL (ref 0.7–1.3)
DIFFERENTIAL METHOD BLD: ABNORMAL
EOSINOPHIL # BLD: 0 K/UL (ref 0–0.4)
EOSINOPHIL NFR BLD: 0 % (ref 0–7)
ERYTHROCYTE [DISTWIDTH] IN BLOOD BY AUTOMATED COUNT: 15.3 % (ref 11.5–14.5)
GLUCOSE SERPL-MCNC: 111 MG/DL (ref 65–100)
HCT VFR BLD AUTO: 41 % (ref 36.6–50.3)
HGB BLD-MCNC: 13.7 G/DL (ref 12.1–17)
LYMPHOCYTES # BLD: 0.3 K/UL (ref 0.8–3.5)
LYMPHOCYTES NFR BLD: 2 % (ref 12–49)
MCH RBC QN AUTO: 33.6 PG (ref 26–34)
MCHC RBC AUTO-ENTMCNC: 33.4 G/DL (ref 30–36.5)
MCV RBC AUTO: 100.5 FL (ref 80–99)
MONOCYTES # BLD: 0.8 K/UL (ref 0–1)
MONOCYTES NFR BLD: 5 % (ref 5–13)
NEUTS SEG # BLD: 15 K/UL (ref 1.8–8)
NEUTS SEG NFR BLD: 93 % (ref 32–75)
PLATELET # BLD AUTO: 219 K/UL (ref 150–400)
POTASSIUM SERPL-SCNC: 4.6 MMOL/L (ref 3.5–5.1)
RBC # BLD AUTO: 4.08 M/UL (ref 4.1–5.7)
RBC MORPH BLD: ABNORMAL
SODIUM SERPL-SCNC: 138 MMOL/L (ref 136–145)
WBC # BLD AUTO: 16.1 K/UL (ref 4.1–11.1)

## 2017-08-22 PROCEDURE — 65660000001 HC RM ICU INTERMED STEPDOWN

## 2017-08-22 PROCEDURE — 80048 BASIC METABOLIC PNL TOTAL CA: CPT | Performed by: INTERNAL MEDICINE

## 2017-08-22 PROCEDURE — 85025 COMPLETE CBC W/AUTO DIFF WBC: CPT | Performed by: INTERNAL MEDICINE

## 2017-08-22 PROCEDURE — 74011250637 HC RX REV CODE- 250/637: Performed by: HOSPITALIST

## 2017-08-22 PROCEDURE — 94640 AIRWAY INHALATION TREATMENT: CPT

## 2017-08-22 PROCEDURE — 74011250636 HC RX REV CODE- 250/636: Performed by: PHYSICIAN ASSISTANT

## 2017-08-22 PROCEDURE — 77010033678 HC OXYGEN DAILY

## 2017-08-22 PROCEDURE — 74011250636 HC RX REV CODE- 250/636: Performed by: HOSPITALIST

## 2017-08-22 PROCEDURE — 36415 COLL VENOUS BLD VENIPUNCTURE: CPT | Performed by: INTERNAL MEDICINE

## 2017-08-22 PROCEDURE — 74011000250 HC RX REV CODE- 250: Performed by: HOSPITALIST

## 2017-08-22 RX ADMIN — ALPRAZOLAM 0.5 MG: 0.5 TABLET ORAL at 18:46

## 2017-08-22 RX ADMIN — RISPERIDONE 3 MG: 1 TABLET ORAL at 08:33

## 2017-08-22 RX ADMIN — METHYLPREDNISOLONE SODIUM SUCCINATE 60 MG: 125 INJECTION, POWDER, FOR SOLUTION INTRAMUSCULAR; INTRAVENOUS at 00:29

## 2017-08-22 RX ADMIN — PAROXETINE HYDROCHLORIDE 20 MG: 20 TABLET, FILM COATED ORAL at 08:33

## 2017-08-22 RX ADMIN — METHYLPREDNISOLONE SODIUM SUCCINATE 60 MG: 125 INJECTION, POWDER, FOR SOLUTION INTRAMUSCULAR; INTRAVENOUS at 08:33

## 2017-08-22 RX ADMIN — BUDESONIDE 500 MCG: 0.5 INHALANT RESPIRATORY (INHALATION) at 20:07

## 2017-08-22 RX ADMIN — TRAZODONE HYDROCHLORIDE 50 MG: 50 TABLET ORAL at 20:50

## 2017-08-22 RX ADMIN — Medication 10 ML: at 16:16

## 2017-08-22 RX ADMIN — ALPRAZOLAM 0.5 MG: 0.5 TABLET ORAL at 08:33

## 2017-08-22 RX ADMIN — BUDESONIDE 500 MCG: 0.5 INHALANT RESPIRATORY (INHALATION) at 07:44

## 2017-08-22 RX ADMIN — BENZTROPINE MESYLATE 2 MG: 1 TABLET ORAL at 08:33

## 2017-08-22 RX ADMIN — Medication 10 ML: at 00:32

## 2017-08-22 RX ADMIN — METHYLPREDNISOLONE SODIUM SUCCINATE 40 MG: 40 INJECTION, POWDER, FOR SOLUTION INTRAMUSCULAR; INTRAVENOUS at 16:16

## 2017-08-22 RX ADMIN — ENOXAPARIN SODIUM 40 MG: 40 INJECTION SUBCUTANEOUS at 18:46

## 2017-08-22 NOTE — PROGRESS NOTES
Pulmonary, Critical Care, and Sleep Medicine~Progress Note    Name: Speedy Moore MRN: [id number]   : 1960 Hospital: [place] [address]   Date: 2017 10:08 AM Admission: 2017     IMPRESSION:   · Acute hypoxemic/hypercapnic resp failure  · AE of COPD  · Active smoker  · Schizophrenia  · Depression      PLAN:   · Reduce steroids   · AAT pending  · O2 titration above 90%  · Encouraged smoking cessation   · OOB as tolerated  · Bronchodilators      Daily Progression:    Breathing much better today. No new symptoms reported     OBJECTIVE:     Vital Signs:       Visit Vitals    BP (!) 143/99 (BP 1 Location: Left arm, BP Patient Position: At rest;Supine)  Comment: Simultaneous filing. User may not have seen previous data.  Pulse 86  Comment: Simultaneous filing. User may not have seen previous data.     Temp 98.1 °F (36.7 °C)    Resp 20    Ht 5' 10\" (1.778 m)    Wt 53.8 kg (118 lb 9.7 oz)    SpO2 96%    BMI 17.02 kg/m2      Temp (24hrs), Av.8 °F (36.6 °C), Min:97.1 °F (36.2 °C), Max:98.2 °F (36.8 °C)     Intake/Output:     Last shift:  07 - 0  In: 867 [P.O.:713]  Out: 290 [Urine:290]    Last 3 shifts: 1901 -  0700  In: 1200 [P.O.:1200]  Out: 9200 [Urine:9200]        Intake/Output Summary (Last 24 hours) at 17 1008  Last data filed at 17 8495   Gross per 24 hour   Intake             1913 ml   Output             7465 ml   Net            -5552 ml       Ventilation:   Mode Rate Tidal Volume Pressure Suppport FiO2/LPM PEEP Other               Peak airway pressure: 16 cm H2O    Minute ventilation: 14.1 l/min    Trilogy:        Physical Exam:                                        Exam Findings Other   General: No resp distress noted, appears stated age    HEENT:  No ulcers, JVD not elevated, no cervical LAD    Chest: No pectus deformity, normal chest rise b/l    HEART:  RRR, no murmurs/rubs/gallops    Lungs:  CTA b/l, no rhonchi/crackles/wheeze, diminished BS at bases    ABD: Soft/NT, non rigid mildly distended    EXT: No cyanosis/clubbing/edema, normal peripheral pulses    Skin: No rashes or ulcers, no mottling    Neuro: A/O x 3        Medications:  Current Facility-Administered Medications   Medication Dose Route Frequency    sodium chloride (NS) flush 5-10 mL  5-10 mL IntraVENous Q8H    sodium chloride (NS) flush 5-10 mL  5-10 mL IntraVENous PRN    methylPREDNISolone (PF) (SOLU-MEDROL) injection 60 mg  60 mg IntraVENous Q8H    ALPRAZolam (XANAX) tablet 0.5 mg  0.5 mg Oral BID    benztropine (COGENTIN) tablet 2 mg  2 mg Oral DAILY    guaiFENesin (ROBITUSSIN) 100 mg/5 mL oral liquid 200 mg  200 mg Oral Q4H PRN    PARoxetine (PAXIL) tablet 20 mg  20 mg Oral DAILY    QUEtiapine (SEROquel) tablet 50 mg  50 mg Oral QHS PRN    risperiDONE (RisperDAL) tablet 3 mg  3 mg Oral DAILY    temazepam (RESTORIL) capsule 30 mg  30 mg Oral QHS PRN    traZODone (DESYREL) tablet 50 mg  50 mg Oral QHS PRN    sodium chloride (NS) flush 5-10 mL  5-10 mL IntraVENous Q8H    sodium chloride (NS) flush 5-10 mL  5-10 mL IntraVENous PRN    budesonide (PULMICORT) 500 mcg/2 ml nebulizer suspension  500 mcg Nebulization BID RT    enoxaparin (LOVENOX) injection 40 mg  40 mg SubCUTAneous Q24H    albuterol-ipratropium (DUO-NEB) 2.5 MG-0.5 MG/3 ML  3 mL Nebulization Q4H PRN       Labs:  ABG Recent Labs      08/20/17   1352   PHI  7.444   PCO2I  55.0*   PO2I  59*   HCO3I  37.7*   SO2I  91*        CBC Recent Labs      08/22/17   0522   WBC  16.1*   HGB  13.7   HCT  41.0   PLT  219   MCV  100.5*   MCH  29.5        Metabolic  Panel Recent Labs      08/22/17   0522   NA  138   K  4.6   CL  98   CO2  35*   GLU  111*   BUN  11   CREA  0.72   CA  8.3*        Pertinent Labs                Burgos Elmer Zapien, PA  8/22/2017

## 2017-08-22 NOTE — PROGRESS NOTES
Problem: Falls - Risk of  Goal: *Absence of Falls  Document Joanie Fall Risk and appropriate interventions in the flowsheet.    Fall Risk Interventions:  Mobility Interventions: Patient to call before getting OOB, Strengthening exercises (ROM-active/passive)     Mentation Interventions: Door open when patient unattended, Increase mobility     Medication Interventions: Bed/chair exit alarm     Elimination Interventions: Bed/chair exit alarm, Call light in reach

## 2017-08-22 NOTE — PROGRESS NOTES
3:23 pm update:  [facility], [phone], and spoke with Asia Kitchen RN, to let her know patient DID NOT need O2. She said she would let the  right now to let her know. I called so they would not give his bed away. 11:24 am update:  Spoke with Argentina Kareen at [facility], [phone], who stated they do take patients who are on O2. She would need a UAI, a H&P (30-pg document), a TB screening, and would need to assess patient in person before he could come there. Told her I would call her back after I speak with patient. 11:07 am update:  Spoke with Sarmad Huitron, [phone], [facility], who stated she thinks [facility] takes O2. Will wait for RT to walk patient to confirm need for O2 then will speak with patient. Spoke with Asia Kitchen, RN, at [facility], where patient resides. Asia Ktichen states patient cannot live there with O2. She is going to get the home administrator to call me back.

## 2017-08-22 NOTE — PROGRESS NOTES
Full note to follow    Saturation on RA at rest is 85-86%  2 L 90-91%  Scattered rhonchi/exp wheezing  Pt denies any sob at rest or with activity this am  Will need home o2 set up and home nebulizer as well.   Will need f/u appt with [facility] Kenzie Ware, NP

## 2017-08-22 NOTE — PROGRESS NOTES
Shift Change  Bedside and Verbal shift change report given to Louie Carlos RN (oncoming nurse) by Ozzie Artis RN (offgoing nurse). Report included the following information SBAR, MAR, Recent Results and Cardiac Rhythm NSR. Problem: Gas Exchange - Impaired  Goal: *Absence of hypoxia  Outcome: Progressing Towards Goal  Pt on NC or BIPAP for supplemental O2. O2 saturation has remained above 90%. Problem: Falls - Risk of  Goal: *Absence of Falls  Document Joanie Fall Risk and appropriate interventions in the flowsheet. Outcome: Progressing Towards Goal  Fall Risk Interventions:  Mobility Interventions: Bed/chair exit alarm, Patient to call before getting OOB     Mentation Interventions: Bed/chair exit alarm, Adequate sleep, hydration, pain control, Door open when patient unattended, More frequent rounding     Medication Interventions: Assess postural VS orthostatic hypotension, Patient to call before getting OOB, Teach patient to arise slowly     Elimination Interventions: Bed/chair exit alarm, Call light in reach           Pt educated on fall prevention. Pt verbalizes understanding. Bedside joanie tool used with pt. Call bell and frequently used items within reach. Pt utilizes call bell appropriately for assistance.

## 2017-08-22 NOTE — PROGRESS NOTES
Hospitalist Progress Note  Goran Newton NP  Office: [phone]  Cell: [phone]      Date of Service:  2017  NAME:  Jonathan Preciado  :  1960  MRN:  [id number]      Admission Summary: This is a 71-year-old [facility] American male from [address] with past medical history significant for COPD, paranoid schizophrenia, mental retardation and depression, who presented to [facility] Emergency Department with progressive shortness of breath. Interval history / Subjective:   Denies sob with rest/activity, Saturation on RA at rest is 85-86%, 2 L 90-91% Needs 6 MWT. Needs home oxygen and home nebulizer machine. F/U appt made with [facility]. Discussed with CM, discharge held up  [facility] [place] needing to accomodate oxygen. Assessment & Plan:     Acute on chronic hypercapnic hypoxic respiratory failure secondary acute COPD exacerbation  - on BiPAP intermittently during admission  - CT chest extensive emphysema, with interval resolution of bibasilar groundglass opacities/airspace disease. No pulmonary edema, pleural effusion or new area of  focal consolidation.  Diffuse bronchial wall thickening and a few scattered areas  of airspace disease which may be chronic  - EF 60-65%, NRWA  - CXR: no acute findings  - ABG on  pH 7.44 pO2 59 [id number]  - evaluated by pulmonary  - steroid taper, pulmicort, prn duo neb, oxygen support and monitor pulse ox    Acute on chronic hyponatremia - Resolved  - resolved with ivf  - TSH normal    History of chronic paranoid schizophrenia   - stable, continue risperidone and cogentin    History of depression  - stable, continue paxil    Underweight  - BMI ~17  - consult to nutritionist       Code status: Full Code  DVT prophylaxis: Lovenox    Care Plan discussed with: Patient/Family, Nurse and   Disposition: TBD     Hospital Problems  Date Reviewed: 8/16/2017          Codes Class Noted POA    * (Principal)Shortness of breath ICD-10-CM: R06.02  ICD-9-CM: 786.05  8/16/2017 Unknown              Vital Signs:    Last 24hrs VS reviewed since prior progress note. Most recent are:  Visit Vitals    BP (!) 142/93 (BP 1 Location: Left arm, BP Patient Position: Sitting)    Pulse (!) 111    Temp 98.5 °F (36.9 °C)    Resp 20    Ht 5' 10\" (1.778 m)    Wt 53.8 kg (118 lb 9.7 oz)    SpO2 96%    BMI 17.02 kg/m2         Intake/Output Summary (Last 24 hours) at 08/22/17 1425  Last data filed at 08/22/17 1410   Gross per 24 hour   Intake             2793 ml   Output             6790 ml   Net            -3997 ml        Physical Examination:             Constitutional:  No acute distress, cooperative, pleasant    ENT:  Oral mucous moist, oropharynx benign. Neck supple   Resp:  Expiratory wheezing bilaterally and scattered rhonchi bilaterally. No accessory muscle use   CV:  Regular rhythm, normal rate, no murmurs, gallops, rubs    GI:  Soft, non distended, non tender. normoactive bowel sounds, no hepatosplenomegaly     Musculoskeletal:  No edema    Neurologic:  Conscious and alert, oriented to place and person, Moves all extremities. CN II-XII reviewed     Psych: Not anxious nor agitated. Data Review:    Review and/or order of clinical lab test      Labs:     Recent Labs      08/22/17   0522   WBC  16.1*   HGB  13.7   HCT  41.0   PLT  219     Recent Labs      08/22/17   0522   NA  138   K  4.6   CL  98   CO2  35*   BUN  11   CREA  0.72   GLU  111*   CA  8.3*     No results for input(s): SGOT, GPT, ALT, AP, TBIL, TBILI, TP, ALB, GLOB, GGT, AML, LPSE in the last 72 hours. No lab exists for component: AMYP, HLPSE  No results for input(s): INR, PTP, APTT in the last 72 hours. No lab exists for component: INREXT, INREXT   No results for input(s): FE, TIBC, PSAT, FERR in the last 72 hours.    No results found for: FOL, RBCF   No results for input(s): PH, PCO2, PO2 in the last 72 hours. No results for input(s): CPK, CKNDX, TROIQ in the last 72 hours.     No lab exists for component: CPKMB  Lab Results   Component Value Date/Time    Cholesterol, total 102 05/15/2017 11:11 AM    HDL Cholesterol 48 05/15/2017 11:11 AM    LDL, calculated 46 05/15/2017 11:11 AM    Triglyceride 41 05/15/2017 11:11 AM     Lab Results   Component Value Date/Time    Glucose (POC) 132 07/04/2017 04:37 PM     Lab Results   Component Value Date/Time    Color YELLOW/STRAW 07/13/2017 04:11 PM    Appearance CLOUDY 07/13/2017 04:11 PM    Specific gravity 1.016 07/13/2017 04:11 PM    pH (UA) 6.0 07/13/2017 04:11 PM    Protein 100 07/13/2017 04:11 PM    Glucose 250 07/13/2017 04:11 PM    Ketone 15 07/13/2017 04:11 PM    Bilirubin NEGATIVE  07/13/2017 04:11 PM    Urobilinogen 0.2 07/13/2017 04:11 PM    Nitrites NEGATIVE  07/13/2017 04:11 PM    Leukocyte Esterase NEGATIVE  07/13/2017 04:11 PM    Epithelial cells FEW 07/13/2017 04:11 PM    Bacteria NEGATIVE  07/13/2017 04:11 PM    WBC 0-4 07/13/2017 04:11 PM    RBC 10-20 07/13/2017 04:11 PM         Medications Reviewed:     Current Facility-Administered Medications   Medication Dose Route Frequency    methylPREDNISolone (PF) (SOLU-MEDROL) injection 40 mg  40 mg IntraVENous Q8H    sodium chloride (NS) flush 5-10 mL  5-10 mL IntraVENous Q8H    sodium chloride (NS) flush 5-10 mL  5-10 mL IntraVENous PRN    ALPRAZolam (XANAX) tablet 0.5 mg  0.5 mg Oral BID    benztropine (COGENTIN) tablet 2 mg  2 mg Oral DAILY    guaiFENesin (ROBITUSSIN) 100 mg/5 mL oral liquid 200 mg  200 mg Oral Q4H PRN    PARoxetine (PAXIL) tablet 20 mg  20 mg Oral DAILY    QUEtiapine (SEROquel) tablet 50 mg  50 mg Oral QHS PRN    risperiDONE (RisperDAL) tablet 3 mg  3 mg Oral DAILY    temazepam (RESTORIL) capsule 30 mg  30 mg Oral QHS PRN    traZODone (DESYREL) tablet 50 mg  50 mg Oral QHS PRN    sodium chloride (NS) flush 5-10 mL  5-10 mL IntraVENous Q8H    sodium chloride (NS) flush 5-10 mL  5-10 mL IntraVENous PRN    budesonide (PULMICORT) 500 mcg/2 ml nebulizer suspension  500 mcg Nebulization BID RT    enoxaparin (LOVENOX) injection 40 mg  40 mg SubCUTAneous Q24H    albuterol-ipratropium (DUO-NEB) 2.5 MG-0.5 MG/3 ML  3 mL Nebulization Q4H PRN     ______________________________________________________________________  EXPECTED LENGTH OF STAY: 3d 21h  ACTUAL LENGTH OF STAY:          6                 Simran Ware V NP

## 2017-08-22 NOTE — PROGRESS NOTES
08/22/17 1415 08/22/17 1417 08/22/17 1419   RT Walking Oximetry   Stage Resting (Room Air) During Walk (Room Air) During Walk (Room Air)   SpO2 93 % 91 % 94 %    bpm 128 bpm 132 bpm   Rate of Dyspnea 0 0 0   O2 Device None (Room air) None (Room air) None (Room air)   Walk/Assistive Device --  Ambulation Ambulation   Comments (Sitting o edge of bed) Pt has no complaints. No changes in WOB stopped for 30 seconds. Pt c/o \"rubbery legs\"       08/22/17 1421 08/22/17 1423   RT Walking Oximetry   Stage During Walk (Room Air) After Walk   SpO2 92 % 91 %    bpm 108 bpm   Rate of Dyspnea 0 0   O2 Device None (Room air) None (Room air)   Walk/Assistive Device Ambulation (Sitting on edge of bed)   Comments Completed walk without other incident. Pt states his \"breathng feels fine\"   Pt on 6-minute walk on RA with O2 sats at or greater than 90%. See chart above. Stopped x 30 seconds due to pt. C/o \"rubbery legs\". Resumed and completed walk without further incident.     Ollie Santiago, RRT  Respiratory Care

## 2017-08-23 ENCOUNTER — PATIENT OUTREACH (OUTPATIENT)
Dept: INTERNAL MEDICINE CLINIC | Age: 57
End: 2017-08-23

## 2017-08-23 VITALS
HEIGHT: 70 IN | WEIGHT: 124.34 LBS | SYSTOLIC BLOOD PRESSURE: 138 MMHG | RESPIRATION RATE: 16 BRPM | OXYGEN SATURATION: 90 % | HEART RATE: 112 BPM | BODY MASS INDEX: 17.8 KG/M2 | DIASTOLIC BLOOD PRESSURE: 80 MMHG | TEMPERATURE: 98.7 F

## 2017-08-23 PROBLEM — R06.02 SHORTNESS OF BREATH: Status: RESOLVED | Noted: 2017-08-16 | Resolved: 2017-08-23

## 2017-08-23 PROCEDURE — 94640 AIRWAY INHALATION TREATMENT: CPT

## 2017-08-23 PROCEDURE — 77010033678 HC OXYGEN DAILY

## 2017-08-23 PROCEDURE — 74011250637 HC RX REV CODE- 250/637: Performed by: HOSPITALIST

## 2017-08-23 PROCEDURE — 74011000250 HC RX REV CODE- 250: Performed by: HOSPITALIST

## 2017-08-23 PROCEDURE — 74011250636 HC RX REV CODE- 250/636: Performed by: PHYSICIAN ASSISTANT

## 2017-08-23 RX ORDER — PREDNISONE 10 MG/1
10 TABLET ORAL
Status: DISCONTINUED | OUTPATIENT
Start: 2017-09-01 | End: 2017-08-23 | Stop reason: HOSPADM

## 2017-08-23 RX ORDER — PREDNISONE 20 MG/1
20 TABLET ORAL
Status: DISCONTINUED | OUTPATIENT
Start: 2017-08-30 | End: 2017-08-23 | Stop reason: HOSPADM

## 2017-08-23 RX ORDER — PREDNISONE 20 MG/1
40 TABLET ORAL
Status: DISCONTINUED | OUTPATIENT
Start: 2017-08-24 | End: 2017-08-23 | Stop reason: HOSPADM

## 2017-08-23 RX ORDER — GUAIFENESIN 100 MG/5ML
200 SOLUTION ORAL
Qty: 10 ML | Refills: 0 | Status: SHIPPED
Start: 2017-08-23 | End: 2017-11-08

## 2017-08-23 RX ORDER — PREDNISONE 20 MG/1
40 TABLET ORAL
Status: DISCONTINUED | OUTPATIENT
Start: 2017-08-24 | End: 2017-08-23 | Stop reason: SDUPTHER

## 2017-08-23 RX ORDER — PREDNISONE 10 MG/1
TABLET ORAL
Qty: 60 TAB | Refills: 0 | Status: SHIPPED | OUTPATIENT
Start: 2017-08-23 | End: 2017-10-10 | Stop reason: SDUPTHER

## 2017-08-23 RX ORDER — PREDNISONE 5 MG/1
5 TABLET ORAL
Status: DISCONTINUED | OUTPATIENT
Start: 2017-09-03 | End: 2017-08-23 | Stop reason: HOSPADM

## 2017-08-23 RX ADMIN — Medication 10 ML: at 06:20

## 2017-08-23 RX ADMIN — METHYLPREDNISOLONE SODIUM SUCCINATE 40 MG: 40 INJECTION, POWDER, FOR SOLUTION INTRAMUSCULAR; INTRAVENOUS at 00:27

## 2017-08-23 RX ADMIN — RISPERIDONE 3 MG: 1 TABLET ORAL at 08:54

## 2017-08-23 RX ADMIN — Medication 10 ML: at 00:27

## 2017-08-23 RX ADMIN — METHYLPREDNISOLONE SODIUM SUCCINATE 40 MG: 40 INJECTION, POWDER, FOR SOLUTION INTRAMUSCULAR; INTRAVENOUS at 08:54

## 2017-08-23 RX ADMIN — BENZTROPINE MESYLATE 2 MG: 1 TABLET ORAL at 08:54

## 2017-08-23 RX ADMIN — PAROXETINE HYDROCHLORIDE 20 MG: 20 TABLET, FILM COATED ORAL at 08:54

## 2017-08-23 RX ADMIN — BUDESONIDE 500 MCG: 0.5 INHALANT RESPIRATORY (INHALATION) at 11:04

## 2017-08-23 RX ADMIN — ALPRAZOLAM 0.5 MG: 0.5 TABLET ORAL at 08:54

## 2017-08-23 NOTE — DISCHARGE INSTRUCTIONS
Discharge Instructions       PATIENT ID: Val Samson  MRN: [id number]   YOB: 1960    DATE OF ADMISSION: 8/16/2017  2:36 PM    DATE OF DISCHARGE: 8/23/2017    PRIMARY CARE PROVIDER: Cassie Hill DO     ATTENDING PHYSICIAN: Daly Bell*  DISCHARGING PROVIDER: Alla Ortiz NP    To contact this individual call [phone] and ask the  to page. If unavailable ask to be transferred the Adult Hospitalist Department. DISCHARGE DIAGNOSES: COPD exacerbation    CONSULTATIONS: IP CONSULT TO PULMONOLOGY    PROCEDURES/SURGERIES: * No surgery found *    PENDING TEST RESULTS:   At the time of discharge the following test results are still pending: none    FOLLOW UP APPOINTMENTS:   Follow-up Information     Follow up With Details Comments Contact Info    Cassie Hill DO In 1 week  [address]      Makayla Ray NP On 9/5/2017 Tuesday 9/5/17 at 3pm for hospital follow up appt 1808 [facility] ([facility]3]  [address]  [phone]             ADDITIONAL CARE RECOMMENDATIONS:  Please keep your follow up appointments at pulmonary associates  Your oxygen levels on room air are 93%  Do NOT smoke. You will take a prednisone taper over the next 12 days. Make sure to follow up with your pcp as well. DIET: Regular     ACTIVITY: Activity as tolerated    WOUND CARE: none    EQUIPMENT needed: none      DISCHARGE MEDICATIONS:   See Medication Reconciliation Form    · It is important that you take the medication exactly as they are prescribed. · Keep your medication in the bottles provided by the pharmacist and keep a list of the medication names, dosages, and times to be taken in your wallet. · Do not take other medications without consulting your doctor. NOTIFY YOUR PHYSICIAN FOR ANY OF THE FOLLOWING:   Fever over 101 degrees for 24 hours.    Chest pain, shortness of breath, fever, chills, nausea, vomiting, diarrhea, change in mentation, falling, weakness, bleeding. Severe pain or pain not relieved by medications. Or, any other signs or symptoms that you may have questions about. DISPOSITION:  xx  Home With:   OT  PT  HH  RN       SNF/Inpatient Rehab/LTAC    Independent/assisted living    Hospice    Other:     CDMP Checked:   Yes xx     PROBLEM LIST Updated:  Yes xx       Signed:   Nini Medel Chaz V, NP  8/23/2017  9:06 AM           Chronic Obstructive Pulmonary Disease (COPD): Care Instructions  Your Care Instructions    Chronic obstructive pulmonary disease (COPD) is a general term for a group of lung diseases, including emphysema and chronic bronchitis. People with COPD have decreased airflow in and out of the lungs, which makes it hard to breathe. The airways also can get clogged with thick mucus. Cigarette smoking is a major cause of COPD. Although there is no cure for COPD, you can slow its progress. Following your treatment plan and taking care of yourself can help you feel better and live longer. Follow-up care is a key part of your treatment and safety. Be sure to make and go to all appointments, and call your doctor if you are having problems. It's also a good idea to know your test results and keep a list of the medicines you take. How can you care for yourself at home? Staying healthy  · Do not smoke. This is the most important step you can take to prevent more damage to your lungs. If you need help quitting, talk to your doctor about stop-smoking programs and medicines. These can increase your chances of quitting for good. · Avoid colds and flu. Get a pneumococcal vaccine shot. If you have had one before, ask your doctor whether you need a second dose. Get the flu vaccine every fall. If you must be around people with colds or the flu, wash your hands often. · Avoid secondhand smoke, air pollution, and high altitudes. Also avoid cold, dry air and hot, humid air.  Stay at home with your windows closed when air pollution is bad. Medicines and oxygen therapy  · Take your medicines exactly as prescribed. Call your doctor if you think you are having a problem with your medicine. · You may be taking medicines such as:  ¨ Bronchodilators. These help open your airways and make breathing easier. Bronchodilators are either short-acting (work for 6 to 9 hours) or long-acting (work for 24 hours). You inhale most bronchodilators, so they start to act quickly. Always carry your quick-relief inhaler with you in case you need it while you are away from home. ¨ Corticosteroids (prednisone, budesonide). These reduce airway inflammation. They come in pill or inhaled form. You must take these medicines every day for them to work well. · A spacer may help you get more inhaled medicine to your lungs. Ask your doctor or pharmacist if a spacer is right for you. If it is, ask how to use it properly. · Do not take any vitamins, over-the-counter medicine, or herbal products without talking to your doctor first.  · If your doctor prescribed antibiotics, take them as directed. Do not stop taking them just because you feel better. You need to take the full course of antibiotics. · Oxygen therapy boosts the amount of oxygen in your blood and helps you breathe easier. Use the flow rate your doctor has recommended, and do not change it without talking to your doctor first.  Activity  · Get regular exercise. Walking is an easy way to get exercise. Start out slowly, and walk a little more each day. · Pay attention to your breathing. You are exercising too hard if you cannot talk while you are exercising. · Take short rest breaks when doing household chores and other activities. · Learn breathing methods--such as breathing through pursed lips--to help you become less short of breath. · If your doctor has not set you up with a pulmonary rehabilitation program, talk to him or her about whether rehab is right for you.  Rehab includes exercise programs, education about your disease and how to manage it, help with diet and other changes, and emotional support. Diet  · Eat regular, healthy meals. Use bronchodilators about 1 hour before you eat to make it easier to eat. Eat several small meals instead of three large ones. Drink beverages at the end of the meal. Avoid foods that are hard to chew. · Eat foods that contain protein so that you do not lose muscle mass. · Talk with your doctor if you gain too much weight or if you lose weight without trying. Mental health  · Talk to your family, friends, or a therapist about your feelings. It is normal to feel frightened, angry, hopeless, helpless, and even guilty. Talking openly about bad feelings can help you cope. If these feelings last, talk to your doctor. When should you call for help? Call 911 anytime you think you may need emergency care. For example, call if:  · You have severe trouble breathing. Call your doctor now or seek immediate medical care if:  · You have new or worse trouble breathing. · You cough up blood. · You have a fever. Watch closely for changes in your health, and be sure to contact your doctor if:  · You cough more deeply or more often, especially if you notice more mucus or a change in the color of your mucus. · You have new or worse swelling in your legs or belly. · You are not getting better as expected. Where can you learn more? Go to http://leila-kodak.info/. Shashi Rich in the search box to learn more about \"Chronic Obstructive Pulmonary Disease (COPD): Care Instructions. \"  Current as of: March 25, 2017  Content Version: 11.3  © 7706-9938 [employer]. Care instructions adapted under license by Dr. TATTOFF (which disclaims liability or warranty for this information).  If you have questions about a medical condition or this instruction, always ask your healthcare professional. Johanna Montoya disclaims any warranty or liability for your use of this information. Stopping Smoking: Care Instructions  Your Care Instructions  Cigarette smokers crave the nicotine in cigarettes. Giving it up is much harder than simply changing a habit. Your body has to stop craving the nicotine. It is hard to quit, but you can do it. There are many tools that people use to quit smoking. You may find that combining tools works best for you. There are several steps to quitting. First you get ready to quit. Then you get support to help you. After that, you learn new skills and behaviors to become a nonsmoker. For many people, a necessary step is getting and using medicine. Your doctor will help you set up the plan that best meets your needs. You may want to attend a smoking cessation program to help you quit smoking. When you choose a program, look for one that has proven success. Ask your doctor for ideas. You will greatly increase your chances of success if you take medicine as well as get counseling or join a cessation program.  Some of the changes you feel when you first quit tobacco are uncomfortable. Your body will miss the nicotine at first, and you may feel short-tempered and grumpy. You may have trouble sleeping or concentrating. Medicine can help you deal with these symptoms. You may struggle with changing your smoking habits and rituals. The last step is the tricky one: Be prepared for the smoking urge to continue for a time. This is a lot to deal with, but keep at it. You will feel better. Follow-up care is a key part of your treatment and safety. Be sure to make and go to all appointments, and call your doctor if you are having problems. Its also a good idea to know your test results and keep a list of the medicines you take. How can you care for yourself at home? · Ask your family, friends, and coworkers for support. You have a better chance of quitting if you have help and support.   · Join a support group, such as Nicotine Anonymous, for people who are trying to quit smoking. · Consider signing up for a smoking cessation program, such as the American Lung Association's Freedom from Smoking program.  · Set a quit date. Pick your date carefully so that it is not right in the middle of a big deadline or stressful time. Once you quit, do not even take a puff. Get rid of all ashtrays and lighters after your last cigarette. Clean your house and your clothes so that they do not smell of smoke. · Learn how to be a nonsmoker. Think about ways you can avoid those things that make you reach for a cigarette. ¨ Avoid situations that put you at greatest risk for smoking. For some people, it is hard to have a drink with friends without smoking. For others, they might skip a coffee break with coworkers who smoke. ¨ Change your daily routine. Take a different route to work or eat a meal in a different place. · Cut down on stress. Calm yourself or release tension by doing an activity you enjoy, such as reading a book, taking a hot bath, or gardening. · Talk to your doctor or pharmacist about nicotine replacement therapy, which replaces the nicotine in your body. You still get nicotine but you do not use tobacco. Nicotine replacement products help you slowly reduce the amount of nicotine you need. These products come in several forms, many of them available over-the-counter:  ¨ Nicotine patches  ¨ Nicotine gum and lozenges  ¨ Nicotine inhaler  · Ask your doctor about bupropion (Wellbutrin) or varenicline (Chantix), which are prescription medicines. They do not contain nicotine. They help you by reducing withdrawal symptoms, such as stress and anxiety. · Some people find hypnosis, acupuncture, and massage helpful for ending the smoking habit. · Eat a healthy diet and get regular exercise. Having healthy habits will help your body move past its craving for nicotine. · Be prepared to keep trying.  Most people are not successful the first few times they try to quit. Do not get mad at yourself if you smoke again. Make a list of things you learned and think about when you want to try again, such as next week, next month, or next year. Where can you learn more? Go to http://leila-kodak.info/. Enter U603 in the search box to learn more about \"Stopping Smoking: Care Instructions. \"  Current as of: March 20, 2017  Content Version: 11.3  © 7545-1646 [employer]. Care instructions adapted under license by "[employer] (which disclaims liability or warranty for this information).  If you have questions about a medical condition or this instruction, always ask your healthcare professional. [address] any warranty or liability for your use of this information.

## 2017-08-23 NOTE — PROGRESS NOTES
Met with pt this am to discuss discharge arrangement for transportation. Pt will be returning to [facility]. Pt has Medicaid insurance. Pt wants medicaid cab arranged to pick him up. CM talked to Taty Minors at [facility], [phone], to notify her of discharge. Discharge Instruction and H & P faxed to [facility], [phone]. Medicaid transportation, [facility],[phone],   arranged to  pt today at discharge area. No specific time of arrival available, but ride will call to nurse's station when in route. Miriam Cross RN  ACM CRM    This CM received in [employer] from  after returning from meeting and lunch. Pt had just left by [facility] as arranged above. CM called [facility] and talked to [address] [place] [address], nurse on duty today, to inform her of change in Inhaler and Prednisone taper. She will fax the discharge med list to their pharmacy. Discharge med list was included as part of discharge instructions faxed earlier today.

## 2017-08-23 NOTE — PROGRESS NOTES
Problem: Falls - Risk of  Goal: *Absence of Falls  Document Joanie Fall Risk and appropriate interventions in the flowsheet. Outcome: Progressing Towards Goal  Fall Risk Interventions:  Mobility Interventions: Bed/chair exit alarm, Patient to call before getting OOB     Mentation Interventions: Door open when patient unattended, Bed/chair exit alarm     Medication Interventions: Patient to call before getting OOB, Teach patient to arise slowly     Elimination Interventions: Call light in reach, Urinal in reach           0755 Bedside and Verbal shift change report given to Alyson Fraire RN (oncoming nurse) by Munira John RN (offgoing nurse). Report included the following information SBAR, Kardex, Intake/Output, MAR and Cardiac Rhythm NSR .

## 2017-08-23 NOTE — PROGRESS NOTES
Have been following patient via charting. Pt appears to be being prepared for discharge today. Has had 6 minute walk test with RT and did not drop below 90% on room air. Writer did notice that pt was tachycardic between 127 and 132 for the walking portion of the test. Pt is on steroids however and this could have worked into the [place]. Pt will be changed to PO and then tapered post discharge. Plan since pt does not have to discharge on O2 is to return to [facility] where he has been for several years. There was mention from Pulmonary of a change in inhaler upon discharge. Writer sent inbox message to Milad Tomlinson who is working with patient today requesting that any new breathing medications be filled prior to discharge and returned with patient to [facility]. Informed that pt has in the past been discharged with order for an inhaler and then not been able to fill inhaler in time to prevent re-occurrence of respiratory event and was re-admitted. Will continue to follow patient. Will reach out to [facility] tomorrow and speak with Aj Fischer who is off today, but is back tomorrow. She works very closely with the patient.

## 2017-08-23 NOTE — DISCHARGE SUMMARY
Discharge Summary       PATIENT ID: Terrell Santos  MRN: [id number]   YOB: 1960    DATE OF ADMISSION: 8/16/2017  2:36 PM    DATE OF DISCHARGE: 8/23/2017   PRIMARY CARE PROVIDER: Nichol Mena DO     ATTENDING PHYSICIAN: Cande Leary MD  DISCHARGING PROVIDER: Mary Santos NP    To contact this individual call [phone] and ask the  to page. If unavailable ask to be transferred the Adult Hospitalist Department. CONSULTATIONS: IP CONSULT TO PULMONOLOGY    PROCEDURES/SURGERIES: * No surgery found *    ADMITTING DIAGNOSES & HOSPITAL COURSE:   This is a 76-year-old  male from [address] with past medical history significant for COPD, paranoid schizophrenia, mental retardation and depression, who presented to [facility] with progressive shortness of breath.    8/23/2017  VSS, 93% on RA, has f/u appointment with pulmonary associates. Steroid taper. DISCHARGE DIAGNOSES / PLAN:      Acute on chronic hypercapnic hypoxic respiratory failure secondary acute COPD exacerbation  - on BiPAP intermittently during admission  - CT chest extensive emphysema, with interval resolution of bibasilar groundglass opacities/airspace disease. No pulmonary edema, pleural effusion or new area of  focal consolidation.  Diffuse bronchial wall thickening and a few scattered areas  of airspace disease which may be chronic  - EF 60-65%, NRWA  - CXR: no acute findings  - ABG on 8/20 pH 7.44 pO2 59 [id number]  - evaluated by pulmonary  - steroid taper, pulmicort, prn duo neb, oxygen support and monitor pulse ox     Acute on chronic hyponatremia - Resolved  - resolved with ivf  - TSH normal     History of chronic paranoid schizophrenia   - stable, continue risperidone and cogentin     History of depression  - stable, continue paxil     Underweight  - BMI ~17  - consult to nutritionist        PENDING TEST RESULTS:   At the time of discharge the following test results are still pending: none    FOLLOW UP APPOINTMENTS:    Follow-up Information     Follow up With Details Comments Contact Alessandra Davies DO On 9/1/2017 Hospital follow up PCP appointment on Friday, 9/1/17 @ 3:00 p.m. [address]      Abbey Livers, NP On 9/5/2017 Tuesday 9/5/17 at 3pm for hospital follow up appt 3518 [facility]  [phone]             ADDITIONAL CARE RECOMMENDATIONS:  Please keep your follow up appointments at pulmonary associates  Your oxygen levels on room air are 93%  Do NOT smoke. You will take a prednisone taper over the next 12 days. Make sure to follow up with your pcp as well. DIET: Regular     ACTIVITY: Activity as tolerated    WOUND CARE: none    EQUIPMENT needed: none        DISCHARGE MEDICATIONS:  Discharge Medication List as of 8/23/2017 11:21 AM      START taking these medications    Details   predniSONE (DELTASONE) 10 mg tablet 80 mg x 3 days then  60 mg x 3 days then  40 mg x 3 days then  20 mg x 3 days then stop, Print, Disp-60 Tab, R-0         CONTINUE these medications which have CHANGED    Details   guaiFENesin (Q-TUSSIN) 100 mg/5 mL liquid Take 10 mL by mouth every four (4) hours as needed for Cough., No Print, Disp-10 mL, R-0         CONTINUE these medications which have NOT CHANGED    Details   PARoxetine (PAXIL) 20 mg tablet Take 20 mg by mouth daily. , Historical Med      risperiDONE (RISPERDAL) 3 mg tablet Take 3 mg by mouth daily. , Historical Med      ALPRAZolam (XANAX) 0.5 mg tablet Take 0.5 mg by mouth two (2) times daily as needed for Anxiety. , Historical Med      benztropine (COGENTIN) 2 mg tablet Take 2 mg by mouth daily as needed., Historical Med      budesonide-formoterol (SYMBICORT) 80-4.5 mcg/actuation HFAA inhaler Take 2 Puffs by inhalation two (2) times a day., Normal, Disp-1 Inhaler, R-7      temazepam (RESTORIL) 30 mg capsule Take 30 mg by mouth nightly as needed for Sleep., Historical Med      QUEtiapine (SEROQUEL) 50 mg tablet Take 50 mg by mouth nightly as needed (for agitation or anxiety (Use 1st)). , Historical Med      traZODone (DESYREL) 100 mg tablet Take 50 mg by mouth nightly as needed for Sleep., Historical Med      albuterol (VENTOLIN HFA) 90 mcg/actuation inhaler Take 2 Puffs by inhalation every four (4) hours as needed for Shortness of Breath., Historical Med      albuterol (PROVENTIL VENTOLIN) 2.5 mg /3 mL (0.083 %) nebulizer solution 2.5 mg by Nebulization route every six (6) hours as needed for Wheezing., Historical Med         STOP taking these medications       ibuprofen (MOTRIN) 800 mg tablet Comments:   Reason for Stopping:                 NOTIFY YOUR PHYSICIAN FOR ANY OF THE FOLLOWING:   Fever over 101 degrees for 24 hours. Chest pain, shortness of breath, fever, chills, nausea, vomiting, diarrhea, change in mentation, falling, weakness, bleeding. Severe pain or pain not relieved by medications. Or, any other signs or symptoms that you may have questions about. DISPOSITION:  xx  Home With:   OT  PT  HH  RN       Long term SNF/Inpatient Rehab    Independent/assisted living    Hospice    Other:       PATIENT CONDITION AT DISCHARGE:     Functional status    Poor     Deconditioned    xx Independent      Cognition    xx Lucid     Forgetful     Dementia      Catheters/lines (plus indication)    Singh     PICC     PEG    xx None      Code status   xx  Full code     DNR      PHYSICAL EXAMINATION AT DISCHARGE:  Constitutional:  No acute distress, cooperative, pleasant    ENT:  Oral mucous moist, oropharynx benign. Neck supple   Resp:  Expiratory wheezing bilaterally and scattered rhonchi bilaterally. No accessory muscle use   CV:  Regular rhythm, normal rate, no murmurs, gallops, rubs    GI:  Soft, non distended, non tender.  normoactive bowel sounds, no hepatosplenomegaly     Musculoskeletal:  No edema    Neurologic:  Conscious and alert, oriented to place and person, Moves all extremities. CN II-XII reviewed                                             Psych: Not anxious nor agitated.         CHRONIC MEDICAL DIAGNOSES:  Problem List as of 8/23/2017  Date Reviewed: 8/23/2017          Codes Class Noted - Resolved    Pulmonary emphysema ([facility] ICD-10-CM: J43.9  ICD-9-CM: 492.8  8/7/2017 - Present        Hypoalbuminemia due to protein-calorie malnutrition ([facility] ICD-10-CM: E46  ICD-9-CM: 263.9  8/7/2017 - Present        Schizoaffective disorder, depressive type ([facility] ICD-10-CM: F25.1  ICD-9-CM: 295.70  8/7/2017 - Present        Cigarette nicotine dependence with nicotine-induced disorder ICD-10-CM: F17.219  ICD-9-CM: 292.9  8/7/2017 - Present        Seizure ([place] [address].) ICD-10-CM: R56.9  ICD-9-CM: 780.39  7/14/2017 - Present        Benign prostatic hyperplasia without lower urinary tract symptoms ICD-10-CM: N40.0  ICD-9-CM: 600.00  5/15/2017 - Present        Underweight ICD-10-CM: R63.6  ICD-9-CM: 783.22  3/29/2016 - Present        Poor dentition ICD-10-CM: K08.9  ICD-9-CM: 525.9  3/29/2016 - Present        Insomnia ICD-10-CM: G47.00  ICD-9-CM: 780.52  3/29/2016 - Present        Bronchitis, chronic ([facility] ICD-10-CM: Kandee Mort  ICD-9-CM: 491.9  5/22/2014 - Present        Depression ICD-10-CM: F32.9  ICD-9-CM: 836  1/31/2012 - Present        * (Principal)RESOLVED: Shortness of breath ICD-10-CM: R06.02  ICD-9-CM: 786.05  8/16/2017 - 8/23/2017        RESOLVED: Acute respiratory failure ([facility] ICD-10-CM: J96.00  ICD-9-CM: 518.81  7/13/2017 - 7/20/2017        RESOLVED: Acute encephalopathy ICD-10-CM: G93.40  ICD-9-CM: 348.30  7/5/2017 - 7/20/2017        RESOLVED: Hyponatremia ICD-10-CM: E87.1  ICD-9-CM: 276.1  7/4/2017 - 7/20/2017        RESOLVED: Chronic obstructive pulmonary disease ([facility] ICD-10-CM: J44.9  ICD-9-CM: 496  3/29/2016 - 7/20/2017        RESOLVED: Tobacco dependence ICD-10-CM: N70.703  ICD-9-CM: 305.1  3/29/2016 - 7/20/2017        RESOLVED: Paranoid schizophrenia [facility] ICD-10-CM: F20.0  ICD-9-CM: 295.30  3/29/2016 - 7/20/2017              Greater than 30 minutes were spent with the patient on counseling and coordination of care    Signed:   Иван Duffy NP  8/23/2017  4:33 PM

## 2017-08-23 NOTE — PROGRESS NOTES
Pulmonary, Critical Care, and Sleep Medicine~Progress Note    Name: Lesley Hernandez MRN: [id number]   : 1960 [facility]   Date: 2017 10:08 AM Admission: 2017     IMPRESSION:   · Acute hypoxemic/hypercapnic resp failure  · AE of COPD  · Active smoker  · Schizophrenia  · Depression      PLAN:   · Reduce steroids, PO tomorrow   · AAT level 122  · O2 titration above 90%  · Encouraged smoking cessation   · OOB as tolerated  · Bronchodilators; consider pulmicort at discharge       Daily Progression:    Sitting on the side of the bed.  Wants to go home    OBJECTIVE:     Vital Signs:       Visit Vitals    BP (!) 149/97 (BP 1 Location: Left arm, BP Patient Position: At rest)    Pulse 65    Temp 98.5 °F (36.9 °C)    Resp 16    Ht 5' 10\" (1.778 m)    Wt 56.4 kg (124 lb 5.4 oz)    SpO2 92%    BMI 17.84 kg/m2      Temp (24hrs), Av.2 °F (36.8 °C), Min:97.9 °F (36.6 °C), Max:98.5 °F (36.9 °C)     Intake/Output:     Last shift:  0701 -  190  In: 240 [P.O.:240]  Out: -     Last 3 shifts:  190 -  0700  In: 1130 [P.O.:4530]  Out: 8380 [Urine:8380]          Intake/Output Summary (Last 24 hours) at 17 1020  Last data filed at 17 7809   Gross per 24 hour   Intake             3577 ml   Output             4990 ml   Net            -1413 ml       Ventilation:   Mode Rate Tidal Volume Pressure Suppport FiO2/LPM PEEP Other               Peak airway pressure: 16 cm H2O    Minute ventilation: 14.1 l/min    Trilogy:        Physical Exam:                                        Exam Findings Other   General: No resp distress noted, appears stated age    HEENT:  No ulcers, JVD not elevated, no cervical LAD    Chest: No pectus deformity, normal chest rise b/l    HEART:  RRR, no murmurs/rubs/gallops    Lungs:  CTA b/l, no rhonchi/crackles/wheeze, diminished BS at bases    ABD: Soft/NT, non rigid mildly distended    EXT: No cyanosis/clubbing/edema, normal peripheral pulses    Skin: No rashes or ulcers, no mottling    Neuro: A/O x 3        Medications:  Current Facility-Administered Medications   Medication Dose Route Frequency    methylPREDNISolone (PF) (SOLU-MEDROL) injection 40 mg  40 mg IntraVENous Q8H    sodium chloride (NS) flush 5-10 mL  5-10 mL IntraVENous Q8H    sodium chloride (NS) flush 5-10 mL  5-10 mL IntraVENous PRN    ALPRAZolam (XANAX) tablet 0.5 mg  0.5 mg Oral BID    benztropine (COGENTIN) tablet 2 mg  2 mg Oral DAILY    guaiFENesin (ROBITUSSIN) 100 mg/5 mL oral liquid 200 mg  200 mg Oral Q4H PRN    PARoxetine (PAXIL) tablet 20 mg  20 mg Oral DAILY    QUEtiapine (SEROquel) tablet 50 mg  50 mg Oral QHS PRN    risperiDONE (RisperDAL) tablet 3 mg  3 mg Oral DAILY    temazepam (RESTORIL) capsule 30 mg  30 mg Oral QHS PRN    traZODone (DESYREL) tablet 50 mg  50 mg Oral QHS PRN    sodium chloride (NS) flush 5-10 mL  5-10 mL IntraVENous Q8H    sodium chloride (NS) flush 5-10 mL  5-10 mL IntraVENous PRN    budesonide (PULMICORT) 500 mcg/2 ml nebulizer suspension  500 mcg Nebulization BID RT    enoxaparin (LOVENOX) injection 40 mg  40 mg SubCUTAneous Q24H    albuterol-ipratropium (DUO-NEB) 2.5 MG-0.5 MG/3 ML  3 mL Nebulization Q4H PRN       Labs:  ABG Recent Labs      08/20/17   1352   PHI  7.444   PCO2I  55.0*   PO2I  59*   HCO3I  37.7*   SO2I  91*        CBC Recent Labs      08/22/17   0522   WBC  16.1*   HGB  13.7   HCT  41.0   PLT  219   MCV  100.5*   MCH  63.3        Metabolic  Panel Recent Labs      08/22/17   0522   NA  138   K  4.6   CL  98   CO2  35*   GLU  111*   BUN  11   CREA  0.72   CA  8.3*        Pertinent Labs                RUDY Bates  8/23/2017

## 2017-09-05 ENCOUNTER — OFFICE VISIT (OUTPATIENT)
Dept: INTERNAL MEDICINE CLINIC | Age: 57
End: 2017-09-05

## 2017-09-05 VITALS
SYSTOLIC BLOOD PRESSURE: 120 MMHG | OXYGEN SATURATION: 97 % | RESPIRATION RATE: 20 BRPM | HEART RATE: 83 BPM | HEIGHT: 70 IN | BODY MASS INDEX: 16.89 KG/M2 | TEMPERATURE: 97.9 F | WEIGHT: 118 LBS | DIASTOLIC BLOOD PRESSURE: 90 MMHG

## 2017-09-05 DIAGNOSIS — F17.219 CIGARETTE NICOTINE DEPENDENCE WITH NICOTINE-INDUCED DISORDER: ICD-10-CM

## 2017-09-05 DIAGNOSIS — J42 CHRONIC BRONCHITIS, UNSPECIFIED CHRONIC BRONCHITIS TYPE (HCC): Primary | ICD-10-CM

## 2017-09-05 DIAGNOSIS — R06.02 SHORTNESS OF BREATH: ICD-10-CM

## 2017-09-05 DIAGNOSIS — R06.2 WHEEZING: ICD-10-CM

## 2017-09-05 NOTE — PROGRESS NOTES
Chief Complaint   Patient presents with   [facility] Follow Up     1. Have you been to the ER, urgent care clinic since your last visit? Hospitalized since your last visit? No    2. Have you seen or consulted any other health care providers outside of the [address] [place] [address] since your last visit? Include any pap smears or colon screening. No    Patient here today, accompanied by , Tosin Chung

## 2017-09-05 NOTE — MR AVS SNAPSHOT
Visit Information Date & Time Provider Department Dept. Phone Encounter #  
 9/5/2017 10:00 AM Kathi Gee NP [facility] [phone] [id number] Your Appointments 11/17/2017 10:15 AM  
ROUTINE CARE with Inis CushingDO [facility] ([address]) Appt Note: 6 month f/u  
 [address] [facility] [address] Pending sale to [facility] [address]  
[phone]  
  
   
 [address] Upcoming Health Maintenance Date Due Hepatitis C Screening 1960 DTaP/Tdap/Td series (1 - Tdap) 8/27/1980 INFLUENZA AGE 9 TO ADULT 8/1/2017 COLONOSCOPY 9/2/2024 Allergies as of 9/5/2017  Review Complete On: 9/5/2017 By: Fab Best LPN No Known Allergies Current Immunizations  Reviewed on 7/17/2017 Name Date Influenza Vaccine Intradermal PF 11/14/2016 PPD 1/31/2012 Pneumococcal Polysaccharide (PPSV-23) 11/14/2016 Not reviewed this visit You Were Diagnosed With   
  
 Codes Comments Wheezing    -  Primary ICD-10-CM: R06.2 ICD-9-CM: 786.07 Shortness of breath     ICD-10-CM: R06.02 
ICD-9-CM: 786.05 Vitals BP Pulse Temp Resp Height(growth percentile) Weight(growth percentile) 120/90 (BP 1 Location: Left arm, BP Patient Position: Sitting) 83 97.9 °F (36.6 °C) (Oral) 20 5' 10\" (1.778 m) 118 lb (53.5 kg) SpO2 BMI Smoking Status 97% 16.93 kg/m2 Current Every Day Smoker BMI and BSA Data Body Mass Index Body Surface Area  
 16.93 kg/m 2 1.63 m 2 Preferred Pharmacy Pharmacy Name Phone [address] [facility], [facility] [phone] Your Updated Medication List  
  
   
This list is accurate as of: 9/5/17 11:06 AM.  Always use your most recent med list.  
  
  
  
  
 ALPRAZolam 0.5 mg tablet Commonly known as:  Fernanda Stefany Take 0.5 mg by mouth two (2) times daily as needed for Anxiety. benztropine 2 mg tablet Commonly known as:  COGENTIN Take 2 mg by mouth daily as needed. budesonide-formoterol 80-4.5 mcg/actuation Hfaa inhaler Commonly known as:  SYMBICORT Take 2 Puffs by inhalation two (2) times a day. guaiFENesin 100 mg/5 mL liquid Commonly known as:  Q-TUSSIN Take 10 mL by mouth every four (4) hours as needed for Cough. PARoxetine 20 mg tablet Commonly known as:  PAXIL Take 20 mg by mouth daily. predniSONE 10 mg tablet Commonly known as:  Alondra Smoker 80 mg x 3 days then 60 mg x 3 days then 40 mg x 3 days then 20 mg x 3 days then stop  
  
 risperiDONE 3 mg tablet Commonly known as:  RisperDAL Take 3 mg by mouth daily. SEROquel 50 mg tablet Generic drug:  QUEtiapine Take 50 mg by mouth nightly as needed (for agitation or anxiety (Use 1st)). temazepam 30 mg capsule Commonly known as:  RESTORIL Take 30 mg by mouth nightly as needed for Sleep.  
  
 traZODone 100 mg tablet Commonly known as:  Forrest Ort Take 50 mg by mouth nightly as needed for Sleep. * VENTOLIN HFA 90 mcg/actuation inhaler Generic drug:  albuterol Take 2 Puffs by inhalation every four (4) hours as needed for Shortness of Breath. * albuterol 2.5 mg /3 mL (0.083 %) nebulizer solution Commonly known as:  PROVENTIL VENTOLIN  
2.5 mg by Nebulization route every six (6) hours as needed for Wheezing. * Notice: This list has 2 medication(s) that are the same as other medications prescribed for you. Read the directions carefully, and ask your doctor or other care provider to review them with you. We Performed the Following ALBUTEROL, INHAL. SOL., FDA-APPROVED FINAL, NON-COMPOUND UNIT DOSE, 1 MG [ [facility]] Please provide this summary of care documentation to your next provider. Your primary care clinician is listed as Sarah Sharif. If you have any questions after today's visit, please call [phone].

## 2017-09-05 NOTE — PROGRESS NOTES
Subjective:     Chief Complaint   Patient presents with   [facility] Follow Up       History of Present Illness    Ivania Crenshaw is a 61y.o. year old male who is a patient of Dr. Juan M Escamilla that presents today for a hospital follow-up. The patient was admitted to [facility] for a COPD exacerbation on 08/16/2017. He was placed on BiPap during his admission. His chest CT revealed extensive emphysema with interval resolution of bibasilar groundglass opacities/airspace disease. No pulmonary edema, pleural effusion or new area of focal consolidation. Diffuse bronchial wall thickening and a few scattered areas  of airspace disease which may be chronic. CXR was negative. His EF is 60-65%. ABG's on 08/20 showed a pH 7.44, pO2 59, and pCO 255. He was seen by Pulmonary and has a follow-up appointment tomorrow. He completed a steroid taper, was started on Pulmicort and duo nebs PRN. He denies any new complaints today. His has an extensive medical history which includes COPD. He continues to smoke 1 PPD. Reviewed medications, recent lab work and imaging with patient. Pt reports compliance with medications. Current Outpatient Prescriptions on File Prior to Visit   Medication Sig Dispense Refill    guaiFENesin (Q-TUSSIN) 100 mg/5 mL liquid Take 10 mL by mouth every four (4) hours as needed for Cough. 10 mL 0    predniSONE (DELTASONE) 10 mg tablet 80 mg x 3 days then  60 mg x 3 days then  40 mg x 3 days then  20 mg x 3 days then stop 60 Tab 0    ALPRAZolam (XANAX) 0.5 mg tablet Take 0.5 mg by mouth two (2) times daily as needed for Anxiety.  benztropine (COGENTIN) 2 mg tablet Take 2 mg by mouth daily as needed.  PARoxetine (PAXIL) 20 mg tablet Take 20 mg by mouth daily.  budesonide-formoterol (SYMBICORT) 80-4.5 mcg/actuation HFAA inhaler Take 2 Puffs by inhalation two (2) times a day. 1 Inhaler 7    temazepam (RESTORIL) 30 mg capsule Take 30 mg by mouth nightly as needed for Sleep.       QUEtiapine (SEROQUEL) 50 mg tablet Take 50 mg by mouth nightly as needed (for agitation or anxiety (Use 1st)).  traZODone (DESYREL) 100 mg tablet Take 50 mg by mouth nightly as needed for Sleep.  risperiDONE (RISPERDAL) 3 mg tablet Take 3 mg by mouth daily.  albuterol (VENTOLIN HFA) 90 mcg/actuation inhaler Take 2 Puffs by inhalation every four (4) hours as needed for Shortness of Breath.  albuterol (PROVENTIL VENTOLIN) 2.5 mg /3 mL (0.083 %) nebulizer solution 2.5 mg by Nebulization route every six (6) hours as needed for Wheezing. No current facility-administered medications on file prior to visit. No Known Allergies   Past Medical History:   Diagnosis Date    Asthma     seldom,use inhaler    Bronchitis     Chronic obstructive pulmonary disease ([facility] [address]     Depression     anxiety    Psychiatric disorder     paranoid schizophrenia    Psychiatric disorder     extrapyramidal disease    Psychotic disorder     mental retardation      Past Surgical History:   Procedure Laterality Date    HX ORTHOPAEDIC      right knee      Social History   Substance Use Topics    Smoking status: Current Every Day Smoker     Packs/day: 1.00     Years: 25.00    Smokeless tobacco: Never Used    Alcohol use No      Comment: socially      Family History   Problem Relation Age of Onset    Diabetes Mother     Heart Disease Father      CHF        Objective:     Vitals:    09/05/17 1023   BP: 120/90   Pulse: 83   Resp: 20   Temp: 97.9 °F (36.6 °C)   TempSrc: Oral   SpO2: 97%   Weight: 118 lb (53.5 kg)   Height: 5' 10\" (1.778 m)       Review of Systems   Constitutional: Negative. HENT: Negative. Eyes: Negative. Respiratory: Negative. Cardiovascular: Negative. Gastrointestinal: Negative. Genitourinary: Negative. Musculoskeletal: Negative. Skin: Negative. Neurological: Negative. Psychiatric/Behavioral: Negative.       Physical Exam   Constitutional: He is oriented to person, place, and time. He appears well-developed and well-nourished. No distress. HENT:   Head: Normocephalic and atraumatic. Eyes: Conjunctivae and EOM are normal. Pupils are equal, round, and reactive to light. Neck: Normal range of motion. Neck supple. Cardiovascular: Normal rate, regular rhythm and normal heart sounds. Pulmonary/Chest: Effort normal. He has wheezes in the left upper field, the left middle field and the left lower field. Abdominal: Soft. Bowel sounds are normal. He exhibits no distension. There is no tenderness. Musculoskeletal: Normal range of motion. He exhibits no edema, tenderness or deformity. Neurological: He is alert and oriented to person, place, and time. Skin: Skin is warm and dry. He is not diaphoretic. Psychiatric: He has a normal mood and affect. His behavior is normal.   Nursing note and vitals reviewed. Assessment/ Plan:   Diagnoses and all orders for this visit:    1. Chronic bronchitis, unspecified chronic bronchitis type [facility])   Follow-up with Pulmonology tomorrow as scheduled. 2. Wheezing   Will order   -     ALBUTEROL, INHAL. PHP.()    3. Shortness of breath    4. Cigarette nicotine dependence with nicotine-induced disorder   Educated on importance of smoking cessation. Patient's plan of care has been reviewed with them. Patient and/or family have verbally conveyed their understanding and agreement of the patient's signs, symptoms, diagnosis, treatment and prognosis and additionally agree to follow up as recommended or return to [facility] should their condition change prior to follow-up. Discharge instructions have also been provided to the patient with some educational information regarding their diagnosis as well a list of reasons why they would want to return to the office prior to their follow-up appointment should their condition change. Follow-up in 3 months.

## 2017-09-18 ENCOUNTER — PATIENT OUTREACH (OUTPATIENT)
Dept: INTERNAL MEDICINE CLINIC | Age: 57
End: 2017-09-18

## 2017-09-18 NOTE — PROGRESS NOTES
Received call from Dr. Miriam Black physician at [facility], reporting patient was admitted to their facility 9/10/17 dx electrolyte abnormality, seizure, intracranial hemorrhage. Per Dr. Brendan Ceballos patient will be discharged from [address] [place] [address] soon a hospital follow up appointment is schedule with Anabella Rollins NP. Will continue a to monitor patient's progress.

## 2017-09-21 ENCOUNTER — PATIENT OUTREACH (OUTPATIENT)
Dept: INTERNAL MEDICINE CLINIC | Age: 57
End: 2017-09-21

## 2017-09-21 NOTE — PROGRESS NOTES
Srikanth Basilio 61 y.o. discharged from [facility] for AMS, hyponatremia from dates: 9/10-20/17. Nurse Navigator(NN) contacted the patient's caregiver Jeanette Callahan at [facility] by telephone to perform post hospital discharge assessment. Verified as patient with 2 identifiers. Provided introduction to self, and explanation of the Nurses Navigator role. Call Information: Spoke with Jeanette Callahan who states pt is acting at his baseline. Pt knows he can walk out of the facility at any time he wishes since it is a free roam adult home [facility]. Pt is kept on a tight fluid restriction within the facility, however he can drink whatever and however much he wants when he is not under watch. Pt did however return from his outdoor excursion today for his breathing treatment. States that [facility] appeared to only have given pt antibiotics while under treatment after regulating sodium level. No changes in respiratory medications noted. Asks about making a f/u appointment. Informed a f/u appointment was made by CM while pt was still in hospital. States it is on Monday 9/25 at 1pm. Informed that if this time is not suitable by the pt's , we can change appointment date/time as needed. Informed that NP can see pt if needed due to availability of appointments with Dr. Bandar Montana. Jeanette Callahan will return call tomorrow re: appt. Red Flags:  Confusion, fatigue, headache, weakness, n/v, twitch/seizure, faint, diarrhea     Discharge Instructions :  Reviewed discharge instructions with patient caregiver who verbalizes understanding of discharge instructions and follow-up care. PCP/Specialist Follow Up:   Patient scheduled to follow up with Dr. Bandar Montana on 9/25 @ 1pm.      Reviewed red flags with patient, and patient verbalizes understanding. Opportunity given to ask questions. No other clinical/social/functional needs noted. Plan:  Reviewed plan of care. Patient caregiver verbalized understanding and agreement with plan.      The caregiver agrees to contact the PCP office for questions related to their healthcare. NN contact information given to call prn. Goal:  Goals Addressed             Most Recent     Prevent complications post hospitalization. Not on track (9/21/2017)          Medical History:     Past Medical History:   Diagnosis Date    Asthma     seldom,use inhaler    Bronchitis     Chronic obstructive pulmonary disease ([address]     Depression     anxiety    Psychiatric disorder     paranoid schizophrenia    Psychiatric disorder     extrapyramidal disease    Psychotic disorder     mental retardation       Labs Reviewed:  Labs reviewed from [facility] documentation.

## 2017-09-28 ENCOUNTER — OFFICE VISIT (OUTPATIENT)
Dept: INTERNAL MEDICINE CLINIC | Age: 57
End: 2017-09-28

## 2017-09-28 VITALS
TEMPERATURE: 98.7 F | OXYGEN SATURATION: 93 % | HEIGHT: 70 IN | DIASTOLIC BLOOD PRESSURE: 85 MMHG | RESPIRATION RATE: 18 BRPM | WEIGHT: 127.6 LBS | HEART RATE: 97 BPM | BODY MASS INDEX: 18.27 KG/M2 | SYSTOLIC BLOOD PRESSURE: 110 MMHG

## 2017-09-28 DIAGNOSIS — J42 CHRONIC BRONCHITIS, UNSPECIFIED CHRONIC BRONCHITIS TYPE (HCC): ICD-10-CM

## 2017-09-28 DIAGNOSIS — F54 PSYCHOGENIC POLYDIPSIA: ICD-10-CM

## 2017-09-28 DIAGNOSIS — R56.9 SEIZURE (HCC): ICD-10-CM

## 2017-09-28 DIAGNOSIS — E87.1 HYPONATREMIA: Primary | ICD-10-CM

## 2017-09-28 DIAGNOSIS — F25.1 SCHIZOAFFECTIVE DISORDER, DEPRESSIVE TYPE (HCC): ICD-10-CM

## 2017-09-28 DIAGNOSIS — R63.1 PSYCHOGENIC POLYDIPSIA: ICD-10-CM

## 2017-09-28 RX ORDER — SODIUM CHLORIDE TAB 1 GM 1 G
1 TAB MISCELLANEOUS DAILY
Qty: 30 TAB | Refills: 1 | Status: ON HOLD | OUTPATIENT
Start: 2017-09-28 | End: 2017-11-08

## 2017-09-28 NOTE — PROGRESS NOTES
Subjective:     Chief Complaint   Patient presents with    Seizure     F/U hospital visit, 9/10/17,     Depression       History of Present Illness    Jacek Stephenson is a 61y.o. year old male who presents for a follow-up visit following a recent admission to [facility]. The patient was admitted to [facility] on 09/10/2017-09/20/2017 for hyponatremia, pneumonitis and COPD exacerbation. He arrived via EMS after he was found altered and SOB at [facility]. Upon arrival his NA was 114. He had a seizure and was intubated to protect his airway. He self-extubated on 09/12/2017. After becoming stable he was discharged home with a NA of 135 and K 4.8. He was discharged home on Cipro, Augmentin, and Salt tablet. He reports feeling generally well today. He denies any new complaints. He states he continues to drink numerous bottles of water per day. He states he does not have a psychiatrist at this time. No CP, SOB, GI, or  symptoms. Reviewed medications, recent lab work and imaging with patient. Pt reports compliance with medications. He states he received the flu vaccine while admitted to [facility]. Current Outpatient Prescriptions on File Prior to Visit   Medication Sig Dispense Refill    guaiFENesin (Q-TUSSIN) 100 mg/5 mL liquid Take 10 mL by mouth every four (4) hours as needed for Cough. 10 mL 0    predniSONE (DELTASONE) 10 mg tablet 80 mg x 3 days then  60 mg x 3 days then  40 mg x 3 days then  20 mg x 3 days then stop 60 Tab 0    ALPRAZolam (XANAX) 0.5 mg tablet Take 0.5 mg by mouth two (2) times daily as needed for Anxiety.  benztropine (COGENTIN) 2 mg tablet Take 2 mg by mouth daily as needed.  PARoxetine (PAXIL) 20 mg tablet Take 20 mg by mouth daily.  budesonide-formoterol (SYMBICORT) 80-4.5 mcg/actuation HFAA inhaler Take 2 Puffs by inhalation two (2) times a day. 1 Inhaler 7    temazepam (RESTORIL) 30 mg capsule Take 30 mg by mouth nightly as needed for Sleep.       QUEtiapine (SEROQUEL) 50 mg tablet Take 50 mg by mouth nightly as needed (for agitation or anxiety (Use 1st)).  traZODone (DESYREL) 100 mg tablet Take 50 mg by mouth nightly as needed for Sleep.  risperiDONE (RISPERDAL) 3 mg tablet Take 3 mg by mouth daily.  albuterol (VENTOLIN HFA) 90 mcg/actuation inhaler Take 2 Puffs by inhalation every four (4) hours as needed for Shortness of Breath.  albuterol (PROVENTIL VENTOLIN) 2.5 mg /3 mL (0.083 %) nebulizer solution 2.5 mg by Nebulization route every six (6) hours as needed for Wheezing. No current facility-administered medications on file prior to visit. No Known Allergies   Past Medical History:   Diagnosis Date    Asthma     seldom,use inhaler    Bronchitis     Chronic obstructive pulmonary disease (HCC)     Depression     anxiety    Psychiatric disorder     paranoid schizophrenia    Psychiatric disorder     extrapyramidal disease    Psychotic disorder     mental retardation      Past Surgical History:   Procedure Laterality Date    HX ORTHOPAEDIC      right knee      Social History   Substance Use Topics    Smoking status: Current Every Day Smoker     Packs/day: 1.00     Years: 25.00    Smokeless tobacco: Never Used    Alcohol use No      Comment: socially      Family History   Problem Relation Age of Onset    Diabetes Mother     Heart Disease Father      CHF        Objective:     Vitals:    09/28/17 1111   BP: 110/85   Pulse: 97   Resp: 18   Temp: 98.7 °F (37.1 °C)   TempSrc: Oral   SpO2: 93%   Weight: 127 lb 9.6 oz (57.9 kg)   Height: 5' 10\" (1.778 m)       Review of Systems   Constitutional: Negative. HENT: Negative. Eyes: Negative. Respiratory: Negative. Cardiovascular: Negative. Gastrointestinal: Negative. Genitourinary: Negative. Musculoskeletal: Negative. Skin: Negative. Neurological: Negative. Psychiatric/Behavioral: Negative. Physical Exam   Constitutional: He is oriented to person, place, and time. No distress. Frail male. NAD    HENT:   Head: Normocephalic and atraumatic. Eyes: Conjunctivae and EOM are normal. Pupils are equal, round, and reactive to light. Neck: Normal range of motion. Neck supple. Cardiovascular: Normal rate, regular rhythm and normal heart sounds. Pulmonary/Chest: Effort normal. He has wheezes in the left upper field, the left middle field and the left lower field. Abdominal: Soft. Bowel sounds are normal. He exhibits no distension. There is no tenderness. Musculoskeletal: Normal range of motion. He exhibits no edema, tenderness or deformity. Neurological: He is alert and oriented to person, place, and time. Skin: Skin is warm and dry. He is not diaphoretic. Psychiatric: He has a normal mood and affect. His behavior is normal.   Nursing note and vitals reviewed. Assessment/ Plan:   Diagnoses and all orders for this visit:    1. Hyponatremia   Will order   -     sodium chloride 1 gram tablet; Take 1 Tab by mouth daily. Indications: hyponatremia   Limit PO water intake to 3 glasses per day. 2. Psychogenic polydipsia   Will order   -     sodium chloride 1 gram tablet; Take 1 Tab by mouth daily. Indications: hyponatremia   Limit PO water intake to 3 glasses per day. 3. Schizoaffective disorder, depressive type ([address]   Called Argenis at [facility] to clarify who patients psychiatrist was. Pt is followed by [facility]. Patient needs to follow-up at [facility]. 4. Seizure ([address]    5. Chronic bronchitis, unspecified chronic bronchitis type ([address]    Patient's plan of care has been reviewed with them. Patient and/or family have verbally conveyed their understanding and agreement of the patient's signs, symptoms, diagnosis, treatment and prognosis and additionally agree to follow up as recommended or return to [facility] should their condition change prior to follow-up.   Discharge instructions have also been provided to the patient with some educational information regarding their diagnosis as well a list of reasons why they would want to return to the office prior to their follow-up appointment should their condition change. Follow-up in 1 month.

## 2017-09-28 NOTE — PATIENT INSTRUCTIONS
Seizure: Care Instructions  Your Care Instructions    Seizures are caused by abnormal patterns of electrical signals in the brain. They are different for each person. Seizures can affect movement, speech, vision, or awareness. Some people have only slight shaking of a hand and do not pass out. Other people may pass out and have violent shaking of the whole body. Some people appear to stare into space. They are awake, but they can't respond normally. Later, they may not remember what happened. You may need tests to identify the type and cause of the seizures. A seizure may occur only once, or you may have them more than one time. Taking medicines as directed and following up with your doctor may help keep you from having more seizures. The doctor has checked you carefully, but problems can develop later. If you notice any problems or new symptoms, get medical treatment right away. Follow-up care is a key part of your treatment and safety. Be sure to make and go to all appointments, and call your doctor if you are having problems. It's also a good idea to know your test results and keep a list of the medicines you take. How can you care for yourself at home? · Be safe with medicines. Take your medicines exactly as prescribed. Call your doctor if you think you are having a problem with your medicine. · Do not do any activity that could be dangerous to you or others until your doctor says it is safe to do so. For example, do not drive a car, operate machinery, swim, or climb ladders. · Be sure that anyone treating you for any health problem knows that you have had a seizure and what medicines you are taking for it. · Identify and avoid things that may make you more likely to have a seizure. These may include lack of sleep, alcohol or drug use, stress, or not eating. · Make sure you go to your follow-up appointment. When should you call for help? Call 911 anytime you think you may need emergency care. For example, call if:  · You have another seizure. · You have more than one seizure in 24 hours. · You have new symptoms, such as trouble walking, speaking, or thinking clearly. Call your doctor now or seek immediate medical care if:  · You are not acting normally. Watch closely for changes in your health, and be sure to contact your doctor if you have any problems. Where can you learn more? Go to http://leila-kodak.info/. Enter T788 in the search box to learn more about \"Seizure: Care Instructions. \"  Current as of: October 14, 2016  Content Version: 11.3  © 5083-0066 [employer]. Care instructions adapted under license by [employer] (which disclaims liability or warranty for this information). If you have questions about a medical condition or this instruction, always ask your healthcare professional. [address] any warranty or liability for your use of this information. Hyponatremia: Care Instructions  Your Care Instructions  Hyponatremia (say \"bn-we-[id number]-TREE-nika-uh\") means that you don't have enough sodium in your blood. It can cause nausea, vomiting, and headaches. Or you may not feel hungry. In serious cases, it can cause seizures, a coma, or even death. Hyponatremia is not a disease. It is a problem caused by something else, such as medicines or exercising for a long time in hot weather. You can get hyponatremia if you lose a lot of fluids and then you drink a lot of water or other liquids that don't have much sodium. You can also get it if you have kidney, liver, heart, or other health problems. Treatment is focused on getting your sodium levels back to normal.  Follow-up care is a key part of your treatment and safety. Be sure to make and go to all appointments, and call your doctor if you are having problems. It's also a good idea to know your test results and keep a list of the medicines you take.   How can you care for yourself at home? · If your doctor recommends it, drink fluids that have sodium. Sports drinks are a good choice. Or you can eat salty foods. · If your doctor recommends it, limit the amount of water you drink. And limit fluids that are mostly water. These include tea, coffee, and juice. · Take your medicines exactly as prescribed. Call your doctor if you have any problems with your medicine. · Get your sodium levels tested when your doctor tells you to. When should you call for help? Call 911 anytime you think you may need emergency care. For example, call if:  · You have a seizure. · You passed out (lost consciousness). Call your doctor now or seek immediate medical care if:  · You are confused or it is hard to focus. · You have little or no appetite. · You feel sick to your stomach or you vomit. · You have a headache. · You have mood changes. · You feel more tired than usual.  Watch closely for changes in your health, and be sure to contact your doctor if:  · You do not get better as expected. Where can you learn more? Go to http://leila-kodak.info/. Enter P868 in the search box to learn more about \"Hyponatremia: Care Instructions. \"  Current as of: October 14, 2016  Content Version: 11.3  © 7243-0510 [employer]. Care instructions adapted under license by [employer] (which disclaims liability or warranty for this information). If you have questions about a medical condition or this instruction, always ask your healthcare professional. [place] [address] any warranty or liability for your use of this information.

## 2017-09-28 NOTE — PROGRESS NOTES
Health Maintenance Due   Topic Date Due    Hepatitis C Screening  1960    DTaP/Tdap/Td series (1 - Tdap) 08/27/1980       Chief Complaint   Patient presents with    Seizure     F/U hospital visit, 9/10/17,     Depression         1. Have you been to the ER, urgent care clinic since your last visit? Hospitalized since your last visit? Yes When: 9/10/17 Where: [facility] Reason for visit: Flu    2. Have you seen or consulted any other health care providers outside of the [address] [place] [address] since your last visit? Include any pap smears or colon screening. No    3) Do you have an Advance Directive on file? no    4) Are you interested in receiving information on Advance Directives? NO      Patient is accompanied by self I have received verbal consent from James Alvares to discuss any/all medical information while they are present in the room.

## 2017-10-10 ENCOUNTER — OFFICE VISIT (OUTPATIENT)
Dept: INTERNAL MEDICINE CLINIC | Age: 57
End: 2017-10-10

## 2017-10-10 VITALS
OXYGEN SATURATION: 91 % | WEIGHT: 124.2 LBS | SYSTOLIC BLOOD PRESSURE: 126 MMHG | RESPIRATION RATE: 18 BRPM | TEMPERATURE: 96.1 F | HEART RATE: 96 BPM | BODY MASS INDEX: 17.78 KG/M2 | DIASTOLIC BLOOD PRESSURE: 86 MMHG | HEIGHT: 70 IN

## 2017-10-10 DIAGNOSIS — R06.2 WHEEZING: Primary | ICD-10-CM

## 2017-10-10 DIAGNOSIS — F25.1 SCHIZOAFFECTIVE DISORDER, DEPRESSIVE TYPE (HCC): ICD-10-CM

## 2017-10-10 DIAGNOSIS — J42 CHRONIC BRONCHITIS, UNSPECIFIED CHRONIC BRONCHITIS TYPE (HCC): ICD-10-CM

## 2017-10-10 DIAGNOSIS — F17.219 CIGARETTE NICOTINE DEPENDENCE WITH NICOTINE-INDUCED DISORDER: ICD-10-CM

## 2017-10-10 RX ORDER — METHYLPREDNISOLONE 4 MG/1
TABLET ORAL
Qty: 1 DOSE PACK | Refills: 0 | Status: SHIPPED | OUTPATIENT
Start: 2017-10-10 | End: 2017-10-10 | Stop reason: CLARIF

## 2017-10-10 RX ORDER — PREDNISONE 10 MG/1
TABLET ORAL
Qty: 60 TAB | Refills: 0 | Status: SHIPPED | OUTPATIENT
Start: 2017-10-10 | End: 2017-10-26 | Stop reason: ALTCHOICE

## 2017-10-10 NOTE — PROGRESS NOTES
Health Maintenance Due   Topic Date Due    Hepatitis C Screening  1960    DTaP/Tdap/Td series (1 - Tdap) 08/27/1980       Chief Complaint   Patient presents with    Weight Gain    Follow-up     Fluid in lungs       1. Have you been to the ER, urgent care clinic since your last visit? Hospitalized since your last visit? No    2. Have you seen or consulted any other health care providers outside of the [address] [place] [address] since your last visit? Include any pap smears or colon screening. No    3) Do you have an Advance Directive on file? no    4) Are you interested in receiving information on Advance Directives? NO      Patient is accompanied by self I have received verbal consent from Celso Wang to discuss any/all medical information while they are present in the room.

## 2017-10-10 NOTE — PROGRESS NOTES
Subjective:     Chief Complaint   Patient presents with    Weight Gain    Follow-up     Fluid in lungs       History of Present Illness    Leia Chavez is a 61y.o. year old male that presents with his  from [facility]. He denies having any complaints at this time. However, his  reports increased SOB and wheezing. He reports continued water intake. He smokes 1 PPD. He appears SOB and audible wheezing heard. He reports compliance with his medications. He as been getting neb treatments q 4 hours at his assisted living. Last one was prior to leaving facility. Needs labs. The patient denies any associated symptoms. Denies CP, SOB, GI, or  complaints. Reviewed medications, recent lab work and imaging with patient. Pt reports compliance with medications. Current Outpatient Prescriptions on File Prior to Visit   Medication Sig Dispense Refill    umeclidinium (INCRUSE ELLIPTA) 62.5 mcg/actuation inhaler Take 1 Puff by inhalation daily.  sodium chloride 1 gram tablet Take 1 Tab by mouth daily. Indications: hyponatremia 30 Tab 1    guaiFENesin (Q-TUSSIN) 100 mg/5 mL liquid Take 10 mL by mouth every four (4) hours as needed for Cough. 10 mL 0    ALPRAZolam (XANAX) 0.5 mg tablet Take 0.5 mg by mouth two (2) times daily as needed for Anxiety.  benztropine (COGENTIN) 2 mg tablet Take 2 mg by mouth daily as needed.  PARoxetine (PAXIL) 20 mg tablet Take 20 mg by mouth daily.  budesonide-formoterol (SYMBICORT) 80-4.5 mcg/actuation HFAA inhaler Take 2 Puffs by inhalation two (2) times a day. 1 Inhaler 7    temazepam (RESTORIL) 30 mg capsule Take 30 mg by mouth nightly as needed for Sleep.  QUEtiapine (SEROQUEL) 50 mg tablet Take 50 mg by mouth nightly as needed (for agitation or anxiety (Use 1st)).  traZODone (DESYREL) 100 mg tablet Take 50 mg by mouth nightly as needed for Sleep.  risperiDONE (RISPERDAL) 3 mg tablet Take 3 mg by mouth daily.  albuterol (VENTOLIN HFA) 90 mcg/actuation inhaler Take 2 Puffs by inhalation every four (4) hours as needed for Shortness of Breath.  albuterol (PROVENTIL VENTOLIN) 2.5 mg /3 mL (0.083 %) nebulizer solution 2.5 mg by Nebulization route every six (6) hours as needed for Wheezing. No current facility-administered medications on file prior to visit. No Known Allergies   Past Medical History:   Diagnosis Date    Asthma     seldom,use inhaler    Bronchitis     Chronic obstructive pulmonary disease ([facility] [address]     Depression     anxiety    Psychiatric disorder     paranoid schizophrenia    Psychiatric disorder     extrapyramidal disease    Psychotic disorder     mental retardation      Past Surgical History:   Procedure Laterality Date    HX ORTHOPAEDIC      right knee      Social History   Substance Use Topics    Smoking status: Current Every Day Smoker     Packs/day: 1.00     Years: 25.00    Smokeless tobacco: Never Used    Alcohol use No      Comment: socially      Family History   Problem Relation Age of Onset    Diabetes Mother     Heart Disease Father      CHF        Objective:     Vitals:    10/10/17 1015   BP: 126/86   Pulse: 96   Resp: 18   Temp: 96.1 °F (35.6 °C)   TempSrc: Oral   SpO2: 91%   Weight: 124 lb 3.2 oz (56.3 kg)   Height: 5' 10\" (1.778 m)       Review of Systems   Constitutional: Negative. HENT: Negative. Eyes: Negative. Respiratory: Negative. Cardiovascular: Negative. Gastrointestinal: Negative. Genitourinary: Negative. Musculoskeletal: Negative. Skin: Negative. Neurological: Negative. Psychiatric/Behavioral: Negative. Physical Exam   Constitutional: He is oriented to person, place, and time. Vital signs are normal. He appears well-developed and well-nourished. He is cooperative. He appears ill. Frail male. HENT:   Head: Normocephalic and atraumatic.    Eyes: Conjunctivae and EOM are normal. Pupils are equal, round, and reactive to light.   Neck: Normal range of motion. Neck supple. Cardiovascular: Normal rate, regular rhythm and normal heart sounds. Pulmonary/Chest: He has wheezes in the right upper field, the right middle field, the right lower field, the left upper field, the left middle field and the left lower field. Increased work of breathing. Abdominal: Soft. Bowel sounds are normal. He exhibits no distension. There is no tenderness. Musculoskeletal: Normal range of motion. He exhibits no edema, tenderness or deformity. Neurological: He is alert and oriented to person, place, and time. Skin: Skin is warm and dry. He is not diaphoretic. Psychiatric: He has a normal mood and affect. His behavior is normal.   Nursing note and vitals reviewed. Assessment/ Plan:   Diagnoses and all orders for this visit:  1. Wheezing  Will order   -     CBC W/O DIFF  -     METABOLIC PANEL, BASIC  -     predniSONE (DELTASONE) 10 mg tablet; 80 mg x 3 days then  60 mg x 3 days then  40 mg x 3 days then  20 mg x 3 days then stop    2. Schizoaffective disorder, depressive type ([facility] [address]   Will order   -     CBC W/O DIFF  -     METABOLIC PANEL, BASIC    3. Chronic bronchitis, unspecified chronic bronchitis type ([facility] [address]   Will order   -     CBC W/O DIFF  -     METABOLIC PANEL, BASIC  -     predniSONE (DELTASONE) 10 mg tablet; 80 mg x 3 days then  60 mg x 3 days then  40 mg x 3 days then  20 mg x 3 days then stop    4. Cigarette nicotine dependence with nicotine-induced disorder    Patient's plan of care has been reviewed with them. Patient and/or family have verbally conveyed their understanding and agreement of the patient's signs, symptoms, diagnosis, treatment and prognosis and additionally agree to follow up as recommended or return to [facility] should their condition change prior to follow-up.   Discharge instructions have also been provided to the patient with some educational information regarding their diagnosis as well a list of reasons why they would want to return to the office prior to their follow-up appointment should their condition change. Follow-up in 2 weeks.

## 2017-10-10 NOTE — LETTER
10/11/2017 9:39 AM 
 
Mr. Oscar Valenzuela 58 [address] [place] [address] [facility] [id number] Dear Oscar Marvin: 
 
Please find your most recent results below. Resulted Orders CBC W/O DIFF Result Value Ref Range WBC 6.6 3.4 - 10.8 x10E3/uL  
 RBC 4.28 4.14 - 5.80 x10E6/uL HGB 14.4 12.6 - 17.7 g/dL HCT 43.9 37.5 - 51.0 %  (H) 79 - 97 fL  
 MCH 33.6 (H) 26.6 - 33.0 pg  
 MCHC 32.8 31.5 - 35.7 g/dL  
 RDW 14.1 12.3 - 15.4 % PLATELET 620 140 - 102 x10E3/uL Narrative Performed at:  [address]  [id number] : Franci Smith MD, Phone:  [phone] METABOLIC PANEL, BASIC Result Value Ref Range Glucose 61 (L) 65 - 99 mg/dL BUN 6 6 - 24 mg/dL Creatinine 0.56 (L) 0.76 - 1.27 mg/dL GFR est non- >59 mL/min/1.73 GFR est  >59 mL/min/1.73  
 BUN/Creatinine ratio 11 9 - 20 Sodium 139 134 - 144 mmol/L Potassium 4.4 3.5 - 5.2 mmol/L Chloride 92 (L) 96 - 106 mmol/L  
 CO2 33 (H) 18 - 29 mmol/L Calcium 9.6 8.7 - 10.2 mg/dL Narrative Performed at:  [address]  [id number] : Franci Smith MD, Phone:  [phone] RECOMMENDATIONS: 
 
Labs stable. Please call me if you have any questions: [phone] Sincerely, Toro Ferraro NP

## 2017-10-10 NOTE — MR AVS SNAPSHOT
Visit Information Date & Time Provider Department Dept. Phone Encounter #  
 10/10/2017 10:00 AM Sage Baca NP [facility] [phone] [id number] Your Appointments 10/26/2017 10:00 AM  
ROUTINE CARE with Sage Baca NP [facility] ([facility]) Appt Note: 1 mos f/u  
 [address] [facility] [address] [facility] [address] [facility] [address]  
[phone]  
  
   
 [address]  
  
    
 12/12/2017 10:45 AM  
ROUTINE CARE with Harjit Copeland DO [facility] ([facility]) Appt Note: 6 month f/u; 3 month follow up [address] [facility] [address] [facility] [address]  
[phone]  
  
   
 [address] Upcoming Health Maintenance Date Due Hepatitis C Screening 1960 DTaP/Tdap/Td series (1 - Tdap) 8/27/1980 COLONOSCOPY 9/2/2024 Allergies as of 10/10/2017  Review Complete On: 10/10/2017 By: Sage Baca NP No Known Allergies Current Immunizations  Reviewed on 7/17/2017 Name Date Influenza High Dose Vaccine PF 9/15/2017 Influenza Vaccine Intradermal PF 11/14/2016 PPD 1/31/2012 Pneumococcal Polysaccharide (PPSV-23) 11/14/2016 Not reviewed this visit You Were Diagnosed With   
  
 Codes Comments Wheezing    -  Primary ICD-10-CM: R06.2 ICD-9-CM: 786.07 Vitals BP Pulse Temp Resp Height(growth percentile) Weight(growth percentile) 126/86 (BP 1 Location: Left arm, BP Patient Position: Sitting) 96 96.1 °F (35.6 °C) (Oral) 18 5' 10\" (1.778 m) 124 lb 3.2 oz (56.3 kg) SpO2 BMI Smoking Status 91% 17.82 kg/m2 Current Every Day Smoker Vitals History BMI and BSA Data Body Mass Index Body Surface Area  
 17.82 kg/m 2 1.67 m 2 Preferred Pharmacy Pharmacy Name Phone [facility] [place] [address] [phone] Your Updated Medication List  
  
   
This list is accurate as of: 10/10/17 10:39 AM.  Always use your most recent med list.  
  
  
  
  
 ALPRAZolam 0.5 mg tablet Commonly known as:  Adi Fine Take 0.5 mg by mouth two (2) times daily as needed for Anxiety. benztropine 2 mg tablet Commonly known as:  COGENTIN Take 2 mg by mouth daily as needed. budesonide-formoterol 80-4.5 mcg/actuation Hfaa Commonly known as:  SYMBICORT Take 2 Puffs by inhalation two (2) times a day. guaiFENesin 100 mg/5 mL liquid Commonly known as:  Q-TUSSIN Take 10 mL by mouth every four (4) hours as needed for Cough. INCRUSE ELLIPTA 62.5 mcg/actuation inhaler Generic drug:  umeclidinium Take 1 Puff by inhalation daily. PARoxetine 20 mg tablet Commonly known as:  PAXIL Take 20 mg by mouth daily. predniSONE 10 mg tablet Commonly known as:  Susen Arbour 80 mg x 3 days then 60 mg x 3 days then 40 mg x 3 days then 20 mg x 3 days then stop  
  
 risperiDONE 3 mg tablet Commonly known as:  RisperDAL Take 3 mg by mouth daily. SEROquel 50 mg tablet Generic drug:  QUEtiapine Take 50 mg by mouth nightly as needed (for agitation or anxiety (Use 1st)). sodium chloride 1 gram tablet Take 1 Tab by mouth daily. Indications: hyponatremia  
  
 temazepam 30 mg capsule Commonly known as:  RESTORIL Take 30 mg by mouth nightly as needed for Sleep.  
  
 traZODone 100 mg tablet Commonly known as:  Ketan Oh Take 50 mg by mouth nightly as needed for Sleep. * VENTOLIN HFA 90 mcg/actuation inhaler Generic drug:  albuterol Take 2 Puffs by inhalation every four (4) hours as needed for Shortness of Breath. * albuterol 2.5 mg /3 mL (0.083 %) nebulizer solution Commonly known as:  PROVENTIL VENTOLIN  
2.5 mg by Nebulization route every six (6) hours as needed for Wheezing. * Notice:   This list has 2 medication(s) that are the same as other medications prescribed for you. Read the directions carefully, and ask your doctor or other care provider to review them with you. Please provide this summary of care documentation to your next provider. Your primary care clinician is listed as Horacio Hernandez. If you have any questions after today's visit, please call [phone].

## 2017-10-10 NOTE — PATIENT INSTRUCTIONS
Bronchitis: Care Instructions  Your Care Instructions    Bronchitis is inflammation of the bronchial tubes, which carry air to the lungs. The tubes swell and produce mucus, or phlegm. The mucus and inflamed bronchial tubes make you cough. You may have trouble breathing. Most cases of bronchitis are caused by viruses like those that cause colds. Antibiotics usually do not help and they may be harmful. Bronchitis usually develops rapidly and lasts about 2 to 3 weeks in otherwise healthy people. Follow-up care is a key part of your treatment and safety. Be sure to make and go to all appointments, and call your doctor if you are having problems. It's also a good idea to know your test results and keep a list of the medicines you take. How can you care for yourself at home? · Take all medicines exactly as prescribed. Call your doctor if you think you are having a problem with your medicine. · Get some extra rest.  · Take an over-the-counter pain medicine, such as acetaminophen (Tylenol), ibuprofen (Advil, Motrin), or naproxen (Aleve) to reduce fever and relieve body aches. Read and follow all instructions on the label. · Do not take two or more pain medicines at the same time unless the doctor told you to. Many pain medicines have acetaminophen, which is Tylenol. Too much acetaminophen (Tylenol) can be harmful. · Take an over-the-counter cough medicine that contains dextromethorphan to help quiet a dry, hacking cough so that you can sleep. Avoid cough medicines that have more than one active ingredient. Read and follow all instructions on the label. · Breathe moist air from a humidifier, hot shower, or sink filled with hot water. The heat and moisture will thin mucus so you can cough it out. · Do not smoke. Smoking can make bronchitis worse. If you need help quitting, talk to your doctor about stop-smoking programs and medicines. These can increase your chances of quitting for good.   When should you call for help? Call 911 anytime you think you may need emergency care. For example, call if:  · You have severe trouble breathing. Call your doctor now or seek immediate medical care if:  · You have new or worse trouble breathing. · You cough up dark brown or bloody mucus (sputum). · You have a new or higher fever. · You have a new rash. Watch closely for changes in your health, and be sure to contact your doctor if:  · You cough more deeply or more often, especially if you notice more mucus or a change in the color of your mucus. · You are not getting better as expected. Where can you learn more? Go to http://leila-kodak.info/. Enter H333 in the search box to learn more about \"Bronchitis: Care Instructions. \"  Current as of: March 25, 2017  Content Version: 11.3  © 7426-0994 [employer]. Care instructions adapted under license by [employer] (which disclaims liability or warranty for this information). If you have questions about a medical condition or this instruction, always ask your healthcare professional. [address] any warranty or liability for your use of this information. Chronic Obstructive Pulmonary Disease (COPD): Care Instructions  Your Care Instructions    Chronic obstructive pulmonary disease (COPD) is a general term for a group of lung diseases, including emphysema and chronic bronchitis. People with COPD have decreased airflow in and out of the lungs, which makes it hard to breathe. The airways also can get clogged with thick mucus. Cigarette smoking is a major cause of COPD. Although there is no cure for COPD, you can slow its progress. Following your treatment plan and taking care of yourself can help you feel better and live longer. Follow-up care is a key part of your treatment and safety. Be sure to make and go to all appointments, and call your doctor if you are having problems.  It's also a good idea to know your test results and keep a list of the medicines you take. How can you care for yourself at home? Staying healthy  · Do not smoke. This is the most important step you can take to prevent more damage to your lungs. If you need help quitting, talk to your doctor about stop-smoking programs and medicines. These can increase your chances of quitting for good. · Avoid colds and flu. Get a pneumococcal vaccine shot. If you have had one before, ask your doctor whether you need a second dose. Get the flu vaccine every fall. If you must be around people with colds or the flu, wash your hands often. · Avoid secondhand smoke, air pollution, and high altitudes. Also avoid cold, dry air and hot, humid air. Stay at home with your windows closed when air pollution is bad. Medicines and oxygen therapy  · Take your medicines exactly as prescribed. Call your doctor if you think you are having a problem with your medicine. · You may be taking medicines such as:  ¨ Bronchodilators. These help open your airways and make breathing easier. Bronchodilators are either short-acting (work for 6 to 9 hours) or long-acting (work for 24 hours). You inhale most bronchodilators, so they start to act quickly. Always carry your quick-relief inhaler with you in case you need it while you are away from home. ¨ Corticosteroids (prednisone, budesonide). These reduce airway inflammation. They come in pill or inhaled form. You must take these medicines every day for them to work well. · A spacer may help you get more inhaled medicine to your lungs. Ask your doctor or pharmacist if a spacer is right for you. If it is, ask how to use it properly. · Do not take any vitamins, over-the-counter medicine, or herbal products without talking to your doctor first.  · If your doctor prescribed antibiotics, take them as directed. Do not stop taking them just because you feel better. You need to take the full course of antibiotics.   · Oxygen therapy boosts the amount of oxygen in your blood and helps you breathe easier. Use the flow rate your doctor has recommended, and do not change it without talking to your doctor first.  Activity  · Get regular exercise. Walking is an easy way to get exercise. Start out slowly, and walk a little more each day. · Pay attention to your breathing. You are exercising too hard if you cannot talk while you are exercising. · Take short rest breaks when doing household chores and other activities. · Learn breathing methods--such as breathing through pursed lips--to help you become less short of breath. · If your doctor has not set you up with a pulmonary rehabilitation program, talk to him or her about whether rehab is right for you. Rehab includes exercise programs, education about your disease and how to manage it, help with diet and other changes, and emotional support. Diet  · Eat regular, healthy meals. Use bronchodilators about 1 hour before you eat to make it easier to eat. Eat several small meals instead of three large ones. Drink beverages at the end of the meal. Avoid foods that are hard to chew. · Eat foods that contain protein so that you do not lose muscle mass. · Talk with your doctor if you gain too much weight or if you lose weight without trying. Mental health  · Talk to your family, friends, or a therapist about your feelings. It is normal to feel frightened, angry, hopeless, helpless, and even guilty. Talking openly about bad feelings can help you cope. If these feelings last, talk to your doctor. When should you call for help? Call 911 anytime you think you may need emergency care. For example, call if:  · You have severe trouble breathing. Call your doctor now or seek immediate medical care if:  · You have new or worse trouble breathing. · You cough up blood. · You have a fever.   Watch closely for changes in your health, and be sure to contact your doctor if:  · You cough more deeply or more often, especially if you notice more mucus or a change in the color of your mucus. · You have new or worse swelling in your legs or belly. · You are not getting better as expected. Where can you learn more? Go to http://leila-kodak.info/. [place] in the search box to learn more about \"Chronic Obstructive Pulmonary Disease (COPD): Care Instructions. \"  Current as of: March 25, 2017  Content Version: 11.3  © 8318-5264 [employer]. Care instructions adapted under license by [employer] (which disclaims liability or warranty for this information). If you have questions about a medical condition or this instruction, always ask your healthcare professional. [place] [address] any warranty or liability for your use of this information. Chronic Obstructive Pulmonary Disease (COPD) Flare-Ups: Care Instructions  Your Care Instructions    Chronic obstructive pulmonary disease (COPD) is a lung disease that makes it hard to breathe. It is caused by damage to the lungs over many years, usually from smoking. COPD is often a mix of two diseases:  · Chronic bronchitis: The airways that carry air to the lungs (bronchial tubes) get inflamed and make a lot of mucus. This can narrow or block the airways. · Emphysema: In a healthy person, the tiny air sacs in the lungs are like balloons. As you breathe in and out, they get bigger and smaller to move air through your lungs. But with emphysema, these air sacs are damaged and lose their stretch. Less air gets in and out of the lungs. Many people with COPD have attacks called flare-ups or exacerbations. This is when your usual symptoms quickly get worse and stay worse. The doctor has checked you carefully. But problems can develop later. If you notice any problems or new symptoms, get medical treatment right away. Follow-up care is a key part of your treatment and safety.  Be sure to make and go to all appointments, and call your doctor if you are having problems. It's also a good idea to know your test results and keep a list of the medicines you take. How can you care for yourself at home? · Be safe with medicines. Take your medicines exactly as prescribed. Call your doctor if you think you are having a problem with your medicine. You may be taking medicines such as:  ¨ Bronchodilators. These help open your airways and make breathing easier. ¨ Corticosteroids. These reduce airway inflammation. They may be given as pills, in a vein, or in an inhaled form. You may go home with pills in addition to an inhaler that you already use. · A spacer may help you get more inhaled medicine to your lungs. Ask your doctor or pharmacist if a spacer is right for you. If it is, ask how to use it properly. · If your doctor prescribed antibiotics, take them as directed. Do not stop taking them just because you feel better. You need to take the full course of antibiotics. · If your doctor prescribed oxygen, use the flow rate your doctor has recommended. Do not change it without talking to your doctor first.  · Do not smoke. Smoking makes COPD worse. If you need help quitting, talk to your doctor about stop-smoking programs and medicines. These can increase your chances of quitting for good. When should you call for help? Call 911 anytime you think you may need emergency care. For example, call if:  · You have severe trouble breathing. Call your doctor now or seek immediate medical care if:  · You have new or worse trouble breathing. · Your coughing or wheezing gets worse. · You cough up dark brown or bloody mucus (sputum). · You have a new or higher fever. Watch closely for changes in your health, and be sure to contact your doctor if:  · You notice more mucus or a change in the color of your mucus. · You need to use your antibiotic or steroid pills. · You do not get better as expected. Where can you learn more?   Go to http://leila-kodak.info/. Enter E012 in the search box to learn more about \"Chronic Obstructive Pulmonary Disease (COPD) Flare-Ups: Care Instructions. \"  Current as of: March 25, 2017  Content Version: 11.3  © 0960-9564 [employer]. Care instructions adapted under license by [employer] (which disclaims liability or warranty for this information). If you have questions about a medical condition or this instruction, always ask your healthcare professional. [place] [address] any warranty or liability for your use of this information.

## 2017-10-11 LAB
BUN SERPL-MCNC: 6 MG/DL (ref 6–24)
BUN/CREAT SERPL: 11 (ref 9–20)
CALCIUM SERPL-MCNC: 9.6 MG/DL (ref 8.7–10.2)
CHLORIDE SERPL-SCNC: 92 MMOL/L (ref 96–106)
CO2 SERPL-SCNC: 33 MMOL/L (ref 18–29)
CREAT SERPL-MCNC: 0.56 MG/DL (ref 0.76–1.27)
ERYTHROCYTE [DISTWIDTH] IN BLOOD BY AUTOMATED COUNT: 14.1 % (ref 12.3–15.4)
GLUCOSE SERPL-MCNC: 61 MG/DL (ref 65–99)
HCT VFR BLD AUTO: 43.9 % (ref 37.5–51)
HGB BLD-MCNC: 14.4 G/DL (ref 12.6–17.7)
MCH RBC QN AUTO: 33.6 PG (ref 26.6–33)
MCHC RBC AUTO-ENTMCNC: 32.8 G/DL (ref 31.5–35.7)
MCV RBC AUTO: 103 FL (ref 79–97)
PLATELET # BLD AUTO: 180 X10E3/UL (ref 150–379)
POTASSIUM SERPL-SCNC: 4.4 MMOL/L (ref 3.5–5.2)
RBC # BLD AUTO: 4.28 X10E6/UL (ref 4.14–5.8)
SODIUM SERPL-SCNC: 139 MMOL/L (ref 134–144)
WBC # BLD AUTO: 6.6 X10E3/UL (ref 3.4–10.8)

## 2017-10-25 DIAGNOSIS — J45.902 ASTHMA WITH STATUS ASTHMATICUS: ICD-10-CM

## 2017-10-26 ENCOUNTER — HOSPITAL ENCOUNTER (INPATIENT)
Age: 57
LOS: 13 days | Discharge: NURSING, REHAB OR LONG-TERM CARE | DRG: 133 | End: 2017-11-08
Attending: EMERGENCY MEDICINE | Admitting: FAMILY MEDICINE
Payer: COMMERCIAL

## 2017-10-26 ENCOUNTER — OFFICE VISIT (OUTPATIENT)
Dept: INTERNAL MEDICINE CLINIC | Age: 57
End: 2017-10-26

## 2017-10-26 ENCOUNTER — APPOINTMENT (OUTPATIENT)
Dept: GENERAL RADIOLOGY | Age: 57
DRG: 133 | End: 2017-10-26
Attending: EMERGENCY MEDICINE
Payer: COMMERCIAL

## 2017-10-26 VITALS
BODY MASS INDEX: 20.39 KG/M2 | SYSTOLIC BLOOD PRESSURE: 141 MMHG | HEIGHT: 70 IN | DIASTOLIC BLOOD PRESSURE: 87 MMHG | RESPIRATION RATE: 18 BRPM | WEIGHT: 142.4 LBS | OXYGEN SATURATION: 72 % | TEMPERATURE: 97.5 F | HEART RATE: 101 BPM

## 2017-10-26 DIAGNOSIS — J43.9 PULMONARY EMPHYSEMA, UNSPECIFIED EMPHYSEMA TYPE (HCC): Primary | ICD-10-CM

## 2017-10-26 DIAGNOSIS — J44.1 ACUTE EXACERBATION OF CHRONIC OBSTRUCTIVE PULMONARY DISEASE (COPD) (HCC): ICD-10-CM

## 2017-10-26 DIAGNOSIS — F17.219 CIGARETTE NICOTINE DEPENDENCE WITH NICOTINE-INDUCED DISORDER: ICD-10-CM

## 2017-10-26 DIAGNOSIS — R63.1 PSYCHOGENIC POLYDIPSIA: ICD-10-CM

## 2017-10-26 DIAGNOSIS — J42 CHRONIC BRONCHITIS, UNSPECIFIED CHRONIC BRONCHITIS TYPE (HCC): ICD-10-CM

## 2017-10-26 DIAGNOSIS — R06.03 RESPIRATORY DISTRESS: Primary | ICD-10-CM

## 2017-10-26 DIAGNOSIS — R09.02 HYPOXIA: ICD-10-CM

## 2017-10-26 DIAGNOSIS — F54 PSYCHOGENIC POLYDIPSIA: ICD-10-CM

## 2017-10-26 DIAGNOSIS — E87.1 HYPONATREMIA: ICD-10-CM

## 2017-10-26 DIAGNOSIS — F25.1 SCHIZOAFFECTIVE DISORDER, DEPRESSIVE TYPE (HCC): ICD-10-CM

## 2017-10-26 LAB
ALBUMIN SERPL-MCNC: 3.1 G/DL (ref 3.5–5)
ALBUMIN/GLOB SERPL: 1 {RATIO} (ref 1.1–2.2)
ALP SERPL-CCNC: 85 U/L (ref 45–117)
ALT SERPL-CCNC: 66 U/L (ref 12–78)
ANION GAP SERPL CALC-SCNC: 3 MMOL/L (ref 5–15)
ARTERIAL PATENCY WRIST A: YES
AST SERPL-CCNC: 31 U/L (ref 15–37)
ATRIAL RATE: 80 BPM
BASE EXCESS BLD CALC-SCNC: 14 MMOL/L
BASOPHILS # BLD: 0 K/UL (ref 0–0.1)
BASOPHILS NFR BLD: 0 % (ref 0–1)
BDY SITE: ABNORMAL
BILIRUB SERPL-MCNC: 0.4 MG/DL (ref 0.2–1)
BNP SERPL-MCNC: 772 PG/ML (ref 0–125)
BUN SERPL-MCNC: 7 MG/DL (ref 6–20)
BUN/CREAT SERPL: 12 (ref 12–20)
CALCIUM SERPL-MCNC: 8.2 MG/DL (ref 8.5–10.1)
CALCULATED P AXIS, ECG09: 69 DEGREES
CALCULATED R AXIS, ECG10: 63 DEGREES
CALCULATED T AXIS, ECG11: 69 DEGREES
CHLORIDE SERPL-SCNC: 92 MMOL/L (ref 97–108)
CO2 SERPL-SCNC: 40 MMOL/L (ref 21–32)
CREAT SERPL-MCNC: 0.57 MG/DL (ref 0.7–1.3)
DIAGNOSIS, 93000: NORMAL
EOSINOPHIL # BLD: 0 K/UL (ref 0–0.4)
EOSINOPHIL NFR BLD: 0 % (ref 0–7)
ERYTHROCYTE [DISTWIDTH] IN BLOOD BY AUTOMATED COUNT: 13.8 % (ref 11.5–14.5)
GAS FLOW.O2 O2 DELIVERY SYS: ABNORMAL L/MIN
GAS FLOW.O2 SETTING OXYMISER: 2 L/M
GLOBULIN SER CALC-MCNC: 3.2 G/DL (ref 2–4)
GLUCOSE SERPL-MCNC: 117 MG/DL (ref 65–100)
HCO3 BLD-SCNC: 39.5 MMOL/L (ref 22–26)
HCT VFR BLD AUTO: 43.7 % (ref 36.6–50.3)
HGB BLD-MCNC: 13.9 G/DL (ref 12.1–17)
LYMPHOCYTES # BLD: 1.2 K/UL (ref 0.8–3.5)
LYMPHOCYTES NFR BLD: 12 % (ref 12–49)
MAGNESIUM SERPL-MCNC: 2.2 MG/DL (ref 1.6–2.4)
MCH RBC QN AUTO: 32.6 PG (ref 26–34)
MCHC RBC AUTO-ENTMCNC: 31.8 G/DL (ref 30–36.5)
MCV RBC AUTO: 102.3 FL (ref 80–99)
MONOCYTES # BLD: 0.8 K/UL (ref 0–1)
MONOCYTES NFR BLD: 9 % (ref 5–13)
NEUTS SEG # BLD: 7.9 K/UL (ref 1.8–8)
NEUTS SEG NFR BLD: 79 % (ref 32–75)
P-R INTERVAL, ECG05: 150 MS
PCO2 BLD: 66.6 MMHG (ref 35–45)
PH BLD: 7.38 [PH] (ref 7.35–7.45)
PLATELET # BLD AUTO: 223 K/UL (ref 150–400)
PO2 BLD: 62 MMHG (ref 80–100)
POTASSIUM SERPL-SCNC: 3.3 MMOL/L (ref 3.5–5.1)
PROT SERPL-MCNC: 6.3 G/DL (ref 6.4–8.2)
Q-T INTERVAL, ECG07: 374 MS
QRS DURATION, ECG06: 78 MS
QTC CALCULATION (BEZET), ECG08: 431 MS
RBC # BLD AUTO: 4.27 M/UL (ref 4.1–5.7)
SAO2 % BLD: 90 % (ref 92–97)
SODIUM SERPL-SCNC: 135 MMOL/L (ref 136–145)
SPECIMEN TYPE: ABNORMAL
TROPONIN I SERPL-MCNC: <0.04 NG/ML
TROPONIN I SERPL-MCNC: <0.04 NG/ML
VENTRICULAR RATE, ECG03: 80 BPM
WBC # BLD AUTO: 9.9 K/UL (ref 4.1–11.1)

## 2017-10-26 PROCEDURE — 96374 THER/PROPH/DIAG INJ IV PUSH: CPT

## 2017-10-26 PROCEDURE — 74011250636 HC RX REV CODE- 250/636: Performed by: EMERGENCY MEDICINE

## 2017-10-26 PROCEDURE — 65660000000 HC RM CCU STEPDOWN

## 2017-10-26 PROCEDURE — 74011000250 HC RX REV CODE- 250: Performed by: FAMILY MEDICINE

## 2017-10-26 PROCEDURE — 94640 AIRWAY INHALATION TREATMENT: CPT

## 2017-10-26 PROCEDURE — 77030029684 HC NEB SM VOL KT MONA -A

## 2017-10-26 PROCEDURE — 83880 ASSAY OF NATRIURETIC PEPTIDE: CPT | Performed by: EMERGENCY MEDICINE

## 2017-10-26 PROCEDURE — 93005 ELECTROCARDIOGRAM TRACING: CPT

## 2017-10-26 PROCEDURE — 85025 COMPLETE CBC W/AUTO DIFF WBC: CPT | Performed by: EMERGENCY MEDICINE

## 2017-10-26 PROCEDURE — 82803 BLOOD GASES ANY COMBINATION: CPT

## 2017-10-26 PROCEDURE — 83735 ASSAY OF MAGNESIUM: CPT | Performed by: FAMILY MEDICINE

## 2017-10-26 PROCEDURE — 74011000258 HC RX REV CODE- 258: Performed by: FAMILY MEDICINE

## 2017-10-26 PROCEDURE — 74011250637 HC RX REV CODE- 250/637: Performed by: FAMILY MEDICINE

## 2017-10-26 PROCEDURE — 84484 ASSAY OF TROPONIN QUANT: CPT | Performed by: EMERGENCY MEDICINE

## 2017-10-26 PROCEDURE — 77010033678 HC OXYGEN DAILY

## 2017-10-26 PROCEDURE — 71010 XR CHEST PORT: CPT

## 2017-10-26 PROCEDURE — 80053 COMPREHEN METABOLIC PANEL: CPT | Performed by: EMERGENCY MEDICINE

## 2017-10-26 PROCEDURE — 36600 WITHDRAWAL OF ARTERIAL BLOOD: CPT

## 2017-10-26 PROCEDURE — 74011250636 HC RX REV CODE- 250/636: Performed by: FAMILY MEDICINE

## 2017-10-26 PROCEDURE — 93306 TTE W/DOPPLER COMPLETE: CPT

## 2017-10-26 PROCEDURE — 74011000250 HC RX REV CODE- 250: Performed by: EMERGENCY MEDICINE

## 2017-10-26 PROCEDURE — 36415 COLL VENOUS BLD VENIPUNCTURE: CPT | Performed by: EMERGENCY MEDICINE

## 2017-10-26 PROCEDURE — 99285 EMERGENCY DEPT VISIT HI MDM: CPT

## 2017-10-26 RX ORDER — TEMAZEPAM 15 MG/1
30 CAPSULE ORAL
Status: DISCONTINUED | OUTPATIENT
Start: 2017-10-26 | End: 2017-11-08 | Stop reason: HOSPADM

## 2017-10-26 RX ORDER — SODIUM CHLORIDE 9 MG/ML
50 INJECTION, SOLUTION INTRAVENOUS CONTINUOUS
Status: CANCELLED | OUTPATIENT
Start: 2017-10-26

## 2017-10-26 RX ORDER — IPRATROPIUM BROMIDE AND ALBUTEROL SULFATE 2.5; .5 MG/3ML; MG/3ML
3 SOLUTION RESPIRATORY (INHALATION)
Status: COMPLETED | OUTPATIENT
Start: 2017-10-26 | End: 2017-10-26

## 2017-10-26 RX ORDER — METHYLPREDNISOLONE 4 MG/1
TABLET ORAL
COMMUNITY
Start: 2017-10-10 | End: 2017-10-26

## 2017-10-26 RX ORDER — ALBUTEROL SULFATE 0.83 MG/ML
2.5 SOLUTION RESPIRATORY (INHALATION) ONCE
Qty: 1 EACH | Refills: 0
Start: 2017-10-26 | End: 2017-10-26

## 2017-10-26 RX ORDER — IPRATROPIUM BROMIDE AND ALBUTEROL SULFATE 2.5; .5 MG/3ML; MG/3ML
3 SOLUTION RESPIRATORY (INHALATION)
Status: DISCONTINUED | OUTPATIENT
Start: 2017-10-26 | End: 2017-10-27

## 2017-10-26 RX ORDER — RISPERIDONE 1 MG/1
3 TABLET, FILM COATED ORAL DAILY
Status: DISCONTINUED | OUTPATIENT
Start: 2017-10-27 | End: 2017-11-08 | Stop reason: HOSPADM

## 2017-10-26 RX ORDER — ARFORMOTEROL TARTRATE 15 UG/2ML
15 SOLUTION RESPIRATORY (INHALATION)
Status: DISCONTINUED | OUTPATIENT
Start: 2017-10-26 | End: 2017-11-08 | Stop reason: HOSPADM

## 2017-10-26 RX ORDER — SODIUM CHLORIDE 0.9 % (FLUSH) 0.9 %
5-10 SYRINGE (ML) INJECTION AS NEEDED
Status: DISCONTINUED | OUTPATIENT
Start: 2017-10-26 | End: 2017-11-08 | Stop reason: HOSPADM

## 2017-10-26 RX ORDER — ALPRAZOLAM 0.5 MG/1
0.5 TABLET ORAL
Status: DISCONTINUED | OUTPATIENT
Start: 2017-10-26 | End: 2017-11-08 | Stop reason: HOSPADM

## 2017-10-26 RX ORDER — BUDESONIDE 0.5 MG/2ML
500 INHALANT ORAL
Status: DISCONTINUED | OUTPATIENT
Start: 2017-10-26 | End: 2017-10-26 | Stop reason: SDUPTHER

## 2017-10-26 RX ORDER — BUDESONIDE 0.5 MG/2ML
500 INHALANT ORAL
Status: DISCONTINUED | OUTPATIENT
Start: 2017-10-26 | End: 2017-11-08 | Stop reason: HOSPADM

## 2017-10-26 RX ORDER — QUETIAPINE FUMARATE 25 MG/1
50 TABLET, FILM COATED ORAL
Status: DISCONTINUED | OUTPATIENT
Start: 2017-10-26 | End: 2017-11-08 | Stop reason: HOSPADM

## 2017-10-26 RX ORDER — POTASSIUM CHLORIDE 750 MG/1
20 TABLET, FILM COATED, EXTENDED RELEASE ORAL
Status: COMPLETED | OUTPATIENT
Start: 2017-10-26 | End: 2017-10-26

## 2017-10-26 RX ORDER — SODIUM CHLORIDE 0.9 % (FLUSH) 0.9 %
5-10 SYRINGE (ML) INJECTION EVERY 8 HOURS
Status: DISCONTINUED | OUTPATIENT
Start: 2017-10-26 | End: 2017-11-08 | Stop reason: HOSPADM

## 2017-10-26 RX ORDER — BENZTROPINE MESYLATE 1 MG/1
2 TABLET ORAL
Status: DISCONTINUED | OUTPATIENT
Start: 2017-10-26 | End: 2017-11-08 | Stop reason: HOSPADM

## 2017-10-26 RX ORDER — PAROXETINE HYDROCHLORIDE 20 MG/1
20 TABLET, FILM COATED ORAL DAILY
Status: DISCONTINUED | OUTPATIENT
Start: 2017-10-27 | End: 2017-11-08 | Stop reason: HOSPADM

## 2017-10-26 RX ORDER — PREDNISONE 20 MG/1
TABLET ORAL
COMMUNITY
Start: 2017-10-10 | End: 2017-10-26

## 2017-10-26 RX ORDER — ALBUTEROL SULFATE 0.83 MG/ML
2.5 SOLUTION RESPIRATORY (INHALATION)
Status: DISCONTINUED | OUTPATIENT
Start: 2017-10-26 | End: 2017-11-03

## 2017-10-26 RX ORDER — SODIUM CHLORIDE TAB 1 GM 1 G
1 TAB MISCELLANEOUS DAILY
Status: DISCONTINUED | OUTPATIENT
Start: 2017-10-27 | End: 2017-11-08 | Stop reason: HOSPADM

## 2017-10-26 RX ORDER — IPRATROPIUM BROMIDE 0.5 MG/2.5ML
0.5 SOLUTION RESPIRATORY (INHALATION)
Status: DISCONTINUED | OUTPATIENT
Start: 2017-10-26 | End: 2017-11-08 | Stop reason: HOSPADM

## 2017-10-26 RX ORDER — ACETAMINOPHEN 325 MG/1
650 TABLET ORAL
Status: DISCONTINUED | OUTPATIENT
Start: 2017-10-26 | End: 2017-11-08 | Stop reason: HOSPADM

## 2017-10-26 RX ORDER — BUDESONIDE AND FORMOTEROL FUMARATE DIHYDRATE 80; 4.5 UG/1; UG/1
AEROSOL RESPIRATORY (INHALATION)
Qty: 1 INHALER | Refills: 3 | Status: SHIPPED | OUTPATIENT
Start: 2017-10-26 | End: 2017-10-30 | Stop reason: ALTCHOICE

## 2017-10-26 RX ADMIN — IPRATROPIUM BROMIDE AND ALBUTEROL SULFATE 3 ML: .5; 3 SOLUTION RESPIRATORY (INHALATION) at 12:00

## 2017-10-26 RX ADMIN — POTASSIUM CHLORIDE 20 MEQ: 750 TABLET, FILM COATED, EXTENDED RELEASE ORAL at 14:16

## 2017-10-26 RX ADMIN — Medication 10 ML: at 17:46

## 2017-10-26 RX ADMIN — Medication 10 ML: at 22:42

## 2017-10-26 RX ADMIN — IPRATROPIUM BROMIDE AND ALBUTEROL SULFATE 3 ML: .5; 3 SOLUTION RESPIRATORY (INHALATION) at 19:47

## 2017-10-26 RX ADMIN — IPRATROPIUM BROMIDE 0.5 MG: 0.5 SOLUTION RESPIRATORY (INHALATION) at 19:47

## 2017-10-26 RX ADMIN — BUDESONIDE 500 MCG: 0.5 INHALANT RESPIRATORY (INHALATION) at 19:46

## 2017-10-26 RX ADMIN — AZITHROMYCIN MONOHYDRATE 500 MG: 500 INJECTION, POWDER, LYOPHILIZED, FOR SOLUTION INTRAVENOUS at 19:57

## 2017-10-26 RX ADMIN — IPRATROPIUM BROMIDE AND ALBUTEROL SULFATE 3 ML: .5; 3 SOLUTION RESPIRATORY (INHALATION) at 11:44

## 2017-10-26 RX ADMIN — CEFTRIAXONE 1 G: 1 INJECTION, POWDER, FOR SOLUTION INTRAMUSCULAR; INTRAVENOUS at 17:45

## 2017-10-26 RX ADMIN — IPRATROPIUM BROMIDE AND ALBUTEROL SULFATE 3 ML: .5; 3 SOLUTION RESPIRATORY (INHALATION) at 11:59

## 2017-10-26 RX ADMIN — TEMAZEPAM 30 MG: 15 CAPSULE ORAL at 22:50

## 2017-10-26 RX ADMIN — METHYLPREDNISOLONE SODIUM SUCCINATE 125 MG: 125 INJECTION, POWDER, FOR SOLUTION INTRAMUSCULAR; INTRAVENOUS at 12:37

## 2017-10-26 RX ADMIN — METHYLPREDNISOLONE SODIUM SUCCINATE 60 MG: 125 INJECTION, POWDER, FOR SOLUTION INTRAMUSCULAR; INTRAVENOUS at 17:46

## 2017-10-26 NOTE — H&P
[address] [facility] [address]   HISTORY AND PHYSICAL       Name:  Ramon Phillips   MR#:  [id number]   :  1960   Account #:  [de-identified]        Date of Adm:  10/26/2017       CHIEF COMPLAINT: Shortness of breath. HISTORY OF PRESENT ILLNESS: The patient is a 26-year-old   gentleman with past medical history of COPD, history of schizophrenia,   history of hyponatremia, depression, who presents to the hospital with   the above mentioned symptoms. The patient reports that he has been   having some trouble breathing starting this morning, went to see his   physician. In the physician's office on arrival, was found to have   saturations in 70s, was given 2 albuterol treatments and the   saturations were in the 80s. The patient was told to come to the ER. The patient was found to have an oxygen saturation of 82% on room   air and was put on nasal cannula and it got up to 96%. The patient   continued to be short of breath, was given multiple doses of   nebulizers, but continued to be wheezing and was requested to be   admitted under the hospitalist service. The patient reports that he has   minimal cough with his symptoms with no expectoration. Denies any   chest pain associated with his symptoms. The patient also denies any   fever or chills. Denies any recent runny nose, sore throat, or upper   respiratory symptoms. The patient also denies any headache, blurry   vision, sore throat, trouble swallowing, trouble with speech, any chest   pain, abdominal pain, constipation, diarrhea, urinary symptoms, focal   or generalized neurological weakness, recent travels, sick contacts,   falls, injuries or any other concerns or problems. PAST MEDICAL HISTORY: See above. HOME MEDICATIONS: Currently the patient is on:   1. Incruse Ellipta. 2. Sodium chloride tablets. 3. Xanax 0.5 mg b.i.d.   4. Cogentin 2 mg by mouth as needed. 5. Paxil 20 mg daily. 6. Symbicort inhaler. 7. Restoril 30 mg nightly as needed for sleep. 8. Seroquel 50 mg nightly as needed for agitation and anxiety. 9. Risperdal 3 mg by mouth daily. 10. Albuterol p.r.n. 11. Trazodone. SOCIAL HISTORY: Current everyday smoker, smokes 1 pack per day   for 25 years. No alcohol. Denies IV drug abuse. Lives at home. ALLERGIES: NO KNOWN DRUG ALLERGIES. FAMILY HISTORY: Discussed. Mother had history of diabetes and   father had history of CHF. PHYSICAL EXAMINATION:   VITAL SIGNS: Temperature 96.7, pulse 82, respiratory 25, blood   pressure /91, pulse oximetry 95% on 2 liters. GENERAL: Alert x3, awake, flat affect, pleasant male, appears to be   stated age. HEENT: Pupils equal and reactive to light. Dry mucous membranes. Tympanic membranes clear. NECK: Supple. CHEST: Diffuse wheezes throughout lung fields. Decreased basal   breath sounds. CORONARY: S1, S2 were heard. ABDOMEN: Soft, nontender, nondistended. Bowel sounds are   physiological.   EXTREMITIES: No clubbing, no cyanosis, no edema. NEUROPSYCHIATRIC: Pleasant mood and affect. Cranial nerves 2-  12 grossly intact. Sensory grossly within normal limits. DTR 2+. Strength 5/5. SKIN: Warm. LABORATORY DATA: White count 9.9, hemoglobin 13.9, hematocrit   .7, platelets 765. Sodium 135, potassium 3.3, chloride 92,   bicarbonate 40, anion gap 3, BUN 7, creatinine 0.57, calcium 8.2,   bilirubin total 0.4, ALT 66, AST 31, alkaline phosphatase 85. Troponin   less than 0.04. . ABG shows a pH of 7.38, pCO2 of 66.6, pO2   of . X-ray of the chest shows clear lungs. EKG shows sinus rhythm   with PVCs. ASSESSMENT AND PLAN:   1. Acute on chronic respiratory failure, most likely secondary to chronic   obstructive pulmonary disease exacerbation. The patient will be   admitted to the hospital on a telemetry bed.  Will start the patient on IV   steroids, IV antibiotic, DuoNebs, continuous pulse oximetry monitoring   and will continue to closely monitor. Will provide supportive care with   oxygen and further intervention will be per hospital course. Will   continue to closely monitor. Reassess as needed. May consider getting   a Pulmonary consult if symptoms persist. Further intervention will be   per hospital course. 2. History of paranoid schizophrenia. Continue home medications. Continue to monitor. The patient denies suicidal or homicidal ideation. 3. History of depression. Continue home medication. 4. Hypertension. The patient appears to be mildly hypertensive. Will   monitor for now. Will provide p.r.n. medication. Further intervention will   be per hospital course. May consider scheduled medication if elevation   persists . 5. Gastrointestinal and deep venous thrombosis prophylaxis. The   patient will be on sequential compression devices. 6. History of chronic hyponatremia, stable, monitor.         MD Susy Gabriel / Johnella Angelucci   D:  10/26/2017   14:11   T:  10/26/2017   14:54   Job #:  [id number]

## 2017-10-26 NOTE — IP AVS SNAPSHOT
[address] [facility] [address] [place] [address] 
[phone] Patient: Imani Londono MRN: [id number] FHU:[id number] You are allergic to the following No active allergies Recent Documentation Height Weight BMI Smoking Status 1.778 m 64 kg 20.23 kg/m2 Current Every Day Smoker Emergency Contacts  (Rel.) Home Phone Work Phone Mobile Phone Jhonatan Munguia (Other Relative) [phone] -- [phone] Amada Maynard (Sister) [phone] -- -- About your hospitalization You were admitted on:  October 26, 2017 You last received care in the:  [facility] You were discharged on:  November 8, 2017 Why you were hospitalized Your primary diagnosis was:  Copd Exacerbation (Hcc) Providers Seen During Your Hospitalization Provider Specialty Primary office phone Luisa Toledo MD Emergency Medicine [phone] Reena Wilson MD Hospitalist [phone] Edy Lauren MD Internal Medicine [phone] Estefanía Lanier MD Internal Medicine [phone] Talya Magaña MD Internal Medicine [phone] Your Primary Care Physician (PCP) Primary Care Physician Office Phone Office Fax Errol Nunez [phone] Follow-up Information Follow up With Details Comments Contact Info Crystal Scruggs DO In 1 week or facility provider [address] [facility] [address] [place] [address] 
[phone] My Medications STOP taking these medications   
 budesonide-formoterol 80-4.5 mcg/actuation Hfaa Commonly known as:  SYMBICORT  
   
  
 guaiFENesin 100 mg/5 mL liquid Commonly known as:  Q-TUSSIN  
   
  
  
TAKE these medications as instructed Instructions Each Dose to Equal  
 Morning Noon Evening Bedtime  
 albuterol 90 mcg/actuation inhaler Commonly known as:  VENTOLIN HFA  
   
 Your last dose was: Your next dose is: Take 2 Puffs by inhalation every four (4) hours as needed for Shortness of Breath. 2 Puff ALPRAZolam 0.5 mg tablet Commonly known as:  Arron Brooks Your last dose was: Your next dose is: Take 1 Tab by mouth two (2) times daily as needed for Anxiety. Max Daily Amount: 1 mg. 0.5 mg  
    
   
   
   
  
 amLODIPine 5 mg tablet Commonly known as:  James Alstrom Your last dose was: Your next dose is: Take 1 Tab by mouth daily. 5 mg  
    
   
   
   
  
 benztropine 2 mg tablet Commonly known as:  COGENTIN Your last dose was: Your next dose is: Take 1 Tab by mouth daily as needed. 2 mg  
    
   
   
   
  
 fluticasone-vilanterol 100-25 mcg/dose inhaler Commonly known as:  GABRIELLA ELLIPTA Your last dose was: Your next dose is: Take 1 Puff by inhalation daily. Indications: COPD  
 1 Puff  
    
   
   
   
  
 nicotine 14 mg/24 hr patch Commonly known as:  Shae Schaeffer Your last dose was: Your next dose is:    
   
   
 1 Patch by TransDERmal route daily for 30 days. 1 Patch PARoxetine 20 mg tablet Commonly known as:  PAXIL Your last dose was: Your next dose is: Take 1 Tab by mouth daily. 20 mg  
    
   
   
   
  
 QUEtiapine 50 mg tablet Commonly known as:  SEROquel Your last dose was: Your next dose is: Take 1 Tab by mouth nightly as needed (for agitation or anxiety (Use 1st)). 50 mg  
    
   
   
   
  
 risperiDONE 3 mg tablet Commonly known as:  RisperDAL Your last dose was: Your next dose is: Take 1 Tab by mouth daily. 3 mg  
    
   
   
   
  
 sodium chloride 1 gram tablet Your last dose was: Your next dose is: Take 1 Tab by mouth daily. Indications: hyponatremia 1 g  
    
   
   
   
  
 temazepam 30 mg capsule Commonly known as:  RESTORIL Your last dose was: Your next dose is: Take 1 Cap by mouth nightly as needed for Sleep. Max Daily Amount: 30 mg.  
 30 mg  
    
   
   
   
  
 traZODone 100 mg tablet Commonly known as:  Debbe Gunner Your last dose was: Your next dose is: Take 0.5 Tabs by mouth nightly as needed for Sleep. 50 mg  
    
   
   
   
  
 umeclidinium 62.5 mcg/actuation inhaler Commonly known as:  INCRUSE ELLIPTA Your last dose was: Your next dose is: Take 1 Puff by inhalation daily. 1 Puff Where to Get Your Medications Information on where to get these meds will be given to you by the nurse or doctor. ! Ask your nurse or doctor about these medications  
  albuterol 90 mcg/actuation inhaler ALPRAZolam 0.5 mg tablet  
 amLODIPine 5 mg tablet  
 benztropine 2 mg tablet  
 fluticasone-vilanterol 100-25 mcg/dose inhaler  
 nicotine 14 mg/24 hr patch PARoxetine 20 mg tablet QUEtiapine 50 mg tablet  
 risperiDONE 3 mg tablet  
 sodium chloride 1 gram tablet  
 temazepam 30 mg capsule  
 traZODone 100 mg tablet  
 umeclidinium 62.5 mcg/actuation inhaler Discharge Instructions Discharge Summary  
  
  
PATIENT ID: Jonathan Crawford MRN: [id number] YOB: 1960 DATE OF ADMISSION: 10/26/2017 11:06 AM   
DATE OF DISCHARGE: 11/8/2017 PRIMARY CARE PROVIDER: Clydie Sever, DO  
  
ATTENDING PHYSICIAN: Ozzie Quintanilla MD 
DISCHARGING PROVIDER: Lois Cottrell NP To contact this individual call [phone] and ask the  to page.   If unavailable ask to be transferred the Adult Hospitalist Department. 
  
CONSULTATIONS: IP CONSULT TO HOSPITALIST 
  
PROCEDURES/SURGERIES: * No surgery found * 
  
ADMITTING [address] [place] [address] COURSE:  
 Pt presented to the ED on 10/26 with SOB. Pt visited PCP office day of admission where his saturations were in the 70's. He was given 2 nebulizer tx's and saturations improve to the 80%. Pt was transferred to the ED where his saturations were 82% on RA. He was admitted with acute hypoxic respiratory failure in the setting of COPD exacerbation. He was treated with nebulizers and steroids. Prior to coming to hospital he was not using oxygen at [facility] (this is where he resided for 18 years). Mr. Gerardo Lipscomb discharge was delayed by the need for oxygen as [facility] was unable to accommodate this need for oxygen. While disposition planning was underway the patient was safely weaned off of oxygen. A repeat 6 MWT was completed where he did not drop his saturations and remained 91% on RA with ambulation. VSS on discharge. Pt offers no complaints.  
  
  
DISCHARGE DIAGNOSES / PLAN:   
  
Acute hypoxic respiratory failure in the setting of COPD exacerbation: Resolved 
- does not use home oxygen PTA but will need on discharge - PT performed walk test 10/29:  
87% at rest on RA 
80% while ambulating on RA 
90% at rest on 4LPM 
88% while ambulating on 6LPM 
- 11/6 steroid taper completed - c/w nebs 
- 11/7 6 MWT 91% on RA throughout w/o dseaturation 
   
Tobacco Abuse:  
- smoking a ppd 
- counseled on smoking cessation - cessation and dangers of smoking while on oxygen have been discussed on several occasions. - on nicoderm patch - should continue on discharge to help with smoking cessation.  
   
History of paranoid schizophrenia: stable. C/w home meds 
   
History of depression: c/w home meds 
   
Hypertension:  
- started norvasc (5 mg) 11/1  
- BP more controlled 
   
History of chronic hyponatremia: Na normal 
- on sodium tabs  
  
  
PENDING TEST RESULTS:  
At the time of discharge the following test results are still pending: none 
  
FOLLOW UP APPOINTMENTS:   
Follow-up Information Follow up With Details Comments Contact Info  
  [address] [place] [address]     [address] [address] 
[phone]  
  Tashi Diaz, DO In 1 week or facility provider [address]  [address] [place] [address] 
[phone]  
  
  
  
ADDITIONAL CARE RECOMMENDATIONS: as above 
  
DIET: Cardiac 
  
ACTIVITY: Activity as tolerated 
  
WOUND CARE: none 
  
EQUIPMENT needed: none 
  
  
DISCHARGE MEDICATIONS: 
     
Current Discharge Medication List  
   
     
START taking these medications  
  Details  
amLODIPine (NORVASC) 5 mg tablet Take 1 Tab by mouth daily. Qty: 30 Tab, Refills: 0  
   
fluticasone-vilanterol (BREO ELLIPTA) 100-25 mcg/dose inhaler Take 1 Puff by inhalation daily. Indications: COPD Qty: 1 Inhaler, Refills: 1  
  Associated Diagnoses: Chronic bronchitis, unspecified chronic bronchitis type (HCC)  
   
nicotine (NICODERM CQ) 14 mg/24 hr patch 1 Patch by TransDERmal route daily for 30 days. Qty: 30 Patch, Refills: 0  
   
   
     
CONTINUE these medications which have CHANGED  
  Details  
benztropine (COGENTIN) 2 mg tablet Take 1 Tab by mouth daily as needed. Qty: 30 Tab, Refills: 0  
  PARoxetine (PAXIL) 20 mg tablet Take 1 Tab by mouth daily. Qty: 30 Tab, Refills: 0  
   
QUEtiapine (SEROQUEL) 50 mg tablet Take 1 Tab by mouth nightly as needed (for agitation or anxiety (Use 1st)). Qty: 30 Tab, Refills: 0  
   
risperiDONE (RISPERDAL) 3 mg tablet Take 1 Tab by mouth daily. Qty: 30 Tab, Refills: 0  
   
sodium chloride 1 gram tablet Take 1 Tab by mouth daily. Indications: hyponatremia Qty: 30 Tab, Refills: 0  
  Associated Diagnoses: Hyponatremia; Psychogenic polydipsia  
   
temazepam (RESTORIL) 30 mg capsule Take 1 Cap by mouth nightly as needed for Sleep. Max Daily Amount: 30 mg. 
Qty: 30 Cap, Refills: 0  
   
traZODone (DESYREL) 100 mg tablet Take 0.5 Tabs by mouth nightly as needed for Sleep.  
Qty: 15 Tab, Refills: 0  
   
 albuterol (VENTOLIN HFA) 90 mcg/actuation inhaler Take 2 Puffs by inhalation every four (4) hours as needed for Shortness of Breath. Qty: 1 Inhaler, Refills: 1  
   
umeclidinium (INCRUSE ELLIPTA) 62.5 mcg/actuation inhaler Take 1 Puff by inhalation daily. Qty: 1 Inhaler, Refills: 0  
  Associated Diagnoses: Chronic bronchitis, unspecified chronic bronchitis type (HCC)  
   
ALPRAZolam (XANAX) 0.5 mg tablet Take 1 Tab by mouth two (2) times daily as needed for Anxiety. Max Daily Amount: 1 mg. Qty: 30 Tab, Refills: 0  
   
   
    
STOP taking these medications  
   
  guaiFENesin (Q-TUSSIN) 100 mg/5 mL liquid Comments:  
Reason for Stopping:   
     
  budesonide-formoterol (SYMBICORT) 80-4.5 mcg/actuation HFAA inhaler Comments:  
Reason for Stopping:   
     
  albuterol (PROVENTIL VENTOLIN) 2.5 mg /3 mL (0.083 %) nebulizer solution Comments:  
Reason for Stopping:   
     
   
  
  
  
NOTIFY YOUR PHYSICIAN FOR ANY OF THE FOLLOWING:  
Fever over 101 degrees for 24 hours. Chest pain, shortness of breath, fever, chills, nausea, vomiting, diarrhea, change in mentation, falling, weakness, bleeding. Severe pain or pain not relieved by medications. Or, any other signs or symptoms that you may have questions about. 
  
DISPOSITION: 
   Home With: 
  OT   PT   HH   RN  
  
  Long term SNF/Inpatient Rehab  
xx Independent/assisted living  
  Hospice  
  Other:  
  
  
PATIENT CONDITION AT DISCHARGE:  
  
Functional status  
  Poor   
  Deconditioned   
xx Independent   
  
Cognition  
xx  Lucid   
  Forgetful   
  Dementia   
  
Catheters/lines (plus indication)  
  Singh   
  PICC   
  PEG   
xx None   
  
Code status  
xx  Full code   
  DNR   
  
PHYSICAL EXAMINATION AT DISCHARGE: 
Constitutional:  No acute distress, cooperative, pleasant   
ENT:  Oral mucous membranes moist, oropharynx benign. Poor dentition Resp:  Diminished, cta.  no sputum.  No accessory muscle use, RA  
CV:  Regular rhythm, no murmurs.    
  GI:  Soft, non distended, non tender. Normoactive bowel sounds  
 Musculoskeletal:  No edema, warm, 2+ pulses throughout  
 Neurologic:  Moves all extremities.  AAOx3  
                                          Psych: Poor insight, Not anxious nor agitated.  
                           Skin: Good turgor, no rashes or ulcers   
  
  
CHRONIC MEDICAL DIAGNOSES: 
Problem List as of 11/8/2017  Date Reviewed: 11/3/2017  
          Codes Class Noted - Resolved  
  Pulmonary emphysema ([facility] ICD-10-CM: J43.9 ICD-9-CM: 492.8   8/7/2017 - Present  
     
  Hypoalbuminemia due to protein-calorie malnutrition (HCC) ICD-10-CM: E46 
ICD-9-CM: 263.9   8/7/2017 - Present  
     
  Schizoaffective disorder, depressive type ([facility] ICD-10-CM: F25.1 ICD-9-CM: 295.70   8/7/2017 - Present  
     
  Cigarette nicotine dependence with nicotine-induced disorder ICD-10-CM: F17.219 ICD-9-CM: 292.9   8/7/2017 - Present  
     
  Seizure (HCC) ICD-10-CM: R56.9 ICD-9-CM: 780.39   7/14/2017 - Present  
     
  Benign prostatic hyperplasia without lower urinary tract symptoms ICD-10-CM: N40.0 ICD-9-CM: 600.00   5/15/2017 - Present  
     
  Underweight ICD-10-CM: R63.6 ICD-9-CM: 783.22   3/29/2016 - Present  
     
  Poor dentition ICD-10-CM: K08.9 ICD-9-CM: 892. 9   3/29/2016 - Present  
     
  Insomnia ICD-10-CM: G47.00 ICD-9-CM: 780.52   3/29/2016 - Present  
     
  Bronchitis, chronic ([facility] ICD-10-CM: G41 ICD-9-CM: 491.9   5/22/2014 - Present  
     
  Depression ICD-10-CM: F32.9 ICD-9-CM: 824   1/31/2012 - Present  
     
  * (Principal)RESOLVED: COPD exacerbation ([facility] ICD-10-CM: J44.1 ICD-9-CM: 491.21   10/26/2017 - 11/3/2017  
     
  RESOLVED: Shortness of breath ICD-10-CM: R06.02 
ICD-9-CM: 786.05   8/16/2017 - 8/23/2017  
     
  RESOLVED: Acute respiratory failure (HCC) ICD-10-CM: J96.00 
ICD-9-CM: 518.81   7/13/2017 - 7/20/2017  
     
  RESOLVED: Acute encephalopathy ICD-10-CM: G93.40 ICD-9-CM: 348.30   7/5/2017 - 7/20/2017  
     
  RESOLVED: Hyponatremia ICD-10-CM: E87.1 ICD-9-CM: 276.1   7/4/2017 - 7/20/2017  
     
  RESOLVED: Chronic obstructive pulmonary disease (HCC) ICD-10-CM: J44.9 ICD-9-CM: 291   3/29/2016 - 7/20/2017  
     
  RESOLVED: Tobacco dependence ICD-10-CM: O43.569 ICD-9-CM: 305. 1   3/29/2016 - 7/20/2017  
     
  RESOLVED: Paranoid schizophrenia ([facility] ICD-10-CM: F20.0 ICD-9-CM: 295.30   3/29/2016 - 7/20/2017  
     
  
  
  
Greater than 30 minutes were spent with the patient on counseling and coordination of care 
  
Signed:  
Neeraj Maloeny NP 
11/8/2017 
8:26 AM 
  
 
 
Discharge Instructions Attachments/References SMOKING: STOPPING (ENGLISH) Discharge Orders None [facility] Announcement We are excited to announce that we are making your provider's discharge notes available to you in Recurious[employer]. You will see these notes when they are completed and signed by the physician that discharged you from your recent hospital stay. If you have any questions or concerns about any information you see in Recurioushart, please call the Health Information Department where you were seen or reach out to your Primary Care Provider for more information about your plan of care. General Information Please provide this summary of care documentation to your next provider. Patient Signature:  ____________________________________________________________ Date:  ____________________________________________________________  
  
Aloma Snellen Provider Signature:  ____________________________________________________________ Date:  ____________________________________________________________

## 2017-10-26 NOTE — PROGRESS NOTES
Subjective:     Chief Complaint   Patient presents with    Follow-up     1 mo    Breathing Problem     Emphysema       History of Present Illness    Carmela Allen is a 61y.o. year old male that presents with his  from [facility]. He denies having any complaints at this time. However, his  reports increased SOB and wheezing. He reports continued water intake. He smokes 1 PPD. He appears SOB and audible wheezing heard. He reports compliance with his medications. He as been getting neb treatments q 4 hours at his assisted living. No neb treatments today. He appears SOB and dyspneic. The patient denies any associated symptoms. Denies CP, GI, or  complaints. Reviewed medications, recent lab work and imaging with patient. Pt reports compliance with medications. Neb treatment given in clinic. Current Outpatient Prescriptions on File Prior to Visit   Medication Sig Dispense Refill    umeclidinium (INCRUSE ELLIPTA) 62.5 mcg/actuation inhaler Take 1 Puff by inhalation daily.  sodium chloride 1 gram tablet Take 1 Tab by mouth daily. Indications: hyponatremia 30 Tab 1    guaiFENesin (Q-TUSSIN) 100 mg/5 mL liquid Take 10 mL by mouth every four (4) hours as needed for Cough. 10 mL 0    ALPRAZolam (XANAX) 0.5 mg tablet Take 0.5 mg by mouth two (2) times daily as needed for Anxiety.  benztropine (COGENTIN) 2 mg tablet Take 2 mg by mouth daily as needed.  PARoxetine (PAXIL) 20 mg tablet Take 20 mg by mouth daily.  budesonide-formoterol (SYMBICORT) 80-4.5 mcg/actuation HFAA inhaler Take 2 Puffs by inhalation two (2) times a day. 1 Inhaler 7    temazepam (RESTORIL) 30 mg capsule Take 30 mg by mouth nightly as needed for Sleep.  QUEtiapine (SEROQUEL) 50 mg tablet Take 50 mg by mouth nightly as needed (for agitation or anxiety (Use 1st)).  traZODone (DESYREL) 100 mg tablet Take 50 mg by mouth nightly as needed for Sleep.       risperiDONE (RISPERDAL) 3 mg tablet Take 3 mg by mouth daily.  albuterol (VENTOLIN HFA) 90 mcg/actuation inhaler Take 2 Puffs by inhalation every four (4) hours as needed for Shortness of Breath.  albuterol (PROVENTIL VENTOLIN) 2.5 mg /3 mL (0.083 %) nebulizer solution 2.5 mg by Nebulization route every six (6) hours as needed for Wheezing. No current facility-administered medications on file prior to visit. No Known Allergies   Past Medical History:   Diagnosis Date    Asthma     seldom,use inhaler    Bronchitis     Chronic obstructive pulmonary disease ([facility]     Depression     anxiety    Psychiatric disorder     paranoid schizophrenia    Psychiatric disorder     extrapyramidal disease    Psychotic disorder     mental retardation      Past Surgical History:   Procedure Laterality Date    HX ORTHOPAEDIC      right knee      Social History   Substance Use Topics    Smoking status: Current Every Day Smoker     Packs/day: 1.00     Years: 25.00    Smokeless tobacco: Never Used    Alcohol use No      Comment: socially      Family History   Problem Relation Age of Onset    Diabetes Mother     Heart Disease Father      CHF        Objective:     Vitals:    10/26/17 0951   BP: 141/87   Pulse: (!) 101   Resp: 18   Temp: 97.5 °F (36.4 °C)   SpO2: (!) 72%   Weight: 142 lb 6.4 oz (64.6 kg)   Height: 5' 10\" (1.778 m)       Review of Systems   Constitutional: Negative. HENT: Negative. Eyes: Negative. Respiratory: Positive for cough. Cardiovascular: Negative. Gastrointestinal: Negative. Genitourinary: Negative. Musculoskeletal: Negative. Neurological: Negative. Psychiatric/Behavioral: Negative. Physical Exam   Constitutional: He is oriented to person, place, and time. He appears well-developed. He has a sickly appearance. He appears distressed. Frail male  Moderate distress    HENT:   Head: Normocephalic and atraumatic.    Eyes: EOM are normal. Pupils are equal, round, and reactive to light.   Neck: Normal range of motion. Neck supple. Cardiovascular: Normal rate, regular rhythm and normal heart sounds. Pulmonary/Chest: Tachypnea noted. He is in respiratory distress. He has wheezes in the right upper field, the right middle field, the right lower field, the left upper field, the left middle field and the left lower field. He has rhonchi in the right upper field, the right middle field, the right lower field, the left upper field and the left middle field. Abdominal: Soft. Bowel sounds are normal. He exhibits no distension. There is no tenderness. Musculoskeletal: Normal range of motion. He exhibits no edema, tenderness or deformity. Neurological: He is alert and oriented to person, place, and time. Skin: Skin is warm and dry. He is not diaphoretic. Psychiatric: He has a normal mood and affect. His behavior is normal.   Nursing note and vitals reviewed. Assessment/ Plan:     Diagnoses and all orders for this visit:    1. Pulmonary emphysema, unspecified emphysema type ([facility]   Will order   -     albuterol (PROVENTIL VENTOLIN) 2.5 mg /3 mL (0.083 %) nebulizer solution; 3 mL by Nebulization route once for 1 dose. -     ALBUTEROL, INHAL. CGV.()  -     INHAL RX, AIRWAY OBST/DX SPUTUM INDUCT ([id number])    2. Schizoaffective disorder, depressive type ([facility]    3. Chronic bronchitis, unspecified chronic bronchitis type ([facility]    4. Cigarette nicotine dependence with nicotine-induced disorder    Pt given 2 neb treatments in office. Pt states never above 85%. Patient remained dyspneic and SOB. Lung sound coarse with wheezing in all lung fields. Pt sent to ED for further evaluation. Pt transported via wheelchair to ED with LPN and RN. Follow-up in 1 month    Discussed plan of care with Dr. Beatriz Em.

## 2017-10-26 NOTE — PROGRESS NOTES
Admission Medication Reconciliation:    Information obtained from: Patient    Significant PMH/Disease States:   Past Medical History:   Diagnosis Date    Asthma     seldom,use inhaler    Bronchitis     Chronic obstructive pulmonary disease ([address]     Depression     anxiety    Psychiatric disorder     paranoid schizophrenia    Psychiatric disorder     extrapyramidal disease    Psychotic disorder     mental retardation       Chief Complaint for this Admission:  SOB    Allergies:  Review of patient's allergies indicates no known allergies. Prior to Admission Medications:   Prior to Admission Medications   Prescriptions Last Dose Informant Patient Reported? Taking? ALPRAZolam (XANAX) 0.5 mg tablet 10/25/2017  Yes Yes   Sig: Take 0.5 mg by mouth two (2) times daily as needed for Anxiety. PARoxetine (PAXIL) 20 mg tablet 10/26/2017  Yes Yes   Sig: Take 20 mg by mouth daily. QUEtiapine (SEROQUEL) 50 mg tablet 10/19/2017  Yes Yes   Sig: Take 50 mg by mouth nightly as needed (for agitation or anxiety (Use 1st)). albuterol (PROVENTIL VENTOLIN) 2.5 mg /3 mL (0.083 %) nebulizer solution 10/26/2017 at am  Yes Yes   Si.5 mg by Nebulization route every six (6) hours as needed for Wheezing. albuterol (VENTOLIN HFA) 90 mcg/actuation inhaler   Yes Yes   Sig: Take 2 Puffs by inhalation every four (4) hours as needed for Shortness of Breath. benztropine (COGENTIN) 2 mg tablet 10/26/2017  Yes Yes   Sig: Take 2 mg by mouth daily as needed. budesonide-formoterol (SYMBICORT) 80-4.5 mcg/actuation HFAA inhaler 10/26/2017  No Yes   Sig: Take 2 Puffs by inhalation two (2) times a day. guaiFENesin (Q-TUSSIN) 100 mg/5 mL liquid   No Yes   Sig: Take 10 mL by mouth every four (4) hours as needed for Cough. risperiDONE (RISPERDAL) 3 mg tablet 10/26/2017  Yes Yes   Sig: Take 3 mg by mouth daily. sodium chloride 1 gram tablet 10/26/2017  No Yes   Sig: Take 1 Tab by mouth daily.  Indications: hyponatremia   temazepam (RESTORIL) 30 mg capsule 10/12/2017 at Unknown time  Yes Yes   Sig: Take 30 mg by mouth nightly as needed for Sleep.   traZODone (DESYREL) 100 mg tablet 10/12/2017  Yes No   Sig: Take 50 mg by mouth nightly as needed for Sleep.   umeclidinium (INCRUSE ELLIPTA) 62.5 mcg/actuation inhaler 10/26/2017  Yes Yes   Sig: Take 1 Puff by inhalation daily. Facility-Administered Medications: None         Comments/Recommendations: Patient is a good historian. Removed:   Albuterol nebulizer (one dose)  Medrol  Marcelino Clay  PharmD Candidate [id number]

## 2017-10-26 NOTE — ED PROVIDER NOTES
Patient is a 61 y.o. male presenting with shortness of breath. Shortness of Breath          62y M with hx of COPD, schizophrenia here with trouble breathing. Says he woke up this morning and had wheezing so used his inhaler. Made an appt to see his doctor. On arrival had sats in the 70's. Given 2 albuterol treatments with sats into the 80's. 87% on RA here with now 96% on 2L NC. States he feels short of breath. No fever. (+) cough. No leg swelling. No prior hx of cardiac problems that he is aware of. No rash. No vomiting. No diarrhea. No recent illnesses. Nothing makes sx's better or worse. Sx's are not positional.    Past Medical History:   Diagnosis Date    Asthma     seldom,use inhaler    Bronchitis     Chronic obstructive pulmonary disease (HCC)     Depression     anxiety    Psychiatric disorder     paranoid schizophrenia    Psychiatric disorder     extrapyramidal disease    Psychotic disorder     mental retardation       Past Surgical History:   Procedure Laterality Date    HX ORTHOPAEDIC      right knee         Family History:   Problem Relation Age of Onset    Diabetes Mother     Heart Disease Father      CHF       Social History     Social History    Marital status: SINGLE     Spouse name: N/A    Number of children: N/A    Years of education: N/A     Occupational History    Not on file. Social History Main Topics    Smoking status: Current Every Day Smoker     Packs/day: 1.00     Years: 25.00    Smokeless tobacco: Never Used    Alcohol use No      Comment: socially    Drug use: Yes     Special: Sedatives/Hypnotics, Prescription    Sexual activity: Not Currently     Birth control/ protection: None      Comment: lives in [place]     Other Topics Concern    Not on file     Social History Narrative         ALLERGIES: Review of patient's allergies indicates no known allergies. Review of Systems   Respiratory: Positive for shortness of breath.     Review of Systems Constitutional: (-) weight loss. HEENT: (-) stiff neck   Eyes: (-) discharge. Respiratory: (+) for cough. Cardiovascular: (-) syncope. Gastrointestinal: (-) blood in stool. Genitourinary: (-) hematuria. Musculoskeletal: (-) myalgias. Neurological: (-) seizure. Skin: (-) petechiae  Lymph/Immunologic: (-) enlarged lymph nodes  All other systems reviewed and are negative. Vitals:    10/26/17 1113   BP: 141/87   Pulse: 96   Resp: 22   Temp: 97.8 °F (36.6 °C)   SpO2: (!) 89%   Weight: 64.5 kg (142 lb 3.2 oz)   Height: 5' 10\" (1.778 m)            Physical Exam Nursing note and vitals reviewed. Constitutional: oriented to person, place, and time. appears well-developed and well-nourished. No distress. Head: Normocephalic and atraumatic. Sclera anicteric  Nose: No rhinorrhea  Mouth/Throat: Oropharynx is clear and moist. Pharynx normal  Eyes: Conjunctivae are normal. Pupils are equal, round, and reactive to light. Right eye exhibits no discharge. Left eye exhibits no discharge. Neck: Painless normal range of motion. Neck supple. No LAD. Cardiovascular: Normal rate, regular rhythm, normal heart sounds and intact distal pulses. Exam reveals no gallop and no friction rub. No murmur heard. Pulmonary/Chest:  Moderate respiratory distress. Diffuse coarse wheezes. No rales. No rhonchi. (+) increased work of breathing. (+) accessory muscle use. Good air exchange throughout. Abdominal: soft, non-tender, no rebound or guarding. No hepatosplenomegaly. Normal bowel sounds throughout. Back: no tenderness to palpation, no deformities, no CVA tenderness  Extremities/Musculoskeletal: Normal range of motion. no tenderness. No edema. Distal extremities are neurovasc intact. Lymphadenopathy:   No adenopathy. Neurological:  Alert and oriented to person, place, and time. Coordination normal. CN 2-12 intact. Motor and sensory function intact. Skin: Skin is warm and dry. No rash noted. No pallor.        MDM 57y M here with shortness of breath. Will need labs, cxr, ecg and admission. More nebs and steroids. ED Course       Procedures      ED EKG interpretation:  Rhythm: normal sinus rhythm; and regular . Rate (approx.): 80; Axis: normal; P wave: normal; QRS interval: normal ; ST/T wave: normal;  This EKG was interpreted by Owen Stewart MD,ED Provider.

## 2017-10-26 NOTE — PATIENT INSTRUCTIONS
COPD Exacerbation Plan: Care Instructions  Your Care Instructions    If you have chronic obstructive pulmonary disease (COPD), your usual shortness of breath could suddenly get worse. You may start coughing more and have more mucus. This flare-up is called a COPD exacerbation (say \"[id number]\"). A lung infection or air pollution could set off an exacerbation. Sometimes it can happen after a quick change in temperature or being around chemicals. Work with your doctor to make a plan for dealing with an exacerbation. You can better manage it if you plan ahead. Follow-up care is a key part of your treatment and safety. Be sure to make and go to all appointments, and call your doctor if you are having problems. It's also a good idea to know your test results and keep a list of the medicines you take. How can you care for yourself at home? During an exacerbation  · Do not panic if you start to have one. Quick treatment at home may help you prevent serious breathing problems. If you have a COPD exacerbation plan that you developed with your doctor, follow it. · Take your medicines exactly as your doctor tells you. ¨ Use your inhaler as directed by your doctor. If your symptoms do not get better after you use your medicine, have someone take you to the emergency room. Call an ambulance if necessary. ¨ With inhaled medicines, a spacer or a nebulizer may help you get more medicine to your lungs. Ask your doctor or pharmacist how to use them properly. Practice using the spacer in front of a mirror before you have an exacerbation. This may help you get the medicine into your lungs quickly. ¨ If your doctor has given you steroid pills, take them as directed. ¨ Your doctor may have given you a prescription for antibiotics, which you can fill if you need it. ¨ Talk to your doctor if you have any problems with your medicine.  And call your doctor if you have to use your antibiotic or steroid pills.  Preventing an exacerbation  · Do not smoke. This is the most important step you can take to prevent more damage to your lungs and prevent problems. If you already smoke, it is never too late to stop. If you need help quitting, talk to your doctor about stop-smoking programs and medicines. These can increase your chances of quitting for good. · Take your daily medicines as prescribed. · Avoid colds and flu. ¨ Get a pneumococcal vaccine. ¨ Get a flu vaccine each year, as soon as it is available. Ask those you live or work with to do the same, so they will not get the flu and infect you. ¨ Try to stay away from people with colds or the flu. ¨ Wash your hands often. · Avoid secondhand smoke; air pollution; cold, dry air; hot, humid air; and high altitudes. Stay at home with your windows closed when air pollution is bad. · Learn breathing techniques for COPD, such as breathing through pursed lips. These techniques can help you breathe easier during an exacerbation. When should you call for help? Call 911 anytime you think you may need emergency care. For example, call if:  ? · You have severe trouble breathing. ? · You have severe chest pain. ?Call your doctor now or seek immediate medical care if:  ? · You have new or worse shortness of breath. ? · You develop new chest pain. ? · You are coughing more deeply or more often, especially if you notice more mucus or a change in the color of your mucus. ? · You cough up blood. ? · You have new or increased swelling in your legs or belly. ? · You have a fever. ? Watch closely for changes in your health, and be sure to contact your doctor if:  ? · You need to use your antibiotic or steroid pills. ? · Your symptoms are getting worse. Where can you learn more? Go to http://leila-kodak.info/. Enter T926 in the search box to learn more about \"COPD Exacerbation Plan: Care Instructions. \"  Current as of:  May 12, 2017  Content Version: 11.4  © 8739-8642 [employer]. Care instructions adapted under license by [employer] (which disclaims liability or warranty for this information). If you have questions about a medical condition or this instruction, always ask your healthcare professional. [address] any warranty or liability for your use of this information. Chronic Obstructive Pulmonary Disease (COPD) Flare-Ups: Care Instructions  Your Care Instructions    Chronic obstructive pulmonary disease (COPD) is a lung disease that makes it hard to breathe. It is caused by damage to the lungs over many years, usually from smoking. COPD is often a mix of two diseases:  · Chronic bronchitis: The airways that carry air to the lungs (bronchial tubes) get inflamed and make a lot of mucus. This can narrow or block the airways. · Emphysema: In a healthy person, the tiny air sacs in the lungs are like balloons. As you breathe in and out, they get bigger and smaller to move air through your lungs. But with emphysema, these air sacs are damaged and lose their stretch. Less air gets in and out of the lungs. Many people with COPD have attacks called flare-ups or exacerbations. This is when your usual symptoms quickly get worse and stay worse. The doctor has checked you carefully. But problems can develop later. If you notice any problems or new symptoms, get medical treatment right away. Follow-up care is a key part of your treatment and safety. Be sure to make and go to all appointments, and call your doctor if you are having problems. It's also a good idea to know your test results and keep a list of the medicines you take. How can you care for yourself at home? · Be safe with medicines. Take your medicines exactly as prescribed. Call your doctor if you think you are having a problem with your medicine. You may be taking medicines such as:  ¨ Bronchodilators.  These help open your airways and make breathing easier. ¨ Corticosteroids. These reduce airway inflammation. They may be given as pills, in a vein, or in an inhaled form. You may go home with pills in addition to an inhaler that you already use. · A spacer may help you get more inhaled medicine to your lungs. Ask your doctor or pharmacist if a spacer is right for you. If it is, ask how to use it properly. · If your doctor prescribed antibiotics, take them as directed. Do not stop taking them just because you feel better. You need to take the full course of antibiotics. · If your doctor prescribed oxygen, use the flow rate your doctor has recommended. Do not change it without talking to your doctor first.  · Do not smoke. Smoking makes COPD worse. If you need help quitting, talk to your doctor about stop-smoking programs and medicines. These can increase your chances of quitting for good. When should you call for help? Call 911 anytime you think you may need emergency care. For example, call if:  ? · You have severe trouble breathing. ?Call your doctor now or seek immediate medical care if:  ? · You have new or worse trouble breathing. ? · Your coughing or wheezing gets worse. ? · You cough up dark brown or bloody mucus (sputum). ? · You have a new or higher fever. ? Watch closely for changes in your health, and be sure to contact your doctor if:  ? · You notice more mucus or a change in the color of your mucus. ? · You need to use your antibiotic or steroid pills. ? · You do not get better as expected. Where can you learn more? Go to http://leila-kodak.info/. Enter J239 in the search box to learn more about \"Chronic Obstructive Pulmonary Disease (COPD) Flare-Ups: Care Instructions. \"  Current as of: May 12, 2017  Content Version: 11.4  © 0379-8582 [employer]. Care instructions adapted under license by [employer] (which disclaims liability or warranty for this information).  If you have questions about a medical condition or this instruction, always ask your healthcare professional. [place] [address] any warranty or liability for your use of this information.

## 2017-10-26 NOTE — ED TRIAGE NOTES
Brought from [facility] for oxygen sats in the 70s. Received 2 breathing treatments at the office but stats increased to 80%. 87% on RA upon arrival.  Pt reports SOB but is speaking in complete sentences and not in distress. Placed on 2L oxygen via nasal cannula.

## 2017-10-26 NOTE — PROGRESS NOTES
Health Maintenance Due   Topic Date Due    Hepatitis C Screening  1960    DTaP/Tdap/Td series (1 - Tdap) 08/27/1980       Chief Complaint   Patient presents with    Follow-up     1 mo    Breathing Problem     Emphysema       1. Have you been to the ER, urgent care clinic since your last visit? Hospitalized since your last visit? No    2. Have you seen or consulted any other health care providers outside of the [address] [place] [address] since your last visit? Include any pap smears or colon screening. No    3) Do you have an Advance Directive on file? no    4) Are you interested in receiving information on Advance Directives? NO      Patient is accompanied by adult caretaker I have received verbal consent from Celso Wang to discuss any/all medical information while they are present in the room. 1 mo follow up visit. Nebulizer treatments ordered by SHAKEEL Galaviz NP r/t SOB & dyspneic. Tolerated well.

## 2017-10-26 NOTE — PROGRESS NOTES
Primary Nurse Drew Carlos RN and Suraj Bunch RN performed a dual skin assessment on this patient No impairment noted  Sanya score is 22

## 2017-10-26 NOTE — PROGRESS NOTES
Bedside and Verbal shift change report given to Jesus Freitas (oncoming nurse) by Liset Azar (offgoing nurse). Report included the following information SBAR, Kardex, ED Summary, Procedure Summary, Intake/Output, Recent Results, Med Rec Status and Cardiac Rhythm NSR.

## 2017-10-26 NOTE — ROUTINE PROCESS
TRANSFER - OUT REPORT:    Verbal report given to Brian Serna RN (name) on [facility]  being transferred to [facility] (unit) for routine progression of care       Report consisted of patients Situation, Background, Assessment and   Recommendations(SBAR). Information from the following report(s) SBAR, ED Summary, Intake/Output, MAR, Recent Results and Cardiac Rhythm sinus was reviewed with the receiving nurse. Lines: 20g L AC       Opportunity for questions and clarification was provided. Patient transported with:   Tech    UPDATE: Pt assessment unchanged, pt continue to maintain O2 sats on 2L NC, VSS, no complaints at this time, waiting on transport to inpatient unit.      Visit Vitals    BP (!) 149/91 (BP 1 Location: Left arm, BP Patient Position: At rest)    Pulse 82    Temp 96.7 °F (35.9 °C)    Resp 25    Ht 5' 10\" (1.778 m)    Wt 64.5 kg (142 lb 3.2 oz)    SpO2 95%    BMI 20.4 kg/m2

## 2017-10-27 DIAGNOSIS — J42 CHRONIC BRONCHITIS, UNSPECIFIED CHRONIC BRONCHITIS TYPE (HCC): Primary | ICD-10-CM

## 2017-10-27 LAB
ANION GAP SERPL CALC-SCNC: 9 MMOL/L (ref 5–15)
BASOPHILS # BLD: 0 K/UL (ref 0–0.1)
BASOPHILS NFR BLD: 0 % (ref 0–1)
BUN SERPL-MCNC: 6 MG/DL (ref 6–20)
BUN/CREAT SERPL: 9 (ref 12–20)
CALCIUM SERPL-MCNC: 8.4 MG/DL (ref 8.5–10.1)
CHLORIDE SERPL-SCNC: 95 MMOL/L (ref 97–108)
CO2 SERPL-SCNC: 30 MMOL/L (ref 21–32)
CREAT SERPL-MCNC: 0.67 MG/DL (ref 0.7–1.3)
DIFFERENTIAL METHOD BLD: ABNORMAL
EOSINOPHIL # BLD: 0 K/UL (ref 0–0.4)
EOSINOPHIL NFR BLD: 0 % (ref 0–7)
ERYTHROCYTE [DISTWIDTH] IN BLOOD BY AUTOMATED COUNT: 13.8 % (ref 11.5–14.5)
GLUCOSE SERPL-MCNC: 155 MG/DL (ref 65–100)
HCT VFR BLD AUTO: 44.6 % (ref 36.6–50.3)
HGB BLD-MCNC: 14.2 G/DL (ref 12.1–17)
LYMPHOCYTES # BLD: 0.2 K/UL (ref 0.8–3.5)
LYMPHOCYTES NFR BLD: 2 % (ref 12–49)
MCH RBC QN AUTO: 32.3 PG (ref 26–34)
MCHC RBC AUTO-ENTMCNC: 31.8 G/DL (ref 30–36.5)
MCV RBC AUTO: 101.6 FL (ref 80–99)
MONOCYTES # BLD: 0.2 K/UL (ref 0–1)
MONOCYTES NFR BLD: 2 % (ref 5–13)
NEUTS BAND NFR BLD MANUAL: 2 %
NEUTS SEG # BLD: 10.7 K/UL (ref 1.8–8)
NEUTS SEG NFR BLD: 94 % (ref 32–75)
PLATELET # BLD AUTO: 241 K/UL (ref 150–400)
PLATELET COMMENTS,PCOM: ABNORMAL
POTASSIUM SERPL-SCNC: 4.2 MMOL/L (ref 3.5–5.1)
RBC # BLD AUTO: 4.39 M/UL (ref 4.1–5.7)
RBC MORPH BLD: ABNORMAL
SODIUM SERPL-SCNC: 134 MMOL/L (ref 136–145)
TROPONIN I SERPL-MCNC: <0.04 NG/ML
WBC # BLD AUTO: 11.1 K/UL (ref 4.1–11.1)
WBC MORPH BLD: ABNORMAL

## 2017-10-27 PROCEDURE — 77010033678 HC OXYGEN DAILY

## 2017-10-27 PROCEDURE — 65660000000 HC RM CCU STEPDOWN

## 2017-10-27 PROCEDURE — 94762 N-INVAS EAR/PLS OXIMTRY CONT: CPT

## 2017-10-27 PROCEDURE — 74011000250 HC RX REV CODE- 250: Performed by: FAMILY MEDICINE

## 2017-10-27 PROCEDURE — 94640 AIRWAY INHALATION TREATMENT: CPT

## 2017-10-27 PROCEDURE — 74011250637 HC RX REV CODE- 250/637: Performed by: HOSPITALIST

## 2017-10-27 PROCEDURE — 84484 ASSAY OF TROPONIN QUANT: CPT | Performed by: FAMILY MEDICINE

## 2017-10-27 PROCEDURE — 74011250636 HC RX REV CODE- 250/636: Performed by: FAMILY MEDICINE

## 2017-10-27 PROCEDURE — 74011250636 HC RX REV CODE- 250/636: Performed by: NURSE PRACTITIONER

## 2017-10-27 PROCEDURE — 80048 BASIC METABOLIC PNL TOTAL CA: CPT | Performed by: FAMILY MEDICINE

## 2017-10-27 PROCEDURE — 36415 COLL VENOUS BLD VENIPUNCTURE: CPT | Performed by: FAMILY MEDICINE

## 2017-10-27 PROCEDURE — 74011250637 HC RX REV CODE- 250/637: Performed by: FAMILY MEDICINE

## 2017-10-27 PROCEDURE — 85025 COMPLETE CBC W/AUTO DIFF WBC: CPT | Performed by: FAMILY MEDICINE

## 2017-10-27 RX ORDER — IPRATROPIUM BROMIDE AND ALBUTEROL SULFATE 2.5; .5 MG/3ML; MG/3ML
3 SOLUTION RESPIRATORY (INHALATION)
Status: DISCONTINUED | OUTPATIENT
Start: 2017-10-27 | End: 2017-11-08 | Stop reason: HOSPADM

## 2017-10-27 RX ORDER — IBUPROFEN 200 MG
1 TABLET ORAL DAILY
Status: DISCONTINUED | OUTPATIENT
Start: 2017-10-27 | End: 2017-11-08 | Stop reason: HOSPADM

## 2017-10-27 RX ADMIN — SODIUM CHLORIDE TAB 1 GM 1 G: 1 TAB at 08:20

## 2017-10-27 RX ADMIN — BUDESONIDE 500 MCG: 0.5 INHALANT RESPIRATORY (INHALATION) at 08:46

## 2017-10-27 RX ADMIN — METHYLPREDNISOLONE SODIUM SUCCINATE 60 MG: 125 INJECTION, POWDER, FOR SOLUTION INTRAMUSCULAR; INTRAVENOUS at 11:10

## 2017-10-27 RX ADMIN — IPRATROPIUM BROMIDE AND ALBUTEROL SULFATE 3 ML: .5; 3 SOLUTION RESPIRATORY (INHALATION) at 08:46

## 2017-10-27 RX ADMIN — ARFORMOTEROL TARTRATE 15 MCG: 15 SOLUTION RESPIRATORY (INHALATION) at 21:02

## 2017-10-27 RX ADMIN — IPRATROPIUM BROMIDE 0.5 MG: 0.5 SOLUTION RESPIRATORY (INHALATION) at 21:02

## 2017-10-27 RX ADMIN — METHYLPREDNISOLONE SODIUM SUCCINATE 60 MG: 125 INJECTION, POWDER, FOR SOLUTION INTRAMUSCULAR; INTRAVENOUS at 00:19

## 2017-10-27 RX ADMIN — METHYLPREDNISOLONE SODIUM SUCCINATE 60 MG: 125 INJECTION, POWDER, FOR SOLUTION INTRAMUSCULAR; INTRAVENOUS at 21:45

## 2017-10-27 RX ADMIN — IPRATROPIUM BROMIDE AND ALBUTEROL SULFATE 3 ML: .5; 3 SOLUTION RESPIRATORY (INHALATION) at 00:22

## 2017-10-27 RX ADMIN — METHYLPREDNISOLONE SODIUM SUCCINATE 60 MG: 125 INJECTION, POWDER, FOR SOLUTION INTRAMUSCULAR; INTRAVENOUS at 05:40

## 2017-10-27 RX ADMIN — Medication 10 ML: at 21:45

## 2017-10-27 RX ADMIN — RISPERIDONE 3 MG: 1 TABLET ORAL at 08:20

## 2017-10-27 RX ADMIN — QUETIAPINE FUMARATE 50 MG: 25 TABLET ORAL at 21:45

## 2017-10-27 RX ADMIN — Medication 10 ML: at 14:00

## 2017-10-27 RX ADMIN — Medication 10 ML: at 05:41

## 2017-10-27 RX ADMIN — PAROXETINE HYDROCHLORIDE 20 MG: 20 TABLET, FILM COATED ORAL at 08:20

## 2017-10-27 RX ADMIN — BUDESONIDE 500 MCG: 0.5 INHALANT RESPIRATORY (INHALATION) at 21:02

## 2017-10-27 RX ADMIN — IPRATROPIUM BROMIDE 0.5 MG: 0.5 SOLUTION RESPIRATORY (INHALATION) at 13:43

## 2017-10-27 RX ADMIN — IPRATROPIUM BROMIDE AND ALBUTEROL SULFATE 3 ML: .5; 3 SOLUTION RESPIRATORY (INHALATION) at 15:58

## 2017-10-27 NOTE — PROGRESS NOTES
10/27/17 1900   Vitals   Temp 98.5 °F (36.9 °C)   Temp Source Oral   Pulse (Heart Rate) (!) 101   Heart Rate Source Monitor   Resp Rate 26   O2 Sat (%) 91 %   Level of Consciousness Alert   /81   MAP (Calculated) 99   BP 1 Location Left arm   BP 1 Method Automatic   BP Patient Position At rest   MEWS Score 3   Pain 1   Pain Scale 1 Numeric (0 - 10)   Pain Intensity 1 0   Patient Stated Pain Goal 0   Pain Reassessment 1 Yes   Oxygen Therapy   O2 Device Nasal cannula   O2 Flow Rate (L/min) 2.5 l/min   MEWS of 3 r/t RR of 26. Patient in the room with no c/o sob or difficulty breathing. MD aware of patient condition.

## 2017-10-27 NOTE — PROGRESS NOTES
NUTRITION COMPLETE ASSESSMENT    RECOMMENDATIONS:   1. Continue current diet, Supplements (specify)  2. Weekly weights     Interventions/Plan:   Food/Nutrient Delivery:           RD to add supplement    Assessment:   Reason for Assessment:   [x]BPA/MST Referral     Diet: Cardiac, Regular  Supplements: none  Nutritionally Significant Medications: [x] Reviewed & Includes:  Methylprednisolone  Meal Intake: Patient Vitals for the past 100 hrs:   % Diet Eaten   10/27/17 0825 100 %       Pre-Hospitalization: Unsure       Current Hospitalization:   Fluid Restriction:  none  Appetite: Excellent  PO Ability: Independent Average po intake:%  Average supplements intake:  n/a      Subjective:  Pt poor historian; spoke with RN    Objective:  Pt seen for MST. Pt admitted with COPD exacerbation. PMHs:  COPD, schizophrenia, hyponatremia, depression, HTN. Reviewed chart, spoke with RN. Pt with hx of low body weight- current BMI normal, but edema noted so true wt likely lower. Pt currently consuming 100% meals, feeding self. On last admission pt drinking Ensure; will add Ensure BID (due to low body wt). Ensure BID provides 700 kcal, 40g protein meeting 35% caloric, 56% protein needs if 100% consumed. RD to follow po intake meals, supplements, wt status. Estimated Nutrition Needs:   Kcals/day: 2018 Kcals/day (3541-8836 kcal/day (MSJ x 1.2-1.3 +250))  Protein: 71 g (71-78g/day (1.1-1.2g/kg))  Fluid: 2100 ml (1mL/kcal)  Based On: [place] Jeor  Weight Used: Actual wt    Pt expected to meet estimated nutrient needs:  [x]   Yes- likely with current po +supplement     []  No [] Unable to predict at this time    Nutrition Diagnosis:   1.  Underweight (low wt) related to hx pt only eating 2 meals a day, hx low body wt with need for supplementation as evidenced by pt with BMI of 20, recent hx BMI 17    Goals:      Pt will continue to consume 100% meals and supplement     Monitoring & Evaluation:    - Total energy intake, Liquid meal replacement   - Weight/weight change   -      Previous Nutrition Goals Met:   N/A  Previous Recommendations:    N/A    Education & Discharge Needs:   [x] None Identified   [] Identified and addressed    [] Participated in care plan, discharge planning, and/or interdisciplinary rounds        Cultural, Spiritism and ethnic food preferences identified: None    Skin Integrity: [x]Intact  []Other  Edema: []None [x]Other- LLE, RLE 1+  Last BM: None noted  Food Allergies: [x]None []Other  Diet Restrictions: Cultural/Orthodoxy Preference(s): None     Anthropometrics:    Weight Loss Metrics 10/27/2017 10/26/2017 10/26/2017 10/26/2017 10/10/2017 9/28/2017 9/5/2017   Today's Wt 142 lb 10.2 oz - 142 lb 6.4 oz - 124 lb 3.2 oz 127 lb 9.6 oz 118 lb   BMI - 20.47 kg/m2 - 20.43 kg/m2 17.82 kg/m2 18.31 kg/m2 16.93 kg/m2      Weight Source: Bed  Height: 5' 10\" (177.8 cm),    Body mass index is 20.47 kg/(m^2).    ,     ,      Labs:  Lab Results   Component Value Date/Time    Sodium 134 10/27/2017 03:13 AM    Potassium 4.2 10/27/2017 03:13 AM    Chloride 95 10/27/2017 03:13 AM    CO2 30 10/27/2017 03:13 AM    Glucose 155 10/27/2017 03:13 AM    BUN 6 10/27/2017 03:13 AM    Creatinine 0.67 10/27/2017 03:13 AM    Calcium 8.4 10/27/2017 03:13 AM    Magnesium 2.2 10/26/2017 11:31 AM    Phosphorus 2.1 07/17/2017 03:50 AM    Albumin 3.1 10/26/2017 11:31 AM     Lab Results   Component Value Date/Time    Hemoglobin A1c 5.2 05/19/2015 02:15 PM         Raeann Hayward RD

## 2017-10-27 NOTE — TELEPHONE ENCOUNTER
symbicort is a non-formulary drug for pt's insurance. Their recommended alternative therapy is breo ellipta 1--/25mcg/ inhalation with 1 piff every day for COPD.   Information forwarded to provider to review and consider

## 2017-10-27 NOTE — PROGRESS NOTES
Hospitalist Progress Note  Александр Mcdowell NP  Answering service: [phone] from in house phone  Cell: [phone]      Date of Service:  10/27/2017  NAME:  Imani Londono  :  1960  MRN:  [id number]      Admission Summary:   Mr. Jacy Diego is a 60 y/o male with PMHx of COPD, paranoid schizophrenia, chronic hyponatremia & depression who presented to the ED on 10/26 with sob. Pt went to see PCP, saturations were in the 70's, was given 2 nebulizer tx's and saturations were 80%, pt then came to the ED where sat's were 82% on RA. Interval history / Subjective:   Denies sob with oxygen in place, sat's 89-90% on 3l, some mild exp wheezing     Assessment & Plan:     Acute hypoxic respiratory failure in the setting of COPD exacerbation  - does not use home oxygen  - steroids, nebs  - no need for abx    Tobacco Abuse   - smoking a ppd  - counseled on smoking cessation    History of paranoid schizophrenia- stable  - c/w home med's    History of depression  - c/w home med's    Hypertension  - improved  - prn med's    History of chronic hyponatremia  - stable, monitor      Code status: full  DVT prophylaxis: [address] discussed with: Patient/Family, attending MD  Disposition: lives at [facility] Problems  Date Reviewed: 10/26/2017          Codes Class Noted POA    * (Principal)COPD exacerbation ([facility] ICD-10-CM: J44.1  ICD-9-CM: 491.21  10/26/2017 Unknown                Review of Systems:   Denies sob with oxygen in place  Denies chest pain  Denies cough, fevers/chills/sick contacts. Denies sore throat, runny nose, congestion    Vital Signs:    Last 24hrs VS reviewed since prior progress note.  Most recent are:  Visit Vitals    /83 (BP 1 Location: Left arm, BP Patient Position: At rest)    Pulse 92    Temp 98.2 °F (36.8 °C)    Resp 26    Ht 5' 10\" (1.778 m)    Wt 64.7 kg (142 lb 10.2 oz)    SpO2 92%    BMI 20.47 kg/m2         Intake/Output Summary (Last 24 hours) at 10/27/17 1545  Last data filed at 10/27/17 1500   Gross per 24 hour   Intake             2910 ml   Output             7700 ml   Net            -4790 ml        Physical Examination:             Constitutional:  No acute distress, cooperative, pleasant    ENT:  Oral mucous moist, oropharynx benign. Neck supple   Resp:  Mild exp wheezing. No accessory muscle use   CV:  Regular rhythm, normal rate, no murmurs, gallops, rubs    GI:  Soft, non distended, non tender. normoactive bowel sounds, no hepatosplenomegaly     Musculoskeletal:  No edema, warm, 2+ pulses throughout    Neurologic:  Moves all extremities. AAOx3, CN II-XII reviewed     Psych:  Fair insight, Not anxious nor agitated. Flat affect                             Skin:  Good turgor, no rashes or ulcers       Data Review:    Review and/or order of clinical lab test  Review and/or order of tests in the radiology section of CPT  Review and/or order of tests in the medicine section of CPT      Labs:     Recent Labs      10/27/17   0313  10/26/17   1131   WBC  11.1  9.9   HGB  14.2  13.9   HCT  44.6  43.7   PLT  241  223     Recent Labs      10/27/17   0313  10/26/17   1131   NA  134*  135*   K  4.2  3.3*   CL  95*  92*   CO2  30  40*   BUN  6  7   CREA  0.67*  0.57*   GLU  155*  117*   CA  8.4*  8.2*   MG   --   2.2     Recent Labs      10/26/17   1131   SGOT  31   ALT  66   AP  85   TBILI  0.4   TP  6.3*   ALB  3.1*   GLOB  3.2     No results for input(s): INR, PTP, APTT in the last 72 hours. No lab exists for component: INREXT   No results for input(s): FE, TIBC, PSAT, FERR in the last 72 hours. No results found for: FOL, RBCF   No results for input(s): PH, PCO2, PO2 in the last 72 hours.   Recent Labs      10/27/17   0313  10/26/17   1820  10/26/17   1131   TROIQ  <0.04  <0.04  <0.04     Lab Results   Component Value Date/Time    Cholesterol, total 102 05/15/2017 11:11 AM    HDL Cholesterol 48 05/15/2017 11:11 AM    LDL, calculated 46 05/15/2017 11:11 AM    Triglyceride 41 05/15/2017 11:11 AM     Lab Results   Component Value Date/Time    Glucose (POC) 132 07/04/2017 04:37 PM     Lab Results   Component Value Date/Time    Color YELLOW/STRAW 07/13/2017 04:11 PM    Appearance CLOUDY 07/13/2017 04:11 PM    Specific gravity 1.016 07/13/2017 04:11 PM    pH (UA) 6.0 07/13/2017 04:11 PM    Protein 100 07/13/2017 04:11 PM    Glucose 250 07/13/2017 04:11 PM    Ketone 15 07/13/2017 04:11 PM    Bilirubin NEGATIVE  07/13/2017 04:11 PM    Urobilinogen 0.2 07/13/2017 04:11 PM    Nitrites NEGATIVE  07/13/2017 04:11 PM    Leukocyte Esterase NEGATIVE  07/13/2017 04:11 PM    Epithelial cells FEW 07/13/2017 04:11 PM    Bacteria NEGATIVE  07/13/2017 04:11 PM    WBC 0-4 07/13/2017 04:11 PM    RBC 10-20 07/13/2017 04:11 PM         Medications Reviewed:     Current Facility-Administered Medications   Medication Dose Route Frequency    nicotine (NICODERM CQ) 14 mg/24 hr patch 1 Patch  1 Patch TransDERmal DAILY    methylPREDNISolone (PF) (SOLU-MEDROL) injection 60 mg  60 mg IntraVENous Q8H    ALPRAZolam (XANAX) tablet 0.5 mg  0.5 mg Oral BID PRN    benztropine (COGENTIN) tablet 2 mg  2 mg Oral DAILY PRN    PARoxetine (PAXIL) tablet 20 mg  20 mg Oral DAILY    QUEtiapine (SEROquel) tablet 50 mg  50 mg Oral QHS PRN    risperiDONE (RisperDAL) tablet 3 mg  3 mg Oral DAILY    sodium chloride tablet 1 g  1 g Oral DAILY    temazepam (RESTORIL) capsule 30 mg  30 mg Oral QHS PRN    sodium chloride (NS) flush 5-10 mL  5-10 mL IntraVENous Q8H    sodium chloride (NS) flush 5-10 mL  5-10 mL IntraVENous PRN    acetaminophen (TYLENOL) tablet 650 mg  650 mg Oral Q4H PRN    albuterol-ipratropium (DUO-NEB) 2.5 MG-0.5 MG/3 ML  3 mL Nebulization Q4H RT    albuterol (PROVENTIL VENTOLIN) nebulizer solution 2.5 mg  2.5 mg Nebulization Q6H PRN    arformoterol (BROVANA) neb solution 15 mcg  15 mcg Nebulization BID RT    And    budesonide (PULMICORT) 500 mcg/2 ml nebulizer suspension  500 mcg Nebulization BID RT    ipratropium (ATROVENT) 0.02 % nebulizer solution 0.5 mg  0.5 mg Nebulization Q6H RT     ______________________________________________________________________  EXPECTED LENGTH OF STAY: 3d 19h  ACTUAL LENGTH OF STAY:          1                 Simran Ware V NP

## 2017-10-27 NOTE — CDMP QUERY
There is noted documentation of \" Acute/chronic Respiratory Failure\" for this patient. Could this be further specified as:     =>Acute Hypoxic Respiratory Failure POA in the setting of COPD exac. ; O2, duo nebs, IV steroids given  =>Other Explanation of clinical findings  =>Unable to Determine (no explanation of clinical findings)    The medical record reflects the following clinical findings, treatment, and risk factors:    Risk Factors: COPD    Clinical Indicators: No Home O2; ED notes:  \" Moderate respiratory distress. Diffuse coarse wheezes. No rales. No rhonchi. (+) increased work of breathing. (+) accessory muscle use\" 87% RA    Treatment: Jet nebs, x3 in ED; O2 as indicated, IV solumedrol, monitor labs, VS, sats    Please clarify and document your clinical opinion in the progress notes and discharge summary including the definitive and/or presumptive diagnosis, (suspected or probable), related to the above clinical findings. Please include clinical findings supporting your diagnosis.     Thank you,  YANIRA Oakley, [place] [address]

## 2017-10-27 NOTE — PROGRESS NOTES
Problem: Chronic Obstructive Pulmonary Disease (COPD)  Goal: *Oxygen saturation during activity within specified parameters  Outcome: Progressing Towards Goal  On 2L via NC with O2 90%. Problem: Falls - Risk of  Goal: *Absence of Falls  Document Joanie Fall Risk and appropriate interventions in the flowsheet. Outcome: Progressing Towards Goal  Bed alarm on and patient instructed not to get out of bed without assistance. Call bell within reach    Fall Risk Interventions:  Mobility Interventions: Bed/chair exit alarm, OT consult for ADLs, Patient to call before getting OOB, PT Consult for mobility concerns                            Problem: Pain  Goal: *Control of Pain  Outcome: Progressing Towards Goal  No c/o pain.

## 2017-10-27 NOTE — PROGRESS NOTES
Bedside shift change report given to Declan Lora (oncoming nurse) by Chinyere Lee (offgoing nurse). Report included the following information SBAR, Kardex, Intake/Output, MAR and Cardiac Rhythm SR/ST.

## 2017-10-27 NOTE — INTERDISCIPLINARY ROUNDS
IDR/SLIDR Summary          Patient: Jeannie Kline MRN: [id number]    Age: 61 y.o. YOB: 1960 Room/Bed: [facility]   Admit Diagnosis: COPD exacerbation (HCC)  Principal Diagnosis: COPD exacerbation ([facility]   Goals: COPD management  Readmission: NO  Quality Measure: COPD  VTE Prophylaxis: Chemical  Influenza Vaccine screening completed? YES  Pneumococcal Vaccine screening completed? YES  Mobility needs: No   Nutrition plan:No  Consults:P.T, O.T. and Respiratory    Financial concerns:Yes  Escalated to CM? YES  RRAT Score: 13   Interventions:COPD Educator to follow   Testing due for pt today?  NO  LOS: 1 days Expected length of stay --- days  Discharge plan: home when stable   PCP: Naga Hayes DO  Transportation needs: No    Days before discharge:two or more days before discharge   Discharge disposition: Home    Signed:     Brigette Villalta  10/27/2017  3:54 AM

## 2017-10-27 NOTE — PROGRESS NOTES
Bedside and Verbal shift change report given to [address] [place] [address] Road (oncoming nurse) by Allen Teran (offgoing nurse). Report included the following information SBAR, Kardex and Recent Results.

## 2017-10-28 PROCEDURE — 65660000000 HC RM CCU STEPDOWN

## 2017-10-28 PROCEDURE — 94640 AIRWAY INHALATION TREATMENT: CPT

## 2017-10-28 PROCEDURE — 74011250637 HC RX REV CODE- 250/637: Performed by: FAMILY MEDICINE

## 2017-10-28 PROCEDURE — 74011000250 HC RX REV CODE- 250: Performed by: FAMILY MEDICINE

## 2017-10-28 PROCEDURE — 74011250637 HC RX REV CODE- 250/637: Performed by: HOSPITALIST

## 2017-10-28 PROCEDURE — 94762 N-INVAS EAR/PLS OXIMTRY CONT: CPT

## 2017-10-28 PROCEDURE — 77010033678 HC OXYGEN DAILY

## 2017-10-28 PROCEDURE — 74011250636 HC RX REV CODE- 250/636: Performed by: NURSE PRACTITIONER

## 2017-10-28 RX ADMIN — PAROXETINE HYDROCHLORIDE 20 MG: 20 TABLET, FILM COATED ORAL at 09:04

## 2017-10-28 RX ADMIN — Medication 10 ML: at 13:34

## 2017-10-28 RX ADMIN — BUDESONIDE 500 MCG: 0.5 INHALANT RESPIRATORY (INHALATION) at 19:21

## 2017-10-28 RX ADMIN — ALPRAZOLAM 0.5 MG: 0.5 TABLET ORAL at 20:37

## 2017-10-28 RX ADMIN — IPRATROPIUM BROMIDE 0.5 MG: 0.5 SOLUTION RESPIRATORY (INHALATION) at 09:57

## 2017-10-28 RX ADMIN — TEMAZEPAM 30 MG: 15 CAPSULE ORAL at 00:41

## 2017-10-28 RX ADMIN — METHYLPREDNISOLONE SODIUM SUCCINATE 60 MG: 125 INJECTION, POWDER, FOR SOLUTION INTRAMUSCULAR; INTRAVENOUS at 13:34

## 2017-10-28 RX ADMIN — ALPRAZOLAM 0.5 MG: 0.5 TABLET ORAL at 11:24

## 2017-10-28 RX ADMIN — ARFORMOTEROL TARTRATE 15 MCG: 15 SOLUTION RESPIRATORY (INHALATION) at 09:56

## 2017-10-28 RX ADMIN — RISPERIDONE 3 MG: 1 TABLET ORAL at 09:04

## 2017-10-28 RX ADMIN — METHYLPREDNISOLONE SODIUM SUCCINATE 60 MG: 125 INJECTION, POWDER, FOR SOLUTION INTRAMUSCULAR; INTRAVENOUS at 06:00

## 2017-10-28 RX ADMIN — ACETAMINOPHEN 650 MG: 325 TABLET ORAL at 20:37

## 2017-10-28 RX ADMIN — QUETIAPINE FUMARATE 50 MG: 25 TABLET ORAL at 20:37

## 2017-10-28 RX ADMIN — ARFORMOTEROL TARTRATE 15 MCG: 15 SOLUTION RESPIRATORY (INHALATION) at 19:22

## 2017-10-28 RX ADMIN — SODIUM CHLORIDE 500 ML: 900 INJECTION, SOLUTION INTRAVENOUS at 13:34

## 2017-10-28 RX ADMIN — SODIUM CHLORIDE TAB 1 GM 1 G: 1 TAB at 09:04

## 2017-10-28 RX ADMIN — IPRATROPIUM BROMIDE 0.5 MG: 0.5 SOLUTION RESPIRATORY (INHALATION) at 19:21

## 2017-10-28 RX ADMIN — Medication 10 ML: at 21:29

## 2017-10-28 RX ADMIN — Medication 10 ML: at 07:18

## 2017-10-28 RX ADMIN — METHYLPREDNISOLONE SODIUM SUCCINATE 60 MG: 125 INJECTION, POWDER, FOR SOLUTION INTRAMUSCULAR; INTRAVENOUS at 21:29

## 2017-10-28 RX ADMIN — BUDESONIDE 500 MCG: 0.5 INHALANT RESPIRATORY (INHALATION) at 09:57

## 2017-10-28 NOTE — PROGRESS NOTES
Hospitalist Progress Note  Nimisha Barnes NP  Answering service: [phone] from in house phone  Cell: [phone]      Date of Service:  10/28/2017  NAME:  Oscar Marvin  :  1960  MRN:  [id number]      Admission Summary:   Mr. Vanessa Suárez is a 62 y/o male with PMHx of COPD, paranoid schizophrenia, chronic hyponatremia & depression who presented to the ED on 10/26 with sob. Pt went to see PCP, saturations were in the 70's, was given 2 nebulizer tx's and saturations were 80%, pt then came to the ED where sat's were 82% on RA. Interval history / Subjective:   Pt sitting in chair, noted to be mouth breathing at a rate of 30, however, denies sob. Pt also tachycardic between 130-140, neb tx several hours ago. Fluid bolus, continue neb's  Will need oxygen to go home with      Assessment & Plan:     Acute hypoxic respiratory failure in the setting of COPD exacerbation  - does not use home oxygen  - steroids, nebs  - no need for abx    Tobacco Abuse   - smoking a ppd  - counseled on smoking cessation    History of paranoid schizophrenia- stable  - c/w home med's    History of depression  - c/w home med's    Hypertension  - improved  - prn med's    History of chronic hyponatremia  - stable, monitor      Code status: full  DVT prophylaxis: [address] discussed with: Patient/Family, attending MD  Disposition: lives at [facility]  Date Reviewed: 10/26/2017          Codes Class Noted POA    * (Principal)COPD exacerbation ([facility] ICD-10-CM: J44.1  ICD-9-CM: 491.21  10/26/2017 Unknown                Review of Systems:   Denies sob with oxygen in place  Denies chest pain  Denies cough, fevers/chills/sick contacts. Denies sore throat, runny nose, congestion    Vital Signs:    Last 24hrs VS reviewed since prior progress note. Most recent are:  Visit Vitals    /87 (BP 1 Location: Left arm, BP Patient Position:  At rest)    Pulse (!) 125    Temp 97.9 °F (36.6 °C)    Resp 26    Ht 5' 10\" (1.778 m)    Wt 65.6 kg (144 lb 11.2 oz)    SpO2 90%    BMI 20.76 kg/m2         Intake/Output Summary (Last 24 hours) at 10/28/17 1401  Last data filed at 10/28/17 1200   Gross per 24 hour   Intake              940 ml   Output             4600 ml   Net            -3660 ml        Physical Examination:             Constitutional:  No acute distress, cooperative, pleasant    ENT:  Oral mucous moist, oropharynx benign. Neck supple   Resp:  Diminished throughout. No accessory muscle use   CV:  Regular rhythm, normal rate, no murmurs, gallops, rubs    GI:  Soft, non distended, non tender. normoactive bowel sounds, no hepatosplenomegaly     Musculoskeletal:  No edema, warm, 2+ pulses throughout    Neurologic:  Moves all extremities. AAOx3, CN II-XII reviewed     Psych:  Fair insight, Not anxious nor agitated. Flat affect                             Skin:  Good turgor, no rashes or ulcers       Data Review:    Review and/or order of clinical lab test  Review and/or order of tests in the radiology section of CPT  Review and/or order of tests in the medicine section of CPT      Labs:     Recent Labs      10/27/17   0313  10/26/17   1131   WBC  11.1  9.9   HGB  14.2  13.9   HCT  44.6  43.7   PLT  241  223     Recent Labs      10/27/17   0313  10/26/17   1131   NA  134*  135*   K  4.2  3.3*   CL  95*  92*   CO2  30  40*   BUN  6  7   CREA  0.67*  0.57*   GLU  155*  117*   CA  8.4*  8.2*   MG   --   2.2     Recent Labs      10/26/17   1131   SGOT  31   ALT  66   AP  85   TBILI  0.4   TP  6.3*   ALB  3.1*   GLOB  3.2     No results for input(s): INR, PTP, APTT in the last 72 hours. No lab exists for component: INREXT, INREXT   No results for input(s): FE, TIBC, PSAT, FERR in the last 72 hours. No results found for: FOL, RBCF   No results for input(s): PH, PCO2, PO2 in the last 72 hours.   Recent Labs      10/27/17   0313  10/26/17   6468 10/26/17   1131   TROIQ  <0.04  <0.04  <0.04     Lab Results   Component Value Date/Time    Cholesterol, total 102 05/15/2017 11:11 AM    HDL Cholesterol 48 05/15/2017 11:11 AM    LDL, calculated 46 05/15/2017 11:11 AM    Triglyceride 41 05/15/2017 11:11 AM     Lab Results   Component Value Date/Time    Glucose (POC) 132 07/04/2017 04:37 PM     Lab Results   Component Value Date/Time    Color YELLOW/STRAW 07/13/2017 04:11 PM    Appearance CLOUDY 07/13/2017 04:11 PM    Specific gravity 1.016 07/13/2017 04:11 PM    pH (UA) 6.0 07/13/2017 04:11 PM    Protein 100 07/13/2017 04:11 PM    Glucose 250 07/13/2017 04:11 PM    Ketone 15 07/13/2017 04:11 PM    Bilirubin NEGATIVE  07/13/2017 04:11 PM    Urobilinogen 0.2 07/13/2017 04:11 PM    Nitrites NEGATIVE  07/13/2017 04:11 PM    Leukocyte Esterase NEGATIVE  07/13/2017 04:11 PM    Epithelial cells FEW 07/13/2017 04:11 PM    Bacteria NEGATIVE  07/13/2017 04:11 PM    WBC 0-4 07/13/2017 04:11 PM    RBC 10-20 07/13/2017 04:11 PM         Medications Reviewed:     Current Facility-Administered Medications   Medication Dose Route Frequency    sodium chloride 0.9 % bolus infusion 500 mL  500 mL IntraVENous ONCE    nicotine (NICODERM CQ) 14 mg/24 hr patch 1 Patch  1 Patch TransDERmal DAILY    methylPREDNISolone (PF) (SOLU-MEDROL) injection 60 mg  60 mg IntraVENous Q8H    albuterol-ipratropium (DUO-NEB) 2.5 MG-0.5 MG/3 ML  3 mL Nebulization Q4H PRN    ALPRAZolam (XANAX) tablet 0.5 mg  0.5 mg Oral BID PRN    benztropine (COGENTIN) tablet 2 mg  2 mg Oral DAILY PRN    PARoxetine (PAXIL) tablet 20 mg  20 mg Oral DAILY    QUEtiapine (SEROquel) tablet 50 mg  50 mg Oral QHS PRN    risperiDONE (RisperDAL) tablet 3 mg  3 mg Oral DAILY    sodium chloride tablet 1 g  1 g Oral DAILY    temazepam (RESTORIL) capsule 30 mg  30 mg Oral QHS PRN    sodium chloride (NS) flush 5-10 mL  5-10 mL IntraVENous Q8H    sodium chloride (NS) flush 5-10 mL  5-10 mL IntraVENous PRN    acetaminophen (TYLENOL) tablet 650 mg  650 mg Oral Q4H PRN    albuterol (PROVENTIL VENTOLIN) nebulizer solution 2.5 mg  2.5 mg Nebulization Q6H PRN    arformoterol (BROVANA) neb solution 15 mcg  15 mcg Nebulization BID RT    And    budesonide (PULMICORT) 500 mcg/2 ml nebulizer suspension  500 mcg Nebulization BID RT    ipratropium (ATROVENT) 0.02 % nebulizer solution 0.5 mg  0.5 mg Nebulization Q6H RT     ______________________________________________________________________  EXPECTED LENGTH OF STAY: 3d 19h  ACTUAL LENGTH OF STAY:          2                 Simran Ware V, NP

## 2017-10-28 NOTE — PROGRESS NOTES
Documented on 10.28.17     Spiritual Care Partner Volunteer visited patient in room 653 on 10.27.17. Documented by: : Rev. Leonidas Ross.  Tobias Falcon; [facility], to contact [phone] [address] call: [phone]

## 2017-10-28 NOTE — PROGRESS NOTES
Problem: Chronic Obstructive Pulmonary Disease (COPD)  Goal: *Oxygen saturation during activity within specified parameters  Outcome: Progressing Towards Goal  2-4L via NC; will likely need home O2  Goal: *Able to remain out of bed as prescribed  Outcome: Progressing Towards Goal  Up in chair for breakfast and lunch  Goal: *Absence of hypoxia  Outcome: Progressing Towards Goal  Needs encouragement to breathe in through nose and to keep NC on.  Goal: *Optimize nutritional status  Outcome: Progressing Towards Goal  Great appetite. Problem: Falls - Risk of  Goal: *Absence of Falls  Document Joanie Fall Risk and appropriate interventions in the flowsheet. Outcome: Progressing Towards Goal  Bed alarm on; patient instructed not to get out of bed without assistance from the nursing staff. Call bell placed within reach of patient. Fall Risk Interventions:  Mobility Interventions: Patient to call before getting OOB         Medication Interventions: Bed/chair exit alarm, Patient to call before getting OOB, Evaluate medications/consider consulting pharmacy, Teach patient to arise slowly                  Problem: Pain  Goal: *Control of Pain  Outcome: Progressing Towards Goal  No c/o pain. Problem: Nutrition Deficit  Goal: *Optimize nutritional status  Outcome: Progressing Towards Goal  Tolerating cardiac diet well; patient has great appetite.

## 2017-10-28 NOTE — PROGRESS NOTES
Patient placed on room air. After several minutes, his O2 dropped to 82%. Patient placed on 3L via NC. After 2 minutes, patient's O2 was 96%. O2 decreased to 2L.

## 2017-10-28 NOTE — PROGRESS NOTES
10/28/17 1500   Vitals   Temp 98 °F (36.7 °C)   Temp Source Oral   Pulse (Heart Rate) (!) 116   Heart Rate Source Monitor   Resp Rate 22   O2 Sat (%) 94 %   Level of Consciousness Alert   BP (!) 137/94   MAP (Calculated) 108   BP 1 Location Left arm   BP 1 Method Automatic   BP Patient Position At rest   Cardiac Rhythm Sinus Tach   MEWS Score 4   Pain 1   Pain Scale 1 Visual   Pain Intensity 1 0   Oxygen Therapy   O2 Device Nasal cannula   O2 Flow Rate (L/min) 4 l/min   MEWS 4 generated d/t HR and BP. MD aware of patient condition. 500 ml bolus given to help with HR, which has decreased from 140s.

## 2017-10-28 NOTE — PROGRESS NOTES
10/28/17 1100   Vitals   Temp 97.9 °F (36.6 °C)   Temp Source Oral   Pulse (Heart Rate) (!) 125   Heart Rate Source Monitor   Resp Rate 26   O2 Sat (%) 90 %   Level of Consciousness Alert   /87   MAP (Calculated) 103   BP 1 Location Left arm   BP 1 Method Automatic   BP Patient Position At rest   Cardiac Rhythm Sinus Tach   MEWS Score 4   Pain 1   Pain Scale 1 Numeric (0 - 10)   Pain Intensity 1 0   Patient Stated Pain Goal 0   Pain Reassessment 1 Yes   Oxygen Therapy   O2 Device Nasal cannula   O2 Flow Rate (L/min) 3 l/min   MEWS of 4 generated d/t patient's high HR and RR. MD at bedside aware of patient condition. Patient recently had a breathing treatment and has been drinking a lot of coffee. Patient instructed to try to slow his breathing and to take a few deep breathes, and to stay away from coffee and caffeine.

## 2017-10-28 NOTE — PROGRESS NOTES
Bedside shift change report given to [address] [place] [address] 107 (oncoming nurse) by Edgardo Blackman (offgoing nurse).  Report included the following information SBAR, Kardex, Intake/Output, MAR and Cardiac Rhythm ST.

## 2017-10-29 PROCEDURE — 74011250636 HC RX REV CODE- 250/636: Performed by: NURSE PRACTITIONER

## 2017-10-29 PROCEDURE — 94640 AIRWAY INHALATION TREATMENT: CPT

## 2017-10-29 PROCEDURE — 74011000250 HC RX REV CODE- 250: Performed by: FAMILY MEDICINE

## 2017-10-29 PROCEDURE — 94664 DEMO&/EVAL PT USE INHALER: CPT

## 2017-10-29 PROCEDURE — 97161 PT EVAL LOW COMPLEX 20 MIN: CPT

## 2017-10-29 PROCEDURE — 74011250637 HC RX REV CODE- 250/637: Performed by: FAMILY MEDICINE

## 2017-10-29 PROCEDURE — 74011250637 HC RX REV CODE- 250/637: Performed by: HOSPITALIST

## 2017-10-29 PROCEDURE — 65660000000 HC RM CCU STEPDOWN

## 2017-10-29 PROCEDURE — 77010033678 HC OXYGEN DAILY

## 2017-10-29 RX ADMIN — ARFORMOTEROL TARTRATE 15 MCG: 15 SOLUTION RESPIRATORY (INHALATION) at 20:45

## 2017-10-29 RX ADMIN — METHYLPREDNISOLONE SODIUM SUCCINATE 60 MG: 125 INJECTION, POWDER, FOR SOLUTION INTRAMUSCULAR; INTRAVENOUS at 21:11

## 2017-10-29 RX ADMIN — Medication 10 ML: at 14:00

## 2017-10-29 RX ADMIN — PAROXETINE HYDROCHLORIDE 20 MG: 20 TABLET, FILM COATED ORAL at 09:40

## 2017-10-29 RX ADMIN — METHYLPREDNISOLONE SODIUM SUCCINATE 60 MG: 125 INJECTION, POWDER, FOR SOLUTION INTRAMUSCULAR; INTRAVENOUS at 06:17

## 2017-10-29 RX ADMIN — IPRATROPIUM BROMIDE 0.5 MG: 0.5 SOLUTION RESPIRATORY (INHALATION) at 09:27

## 2017-10-29 RX ADMIN — IPRATROPIUM BROMIDE 0.5 MG: 0.5 SOLUTION RESPIRATORY (INHALATION) at 14:26

## 2017-10-29 RX ADMIN — BUDESONIDE 500 MCG: 0.5 INHALANT RESPIRATORY (INHALATION) at 09:26

## 2017-10-29 RX ADMIN — RISPERIDONE 3 MG: 1 TABLET ORAL at 09:40

## 2017-10-29 RX ADMIN — ARFORMOTEROL TARTRATE 15 MCG: 15 SOLUTION RESPIRATORY (INHALATION) at 09:26

## 2017-10-29 RX ADMIN — Medication 10 ML: at 06:17

## 2017-10-29 RX ADMIN — IPRATROPIUM BROMIDE 0.5 MG: 0.5 SOLUTION RESPIRATORY (INHALATION) at 20:45

## 2017-10-29 RX ADMIN — SODIUM CHLORIDE TAB 1 GM 1 G: 1 TAB at 09:40

## 2017-10-29 RX ADMIN — TEMAZEPAM 30 MG: 15 CAPSULE ORAL at 22:55

## 2017-10-29 RX ADMIN — BUDESONIDE 500 MCG: 0.5 INHALANT RESPIRATORY (INHALATION) at 20:46

## 2017-10-29 RX ADMIN — Medication 10 ML: at 21:11

## 2017-10-29 NOTE — PROGRESS NOTES
Orders entered to arrange for home 02 for this patient. Patient chart reviewed and history noted. Patient resides at [facility] which is an adult group home. Writer contacted them and spoke with Karissa Quintero @ [phone] who advised that unfortunately, patients cannot have 02 in the residence. Patient may need alternate housing if he will require 02 on an ongoing basis. After a further review of medical record by CORNELIA Ayala, it looks like patient was here in August of this year with similar needs. He ended up discharging without 02. At that time, there was a community Mental Health worker identified by the name of Candelaria Dan @ [phone]. She identified that [address] [place] [address] [phone] can accept patients with 02. They required at the time a UAI, H&P and a TB screening be done for admission consideration. Patient is a daily smoker with COPD. It is unclear if his 02 requirement will improve, like it had during previous admission? CM will continue to follow this patient for ongoing disposition needs.  Glo Nguyen LCSW

## 2017-10-29 NOTE — PROGRESS NOTES
Problem: Chronic Obstructive Pulmonary Disease (COPD)  Goal: *Oxygen saturation during activity within specified parameters  Outcome: Progressing Towards Goal  Patient on 3L via NC. Will be going home with O2  Goal: *Able to remain out of bed as prescribed  Outcome: Progressing Towards Goal  Up in chair  Goal: *Optimize nutritional status  Outcome: Progressing Towards Goal  Great appetite; tolerating cardiac diet. Problem: Falls - Risk of  Goal: *Absence of Falls  Document Joanie Fall Risk and appropriate interventions in the flowsheet. Outcome: Progressing Towards Goal  Fall Risk Interventions:  Mobility Interventions: Patient to call before getting OOB, Bed/chair exit alarm         Medication Interventions: Patient to call before getting OOB    Elimination Interventions: Bed/chair exit alarm, Call light in reach, Patient to call for help with toileting needs    History of Falls Interventions: Consult care management for discharge planning        Problem: Pain  Goal: *Control of Pain  Outcome: Progressing Towards Goal  No c/o pain. Problem: Nutrition Deficit  Goal: *Optimize nutritional status  Outcome: Progressing Towards Goal  Tolerating cardiac diet well.

## 2017-10-29 NOTE — PROGRESS NOTES
Physical Therapy  10.29.17    Order received, chart reviewed, and evaluation completed by PT. Patient overall independent for all functional mobility and further PT not indicated therefore will complete orders. Assessed O2 sats as below. Patient will need home O2 at d/c. Pulse Oximetry Assessment    87% at rest on room air  80% while ambulating on room air  90% at rest on 4LPM  88% while ambulating on 6LPM    Thank you.     Av Ariza, PT, DPT  Time calculation: 15 mins

## 2017-10-29 NOTE — PROGRESS NOTES
Hospitalist Progress Note  Gabriela Lara NP  Answering service: [phone] from in house phone  Cell: [phone]      Date of Service:  10/29/2017  NAME:  Krystyna Appiah  :  1960  MRN:  [id number]      Admission Summary:   Mr. Deanna Wilson is a 60 y/o male with PMHx of COPD, paranoid schizophrenia, chronic hyponatremia & depression who presented to the ED on 10/26 with sob. Pt went to see PCP, saturations were in the 70's, was given 2 nebulizer tx's and saturations were 80%, pt then came to the ED where sat's were 82% on RA. Interval history / Subjective:   Denies sob, denies cough, scattered rhonchi, occasional posterior exp wheezing, mouth breather  88-91% on 4L, mild ST on tele  Pt unable to return to [facility] with oxygen in place. Will need different placement. CM working on this. Assessment & Plan:     Acute hypoxic respiratory failure in the setting of COPD exacerbation  - does not use home oxygen  - steroids, nebs  - no need for abx    Tobacco Abuse   - smoking a ppd  - counseled on smoking cessation    History of paranoid schizophrenia- stable  - c/w home med's    History of depression  - c/w home med's    Hypertension  - improved  - prn med's    History of chronic hyponatremia  - stable, monitor      Code status: full  DVT prophylaxis: [address] discussed with: Patient/Family, attending MD  Disposition: lives at [place] [facility] Problems  Date Reviewed: 10/26/2017          Codes Class Noted POA    * (Principal)COPD exacerbation ([facility] [address] ICD-10-CM: J44.1  ICD-9-CM: 491.21  10/26/2017 Unknown                Review of Systems:   Denies sob with oxygen in place  Denies chest pain  Denies cough, fevers/chills/sick contacts. Denies sore throat, runny nose, congestion    Vital Signs:    Last 24hrs VS reviewed since prior progress note.  Most recent are:  Visit Vitals    BP (!) 144/92 (BP 1 Location: Left arm, BP Patient Position: At rest)    Pulse (!) 116    Temp 98.3 °F (36.8 °C)    Resp 20    Ht 5' 10\" (1.778 m)    Wt 65.8 kg (145 lb 1.6 oz)    SpO2 94%    BMI 20.82 kg/m2         Intake/Output Summary (Last 24 hours) at 10/29/17 1355  Last data filed at 10/29/17 1200   Gross per 24 hour   Intake             1180 ml   Output             3250 ml   Net            -2070 ml        Physical Examination:             Constitutional:  No acute distress, cooperative, pleasant    ENT:  Oral mucous moist, oropharynx benign. Neck supple   Resp:  Diminished throughout, scattered rhonchi, exp wheezing. No accessory muscle use   CV:  Regular rhythm, normal rate, no murmurs, gallops, rubs    GI:  Soft, non distended, non tender. normoactive bowel sounds, no hepatosplenomegaly     Musculoskeletal:  No edema, warm, 2+ pulses throughout    Neurologic:  Moves all extremities. AAOx3, CN II-XII reviewed     Psych:  Poor insight, Not anxious nor agitated. Flat affect                             Skin:  Good turgor, no rashes or ulcers       Data Review:    Review and/or order of clinical lab test  Review and/or order of tests in the radiology section of CPT  Review and/or order of tests in the medicine section of CPT      Labs:     Recent Labs      10/27/17   0313   WBC  11.1   HGB  14.2   HCT  44.6   PLT  241     Recent Labs      10/27/17   0313   NA  134*   K  4.2   CL  95*   CO2  30   BUN  6   CREA  0.67*   GLU  155*   CA  8.4*     No results for input(s): SGOT, GPT, ALT, AP, TBIL, TBILI, TP, ALB, GLOB, GGT, AML, LPSE in the last 72 hours. No lab exists for component: AMYP, HLPSE  No results for input(s): INR, PTP, APTT in the last 72 hours. No lab exists for component: INREXT, INREXT   No results for input(s): FE, TIBC, PSAT, FERR in the last 72 hours. No results found for: FOL, RBCF   No results for input(s): PH, PCO2, PO2 in the last 72 hours.   Recent Labs      10/27/17   0313  10/26/17   1820   TROIQ  <0.04 <0.04     Lab Results   Component Value Date/Time    Cholesterol, total 102 05/15/2017 11:11 AM    HDL Cholesterol 48 05/15/2017 11:11 AM    LDL, calculated 46 05/15/2017 11:11 AM    Triglyceride 41 05/15/2017 11:11 AM     Lab Results   Component Value Date/Time    Glucose (POC) 132 07/04/2017 04:37 PM     Lab Results   Component Value Date/Time    Color YELLOW/STRAW 07/13/2017 04:11 PM    Appearance CLOUDY 07/13/2017 04:11 PM    Specific gravity 1.016 07/13/2017 04:11 PM    pH (UA) 6.0 07/13/2017 04:11 PM    Protein 100 07/13/2017 04:11 PM    Glucose 250 07/13/2017 04:11 PM    Ketone 15 07/13/2017 04:11 PM    Bilirubin NEGATIVE  07/13/2017 04:11 PM    Urobilinogen 0.2 07/13/2017 04:11 PM    Nitrites NEGATIVE  07/13/2017 04:11 PM    Leukocyte Esterase NEGATIVE  07/13/2017 04:11 PM    Epithelial cells FEW 07/13/2017 04:11 PM    Bacteria NEGATIVE  07/13/2017 04:11 PM    WBC 0-4 07/13/2017 04:11 PM    RBC 10-20 07/13/2017 04:11 PM         Medications Reviewed:     Current Facility-Administered Medications   Medication Dose Route Frequency    nicotine (NICODERM CQ) 14 mg/24 hr patch 1 Patch  1 Patch TransDERmal DAILY    methylPREDNISolone (PF) (SOLU-MEDROL) injection 60 mg  60 mg IntraVENous Q8H    albuterol-ipratropium (DUO-NEB) 2.5 MG-0.5 MG/3 ML  3 mL Nebulization Q4H PRN    ALPRAZolam (XANAX) tablet 0.5 mg  0.5 mg Oral BID PRN    benztropine (COGENTIN) tablet 2 mg  2 mg Oral DAILY PRN    PARoxetine (PAXIL) tablet 20 mg  20 mg Oral DAILY    QUEtiapine (SEROquel) tablet 50 mg  50 mg Oral QHS PRN    risperiDONE (RisperDAL) tablet 3 mg  3 mg Oral DAILY    sodium chloride tablet 1 g  1 g Oral DAILY    temazepam (RESTORIL) capsule 30 mg  30 mg Oral QHS PRN    sodium chloride (NS) flush 5-10 mL  5-10 mL IntraVENous Q8H    sodium chloride (NS) flush 5-10 mL  5-10 mL IntraVENous PRN    acetaminophen (TYLENOL) tablet 650 mg  650 mg Oral Q4H PRN    albuterol (PROVENTIL VENTOLIN) nebulizer solution 2.5 mg  2.5 mg Nebulization Q6H PRN    arformoterol (BROVANA) neb solution 15 mcg  15 mcg Nebulization BID RT    And    budesonide (PULMICORT) 500 mcg/2 ml nebulizer suspension  500 mcg Nebulization BID RT    ipratropium (ATROVENT) 0.02 % nebulizer solution 0.5 mg  0.5 mg Nebulization Q6H RT     ______________________________________________________________________  EXPECTED LENGTH OF STAY: 3d 19h  ACTUAL LENGTH OF STAY:          3                 Simran Ware V NP

## 2017-10-29 NOTE — PROGRESS NOTES
10/29/17 1100   Vitals   Temp 98.3 °F (36.8 °C)   Temp Source Oral   Pulse (Heart Rate) (!) 116   Heart Rate Source Monitor   Resp Rate 20   O2 Sat (%) 94 %   Level of Consciousness Alert   BP (!) 144/92   MAP (Calculated) 109   BP 1 Location Left arm   BP 1 Method Automatic   BP Patient Position At rest   Cardiac Rhythm Sinus Tach   MEWS Score 3   Pain 1   Pain Scale 1 Numeric (0 - 10)   Pain Intensity 1 0   Patient Stated Pain Goal 0   MEWS of 3 generated d/t HR and BP. HR has been an ongoing issue and MD is aware of patient condition. DBP elevated possibly elevated d/t patient movement.

## 2017-10-30 PROCEDURE — 77010033678 HC OXYGEN DAILY

## 2017-10-30 PROCEDURE — 74011250637 HC RX REV CODE- 250/637: Performed by: HOSPITALIST

## 2017-10-30 PROCEDURE — 65270000032 HC RM SEMIPRIVATE

## 2017-10-30 PROCEDURE — 74011250637 HC RX REV CODE- 250/637: Performed by: FAMILY MEDICINE

## 2017-10-30 PROCEDURE — 77030029684 HC NEB SM VOL KT MONA -A

## 2017-10-30 PROCEDURE — 94640 AIRWAY INHALATION TREATMENT: CPT

## 2017-10-30 PROCEDURE — 74011000250 HC RX REV CODE- 250: Performed by: FAMILY MEDICINE

## 2017-10-30 PROCEDURE — 94762 N-INVAS EAR/PLS OXIMTRY CONT: CPT

## 2017-10-30 PROCEDURE — 74011250636 HC RX REV CODE- 250/636: Performed by: NURSE PRACTITIONER

## 2017-10-30 RX ORDER — FLUTICASONE FUROATE AND VILANTEROL 100; 25 UG/1; UG/1
1 POWDER RESPIRATORY (INHALATION) DAILY
Qty: 3 INHALER | Refills: 4 | Status: SHIPPED | OUTPATIENT
Start: 2017-10-30 | End: 2017-11-07

## 2017-10-30 RX ORDER — PREDNISONE 20 MG/1
60 TABLET ORAL
Status: DISCONTINUED | OUTPATIENT
Start: 2017-10-31 | End: 2017-10-31

## 2017-10-30 RX ADMIN — IPRATROPIUM BROMIDE 0.5 MG: 0.5 SOLUTION RESPIRATORY (INHALATION) at 08:27

## 2017-10-30 RX ADMIN — METHYLPREDNISOLONE SODIUM SUCCINATE 60 MG: 125 INJECTION, POWDER, FOR SOLUTION INTRAMUSCULAR; INTRAVENOUS at 10:43

## 2017-10-30 RX ADMIN — RISPERIDONE 3 MG: 1 TABLET ORAL at 10:43

## 2017-10-30 RX ADMIN — IPRATROPIUM BROMIDE 0.5 MG: 0.5 SOLUTION RESPIRATORY (INHALATION) at 20:03

## 2017-10-30 RX ADMIN — Medication 10 ML: at 21:22

## 2017-10-30 RX ADMIN — ARFORMOTEROL TARTRATE 15 MCG: 15 SOLUTION RESPIRATORY (INHALATION) at 08:27

## 2017-10-30 RX ADMIN — Medication 10 ML: at 07:05

## 2017-10-30 RX ADMIN — METHYLPREDNISOLONE SODIUM SUCCINATE 60 MG: 125 INJECTION, POWDER, FOR SOLUTION INTRAMUSCULAR; INTRAVENOUS at 21:23

## 2017-10-30 RX ADMIN — ARFORMOTEROL TARTRATE 15 MCG: 15 SOLUTION RESPIRATORY (INHALATION) at 20:03

## 2017-10-30 RX ADMIN — SODIUM CHLORIDE TAB 1 GM 1 G: 1 TAB at 10:43

## 2017-10-30 RX ADMIN — IPRATROPIUM BROMIDE 0.5 MG: 0.5 SOLUTION RESPIRATORY (INHALATION) at 03:06

## 2017-10-30 RX ADMIN — QUETIAPINE FUMARATE 50 MG: 25 TABLET ORAL at 21:28

## 2017-10-30 RX ADMIN — BUDESONIDE 500 MCG: 0.5 INHALANT RESPIRATORY (INHALATION) at 08:27

## 2017-10-30 RX ADMIN — PAROXETINE HYDROCHLORIDE 20 MG: 20 TABLET, FILM COATED ORAL at 10:43

## 2017-10-30 RX ADMIN — Medication 10 ML: at 14:36

## 2017-10-30 RX ADMIN — BUDESONIDE 500 MCG: 0.5 INHALANT RESPIRATORY (INHALATION) at 20:03

## 2017-10-30 NOTE — PROGRESS NOTES
Problem: Falls - Risk of  Goal: *Absence of Falls  Document Joanie Fall Risk and appropriate interventions in the flowsheet.    Outcome: Progressing Towards Goal  Fall Risk Interventions:  Mobility Interventions: Patient to call before getting OOB         Medication Interventions: Patient to call before getting OOB    Elimination Interventions: Call light in reach, Patient to call for help with toileting needs    History of Falls Interventions: Door open when patient unattended

## 2017-10-30 NOTE — PROGRESS NOTES
Occupational Therapy Note 1031    Orders acknowledged, chart reviewed. Patient with no acute OT needs. Discharged from PT. Ambulating in room and completing all ADLs. Please re-consult if patient has change in status, will complete orders. Thank you.     Paulo Valles OTR/L

## 2017-10-30 NOTE — PROGRESS NOTES
Michelle Mckinney RN and Blanca Reeves RN performed a dual skin assessment on this patient   No impairment noted  Sanya score is 22    Pt's sacrum is red, but blanchable. Reminded patient to turn from side to side to stay off sacrum.

## 2017-10-30 NOTE — PROGRESS NOTES
PIPER called Rowe Merlin ([phone]) with [facility] inquiring as to whether they have male beds. She stated that they may. They can accommodate a patient with home oxygen. Per her request, PIPER faxed her pt's clinicals to include his H and P, and UAI from [place] [address]. Piper attempted to call Rowe Merlin again but she did not answer and did not have her voice mail activated.  Chaparro Grady

## 2017-10-30 NOTE — PROGRESS NOTES
Problem: Falls - Risk of  Goal: *Absence of Falls  Document Joanie Fall Risk and appropriate interventions in the flowsheet.    Outcome: Progressing Towards Goal  Fall Risk Interventions:  Mobility Interventions: Bed/chair exit alarm, Communicate number of staff needed for ambulation/transfer, OT consult for ADLs, Strengthening exercises (ROM-active/passive), PT Consult for assist device competence, PT Consult for mobility concerns, Patient to call before getting OOB         Medication Interventions: Assess postural VS orthostatic hypotension, Bed/chair exit alarm, Evaluate medications/consider consulting pharmacy, Patient to call before getting OOB, Teach patient to arise slowly    Elimination Interventions: Bed/chair exit alarm, Call light in reach, Patient to call for help with toileting needs, Toilet paper/wipes in reach, Toileting schedule/hourly rounds, Urinal in reach    History of Falls Interventions: Bed/chair exit alarm, Door open when patient unattended, Investigate reason for fall

## 2017-10-30 NOTE — PROGRESS NOTES
Hospitalist Progress Note  Rodney Xiong NP  Answering service: [phone] from in house phone  Cell: [phone]   Date of Service:  10/30/2017  NAME:  Gunnar Cook  :  1960  MRN:  [id number]    Admission Summary:   Pt presented to the ED on 10/26 with SOB. Pt went to see PCP where his saturations were in the 70's. He was given 2 nebulizer tx's and saturations improve to the 80%. Pt was transferred to the ED where his saturations were 82% on RA. Interval history / Subjective:   Pt lying in bed receiving breathing treatment - he has no new complaints and reports to be breathing well. Has a good appetite. He is aware he can not return to [facility] due to oxygen needs and  is currently working on an alternate dispo plan. Assessment & Plan:     Acute hypoxic respiratory failure in the setting of COPD exacerbation:  - does not use home oxygen  PTA but will need on discharge. PT performed walk test 10/29:   87% at rest on RA  80% while ambulating on RA  90% at rest on 4LPM  88% while ambulating on 6LPM  - steroids (taper down and start oral tomorrow), nebs  - no need for abx    Tobacco Abuse   - smoking a ppd  - counseled on smoking cessation - emphasized cessation today and dangers of smoking while on oxygen    History of paranoid schizophrenia: stable. C/w home med's    History of depression: c/w home med's    Hypertension: reasonable, monitoring    History of chronic hyponatremia: stable, monitor    Code status: full  DVT prophylaxis: [address] discussed with: Patient/Family, attending MD  Disposition: lives at [facility] but they are unable to accept him back on oxygen. Case management working on alternative placement.       Hospital Problems  Date Reviewed: 10/26/2017          Codes Class Noted POA    * (Principal)[facility] ICD-10-CM: J44.1  ICD-9-CM: 491.21  10/26/2017 Unknown Review of Systems:   Denies HA. No chest pain or pressure. No difficulty breathing. No abdominal complaints, no N/V and has a good appetite     Vital Signs:    Last 24hrs VS reviewed since prior progress note. Most recent are:  Visit Vitals    BP (!) 136/98 (BP 1 Location: Left arm, BP Patient Position: At rest)    Pulse 99    Temp 98 °F (36.7 °C)    Resp 23    Ht 5' 10\" (1.778 m)    Wt 64 kg (141 lb)    SpO2 90%    BMI 20.23 kg/m2       Intake/Output Summary (Last 24 hours) at 10/30/17 0931  Last data filed at 10/29/17 2259   Gross per 24 hour   Intake             2200 ml   Output             3700 ml   Net            -1500 ml      Physical Examination:        Constitutional:  No acute distress, cooperative, pleasant    ENT:  Oral mucous moist, oropharynx benign. Resp:  Diminished throughout, scattered rhonchi, no wheezing noted. No accessory muscle use and on 2 L NC at present. 6 minute walk test results reviewed from 10/29. CV:  Regular rhythm, no murmurs. ST on tele. GI:  Soft, non distended, non tender. Normoactive bowel sounds    Musculoskeletal:  No edema, warm, 2+ pulses throughout    Neurologic:  Moves all extremities. AAOx3     Psych: Poor insight, Not anxious nor agitated. Skin: Good turgor, no rashes or ulcers    Data Review:   Review and/or order of clinical lab test  Review and/or order of tests in the radiology section of CPT  Review and/or order of tests in the medicine section of CPT    Labs:     No results for input(s): WBC, HGB, HCT, PLT, HGBEXT, HCTEXT, PLTEXT, HGBEXT, HCTEXT, PLTEXT in the last 72 hours. No results for input(s): NA, K, CL, CO2, BUN, CREA, GLU, CA, MG, PHOS, URICA in the last 72 hours. No results for input(s): SGOT, GPT, ALT, AP, TBIL, TBILI, TP, ALB, GLOB, GGT, AML, LPSE in the last 72 hours. No lab exists for component: AMYP, HLPSE  No results for input(s): INR, PTP, APTT in the last 72 hours.     No lab exists for component: INREXT, INREXT   No results for input(s): FE, TIBC, PSAT, FERR in the last 72 hours. No results found for: FOL, RBCF   No results for input(s): PH, PCO2, PO2 in the last 72 hours. No results for input(s): CPK, CKNDX, TROIQ in the last 72 hours.     No lab exists for component: CPKMB  Lab Results   Component Value Date/Time    Cholesterol, total 102 05/15/2017 11:11 AM    HDL Cholesterol 48 05/15/2017 11:11 AM    LDL, calculated 46 05/15/2017 11:11 AM    Triglyceride 41 05/15/2017 11:11 AM     Lab Results   Component Value Date/Time    Glucose (POC) 132 07/04/2017 04:37 PM     Lab Results   Component Value Date/Time    Color YELLOW/STRAW 07/13/2017 04:11 PM    Appearance CLOUDY 07/13/2017 04:11 PM    Specific gravity 1.016 07/13/2017 04:11 PM    pH (UA) 6.0 07/13/2017 04:11 PM    Protein 100 07/13/2017 04:11 PM    Glucose 250 07/13/2017 04:11 PM    Ketone 15 07/13/2017 04:11 PM    Bilirubin NEGATIVE  07/13/2017 04:11 PM    Urobilinogen 0.2 07/13/2017 04:11 PM    Nitrites NEGATIVE  07/13/2017 04:11 PM    Leukocyte Esterase NEGATIVE  07/13/2017 04:11 PM    Epithelial cells FEW 07/13/2017 04:11 PM    Bacteria NEGATIVE  07/13/2017 04:11 PM    WBC 0-4 07/13/2017 04:11 PM    RBC 10-20 07/13/2017 04:11 PM     Medications Reviewed:     Current Facility-Administered Medications   Medication Dose Route Frequency    [START ON 10/31/2017] predniSONE (DELTASONE) tablet 60 mg  60 mg Oral DAILY WITH BREAKFAST    methylPREDNISolone (PF) (SOLU-MEDROL) injection 60 mg  60 mg IntraVENous Q12H    nicotine (NICODERM CQ) 14 mg/24 hr patch 1 Patch  1 Patch TransDERmal DAILY    albuterol-ipratropium (DUO-NEB) 2.5 MG-0.5 MG/3 ML  3 mL Nebulization Q4H PRN    ALPRAZolam (XANAX) tablet 0.5 mg  0.5 mg Oral BID PRN    benztropine (COGENTIN) tablet 2 mg  2 mg Oral DAILY PRN    PARoxetine (PAXIL) tablet 20 mg  20 mg Oral DAILY    QUEtiapine (SEROquel) tablet 50 mg  50 mg Oral QHS PRN    risperiDONE (RisperDAL) tablet 3 mg  3 mg Oral DAILY    sodium chloride tablet 1 g  1 g Oral DAILY    temazepam (RESTORIL) capsule 30 mg  30 mg Oral QHS PRN    sodium chloride (NS) flush 5-10 mL  5-10 mL IntraVENous Q8H    sodium chloride (NS) flush 5-10 mL  5-10 mL IntraVENous PRN    acetaminophen (TYLENOL) tablet 650 mg  650 mg Oral Q4H PRN    albuterol (PROVENTIL VENTOLIN) nebulizer solution 2.5 mg  2.5 mg Nebulization Q6H PRN    arformoterol (BROVANA) neb solution 15 mcg  15 mcg Nebulization BID RT    And    budesonide (PULMICORT) 500 mcg/2 ml nebulizer suspension  500 mcg Nebulization BID RT    ipratropium (ATROVENT) 0.02 % nebulizer solution 0.5 mg  0.5 mg Nebulization Q6H RT   ______________________________________________________________________  EXPECTED LENGTH OF STAY: 3d 19h  ACTUAL LENGTH OF STAY:          Juan Aragon 1277, NP

## 2017-10-30 NOTE — PROGRESS NOTES
Cm reviewed chart and this pt now need continuous oxygen. He was living at [facility] and per Ms. Joseph at the facility, they are unable to accept a patient with oxygen. Piper called [facility] to ask if there are beds open. They have openings and can accept patients with O2, but her  is out until next Monday and they will not be able to take a new patient without the 's approval. PIPER sent a referral to [address] [place] [address] to see if they have medicaid beds. PIPER obtained O2 orders and sent a referral to [address] through [facility].  Hang Gagnon

## 2017-10-30 NOTE — PROGRESS NOTES
Problem: Falls - Risk of  Goal: *Absence of Falls  Document Joanie Fall Risk and appropriate interventions in the flowsheet.    Outcome: Progressing Towards Goal  Fall Risk Interventions:  Mobility Interventions: Assess mobility with egress test, OT consult for ADLs, Patient to call before getting OOB, PT Consult for mobility concerns, PT Consult for assist device competence, Strengthening exercises (ROM-active/passive)         Medication Interventions: Evaluate medications/consider consulting pharmacy, Patient to call before getting OOB, Teach patient to arise slowly    Elimination Interventions: Call light in reach, Toilet paper/wipes in reach, Toileting schedule/hourly rounds, Urinal in reach    History of Falls Interventions: Door open when patient unattended, Consult care management for discharge planning

## 2017-10-30 NOTE — PROGRESS NOTES
MEWS 3, will retake vitals. Patient drinking coffee, removed from bedside table. 1454 - Mews 3 after retaking vitals, paged Dr Candi Triana. 46 - Dr Candi Triana called back , made aware of MEWS and said no new orders at this time, will continue to monitor. 1700 - Called respiratory to give scheduled treatment that was overdue at 1400.

## 2017-10-30 NOTE — PROGRESS NOTES
Primary Nurse Darrell Sinclair RN and Fabricio Kirkland RN performed a dual skin assessment on this patient No impairment noted  Sanya score is 22    Sacrum is pink and blanchable

## 2017-10-30 NOTE — PROGRESS NOTES
Bedside shift change report given to Trista (oncoming nurse) by Maverick Ramirez (offgoing nurse). Report included the following information SBAR, Kardex, Intake/Output and Recent Results.

## 2017-10-30 NOTE — PROGRESS NOTES
Bedside and Verbal shift change report given to [address] [place] [address] (oncoming nurse) by Brigette Giron RN (offgoing nurse). Report included the following information SBAR, Kardex, ED Summary, Procedure Summary, Intake/Output, Recent Results and Cardiac Rhythm NSR.

## 2017-10-30 NOTE — INTERDISCIPLINARY ROUNDS
IDR/SLIDR Summary          Patient: Ortiz Serrato MRN: [id number]    Age: 61 y.o. YOB: 1960 Room/Bed: [facility]   Admit Diagnosis: COPD exacerbation (HCC)  Principal Diagnosis: COPD exacerbation (HCC)   Goals: Safety, Improve breathing  Readmission: NO  Quality Measure: PNA  VTE Prophylaxis: Mechanical  Influenza Vaccine screening completed? YES  Pneumococcal Vaccine screening completed? YES  Mobility needs: Yes   Nutrition plan:No  Consults:P.T, O.T., Respiratory and Case Management    Financial concerns:No  Escalated to CM? YES  RRAT Score: 13   Interventions:Home Health  Testing due for pt today?  NO  LOS: 4 days Expected length of stay 5 days  Discharge plan: Group home   PCP: Shayne Notice, DO  Transportation needs: Yes    Days before discharge:longer than expected LOS  Discharge disposition: Home    Signed:     Jaspreet Ludwig  10/30/2017  6:38 AM

## 2017-10-31 LAB
ANION GAP SERPL CALC-SCNC: 5 MMOL/L (ref 5–15)
BUN SERPL-MCNC: 18 MG/DL (ref 6–20)
BUN/CREAT SERPL: 28 (ref 12–20)
CALCIUM SERPL-MCNC: 8.6 MG/DL (ref 8.5–10.1)
CHLORIDE SERPL-SCNC: 99 MMOL/L (ref 97–108)
CO2 SERPL-SCNC: 36 MMOL/L (ref 21–32)
CREAT SERPL-MCNC: 0.64 MG/DL (ref 0.7–1.3)
GLUCOSE SERPL-MCNC: 104 MG/DL (ref 65–100)
POTASSIUM SERPL-SCNC: 4.9 MMOL/L (ref 3.5–5.1)
SODIUM SERPL-SCNC: 140 MMOL/L (ref 136–145)

## 2017-10-31 PROCEDURE — 74011000250 HC RX REV CODE- 250: Performed by: FAMILY MEDICINE

## 2017-10-31 PROCEDURE — 94664 DEMO&/EVAL PT USE INHALER: CPT

## 2017-10-31 PROCEDURE — 36415 COLL VENOUS BLD VENIPUNCTURE: CPT | Performed by: NURSE PRACTITIONER

## 2017-10-31 PROCEDURE — 74011636637 HC RX REV CODE- 636/637: Performed by: NURSE PRACTITIONER

## 2017-10-31 PROCEDURE — 80048 BASIC METABOLIC PNL TOTAL CA: CPT | Performed by: NURSE PRACTITIONER

## 2017-10-31 PROCEDURE — 77010033678 HC OXYGEN DAILY

## 2017-10-31 PROCEDURE — 94640 AIRWAY INHALATION TREATMENT: CPT

## 2017-10-31 PROCEDURE — 65270000032 HC RM SEMIPRIVATE

## 2017-10-31 PROCEDURE — 74011250637 HC RX REV CODE- 250/637: Performed by: HOSPITALIST

## 2017-10-31 PROCEDURE — 74011250637 HC RX REV CODE- 250/637: Performed by: FAMILY MEDICINE

## 2017-10-31 RX ORDER — PREDNISONE 20 MG/1
40 TABLET ORAL
Status: DISCONTINUED | OUTPATIENT
Start: 2017-11-01 | End: 2017-11-02

## 2017-10-31 RX ADMIN — Medication 10 ML: at 07:10

## 2017-10-31 RX ADMIN — ARFORMOTEROL TARTRATE 15 MCG: 15 SOLUTION RESPIRATORY (INHALATION) at 20:20

## 2017-10-31 RX ADMIN — IPRATROPIUM BROMIDE 0.5 MG: 0.5 SOLUTION RESPIRATORY (INHALATION) at 07:40

## 2017-10-31 RX ADMIN — BUDESONIDE 500 MCG: 0.5 INHALANT RESPIRATORY (INHALATION) at 20:20

## 2017-10-31 RX ADMIN — ARFORMOTEROL TARTRATE 15 MCG: 15 SOLUTION RESPIRATORY (INHALATION) at 07:40

## 2017-10-31 RX ADMIN — TEMAZEPAM 30 MG: 15 CAPSULE ORAL at 22:25

## 2017-10-31 RX ADMIN — PAROXETINE HYDROCHLORIDE 20 MG: 20 TABLET, FILM COATED ORAL at 08:59

## 2017-10-31 RX ADMIN — IPRATROPIUM BROMIDE 0.5 MG: 0.5 SOLUTION RESPIRATORY (INHALATION) at 12:58

## 2017-10-31 RX ADMIN — Medication 10 ML: at 13:29

## 2017-10-31 RX ADMIN — PREDNISONE 60 MG: 20 TABLET ORAL at 07:09

## 2017-10-31 RX ADMIN — BUDESONIDE 500 MCG: 0.5 INHALANT RESPIRATORY (INHALATION) at 07:40

## 2017-10-31 RX ADMIN — IPRATROPIUM BROMIDE 0.5 MG: 0.5 SOLUTION RESPIRATORY (INHALATION) at 20:20

## 2017-10-31 RX ADMIN — Medication 10 ML: at 21:14

## 2017-10-31 RX ADMIN — QUETIAPINE FUMARATE 50 MG: 25 TABLET ORAL at 21:14

## 2017-10-31 RX ADMIN — IPRATROPIUM BROMIDE 0.5 MG: 0.5 SOLUTION RESPIRATORY (INHALATION) at 01:24

## 2017-10-31 RX ADMIN — SODIUM CHLORIDE TAB 1 GM 1 G: 1 TAB at 08:59

## 2017-10-31 RX ADMIN — RISPERIDONE 3 MG: 1 TABLET ORAL at 08:59

## 2017-10-31 NOTE — PROGRESS NOTES
Respiratory Educator    COPD education provided to this 60yo male with COPD exacerbation. (Seephysician's notes). Purpose is to review current resp. Regimen, educate on COPD management and exacerbation prevention. Purpose is to prevent readmission for the same. S: \"Doing OK\"     O/A: Pt is awake sitting in bed on O2 at 4lpm via NC with O2 Sats of 93%, HR: 112, RR: 20, BS: Diminished t/o with scattered exp. wheezes bilaterally, ant. And post. . Cough is congested and loose. Pt appears to swallow secretions. No use of accessory muscles, no cyanosis. Pt replies with short answers but does not appear to be resp. related. Current home resp. meds are:   Albuterol inhaler, 2 puffs Q4 for SOB   Albuyterol Neb, Q6 for wheezes   Incruse Ellipta, 1 puff Q day. Administered pt.s afternoon scheduled nebulizer (Ipatropium Bromide) and included aerobika (oscillatory PEP) to assist with airway clearance. After TX pt had strong spont. congested, l;oose cough. However pt appears to swallow secretions. Ana Laura. Tx well. Increased aeration after TX but still with scattered exp./ wheezes. P: Provided COPD packet and reviewed its contents  1. Discussed importance of adhering to resp. meds as Dr prescribed  2. Discussed use of Aerobika for airway clearance. To use with nebs and Q1 WA (10-12 exhalations through [place])  3. Discussed importance of f/up with  when discharged.     Butch Bautista, RRT   Respiratory Care  Pul;monary Disease Case Manager

## 2017-10-31 NOTE — PROGRESS NOTES
Hospitalist Progress Note  Georgette Rodriguez NP  Answering service: [phone] from in house phone  Cell: [phone]   Date of Service:  10/31/2017  NAME:  Noé Angeles  :  1960  MRN:  [id number]    Admission Summary:   Pt presented to the ED on 10/26 with SOB. Pt went to see PCP where his saturations were in the 70's. He was given 2 nebulizer tx's and saturations improve to the 80%. Pt was transferred to the ED where his saturations were 82% on RA. Interval history / Subjective:   Pt lying in bed receiving breathing treatment - he has no new complaints. Has continues to have a good appetite. He is aware he can not return to [facility] due to oxygen needs and  is currently working on an alternate dispo plan. He revealed that he has lived at [facility] for 18 years. Assessment & Plan:     Acute hypoxic respiratory failure in the setting of COPD exacerbation:  - does not use home oxygen PTA but will need on discharge (has been set up with [facility] Anton[facility] Back) - PT performed walk test 10/29:   87% at rest on RA  80% while ambulating on RA  90% at rest on 4LPM  88% while ambulating on 6LPM  - steroids (now on oral meds, will taper), nebs  - no need for abx    Tobacco Abuse:   - smoking a ppd  - counseled on smoking cessation - emphasized cessation today and dangers of smoking while on oxygen    History of paranoid schizophrenia: stable. C/w home med's    History of depression: c/w home meds    Hypertension: reasonable, monitoring    History of chronic hyponatremia: Na normal    Code status: full  DVT prophylaxis: SCDs  Care Plan discussed with: patient, nurse, attending MD  Disposition: lives at [facility] but they are unable to accept him back on oxygen. Case management working on alternative placement.       Hospital Problems  Date Reviewed: 10/26/2017          Codes Class Noted POA    * (Principal)COPD exacerbation [facility] ICD-10-CM: J44.1  ICD-9-CM: 491.21  10/26/2017 Unknown            Review of Systems:   Denies HA. No chest pain or pressure. No difficulty breathing. No abdominal complaints, no N/V and has a good appetite     Vital Signs:    Last 24hrs VS reviewed since prior progress note. Most recent are:  Visit Vitals    /88 (BP 1 Location: Left arm, BP Patient Position: At rest)    Pulse 95    Temp 98 °F (36.7 °C)    Resp 18    Ht 5' 10\" (1.778 m)    Wt 64 kg (141 lb)    SpO2 95%    BMI 20.23 kg/m2       Intake/Output Summary (Last 24 hours) at 10/31/17 0853  Last data filed at 10/31/17 0240   Gross per 24 hour   Intake             1520 ml   Output             4900 ml   Net            -3380 ml      Physical Examination:        Constitutional:  No acute distress, cooperative, pleasant    ENT:  Oral mucous moist, oropharynx benign. Resp:  Diminished throughout, no wheezing noted. No accessory muscle use and on 4 L NC at present. 6 minute walk test results reviewed from 10/29. CV:  Regular rhythm, no murmurs. GI:  Soft, non distended, non tender. Normoactive bowel sounds    Musculoskeletal:  No edema, warm, 2+ pulses throughout    Neurologic:  Moves all extremities. AAOx3     Psych: Poor insight, Not anxious nor agitated. Skin: Good turgor, no rashes or ulcers    Data Review:   Review and/or order of clinical lab test  Review and/or order of tests in the radiology section of CPT  Review and/or order of tests in the medicine section of CPT    Labs:     No results for input(s): WBC, HGB, HCT, PLT, HGBEXT, HCTEXT, PLTEXT, HGBEXT, HCTEXT, PLTEXT in the last 72 hours. Recent Labs      10/31/17   0541   NA  140   K  4.9   CL  99   CO2  36*   BUN  18   CREA  0.64*   GLU  104*   CA  8.6     No results for input(s): SGOT, GPT, ALT, AP, TBIL, TBILI, TP, ALB, GLOB, GGT, AML, LPSE in the last 72 hours.     No lab exists for component: AMYP, HLPSE  No results for input(s): INR, PTP, APTT in the last 72 hours. No lab exists for component: INREXT, INREXT   No results for input(s): FE, TIBC, PSAT, FERR in the last 72 hours. No results found for: FOL, RBCF   No results for input(s): PH, PCO2, PO2 in the last 72 hours. No results for input(s): CPK, CKNDX, TROIQ in the last 72 hours.     No lab exists for component: CPKMB  Lab Results   Component Value Date/Time    Cholesterol, total 102 05/15/2017 11:11 AM    HDL Cholesterol 48 05/15/2017 11:11 AM    LDL, calculated 46 05/15/2017 11:11 AM    Triglyceride 41 05/15/2017 11:11 AM     Lab Results   Component Value Date/Time    Glucose (POC) 132 07/04/2017 04:37 PM     Lab Results   Component Value Date/Time    Color YELLOW/STRAW 07/13/2017 04:11 PM    Appearance CLOUDY 07/13/2017 04:11 PM    Specific gravity 1.016 07/13/2017 04:11 PM    pH (UA) 6.0 07/13/2017 04:11 PM    Protein 100 07/13/2017 04:11 PM    Glucose 250 07/13/2017 04:11 PM    Ketone 15 07/13/2017 04:11 PM    Bilirubin NEGATIVE  07/13/2017 04:11 PM    Urobilinogen 0.2 07/13/2017 04:11 PM    Nitrites NEGATIVE  07/13/2017 04:11 PM    Leukocyte Esterase NEGATIVE  07/13/2017 04:11 PM    Epithelial cells FEW 07/13/2017 04:11 PM    Bacteria NEGATIVE  07/13/2017 04:11 PM    WBC 0-4 07/13/2017 04:11 PM    RBC 10-20 07/13/2017 04:11 PM     Medications Reviewed:     Current Facility-Administered Medications   Medication Dose Route Frequency    predniSONE (DELTASONE) tablet 60 mg  60 mg Oral DAILY WITH BREAKFAST    nicotine (NICODERM CQ) 14 mg/24 hr patch 1 Patch  1 Patch TransDERmal DAILY    albuterol-ipratropium (DUO-NEB) 2.5 MG-0.5 MG/3 ML  3 mL Nebulization Q4H PRN    ALPRAZolam (XANAX) tablet 0.5 mg  0.5 mg Oral BID PRN    benztropine (COGENTIN) tablet 2 mg  2 mg Oral DAILY PRN    PARoxetine (PAXIL) tablet 20 mg  20 mg Oral DAILY    QUEtiapine (SEROquel) tablet 50 mg  50 mg Oral QHS PRN    risperiDONE (RisperDAL) tablet 3 mg  3 mg Oral DAILY    sodium chloride tablet 1 g  1 g Oral DAILY    temazepam (RESTORIL) capsule 30 mg  30 mg Oral QHS PRN    sodium chloride (NS) flush 5-10 mL  5-10 mL IntraVENous Q8H    sodium chloride (NS) flush 5-10 mL  5-10 mL IntraVENous PRN    acetaminophen (TYLENOL) tablet 650 mg  650 mg Oral Q4H PRN    albuterol (PROVENTIL VENTOLIN) nebulizer solution 2.5 mg  2.5 mg Nebulization Q6H PRN    arformoterol (BROVANA) neb solution 15 mcg  15 mcg Nebulization BID RT    And    budesonide (PULMICORT) 500 mcg/2 ml nebulizer suspension  500 mcg Nebulization BID RT    ipratropium (ATROVENT) 0.02 % nebulizer solution 0.5 mg  0.5 mg Nebulization Q6H RT   ______________________________________________________________________  EXPECTED LENGTH OF STAY: 3d 19h  ACTUAL LENGTH OF STAY:          5              George Marquez NP

## 2017-10-31 NOTE — PROGRESS NOTES
Bedside and Verbal shift change report given to Catrina Pedraza (oncoming nurse) by Tricia Doherty (offgoing nurse). Report included the following information SBAR and Kardex.

## 2017-10-31 NOTE — PROGRESS NOTES
Bedside and Verbal shift change report given to Rosangela Doyle (oncoming nurse) by Ronald Bence (offgoing nurse). Report included the following information SBAR.

## 2017-10-31 NOTE — PROGRESS NOTES
Bedside shift change report given to Jaz Conroy (oncoming nurse) by Terrance Jean (offgoing nurse). Report included the following information SBAR and Kardex.

## 2017-10-31 NOTE — PROGRESS NOTES
Bedside shift change report given to Marylen Laura (oncoming nurse) by Franklin Garzon (offgoing nurse). Report included the following information SBAR and Kardex.

## 2017-10-31 NOTE — PROGRESS NOTES
Piper received a message from [place] [facility] that he has all needed information for O2 delivery. He just needs to know where this pt will be going at d/c. PIPER will follow.  Vero Adkins

## 2017-10-31 NOTE — PROGRESS NOTES
Problem: Falls - Risk of  Goal: *Absence of Falls  Document Joanie Fall Risk and appropriate interventions in the flowsheet.    Outcome: Progressing Towards Goal  Fall Risk Interventions:  Mobility Interventions: Patient to call before getting OOB         Medication Interventions: Patient to call before getting OOB, Teach patient to arise slowly    Elimination Interventions: Call light in reach, Patient to call for help with toileting needs    History of Falls Interventions: Door open when patient unattended

## 2017-11-01 PROCEDURE — 74011250637 HC RX REV CODE- 250/637: Performed by: FAMILY MEDICINE

## 2017-11-01 PROCEDURE — 74011250637 HC RX REV CODE- 250/637: Performed by: HOSPITALIST

## 2017-11-01 PROCEDURE — 65270000032 HC RM SEMIPRIVATE

## 2017-11-01 PROCEDURE — 74011636637 HC RX REV CODE- 636/637: Performed by: NURSE PRACTITIONER

## 2017-11-01 PROCEDURE — 94640 AIRWAY INHALATION TREATMENT: CPT

## 2017-11-01 PROCEDURE — 77010033678 HC OXYGEN DAILY

## 2017-11-01 PROCEDURE — 74011250637 HC RX REV CODE- 250/637: Performed by: NURSE PRACTITIONER

## 2017-11-01 PROCEDURE — 74011000250 HC RX REV CODE- 250: Performed by: FAMILY MEDICINE

## 2017-11-01 RX ORDER — AMLODIPINE BESYLATE 5 MG/1
5 TABLET ORAL DAILY
Status: DISCONTINUED | OUTPATIENT
Start: 2017-11-01 | End: 2017-11-08 | Stop reason: HOSPADM

## 2017-11-01 RX ADMIN — IPRATROPIUM BROMIDE 0.5 MG: 0.5 SOLUTION RESPIRATORY (INHALATION) at 15:12

## 2017-11-01 RX ADMIN — Medication 10 ML: at 07:08

## 2017-11-01 RX ADMIN — AMLODIPINE BESYLATE 5 MG: 5 TABLET ORAL at 17:29

## 2017-11-01 RX ADMIN — Medication 10 ML: at 21:24

## 2017-11-01 RX ADMIN — IPRATROPIUM BROMIDE 0.5 MG: 0.5 SOLUTION RESPIRATORY (INHALATION) at 20:25

## 2017-11-01 RX ADMIN — ARFORMOTEROL TARTRATE 15 MCG: 15 SOLUTION RESPIRATORY (INHALATION) at 20:25

## 2017-11-01 RX ADMIN — QUETIAPINE FUMARATE 50 MG: 25 TABLET ORAL at 21:24

## 2017-11-01 RX ADMIN — IPRATROPIUM BROMIDE 0.5 MG: 0.5 SOLUTION RESPIRATORY (INHALATION) at 07:45

## 2017-11-01 RX ADMIN — ARFORMOTEROL TARTRATE 15 MCG: 15 SOLUTION RESPIRATORY (INHALATION) at 07:45

## 2017-11-01 RX ADMIN — RISPERIDONE 3 MG: 1 TABLET ORAL at 09:15

## 2017-11-01 RX ADMIN — IPRATROPIUM BROMIDE 0.5 MG: 0.5 SOLUTION RESPIRATORY (INHALATION) at 02:14

## 2017-11-01 RX ADMIN — PAROXETINE HYDROCHLORIDE 20 MG: 20 TABLET, FILM COATED ORAL at 09:15

## 2017-11-01 RX ADMIN — BUDESONIDE 500 MCG: 0.5 INHALANT RESPIRATORY (INHALATION) at 20:25

## 2017-11-01 RX ADMIN — BUDESONIDE 500 MCG: 0.5 INHALANT RESPIRATORY (INHALATION) at 07:45

## 2017-11-01 RX ADMIN — PREDNISONE 40 MG: 20 TABLET ORAL at 07:08

## 2017-11-01 RX ADMIN — TEMAZEPAM 30 MG: 15 CAPSULE ORAL at 22:41

## 2017-11-01 RX ADMIN — SODIUM CHLORIDE TAB 1 GM 1 G: 1 TAB at 09:15

## 2017-11-01 RX ADMIN — Medication 10 ML: at 13:27

## 2017-11-01 NOTE — PROGRESS NOTES
2:00 PM  CM completed bedside rounds with NP and RN. There are no new updates at this time in regards to disposition. CM currently working on identify LTC placement/housing for patient. CM re-submitted a referral via allscripts to [address] in regards to LTC. CM received no updates or response. CM contacted facility at [phone] and requested to speak with admissions. CM spoke with Maury Godoy. CM informed that facility does have a LTC male bed available. CM further informed that patient is a dual eligible patient. Facility will need to obtain authorization. Facility currently working on obtaining authorization. CM provided update to patient. CM attempted to contact patient's brother at number listed on ED Facesheet 21 . There was no answer. CM unable to leave a message requesting a return call.  requested for remote access code. CM received a call from patient's Ysitie 6 [phone] with [address] inquiring of patient's discharge plan and needs. CM provided current updates of patient not returning to [facility] for services. [facility] also provided CM with updated contact information for patient's brother Severo Sloan [phone]. She further informed CM that patient's brother assists with making decisions. CM contacted patient's brother at number provided. No one was present at the time of the call. CM left a message requesting a return call. CM contacted Graciela Burt with [facility] to provide update on patient possibly transitioning to [address] at discharge for LTC. CM informed to keep them up to date if anything changes. CM will continue to follow and assist with disposition needs as they arise. 3:20 PM  CM received a return call from patient's twin brother, Severo Sloan [phone].   Brother requesting for CM to coordinate disposition needs with patient's mental health , [facility] [phone] with [address]. Brother desires for patient to be closer and not far away in [place] for services. He is open to [facility] if no other facility located near the [facility] is able to accept and accommodate patient for services. CM spoke with patient's mental health , Tracy. CM able to identify facility choices. CM submitted referrals to [facility], [address] [place] [address] [place], [address] [place],[address] for [place] via [employer]. CM requested for [facility] [employer] [facility] to hold off on attempt to obtain authorization at this time per patient's family request via [employer]. CM will continue to follow and assist with disposition needs as they arise. 4:11 PM  CM received notification from [facility] via [employer] that they are unable to accept patient for services at this time due to no LTC bed available. CM received notification from [facility] via [employer] that referral has been received and they are reviewing clinicals. CM will continue to follow and assist with disposition needs as they arise.     CORNELIA Cole/KAI

## 2017-11-01 NOTE — PROGRESS NOTES
..Bedside shift change report given to Luca Marc RN (oncoming nurse) by 61Alek Carter (offgoing nurse). Report included the following information SBAR, ED Summary and MAR.

## 2017-11-01 NOTE — PROGRESS NOTES
Hospitalist Progress Note  Genie Man NP  Answering service: [phone] from in house phone  Cell: [phone]   Date of Service:  2017  NAME:  Tc Meng  :  1960  MRN:  [id number]    Admission Summary:   Pt presented to the ED on 10/26 with SOB. Pt went to see PCP where his saturations were in the 70's. He was given 2 nebulizer tx's and saturations improve to the 80%. Pt was transferred to the ED where his saturations were 82% on RA. Interval history / Subjective:   Pt lying in bed, seen this am on rounds with both nurse and . Pt with no new complaints or discomforts. Pt is aware he can not return to [facility] due to oxygen needs and  is currently working on an alternate dispo plan. Assessment & Plan:     Acute hypoxic respiratory failure in the setting of COPD exacerbation:  - does not use home oxygen PTA but will need on discharge (has been set up with Angela Shea) - PT performed walk test 10/29:   87% at rest on RA  80% while ambulating on RA  90% at rest on 4LPM  88% while ambulating on 6LPM  - steroids (now on oral meds, tapering), nebs  - no need for abx    Tobacco Abuse:   - smoking a ppd  - counseled on smoking cessation - cessation and dangers of smoking while on oxygen have been discussed on several occasions. History of paranoid schizophrenia: stable. C/w home meds    History of depression: c/w home meds    Hypertension: BP has been running on high side  - start norvasc (5 mg) today and monitor effects    History of chronic hyponatremia: Na normal    Code status: Full  DVT prophylaxis: SCDs  Care Plan discussed with: patient, nurse, attending MD  Disposition: lives at [facility] but they are unable to accept him back on oxygen. Case management working on alternative placement.     Pt has twin brother, Nita Da Silva -  has updated him on plan of care     RONAL ALSTON - SILVIA Problems  Date Reviewed: 10/26/2017          Codes Class Noted POA    * (Principal)COPD exacerbation ([facility] [address] ICD-10-CM: J44.1  ICD-9-CM: 491.21  10/26/2017 Unknown            Review of Systems:   Denies HA. No chest pain or pressure. No difficulty breathing. No abdominal complaints, no N/V and has a good appetite     Vital Signs:    Last 24hrs VS reviewed since prior progress note. Most recent are:  Visit Vitals    /82 (BP 1 Location: Left arm, BP Patient Position: At rest)    Pulse (!) 111    Temp 97.8 °F (36.6 °C)    Resp 20    Ht 5' 10\" (1.778 m)    Wt 64 kg (141 lb)    SpO2 92%    BMI 20.23 kg/m2       Intake/Output Summary (Last 24 hours) at 11/01/17 1623  Last data filed at 11/01/17 1539   Gross per 24 hour   Intake                0 ml   Output             6375 ml   Net            -6375 ml      Physical Examination:        Constitutional:  No acute distress, cooperative, pleasant    ENT:  Oral mucous membranes moist, oropharynx benign. Poor dentition   Resp:  Diminished throughout, no wheezing noted. No accessory muscle use and on 4 L NC.    CV:  Regular rhythm, no murmurs. GI:  Soft, non distended, non tender. Normoactive bowel sounds    Musculoskeletal:  No edema, warm, 2+ pulses throughout    Neurologic:  Moves all extremities. AAOx3     Psych: Poor insight, Not anxious nor agitated. Skin: Good turgor, no rashes or ulcers    Data Review:   Review and/or order of clinical lab test  Review and/or order of tests in the radiology section of CPT  Review and/or order of tests in the medicine section of CPT    Labs:     No results for input(s): WBC, HGB, HCT, PLT, HGBEXT, HCTEXT, PLTEXT, HGBEXT, HCTEXT, PLTEXT in the last 72 hours. Recent Labs      10/31/17   0541   NA  140   K  4.9   CL  99   CO2  36*   BUN  18   CREA  0.64*   GLU  104*   CA  8.6     No results for input(s): SGOT, GPT, ALT, AP, TBIL, TBILI, TP, ALB, GLOB, GGT, AML, LPSE in the last 72 hours.     No lab exists for component: AMYP, HLPSE  No results for input(s): INR, PTP, APTT in the last 72 hours. No lab exists for component: INREXT, INREXT   No results for input(s): FE, TIBC, PSAT, FERR in the last 72 hours. No results found for: FOL, RBCF   No results for input(s): PH, PCO2, PO2 in the last 72 hours. No results for input(s): CPK, CKNDX, TROIQ in the last 72 hours.     No lab exists for component: CPKMB  Lab Results   Component Value Date/Time    Cholesterol, total 102 05/15/2017 11:11 AM    HDL Cholesterol 48 05/15/2017 11:11 AM    LDL, calculated 46 05/15/2017 11:11 AM    Triglyceride 41 05/15/2017 11:11 AM     Lab Results   Component Value Date/Time    Glucose (POC) 132 07/04/2017 04:37 PM     Lab Results   Component Value Date/Time    Color YELLOW/STRAW 07/13/2017 04:11 PM    Appearance CLOUDY 07/13/2017 04:11 PM    Specific gravity 1.016 07/13/2017 04:11 PM    pH (UA) 6.0 07/13/2017 04:11 PM    Protein 100 07/13/2017 04:11 PM    Glucose 250 07/13/2017 04:11 PM    Ketone 15 07/13/2017 04:11 PM    Bilirubin NEGATIVE  07/13/2017 04:11 PM    Urobilinogen 0.2 07/13/2017 04:11 PM    Nitrites NEGATIVE  07/13/2017 04:11 PM    Leukocyte Esterase NEGATIVE  07/13/2017 04:11 PM    Epithelial cells FEW 07/13/2017 04:11 PM    Bacteria NEGATIVE  07/13/2017 04:11 PM    WBC 0-4 07/13/2017 04:11 PM    RBC 10-20 07/13/2017 04:11 PM     Medications Reviewed:     Current Facility-Administered Medications   Medication Dose Route Frequency    predniSONE (DELTASONE) tablet 40 mg  40 mg Oral DAILY WITH BREAKFAST    nicotine (NICODERM CQ) 14 mg/24 hr patch 1 Patch  1 Patch TransDERmal DAILY    albuterol-ipratropium (DUO-NEB) 2.5 MG-0.5 MG/3 ML  3 mL Nebulization Q4H PRN    ALPRAZolam (XANAX) tablet 0.5 mg  0.5 mg Oral BID PRN    benztropine (COGENTIN) tablet 2 mg  2 mg Oral DAILY PRN    PARoxetine (PAXIL) tablet 20 mg  20 mg Oral DAILY    QUEtiapine (SEROquel) tablet 50 mg  50 mg Oral QHS PRN    risperiDONE (RisperDAL) tablet 3 mg  3 mg Oral DAILY    sodium chloride tablet 1 g  1 g Oral DAILY    temazepam (RESTORIL) capsule 30 mg  30 mg Oral QHS PRN    sodium chloride (NS) flush 5-10 mL  5-10 mL IntraVENous Q8H    sodium chloride (NS) flush 5-10 mL  5-10 mL IntraVENous PRN    acetaminophen (TYLENOL) tablet 650 mg  650 mg Oral Q4H PRN    albuterol (PROVENTIL VENTOLIN) nebulizer solution 2.5 mg  2.5 mg Nebulization Q6H PRN    arformoterol (BROVANA) neb solution 15 mcg  15 mcg Nebulization BID RT    And    budesonide (PULMICORT) 500 mcg/2 ml nebulizer suspension  500 mcg Nebulization BID RT    ipratropium (ATROVENT) 0.02 % nebulizer solution 0.5 mg  0.5 mg Nebulization Q6H RT   ______________________________________________________________________  EXPECTED LENGTH OF STAY: 3d 19h  ACTUAL LENGTH OF STAY:          6              Homer Duran NP

## 2017-11-02 ENCOUNTER — APPOINTMENT (OUTPATIENT)
Dept: GENERAL RADIOLOGY | Age: 57
DRG: 133 | End: 2017-11-02
Attending: HOSPITALIST
Payer: COMMERCIAL

## 2017-11-02 PROCEDURE — 74011636637 HC RX REV CODE- 636/637: Performed by: NURSE PRACTITIONER

## 2017-11-02 PROCEDURE — 94640 AIRWAY INHALATION TREATMENT: CPT

## 2017-11-02 PROCEDURE — 93005 ELECTROCARDIOGRAM TRACING: CPT

## 2017-11-02 PROCEDURE — 65270000032 HC RM SEMIPRIVATE

## 2017-11-02 PROCEDURE — 74011000250 HC RX REV CODE- 250: Performed by: FAMILY MEDICINE

## 2017-11-02 PROCEDURE — 77010033678 HC OXYGEN DAILY

## 2017-11-02 PROCEDURE — 74011250637 HC RX REV CODE- 250/637: Performed by: NURSE PRACTITIONER

## 2017-11-02 PROCEDURE — 94760 N-INVAS EAR/PLS OXIMETRY 1: CPT

## 2017-11-02 PROCEDURE — 74011250637 HC RX REV CODE- 250/637: Performed by: HOSPITALIST

## 2017-11-02 PROCEDURE — 74011250637 HC RX REV CODE- 250/637: Performed by: FAMILY MEDICINE

## 2017-11-02 PROCEDURE — 71010 XR CHEST PORT: CPT

## 2017-11-02 RX ORDER — PREDNISONE 20 MG/1
20 TABLET ORAL
Status: DISCONTINUED | OUTPATIENT
Start: 2017-11-03 | End: 2017-11-04

## 2017-11-02 RX ADMIN — ARFORMOTEROL TARTRATE 15 MCG: 15 SOLUTION RESPIRATORY (INHALATION) at 07:53

## 2017-11-02 RX ADMIN — Medication 10 ML: at 21:08

## 2017-11-02 RX ADMIN — IPRATROPIUM BROMIDE 0.5 MG: 0.5 SOLUTION RESPIRATORY (INHALATION) at 20:33

## 2017-11-02 RX ADMIN — IPRATROPIUM BROMIDE 0.5 MG: 0.5 SOLUTION RESPIRATORY (INHALATION) at 14:01

## 2017-11-02 RX ADMIN — RISPERIDONE 3 MG: 1 TABLET ORAL at 09:12

## 2017-11-02 RX ADMIN — AMLODIPINE BESYLATE 5 MG: 5 TABLET ORAL at 09:12

## 2017-11-02 RX ADMIN — PREDNISONE 40 MG: 20 TABLET ORAL at 07:13

## 2017-11-02 RX ADMIN — IPRATROPIUM BROMIDE 0.5 MG: 0.5 SOLUTION RESPIRATORY (INHALATION) at 07:46

## 2017-11-02 RX ADMIN — BUDESONIDE 500 MCG: 0.5 INHALANT RESPIRATORY (INHALATION) at 07:54

## 2017-11-02 RX ADMIN — TEMAZEPAM 30 MG: 15 CAPSULE ORAL at 22:39

## 2017-11-02 RX ADMIN — ARFORMOTEROL TARTRATE 15 MCG: 15 SOLUTION RESPIRATORY (INHALATION) at 20:33

## 2017-11-02 RX ADMIN — QUETIAPINE FUMARATE 50 MG: 25 TABLET ORAL at 21:08

## 2017-11-02 RX ADMIN — SODIUM CHLORIDE TAB 1 GM 1 G: 1 TAB at 09:12

## 2017-11-02 RX ADMIN — Medication 10 ML: at 07:13

## 2017-11-02 RX ADMIN — Medication 10 ML: at 14:44

## 2017-11-02 RX ADMIN — BUDESONIDE 500 MCG: 0.5 INHALANT RESPIRATORY (INHALATION) at 20:33

## 2017-11-02 RX ADMIN — PAROXETINE HYDROCHLORIDE 20 MG: 20 TABLET, FILM COATED ORAL at 09:12

## 2017-11-02 NOTE — PROGRESS NOTES
Bedside shift change report given to Judi Baeza (oncoming nurse) by Nunu Perez (offgoing nurse). Report included the following information SBAR and Kardex.

## 2017-11-02 NOTE — PROGRESS NOTES
11/02/17 1426   Vital Signs   Temp 98.1 °F (36.7 °C)   Temp Source Oral   Pulse (Heart Rate) (!) 118   Heart Rate Source Monitor   Resp Rate 28   O2 Sat (%) 91 %   Level of Consciousness Alert   /79   MAP (Calculated) 93   BP 1 Method Automatic   BP 1 Location Left arm   BP Patient Position Sitting   MEWS Score 4   informed [place] by primary nurse fernando

## 2017-11-02 NOTE — PROGRESS NOTES
Notified Dr. Scottie Hunter of [facility] of 3. He said to continue to monitor.

## 2017-11-02 NOTE — PROGRESS NOTES
Bedside shift change report given to Ledia Perez RN (oncoming nurse) by Ana Christiansen RN (offgoing nurse). Report included the following information SBAR and Kardex.

## 2017-11-02 NOTE — PROGRESS NOTES
8:59 AM  CM received a call from Emma Wylie with  at [facility]. CM was informed that agency is unable to accept patient at this time. Patient does not have LTC Insurance under his [employer]. Kodi Jenkins indicated that patient is only covered for  [facility]. CM contacted patient's Ysitie 6 [phone]  with [facility] to verify coverage and to identify new option for potential placement. CM informed to contact Manju Vikas to verify Medicare and Medicaid information at  [phone]. CM instructed to speak with Jeff Carrillo or Shivam Ross in regards to it. CM contacted [facility] and spoke with Jeff Carrillo [phone]. Jeff Carrillo informed CM that she would fax updated insurance card, UAI, and documents to [address] [place] [address] [place] at [phone]. CM will continue to follow and assist with disposition needs as they arise. 9:22 AM  CM received notification from [facility] via [place] and [facility] via [employer] that they are interested in patient, however they are requiring more information. CM awaiting updates from [facility] to submit to facilities for review. CM will continue to follow and assist with disposition needs as they arise. 9:36 AM  CM received fax documents from [facility] [phone] and faxed [facility] [phone] and submitted updates [facility] via [employer] for their review. CM will continue to follow and assist with disposition needs as they arise. 1:55 PM   CM received notification from [facility] that they are able to accept patient for LTC Medicaid Bed. [facility] is still reviewing patient at this time. [facility] liaison did come and meet with patient and introduced self. CM contacted patient's brother,  Anders Martin [phone] to obtain choice of facility. He requested to call CM back in 30 mins.   CM will continue to follow and assist with disposition needs as they arise.     CORNELIA Michael/KAI

## 2017-11-02 NOTE — PROGRESS NOTES
11/02/17 1846   Vital Signs   Temp 97.2 °F (36.2 °C)   Pulse (Heart Rate) (!) 112   Resp Rate 28   O2 Sat (%) 94 %   Level of Consciousness Alert   /81   MAP (Calculated) 96   MEWS Score 4   md aware cxr and ekg

## 2017-11-02 NOTE — PROGRESS NOTES
Hospitalist Progress Note  Lonell Old, NP  Answering service: [phone] OR [phone] from in house phone  Cell: [phone]   Date of Service:  2017  NAME:  Wilbert Ortega  :  1960  MRN:  [id number]    Admission Summary:   Pt presented to the ED on 10/26 with SOB. Pt went to see PCP where his saturations were in the 70's. He was given 2 nebulizer tx's and saturations improve to the 80%. Pt was transferred to the ED where his saturations were 82% on RA. Interval history / Subjective:   Pt lying in bed. Pt with no new complaints or discomforts. Assessment & Plan:     Acute hypoxic respiratory failure in the setting of COPD exacerbation:  - does not use home oxygen PTA but will need on discharge (has been set up with [facility]) - PT performed walk test 10/29:   87% at rest on RA  80% while ambulating on RA  90% at rest on 4LPM  88% while ambulating on 6LPM  - steroids (now on oral meds, tapering), nebs  - no need for abx  - no wheezing    Tobacco Abuse:   - smoking a ppd  - counseled on smoking cessation - cessation and dangers of smoking while on oxygen have been discussed on several occasions. - on nicoderm patch    History of paranoid schizophrenia: stable. C/w home meds    History of depression: c/w home meds    Hypertension: BP has been running on high side  - just started norvasc (5 mg)    - monitor BP    History of chronic hyponatremia: Na normal    Code status: Full  DVT prophylaxis: SCDs  Care Plan discussed with: patient, nurse, attending MD  Disposition: lives at [facility] but they are unable to accept him back on oxygen. Case management working on alternative placement.      Pt has twin brother, Miriam De Dios -  has updated him on plan of care     Hospital Problems  Date Reviewed: 10/26/2017          Codes Class Noted POA    * (Principal)[facility] ICD-10-CM: J44.1  ICD-9-CM: 491.21  10/26/2017 Unknown            Review of Systems:   Denies HA. No chest pain or pressure. No difficulty breathing. No abdominal complaints, no N/V and has a good appetite     Vital Signs:    Last 24hrs VS reviewed since prior progress note. Most recent are:  Visit Vitals    /90 (BP 1 Location: Left arm, BP Patient Position: At rest)    Pulse (!) 118    Temp 98.1 °F (36.7 °C)    Resp 16    Ht 5' 10\" (1.778 m)    Wt 64 kg (141 lb)    SpO2 93%    BMI 20.23 kg/m2       Intake/Output Summary (Last 24 hours) at 11/02/17 0844  Last data filed at 11/02/17 9813   Gross per 24 hour   Intake                0 ml   Output             8300 ml   Net            -8300 ml      Physical Examination:        Constitutional:  No acute distress, cooperative, pleasant    ENT:  Oral mucous membranes moist, oropharynx benign. Poor dentition   Resp:  Diminished throughout, no wheezing noted. No accessory muscle use and on 4 L NC.    CV:  Regular rhythm, no murmurs. GI:  Soft, non distended, non tender. Normoactive bowel sounds    Musculoskeletal:  No edema, warm, 2+ pulses throughout    Neurologic:  Moves all extremities. AAOx3     Psych: Poor insight, Not anxious nor agitated. Skin: Good turgor, no rashes or ulcers    Data Review:   Review and/or order of clinical lab test  Review and/or order of tests in the radiology section of CPT  Review and/or order of tests in the medicine section of CPT    Labs:     No results for input(s): WBC, HGB, HCT, PLT, HGBEXT, HCTEXT, PLTEXT, HGBEXT, HCTEXT, PLTEXT in the last 72 hours. Recent Labs      10/31/17   0541   NA  140   K  4.9   CL  99   CO2  36*   BUN  18   CREA  0.64*   GLU  104*   CA  8.6     No results for input(s): SGOT, GPT, ALT, AP, TBIL, TBILI, TP, ALB, GLOB, GGT, AML, LPSE in the last 72 hours. No lab exists for component: AMYP, HLPSE  No results for input(s): INR, PTP, APTT in the last 72 hours.     No lab exists for component: INREXT, INREXT   No results for input(s): FE, TIBC, PSAT, FERR in the last 72 hours. No results found for: FOL, RBCF   No results for input(s): PH, PCO2, PO2 in the last 72 hours. No results for input(s): CPK, CKNDX, TROIQ in the last 72 hours.     No lab exists for component: CPKMB  Lab Results   Component Value Date/Time    Cholesterol, total 102 05/15/2017 11:11 AM    HDL Cholesterol 48 05/15/2017 11:11 AM    LDL, calculated 46 05/15/2017 11:11 AM    Triglyceride 41 05/15/2017 11:11 AM     Lab Results   Component Value Date/Time    Glucose (POC) 132 07/04/2017 04:37 PM     Lab Results   Component Value Date/Time    Color YELLOW/STRAW 07/13/2017 04:11 PM    Appearance CLOUDY 07/13/2017 04:11 PM    Specific gravity 1.016 07/13/2017 04:11 PM    pH (UA) 6.0 07/13/2017 04:11 PM    Protein 100 07/13/2017 04:11 PM    Glucose 250 07/13/2017 04:11 PM    Ketone 15 07/13/2017 04:11 PM    Bilirubin NEGATIVE  07/13/2017 04:11 PM    Urobilinogen 0.2 07/13/2017 04:11 PM    Nitrites NEGATIVE  07/13/2017 04:11 PM    Leukocyte Esterase NEGATIVE  07/13/2017 04:11 PM    Epithelial cells FEW 07/13/2017 04:11 PM    Bacteria NEGATIVE  07/13/2017 04:11 PM    WBC 0-4 07/13/2017 04:11 PM    RBC 10-20 07/13/2017 04:11 PM     Medications Reviewed:     Current Facility-Administered Medications   Medication Dose Route Frequency    amLODIPine (NORVASC) tablet 5 mg  5 mg Oral DAILY    predniSONE (DELTASONE) tablet 40 mg  40 mg Oral DAILY WITH BREAKFAST    nicotine (NICODERM CQ) 14 mg/24 hr patch 1 Patch  1 Patch TransDERmal DAILY    albuterol-ipratropium (DUO-NEB) 2.5 MG-0.5 MG/3 ML  3 mL Nebulization Q4H PRN    ALPRAZolam (XANAX) tablet 0.5 mg  0.5 mg Oral BID PRN    benztropine (COGENTIN) tablet 2 mg  2 mg Oral DAILY PRN    PARoxetine (PAXIL) tablet 20 mg  20 mg Oral DAILY    QUEtiapine (SEROquel) tablet 50 mg  50 mg Oral QHS PRN    risperiDONE (RisperDAL) tablet 3 mg  3 mg Oral DAILY    sodium chloride tablet 1 g  1 g Oral DAILY    temazepam (RESTORIL) capsule 30 mg  30 mg Oral QHS PRN    sodium chloride (NS) flush 5-10 mL  5-10 mL IntraVENous Q8H    sodium chloride (NS) flush 5-10 mL  5-10 mL IntraVENous PRN    acetaminophen (TYLENOL) tablet 650 mg  650 mg Oral Q4H PRN    albuterol (PROVENTIL VENTOLIN) nebulizer solution 2.5 mg  2.5 mg Nebulization Q6H PRN    arformoterol (BROVANA) neb solution 15 mcg  15 mcg Nebulization BID RT    And    budesonide (PULMICORT) 500 mcg/2 ml nebulizer suspension  500 mcg Nebulization BID RT    ipratropium (ATROVENT) 0.02 % nebulizer solution 0.5 mg  0.5 mg Nebulization Q6H RT   ______________________________________________________________________  EXPECTED LENGTH OF STAY: 3d 19h  ACTUAL LENGTH OF STAY:          [id number] [facility], NP

## 2017-11-02 NOTE — PROGRESS NOTES
Notified Dr. Jose Espinosa of MEWS of [facility]. He suggested I notify Jason Berger NP. Left a voice mail for Galindo Cifuentes. Patient does not appear to be in distress. He says he feels okay.

## 2017-11-03 PROBLEM — J44.1 COPD EXACERBATION (HCC): Status: RESOLVED | Noted: 2017-10-26 | Resolved: 2017-11-03

## 2017-11-03 LAB
ATRIAL RATE: 117 BPM
CALCULATED P AXIS, ECG09: 81 DEGREES
CALCULATED R AXIS, ECG10: 65 DEGREES
CALCULATED T AXIS, ECG11: 68 DEGREES
DIAGNOSIS, 93000: NORMAL
P-R INTERVAL, ECG05: 134 MS
Q-T INTERVAL, ECG07: 296 MS
QRS DURATION, ECG06: 68 MS
QTC CALCULATION (BEZET), ECG08: 412 MS
VENTRICULAR RATE, ECG03: 117 BPM

## 2017-11-03 PROCEDURE — 74011636637 HC RX REV CODE- 636/637: Performed by: NURSE PRACTITIONER

## 2017-11-03 PROCEDURE — 74011250637 HC RX REV CODE- 250/637: Performed by: NURSE PRACTITIONER

## 2017-11-03 PROCEDURE — 65270000032 HC RM SEMIPRIVATE

## 2017-11-03 PROCEDURE — 74011000250 HC RX REV CODE- 250: Performed by: FAMILY MEDICINE

## 2017-11-03 PROCEDURE — 77010033678 HC OXYGEN DAILY

## 2017-11-03 PROCEDURE — 74011250637 HC RX REV CODE- 250/637: Performed by: FAMILY MEDICINE

## 2017-11-03 PROCEDURE — 94640 AIRWAY INHALATION TREATMENT: CPT

## 2017-11-03 PROCEDURE — 74011250637 HC RX REV CODE- 250/637: Performed by: HOSPITALIST

## 2017-11-03 RX ORDER — PREDNISONE 10 MG/1
TABLET ORAL
Qty: 4 TAB | Refills: 0 | Status: SHIPPED
Start: 2017-11-03 | End: 2017-11-05

## 2017-11-03 RX ORDER — AMLODIPINE BESYLATE 5 MG/1
5 TABLET ORAL DAILY
Qty: 30 TAB | Refills: 0 | Status: SHIPPED
Start: 2017-11-04 | End: 2017-11-07

## 2017-11-03 RX ORDER — ALPRAZOLAM 0.5 MG/1
0.5 TABLET ORAL
Qty: 30 TAB | Refills: 0 | Status: SHIPPED | OUTPATIENT
Start: 2017-11-03 | End: 2018-09-11

## 2017-11-03 RX ADMIN — IPRATROPIUM BROMIDE 0.5 MG: 0.5 SOLUTION RESPIRATORY (INHALATION) at 19:26

## 2017-11-03 RX ADMIN — IPRATROPIUM BROMIDE 0.5 MG: 0.5 SOLUTION RESPIRATORY (INHALATION) at 07:30

## 2017-11-03 RX ADMIN — PAROXETINE HYDROCHLORIDE 20 MG: 20 TABLET, FILM COATED ORAL at 09:40

## 2017-11-03 RX ADMIN — ARFORMOTEROL TARTRATE 15 MCG: 15 SOLUTION RESPIRATORY (INHALATION) at 07:31

## 2017-11-03 RX ADMIN — Medication 10 ML: at 14:00

## 2017-11-03 RX ADMIN — RISPERIDONE 3 MG: 1 TABLET ORAL at 09:40

## 2017-11-03 RX ADMIN — BUDESONIDE 500 MCG: 0.5 INHALANT RESPIRATORY (INHALATION) at 07:30

## 2017-11-03 RX ADMIN — ARFORMOTEROL TARTRATE 15 MCG: 15 SOLUTION RESPIRATORY (INHALATION) at 19:26

## 2017-11-03 RX ADMIN — IPRATROPIUM BROMIDE 0.5 MG: 0.5 SOLUTION RESPIRATORY (INHALATION) at 13:57

## 2017-11-03 RX ADMIN — PREDNISONE 20 MG: 20 TABLET ORAL at 08:04

## 2017-11-03 RX ADMIN — AMLODIPINE BESYLATE 5 MG: 5 TABLET ORAL at 09:40

## 2017-11-03 RX ADMIN — Medication 10 ML: at 21:59

## 2017-11-03 RX ADMIN — TEMAZEPAM 30 MG: 15 CAPSULE ORAL at 21:59

## 2017-11-03 RX ADMIN — QUETIAPINE FUMARATE 50 MG: 25 TABLET ORAL at 21:59

## 2017-11-03 RX ADMIN — BUDESONIDE 500 MCG: 0.5 INHALANT RESPIRATORY (INHALATION) at 19:26

## 2017-11-03 NOTE — PROGRESS NOTES
NUTRITION COMPLETE ASSESSMENT    RECOMMENDATIONS:   1. Continue current diet, Supplements (specify)  2. Weekly weights     Interventions/Plan:   Food/Nutrient Delivery:           RD to follow po intake, wt status    Assessment:   Reason for Assessment:   [x]Reassessment     Diet: Cardiac, Regular  Supplements: Ensure Enlive BID  Nutritionally Significant Medications: [x] Reviewed & Includes: Deltasone  Meal Intake: Patient Vitals for the past 100 hrs:   % Diet Eaten   11/02/17 1743 100 %   11/02/17 0820 100 %   10/31/17 1301 100 %   10/31/17 0903 100 %   10/30/17 1816 100 %   10/30/17 1716 100 %        Current Hospitalization:   Fluid Restriction:  none  Appetite: Excellent  PO Ability: Independent Average po intake:%  Average supplements intake:  100$      Subjective:  \"I'm eating all my food. \"    Objective:  Pt seen for follow up. Pt admitted with COPD exacerbation. COPD, schizophrenia, hyponatremia, depression, HTN. Reviewed chart, spoke with pt. Pt consuming 100% meals (noted pt receiving double portions) and drinking 100% Ensure BID. Wt has remained stable over past week. Pt currently awaiting placement. RD to follow po intake, wt status. Estimated Nutrition Needs:   Kcals/day: 2018 Kcals/day (5836-7672 kcal/day (MSJ x 1.2-1.3 +250))  Protein: 71 g (71-78g/day (1.1-1.2g/kg))  Fluid: 2100 ml (1mL/kcal)  Based On: [place] Jeor  Weight Used: Actual wt    Pt expected to meet estimated nutrient needs:  [x]   Yes     []  No [] Unable to predict at this time    Nutrition Diagnosis:   1.  Underweight related to hx pt only eating 2 meals a day, hx low body wt with need for supplementation as evidenced by pt with BMI of 20, recent hx BMI 17    Goals:      Pt to consume % meals, supplements over next 5-7 days     Monitoring & Evaluation:    - Total energy intake, Liquid meal replacement   - Weight/weight change   -      Previous Nutrition Goals Met:   N/A  Previous Recommendations: N/A    Education & Discharge Needs:   [x] None Identified   [] Identified and addressed    [] Participated in care plan, discharge planning, and/or interdisciplinary rounds        Cultural, Gnosticist and ethnic food preferences identified: None    Skin Integrity: [x]Intact  []Other  Edema: [x]None []Other  Last BM: 11/1/2017  Food Allergies: [x]None []Other  Diet Restrictions: Cultural/Temple Preference(s): None     Anthropometrics:    Weight Loss Metrics 10/30/2017 10/26/2017 10/26/2017 10/26/2017 10/10/2017 9/28/2017 9/5/2017   Today's Wt 141 lb - 142 lb 6.4 oz - 124 lb 3.2 oz 127 lb 9.6 oz 118 lb   BMI - 20.23 kg/m2 - 20.43 kg/m2 17.82 kg/m2 18.31 kg/m2 16.93 kg/m2      Weight Source: Bed  Height: 5' 10\" (177.8 cm),    Body mass index is 20.23 kg/(m^2).    ,     ,      Labs:  Lab Results   Component Value Date/Time    Sodium 140 10/31/2017 05:41 AM    Potassium 4.9 10/31/2017 05:41 AM    Chloride 99 10/31/2017 05:41 AM    CO2 36 10/31/2017 05:41 AM    Glucose 104 10/31/2017 05:41 AM    BUN 18 10/31/2017 05:41 AM    Creatinine 0.64 10/31/2017 05:41 AM    Calcium 8.6 10/31/2017 05:41 AM    Magnesium 2.2 10/26/2017 11:31 AM    Phosphorus 2.1 07/17/2017 03:50 AM    Albumin 3.1 10/26/2017 11:31 AM     Lab Results   Component Value Date/Time    Hemoglobin A1c 5.2 05/19/2015 02:15 PM         Noemí Styles RD

## 2017-11-03 NOTE — PROGRESS NOTES
Physical Therapy  11.3.17    Order received, chart reviewed. Patient evaluated by this PT on 10/29 and was completely independent with all mobility, however required O2 support to maintain sats. Patient has had no major change in status since then and further PT/OT evaluation remains not indicated at this time. Please only re-consult if patient has major change in functional mobility status, as skilled services currently not indicated. Thank you.     Ana Christy, PT, DPT

## 2017-11-03 NOTE — PROGRESS NOTES
Hospitalist Progress Note  Sailaja España NP  Answering service: [phone] from in house phone  Cell: [phone]   Date of Service:  11/3/2017  NAME:  Cassidy Cohen  :  1960  MRN:  [id number]    Admission Summary:   Pt presented to the ED on 10/26 with SOB. Pt went to see PCP where his saturations were in the 70's. He was given 2 nebulizer tx's and saturations improve to the 80%. Pt was transferred to the ED where his saturations were 82% on RA. Interval history / Subjective:   Pt lying in bed. Pt with no new complaints or discomforts. Assessment & Plan:     Acute hypoxic respiratory failure in the setting of COPD exacerbation:  - does not use home oxygen PTA but will need on discharge (has been set up with [address]) - PT performed walk test 10/29:   87% at rest on RA  80% while ambulating on RA  90% at rest on 4LPM  88% while ambulating on 6LPM  - steroids (now on oral meds, tapering), nebs  - no need for abx  - no wheezing    Tobacco Abuse:   - smoking a ppd  - counseled on smoking cessation - cessation and dangers of smoking while on oxygen have been discussed on several occasions. - on nicoderm patch    History of paranoid schizophrenia: stable. C/w home meds    History of depression: c/w home meds    Hypertension: started norvasc (5 mg)  and BP appears to be more controlled    History of chronic hyponatremia: Na normal    Code status: Full  DVT prophylaxis: SCDs  Care Plan discussed with: patient, nurse, attending MD  Disposition: lives at [facility] but they are unable to accept him back on oxygen. Case management working on alternative placement - [facility]    Pt has twin brother, Yeimy Milner -  has updated him on plan of care     Hospital Problems  Date Reviewed: 11/3/2017    None        Review of Systems:   Denies HA. No chest pain or pressure. No difficulty breathing.  No abdominal complaints, no N/V and has a good appetite     Vital Signs:    Last 24hrs VS reviewed since prior progress note. Most recent are:  Visit Vitals    /72 (BP 1 Location: Left arm, BP Patient Position: At rest)    Pulse (!) 106    Temp 97.8 °F (36.6 °C)    Resp 18    Ht 5' 10\" (1.778 m)    Wt 64 kg (141 lb)    SpO2 96%    BMI 20.23 kg/m2       Intake/Output Summary (Last 24 hours) at 11/03/17 1507  Last data filed at 11/03/17 1421   Gross per 24 hour   Intake              700 ml   Output             5275 ml   Net            -4575 ml      Physical Examination:        Constitutional:  No acute distress, cooperative, pleasant    ENT:  Oral mucous membranes moist, oropharynx benign. Poor dentition   Resp:  Diminished throughout but clear. No accessory muscle use and on 4 L NC.    CV:  Regular rhythm, no murmurs. GI:  Soft, non distended, non tender. Normoactive bowel sounds    Musculoskeletal:  No edema, warm, 2+ pulses throughout    Neurologic:  Moves all extremities. AAOx3     Psych: Poor insight, Not anxious nor agitated. Skin: Good turgor, no rashes or ulcers    Data Review:   Review and/or order of clinical lab test  Review and/or order of tests in the radiology section of CPT  Review and/or order of tests in the medicine section of CPT    Labs:     No results for input(s): WBC, HGB, HCT, PLT, HGBEXT, HCTEXT, PLTEXT, HGBEXT, HCTEXT, PLTEXT in the last 72 hours. No results for input(s): NA, K, CL, CO2, BUN, CREA, GLU, CA, MG, PHOS, URICA in the last 72 hours. No results for input(s): SGOT, GPT, ALT, AP, TBIL, TBILI, TP, ALB, GLOB, GGT, AML, LPSE in the last 72 hours. No lab exists for component: AMYP, HLPSE  No results for input(s): INR, PTP, APTT in the last 72 hours. No lab exists for component: INREXT, INREXT   No results for input(s): FE, TIBC, PSAT, FERR in the last 72 hours.    No results found for: FOL, RBCF   No results for input(s): PH, PCO2, PO2 in the last 72 hours. No results for input(s): CPK, CKNDX, TROIQ in the last 72 hours.     No lab exists for component: CPKMB  Lab Results   Component Value Date/Time    Cholesterol, total 102 05/15/2017 11:11 AM    HDL Cholesterol 48 05/15/2017 11:11 AM    LDL, calculated 46 05/15/2017 11:11 AM    Triglyceride 41 05/15/2017 11:11 AM     Lab Results   Component Value Date/Time    Glucose (POC) 132 07/04/2017 04:37 PM     Lab Results   Component Value Date/Time    Color YELLOW/STRAW 07/13/2017 04:11 PM    Appearance CLOUDY 07/13/2017 04:11 PM    Specific gravity 1.016 07/13/2017 04:11 PM    pH (UA) 6.0 07/13/2017 04:11 PM    Protein 100 07/13/2017 04:11 PM    Glucose 250 07/13/2017 04:11 PM    Ketone 15 07/13/2017 04:11 PM    Bilirubin NEGATIVE  07/13/2017 04:11 PM    Urobilinogen 0.2 07/13/2017 04:11 PM    Nitrites NEGATIVE  07/13/2017 04:11 PM    Leukocyte Esterase NEGATIVE  07/13/2017 04:11 PM    Epithelial cells FEW 07/13/2017 04:11 PM    Bacteria NEGATIVE  07/13/2017 04:11 PM    WBC 0-4 07/13/2017 04:11 PM    RBC 10-20 07/13/2017 04:11 PM     Medications Reviewed:     Current Facility-Administered Medications   Medication Dose Route Frequency    predniSONE (DELTASONE) tablet 20 mg  20 mg Oral DAILY WITH BREAKFAST    amLODIPine (NORVASC) tablet 5 mg  5 mg Oral DAILY    nicotine (NICODERM CQ) 14 mg/24 hr patch 1 Patch  1 Patch TransDERmal DAILY    albuterol-ipratropium (DUO-NEB) 2.5 MG-0.5 MG/3 ML  3 mL Nebulization Q4H PRN    ALPRAZolam (XANAX) tablet 0.5 mg  0.5 mg Oral BID PRN    benztropine (COGENTIN) tablet 2 mg  2 mg Oral DAILY PRN    PARoxetine (PAXIL) tablet 20 mg  20 mg Oral DAILY    QUEtiapine (SEROquel) tablet 50 mg  50 mg Oral QHS PRN    risperiDONE (RisperDAL) tablet 3 mg  3 mg Oral DAILY    sodium chloride tablet 1 g  1 g Oral DAILY    temazepam (RESTORIL) capsule 30 mg  30 mg Oral QHS PRN    sodium chloride (NS) flush 5-10 mL  5-10 mL IntraVENous Q8H    sodium chloride (NS) flush 5-10 mL  5-10 mL IntraVENous PRN    acetaminophen (TYLENOL) tablet 650 mg  650 mg Oral Q4H PRN    arformoterol (BROVANA) neb solution 15 mcg  15 mcg Nebulization BID RT    And    budesonide (PULMICORT) 500 mcg/2 ml nebulizer suspension  500 mcg Nebulization BID RT    ipratropium (ATROVENT) 0.02 % nebulizer solution 0.5 mg  0.5 mg Nebulization Q6H RT   ______________________________________________________________________  EXPECTED LENGTH OF STAY: 3d 19h  ACTUAL LENGTH OF STAY:          [address], NP

## 2017-11-03 NOTE — PROGRESS NOTES
Problem: Falls - Risk of  Goal: *Absence of Falls  Document Joanie Fall Risk and appropriate interventions in the flowsheet.    Outcome: Progressing Towards Goal  Fall Risk Interventions:  Mobility Interventions: Patient to call before getting OOB         Medication Interventions: Teach patient to arise slowly    Elimination Interventions: Call light in reach, Patient to call for help with toileting needs    History of Falls Interventions: Door open when patient unattended

## 2017-11-03 NOTE — PROGRESS NOTES
7:30 AM  CM received a voicemail from patient's brother Earl Hyman [phone]. Brother indicated that he would like his brother to transition to [email or web address] for transition of care services. Brother also stated that he informed patient's Mental Health Worker, Fuad Cotton [phone]  with [address] [place] [address] Rd of choice to transition patient to [facility]. CM to follow-up with worker. CM notified [facility] that patient's brother would like to move forward with their agency via [facility] and awaiting update. CM to inform attending of facility choice. CM will continue to follow and assist with disposition needs as they arise. Mode of transport at time of discharge TBD. 10:23 AM  CM received notification from [facility] that they will need to obtain authorization for services. CM will continue to follow and assist with disposition needs as they arise. 11:26 AM  CM received notification from [facility] that insurance is requesting PT/OT notes at this time to review. CM notified Charge Nurse of PT/OT consult needed for patient. CM will continue to follow and assist with disposition needs as they arise.     CORNELIA Kapoor/KAI

## 2017-11-03 NOTE — PROGRESS NOTES
Occupational Therapy Note [phone]    Order received, chart reviewed. Patient discharged from OT services on 10/30/2017 and was completely independent with all mobility, however required O2 support to maintain sats. Patient has had no major change in status since then and further PT/OT evaluation remains not indicated at this time. Please only re-consult if patient has major change in functional mobility status, as skilled services currently not indicated. Thank you.     Fartun Bolton, OTR/L

## 2017-11-03 NOTE — PROGRESS NOTES
Bedside and Verbal shift change report given to APOLONIA Marshall (oncoming nurse) by Quinn Vo RN (offgoing nurse). Report included the following information SBAR, Kardex, MAR, Accordion and Recent Results.

## 2017-11-03 NOTE — PROGRESS NOTES
Bedside and Verbal shift change report given to Marylou (oncoming nurse) by Alina Maguire (offgoing nurse). Report included the following information SBAR, Kardex and Intake/Output.

## 2017-11-04 PROCEDURE — 74011250637 HC RX REV CODE- 250/637: Performed by: NURSE PRACTITIONER

## 2017-11-04 PROCEDURE — 77010033678 HC OXYGEN DAILY

## 2017-11-04 PROCEDURE — 74011250637 HC RX REV CODE- 250/637: Performed by: HOSPITALIST

## 2017-11-04 PROCEDURE — 74011000250 HC RX REV CODE- 250: Performed by: FAMILY MEDICINE

## 2017-11-04 PROCEDURE — 65270000032 HC RM SEMIPRIVATE

## 2017-11-04 PROCEDURE — 94640 AIRWAY INHALATION TREATMENT: CPT

## 2017-11-04 PROCEDURE — 74011636637 HC RX REV CODE- 636/637: Performed by: NURSE PRACTITIONER

## 2017-11-04 PROCEDURE — 74011250637 HC RX REV CODE- 250/637: Performed by: FAMILY MEDICINE

## 2017-11-04 RX ORDER — PREDNISONE 10 MG/1
10 TABLET ORAL
Status: DISCONTINUED | OUTPATIENT
Start: 2017-11-05 | End: 2017-11-04

## 2017-11-04 RX ORDER — PREDNISONE 10 MG/1
10 TABLET ORAL
Status: COMPLETED | OUTPATIENT
Start: 2017-11-05 | End: 2017-11-06

## 2017-11-04 RX ADMIN — TEMAZEPAM 30 MG: 15 CAPSULE ORAL at 21:46

## 2017-11-04 RX ADMIN — ARFORMOTEROL TARTRATE 15 MCG: 15 SOLUTION RESPIRATORY (INHALATION) at 09:11

## 2017-11-04 RX ADMIN — SODIUM CHLORIDE TAB 1 GM 1 G: 1 TAB at 08:33

## 2017-11-04 RX ADMIN — IPRATROPIUM BROMIDE 0.5 MG: 0.5 SOLUTION RESPIRATORY (INHALATION) at 09:11

## 2017-11-04 RX ADMIN — IPRATROPIUM BROMIDE 0.5 MG: 0.5 SOLUTION RESPIRATORY (INHALATION) at 14:26

## 2017-11-04 RX ADMIN — RISPERIDONE 3 MG: 1 TABLET ORAL at 08:33

## 2017-11-04 RX ADMIN — BUDESONIDE 500 MCG: 0.5 INHALANT RESPIRATORY (INHALATION) at 19:37

## 2017-11-04 RX ADMIN — PREDNISONE 20 MG: 20 TABLET ORAL at 08:33

## 2017-11-04 RX ADMIN — ARFORMOTEROL TARTRATE 15 MCG: 15 SOLUTION RESPIRATORY (INHALATION) at 19:36

## 2017-11-04 RX ADMIN — PAROXETINE HYDROCHLORIDE 20 MG: 20 TABLET, FILM COATED ORAL at 08:33

## 2017-11-04 RX ADMIN — IPRATROPIUM BROMIDE 0.5 MG: 0.5 SOLUTION RESPIRATORY (INHALATION) at 19:37

## 2017-11-04 RX ADMIN — Medication 10 ML: at 21:15

## 2017-11-04 RX ADMIN — AMLODIPINE BESYLATE 5 MG: 5 TABLET ORAL at 08:33

## 2017-11-04 RX ADMIN — IPRATROPIUM BROMIDE 0.5 MG: 0.5 SOLUTION RESPIRATORY (INHALATION) at 03:35

## 2017-11-04 RX ADMIN — BUDESONIDE 500 MCG: 0.5 INHALANT RESPIRATORY (INHALATION) at 09:11

## 2017-11-04 NOTE — PROGRESS NOTES
Bedside and Verbal shift change report given to Marylou (oncoming nurse) by Obdulio Padgett (offgoing nurse). Report included the following information SBAR, Kardex and Intake/Output.

## 2017-11-04 NOTE — PROGRESS NOTES
Problem: Falls - Risk of  Goal: *Absence of Falls  Document Joanie Fall Risk and appropriate interventions in the flowsheet.    Outcome: Progressing Towards Goal  Fall Risk Interventions:  Mobility Interventions: Patient to call before getting OOB         Medication Interventions: Teach patient to arise slowly, Patient to call before getting OOB    Elimination Interventions: Call light in reach, Patient to call for help with toileting needs    History of Falls Interventions: Door open when patient unattended

## 2017-11-04 NOTE — PROGRESS NOTES
Hospitalist Progress Note  Forest Mcgill NP  Answering service: [phone] from in house phone  Cell: [phone]   Date of Service:  2017  NAME:  Carmela Allen  :  1960  MRN:  [id number]    Admission Summary:   Pt presented to the ED on 10/26 with SOB. Pt went to see PCP where his saturations were in the 70's. He was given 2 nebulizer tx's and saturations improve to the 80%. Pt was transferred to the ED where his saturations were 82% on RA. Interval history / Subjective:   Pt lying in bed resting. No new complaints. Assessment & Plan:     Acute hypoxic respiratory failure in the setting of COPD exacerbation:  - does not use home oxygen PTA but will need on discharge  - PT performed walk test 10/29:   87% at rest on RA  80% while ambulating on RA  90% at rest on 4LPM  88% while ambulating on 6LPM  - steroids (now on oral meds, tapering, end date entered for ), nebs  - no abx  - no wheezing. Tobacco Abuse:   - smoking a ppd  - counseled on smoking cessation - cessation and dangers of smoking while on oxygen have been discussed on several occasions. - on nicoderm patch    History of paranoid schizophrenia: stable. C/w home meds    History of depression: c/w home meds    Hypertension: started norvasc (5 mg)  and BP appears to be more controlled    History of chronic hyponatremia: Na normal  - on sodium tabs    Code status: Full  DVT prophylaxis: SCDs  Care Plan discussed with: patient, nurse, attending MD  Disposition: lives at [facility] but they are unable to accept him back on oxygen. Case management working on alternative placement - possible [facility]. We are still waiting on authorization and acceptance. Pt has been medically stable for discharge for a week but waiting on placement. Pt has twin brother, Arnol Powers ([phone]). Called an updated him on plan of care.       Review of Systems:   Denies HA. No chest pain or pressure. No difficulty breathing. No abdominal complaints, no N/V and has a good appetite     Vital Signs:    Last 24hrs VS reviewed since prior progress note. Most recent are:  Visit Vitals    /78 (BP 1 Location: Left arm)    Pulse (!) 111    Temp 98.8 °F (37.1 °C)    Resp 18    Ht 5' 10\" (1.778 m)    Wt 64 kg (141 lb)    SpO2 95%    BMI 20.23 kg/m2       Intake/Output Summary (Last 24 hours) at 11/04/17 1233  Last data filed at 11/04/17 1156   Gross per 24 hour   Intake                0 ml   Output             3375 ml   Net            -3375 ml      Physical Examination:   Unchanged       Constitutional:  No acute distress, cooperative, pleasant    ENT:  Oral mucous membranes moist, oropharynx benign. Poor dentition   Resp:  Diminished throughout but clear. No accessory muscle use and on 4 L NC.    CV:  Regular rhythm, no murmurs. GI:  Soft, non distended, non tender. Normoactive bowel sounds    Musculoskeletal:  No edema, warm, 2+ pulses throughout    Neurologic:  Moves all extremities. AAOx3     Psych: Poor insight, Not anxious nor agitated. Skin: Good turgor, no rashes or ulcers    Data Review:   Review and/or order of clinical lab test  Review and/or order of tests in the radiology section of CPT  Review and/or order of tests in the medicine section of CPT    Labs:     No results for input(s): WBC, HGB, HCT, PLT, HGBEXT, HCTEXT, PLTEXT, HGBEXT, HCTEXT, PLTEXT in the last 72 hours. No results for input(s): NA, K, CL, CO2, BUN, CREA, GLU, CA, MG, PHOS, URICA in the last 72 hours. No results for input(s): SGOT, GPT, ALT, AP, TBIL, TBILI, TP, ALB, GLOB, GGT, AML, LPSE in the last 72 hours. No lab exists for component: AMYP, HLPSE  No results for input(s): INR, PTP, APTT in the last 72 hours. No lab exists for component: INREXT, INREXT   No results for input(s): FE, TIBC, PSAT, FERR in the last 72 hours.    No results found for: FOL, RBCF No results for input(s): PH, PCO2, PO2 in the last 72 hours. No results for input(s): CPK, CKNDX, TROIQ in the last 72 hours.     No lab exists for component: CPKMB  Lab Results   Component Value Date/Time    Cholesterol, total 102 05/15/2017 11:11 AM    HDL Cholesterol 48 05/15/2017 11:11 AM    LDL, calculated 46 05/15/2017 11:11 AM    Triglyceride 41 05/15/2017 11:11 AM     Lab Results   Component Value Date/Time    Glucose (POC) 132 07/04/2017 04:37 PM     Lab Results   Component Value Date/Time    Color YELLOW/STRAW 07/13/2017 04:11 PM    Appearance CLOUDY 07/13/2017 04:11 PM    Specific gravity 1.016 07/13/2017 04:11 PM    pH (UA) 6.0 07/13/2017 04:11 PM    Protein 100 07/13/2017 04:11 PM    Glucose 250 07/13/2017 04:11 PM    Ketone 15 07/13/2017 04:11 PM    Bilirubin NEGATIVE  07/13/2017 04:11 PM    Urobilinogen 0.2 07/13/2017 04:11 PM    Nitrites NEGATIVE  07/13/2017 04:11 PM    Leukocyte Esterase NEGATIVE  07/13/2017 04:11 PM    Epithelial cells FEW 07/13/2017 04:11 PM    Bacteria NEGATIVE  07/13/2017 04:11 PM    WBC 0-4 07/13/2017 04:11 PM    RBC 10-20 07/13/2017 04:11 PM     Medications Reviewed:     Current Facility-Administered Medications   Medication Dose Route Frequency    predniSONE (DELTASONE) tablet 20 mg  20 mg Oral DAILY WITH BREAKFAST    amLODIPine (NORVASC) tablet 5 mg  5 mg Oral DAILY    nicotine (NICODERM CQ) 14 mg/24 hr patch 1 Patch  1 Patch TransDERmal DAILY    albuterol-ipratropium (DUO-NEB) 2.5 MG-0.5 MG/3 ML  3 mL Nebulization Q4H PRN    ALPRAZolam (XANAX) tablet 0.5 mg  0.5 mg Oral BID PRN    benztropine (COGENTIN) tablet 2 mg  2 mg Oral DAILY PRN    PARoxetine (PAXIL) tablet 20 mg  20 mg Oral DAILY    QUEtiapine (SEROquel) tablet 50 mg  50 mg Oral QHS PRN    risperiDONE (RisperDAL) tablet 3 mg  3 mg Oral DAILY    sodium chloride tablet 1 g  1 g Oral DAILY    temazepam (RESTORIL) capsule 30 mg  30 mg Oral QHS PRN    sodium chloride (NS) flush 5-10 mL  5-10 mL IntraVENous Q8H    sodium chloride (NS) flush 5-10 mL  5-10 mL IntraVENous PRN    acetaminophen (TYLENOL) tablet 650 mg  650 mg Oral Q4H PRN    arformoterol (BROVANA) neb solution 15 mcg  15 mcg Nebulization BID RT    And    budesonide (PULMICORT) 500 mcg/2 ml nebulizer suspension  500 mcg Nebulization BID RT    ipratropium (ATROVENT) 0.02 % nebulizer solution 0.5 mg  0.5 mg Nebulization Q6H RT   ______________________________________________________________________  EXPECTED LENGTH OF STAY: 3d 19h  ACTUAL LENGTH OF STAY:          [address], NP

## 2017-11-04 NOTE — PROGRESS NOTES
Bedside and Verbal shift change report given to APOLONIA Marshall (oncoming nurse) by Noah Granados RN (offgoing nurse). Report included the following information SBAR, Kardex, Intake/Output, MAR and Accordion.

## 2017-11-04 NOTE — PROGRESS NOTES
11/04/17 0300   Vital Signs   Temp 96.7 °F (35.9 °C)   Temp Source Axillary   Pulse (Heart Rate) (!) 111   Heart Rate Source Monitor   Resp Rate 18   O2 Sat (%) (!) 88 %   Level of Consciousness Alert   BP (!) 133/91   MAP (Calculated) 105   BP 1 Method Automatic   BP 1 Location Left arm   BP Patient Position At rest   MEWS Score 3   Primary nurse made aware by PCT of elevated MEW's. Noted pt just returned from bathroom. Pt also has not received 0200 neb treatment. Paged respiratory for neb treatment and instructed PCT to retake vitals in 30 minutes. Will continue to monitor.

## 2017-11-04 NOTE — PROGRESS NOTES
Bedside shift change report given to [address] (oncoming nurse) by Tiara Huston RN (offgoing nurse). Report included the following information SBAR, Intake/Output and Recent Results.

## 2017-11-05 PROCEDURE — 74011250637 HC RX REV CODE- 250/637: Performed by: HOSPITALIST

## 2017-11-05 PROCEDURE — 65270000032 HC RM SEMIPRIVATE

## 2017-11-05 PROCEDURE — 74011250637 HC RX REV CODE- 250/637: Performed by: NURSE PRACTITIONER

## 2017-11-05 PROCEDURE — 74011000250 HC RX REV CODE- 250: Performed by: FAMILY MEDICINE

## 2017-11-05 PROCEDURE — 77010033678 HC OXYGEN DAILY

## 2017-11-05 PROCEDURE — 74011636637 HC RX REV CODE- 636/637: Performed by: NURSE PRACTITIONER

## 2017-11-05 PROCEDURE — 94640 AIRWAY INHALATION TREATMENT: CPT

## 2017-11-05 PROCEDURE — 74011250637 HC RX REV CODE- 250/637: Performed by: FAMILY MEDICINE

## 2017-11-05 RX ORDER — IBUPROFEN 200 MG
1 TABLET ORAL DAILY
Qty: 30 PATCH | Refills: 0 | Status: SHIPPED
Start: 2017-11-06 | End: 2017-11-07

## 2017-11-05 RX ADMIN — BUDESONIDE 500 MCG: 0.5 INHALANT RESPIRATORY (INHALATION) at 08:13

## 2017-11-05 RX ADMIN — Medication 10 ML: at 07:42

## 2017-11-05 RX ADMIN — PREDNISONE 10 MG: 10 TABLET ORAL at 07:43

## 2017-11-05 RX ADMIN — BUDESONIDE 500 MCG: 0.5 INHALANT RESPIRATORY (INHALATION) at 21:14

## 2017-11-05 RX ADMIN — IPRATROPIUM BROMIDE 0.5 MG: 0.5 SOLUTION RESPIRATORY (INHALATION) at 14:29

## 2017-11-05 RX ADMIN — TEMAZEPAM 30 MG: 15 CAPSULE ORAL at 21:11

## 2017-11-05 RX ADMIN — Medication 10 ML: at 13:27

## 2017-11-05 RX ADMIN — PAROXETINE HYDROCHLORIDE 20 MG: 20 TABLET, FILM COATED ORAL at 08:32

## 2017-11-05 RX ADMIN — Medication 10 ML: at 21:11

## 2017-11-05 RX ADMIN — IPRATROPIUM BROMIDE 0.5 MG: 0.5 SOLUTION RESPIRATORY (INHALATION) at 08:13

## 2017-11-05 RX ADMIN — AMLODIPINE BESYLATE 5 MG: 5 TABLET ORAL at 08:32

## 2017-11-05 RX ADMIN — IPRATROPIUM BROMIDE 0.5 MG: 0.5 SOLUTION RESPIRATORY (INHALATION) at 21:14

## 2017-11-05 RX ADMIN — SODIUM CHLORIDE TAB 1 GM 1 G: 1 TAB at 08:32

## 2017-11-05 RX ADMIN — ARFORMOTEROL TARTRATE 15 MCG: 15 SOLUTION RESPIRATORY (INHALATION) at 21:14

## 2017-11-05 RX ADMIN — RISPERIDONE 3 MG: 1 TABLET ORAL at 08:32

## 2017-11-05 NOTE — PROGRESS NOTES
CM follow up. PIPER spoke with Perri Suero, Nursing Supervisor at [facility], who said that insurance authorization continues to be pending.

## 2017-11-05 NOTE — PROGRESS NOTES
Bedside and Verbal shift change report given to [address] Marquez (oncoming nurse) by Saundra Freeman (offgoing nurse). Report included the following information SBAR, Kardex, ED Summary, [facility] and Recent Results.

## 2017-11-05 NOTE — PROGRESS NOTES
,Bedside and Verbal shift change report given to Marylou (oncoming nurse) by Elton Ortega (offgoing nurse). Report included the following information SBAR, Kardex and Intake/Output.

## 2017-11-05 NOTE — PROGRESS NOTES
Hospitalist Progress Note  Aly Altamirano NP  Answering service: [phone] from in house phone  Cell: [phone]   Date of Service:  2017  NAME:  Torri Gomez  :  1960  MRN:  [id number]    Admission Summary:   Pt presented to the ED on 10/26 with SOB. Pt went to see PCP where his saturations were in the 70's. He was given 2 nebulizer tx's and saturations improve to the 80%. Pt was transferred to the ED where his saturations were 82% on RA. Interval history / Subjective:   Pt lying in bed resting. No new complaints. He is comfortable, eating and sleeping well. Assessment & Plan:     Acute hypoxic respiratory failure in the setting of COPD exacerbation:  - does not use home oxygen PTA but will need on discharge  - PT performed walk test 10/29:   87% at rest on RA  80% while ambulating on RA  90% at rest on 4LPM  88% while ambulating on 6LPM  - steroids (now on oral meds, tapering, end date entered for ), nebs  - no abx  - no wheezing, few coarse rhonchi today but no sputum production. Tobacco Abuse:   - smoking a ppd  - counseled on smoking cessation - cessation and dangers of smoking while on oxygen have been discussed on several occasions. - on nicoderm patch - should continue on discharge to help with smoking cessation. History of paranoid schizophrenia: stable. C/w home meds    History of depression: c/w home meds    Hypertension:   - started norvasc (5 mg)    - BP more controlled    History of chronic hyponatremia: Na normal  - on sodium tabs    Code status: Full  DVT prophylaxis: SCDs  Care Plan discussed with: patient, nurse, attending MD  Disposition: Previously lived at [facility] but they are unable to accept him back on oxygen. Case management working on alternative placement - possible [place], authorization is still pending.      Pt has been medically stable for discharge for a week but waiting on placement. Pt has twin brother, Jerry Ellison ([phone]). Called an updated him on plan of care 11/4. Review of Systems:   Denies HA. No chest pain or pressure. No difficulty breathing. No abdominal complaints, no N/V and has a good appetite     Vital Signs:    Last 24hrs VS reviewed since prior progress note. Most recent are:  Visit Vitals    /78 (BP 1 Location: Left arm, BP Patient Position: At rest)    Pulse 90    Temp 98.3 °F (36.8 °C)    Resp 18    Ht 5' 10\" (1.778 m)    Wt 64 kg (141 lb)    SpO2 97%    BMI 20.23 kg/m2       Intake/Output Summary (Last 24 hours) at 11/05/17 1044  Last data filed at 11/05/17 0745   Gross per 24 hour   Intake              300 ml   Output             3450 ml   Net            -3150 ml      Physical Examination:        Constitutional:  No acute distress, cooperative, pleasant    ENT:  Oral mucous membranes moist, oropharynx benign. Poor dentition   Resp:  ew scattered rhonchi, no cough or sputum. No accessory muscle use and on 4 L NC.    CV:  Regular rhythm, no murmurs. GI:  Soft, non distended, non tender. Normoactive bowel sounds    Musculoskeletal:  No edema, warm, 2+ pulses throughout    Neurologic:  Moves all extremities. AAOx3     Psych: Poor insight, Not anxious nor agitated. Skin: Good turgor, no rashes or ulcers    Data Review:   Review and/or order of clinical lab test  Review and/or order of tests in the radiology section of CPT  Review and/or order of tests in the medicine section of CPT    Labs:     No results for input(s): WBC, HGB, HCT, PLT, HGBEXT, HCTEXT, PLTEXT, HGBEXT, HCTEXT, PLTEXT in the last 72 hours. No results for input(s): NA, K, CL, CO2, BUN, CREA, GLU, CA, MG, PHOS, URICA in the last 72 hours. No results for input(s): SGOT, GPT, ALT, AP, TBIL, TBILI, TP, ALB, GLOB, GGT, AML, LPSE in the last 72 hours.     No lab exists for component: AMYP, HLPSE  No results for input(s): INR, PTP, APTT in the last 72 hours. No lab exists for component: INREXT, INREXT   No results for input(s): FE, TIBC, PSAT, FERR in the last 72 hours. No results found for: FOL, RBCF   No results for input(s): PH, PCO2, PO2 in the last 72 hours. No results for input(s): CPK, CKNDX, TROIQ in the last 72 hours.     No lab exists for component: CPKMB  Lab Results   Component Value Date/Time    Cholesterol, total 102 05/15/2017 11:11 AM    HDL Cholesterol 48 05/15/2017 11:11 AM    LDL, calculated 46 05/15/2017 11:11 AM    Triglyceride 41 05/15/2017 11:11 AM     Lab Results   Component Value Date/Time    Glucose (POC) 132 07/04/2017 04:37 PM     Lab Results   Component Value Date/Time    Color YELLOW/STRAW 07/13/2017 04:11 PM    Appearance CLOUDY 07/13/2017 04:11 PM    Specific gravity 1.016 07/13/2017 04:11 PM    pH (UA) 6.0 07/13/2017 04:11 PM    Protein 100 07/13/2017 04:11 PM    Glucose 250 07/13/2017 04:11 PM    Ketone 15 07/13/2017 04:11 PM    Bilirubin NEGATIVE  07/13/2017 04:11 PM    Urobilinogen 0.2 07/13/2017 04:11 PM    Nitrites NEGATIVE  07/13/2017 04:11 PM    Leukocyte Esterase NEGATIVE  07/13/2017 04:11 PM    Epithelial cells FEW 07/13/2017 04:11 PM    Bacteria NEGATIVE  07/13/2017 04:11 PM    WBC 0-4 07/13/2017 04:11 PM    RBC 10-20 07/13/2017 04:11 PM     Medications Reviewed:     Current Facility-Administered Medications   Medication Dose Route Frequency    predniSONE (DELTASONE) tablet 10 mg  10 mg Oral DAILY WITH BREAKFAST    amLODIPine (NORVASC) tablet 5 mg  5 mg Oral DAILY    nicotine (NICODERM CQ) 14 mg/24 hr patch 1 Patch  1 Patch TransDERmal DAILY    albuterol-ipratropium (DUO-NEB) 2.5 MG-0.5 MG/3 ML  3 mL Nebulization Q4H PRN    ALPRAZolam (XANAX) tablet 0.5 mg  0.5 mg Oral BID PRN    benztropine (COGENTIN) tablet 2 mg  2 mg Oral DAILY PRN    PARoxetine (PAXIL) tablet 20 mg  20 mg Oral DAILY    QUEtiapine (SEROquel) tablet 50 mg  50 mg Oral QHS PRN    risperiDONE (RisperDAL) tablet 3 mg  3 mg Oral DAILY  sodium chloride tablet 1 g  1 g Oral DAILY    temazepam (RESTORIL) capsule 30 mg  30 mg Oral QHS PRN    sodium chloride (NS) flush 5-10 mL  5-10 mL IntraVENous Q8H    sodium chloride (NS) flush 5-10 mL  5-10 mL IntraVENous PRN    acetaminophen (TYLENOL) tablet 650 mg  650 mg Oral Q4H PRN    arformoterol (BROVANA) neb solution 15 mcg  15 mcg Nebulization BID RT    And    budesonide (PULMICORT) 500 mcg/2 ml nebulizer suspension  500 mcg Nebulization BID RT    ipratropium (ATROVENT) 0.02 % nebulizer solution 0.5 mg  0.5 mg Nebulization Q6H RT   ______________________________________________________________________  EXPECTED LENGTH OF STAY: 3d 19h  ACTUAL LENGTH OF STAY:          Ekaterina Black 39, NP

## 2017-11-06 PROCEDURE — 65270000032 HC RM SEMIPRIVATE

## 2017-11-06 PROCEDURE — 94640 AIRWAY INHALATION TREATMENT: CPT

## 2017-11-06 PROCEDURE — 94664 DEMO&/EVAL PT USE INHALER: CPT

## 2017-11-06 PROCEDURE — 74011250637 HC RX REV CODE- 250/637: Performed by: HOSPITALIST

## 2017-11-06 PROCEDURE — 74011250637 HC RX REV CODE- 250/637: Performed by: FAMILY MEDICINE

## 2017-11-06 PROCEDURE — 74011000250 HC RX REV CODE- 250: Performed by: FAMILY MEDICINE

## 2017-11-06 PROCEDURE — 74011636637 HC RX REV CODE- 636/637: Performed by: NURSE PRACTITIONER

## 2017-11-06 PROCEDURE — 74011250637 HC RX REV CODE- 250/637: Performed by: NURSE PRACTITIONER

## 2017-11-06 RX ADMIN — Medication 10 ML: at 07:00

## 2017-11-06 RX ADMIN — BUDESONIDE 500 MCG: 0.5 INHALANT RESPIRATORY (INHALATION) at 20:07

## 2017-11-06 RX ADMIN — RISPERIDONE 3 MG: 1 TABLET ORAL at 09:47

## 2017-11-06 RX ADMIN — PREDNISONE 10 MG: 10 TABLET ORAL at 09:47

## 2017-11-06 RX ADMIN — IPRATROPIUM BROMIDE 0.5 MG: 0.5 SOLUTION RESPIRATORY (INHALATION) at 13:27

## 2017-11-06 RX ADMIN — TEMAZEPAM 30 MG: 15 CAPSULE ORAL at 21:16

## 2017-11-06 RX ADMIN — ARFORMOTEROL TARTRATE 15 MCG: 15 SOLUTION RESPIRATORY (INHALATION) at 08:36

## 2017-11-06 RX ADMIN — BUDESONIDE 500 MCG: 0.5 INHALANT RESPIRATORY (INHALATION) at 08:36

## 2017-11-06 RX ADMIN — ARFORMOTEROL TARTRATE 15 MCG: 15 SOLUTION RESPIRATORY (INHALATION) at 20:07

## 2017-11-06 RX ADMIN — PAROXETINE HYDROCHLORIDE 20 MG: 20 TABLET, FILM COATED ORAL at 09:47

## 2017-11-06 RX ADMIN — IPRATROPIUM BROMIDE 0.5 MG: 0.5 SOLUTION RESPIRATORY (INHALATION) at 08:37

## 2017-11-06 RX ADMIN — IPRATROPIUM BROMIDE 0.5 MG: 0.5 SOLUTION RESPIRATORY (INHALATION) at 20:07

## 2017-11-06 RX ADMIN — Medication 10 ML: at 21:16

## 2017-11-06 RX ADMIN — AMLODIPINE BESYLATE 5 MG: 5 TABLET ORAL at 09:47

## 2017-11-06 RX ADMIN — SODIUM CHLORIDE TAB 1 GM 1 G: 1 TAB at 09:48

## 2017-11-06 RX ADMIN — IPRATROPIUM BROMIDE 0.5 MG: 0.5 SOLUTION RESPIRATORY (INHALATION) at 01:51

## 2017-11-06 NOTE — PROGRESS NOTES
Hospitalist Progress Note  Ector De Jesus NP  Answering service: [phone] from in house phone  Cell: [phone]   Date of Service:  2017  NAME:  Lito Singh  :  1960  MRN:  [id number]    Admission Summary:   Pt presented to the ED on 10/26 with SOB. Pt went to see PCP where his saturations were in the 70's. He was given 2 nebulizer tx's and saturations improve to the 80%. Pt was transferred to the ED where his saturations were 82% on RA. Interval history / Subjective:   Pt sitting in bed w/o new complaints. Denies sob. Understands we are waiting on LTC placement for him and that this has been the delay in discharge. Repeat 6MWT in am  [place] to evaluate tomorrow am       Assessment & Plan:     Acute hypoxic respiratory failure in the setting of COPD exacerbation:  - does not use home oxygen PTA but will need on discharge  - PT performed walk test 10/29:   87% at rest on RA  80% while ambulating on RA  90% at rest on 4LPM  88% while ambulating on 6LPM  -  steroid taper completed  - c/w nebs    Tobacco Abuse:   - smoking a ppd  - counseled on smoking cessation - cessation and dangers of smoking while on oxygen have been discussed on several occasions. - on nicoderm patch - should continue on discharge to help with smoking cessation. History of paranoid schizophrenia: stable. C/w home meds    History of depression: c/w home meds    Hypertension:   - started norvasc (5 mg)    - BP more controlled    History of chronic hyponatremia: Na normal  - on sodium tabs    Code status: Full  DVT prophylaxis: SCDs  Care Plan discussed with: patient, nurse, attending MD  Disposition: Previously lived at [facility] but they are unable to accept him back on oxygen. Case management working on alternative placement - possible [place], authorization is still pending.      Pt has been medically stable for discharge for a week but waiting on placement. Pt has twin brother, Nita Da Silva ([phone]). Called and updated him on plan of care 11/4. Review of Systems:   Denies HA. No chest pain or pressure. No difficulty breathing. No abdominal complaints, no N/V and has a good appetite     Vital Signs:    Last 24hrs VS reviewed since prior progress note. Most recent are:  Visit Vitals    /78 (BP 1 Location: Left arm, BP Patient Position: Sitting)    Pulse 95    Temp 98 °F (36.7 °C)    Resp 16    Ht 5' 10\" (1.778 m)    Wt 64 kg (141 lb)    SpO2 98%    BMI 20.23 kg/m2       Intake/Output Summary (Last 24 hours) at 11/06/17 1133  Last data filed at 11/06/17 1107   Gross per 24 hour   Intake              500 ml   Output             1700 ml   Net            -1200 ml      Physical Examination:        Constitutional:  No acute distress, cooperative, pleasant    ENT:  Oral mucous membranes moist, oropharynx benign. Poor dentition   Resp:  Few scattered rhonchi that clears with cough, no sputum. No accessory muscle use, 4 L NC.    CV:  Regular rhythm, no murmurs. GI:  Soft, non distended, non tender. Normoactive bowel sounds    Musculoskeletal:  No edema, warm, 2+ pulses throughout    Neurologic:  Moves all extremities. AAOx3     Psych: Poor insight, Not anxious nor agitated. Skin: Good turgor, no rashes or ulcers    Data Review:   Review and/or order of clinical lab test  Review and/or order of tests in the radiology section of CPT  Review and/or order of tests in the medicine section of CPT    Labs:     No results for input(s): WBC, HGB, HCT, PLT, HGBEXT, HCTEXT, PLTEXT, HGBEXT, HCTEXT, PLTEXT in the last 72 hours. No results for input(s): NA, K, CL, CO2, BUN, CREA, GLU, CA, MG, PHOS, URICA in the last 72 hours. No results for input(s): SGOT, GPT, ALT, AP, TBIL, TBILI, TP, ALB, GLOB, GGT, AML, LPSE in the last 72 hours.     No lab exists for component: AMYP, HLPSE  No results for input(s): INR, PTP, APTT in the last 72 hours. No lab exists for component: INREXT, INREXT   No results for input(s): FE, TIBC, PSAT, FERR in the last 72 hours. No results found for: FOL, RBCF   No results for input(s): PH, PCO2, PO2 in the last 72 hours. No results for input(s): CPK, CKNDX, TROIQ in the last 72 hours.     No lab exists for component: CPKMB  Lab Results   Component Value Date/Time    Cholesterol, total 102 05/15/2017 11:11 AM    HDL Cholesterol 48 05/15/2017 11:11 AM    LDL, calculated 46 05/15/2017 11:11 AM    Triglyceride 41 05/15/2017 11:11 AM     Lab Results   Component Value Date/Time    Glucose (POC) 132 07/04/2017 04:37 PM     Lab Results   Component Value Date/Time    Color YELLOW/STRAW 07/13/2017 04:11 PM    Appearance CLOUDY 07/13/2017 04:11 PM    Specific gravity 1.016 07/13/2017 04:11 PM    pH (UA) 6.0 07/13/2017 04:11 PM    Protein 100 07/13/2017 04:11 PM    Glucose 250 07/13/2017 04:11 PM    Ketone 15 07/13/2017 04:11 PM    Bilirubin NEGATIVE  07/13/2017 04:11 PM    Urobilinogen 0.2 07/13/2017 04:11 PM    Nitrites NEGATIVE  07/13/2017 04:11 PM    Leukocyte Esterase NEGATIVE  07/13/2017 04:11 PM    Epithelial cells FEW 07/13/2017 04:11 PM    Bacteria NEGATIVE  07/13/2017 04:11 PM    WBC 0-4 07/13/2017 04:11 PM    RBC 10-20 07/13/2017 04:11 PM     Medications Reviewed:     Current Facility-Administered Medications   Medication Dose Route Frequency    amLODIPine (NORVASC) tablet 5 mg  5 mg Oral DAILY    nicotine (NICODERM CQ) 14 mg/24 hr patch 1 Patch  1 Patch TransDERmal DAILY    albuterol-ipratropium (DUO-NEB) 2.5 MG-0.5 MG/3 ML  3 mL Nebulization Q4H PRN    ALPRAZolam (XANAX) tablet 0.5 mg  0.5 mg Oral BID PRN    benztropine (COGENTIN) tablet 2 mg  2 mg Oral DAILY PRN    PARoxetine (PAXIL) tablet 20 mg  20 mg Oral DAILY    QUEtiapine (SEROquel) tablet 50 mg  50 mg Oral QHS PRN    risperiDONE (RisperDAL) tablet 3 mg  3 mg Oral DAILY    sodium chloride tablet 1 g  1 g Oral DAILY    temazepam (RESTORIL) capsule 30 mg  30 mg Oral QHS PRN    sodium chloride (NS) flush 5-10 mL  5-10 mL IntraVENous Q8H    sodium chloride (NS) flush 5-10 mL  5-10 mL IntraVENous PRN    acetaminophen (TYLENOL) tablet 650 mg  650 mg Oral Q4H PRN    arformoterol (BROVANA) neb solution 15 mcg  15 mcg Nebulization BID RT    And    budesonide (PULMICORT) 500 mcg/2 ml nebulizer suspension  500 mcg Nebulization BID RT    ipratropium (ATROVENT) 0.02 % nebulizer solution 0.5 mg  0.5 mg Nebulization Q6H RT   ______________________________________________________________________  EXPECTED LENGTH OF STAY: 3d 19h  ACTUAL LENGTH OF STAY:          11              Simran Ware V NP

## 2017-11-06 NOTE — PROGRESS NOTES
Problem: Falls - Risk of  Goal: *Absence of Falls  Document Joanie Fall Risk and appropriate interventions in the flowsheet.    Outcome: Progressing Towards Goal  Fall Risk Interventions:  Mobility Interventions: Patient to call before getting OOB         Medication Interventions: Patient to call before getting OOB    Elimination Interventions: Call light in reach    History of Falls Interventions: Door open when patient unattended

## 2017-11-06 NOTE — PROGRESS NOTES
Bedside and Verbal shift change report given to Gabbi Marquez (oncoming nurse) by Aysha Narayan RN (offgoing nurse). Report included the following information SBAR, Kardex, ED Summary, [facility] and Recent Results.

## 2017-11-06 NOTE — PROGRESS NOTES
7:21 AM  CM received notification from [facility] that they are unable to accept and accommodate patient for services. [employer] informed [facility] that patient's policy does not provide for SNF benefits.  CM consulted with prior CM to identify other interventions for placement. CM informed to contact [facility] and [facility] to identify availability for potential placement. CM will continue to follow and assist with disposition needs as they arise. 10:02 AM  CM contacted Chaneta Pain with [facility] and [facility] [phone] inquiring as to whether they have male beds and had an opportunity to review clinicals that were faxed prior. She indicated that she needed to follow up with nursing administrator and get back with CM to provide update. CM contacted [employer] located on [address] [phone]   to inquire about male beds and if they can accommodate patient who is on oxygen. CM was transferred to the . CM spoke with Matt Del Valle. Sarah indicated that she would be available to come to hospital tomorrow at 11:00 am to complete screening of patient. CM to contact patient's brother and mental health  to provide current updates and follow-up and to obtain first choice of agency. CM will continue and assist with disposition needs as they arise. 3:05 PM  CM received a return call from [facility] with [facility] and Susannah Pettit [phone] via voicemail. CM contacted Ms. Linton via telephone. Chaneta Pain requested to call CM back. CM will continue to follow and assist with disposition needs as they arise. CM contacted patient's mental health ,Danay [phone] Yimi Tinoco to provide updates. Mental health worker is recommending [facility] for transition of care services at this time, however if no availability [facility] would be second choice. CM contacted patient's brother, Gideon Hernandez [phone] to provide updates.   No one was present at the time of the call. CM left a message requesting a return call and will continue to follow and assist with disposition needs as they arise. 3:41 PM  CM received a return call from patient's brother, Boston Harmon [phone]. CM was informed that patient transitioning to [facility] would not be a good option for patient and family. Mr. Collette Jordan stated that he prefers his brother transitions to [facility] at discharge. Mr. Collette Jordan is aware that [facility] has a scheduled screening with patient tomorrow at 11:00 am.  CM will continue to follow and assist with disposition needs as they arise.     CORNELIA Abreu/KAI

## 2017-11-06 NOTE — PROGRESS NOTES
11/06/17 0944   Vital Signs   Temp 98 °F (36.7 °C)   Temp Source Oral   Pulse (Heart Rate) (!) 112   Heart Rate Source Brachial   Resp Rate 16   O2 Sat (%) 98 %   Level of Consciousness Alert   /78   MAP (Calculated) 90   BP 1 Location Left arm   BP Patient Position Sitting   MEWS Score 3   Pain 1   Pain Scale 1 Numeric (0 - 10)   Pain Intensity 1 0   Patient Stated Pain Goal 0     Simran Minor NP paged to notify of HR/tachycardia. Medications given per [facility]; will recheck and monitor.     @1121:  HR now 95

## 2017-11-06 NOTE — PROGRESS NOTES
Bedside shift change report given to eliana (oncoming nurse) by Jean Stark (offgoing nurse). Report included the following information SBAR, Kardex, MAR and Recent Results.

## 2017-11-07 PROCEDURE — 94640 AIRWAY INHALATION TREATMENT: CPT

## 2017-11-07 PROCEDURE — 74011250637 HC RX REV CODE- 250/637: Performed by: HOSPITALIST

## 2017-11-07 PROCEDURE — 94761 N-INVAS EAR/PLS OXIMETRY MLT: CPT

## 2017-11-07 PROCEDURE — 74011250637 HC RX REV CODE- 250/637: Performed by: FAMILY MEDICINE

## 2017-11-07 PROCEDURE — 74011000250 HC RX REV CODE- 250: Performed by: FAMILY MEDICINE

## 2017-11-07 PROCEDURE — 65270000032 HC RM SEMIPRIVATE

## 2017-11-07 PROCEDURE — 74011250637 HC RX REV CODE- 250/637: Performed by: NURSE PRACTITIONER

## 2017-11-07 PROCEDURE — 94664 DEMO&/EVAL PT USE INHALER: CPT

## 2017-11-07 PROCEDURE — 74011000250 HC RX REV CODE- 250: Performed by: HOSPITALIST

## 2017-11-07 RX ORDER — ALBUTEROL SULFATE 90 UG/1
2 AEROSOL, METERED RESPIRATORY (INHALATION)
Qty: 1 INHALER | Refills: 1 | Status: SHIPPED | OUTPATIENT
Start: 2017-11-07 | End: 2018-07-27 | Stop reason: SDUPTHER

## 2017-11-07 RX ORDER — IBUPROFEN 200 MG
1 TABLET ORAL DAILY
Qty: 30 PATCH | Refills: 0 | Status: SHIPPED | OUTPATIENT
Start: 2017-11-07 | End: 2017-11-15 | Stop reason: SDDI

## 2017-11-07 RX ORDER — FLUTICASONE FUROATE AND VILANTEROL 100; 25 UG/1; UG/1
1 POWDER RESPIRATORY (INHALATION) DAILY
Qty: 1 INHALER | Refills: 1 | Status: SHIPPED | OUTPATIENT
Start: 2017-11-07 | End: 2018-09-11

## 2017-11-07 RX ORDER — AMLODIPINE BESYLATE 5 MG/1
5 TABLET ORAL DAILY
Qty: 30 TAB | Refills: 0 | Status: SHIPPED | OUTPATIENT
Start: 2017-11-07 | End: 2019-01-11 | Stop reason: SDUPTHER

## 2017-11-07 RX ADMIN — BUDESONIDE 500 MCG: 0.5 INHALANT RESPIRATORY (INHALATION) at 08:25

## 2017-11-07 RX ADMIN — RISPERIDONE 3 MG: 1 TABLET ORAL at 10:23

## 2017-11-07 RX ADMIN — ARFORMOTEROL TARTRATE 15 MCG: 15 SOLUTION RESPIRATORY (INHALATION) at 08:25

## 2017-11-07 RX ADMIN — PAROXETINE HYDROCHLORIDE 20 MG: 20 TABLET, FILM COATED ORAL at 10:23

## 2017-11-07 RX ADMIN — Medication 10 ML: at 05:06

## 2017-11-07 RX ADMIN — IPRATROPIUM BROMIDE AND ALBUTEROL SULFATE 3 ML: .5; 3 SOLUTION RESPIRATORY (INHALATION) at 20:10

## 2017-11-07 RX ADMIN — SODIUM CHLORIDE TAB 1 GM 1 G: 1 TAB at 10:23

## 2017-11-07 RX ADMIN — IPRATROPIUM BROMIDE 0.5 MG: 0.5 SOLUTION RESPIRATORY (INHALATION) at 20:10

## 2017-11-07 RX ADMIN — IPRATROPIUM BROMIDE 0.5 MG: 0.5 SOLUTION RESPIRATORY (INHALATION) at 08:25

## 2017-11-07 RX ADMIN — BUDESONIDE 500 MCG: 0.5 INHALANT RESPIRATORY (INHALATION) at 20:10

## 2017-11-07 RX ADMIN — TEMAZEPAM 30 MG: 15 CAPSULE ORAL at 21:08

## 2017-11-07 RX ADMIN — AMLODIPINE BESYLATE 5 MG: 5 TABLET ORAL at 10:24

## 2017-11-07 RX ADMIN — IPRATROPIUM BROMIDE 0.5 MG: 0.5 SOLUTION RESPIRATORY (INHALATION) at 02:49

## 2017-11-07 RX ADMIN — Medication 10 ML: at 21:09

## 2017-11-07 RX ADMIN — IPRATROPIUM BROMIDE 0.5 MG: 0.5 SOLUTION RESPIRATORY (INHALATION) at 14:13

## 2017-11-07 NOTE — PROGRESS NOTES
11/07/17 0915 11/07/17 0917 11/07/17 0919   RT Walking Oximetry   Stage Resting (Room Air) During Walk (Room Air) During Walk (Room Air)   SpO2 93 % 92 % 91 %    bpm 136 bpm 140 bpm   O2 Device None (Room air) None (Room air) None (Room air)   FIO2 (%) 21 % 21 % 21 %       11/07/17 0921 11/07/17 0924   RT Walking Oximetry   Stage During Walk (Room Air) After Walk   SpO2 91 % 92 %    bpm 136 bpm   O2 Device None (Room air) None (Room air)   FIO2 (%) 21 % 21 %

## 2017-11-07 NOTE — PROGRESS NOTES
Hospitalist Progress Note  Ector De Jesus NP  Answering service: [phone] OR 36 from in house phone  Cell: [phone]   Date of Service:  2017  NAME:  Lito Singh  :  1960  MRN:  [id number]    Admission Summary:   Pt presented to the ED on 10/26 with SOB. Pt went to see PCP where his saturations were in the 70's. He was given 2 nebulizer tx's and saturations improve to the 80%. Pt was transferred to the ED where his saturations were 82% on RA. Interval history / Subjective:   Repeat 6 MWT with RT: 91% on RA entire 6 minutes w/o desaturation. May return to [facility], discussed with CM. They have released the patient from their system and are unable to accept him back now. [facility] coming to evaluate patient today. Remains medically stable for discharge for over 1 week now. Placement is the only issue. Assessment & Plan:     Acute hypoxic respiratory failure in the setting of COPD exacerbation: Resolved  - does not use home oxygen PTA but will need on discharge  - PT performed walk test 10/29:   87% at rest on RA  80% while ambulating on RA  90% at rest on 4LPM  88% while ambulating on 6LPM  -  steroid taper completed  - c/w nebs  -  6 MWT 91% on RA throughout w/o dseaturation    Tobacco Abuse:   - smoking a ppd  - counseled on smoking cessation - cessation and dangers of smoking while on oxygen have been discussed on several occasions. - on nicoderm patch - should continue on discharge to help with smoking cessation. History of paranoid schizophrenia: stable.  C/w home meds    History of depression: c/w home meds    Hypertension:   - started norvasc (5 mg)    - BP more controlled    History of chronic hyponatremia: Na normal  - on sodium tabs    Code status: Full  DVT prophylaxis: SCDs  Care Plan discussed with: patient, nurse, attending MD  Disposition: Previously lived at [facility] but they are unable to accept him back on oxygen. Case management working on alternative placement - possible [place], authorization is still pending. Pt has been medically stable for discharge for > 1 week but waiting on placement. Pt has twin brother, Rohan Logan ([phone]). Called and updated him on plan of care 11/4. Review of Systems:   Denies any new complaints    Vital Signs:    Last 24hrs VS reviewed since prior progress note. Most recent are:  Visit Vitals    /89 (BP 1 Location: Left arm, BP Patient Position: At rest;Supine)    Pulse (!) 108    Temp 97.8 °F (36.6 °C)    Resp 18    Ht 5' 10\" (1.778 m)    Wt 64 kg (141 lb)    SpO2 96%    BMI 20.23 kg/m2       Intake/Output Summary (Last 24 hours) at 11/07/17 8334  Last data filed at 11/07/17 0645   Gross per 24 hour   Intake             1940 ml   Output             3900 ml   Net            -1960 ml      Physical Examination:        Constitutional:  No acute distress, cooperative, pleasant    ENT:  Oral mucous membranes moist, oropharynx benign. Poor dentition   Resp:  Diminished, cta.  no sputum. No accessory muscle use, RA   CV:  Regular rhythm, no murmurs. GI:  Soft, non distended, non tender. Normoactive bowel sounds    Musculoskeletal:  No edema, warm, 2+ pulses throughout    Neurologic:  Moves all extremities. AAOx3     Psych: Poor insight, Not anxious nor agitated. Skin: Good turgor, no rashes or ulcers    Data Review:   Review and/or order of clinical lab test  Review and/or order of tests in the radiology section of CPT  Review and/or order of tests in the medicine section of CPT    Labs:     No results for input(s): WBC, HGB, HCT, PLT, HGBEXT, HCTEXT, PLTEXT, HGBEXT, HCTEXT, PLTEXT in the last 72 hours. No results for input(s): NA, K, CL, CO2, BUN, CREA, GLU, CA, MG, PHOS, URICA in the last 72 hours.   No results for input(s): SGOT, GPT, ALT, AP, TBIL, TBILI, TP, ALB, GLOB, GGT, AML, LPSE in the last 72 hours. No lab exists for component: AMYP, HLPSE  No results for input(s): INR, PTP, APTT in the last 72 hours. No lab exists for component: INREXT, INREXT   No results for input(s): FE, TIBC, PSAT, FERR in the last 72 hours. No results found for: FOL, RBCF   No results for input(s): PH, PCO2, PO2 in the last 72 hours. No results for input(s): CPK, CKNDX, TROIQ in the last 72 hours.     No lab exists for component: CPKMB  Lab Results   Component Value Date/Time    Cholesterol, total 102 05/15/2017 11:11 AM    HDL Cholesterol 48 05/15/2017 11:11 AM    LDL, calculated 46 05/15/2017 11:11 AM    Triglyceride 41 05/15/2017 11:11 AM     Lab Results   Component Value Date/Time    Glucose (POC) 132 07/04/2017 04:37 PM     Lab Results   Component Value Date/Time    Color YELLOW/STRAW 07/13/2017 04:11 PM    Appearance CLOUDY 07/13/2017 04:11 PM    Specific gravity 1.016 07/13/2017 04:11 PM    pH (UA) 6.0 07/13/2017 04:11 PM    Protein 100 07/13/2017 04:11 PM    Glucose 250 07/13/2017 04:11 PM    Ketone 15 07/13/2017 04:11 PM    Bilirubin NEGATIVE  07/13/2017 04:11 PM    Urobilinogen 0.2 07/13/2017 04:11 PM    Nitrites NEGATIVE  07/13/2017 04:11 PM    Leukocyte Esterase NEGATIVE  07/13/2017 04:11 PM    Epithelial cells FEW 07/13/2017 04:11 PM    Bacteria NEGATIVE  07/13/2017 04:11 PM    WBC 0-4 07/13/2017 04:11 PM    RBC 10-20 07/13/2017 04:11 PM     Medications Reviewed:     Current Facility-Administered Medications   Medication Dose Route Frequency    amLODIPine (NORVASC) tablet 5 mg  5 mg Oral DAILY    nicotine (NICODERM CQ) 14 mg/24 hr patch 1 Patch  1 Patch TransDERmal DAILY    albuterol-ipratropium (DUO-NEB) 2.5 MG-0.5 MG/3 ML  3 mL Nebulization Q4H PRN    ALPRAZolam (XANAX) tablet 0.5 mg  0.5 mg Oral BID PRN    benztropine (COGENTIN) tablet 2 mg  2 mg Oral DAILY PRN    PARoxetine (PAXIL) tablet 20 mg  20 mg Oral DAILY    QUEtiapine (SEROquel) tablet 50 mg  50 mg Oral QHS PRN    risperiDONE (RisperDAL) tablet 3 mg  3 mg Oral DAILY    sodium chloride tablet 1 g  1 g Oral DAILY    temazepam (RESTORIL) capsule 30 mg  30 mg Oral QHS PRN    sodium chloride (NS) flush 5-10 mL  5-10 mL IntraVENous Q8H    sodium chloride (NS) flush 5-10 mL  5-10 mL IntraVENous PRN    acetaminophen (TYLENOL) tablet 650 mg  650 mg Oral Q4H PRN    arformoterol (BROVANA) neb solution 15 mcg  15 mcg Nebulization BID RT    And    budesonide (PULMICORT) 500 mcg/2 ml nebulizer suspension  500 mcg Nebulization BID RT    ipratropium (ATROVENT) 0.02 % nebulizer solution 0.5 mg  0.5 mg Nebulization Q6H RT   ______________________________________________________________________  EXPECTED LENGTH OF STAY: 3d 19h  ACTUAL LENGTH OF STAY:          12              Simran Ware V, NP

## 2017-11-07 NOTE — PROGRESS NOTES
9:21 AM  CM contacted [facility] located on [address] at [phone] to speak with Nadia Colunga. Ms. Neri Leija currently on another call. CM spoke with  who confirmed that Ms. Neri Leija would be completing a screening today at 11:00 am on patient to assess for placement at hospital.  CM will continue to follow and assist with disposition needs as they arise. 9:36 AM  CM spoke with attending. CM informed patient completed testing for oxygen and sat is 91% on room air. Patient does not meet criteria for home O2. CM informed to contact [address] [place] [address] to see if they can accept patient back for services since he no longer requires home O2. CM contacted [address] [place] [address] as requested. CM spoke with ST. LUCIA WONG [phone]. [facility]. LUCIA WONG informed CM that she would need to speak with . ST. LUCIA WONG further informed CM that patient has been released from their facility and at this time they are unable to accept patient back unless administration approves. CM requested to speak with , Kimmie Echeverria. CM instructed to call back within the hour when she would possibly be available. CM will continue to follow and assist with disposition needs as they arise. CM contacted patient's mental health , Joseph Child [phone]  with [address] [facility]  and brother Julian Has [phone]  to provide updates. PIPER was able to provide updates to mental health worker. CM left a message on patient's brother voicemail requesting a return call. 11:32 AM  Sarah Leija with [facility] visited with patient. CM provided Ms. Neri Leija with copy of I and [facility] Authorization Form. Facility able to accept and accommodate patient for services. Before acceptance facility needs an updated H&P and TB Screening. CM informed attending of updates. CM placed H&P on patient chart to be completed. Patient had Chest X-Ray completed on 11/2/17.   CM to fax H&P and Chest X-Ray to  [phone] once obtained. CM contacted patient's mental health , Juan Escobedo [phone]  with [address]  and brother Lorraine Hair [phone]  to provide updates of patient's acceptance into [facility]. Mental Health Worker indicated if patient is discharged tomorrow she would be able to transport before 1:30 pm.  CM will continue to follow and assist with disposition needs. 1:13 PM  CM faxed Chest X-Ray dated 11/2/17 and TB Screening dated 10/26/17 to [facility] [phone] for their review. CM to fax H&P once it has been completed. CM will continue to follow and assist with disposition needs as they arise.     CORNELIA Nicholson/KAI

## 2017-11-07 NOTE — PROGRESS NOTES
Bedside shift change report given to Emilio Cardozo RN (oncoming nurse) by Ananth Mustafa RN (offgoing nurse). Report included the following information SBAR and Kardex.

## 2017-11-07 NOTE — PROGRESS NOTES
10/26/17 1111   TB Screen   Unplanned Weight Loss in Last 3 Months 0   Exposure to TB 0   Previous Positive PPD Test 0   Persistent Cough Greater Than 2 Weeks 0   Night Sweats 0   Hemoptysis 0   Travel Outside of the [place] 0   TB Screen Score 0     Patient screened for TB on admission (\"0\" zero = NO in answer column)

## 2017-11-07 NOTE — PROGRESS NOTES
Bedside and Verbal shift change report given to APOLONIA Marshall (oncoming nurse) by Charity Engel RN (offgoing nurse). Report included the following information SBAR, Kardex, MAR, Accordion and Recent Results.

## 2017-11-07 NOTE — PROGRESS NOTES
Problem: Falls - Risk of  Goal: *Absence of Falls  Document Joanie Fall Risk and appropriate interventions in the flowsheet.    Outcome: Progressing Towards Goal  Fall Risk Interventions:  Mobility Interventions: Patient to call before getting OOB         Medication Interventions: Teach patient to arise slowly    Elimination Interventions: Call light in reach    History of Falls Interventions: Door open when patient unattended

## 2017-11-08 VITALS
HEART RATE: 99 BPM | WEIGHT: 141 LBS | DIASTOLIC BLOOD PRESSURE: 87 MMHG | OXYGEN SATURATION: 94 % | HEIGHT: 70 IN | SYSTOLIC BLOOD PRESSURE: 129 MMHG | RESPIRATION RATE: 18 BRPM | TEMPERATURE: 97.6 F | BODY MASS INDEX: 20.19 KG/M2

## 2017-11-08 PROCEDURE — 74011250637 HC RX REV CODE- 250/637: Performed by: HOSPITALIST

## 2017-11-08 PROCEDURE — 74011250637 HC RX REV CODE- 250/637: Performed by: NURSE PRACTITIONER

## 2017-11-08 PROCEDURE — 94640 AIRWAY INHALATION TREATMENT: CPT

## 2017-11-08 PROCEDURE — 74011000250 HC RX REV CODE- 250: Performed by: FAMILY MEDICINE

## 2017-11-08 PROCEDURE — 74011250637 HC RX REV CODE- 250/637: Performed by: FAMILY MEDICINE

## 2017-11-08 RX ORDER — PAROXETINE HYDROCHLORIDE 20 MG/1
20 TABLET, FILM COATED ORAL DAILY
Qty: 30 TAB | Refills: 0 | Status: SHIPPED | OUTPATIENT
Start: 2017-11-08 | End: 2019-01-11 | Stop reason: SDUPTHER

## 2017-11-08 RX ORDER — RISPERIDONE 3 MG/1
3 TABLET, FILM COATED ORAL DAILY
Qty: 30 TAB | Refills: 0 | Status: SHIPPED | OUTPATIENT
Start: 2017-11-08 | End: 2018-02-05 | Stop reason: SDUPTHER

## 2017-11-08 RX ORDER — TEMAZEPAM 30 MG/1
30 CAPSULE ORAL
Qty: 30 CAP | Refills: 0 | Status: SHIPPED | OUTPATIENT
Start: 2017-11-08 | End: 2018-09-11

## 2017-11-08 RX ORDER — BENZTROPINE MESYLATE 2 MG/1
2 TABLET ORAL
Qty: 30 TAB | Refills: 0 | Status: SHIPPED | OUTPATIENT
Start: 2017-11-08 | End: 2018-06-28 | Stop reason: SDUPTHER

## 2017-11-08 RX ORDER — SODIUM CHLORIDE TAB 1 GM 1 G
1 TAB MISCELLANEOUS DAILY
Qty: 30 TAB | Refills: 0 | Status: SHIPPED | OUTPATIENT
Start: 2017-11-08 | End: 2019-01-11 | Stop reason: SDUPTHER

## 2017-11-08 RX ORDER — QUETIAPINE FUMARATE 50 MG/1
50 TABLET, FILM COATED ORAL
Qty: 30 TAB | Refills: 0 | Status: SHIPPED | OUTPATIENT
Start: 2017-11-08 | End: 2021-11-03

## 2017-11-08 RX ORDER — TRAZODONE HYDROCHLORIDE 100 MG/1
50 TABLET ORAL
Qty: 15 TAB | Refills: 0 | Status: SHIPPED | OUTPATIENT
Start: 2017-11-08 | End: 2018-09-11

## 2017-11-08 RX ADMIN — RISPERIDONE 3 MG: 1 TABLET ORAL at 08:54

## 2017-11-08 RX ADMIN — BUDESONIDE 500 MCG: 0.5 INHALANT RESPIRATORY (INHALATION) at 07:51

## 2017-11-08 RX ADMIN — PAROXETINE HYDROCHLORIDE 20 MG: 20 TABLET, FILM COATED ORAL at 08:54

## 2017-11-08 RX ADMIN — IPRATROPIUM BROMIDE 0.5 MG: 0.5 SOLUTION RESPIRATORY (INHALATION) at 01:26

## 2017-11-08 RX ADMIN — SODIUM CHLORIDE TAB 1 GM 1 G: 1 TAB at 08:53

## 2017-11-08 RX ADMIN — AMLODIPINE BESYLATE 5 MG: 5 TABLET ORAL at 08:54

## 2017-11-08 RX ADMIN — IPRATROPIUM BROMIDE 0.5 MG: 0.5 SOLUTION RESPIRATORY (INHALATION) at 07:51

## 2017-11-08 RX ADMIN — Medication 10 ML: at 07:26

## 2017-11-08 RX ADMIN — ARFORMOTEROL TARTRATE 15 MCG: 15 SOLUTION RESPIRATORY (INHALATION) at 07:51

## 2017-11-08 NOTE — DISCHARGE INSTRUCTIONS
Discharge Summary         PATIENT ID: Vimal Schaefer  MRN: [id number]   YOB: 1960    DATE OF ADMISSION: 10/26/2017 11:06 AM    DATE OF DISCHARGE: 11/8/2017   PRIMARY CARE PROVIDER: Julieta Rae DO      ATTENDING PHYSICIAN: Abida Rosa MD  DISCHARGING PROVIDER: Wilver Gross NP    To contact this individual call [phone] and ask the  to page. If unavailable ask to be transferred the Adult Hospitalist Department.     CONSULTATIONS: IP CONSULT TO HOSPITALIST     PROCEDURES/SURGERIES: * No surgery found *     ADMITTING [address]:   Pt presented to the ED on 10/26 with SOB. Pt visited PCP office day of admission where his saturations were in the 70's. He was given 2 nebulizer tx's and saturations improve to the 80%. Pt was transferred to the ED where his saturations were 82% on RA. He was admitted with acute hypoxic respiratory failure in the setting of COPD exacerbation. He was treated with nebulizers and steroids. Prior to coming to hospital he was not using oxygen at [facility] (this is where he resided for 18 years). Mr. Asia Dickinson discharge was delayed by the need for oxygen as [facility] was unable to accommodate this need for oxygen. While disposition planning was underway the patient was safely weaned off of oxygen. A repeat 6 MWT was completed where he did not drop his saturations and remained 91% on RA with ambulation. VSS on discharge.   Pt offers no complaints.         DISCHARGE DIAGNOSES / PLAN:       Acute hypoxic respiratory failure in the setting of COPD exacerbation: Resolved  - does not use home oxygen PTA but will need on discharge  - PT performed walk test 10/29:   87% at rest on RA  80% while ambulating on RA  90% at rest on 4LPM  88% while ambulating on 6LPM  - 11/6 steroid taper completed  - c/w nebs  - 11/7 6 MWT 91% on RA throughout w/o dseaturation      Tobacco Abuse:   - smoking a ppd  - counseled on smoking cessation - cessation and dangers of smoking while on oxygen have been discussed on several occasions. - on nicoderm patch - should continue on discharge to help with smoking cessation.       History of paranoid schizophrenia: stable. C/w home meds      History of depression: c/w home meds      Hypertension:   - started norvasc (5 mg) 11/1   - BP more controlled      History of chronic hyponatremia: Na normal  - on sodium tabs         PENDING TEST RESULTS:   At the time of discharge the following test results are still pending: none     FOLLOW UP APPOINTMENTS:    Follow-up Information     Follow up With Details Comments Contact Info     [address] [place] [address]     [address]     [facility], DO In 1 week or facility provider [address] [facility]  [address] [facility]  [phone]            ADDITIONAL CARE RECOMMENDATIONS: as above     DIET: Cardiac     ACTIVITY: Activity as tolerated     WOUND CARE: none     EQUIPMENT needed: none        DISCHARGE MEDICATIONS:        Current Discharge Medication List             START taking these medications     Details   amLODIPine (NORVASC) 5 mg tablet Take 1 Tab by mouth daily. Qty: 30 Tab, Refills: 0       fluticasone-vilanterol (BREO ELLIPTA) 100-25 mcg/dose inhaler Take 1 Puff by inhalation daily. Indications: COPD  Qty: 1 Inhaler, Refills: 1     Associated Diagnoses: Chronic bronchitis, unspecified chronic bronchitis type (HCC)       nicotine (NICODERM CQ) 14 mg/24 hr patch 1 Patch by TransDERmal route daily for 30 days. Qty: 30 Patch, Refills: 0                 CONTINUE these medications which have CHANGED     Details   benztropine (COGENTIN) 2 mg tablet Take 1 Tab by mouth daily as needed. Qty: 30 Tab, Refills: 0       PARoxetine (PAXIL) 20 mg tablet Take 1 Tab by mouth daily. Qty: 30 Tab, Refills: 0       QUEtiapine (SEROQUEL) 50 mg tablet Take 1 Tab by mouth nightly as needed (for agitation or anxiety (Use 1st)).   Qty: 30 Tab, Refills: 0     risperiDONE (RISPERDAL) 3 mg tablet Take 1 Tab by mouth daily. Qty: 30 Tab, Refills: 0       sodium chloride 1 gram tablet Take 1 Tab by mouth daily. Indications: hyponatremia  Qty: 30 Tab, Refills: 0     Associated Diagnoses: Hyponatremia; Psychogenic polydipsia       temazepam (RESTORIL) 30 mg capsule Take 1 Cap by mouth nightly as needed for Sleep. Max Daily Amount: 30 mg.  Qty: 30 Cap, Refills: 0       traZODone (DESYREL) 100 mg tablet Take 0.5 Tabs by mouth nightly as needed for Sleep. Qty: 15 Tab, Refills: 0       albuterol (VENTOLIN HFA) 90 mcg/actuation inhaler Take 2 Puffs by inhalation every four (4) hours as needed for Shortness of Breath. Qty: 1 Inhaler, Refills: 1       umeclidinium (INCRUSE ELLIPTA) 62.5 mcg/actuation inhaler Take 1 Puff by inhalation daily. Qty: 1 Inhaler, Refills: 0     Associated Diagnoses: Chronic bronchitis, unspecified chronic bronchitis type (HCC)       ALPRAZolam (XANAX) 0.5 mg tablet Take 1 Tab by mouth two (2) times daily as needed for Anxiety. Max Daily Amount: 1 mg. Qty: 30 Tab, Refills: 0                STOP taking these medications         guaiFENesin (Q-TUSSIN) 100 mg/5 mL liquid Comments:   Reason for Stopping:            budesonide-formoterol (SYMBICORT) 80-4.5 mcg/actuation HFAA inhaler Comments:   Reason for Stopping:            albuterol (PROVENTIL VENTOLIN) 2.5 mg /3 mL (0.083 %) nebulizer solution Comments:   Reason for Stopping:                       NOTIFY YOUR PHYSICIAN FOR ANY OF THE FOLLOWING:   Fever over 101 degrees for 24 hours. Chest pain, shortness of breath, fever, chills, nausea, vomiting, diarrhea, change in mentation, falling, weakness, bleeding. Severe pain or pain not relieved by medications.   Or, any other signs or symptoms that you may have questions about.     DISPOSITION:     Home With:    OT   PT   HH   RN        Long term SNF/Inpatient Rehab   xx Independent/assisted living     Hospice     Other:         PATIENT CONDITION AT DISCHARGE:      Functional status     Poor      Deconditioned    xx Independent       Cognition   xx  Lucid      Forgetful      Dementia       Catheters/lines (plus indication)     Singh      PICC      PEG    xx None       Code status   xx  Full code      DNR       PHYSICAL EXAMINATION AT DISCHARGE:  Constitutional:  No acute distress, cooperative, pleasant    ENT:  Oral mucous membranes moist, oropharynx benign. Poor dentition   Resp:  Diminished, cta.  no sputum. No accessory muscle use, RA   CV:  Regular rhythm, no murmurs.      GI:  Soft, non distended, non tender. Normoactive bowel sounds    Musculoskeletal:  No edema, warm, 2+ pulses throughout    Neurologic:  Moves all extremities.  AAOx3                                             Psych: Poor insight, Not anxious nor agitated.                              Skin: Good turgor, no rashes or ulcers           CHRONIC MEDICAL DIAGNOSES:  Problem List as of 11/8/2017  Date Reviewed: 11/3/2017             Codes Class Noted - Resolved     Pulmonary emphysema ([facility] ICD-10-CM: J43.9  ICD-9-CM: 492.8   8/7/2017 - Present           Hypoalbuminemia due to protein-calorie malnutrition ([facility] ICD-10-CM: E46  ICD-9-CM: 263.9   8/7/2017 - Present           Schizoaffective disorder, depressive type ([facility] ICD-10-CM: F25.1  ICD-9-CM: 295.70   8/7/2017 - Present           Cigarette nicotine dependence with nicotine-induced disorder ICD-10-CM: F17.219  ICD-9-CM: 292.9   8/7/2017 - Present           Seizure ([facility] ICD-10-CM: R56.9  ICD-9-CM: 780.39   7/14/2017 - Present           Benign prostatic hyperplasia without lower urinary tract symptoms ICD-10-CM: N40.0  ICD-9-CM: 600.00   5/15/2017 - Present           Underweight ICD-10-CM: R63.6  ICD-9-CM: 783.22   3/29/2016 - Present           Poor dentition ICD-10-CM: K08.9  ICD-9-CM: 525. 9   3/29/2016 - Present           Insomnia ICD-10-CM: G47.00  ICD-9-CM: 780.52   3/29/2016 - Present           Bronchitis, chronic (HCC) ICD-10-CM: J42  ICD-9-CM: 491.9   5/22/2014 - Present           Depression ICD-10-CM: F32.9  ICD-9-CM: 095   1/31/2012 - Present           * (Principal)RESOLVED: COPD exacerbation ([facility] ICD-10-CM: J44.1  ICD-9-CM: 491.21   10/26/2017 - 11/3/2017           RESOLVED: Shortness of breath ICD-10-CM: R06.02  ICD-9-CM: 786.05   8/16/2017 - 8/23/2017           RESOLVED: Acute respiratory failure (HCC) ICD-10-CM: J96.00  ICD-9-CM: 518.81   7/13/2017 - 7/20/2017           RESOLVED: Acute encephalopathy ICD-10-CM: G93.40  ICD-9-CM: 348.30   7/5/2017 - 7/20/2017           RESOLVED: Hyponatremia ICD-10-CM: E87.1  ICD-9-CM: 276.1   7/4/2017 - 7/20/2017           RESOLVED: Chronic obstructive pulmonary disease ([facility] ICD-10-CM: J44.9  ICD-9-CM: 496   3/29/2016 - 7/20/2017           RESOLVED: Tobacco dependence ICD-10-CM: F17.200  ICD-9-CM: 305. 1   3/29/2016 - 7/20/2017           RESOLVED: Paranoid schizophrenia ([facility] ICD-10-CM: F20.0  ICD-9-CM: 295.30   3/29/2016 - 7/20/2017                  Greater than 30 minutes were spent with the patient on counseling and coordination of care     Signed:   Marilu Carlos NP  11/8/2017  8:26 AM

## 2017-11-08 NOTE — PROGRESS NOTES
8:29 AM  CM contacted [facility] to see if they can accept patient for services today [phone]. Ms. Susie Rdz was not present at the time of the call. CM left a message with facility  requesting a return call from Ms. Gomezangela Carvalho. CM awaiting H&P to fax to facility for review. CM faxed TB Screening and X-Ray on yesterday to . CM will continue to follow and assist with disposition needs as they arise.     Yves Ventura MSW/CRM

## 2017-11-08 NOTE — PROGRESS NOTES
Call placed to [facility]. Ms Chance Arambula was unavaible. Resident Physical faxed to facility at [phone]. Lisa Vaughn CRM    9:34p  CM received return call from Ms. Mauro. Patient approved for admission today. Questions answered  By this CM. Patient's brother to transport him to facility. He should arrive before 3pm today. GEOVANI Simons, CRM    10:10 AM  NP and attending notified that patient is able to discharge to [facility] today. RN to call report to [phone]. CM contacted patient's mental health  Jaspreet Breaux, [phone] [facility] Earnest  and brother Jamarcus Cole [phone] to provide updates of patient discharge today. Mental Health Worker to transport patient today at 12:00 pm.  RN notified. CM to fax discharge summary to .      11:24 AM  CM notified of patient's discharge orders. CM faxed discharge summary to [facility] at [phone].       CORNELIA Carrizales/CRM

## 2017-11-08 NOTE — PROGRESS NOTES
Patient is being discharge to [facility] for continuity of care and report was called to Gardiner Smoker in the facility with SBAR given. He will be leaving with 13 prescriptions to be filled out in the facility. No distress noted.

## 2017-11-08 NOTE — PROGRESS NOTES
Bedside and Verbal shift change report given to Niranjan (oncoming nurse) by Elle Quiroz (offgoing nurse). Report included the following information SBAR, Kardex, MAR and Recent Results.

## 2017-11-08 NOTE — DISCHARGE SUMMARY
Discharge Summary       PATIENT ID: Imani Estrada  MRN: [id number]   YOB: 1960    DATE OF ADMISSION: 10/26/2017 11:06 AM    DATE OF DISCHARGE: 11/8/2017   PRIMARY CARE PROVIDER: Meryle Sager, DO     ATTENDING PHYSICIAN: Alyssa Forbes MD  DISCHARGING PROVIDER: Onesimo Frankel, NP    To contact this individual call [phone] and ask the  to page. If unavailable ask to be transferred the Adult Hospitalist Department. CONSULTATIONS: IP CONSULT TO HOSPITALIST    PROCEDURES/SURGERIES: * No surgery found *    ADMITTING [address] [place] [address] COURSE:   Pt presented to the ED on 10/26 with SOB. Pt visited PCP office day of admission where his saturations were in the 70's. He was given 2 nebulizer tx's and saturations improve to the 80%. Pt was transferred to the ED where his saturations were 82% on RA. He was admitted with acute hypoxic respiratory failure in the setting of COPD exacerbation. He was treated with nebulizers and steroids. Prior to coming to hospital he was not using oxygen at [facility] (this is where he resided for 18 years). Mr. Aman Bertrand discharge was delayed by the need for oxygen as [facility] was unable to accommodate this need for oxygen. While disposition planning was underway the patient was safely weaned off of oxygen. A repeat 6 MWT was completed where he did not drop his saturations and remained 91% on RA with ambulation. VSS on discharge. Pt offers no complaints.        DISCHARGE DIAGNOSES / PLAN:      Acute hypoxic respiratory failure in the setting of COPD exacerbation: Resolved  - does not use home oxygen PTA but will need on discharge  - PT performed walk test 10/29:   87% at rest on RA  80% while ambulating on RA  90% at rest on 4LPM  88% while ambulating on 6LPM  - 11/6 steroid taper completed  - c/w nebs  - 11/7 6 MWT 91% on RA throughout w/o dseaturation     Tobacco Abuse:   - smoking a ppd  - counseled on smoking cessation - cessation and dangers of smoking while on oxygen have been discussed on several occasions. - on nicoderm patch - should continue on discharge to help with smoking cessation.      History of paranoid schizophrenia: stable. C/w home meds     History of depression: c/w home meds     Hypertension:   - started norvasc (5 mg) 11/1   - BP more controlled     History of chronic hyponatremia: Na normal  - on sodium tabs       PENDING TEST RESULTS:   At the time of discharge the following test results are still pending: none    FOLLOW UP APPOINTMENTS:    Follow-up Information     Follow up With Details Comments Contact Info    Paula VASQUEZ [facility]   [address]      [facility], DO In 1 week or facility provider [address] [facility]  [address]  [phone]             ADDITIONAL CARE RECOMMENDATIONS: as above    DIET: Cardiac    ACTIVITY: Activity as tolerated    WOUND CARE: none    EQUIPMENT needed: none      DISCHARGE MEDICATIONS:  Current Discharge Medication List      START taking these medications    Details   amLODIPine (NORVASC) 5 mg tablet Take 1 Tab by mouth daily. Qty: 30 Tab, Refills: 0      fluticasone-vilanterol (BREO ELLIPTA) 100-25 mcg/dose inhaler Take 1 Puff by inhalation daily. Indications: COPD  Qty: 1 Inhaler, Refills: 1    Associated Diagnoses: Chronic bronchitis, unspecified chronic bronchitis type (HCC)      nicotine (NICODERM CQ) 14 mg/24 hr patch 1 Patch by TransDERmal route daily for 30 days. Qty: 30 Patch, Refills: 0         CONTINUE these medications which have CHANGED    Details   benztropine (COGENTIN) 2 mg tablet Take 1 Tab by mouth daily as needed. Qty: 30 Tab, Refills: 0      PARoxetine (PAXIL) 20 mg tablet Take 1 Tab by mouth daily. Qty: 30 Tab, Refills: 0      QUEtiapine (SEROQUEL) 50 mg tablet Take 1 Tab by mouth nightly as needed (for agitation or anxiety (Use 1st)).   Qty: 30 Tab, Refills: 0      risperiDONE (RISPERDAL) 3 mg tablet Take 1 Tab by mouth daily.  Qty: 30 Tab, Refills: 0      sodium chloride 1 gram tablet Take 1 Tab by mouth daily. Indications: hyponatremia  Qty: 30 Tab, Refills: 0    Associated Diagnoses: Hyponatremia; Psychogenic polydipsia      temazepam (RESTORIL) 30 mg capsule Take 1 Cap by mouth nightly as needed for Sleep. Max Daily Amount: 30 mg.  Qty: 30 Cap, Refills: 0      traZODone (DESYREL) 100 mg tablet Take 0.5 Tabs by mouth nightly as needed for Sleep. Qty: 15 Tab, Refills: 0      albuterol (VENTOLIN HFA) 90 mcg/actuation inhaler Take 2 Puffs by inhalation every four (4) hours as needed for Shortness of Breath. Qty: 1 Inhaler, Refills: 1      umeclidinium (INCRUSE ELLIPTA) 62.5 mcg/actuation inhaler Take 1 Puff by inhalation daily. Qty: 1 Inhaler, Refills: 0    Associated Diagnoses: Chronic bronchitis, unspecified chronic bronchitis type (HCC)      ALPRAZolam (XANAX) 0.5 mg tablet Take 1 Tab by mouth two (2) times daily as needed for Anxiety. Max Daily Amount: 1 mg. Qty: 30 Tab, Refills: 0         STOP taking these medications       guaiFENesin (Q-TUSSIN) 100 mg/5 mL liquid Comments:   Reason for Stopping:         budesonide-formoterol (SYMBICORT) 80-4.5 mcg/actuation HFAA inhaler Comments:   Reason for Stopping:         albuterol (PROVENTIL VENTOLIN) 2.5 mg /3 mL (0.083 %) nebulizer solution Comments:   Reason for Stopping:                 NOTIFY YOUR PHYSICIAN FOR ANY OF THE FOLLOWING:   Fever over 101 degrees for 24 hours. Chest pain, shortness of breath, fever, chills, nausea, vomiting, diarrhea, change in mentation, falling, weakness, bleeding. Severe pain or pain not relieved by medications. Or, any other signs or symptoms that you may have questions about.     DISPOSITION:    Home With:   OT  PT  HH  RN       Long term SNF/Inpatient Rehab   xx Independent/assisted living    Hospice    Other:       PATIENT CONDITION AT DISCHARGE:     Functional status    Poor     Deconditioned    xx Independent      Cognition   xx  Lucid Forgetful     Dementia      Catheters/lines (plus indication)    Singh     PICC     PEG    xx None      Code status   xx  Full code     DNR      PHYSICAL EXAMINATION AT DISCHARGE:  Constitutional:  No acute distress, cooperative, pleasant    ENT:  Oral mucous membranes moist, oropharynx benign. Poor dentition   Resp:  Diminished, cta.  no sputum. No accessory muscle use, RA   CV:  Regular rhythm, no murmurs. GI:  Soft, non distended, non tender. Normoactive bowel sounds    Musculoskeletal:  No edema, warm, 2+ pulses throughout    Neurologic:  Moves all extremities. AAOx3                                             Psych: Poor insight, Not anxious nor agitated.                               Skin: Good turgor, no rashes or ulcers        CHRONIC MEDICAL DIAGNOSES:  Problem List as of 11/8/2017  Date Reviewed: 11/3/2017          Codes Class Noted - Resolved    Pulmonary emphysema ([facility] ICD-10-CM: J43.9  ICD-9-CM: 492.8  8/7/2017 - Present        Hypoalbuminemia due to protein-calorie malnutrition ([facility] ICD-10-CM: E46  ICD-9-CM: 263.9  8/7/2017 - Present        Schizoaffective disorder, depressive type ([facility] ICD-10-CM: F25.1  ICD-9-CM: 295.70  8/7/2017 - Present        Cigarette nicotine dependence with nicotine-induced disorder ICD-10-CM: F17.219  ICD-9-CM: 292.9  8/7/2017 - Present        Seizure ([facility] ICD-10-CM: R56.9  ICD-9-CM: 780.39  7/14/2017 - Present        Benign prostatic hyperplasia without lower urinary tract symptoms ICD-10-CM: N40.0  ICD-9-CM: 600.00  5/15/2017 - Present        Underweight ICD-10-CM: R63.6  ICD-9-CM: 783.22  3/29/2016 - Present        Poor dentition ICD-10-CM: K08.9  ICD-9-CM: 525.9  3/29/2016 - Present        Insomnia ICD-10-CM: G47.00  ICD-9-CM: 780.52  3/29/2016 - Present        Bronchitis, chronic ([facility] ICD-10-CM: J42  ICD-9-CM: 491.9  5/22/2014 - Present        Depression ICD-10-CM: F32.9  ICD-9-CM: 188  1/31/2012 - Present        * (Principal)RESOLVED: COPD exacerbation ([facility] ICD-10-CM: J44.1  ICD-9-CM: 491.21  10/26/2017 - 11/3/2017        RESOLVED: Shortness of breath ICD-10-CM: R06.02  ICD-9-CM: 786.05  8/16/2017 - 8/23/2017        RESOLVED: Acute respiratory failure ([facility] ICD-10-CM: J96.00  ICD-9-CM: 518.81  7/13/2017 - 7/20/2017        RESOLVED: Acute encephalopathy ICD-10-CM: G93.40  ICD-9-CM: 348.30  7/5/2017 - 7/20/2017        RESOLVED: Hyponatremia ICD-10-CM: E87.1  ICD-9-CM: 276.1  7/4/2017 - 7/20/2017        RESOLVED: Chronic obstructive pulmonary disease ([facility] ICD-10-CM: J44.9  ICD-9-CM: 438  3/29/2016 - 7/20/2017        RESOLVED: Tobacco dependence ICD-10-CM: F17.200  ICD-9-CM: 305.1  3/29/2016 - 7/20/2017        RESOLVED: Paranoid schizophrenia ([facility] ICD-10-CM: F20.0  ICD-9-CM: 295.30  3/29/2016 - 7/20/2017              Greater than 30 minutes were spent with the patient on counseling and coordination of care    Signed:   Gabriela Lara NP  11/8/2017  8:26 AM

## 2017-11-09 ENCOUNTER — PATIENT OUTREACH (OUTPATIENT)
Dept: INTERNAL MEDICINE CLINIC | Age: 57
End: 2017-11-09

## 2017-11-09 NOTE — PROGRESS NOTES
Sayda Mina 61 y.o. listed on [facility], [email or web address] report 11/9/2017. [facility] for Respiratory Distress from dates: 10/26/17-11/8/17. RRAT score: 16 Moderate    Nurse Navigator(NN) contacted the patient's new adult group home facility by telephone to perform post hospital discharge assessment. Verified patient with 2 identifiers. Provided introduction to self, and explanation of the Nurses Navigator role. Call Information: Was informed by  at [facility] that care manager was out of the facility this afternoon, but would be expected back tomorrow morning. Red Flags:  Fever over 101 for 24 hours, CP, SOB, chills, N/V/D, change in mentation, falling, weakness, bleeding, severe pain, or other signs of concern or question     Discharge Instructions :  Unable to review with care manager at this time. Out of facility this afternoon. PCP/Specialist Follow Up:   Patient scheduled to follow up with Dr. Alan Driscoll on 11/10/17 @ 3pm.      Plan:  Call Care Manager tomorrow    Goal:  Goals      Prevent complications post hospitalization. Last discharge 10/26/17-11/8/17 [facility] Respiratory Distress (sent from PCP office). Goal to avoid readmission for minimum of 30 days. 12/9/17       Supportive resources in place to maintain patient in the community (ie., home health, equipment, DME, refer to, etc.)            Pt has twin brother, Leslie Matthews CM (some involvement), [facility] staff, Psych MD and a counselor that rounds at the facility weekly. Pt does ask and want for drinks and foods that are not approved by the MD. Because of that, pt gets upset and will leave facility. Recently ran away for several days. Josefa Steele at [facility] had to put in a missing person's report because pt was gone missing several days because they were not allowing excessive water intake due to low sodium levels, coffee, or soda.  These were doctor's orders and required to be followed by facility. Per [address] CM/SW documentation, pt's twin brother now wants to move pt to another facility. It is a result of this issue. [facility] 8/4/17  Pt has now moved to a new facility since he needs supportive O2 therapy: [employer]. Twin brother Nabeel Palma and sister remain participatory in pt's life. Pt's mental health  Oriana Sohail, [phone] Yimi Tinoco has been actively participating in pt's care as well. [facility] 11/9/17          Medical History:     Past Medical History:   Diagnosis Date    Asthma     seldom,use inhaler    Bronchitis     Chronic obstructive pulmonary disease ([facility]     Depression     anxiety    Psychiatric disorder     paranoid schizophrenia    Psychiatric disorder     extrapyramidal disease    Psychotic disorder     mental retardation       Labs Reviewed:  Lab Results   Component Value Date/Time    WBC 11.1 10/27/2017 03:13 AM    WBC 6.5 06/19/2012 07:16 AM    HGB 14.2 10/27/2017 03:13 AM    HCT 44.6 10/27/2017 03:13 AM    PLATELET 839 61/95/9297 03:13 AM    .6 10/27/2017 03:13 AM     Lab Results   Component Value Date/Time    Sodium 140 10/31/2017 05:41 AM    Potassium 4.9 10/31/2017 05:41 AM    Chloride 99 10/31/2017 05:41 AM    CO2 36 10/31/2017 05:41 AM    Anion gap 5 10/31/2017 05:41 AM    Glucose 104 10/31/2017 05:41 AM    BUN 18 10/31/2017 05:41 AM    Creatinine 0.64 10/31/2017 05:41 AM    BUN/Creatinine ratio 28 10/31/2017 05:41 AM    GFR est AA >60 10/31/2017 05:41 AM    GFR est non-AA >60 10/31/2017 05:41 AM    Calcium 8.6 10/31/2017 05:41 AM    Bilirubin, total 0.4 10/26/2017 11:31 AM    AST (SGOT) 31 10/26/2017 11:31 AM    Alk. phosphatase 85 10/26/2017 11:31 AM    Protein, total 6.3 10/26/2017 11:31 AM    Albumin 3.1 10/26/2017 11:31 AM    Globulin 3.2 10/26/2017 11:31 AM    A-G Ratio 1.0 10/26/2017 11:31 AM    ALT (SGPT) 66 10/26/2017 11:31 AM     XR Results (maximum last 3):     Results from Hospital Encounter encounter on 10/26/17   XR CHEST PORT Impression IMPRESSION: Minimal left basilar subsegmental atelectasis versus effusion          XR CHEST PORT   Impression IMPRESSION:   Clear lungs. Results from [place] encounter on 08/16/17   XR CHEST PORT   Impression IMPRESSION:     Emphysema. No acute process.

## 2017-11-10 ENCOUNTER — PATIENT OUTREACH (OUTPATIENT)
Dept: INTERNAL MEDICINE CLINIC | Age: 57
End: 2017-11-10

## 2017-11-10 NOTE — PROGRESS NOTES
Pt's new residence is [facility] located at: [address] Phone # is [phone]. Spoke with gilberto who was in the midst of a med pass. She will fax a med list to writer asap. She was unaware of an appointment for the patient and unsure pt will be able to attend. Informs the  is not present today (nor was she present yesterday afternoon) and therefore she is unable to make arrangements for transportation if they have not already been arranged. She will speak with the patient after her med pass. Writer informed to take her time with med pass and may get back to writer when completed. Writer called and spoke with pt's  Artie Boothe who was also unaware of pt's f/u appointment. She is not able to transport to the f/u appointment and would prefer that pt's appointment be rescheduled so that she may attend personally. She is unable to come on Monday so an appointment was made for Tuesday at 10:20am with [facility]. Dr. Alyx Denson will be available in the office if any concerns arise. Esmer Tijerina also happened to be the provider who saw the patient when he was in distress and had to be sent to the ED and was admitted for this past hospitalization, so she is very familiar with the patient and his history and challenges. Will call facility back to make aware of appointment change after med tech has an opportunity to complete her rounds. Elio Bean informed of new appt time and that today's appointment has been canceled due to lack of transportation.

## 2017-11-14 ENCOUNTER — OFFICE VISIT (OUTPATIENT)
Dept: INTERNAL MEDICINE CLINIC | Age: 57
End: 2017-11-14

## 2017-11-14 VITALS
TEMPERATURE: 95.2 F | HEIGHT: 70 IN | DIASTOLIC BLOOD PRESSURE: 76 MMHG | BODY MASS INDEX: 19.5 KG/M2 | HEART RATE: 104 BPM | SYSTOLIC BLOOD PRESSURE: 107 MMHG | RESPIRATION RATE: 18 BRPM | WEIGHT: 136.2 LBS | OXYGEN SATURATION: 99 %

## 2017-11-14 DIAGNOSIS — F17.219 CIGARETTE NICOTINE DEPENDENCE WITH NICOTINE-INDUCED DISORDER: ICD-10-CM

## 2017-11-14 DIAGNOSIS — J43.9 PULMONARY EMPHYSEMA, UNSPECIFIED EMPHYSEMA TYPE (HCC): ICD-10-CM

## 2017-11-14 DIAGNOSIS — J42 CHRONIC BRONCHITIS, UNSPECIFIED CHRONIC BRONCHITIS TYPE (HCC): Primary | ICD-10-CM

## 2017-11-14 RX ORDER — TRAZODONE HYDROCHLORIDE 50 MG/1
TABLET ORAL
COMMUNITY
Start: 2017-11-08 | End: 2018-09-11 | Stop reason: ALTCHOICE

## 2017-11-14 NOTE — PROGRESS NOTES
Health Maintenance Due   Topic Date Due    Hepatitis C Screening  1960    DTaP/Tdap/Td series (1 - Tdap) 08/27/1980       Chief Complaint   Patient presents with    Follow-up     [facility] f/u, COPD    Seizure       1. Have you been to the ER, urgent care clinic since your last visit? Hospitalized since your last visit?10/26/2017, [facility], COPD    2. Have you seen or consulted any other health care providers outside of the [address] [place] [address] since your last visit? Include any pap smears or colon screening. No    3) Do you have an Advance Directive on file? no    4) Are you interested in receiving information on Advance Directives? NO      Patient is accompanied by adult caretaker I have received verbal consent from Imani Estrada to discuss any/all medical information while they are present in the room.

## 2017-11-14 NOTE — PROGRESS NOTES
Subjective:     Chief Complaint   Patient presents with    Follow-up     [facility] f/u, COPD    Seizure       History of Present Illness    Sherrie Ornelas is a 61y.o. year old male who is a patient of Dr. Kirstie Geiger that presents today with his . He is being seen for a RIGO appointment. He was recently admitted to [facility] for a COPD exacerbation. He was not on O2 therapy prior to admission. He was discharged with PRN O2. Due to this he was transferred from [facility] to [facility]. He states he has not had to use O2 since being discharged. NAD. He reports compliance with his medications. His sodium level was WNL while in hospital.  He continues to take a sodium tablet. Still smoking 1PPD. Pt reports trying to cut back. He denies any complaints today and reports feeling generally well. No CP, SOB, GI, or  symptoms. Reviewed medications, recent lab work and imaging with patient. Pt reports compliance with medications. Current Outpatient Prescriptions on File Prior to Visit   Medication Sig Dispense Refill    benztropine (COGENTIN) 2 mg tablet Take 1 Tab by mouth daily as needed. 30 Tab 0    PARoxetine (PAXIL) 20 mg tablet Take 1 Tab by mouth daily. 30 Tab 0    QUEtiapine (SEROQUEL) 50 mg tablet Take 1 Tab by mouth nightly as needed (for agitation or anxiety (Use 1st)). 30 Tab 0    risperiDONE (RISPERDAL) 3 mg tablet Take 1 Tab by mouth daily. 30 Tab 0    sodium chloride 1 gram tablet Take 1 Tab by mouth daily. Indications: hyponatremia 30 Tab 0    temazepam (RESTORIL) 30 mg capsule Take 1 Cap by mouth nightly as needed for Sleep. Max Daily Amount: 30 mg. 30 Cap 0    traZODone (DESYREL) 100 mg tablet Take 0.5 Tabs by mouth nightly as needed for Sleep. 15 Tab 0    albuterol (VENTOLIN HFA) 90 mcg/actuation inhaler Take 2 Puffs by inhalation every four (4) hours as needed for Shortness of Breath. 1 Inhaler 1    amLODIPine (NORVASC) 5 mg tablet Take 1 Tab by mouth daily.  30 Tab 0    fluticasone-vilanterol (BREO ELLIPTA) 100-25 mcg/dose inhaler Take 1 Puff by inhalation daily. Indications: COPD 1 Inhaler 1    nicotine (NICODERM CQ) 14 mg/24 hr patch 1 Patch by TransDERmal route daily for 30 days. 30 Patch 0    umeclidinium (INCRUSE ELLIPTA) 62.5 mcg/actuation inhaler Take 1 Puff by inhalation daily. 1 Inhaler 0    ALPRAZolam (XANAX) 0.5 mg tablet Take 1 Tab by mouth two (2) times daily as needed for Anxiety. Max Daily Amount: 1 mg. 30 Tab 0     No current facility-administered medications on file prior to visit. No Known Allergies   Past Medical History:   Diagnosis Date    Asthma     seldom,use inhaler    Bronchitis     Chronic obstructive pulmonary disease (HCC)     Depression     anxiety    Psychiatric disorder     paranoid schizophrenia    Psychiatric disorder     extrapyramidal disease    Psychotic disorder     mental retardation      Past Surgical History:   Procedure Laterality Date    HX ORTHOPAEDIC      right knee      Social History   Substance Use Topics    Smoking status: Current Every Day Smoker     Packs/day: 1.00     Years: 25.00    Smokeless tobacco: Never Used    Alcohol use No      Comment: socially      Family History   Problem Relation Age of Onset    Diabetes Mother     Heart Disease Father      CHF        Objective:     Vitals:    11/14/17 1028   BP: 107/76   Pulse: (!) 104   Resp: 18   Temp: 95.2 °F (35.1 °C)   TempSrc: Oral   SpO2: 99%   Weight: 136 lb 3.2 oz (61.8 kg)   Height: 5' 10\" (1.778 m)       Review of Systems   Constitutional: Negative. HENT: Negative. Eyes: Negative. Respiratory: Negative. Cardiovascular: Negative. Gastrointestinal: Negative. Genitourinary: Negative. Musculoskeletal: Negative. Skin: Negative. Neurological: Negative. Psychiatric/Behavioral: Negative. Physical Exam   Constitutional: He is oriented to person, place, and time. He appears well-developed and well-nourished. No distress. Frail male  NAD   HENT:   Head: Normocephalic and atraumatic. Mouth/Throat: Abnormal dentition. Dental caries present. Eyes: EOM are normal. Pupils are equal, round, and reactive to light. Neck: Normal range of motion. Neck supple. Cardiovascular: Normal rate, regular rhythm and normal heart sounds. Pulmonary/Chest: Effort normal. No respiratory distress. He has no wheezes. Lung sounds decreased in bases otherwise clear. Abdominal: He exhibits no distension. Musculoskeletal: Normal range of motion. He exhibits no edema, tenderness or deformity. Neurological: He is alert and oriented to person, place, and time. Skin: Skin is warm and dry. He is not diaphoretic. Psychiatric: He has a normal mood and affect. His behavior is normal.   Nursing note and vitals reviewed. Assessment/ Plan:   Diagnoses and all orders for this visit:    1. Chronic bronchitis, unspecified chronic bronchitis type ([address]    2. Pulmonary emphysema, unspecified emphysema type ([address]    3. Cigarette nicotine dependence with nicotine-induced disorder    Patient's plan of care has been reviewed with them. Patient and/or family have verbally conveyed their understanding and agreement of the patient's signs, symptoms, diagnosis, treatment and prognosis and additionally agree to follow up as recommended or return to [facility] should their condition change prior to follow-up. Discharge instructions have also been provided to the patient with some educational information regarding their diagnosis as well a list of reasons why they would want to return to the office prior to their follow-up appointment should their condition change. Follow-up with Dr. Janie Dakin in 3 months.

## 2017-11-14 NOTE — MR AVS SNAPSHOT
Visit Information Date & Time Provider Department Dept. Phone Encounter #  
 11/14/2017 10:20 AM Janelle Ahmadi NP [facility] Internal Medicine [phone] [id number] Your Appointments 12/12/2017 10:45 AM  
ROUTINE CARE with Shirin Suazo DO [facility] Internal Medicine ([facility]) Appt Note: 6 month f/u; 3 month follow up [address] [facility] [address] [place] [address]  
[phone]  
  
   
 [address] Upcoming Health Maintenance Date Due Hepatitis C Screening 1960 DTaP/Tdap/Td series (1 - Tdap) 8/27/1980 COLONOSCOPY 9/2/2024 Allergies as of 11/14/2017  Review Complete On: 11/14/2017 By: Janelle Ahmadi NP No Known Allergies Current Immunizations  Reviewed on 7/17/2017 Name Date Influenza High Dose Vaccine PF 9/15/2017 Influenza Vaccine Intradermal PF 11/14/2016 PPD 1/31/2012 Pneumococcal Polysaccharide (PPSV-23) 11/14/2016 Not reviewed this visit Vitals BP Pulse Temp Resp Height(growth percentile) Weight(growth percentile) 107/76 (BP 1 Location: Left arm, BP Patient Position: Sitting) (!) 104 95.2 °F (35.1 °C) (Oral) 18 5' 10\" (1.778 m) 136 lb 3.2 oz (61.8 kg) SpO2 BMI Smoking Status 99% 19.54 kg/m2 Current Every Day Smoker Vitals History BMI and BSA Data Body Mass Index Body Surface Area  
 19.54 kg/m 2 1.75 m 2 Preferred Pharmacy Pharmacy Name Phone [phone] [place] [address] [phone] Your Updated Medication List  
  
   
This list is accurate as of: 11/14/17 10:41 AM.  Always use your most recent med list.  
  
  
  
  
 albuterol 90 mcg/actuation inhaler Commonly known as:  VENTOLIN HFA Take 2 Puffs by inhalation every four (4) hours as needed for Shortness of Breath. ALPRAZolam 0.5 mg tablet Commonly known as:  Neeraj Mercado Take 1 Tab by mouth two (2) times daily as needed for Anxiety. Max Daily Amount: 1 mg. amLODIPine 5 mg tablet Commonly known as:  [place] Jim Take 1 Tab by mouth daily. benztropine 2 mg tablet Commonly known as:  COGENTIN Take 1 Tab by mouth daily as needed. fluticasone-vilanterol 100-25 mcg/dose inhaler Commonly known as:  BREO ELLIPTA Take 1 Puff by inhalation daily. Indications: COPD  
  
 nicotine 14 mg/24 hr patch Commonly known as:  NICODERM CQ  
1 Patch by TransDERmal route daily for 30 days. PARoxetine 20 mg tablet Commonly known as:  PAXIL Take 1 Tab by mouth daily. QUEtiapine 50 mg tablet Commonly known as:  SEROquel Take 1 Tab by mouth nightly as needed (for agitation or anxiety (Use 1st)). risperiDONE 3 mg tablet Commonly known as:  RisperDAL Take 1 Tab by mouth daily. sodium chloride 1 gram tablet Take 1 Tab by mouth daily. Indications: hyponatremia  
  
 temazepam 30 mg capsule Commonly known as:  RESTORIL Take 1 Cap by mouth nightly as needed for Sleep. Max Daily Amount: 30 mg.  
  
 * traZODone 100 mg tablet Commonly known as:  Katharine Au Take 0.5 Tabs by mouth nightly as needed for Sleep. * traZODone 50 mg tablet Commonly known as:  DESYREL  
  
 umeclidinium 62.5 mcg/actuation inhaler Commonly known as:  INCRUSE ELLIPTA Take 1 Puff by inhalation daily. * Notice: This list has 2 medication(s) that are the same as other medications prescribed for you. Read the directions carefully, and ask your doctor or other care provider to review them with you. Please provide this summary of care documentation to your next provider. Your primary care clinician is listed as Rosa Gonzales. If you have any questions after today's visit, please call [phone].

## 2017-11-14 NOTE — PATIENT INSTRUCTIONS
Bronchitis: Care Instructions  Your Care Instructions    Bronchitis is inflammation of the bronchial tubes, which carry air to the lungs. The tubes swell and produce mucus, or phlegm. The mucus and inflamed bronchial tubes make you cough. You may have trouble breathing. Most cases of bronchitis are caused by viruses like those that cause colds. Antibiotics usually do not help and they may be harmful. Bronchitis usually develops rapidly and lasts about 2 to 3 weeks in otherwise healthy people. Follow-up care is a key part of your treatment and safety. Be sure to make and go to all appointments, and call your doctor if you are having problems. It's also a good idea to know your test results and keep a list of the medicines you take. How can you care for yourself at home? · Take all medicines exactly as prescribed. Call your doctor if you think you are having a problem with your medicine. · Get some extra rest.  · Take an over-the-counter pain medicine, such as acetaminophen (Tylenol), ibuprofen (Advil, Motrin), or naproxen (Aleve) to reduce fever and relieve body aches. Read and follow all instructions on the label. · Do not take two or more pain medicines at the same time unless the doctor told you to. Many pain medicines have acetaminophen, which is Tylenol. Too much acetaminophen (Tylenol) can be harmful. · Take an over-the-counter cough medicine that contains dextromethorphan to help quiet a dry, hacking cough so that you can sleep. Avoid cough medicines that have more than one active ingredient. Read and follow all instructions on the label. · Breathe moist air from a humidifier, hot shower, or sink filled with hot water. The heat and moisture will thin mucus so you can cough it out. · Do not smoke. Smoking can make bronchitis worse. If you need help quitting, talk to your doctor about stop-smoking programs and medicines. These can increase your chances of quitting for good.   When should you call for help? Call 911 anytime you think you may need emergency care. For example, call if:  ? · You have severe trouble breathing. ?Call your doctor now or seek immediate medical care if:  ? · You have new or worse trouble breathing. ? · You cough up dark brown or bloody mucus (sputum). ? · You have a new or higher fever. ? · You have a new rash. ? Watch closely for changes in your health, and be sure to contact your doctor if:  ? · You cough more deeply or more often, especially if you notice more mucus or a change in the color of your mucus. ? · You are not getting better as expected. Where can you learn more? Go to http://leila-kodak.info/. Enter H333 in the search box to learn more about \"Bronchitis: Care Instructions. \"  Current as of: May 12, 2017  Content Version: 11.4  © 1983-8386 [employer]. Care instructions adapted under license by [employer] (which disclaims liability or warranty for this information). If you have questions about a medical condition or this instruction, always ask your healthcare professional. [place] [address] any warranty or liability for your use of this information. Learning About Chronic Bronchitis  What is chronic bronchitis? Chronic bronchitis is long-term swelling and the buildup of mucus in the airways of your lungs. The airways (bronchial tubes) get inflamed and make a lot of mucus. This can narrow or block the airways, making it hard for you to breathe. It is a form of COPD (chronic obstructive pulmonary disease). Chronic bronchitis is usually caused by smoking. But chemical fumes, dust, or air pollution also can cause it over time. What can you expect when you have chronic bronchitis? Chronic bronchitis gets worse over time. You cannot undo the damage to your lungs.   Over time, you may find that:  · You get short of breath even when you do simple things like get dressed or fix a meal.  · It is hard to eat or exercise. · You lose weight and feel weaker. Over many years, the swelling and mucus from chronic bronchitis make it more likely that you will get lung infections. But there are things you can do to prevent more damage and feel better. What are the symptoms? The main symptoms of chronic bronchitis are:  · A cough that will not go away. · Mucus that comes up when you cough. · Shortness of breath that gets worse when you exercise. At times, your symptoms may suddenly flare up and get much worse. This is a called an exacerbation (say \"egg-AIXA-jani-BAY-shun\"). When this happens, your usual symptoms quickly get worse and stay bad. This can be dangerous. You may have to go to the hospital.  How can you keep chronic bronchitis from getting worse? Don't smoke. That is the best way to keep chronic bronchitis from getting worse. If you already smoke, it is never too late to stop. If you need help quitting, talk to your doctor about stop-smoking programs and medicines. These can increase your chances of quitting for good. You can do other things to keep chronic bronchitis from getting worse:  · Avoid bad air. Air pollution, chemical fumes, and dust also can make chronic bronchitis worse. · Get a flu shot every year. A shot may keep the flu from turning into something more serious, like pneumonia. A flu shot also may lower your chances of having a flare-up. · Get a pneumococcal shot. A shot can prevent some of the serious complications of pneumonia. Ask your doctor how often you should get this shot. How is chronic bronchitis treated? Chronic bronchitis is treated with medicines and oxygen. You also can take steps at home to stay healthy and keep your condition from getting worse. Medicines and oxygen therapy  · You may be taking medicines such as:  ¨ Bronchodilators. These help open your airways and make breathing easier.  Bronchodilators are either short-acting (work for 6 to 9 hours) or long-acting (work for 24 hours). You inhale most bronchodilators, so they start to act quickly. Always carry your quick-relief inhaler with you in case you need it while you are away from home. ¨ Corticosteroids. These reduce airway inflammation. They come in pill or inhaled form. You must take these medicines every day for them to work well. ¨ Antibiotics. These medicines are used when you have a bacterial lung infection. · Take your medicines exactly as prescribed. Call your doctor if you think you are having a problem with your medicine. · Oxygen therapy boosts the amount of oxygen in your blood and helps you breathe easier. Use the flow rate your doctor has recommended, and do not change it without talking to your doctor first.  Other care at home  · If your doctor recommends it, get more exercise. Walking is a good choice. Bit by bit, increase the amount you walk every day. Try for at least 30 minutes on most days of the week. · Learn breathing methods-such as breathing through pursed lips-to help you become less short of breath. · If your doctor has not set you up with a pulmonary rehabilitation program, talk to him or her about whether rehab is right for you. Rehab includes exercise programs, education about your disease and how to manage it, help with diet and other changes, and emotional support. · Eat regular, healthy meals. Use bronchodilators about 1 hour before you eat to make it easier to eat. Eat several small meals instead of three large ones. Drink beverages at the end of the meal. Avoid foods that are hard to chew. Follow-up care is a key part of your treatment and safety. Be sure to make and go to all appointments, and call your doctor if you are having problems. It's also a good idea to know your test results and keep a list of the medicines you take. Where can you learn more? Go to http://leila-kodak.info/.   Enter F201 in the search box to learn more about \"Learning About Chronic Bronchitis. \"  Current as of: May 12, 2017  Content Version: 11.4  © 5187-3072 [employer]. Care instructions adapted under license by [employer] (which disclaims liability or warranty for this information). If you have questions about a medical condition or this instruction, always ask your healthcare professional. [address] any warranty or liability for your use of this information. Chronic Obstructive Pulmonary Disease (COPD): Care Instructions  Your Care Instructions    Chronic obstructive pulmonary disease (COPD) is a general term for a group of lung diseases, including emphysema and chronic bronchitis. People with COPD have decreased airflow in and out of the lungs, which makes it hard to breathe. The airways also can get clogged with thick mucus. Cigarette smoking is a major cause of COPD. Although there is no cure for COPD, you can slow its progress. Following your treatment plan and taking care of yourself can help you feel better and live longer. Follow-up care is a key part of your treatment and safety. Be sure to make and go to all appointments, and call your doctor if you are having problems. It's also a good idea to know your test results and keep a list of the medicines you take. How can you care for yourself at home? ?Staying healthy  ? · Do not smoke. This is the most important step you can take to prevent more damage to your lungs. If you need help quitting, talk to your doctor about stop-smoking programs and medicines. These can increase your chances of quitting for good. ? · Avoid colds and flu. Get a pneumococcal vaccine shot. If you have had one before, ask your doctor whether you need a second dose. Get the flu vaccine every fall. If you must be around people with colds or the flu, wash your hands often. ? · Avoid secondhand smoke, air pollution, and high altitudes.  Also avoid cold, dry air and hot, humid air. Stay at home with your windows closed when air pollution is bad. ?Medicines and oxygen therapy  ? · Take your medicines exactly as prescribed. Call your doctor if you think you are having a problem with your medicine. ? · You may be taking medicines such as:  ¨ Bronchodilators. These help open your airways and make breathing easier. Bronchodilators are either short-acting (work for 6 to 9 hours) or long-acting (work for 24 hours). You inhale most bronchodilators, so they start to act quickly. Always carry your quick-relief inhaler with you in case you need it while you are away from home. ¨ Corticosteroids (prednisone, budesonide). These reduce airway inflammation. They come in pill or inhaled form. You must take these medicines every day for them to work well. ? · A spacer may help you get more inhaled medicine to your lungs. Ask your doctor or pharmacist if a spacer is right for you. If it is, ask how to use it properly. ? · Do not take any vitamins, over-the-counter medicine, or herbal products without talking to your doctor first.   ? · If your doctor prescribed antibiotics, take them as directed. Do not stop taking them just because you feel better. You need to take the full course of antibiotics. ? · Oxygen therapy boosts the amount of oxygen in your blood and helps you breathe easier. Use the flow rate your doctor has recommended, and do not change it without talking to your doctor first.   Activity  ? · Get regular exercise. Walking is an easy way to get exercise. Start out slowly, and walk a little more each day. ? · Pay attention to your breathing. You are exercising too hard if you cannot talk while you are exercising. ? · Take short rest breaks when doing household chores and other activities. ? · Learn breathing methods-such as breathing through pursed lips-to help you become less short of breath.    ? · If your doctor has not set you up with a pulmonary rehabilitation program, talk to him or her about whether rehab is right for you. Rehab includes exercise programs, education about your disease and how to manage it, help with diet and other changes, and emotional support. Diet  ? · Eat regular, healthy meals. Use bronchodilators about 1 hour before you eat to make it easier to eat. Eat several small meals instead of three large ones. Drink beverages at the end of the meal. Avoid foods that are hard to chew. ? · Eat foods that contain protein so that you do not lose muscle mass. ? · Talk with your doctor if you gain too much weight or if you lose weight without trying. ?Mental health  ? · Talk to your family, friends, or a therapist about your feelings. It is normal to feel frightened, angry, hopeless, helpless, and even guilty. Talking openly about bad feelings can help you cope. If these feelings last, talk to your doctor. When should you call for help? Call 911 anytime you think you may need emergency care. For example, call if:  ? · You have severe trouble breathing. ?Call your doctor now or seek immediate medical care if:  ? · You have new or worse trouble breathing. ? · You cough up blood. ? · You have a fever. ? Watch closely for changes in your health, and be sure to contact your doctor if:  ? · You cough more deeply or more often, especially if you notice more mucus or a change in the color of your mucus. ? · You have new or worse swelling in your legs or belly. ? · You are not getting better as expected. Where can you learn more? Go to http://leila-kodak.info/. Chava Rice in the search box to learn more about \"Chronic Obstructive Pulmonary Disease (COPD): Care Instructions. \"  Current as of: May 12, 2017  Content Version: 11.4  © 7878-9961 [employer]. Care instructions adapted under license by [employer] (which disclaims liability or warranty for this information).  If you have questions about a medical condition or this instruction, always ask your healthcare professional. [place] [address] any warranty or liability for your use of this information.

## 2018-01-02 ENCOUNTER — PATIENT OUTREACH (OUTPATIENT)
Dept: INTERNAL MEDICINE CLINIC | Age: 58
End: 2018-01-02

## 2018-01-02 NOTE — PROGRESS NOTES
Was informed that pt was in hospital and MD needed notes to review for pt's appt. Had been told pt had been seen in [facility], but unable to locate files for [facility]. Unsure why not seeing ANY files for [facility] since writer has pulled files for pt from [facility] for visit in not too distant past. Did however find records from [facility] AND [facility] from 12/19-12/22. Will hope these are the records they are referring to. Pulled ED notes, 1st and last Pulmonology Consult note, Discharge Summary, and CM notes. These can be scanned into the system post the pt's appointment and MD's review and signature. Will attempt contact with pt and his  as well at tomorrow's appointment.

## 2018-01-10 ENCOUNTER — OFFICE VISIT (OUTPATIENT)
Dept: INTERNAL MEDICINE CLINIC | Age: 58
End: 2018-01-10

## 2018-01-10 VITALS
WEIGHT: 147.8 LBS | HEIGHT: 70 IN | HEART RATE: 112 BPM | SYSTOLIC BLOOD PRESSURE: 91 MMHG | TEMPERATURE: 97.8 F | OXYGEN SATURATION: 96 % | DIASTOLIC BLOOD PRESSURE: 47 MMHG | RESPIRATION RATE: 18 BRPM | BODY MASS INDEX: 21.16 KG/M2

## 2018-01-10 DIAGNOSIS — F25.1 SCHIZOAFFECTIVE DISORDER, DEPRESSIVE TYPE (HCC): ICD-10-CM

## 2018-01-10 DIAGNOSIS — E87.1 HYPONATREMIA: ICD-10-CM

## 2018-01-10 DIAGNOSIS — F17.219 CIGARETTE NICOTINE DEPENDENCE WITH NICOTINE-INDUCED DISORDER: ICD-10-CM

## 2018-01-10 DIAGNOSIS — J43.9 PULMONARY EMPHYSEMA, UNSPECIFIED EMPHYSEMA TYPE (HCC): Primary | ICD-10-CM

## 2018-01-10 PROBLEM — F33.9 RECURRENT DEPRESSION (HCC): Status: ACTIVE | Noted: 2018-01-10

## 2018-01-10 RX ORDER — SODIUM CHLORIDE TAB 1 GM 1 G
1 TAB MISCELLANEOUS 3 TIMES DAILY
Qty: 90 TAB | Refills: 3 | Status: SHIPPED | OUTPATIENT
Start: 2018-01-10 | End: 2018-01-11 | Stop reason: SDUPTHER

## 2018-01-10 RX ORDER — ALBUTEROL SULFATE 0.83 MG/ML
2.5 SOLUTION RESPIRATORY (INHALATION) ONCE
Qty: 1 EACH | Refills: 0
Start: 2018-01-10 | End: 2018-01-11 | Stop reason: SDUPTHER

## 2018-01-10 RX ORDER — IPRATROPIUM BROMIDE AND ALBUTEROL SULFATE 2.5; .5 MG/3ML; MG/3ML
3 SOLUTION RESPIRATORY (INHALATION)
Qty: 30 NEBULE | Refills: 3 | Status: SHIPPED | OUTPATIENT
Start: 2018-01-10 | End: 2018-07-26 | Stop reason: SDUPTHER

## 2018-01-10 RX ORDER — IBUPROFEN 200 MG
1 TABLET ORAL EVERY 24 HOURS
Qty: 30 PATCH | Refills: 0 | Status: SHIPPED | OUTPATIENT
Start: 2018-01-10 | End: 2018-01-11 | Stop reason: SDUPTHER

## 2018-01-10 NOTE — PATIENT INSTRUCTIONS
Chronic Obstructive Pulmonary Disease (COPD): Care Instructions  Your Care Instructions    Chronic obstructive pulmonary disease (COPD) is a general term for a group of lung diseases, including emphysema and chronic bronchitis. People with COPD have decreased airflow in and out of the lungs, which makes it hard to breathe. The airways also can get clogged with thick mucus. Cigarette smoking is a major cause of COPD. Although there is no cure for COPD, you can slow its progress. Following your treatment plan and taking care of yourself can help you feel better and live longer. Follow-up care is a key part of your treatment and safety. Be sure to make and go to all appointments, and call your doctor if you are having problems. It's also a good idea to know your test results and keep a list of the medicines you take. How can you care for yourself at home? ?Staying healthy  ? · Do not smoke. This is the most important step you can take to prevent more damage to your lungs. If you need help quitting, talk to your doctor about stop-smoking programs and medicines. These can increase your chances of quitting for good. ? · Avoid colds and flu. Get a pneumococcal vaccine shot. If you have had one before, ask your doctor whether you need a second dose. Get the flu vaccine every fall. If you must be around people with colds or the flu, wash your hands often. ? · Avoid secondhand smoke, air pollution, and high altitudes. Also avoid cold, dry air and hot, humid air. Stay at home with your windows closed when air pollution is bad. ?Medicines and oxygen therapy  ? · Take your medicines exactly as prescribed. Call your doctor if you think you are having a problem with your medicine. ? · You may be taking medicines such as:  ¨ Bronchodilators. These help open your airways and make breathing easier. Bronchodilators are either short-acting (work for 6 to 9 hours) or long-acting (work for 24 hours).  You inhale most bronchodilators, so they start to act quickly. Always carry your quick-relief inhaler with you in case you need it while you are away from home. ¨ Corticosteroids (prednisone, budesonide). These reduce airway inflammation. They come in pill or inhaled form. You must take these medicines every day for them to work well. ? · A spacer may help you get more inhaled medicine to your lungs. Ask your doctor or pharmacist if a spacer is right for you. If it is, ask how to use it properly. ? · Do not take any vitamins, over-the-counter medicine, or herbal products without talking to your doctor first.   ? · If your doctor prescribed antibiotics, take them as directed. Do not stop taking them just because you feel better. You need to take the full course of antibiotics. ? · Oxygen therapy boosts the amount of oxygen in your blood and helps you breathe easier. Use the flow rate your doctor has recommended, and do not change it without talking to your doctor first.   Activity  ? · Get regular exercise. Walking is an easy way to get exercise. Start out slowly, and walk a little more each day. ? · Pay attention to your breathing. You are exercising too hard if you cannot talk while you are exercising. ? · Take short rest breaks when doing household chores and other activities. ? · Learn breathing methods-such as breathing through pursed lips-to help you become less short of breath. ? · If your doctor has not set you up with a pulmonary rehabilitation program, talk to him or her about whether rehab is right for you. Rehab includes exercise programs, education about your disease and how to manage it, help with diet and other changes, and emotional support. Diet  ? · Eat regular, healthy meals. Use bronchodilators about 1 hour before you eat to make it easier to eat. Eat several small meals instead of three large ones. Drink beverages at the end of the meal. Avoid foods that are hard to chew.    ? · Eat foods that contain protein so that you do not lose muscle mass. ? · Talk with your doctor if you gain too much weight or if you lose weight without trying. ?Mental health  ? · Talk to your family, friends, or a therapist about your feelings. It is normal to feel frightened, angry, hopeless, helpless, and even guilty. Talking openly about bad feelings can help you cope. If these feelings last, talk to your doctor. When should you call for help? Call 911 anytime you think you may need emergency care. For example, call if:  ? · You have severe trouble breathing. ?Call your doctor now or seek immediate medical care if:  ? · You have new or worse trouble breathing. ? · You cough up blood. ? · You have a fever. ? Watch closely for changes in your health, and be sure to contact your doctor if:  ? · You cough more deeply or more often, especially if you notice more mucus or a change in the color of your mucus. ? · You have new or worse swelling in your legs or belly. ? · You are not getting better as expected. Where can you learn more? Go to http://leilaKitanikodak.info/. Fahad Newton in the search box to learn more about \"Chronic Obstructive Pulmonary Disease (COPD): Care Instructions. \"  Current as of: May 12, 2017  Content Version: 11.4  © 0188-3256 [employer]. Care instructions adapted under license by [employer] (which disclaims liability or warranty for this information). If you have questions about a medical condition or this instruction, always ask your healthcare professional. [place] [address] any warranty or liability for your use of this information. Chronic Obstructive Pulmonary Disease (COPD) Flare-Ups: Care Instructions  Your Care Instructions    Chronic obstructive pulmonary disease (COPD) is a lung disease that makes it hard to breathe. It is caused by damage to the lungs over many years, usually from smoking.   COPD is often a mix of two diseases:  · Chronic bronchitis: The airways that carry air to the lungs (bronchial tubes) get inflamed and make a lot of mucus. This can narrow or block the airways. · Emphysema: In a healthy person, the tiny air sacs in the lungs are like balloons. As you breathe in and out, they get bigger and smaller to move air through your lungs. But with emphysema, these air sacs are damaged and lose their stretch. Less air gets in and out of the lungs. Many people with COPD have attacks called flare-ups or exacerbations. This is when your usual symptoms quickly get worse and stay worse. The doctor has checked you carefully. But problems can develop later. If you notice any problems or new symptoms, get medical treatment right away. Follow-up care is a key part of your treatment and safety. Be sure to make and go to all appointments, and call your doctor if you are having problems. It's also a good idea to know your test results and keep a list of the medicines you take. How can you care for yourself at home? · Be safe with medicines. Take your medicines exactly as prescribed. Call your doctor if you think you are having a problem with your medicine. You may be taking medicines such as:  ¨ Bronchodilators. These help open your airways and make breathing easier. ¨ Corticosteroids. These reduce airway inflammation. They may be given as pills, in a vein, or in an inhaled form. You may go home with pills in addition to an inhaler that you already use. · A spacer may help you get more inhaled medicine to your lungs. Ask your doctor or pharmacist if a spacer is right for you. If it is, ask how to use it properly. · If your doctor prescribed antibiotics, take them as directed. Do not stop taking them just because you feel better. You need to take the full course of antibiotics. · If your doctor prescribed oxygen, use the flow rate your doctor has recommended.  Do not change it without talking to your doctor first.  · Do not smoke. Smoking makes COPD worse. If you need help quitting, talk to your doctor about stop-smoking programs and medicines. These can increase your chances of quitting for good. When should you call for help? Call 911 anytime you think you may need emergency care. For example, call if:  ? · You have severe trouble breathing. ?Call your doctor now or seek immediate medical care if:  ? · You have new or worse trouble breathing. ? · Your coughing or wheezing gets worse. ? · You cough up dark brown or bloody mucus (sputum). ? · You have a new or higher fever. ? Watch closely for changes in your health, and be sure to contact your doctor if:  ? · You notice more mucus or a change in the color of your mucus. ? · You need to use your antibiotic or steroid pills. ? · You do not get better as expected. Where can you learn more? Go to http://leila-kodak.info/. Enter B551 in the search box to learn more about \"Chronic Obstructive Pulmonary Disease (COPD) Flare-Ups: Care Instructions. \"  Current as of: May 12, 2017  Content Version: 11.4  © 0274-9374 [employer]. Care instructions adapted under license by [employer] (which disclaims liability or warranty for this information). If you have questions about a medical condition or this instruction, always ask your healthcare professional. [address] any warranty or liability for your use of this information. Learning About COPD  What is COPD? COPD is a lung disease that makes it hard to breathe. COPD stands for chronic obstructive pulmonary disease. It is caused by damage to the lungs over many years, usually from smoking. COPD is a mix of two diseases: chronic bronchitis and emphysema. Other things that may put you at risk for COPD include breathing chemical fumes, dust, or air pollution over a long period of time. Secondhand smoke is also bad.   In chronic bronchitis, the airways that carry air to the lungs (bronchial tubes) get inflamed and make a lot of mucus. This can narrow or block the airways, making it hard for you to breathe. In emphysema, the air sacs in your lungs are damaged and lose their stretch. Less air gets in and out of your lungs, which makes you feel short of breath. What can you expect when you have COPD? COPD gets worse over time. You cannot undo the damage to your lungs. Over time, you may find that:  · You get short of breath even when you do simple things like get dressed or fix a meal.  · It is hard to eat or exercise. · You lose weight and feel weaker. But there are things you can do to prevent more damage and feel better. What are the symptoms? The main symptoms are:  · A cough that will not go away. · Mucus that comes up when you cough. · Shortness of breath that gets worse with activity. At times, your symptoms may suddenly flare up and get much worse. This is a called a COPD exacerbation (say \"[facility]\"). When this happens, your usual symptoms quickly get worse and stay bad. This can be dangerous. You may have to go to the hospital.  How can you keep COPD from getting worse? Don't smoke. That is the best way to keep COPD from getting worse. If you already smoke, it is never too late to stop. If you need help quitting, talk to your doctor about stop-smoking programs and medicines. These can increase your chances of quitting for good. You can do other things to keep COPD from getting worse:  · Avoid bad air. Air pollution, chemical fumes, and dust also can make COPD worse. · Get a flu shot every year. A shot may keep the flu from turning into something more serious, like pneumonia. A flu shot also may lower your chances of having a COPD flare-up. · Get a pneumococcal vaccine shot. If you have had one before, ask your doctor if you need another dose. How is COPD treated? COPD is treated with medicines and oxygen.  You also can take steps at home to stay healthy and keep your COPD from getting worse. Medicines and oxygen therapy  · You may be taking medicines such as:  ¨ Bronchodilators. These help open your airways and make breathing easier. Bronchodilators are either short-acting (work for 6 to 9 hours) or long-acting (work for 24 hours). You inhale most bronchodilators, so they start to act quickly. Always carry your quick-relief inhaler with you in case you need it while you are away from home. ¨ Corticosteroids. These reduce airway inflammation. They come in pill or inhaled form. You must take these medicines every day for them to work well. · Take your medicines exactly as prescribed. Call your doctor if you think you are having a problem with your medicine. · Oxygen therapy boosts the amount of oxygen in your blood and helps you breathe easier. Use the flow rate your doctor has recommended, and do not change it without talking to your doctor first.  Other care at home  · If your doctor recommends it, get more exercise. Walking is a good choice. Bit by bit, increase the amount you walk every day. Try for at least 30 minutes on most days of the week. · Learn breathing methods-such as breathing through pursed lips-to help you become less short of breath. · If your doctor has not set you up with a pulmonary rehabilitation program, talk to him or her about whether rehab is right for you. Rehab includes exercise programs, education about your disease and how to manage it, help with diet and other changes, and emotional support. · Eat regular, healthy meals. Use bronchodilators about 1 hour before you eat to make it easier to eat. Eat several small meals instead of three large ones. Drink beverages at the end of the meal. Avoid foods that are hard to chew. Follow-up care is a key part of your treatment and safety. Be sure to make and go to all appointments, and call your doctor if you are having problems.  It's also a good idea to know your test results and keep a list of the medicines you take. Where can you learn more? Go to http://leila-kodak.info/. Enter V314 in the search box to learn more about \"Learning About COPD. \"  Current as of: May 12, 2017  Content Version: 11.4  © 7225-0593 [employer], [employer]. Care instructions adapted under license by [employer] (which disclaims liability or warranty for this information). If you have questions about a medical condition or this instruction, always ask your healthcare professional. [address] any warranty or liability for your use of this information.

## 2018-01-10 NOTE — PROGRESS NOTES
Health Maintenance Due   Topic Date Due    Hepatitis C Screening  1960    DTaP/Tdap/Td series (1 - Tdap) 08/27/1980       Chief Complaint   Patient presents with    Follow-up    Breathing Problem     Emphysema, Bronchitis       1. Have you been to the ER, urgent care clinic since your last visit? Hospitalized since your last visit? No    2. Have you seen or consulted any other health care providers outside of the [address] [place] [address] since your last visit? Include any pap smears or colon screening. No    3) Do you have an Advance Directive on file? no    4) Are you interested in receiving information on Advance Directives? NO      Patient is accompanied by adult caretaker I have received verbal consent from Donna Schultz to discuss any/all medical information while they are present in the room. SPO2 91%. Neb Treatment w/Albuterol Sulfate Inh solution 2.5mg/3m 2-vials administered w/o complaint or concern. SPO2 93%.

## 2018-01-10 NOTE — PROGRESS NOTES
Pt lung sounds improved after breathing treatment. Scattered wheezing throughout. NAD. No tachypnea noted. No increased work of breathing.

## 2018-01-10 NOTE — MR AVS SNAPSHOT
Visit Information Date & Time Provider Department Dept. Phone Encounter #  
 1/10/2018 10:00 AM Gabriella Gibson NP [facility] [phone] [id number] Your Appointments 2/13/2018 10:00 AM  
ROUTINE CARE with Ellen Mitchell DO [facility] ([facility]) Appt Note: 6 month f/u; 3 month follow up; 6 month f/u 3 month follow up [address] [facility] [address] [place] [address]  
[phone]  
  
   
 [address] [facility] [address] Upcoming Health Maintenance Date Due Hepatitis C Screening 1960 DTaP/Tdap/Td series (1 - Tdap) 8/27/1980 COLONOSCOPY 9/2/2024 Allergies as of 1/10/2018  Review Complete On: 1/10/2018 By: Gabriella Gibson NP No Known Allergies Current Immunizations  Reviewed on 7/17/2017 Name Date Influenza High Dose Vaccine PF 9/15/2017 Influenza Vaccine Intradermal PF 11/14/2016 PPD 1/31/2012 Pneumococcal Polysaccharide (PPSV-23) 11/14/2016 Not reviewed this visit You Were Diagnosed With   
  
 Codes Comments Pulmonary emphysema, unspecified emphysema type ([facility]    -  Primary ICD-10-CM: J43.9 ICD-9-CM: 492.8 Hyponatremia     ICD-10-CM: E87.1 ICD-9-CM: 276.1 Schizoaffective disorder, depressive type ([facility]     ICD-10-CM: F25.1 ICD-9-CM: 295.70 Cigarette nicotine dependence with nicotine-induced disorder     ICD-10-CM: F17.219 ICD-9-CM: 292.9 Vitals BP Pulse Temp Resp Height(growth percentile) Weight(growth percentile) 91/47 (BP 1 Location: Right arm, BP Patient Position: Sitting) (!) 112 97.8 °F (36.6 °C) (Oral) 18 5' 10\" (1.778 m) 147 lb 12.8 oz (67 kg) SpO2 BMI Smoking Status 96% 21.21 kg/m2 Current Every Day Smoker Vitals History BMI and BSA Data Body Mass Index Body Surface Area  
 21.21 kg/m 2 1.82 m 2 Preferred Pharmacy Pharmacy Name Phone [address] [place] [address] [phone] Your Updated Medication List  
  
   
This list is accurate as of: 1/10/18 10:47 AM.  Always use your most recent med list.  
  
  
  
  
 * albuterol 90 mcg/actuation inhaler Commonly known as:  VENTOLIN HFA Take 2 Puffs by inhalation every four (4) hours as needed for Shortness of Breath. * albuterol 2.5 mg /3 mL (0.083 %) nebulizer solution Commonly known as:  PROVENTIL VENTOLIN  
3 mL by Nebulization route once for 1 dose. albuterol-ipratropium 2.5 mg-0.5 mg/3 ml Nebu Commonly known as:  DUO-NEB  
3 mL by Nebulization route every four (4) hours as needed. ALPRAZolam 0.5 mg tablet Commonly known as:  Doyal Garden Take 1 Tab by mouth two (2) times daily as needed for Anxiety. Max Daily Amount: 1 mg. amLODIPine 5 mg tablet Commonly known as:  Paula Portland Take 1 Tab by mouth daily. benztropine 2 mg tablet Commonly known as:  COGENTIN Take 1 Tab by mouth daily as needed. fluticasone-vilanterol 100-25 mcg/dose inhaler Commonly known as:  BREO ELLIPTA Take 1 Puff by inhalation daily. Indications: COPD PARoxetine 20 mg tablet Commonly known as:  PAXIL Take 1 Tab by mouth daily. QUEtiapine 50 mg tablet Commonly known as:  SEROquel Take 1 Tab by mouth nightly as needed (for agitation or anxiety (Use 1st)). risperiDONE 3 mg tablet Commonly known as:  RisperDAL Take 1 Tab by mouth daily. * sodium chloride 1 gram tablet Take 1 Tab by mouth daily. Indications: hyponatremia * sodium chloride 1 gram tablet Take 1 Tab by mouth three (3) times daily. temazepam 30 mg capsule Commonly known as:  RESTORIL Take 1 Cap by mouth nightly as needed for Sleep. Max Daily Amount: 30 mg.  
  
 * traZODone 100 mg tablet Commonly known as:  Stevphen Hacker Take 0.5 Tabs by mouth nightly as needed for Sleep. * traZODone 50 mg tablet Commonly known as:  DESYREL  
  
 umeclidinium 62.5 mcg/actuation inhaler Commonly known as:  INCRUSE ELLIPTA Take 1 Puff by inhalation daily. * Notice: This list has 6 medication(s) that are the same as other medications prescribed for you. Read the directions carefully, and ask your doctor or other care provider to review them with you. Prescriptions Sent to Pharmacy Refills  
 albuterol-ipratropium (DUO-NEB) 2.5 mg-0.5 mg/3 ml nebu 3 Sig: 3 mL by Nebulization route every four (4) hours as needed. Class: Normal  
 Pharmacy: [address] [place] [address] Ph #: [phone] Route: Nebulization  
 sodium chloride 1 gram tablet 3 Sig: Take 1 Tab by mouth three (3) times daily. Class: Normal  
 Pharmacy: [address] [place] [address] Ph #: [phone] Route: Oral  
  
We Performed the Following ALBUTEROL, INHAL. SOL., FDA-APPROVED FINAL, NON-COMPOUND UNIT DOSE, 1 MG [ [facility]] Please provide this summary of care documentation to your next provider. Your primary care clinician is listed as Carvin Peabody. If you have any questions after today's visit, please call [phone].

## 2018-01-10 NOTE — PROGRESS NOTES
Subjective:     Chief Complaint   Patient presents with    Follow-up    Breathing Problem     Emphysema, Bronchitis       History of Present Illness    Megan Vaughn is a 61y.o. year old male who presents of a follow-up visit. He was recently admitted to [facility] for COPD exacerbations/respiratory failure and found to be severely hyponatremic. His electrolytes where stabilized along with his respiratory status and he was discharged back to [facility]. He was previously requiring O2 however, he is not longer needing O2. Room air sats are 95% in office today. He continues to smoke, he states he is only smoking 5 cigarettes per day, and drink large amounts of fluids throughout the day. He denies alcohol use however, during a recent admission there was concern about the patient consuming alcohol. He denies alcohol use to me today. His other PMH includes schizoaffective disorder and depression. He is followed by psychiatry. He denies any new complaints today. No CP, SOB, GI, or  symptoms. Reviewed medications, recent lab work and imaging with patient. Pt reports compliance with medications. Current Outpatient Prescriptions on File Prior to Visit   Medication Sig Dispense Refill    traZODone (DESYREL) 50 mg tablet       benztropine (COGENTIN) 2 mg tablet Take 1 Tab by mouth daily as needed. 30 Tab 0    PARoxetine (PAXIL) 20 mg tablet Take 1 Tab by mouth daily. 30 Tab 0    QUEtiapine (SEROQUEL) 50 mg tablet Take 1 Tab by mouth nightly as needed (for agitation or anxiety (Use 1st)). 30 Tab 0    risperiDONE (RISPERDAL) 3 mg tablet Take 1 Tab by mouth daily. 30 Tab 0    temazepam (RESTORIL) 30 mg capsule Take 1 Cap by mouth nightly as needed for Sleep. Max Daily Amount: 30 mg. 30 Cap 0    traZODone (DESYREL) 100 mg tablet Take 0.5 Tabs by mouth nightly as needed for Sleep.  15 Tab 0    albuterol (VENTOLIN HFA) 90 mcg/actuation inhaler Take 2 Puffs by inhalation every four (4) hours as needed for Shortness of Breath. 1 Inhaler 1    amLODIPine (NORVASC) 5 mg tablet Take 1 Tab by mouth daily. 30 Tab 0    fluticasone-vilanterol (BREO ELLIPTA) 100-25 mcg/dose inhaler Take 1 Puff by inhalation daily. Indications: COPD 1 Inhaler 1    umeclidinium (INCRUSE ELLIPTA) 62.5 mcg/actuation inhaler Take 1 Puff by inhalation daily. 1 Inhaler 0    ALPRAZolam (XANAX) 0.5 mg tablet Take 1 Tab by mouth two (2) times daily as needed for Anxiety. Max Daily Amount: 1 mg. 30 Tab 0    sodium chloride 1 gram tablet Take 1 Tab by mouth daily. Indications: hyponatremia 30 Tab 0     No current facility-administered medications on file prior to visit. No Known Allergies   Past Medical History:   Diagnosis Date    Asthma     seldom,use inhaler    Bronchitis     Chronic obstructive pulmonary disease (HCC)     Depression     anxiety    Psychiatric disorder     paranoid schizophrenia    Psychiatric disorder     extrapyramidal disease    Psychotic disorder     mental retardation      Past Surgical History:   Procedure Laterality Date    HX ORTHOPAEDIC      right knee      Social History   Substance Use Topics    Smoking status: Current Every Day Smoker     Packs/day: 1.00     Years: 25.00    Smokeless tobacco: Never Used    Alcohol use No      Comment: socially      Family History   Problem Relation Age of Onset    Diabetes Mother     Heart Disease Father      CHF        Objective:     Vitals:    01/10/18 1004 01/10/18 1015   BP:  91/47   Pulse:  (!) 112   Resp: 18    Temp: 97.8 °F (36.6 °C)    TempSrc: Oral    SpO2: 96%    Weight: 147 lb 12.8 oz (67 kg)    Height: 5' 10\" (1.778 m)        Review of Systems   Constitutional: Negative. HENT: Negative. Eyes: Negative. Respiratory: Negative. Cardiovascular: Negative. Gastrointestinal: Negative. Genitourinary: Negative. Musculoskeletal: Negative. Skin: Negative. Neurological: Negative. Psychiatric/Behavioral: Negative. Physical Exam   Constitutional: He is oriented to person, place, and time. Vital signs are normal. He appears well-developed and well-nourished. He is cooperative. No distress. Frail elderly male. NAD   HENT:   Head: Normocephalic and atraumatic. Mouth/Throat: Abnormal dentition. Dental caries present. Eyes: Conjunctivae and EOM are normal. Pupils are equal, round, and reactive to light. Neck: Normal range of motion. Neck supple. Cardiovascular: Normal rate, regular rhythm and normal heart sounds. Pulmonary/Chest: He has wheezes in the right upper field, the right middle field, the right lower field, the left upper field, the left middle field and the left lower field. Abdominal: Soft. Bowel sounds are normal. He exhibits no distension. There is no tenderness. Musculoskeletal: Normal range of motion. He exhibits no edema, tenderness or deformity. Neurological: He is alert and oriented to person, place, and time. Skin: Skin is warm and dry. He is not diaphoretic. Psychiatric: He has a normal mood and affect. His behavior is normal.   Nursing note and vitals reviewed. Assessment/ Plan:   Diagnoses and all orders for this visit:    1. Pulmonary emphysema, unspecified emphysema type ([address]   Will order   -     albuterol (PROVENTIL VENTOLIN) 2.5 mg /3 mL (0.083 %) nebulizer solution; 3 mL by Nebulization route once for 1 dose. -     ALBUTEROL, INHAL. LSF.()  -     albuterol-ipratropium (DUO-NEB) 2.5 mg-0.5 mg/3 ml nebu; 3 mL by Nebulization route every four (4) hours as needed. 2. Hyponatremia   Will refill  -     sodium chloride 1 gram tablet; Take 1 Tab by mouth three (3) times daily. 3. Schizoaffective disorder, depressive type ([address]    4. Cigarette nicotine dependence with nicotine-induced disorder   Will refill   -     nicotine (NICODERM CQ) 14 mg/24 hr patch; 1 Patch by TransDERmal route every twenty-four (24) hours for 30 days.     Patient's plan of care has been reviewed with them. Patient and/or family have verbally conveyed their understanding and agreement of the patient's signs, symptoms, diagnosis, treatment and prognosis and additionally agree to follow up as recommended or return to [facility] should their condition change prior to follow-up. Discharge instructions have also been provided to the patient with some educational information regarding their diagnosis as well a list of reasons why they would want to return to the office prior to their follow-up appointment should their condition change. Follow-up with Dr. Garrett Loza as scheduled.

## 2018-01-11 DIAGNOSIS — F17.219 CIGARETTE NICOTINE DEPENDENCE WITH NICOTINE-INDUCED DISORDER: ICD-10-CM

## 2018-01-11 DIAGNOSIS — E87.1 HYPONATREMIA: ICD-10-CM

## 2018-01-11 DIAGNOSIS — J43.9 PULMONARY EMPHYSEMA, UNSPECIFIED EMPHYSEMA TYPE (HCC): ICD-10-CM

## 2018-01-11 RX ORDER — IBUPROFEN 200 MG
1 TABLET ORAL EVERY 24 HOURS
Qty: 30 PATCH | Refills: 0 | Status: SHIPPED | OUTPATIENT
Start: 2018-01-11 | End: 2018-02-10

## 2018-01-11 RX ORDER — ALBUTEROL SULFATE 0.83 MG/ML
2.5 SOLUTION RESPIRATORY (INHALATION) ONCE
Qty: 1 EACH | Refills: 0 | Status: SHIPPED | OUTPATIENT
Start: 2018-01-11 | End: 2018-01-11

## 2018-01-11 RX ORDER — SODIUM CHLORIDE TAB 1 GM 1 G
1 TAB MISCELLANEOUS 3 TIMES DAILY
Qty: 90 TAB | Refills: 3 | Status: SHIPPED | OUTPATIENT
Start: 2018-01-11 | End: 2018-01-29 | Stop reason: SDUPTHER

## 2018-01-29 ENCOUNTER — OFFICE VISIT (OUTPATIENT)
Dept: INTERNAL MEDICINE CLINIC | Age: 58
End: 2018-01-29

## 2018-01-29 ENCOUNTER — TELEPHONE (OUTPATIENT)
Dept: INTERNAL MEDICINE CLINIC | Age: 58
End: 2018-01-29

## 2018-01-29 VITALS
TEMPERATURE: 97.9 F | HEIGHT: 70 IN | RESPIRATION RATE: 18 BRPM | OXYGEN SATURATION: 84 % | BODY MASS INDEX: 22.02 KG/M2 | DIASTOLIC BLOOD PRESSURE: 71 MMHG | WEIGHT: 153.8 LBS | HEART RATE: 104 BPM | SYSTOLIC BLOOD PRESSURE: 103 MMHG

## 2018-01-29 DIAGNOSIS — J43.9 PULMONARY EMPHYSEMA, UNSPECIFIED EMPHYSEMA TYPE (HCC): Primary | ICD-10-CM

## 2018-01-29 DIAGNOSIS — J42 CHRONIC BRONCHITIS, UNSPECIFIED CHRONIC BRONCHITIS TYPE (HCC): ICD-10-CM

## 2018-01-29 RX ORDER — METHYLPREDNISOLONE 4 MG/1
TABLET ORAL
Qty: 1 DOSE PACK | Refills: 0 | Status: SHIPPED | OUTPATIENT
Start: 2018-01-29 | End: 2018-09-11

## 2018-01-29 RX ORDER — IPRATROPIUM BROMIDE AND ALBUTEROL SULFATE 2.5; .5 MG/3ML; MG/3ML
3 SOLUTION RESPIRATORY (INHALATION)
Qty: 30 NEBULE | Refills: 11 | Status: SHIPPED | OUTPATIENT
Start: 2018-01-29 | End: 2018-07-27 | Stop reason: SDUPTHER

## 2018-01-29 RX ORDER — ALBUTEROL SULFATE 90 UG/1
1 AEROSOL, METERED RESPIRATORY (INHALATION)
Qty: 1 INHALER | Refills: 11 | Status: SHIPPED | OUTPATIENT
Start: 2018-01-29 | End: 2018-01-29 | Stop reason: CLARIF

## 2018-01-29 RX ORDER — NEBULIZER AND COMPRESSOR
EACH MISCELLANEOUS
Qty: 1 EACH | Refills: 0 | Status: SHIPPED | OUTPATIENT
Start: 2018-01-29 | End: 2018-01-30 | Stop reason: SDUPTHER

## 2018-01-29 RX ORDER — ALBUTEROL SULFATE 0.83 MG/ML
2.5 SOLUTION RESPIRATORY (INHALATION) ONCE
Qty: 1 EACH | Refills: 0
Start: 2018-01-29 | End: 2018-01-29

## 2018-01-29 NOTE — PROGRESS NOTES
Health Maintenance Due   Topic Date Due    Hepatitis C Screening  1960    DTaP/Tdap/Td series (1 - Tdap) 08/27/1980       Chief Complaint   Patient presents with    Medication Problem     FACILITY NOT GIVING BREATHING TREATMENTS AS ORDERED       1. Have you been to the ER, urgent care clinic since your last visit? Hospitalized since your last visit? No    2. Have you seen or consulted any other health care providers outside of the [address] [place] [address] since your last visit? Include any pap smears or colon screening. No    3) Do you have an Advance Directive on file? no    4) Are you interested in receiving information on Advance Directives? NO      Patient is accompanied by adult caretaker I have received verbal consent from Suni Cain to discuss any/all medical information while they are present in the room. Patient received 2- vials 2.5mg/3 ml Albuterol Sulfate Inhalation Solution via nebulizer during office visit.  O2 Stats

## 2018-01-29 NOTE — PATIENT INSTRUCTIONS
Chronic Obstructive Pulmonary Disease (COPD): Care Instructions  Your Care Instructions    Chronic obstructive pulmonary disease (COPD) is a general term for a group of lung diseases, including emphysema and chronic bronchitis. People with COPD have decreased airflow in and out of the lungs, which makes it hard to breathe. The airways also can get clogged with thick mucus. Cigarette smoking is a major cause of COPD. Although there is no cure for COPD, you can slow its progress. Following your treatment plan and taking care of yourself can help you feel better and live longer. Follow-up care is a key part of your treatment and safety. Be sure to make and go to all appointments, and call your doctor if you are having problems. It's also a good idea to know your test results and keep a list of the medicines you take. How can you care for yourself at home? ?Staying healthy  ? · Do not smoke. This is the most important step you can take to prevent more damage to your lungs. If you need help quitting, talk to your doctor about stop-smoking programs and medicines. These can increase your chances of quitting for good. ? · Avoid colds and flu. Get a pneumococcal vaccine shot. If you have had one before, ask your doctor whether you need a second dose. Get the flu vaccine every fall. If you must be around people with colds or the flu, wash your hands often. ? · Avoid secondhand smoke, air pollution, and high altitudes. Also avoid cold, dry air and hot, humid air. Stay at home with your windows closed when air pollution is bad. ?Medicines and oxygen therapy  ? · Take your medicines exactly as prescribed. Call your doctor if you think you are having a problem with your medicine. ? · You may be taking medicines such as:  ¨ Bronchodilators. These help open your airways and make breathing easier. Bronchodilators are either short-acting (work for 6 to 9 hours) or long-acting (work for 24 hours).  You inhale most bronchodilators, so they start to act quickly. Always carry your quick-relief inhaler with you in case you need it while you are away from home. ¨ Corticosteroids (prednisone, budesonide). These reduce airway inflammation. They come in pill or inhaled form. You must take these medicines every day for them to work well. ? · A spacer may help you get more inhaled medicine to your lungs. Ask your doctor or pharmacist if a spacer is right for you. If it is, ask how to use it properly. ? · Do not take any vitamins, over-the-counter medicine, or herbal products without talking to your doctor first.   ? · If your doctor prescribed antibiotics, take them as directed. Do not stop taking them just because you feel better. You need to take the full course of antibiotics. ? · Oxygen therapy boosts the amount of oxygen in your blood and helps you breathe easier. Use the flow rate your doctor has recommended, and do not change it without talking to your doctor first.   Activity  ? · Get regular exercise. Walking is an easy way to get exercise. Start out slowly, and walk a little more each day. ? · Pay attention to your breathing. You are exercising too hard if you cannot talk while you are exercising. ? · Take short rest breaks when doing household chores and other activities. ? · Learn breathing methods-such as breathing through pursed lips-to help you become less short of breath. ? · If your doctor has not set you up with a pulmonary rehabilitation program, talk to him or her about whether rehab is right for you. Rehab includes exercise programs, education about your disease and how to manage it, help with diet and other changes, and emotional support. Diet  ? · Eat regular, healthy meals. Use bronchodilators about 1 hour before you eat to make it easier to eat. Eat several small meals instead of three large ones. Drink beverages at the end of the meal. Avoid foods that are hard to chew.    ? · Eat foods that contain protein so that you do not lose muscle mass. ? · Talk with your doctor if you gain too much weight or if you lose weight without trying. ?Mental health  ? · Talk to your family, friends, or a therapist about your feelings. It is normal to feel frightened, angry, hopeless, helpless, and even guilty. Talking openly about bad feelings can help you cope. If these feelings last, talk to your doctor. When should you call for help? Call 911 anytime you think you may need emergency care. For example, call if:  ? · You have severe trouble breathing. ?Call your doctor now or seek immediate medical care if:  ? · You have new or worse trouble breathing. ? · You cough up blood. ? · You have a fever. ? Watch closely for changes in your health, and be sure to contact your doctor if:  ? · You cough more deeply or more often, especially if you notice more mucus or a change in the color of your mucus. ? · You have new or worse swelling in your legs or belly. ? · You are not getting better as expected. Where can you learn more? Go to http://leila-kodak.info/. Freddie Sandhu in the search box to learn more about \"Chronic Obstructive Pulmonary Disease (COPD): Care Instructions. \"  Current as of: May 12, 2017  Content Version: 11.4  © 7260-0308 [employer]. Care instructions adapted under license by [employer] (which disclaims liability or warranty for this information). If you have questions about a medical condition or this instruction, always ask your healthcare professional. [place] [address] any warranty or liability for your use of this information. Chronic Obstructive Pulmonary Disease (COPD) Flare-Ups: Care Instructions  Your Care Instructions    Chronic obstructive pulmonary disease (COPD) is a lung disease that makes it hard to breathe. It is caused by damage to the lungs over many years, usually from smoking.   COPD is often a mix of two diseases:  · Chronic bronchitis: The airways that carry air to the lungs (bronchial tubes) get inflamed and make a lot of mucus. This can narrow or block the airways. · Emphysema: In a healthy person, the tiny air sacs in the lungs are like balloons. As you breathe in and out, they get bigger and smaller to move air through your lungs. But with emphysema, these air sacs are damaged and lose their stretch. Less air gets in and out of the lungs. Many people with COPD have attacks called flare-ups or exacerbations. This is when your usual symptoms quickly get worse and stay worse. The doctor has checked you carefully. But problems can develop later. If you notice any problems or new symptoms, get medical treatment right away. Follow-up care is a key part of your treatment and safety. Be sure to make and go to all appointments, and call your doctor if you are having problems. It's also a good idea to know your test results and keep a list of the medicines you take. How can you care for yourself at home? · Be safe with medicines. Take your medicines exactly as prescribed. Call your doctor if you think you are having a problem with your medicine. You may be taking medicines such as:  ¨ Bronchodilators. These help open your airways and make breathing easier. ¨ Corticosteroids. These reduce airway inflammation. They may be given as pills, in a vein, or in an inhaled form. You may go home with pills in addition to an inhaler that you already use. · A spacer may help you get more inhaled medicine to your lungs. Ask your doctor or pharmacist if a spacer is right for you. If it is, ask how to use it properly. · If your doctor prescribed antibiotics, take them as directed. Do not stop taking them just because you feel better. You need to take the full course of antibiotics. · If your doctor prescribed oxygen, use the flow rate your doctor has recommended.  Do not change it without talking to your doctor first.  · Do not smoke. Smoking makes COPD worse. If you need help quitting, talk to your doctor about stop-smoking programs and medicines. These can increase your chances of quitting for good. When should you call for help? Call 911 anytime you think you may need emergency care. For example, call if:  ? · You have severe trouble breathing. ?Call your doctor now or seek immediate medical care if:  ? · You have new or worse trouble breathing. ? · Your coughing or wheezing gets worse. ? · You cough up dark brown or bloody mucus (sputum). ? · You have a new or higher fever. ? Watch closely for changes in your health, and be sure to contact your doctor if:  ? · You notice more mucus or a change in the color of your mucus. ? · You need to use your antibiotic or steroid pills. ? · You do not get better as expected. Where can you learn more? Go to http://leila-kodak.info/. Enter V005 in the search box to learn more about \"Chronic Obstructive Pulmonary Disease (COPD) Flare-Ups: Care Instructions. \"  Current as of: May 12, 2017  Content Version: 11.4  © 3043-3506 [employer]. Care instructions adapted under license by [employer] (which disclaims liability or warranty for this information). If you have questions about a medical condition or this instruction, always ask your healthcare professional. [address] any warranty or liability for your use of this information. Learning About COPD and Clearing Your Lungs  How does clearing your lungs affect COPD? When you have COPD, you may have too much mucus in your lungs. Learning to clear your lungs may help you save energy and improves your breathing. It may also help prevent lung infections. There are three ways to clear your lungs:  · Controlled coughing  · Postural drainage  · Chest percussion  How do you do controlled coughing? Coughing is how your body tries to get rid of mucus.  But the kind of coughing you cannot control makes things worse. It causes your airways to close. It also traps the mucus in your lungs. Controlled coughing comes from deep in your lungs. It loosens mucus and moves it though your airways. It is best to do it after you use your inhaler or other medicine. 1. Sit on the edge of a chair. Keep both feet on the floor. 2. Lean forward a little. Relax. 3. Breathe in slowly through your nose. Fold your arms over your belly. 4. Lean forward as you breathe out. Push your arms against your belly. 5. Cough 2 or 3 times as you exhale with your mouth slightly open. Make the coughs short and sharp. Push on your belly with your arms as you cough. The first cough brings the mucus through the lung airways. The next coughs bring it up and out. 6. Inhale again, but do it slowly and gently through your nose. Do not take quick or deep breaths through your mouth. It can block the mucus coming out of the lungs. It also can cause uncontrolled coughing. 7. Rest, and repeat if you need to. How do you do postural drainage? Postural drainage means lying down in different positions to help drain mucus from your lungs. Hold each position for 5 minutes. Do it about 30 minutes after you use your inhaler. Make sure you have an empty stomach. If you need to cough, sit up and do controlled coughing. 1. To drain the front of your lungs: Make sure your chest is lower than your hips. Put two pillows under your hips. Use a small pillow under your head. Keep your arms at your sides. Then follow these instructions for breathing:  ¨ Breathe in: With one hand on your belly and the other on your chest, breathe in. Push your belly out as far as possible. You should be able to feel the hand on your belly move out, while the hand on your chest should not move. ¨ Breathe out: When you breathe out, you should be able to feel the hand on your belly move in. This is called diaphragmatic breathing.  You will use it in the other drainage positions too. 2. To drain the sides of your lungs: Place two or three pillows under your hips. Use a small pillow under your head. Make sure your chest is lower than your hips. Use diaphragmatic breathing. After 5 or 10 minutes, switch sides. 3. To drain the back of your lungs: Place two or three pillows under your hips. Use a small pillow under your head. Kneel over the pillows. Place your arms by your head. Use diaphragmatic breathing. How do you do chest percussion? Chest percussion means that you lightly tap your chest and back. The tapping loosens the mucus in your lungs. · Cup your hand, and lightly tap your chest and back. · Ask your doctor where the best spots are to tap. Avoid your spine and breastbone. · It may be easier to have someone do the tapping for you. Follow-up care is a key part of your treatment and safety. Be sure to make and go to all appointments, and call your doctor if you are having problems. It's also a good idea to know your test results and keep a list of the medicines you take. Where can you learn more? Go to http://leila-kodak.info/. Enter W906 in the search box to learn more about \"Learning About COPD and Clearing Your Lungs. \"  Current as of: May 12, 2017  Content Version: 11.4  © 1898-0280 [employer]. Care instructions adapted under license by [employer] (which disclaims liability or warranty for this information). If you have questions about a medical condition or this instruction, always ask your healthcare professional. [place] [address] any warranty or liability for your use of this information.

## 2018-01-29 NOTE — PROGRESS NOTES
Subjective:     Chief Complaint   Patient presents with    Medication Problem     FACILITY NOT GIVING BREATHING TREATMENTS AS ORDERED       History of Present Illness    Yara Allen is a 61y.o. year old male who presents with is  from his SHI. He has an extensive medical history and numerous hospital admission r/t emphysema and chronic bronchitis. He has not been to the ED since his last OV with me. He states he is not using his nicotine patches and continues to smoke 5-7 cigarettes per day. He does not wish to quit. Patient educated on the importance of smoking cessation. His  states he has not been getting his q 4 neb treatments at [facility] because they do not have a nebulizer machine to use. The patient denies any new complaints today. He appears tachypnec. His 02 sats are 85%. No CP, SOB, GI, or  symptoms. Reviewed medications, recent lab work and imaging with patient. Pt reports compliance with medications. Current Outpatient Prescriptions on File Prior to Visit   Medication Sig Dispense Refill    nicotine (NICODERM CQ) 14 mg/24 hr patch 1 Patch by TransDERmal route every twenty-four (24) hours for 30 days. 30 Patch 0    traZODone (DESYREL) 50 mg tablet       benztropine (COGENTIN) 2 mg tablet Take 1 Tab by mouth daily as needed. 30 Tab 0    PARoxetine (PAXIL) 20 mg tablet Take 1 Tab by mouth daily. 30 Tab 0    QUEtiapine (SEROQUEL) 50 mg tablet Take 1 Tab by mouth nightly as needed (for agitation or anxiety (Use 1st)). 30 Tab 0    risperiDONE (RISPERDAL) 3 mg tablet Take 1 Tab by mouth daily. 30 Tab 0    sodium chloride 1 gram tablet Take 1 Tab by mouth daily. Indications: hyponatremia 30 Tab 0    temazepam (RESTORIL) 30 mg capsule Take 1 Cap by mouth nightly as needed for Sleep. Max Daily Amount: 30 mg. 30 Cap 0    albuterol (VENTOLIN HFA) 90 mcg/actuation inhaler Take 2 Puffs by inhalation every four (4) hours as needed for Shortness of Breath.  1 Inhaler 1    amLODIPine (NORVASC) 5 mg tablet Take 1 Tab by mouth daily. 30 Tab 0    umeclidinium (INCRUSE ELLIPTA) 62.5 mcg/actuation inhaler Take 1 Puff by inhalation daily. 1 Inhaler 0    ALPRAZolam (XANAX) 0.5 mg tablet Take 1 Tab by mouth two (2) times daily as needed for Anxiety. Max Daily Amount: 1 mg. 30 Tab 0    albuterol-ipratropium (DUO-NEB) 2.5 mg-0.5 mg/3 ml nebu 3 mL by Nebulization route every four (4) hours as needed. 30 Nebule 3    traZODone (DESYREL) 100 mg tablet Take 0.5 Tabs by mouth nightly as needed for Sleep. 15 Tab 0    fluticasone-vilanterol (BREO ELLIPTA) 100-25 mcg/dose inhaler Take 1 Puff by inhalation daily. Indications: COPD 1 Inhaler 1     No current facility-administered medications on file prior to visit. No Known Allergies   Past Medical History:   Diagnosis Date    Asthma     seldom,use inhaler    Bronchitis     Chronic obstructive pulmonary disease (HCC)     Depression     anxiety    Psychiatric disorder     paranoid schizophrenia    Psychiatric disorder     extrapyramidal disease    Psychotic disorder     mental retardation      Past Surgical History:   Procedure Laterality Date    HX ORTHOPAEDIC      right knee      Social History   Substance Use Topics    Smoking status: Current Every Day Smoker     Packs/day: 1.00     Years: 25.00    Smokeless tobacco: Never Used    Alcohol use No      Comment: socially      Family History   Problem Relation Age of Onset    Diabetes Mother     Heart Disease Father      CHF        Objective:     Vitals:    01/29/18 1004   BP: 103/71   Pulse: (!) 104   Resp: 18   Temp: 97.9 °F (36.6 °C)   TempSrc: Oral   SpO2: (!) 84%   Weight: 153 lb 12.8 oz (69.8 kg)   Height: 5' 10\" (1.778 m)       Review of Systems   Constitutional: Negative. HENT: Negative. Eyes: Negative. Respiratory: Positive for wheezing. Cardiovascular: Negative. Gastrointestinal: Negative. Genitourinary: Negative.     Musculoskeletal: Negative. Skin: Negative. Neurological: Negative. Psychiatric/Behavioral: Negative. Physical Exam   Constitutional: He is oriented to person, place, and time. Vital signs are normal. He appears well-developed and well-nourished. He is cooperative. No distress. Frail elderly male. NAD   HENT:   Head: Normocephalic and atraumatic. Mouth/Throat: Abnormal dentition. Dental caries present. Eyes: Conjunctivae and EOM are normal. Pupils are equal, round, and reactive to light. Neck: Normal range of motion. Neck supple. Cardiovascular: Normal rate, regular rhythm and normal heart sounds. Pulmonary/Chest: Tachypnea noted. He has wheezes in the right upper field, the right middle field, the right lower field, the left upper field, the left middle field and the left lower field. He has rales. Abdominal: Soft. Bowel sounds are normal. He exhibits no distension. There is no tenderness. Musculoskeletal: Normal range of motion. He exhibits no edema, tenderness or deformity. Neurological: He is alert and oriented to person, place, and time. Skin: Skin is warm and dry. He is not diaphoretic. Psychiatric: He has a normal mood and affect. His behavior is normal.   Nursing note and vitals reviewed. Assessment/ Plan:   Diagnoses and all orders for this visit:    1. Pulmonary emphysema, unspecified emphysema type ([facility]   Will order   -     Nebulizer Accessories kit; Use as directed  -     Nebulizer & Compressor machine; Use as directed  -     ALBUTEROL, INHAL. GZA.()  -     albuterol-ipratropium (DUO-NEB) 2.5 mg-0.5 mg/3 ml nebu; 3 mL by Nebulization route every four (4) hours as needed.     2. Chronic bronchitis, unspecified chronic bronchitis type ([facility]   Will order  -     Nebulizer Accessories kit; Use as directed  -     Nebulizer & Compressor machine; Use as directed  -     ALBUTEROL, INHAL. ETD.()  -     albuterol-ipratropium (DUO-NEB) 2.5 mg-0.5 mg/3 ml nebu; 3 mL by Nebulization route every four (4) hours as needed. Other orders  -     albuterol (PROVENTIL VENTOLIN) 2.5 mg /3 mL (0.083 %) nebulizer solution; 3 mL by Nebulization route once for 1 dose. Patient's plan of care has been reviewed with them. Patient and/or family have verbally conveyed their understanding and agreement of the patient's signs, symptoms, diagnosis, treatment and prognosis and additionally agree to follow up as recommended or return to [facility] should their condition change prior to follow-up. Discharge instructions have also been provided to the patient with some educational information regarding their diagnosis as well a list of reasons why they would want to return to the office prior to their follow-up appointment should their condition change. Follow-up in 1 month.

## 2018-01-29 NOTE — PROGRESS NOTES
Patient continues with wheezing in all lung fields after neb. NAD. Will administer another. Pt reports feeling generally well.

## 2018-01-29 NOTE — PROGRESS NOTES
Pt continues with wheezing in all lung fields after 3 neb treatments. O2 sats 85%. NAD. NP recommended that patient be further evaluated in the ED. Pt refuses. Patient educated on the risks of not being further treated. Pt verbalizes understanding.

## 2018-01-29 NOTE — MR AVS SNAPSHOT
[address] [facility] [address] [facility] [address] [place] [address] 
[phone] Patient: Saul Mendiola MRN: [id number] Visit Information Date & Time Provider Department Dept. Phone Encounter #  
 1/29/2018 10:20 AM Augie Miller NP [facility] [phone] [id number] Upcoming Health Maintenance Date Due Hepatitis C Screening 1960 DTaP/Tdap/Td series (1 - Tdap) 8/27/1980 COLONOSCOPY 9/2/2024 Allergies as of 1/29/2018  Review Complete On: 1/29/2018 By: Toby Murillo LPN No Known Allergies Current Immunizations  Reviewed on 7/17/2017 Name Date Influenza High Dose Vaccine PF 9/15/2017 Influenza Vaccine Intradermal PF 11/14/2016 PPD 1/31/2012 Pneumococcal Polysaccharide (PPSV-23) 11/14/2016 Not reviewed this visit You Were Diagnosed With   
  
 Codes Comments Pulmonary emphysema, unspecified emphysema type ([facility]    -  Primary ICD-10-CM: J43.9 ICD-9-CM: 492.8 Chronic bronchitis, unspecified chronic bronchitis type ([facility]     ICD-10-CM: R51 ICD-9-CM: 491.9 Vitals BP Pulse Temp Resp Height(growth percentile) Weight(growth percentile) 103/71 (BP 1 Location: Right arm, BP Patient Position: Sitting) (!) 104 97.9 °F (36.6 °C) (Oral) 18 5' 10\" (1.778 m) 153 lb 12.8 oz (69.8 kg) SpO2 BMI Smoking Status (!) 84% 22.07 kg/m2 Current Every Day Smoker Vitals History BMI and BSA Data Body Mass Index Body Surface Area 22.07 kg/m 2 1.86 m 2 Preferred Pharmacy Pharmacy Name Phone  [address] [facility] [phone] Your Updated Medication List  
  
   
This list is accurate as of: 1/29/18 10:22 AM.  Always use your most recent med list.  
  
  
  
  
 * albuterol 90 mcg/actuation inhaler Commonly known as:  VENTOLIN HFA Take 2 Puffs by inhalation every four (4) hours as needed for Shortness of Breath. * albuterol 90 mcg/actuation inhaler Commonly known as:  PROVENTIL HFA, VENTOLIN HFA, PROAIR HFA Take 1 Puff by inhalation every four (4) hours as needed for Wheezing. albuterol-ipratropium 2.5 mg-0.5 mg/3 ml Nebu Commonly known as:  DUO-NEB  
3 mL by Nebulization route every four (4) hours as needed. ALPRAZolam 0.5 mg tablet Commonly known as:  [employer] Take 1 Tab by mouth two (2) times daily as needed for Anxiety. Max Daily Amount: 1 mg. amLODIPine 5 mg tablet Commonly known as:  [place] Take 1 Tab by mouth daily. benztropine 2 mg tablet Commonly known as:  COGENTIN Take 1 Tab by mouth daily as needed. fluticasone-vilanterol 100-25 mcg/dose inhaler Commonly known as:  BREO ELLIPTA Take 1 Puff by inhalation daily. Indications: COPD Nebulizer & Compressor machine Use as directed Nebulizer Accessories Kit Use as directed  
  
 nicotine 14 mg/24 hr patch Commonly known as:  NICODERM CQ  
1 Patch by TransDERmal route every twenty-four (24) hours for 30 days. PARoxetine 20 mg tablet Commonly known as:  PAXIL Take 1 Tab by mouth daily. QUEtiapine 50 mg tablet Commonly known as:  SEROquel Take 1 Tab by mouth nightly as needed (for agitation or anxiety (Use 1st)). risperiDONE 3 mg tablet Commonly known as:  RisperDAL Take 1 Tab by mouth daily. sodium chloride 1 gram tablet Take 1 Tab by mouth daily. Indications: hyponatremia  
  
 temazepam 30 mg capsule Commonly known as:  RESTORIL Take 1 Cap by mouth nightly as needed for Sleep. Max Daily Amount: 30 mg.  
  
 * traZODone 100 mg tablet Commonly known as:  [place] Grate Take 0.5 Tabs by mouth nightly as needed for Sleep. * traZODone 50 mg tablet Commonly known as:  DESYREL  
  
 umeclidinium 62.5 mcg/actuation inhaler Commonly known as:  INCRUSE ELLIPTA Take 1 Puff by inhalation daily. * Notice: This list has 4 medication(s) that are the same as other medications prescribed for you. Read the directions carefully, and ask your doctor or other care provider to review them with you. Prescriptions Printed Refills Nebulizer Accessories kit 0 Sig: Use as directed Class: Print Nebulizer & Compressor machine 0 Sig: Use as directed Class: Print Prescriptions Sent to Pharmacy Refills  
 albuterol (PROVENTIL HFA, VENTOLIN HFA, PROAIR HFA) 90 mcg/actuation inhaler 11 Sig: Take 1 Puff by inhalation every four (4) hours as needed for Wheezing. Class: Normal  
 Pharmacy: [facility] [address] Ph #: [phone] Route: Inhalation Please provide this summary of care documentation to your next provider. Your primary care clinician is listed as Dandre Medrano. If you have any questions after today's visit, please call [phone].

## 2018-01-29 NOTE — LETTER
1/29/2018 10:34 AM 
 
[facility]. [facility] [phone] To whom it may concern: 
 
Please give Mr. Aman Engle treatment every 4 hours. Orders for albuterol-ipratropium 2.5 mg-0.5 mg has been e-scribed to the pharmacy. Hard scripts for a Nebulizer & Compressor machine along with Nebulizer Accessories kit have been given to the patient. Please contact [facility] with any further questions. Sincerely, Ludwig Sandoval NP

## 2018-01-29 NOTE — TELEPHONE ENCOUNTER
Call [address] - the nebulizer is not covered - is there something that can be done? A prior authorization?   [phone]

## 2018-01-30 ENCOUNTER — TELEPHONE (OUTPATIENT)
Dept: INTERNAL MEDICINE CLINIC | Age: 58
End: 2018-01-30

## 2018-01-30 DIAGNOSIS — J42 CHRONIC BRONCHITIS, UNSPECIFIED CHRONIC BRONCHITIS TYPE (HCC): ICD-10-CM

## 2018-01-30 DIAGNOSIS — J43.9 PULMONARY EMPHYSEMA, UNSPECIFIED EMPHYSEMA TYPE (HCC): ICD-10-CM

## 2018-01-30 RX ORDER — NEBULIZER AND COMPRESSOR
EACH MISCELLANEOUS
Qty: 1 EACH | Refills: 0 | Status: ON HOLD | OUTPATIENT
Start: 2018-01-30 | End: 2022-02-26

## 2018-01-30 NOTE — TELEPHONE ENCOUNTER
Called and faxed rxs, insurance info, office visit note, and face sheet to [address] [place] [address]. They can service pt with his nebulizer and accessories. They have it in stock and can take care of it as early as tomorrow. Called Mr Carmichael Jackie OSULLIVAN and gave them information about his nebulizer being ordered and they said they usually will deliver it to the door.   Will f/u tomorrow with DME co to see if it will happen tomorrow or not

## 2018-01-30 NOTE — TELEPHONE ENCOUNTER
Called to get PA for patient to get nebulizer but [facility] said it wasn't formulary. After talking to a few different people they said we would have to get it through a [facility] company. They gave me 2 names and phone numbers. [facility] at [phone] or [facility] at [phone]. Will print out rx's and let Ms Galaviz know.   Will call [facility] to try to expedite pt getting his nebulizer

## 2018-01-31 NOTE — TELEPHONE ENCOUNTER
[facility] ( spoke with [employer]) and they are received the fax from yesterday and are processing it. It should be delivered to pt today.   Will make provider aware

## 2018-02-05 RX ORDER — RISPERIDONE 3 MG/1
3 TABLET, FILM COATED ORAL DAILY
Qty: 30 TAB | Refills: 0 | Status: SHIPPED | OUTPATIENT
Start: 2018-02-05 | End: 2021-03-05 | Stop reason: SDUPTHER

## 2018-02-06 RX ORDER — TEMAZEPAM 30 MG/1
30 CAPSULE ORAL
Qty: 30 CAP | Refills: 0 | OUTPATIENT
Start: 2018-02-06

## 2018-06-28 DIAGNOSIS — J42 CHRONIC BRONCHITIS, UNSPECIFIED CHRONIC BRONCHITIS TYPE (HCC): ICD-10-CM

## 2018-06-28 RX ORDER — BUDESONIDE AND FORMOTEROL FUMARATE DIHYDRATE 160; 4.5 UG/1; UG/1
2 AEROSOL RESPIRATORY (INHALATION) 2 TIMES DAILY
Qty: 1 INHALER | Refills: 11 | Status: SHIPPED | OUTPATIENT
Start: 2018-06-28 | End: 2019-12-09 | Stop reason: SDUPTHER

## 2018-06-28 RX ORDER — BENZTROPINE MESYLATE 2 MG/1
2 TABLET ORAL DAILY
Qty: 30 TAB | Refills: 11 | Status: SHIPPED | OUTPATIENT
Start: 2018-06-28

## 2018-09-11 ENCOUNTER — OFFICE VISIT (OUTPATIENT)
Dept: INTERNAL MEDICINE CLINIC | Age: 58
End: 2018-09-11

## 2018-09-11 VITALS
DIASTOLIC BLOOD PRESSURE: 78 MMHG | HEART RATE: 58 BPM | OXYGEN SATURATION: 90 % | RESPIRATION RATE: 18 BRPM | TEMPERATURE: 98.2 F | SYSTOLIC BLOOD PRESSURE: 100 MMHG | WEIGHT: 143 LBS | BODY MASS INDEX: 20.47 KG/M2 | HEIGHT: 70 IN

## 2018-09-11 DIAGNOSIS — F51.01 PRIMARY INSOMNIA: ICD-10-CM

## 2018-09-11 DIAGNOSIS — Z11.1 SCREENING-PULMONARY TB: ICD-10-CM

## 2018-09-11 DIAGNOSIS — F17.219 CIGARETTE NICOTINE DEPENDENCE WITH NICOTINE-INDUCED DISORDER: ICD-10-CM

## 2018-09-11 DIAGNOSIS — F25.1 SCHIZOAFFECTIVE DISORDER, DEPRESSIVE TYPE (HCC): ICD-10-CM

## 2018-09-11 DIAGNOSIS — J43.9 PULMONARY EMPHYSEMA, UNSPECIFIED EMPHYSEMA TYPE (HCC): Primary | ICD-10-CM

## 2018-09-11 RX ORDER — HYDROXYZINE PAMOATE 25 MG/1
25 CAPSULE ORAL
Qty: 30 CAP | Refills: 3 | Status: SHIPPED | OUTPATIENT
Start: 2018-09-11 | End: 2018-09-25

## 2018-09-11 RX ORDER — PREDNISONE 10 MG/1
TABLET ORAL 2 TIMES DAILY
COMMUNITY
End: 2019-05-28 | Stop reason: ALTCHOICE

## 2018-09-11 RX ORDER — IBUPROFEN 200 MG
1 TABLET ORAL EVERY 24 HOURS
COMMUNITY
End: 2018-09-11 | Stop reason: ALTCHOICE

## 2018-09-11 RX ORDER — LANOLIN ALCOHOL/MO/W.PET/CERES
CREAM (GRAM) TOPICAL DAILY
COMMUNITY
End: 2019-01-11 | Stop reason: SDUPTHER

## 2018-09-11 RX ORDER — LACTULOSE 10 G/15ML
SOLUTION ORAL; RECTAL
COMMUNITY
Start: 2018-09-04 | End: 2019-01-08 | Stop reason: SDUPTHER

## 2018-09-11 RX ORDER — IPRATROPIUM BROMIDE AND ALBUTEROL SULFATE 2.5; .5 MG/3ML; MG/3ML
SOLUTION RESPIRATORY (INHALATION)
Qty: 75 ML | Refills: 6 | Status: SHIPPED | OUTPATIENT
Start: 2018-09-11 | End: 2019-10-16 | Stop reason: SDUPTHER

## 2018-09-11 RX ORDER — FOLIC ACID 1 MG/1
TABLET ORAL
COMMUNITY
Start: 2018-08-14 | End: 2019-01-11 | Stop reason: SDUPTHER

## 2018-09-11 NOTE — PROGRESS NOTES
Patient identified with 2 ID's, Name and  Kailash Ventura is a 60 y.o. male Chief Complaint Patient presents with  
 Other Pulmonary emphysema follow up appointment 1. Have you been to the ER, urgent care clinic since your last visit? Hospitalized since your last visit? No  
 
2. Have you seen or consulted any other health care providers outside of the [facility] since your last visit? Include any pap smears or colon screening. No  
 
In the event something were to happen to you and you were unable to speak on your behalf, do you have an Advance Directive/ Living Will in place stating your wishes? No  
  
If no, would you like information?  declined Visit Vitals  /78 (BP 1 Location: Left arm, BP Patient Position: Sitting)  Pulse (!) 58  Temp 98.2 °F (36.8 °C) (Oral)  Resp 18  Ht 5' 10\" (1.778 m)  Wt 143 lb (64.9 kg)  SpO2 90%  BMI 20.52 kg/m2 Medication Reconciliation reviewed with patient on this date PHQ over the last two weeks 2018 PHQ Not Done - Little interest or pleasure in doing things Not at all Feeling down, depressed, irritable, or hopeless Not at all Total Score PHQ 2 0 Learning Assessment 2017 PRIMARY LEARNER Patient HIGHEST LEVEL OF EDUCATION - PRIMARY LEARNER  GRADUATED HIGH SCHOOL OR GED  
BARRIERS PRIMARY LEARNER OTHER  
CO-LEARNER CAREGIVER Yes PRIMARY LANGUAGE ENGLISH  
LEARNER PREFERENCE PRIMARY VIDEOS  
ANSWERED BY patient RELATIONSHIP SELF Abuse Screening Questionnaire 2018 Do you ever feel afraid of your partner? Alicia Alireza Are you in a relationship with someone who physically or mentally threatens you? Alicia Lay Is it safe for you to go home? Maribeth Ford Decline flu vaccine

## 2018-09-11 NOTE — PATIENT INSTRUCTIONS
Chronic Obstructive Pulmonary Disease (COPD): Care Instructions Your Care Instructions Chronic obstructive pulmonary disease (COPD) is a general term for a group of lung diseases, including emphysema and chronic bronchitis. People with COPD have decreased airflow in and out of the lungs, which makes it hard to breathe. The airways also can get clogged with thick mucus. Cigarette smoking is a major cause of COPD. Although there is no cure for COPD, you can slow its progress. Following your treatment plan and taking care of yourself can help you feel better and live longer. Follow-up care is a key part of your treatment and safety. Be sure to make and go to all appointments, and call your doctor if you are having problems. It's also a good idea to know your test results and keep a list of the medicines you take. How can you care for yourself at home? 
 Staying healthy 
  · Do not smoke. This is the most important step you can take to prevent more damage to your lungs. If you need help quitting, talk to your doctor about stop-smoking programs and medicines. These can increase your chances of quitting for good.  
  · Avoid colds and flu. Get a pneumococcal vaccine shot. If you have had one before, ask your doctor whether you need a second dose. Get the flu vaccine every fall. If you must be around people with colds or the flu, wash your hands often.  
  · Avoid secondhand smoke, air pollution, and high altitudes. Also avoid cold, dry air and hot, humid air. Stay at home with your windows closed when air pollution is bad.  
 Medicines and oxygen therapy 
  · Take your medicines exactly as prescribed. Call your doctor if you think you are having a problem with your medicine.  
  · You may be taking medicines such as: ¨ Bronchodilators. These help open your airways and make breathing easier.  Bronchodilators are either short-acting (work for 6 to 9 hours) or long-acting (work for 24 hours). You inhale most bronchodilators, so they start to act quickly. Always carry your quick-relief inhaler with you in case you need it while you are away from home. ¨ Corticosteroids (prednisone, budesonide). These reduce airway inflammation. They come in pill or inhaled form. You must take these medicines every day for them to work well.  
  · A spacer may help you get more inhaled medicine to your lungs. Ask your doctor or pharmacist if a spacer is right for you. If it is, ask how to use it properly.  
  · Do not take any vitamins, over-the-counter medicine, or herbal products without talking to your doctor first.  
  · If your doctor prescribed antibiotics, take them as directed. Do not stop taking them just because you feel better. You need to take the full course of antibiotics.  
  · Oxygen therapy boosts the amount of oxygen in your blood and helps you breathe easier. Use the flow rate your doctor has recommended, and do not change it without talking to your doctor first.  
Activity 
  · Get regular exercise. Walking is an easy way to get exercise. Start out slowly, and walk a little more each day.  
  · Pay attention to your breathing. You are exercising too hard if you cannot talk while you are exercising.  
  · Take short rest breaks when doing household chores and other activities.  
  · Learn breathing methods-such as breathing through pursed lips-to help you become less short of breath.  
  · If your doctor has not set you up with a pulmonary rehabilitation program, talk to him or her about whether rehab is right for you. Rehab includes exercise programs, education about your disease and how to manage it, help with diet and other changes, and emotional support. Diet 
  · Eat regular, healthy meals. Use bronchodilators about 1 hour before you eat to make it easier to eat.  Eat several small meals instead of three large ones. Drink beverages at the end of the meal. Avoid foods that are hard to chew.  
  · Eat foods that contain protein so that you do not lose muscle mass.  
  · Talk with your doctor if you gain too much weight or if you lose weight without trying.  
 Mental health 
  · Talk to your family, friends, or a therapist about your feelings. It is normal to feel frightened, angry, hopeless, helpless, and even guilty. Talking openly about bad feelings can help you cope. If these feelings last, talk to your doctor. When should you call for help? Call 911 anytime you think you may need emergency care. For example, call if: 
  · You have severe trouble breathing.  
 Call your doctor now or seek immediate medical care if: 
  · You have new or worse trouble breathing.  
  · You cough up blood.  
  · You have a fever.  
 Watch closely for changes in your health, and be sure to contact your doctor if: 
  · You cough more deeply or more often, especially if you notice more mucus or a change in the color of your mucus.  
  · You have new or worse swelling in your legs or belly.  
  · You are not getting better as expected. Where can you learn more? Go to http://leila-kodak.info/. Raegan Shipley in the search box to learn more about \"Chronic Obstructive Pulmonary Disease (COPD): Care Instructions. \" Current as of: December 6, 2017 Content Version: 11.7 © 2487-8730 [employer]. Care instructions adapted under license by [employer] (which disclaims liability or warranty for this information). If you have questions about a medical condition or this instruction, always ask your healthcare professional. [place] [address] any warranty or liability for your use of this information. Breathing Techniques for COPD: Care Instructions Your Care Instructions Breathing is hard when you have chronic obstructive pulmonary disease (COPD). You may take quick, short breaths. Breathing this way makes it harder to get air into your lungs. Learning new ways to control your breathing may help. You may feel better and be able to do more. You can try three basic ways to control your breathing. They are pursed-lip breathing, diaphragmatic breathing, and breathing while bending. Use these methods when you are more short of breath than normal. Practice them often so you can do them well. Follow-up care is a key part of your treatment and safety. Be sure to make and go to all appointments, and call your doctor if you are having problems. It's also a good idea to know your test results and keep a list of the medicines you take. How can you care for yourself at home? · Pursed-lip breathing helps you breathe more air out so that your next breath can be deeper. For this type of breathing, you breathe in through your nose and out through your mouth while almost closing your lips. Breathe in for about 2 seconds, and breathe out for 4 to 6 seconds. Pursed-lip breathing decreases shortness of breath and improves your ability to exercise. · Diaphragmatic breathing helps your lungs expand so that they take in more air. ¨ Lie on your back, or prop yourself up on several pillows. ¨ Put one hand on your belly and the other on your chest. When you breathe in, push your belly out as far as possible. You should feel the hand on your belly move out, while the hand on your chest does not move. ¨ When you breathe out, you should feel the hand on your belly move in. When you can do this type of breathing well while lying down, learn to do it while sitting or standing. Many people with COPD find this breathing method helpful. ¨ Practice diaphragmatic breathing for 20 minutes, 2 or 3 times a day. · Breathing while bending forward at the waist may make breathing easier.  It can reduce shortness of breath while you exercise or rest. It helps the diaphragm move more easily. The diaphragm is a large muscle that separates your lungs from your belly. It helps draw air into your lungs as you breathe. · Do not smoke. Smoking makes COPD worse. If you need help quitting, talk to your doctor about stop-smoking programs and medicines. These can increase your chances of quitting for good. When should you call for help? Call your doctor now or seek immediate medical care if: 
  · Your breathing methods do not help.  
  · Your shortness of breath gets worse.  
  · You cough up blood.  
  · You have swelling in your belly and legs.  
  · You have severe chest pain.  
 Watch closely for changes in your health, and be sure to contact your doctor if you have any problems. Where can you learn more? Go to http://leila-kodak.info/. Enter C769 in the search box to learn more about \"Breathing Techniques for COPD: Care Instructions. \" Current as of: December 6, 2017 Content Version: 11.7 © 3552-0651 [employer]. Care instructions adapted under license by [employer] (which disclaims liability or warranty for this information). If you have questions about a medical condition or this instruction, always ask your healthcare professional. [address] any warranty or liability for your use of this information. Chronic Obstructive Pulmonary Disease (COPD) Flare-Ups: Care Instructions Your Care Instructions Chronic obstructive pulmonary disease (COPD) is a lung disease that makes it hard to breathe. It is caused by damage to the lungs over many years, usually from smoking. COPD is often a mix of two diseases: · Chronic bronchitis: The airways that carry air to the lungs (bronchial tubes) get inflamed and make a lot of mucus. This can narrow or block the airways. · Emphysema:  In a healthy person, the tiny air sacs in the lungs are like balloons. As you breathe in and out, they get bigger and smaller to move air through your lungs. But with emphysema, these air sacs are damaged and lose their stretch. Less air gets in and out of the lungs. Many people with COPD have attacks called flare-ups or exacerbations. This is when your usual symptoms quickly get worse and stay worse. The doctor has checked you carefully. But problems can develop later. If you notice any problems or new symptoms, get medical treatment right away. Follow-up care is a key part of your treatment and safety. Be sure to make and go to all appointments, and call your doctor if you are having problems. It's also a good idea to know your test results and keep a list of the medicines you take. How can you care for yourself at home? · Be safe with medicines. Take your medicines exactly as prescribed. Call your doctor if you think you are having a problem with your medicine. You may be taking medicines such as: ¨ Bronchodilators. These help open your airways and make breathing easier. ¨ Corticosteroids. These reduce airway inflammation. They may be given as pills, in a vein, or in an inhaled form. You may go home with pills in addition to an inhaler that you already use. · A spacer may help you get more inhaled medicine to your lungs. Ask your doctor or pharmacist if a spacer is right for you. If it is, ask how to use it properly. · If your doctor prescribed antibiotics, take them as directed. Do not stop taking them just because you feel better. You need to take the full course of antibiotics. · If your doctor prescribed oxygen, use the flow rate your doctor has recommended. Do not change it without talking to your doctor first. 
· Do not smoke. Smoking makes COPD worse. If you need help quitting, talk to your doctor about stop-smoking programs and medicines. These can increase your chances of quitting for good. When should you call for help? Call 911 anytime you think you may need emergency care. For example, call if: 
  · You have severe trouble breathing.  
 Call your doctor now or seek immediate medical care if: 
  · You have new or worse trouble breathing.  
  · Your coughing or wheezing gets worse.  
  · You cough up dark brown or bloody mucus (sputum).  
  · You have a new or higher fever.  
 Watch closely for changes in your health, and be sure to contact your doctor if: 
  · You notice more mucus or a change in the color of your mucus.  
  · You need to use your antibiotic or steroid pills.  
  · You do not get better as expected. Where can you learn more? Go to http://leila-kodak.info/. Enter V235 in the search box to learn more about \"Chronic Obstructive Pulmonary Disease (COPD) Flare-Ups: Care Instructions. \" Current as of: December 6, 2017 Content Version: 11.7 © 9029-2895 [employer]. Care instructions adapted under license by [employer] (which disclaims liability or warranty for this information). If you have questions about a medical condition or this instruction, always ask your healthcare professional. [place] [address] any warranty or liability for your use of this information.

## 2018-09-11 NOTE — PROGRESS NOTES
Subjective: Chief Complaint Patient presents with  
 Other Pulmonary emphysema follow up appointment History of Present Illness Veronique Brooks is a 60y.o. year old male who is a patient of Dr. Geovany Otero that presents today with his  from [facility]. He has an extensive medical history and numerous hospital admission r/t emphysema and chronic bronchitis along with hyponatremia. He has not been to the ED since his last OV with me. He states he is not using his nicotine patches and continues to smoke 5-7 cigarettes per day. He does not wish to quit. Patient educated on the importance of smoking cessation. His  states he has been getting his q 4 neb treatments at [facility]. The patient reports feeling generally well. NAD. His only complaint today is difficulty sleeping. He states he goes to be around 8 pm and is not falling asleep until 3 or 4 am.  He denies relief with Temazepam or Trazodone. The patient denies any new complaints today. No CP, SOB, GI, or  symptoms. Reviewed medications, recent lab work and imaging with patient. Pt reports compliance with medications. Current Outpatient Prescriptions on File Prior to Visit Medication Sig Dispense Refill  umeclidinium (INCRUSE ELLIPTA) 62.5 mcg/actuation inhaler Take 1 Puff by inhalation daily. 1 Inhaler 0  
 budesonide-formoterol (SYMBICORT) 160-4.5 mcg/actuation HFAA Take 2 Puffs by inhalation two (2) times a day. 1 Inhaler 11  
 benztropine (COGENTIN) 2 mg tablet Take 1 Tab by mouth daily. 30 Tab 11  
 risperiDONE (RISPERDAL) 3 mg tablet Take 1 Tab by mouth daily. 30 Tab 0  
 Nebulizer & Compressor machine Use as directed for J42 and J43.9 1 Each 0  
 Nebulizer Accessories kit Use as directed for J42 and J43.9 1 Kit 0  
 PARoxetine (PAXIL) 20 mg tablet Take 1 Tab by mouth daily.  30 Tab 0  
 QUEtiapine (SEROQUEL) 50 mg tablet Take 1 Tab by mouth nightly as needed (for agitation or anxiety (Use 1st)). 30 Tab 0  
 sodium chloride 1 gram tablet Take 1 Tab by mouth daily. Indications: hyponatremia 30 Tab 0  
 amLODIPine (NORVASC) 5 mg tablet Take 1 Tab by mouth daily. 30 Tab 0 No current facility-administered medications on file prior to visit. No Known Allergies Past Medical History:  
Diagnosis Date  Asthma   
 seldom,use inhaler  Bronchitis  Chronic obstructive pulmonary disease ([address]  Depression   
 anxiety  Psychiatric disorder   
 paranoid schizophrenia  Psychiatric disorder   
 extrapyramidal disease  Psychotic disorder   
 mental retardation Past Surgical History:  
Procedure Laterality Date  HX ORTHOPAEDIC    
 right knee Social History Substance Use Topics  Smoking status: Current Every Day Smoker Packs/day: 1.00 Years: 25.00  Smokeless tobacco: Never Used  Alcohol use No  
   Comment: socially Family History Problem Relation Age of Onset  Diabetes Mother  Heart Disease Father CHF Objective:  
 
Vitals:  
 09/11/18 1030 BP: 100/78 Pulse: (!) 58 Resp: 18 Temp: 98.2 °F (36.8 °C) TempSrc: Oral  
SpO2: 90% Weight: 143 lb (64.9 kg) Height: 5' 10\" (1.778 m) Review of Systems Constitutional: Negative. HENT: Negative. Eyes: Negative. Respiratory: Negative. Cardiovascular: Negative. Gastrointestinal: Negative. Genitourinary: Negative. Musculoskeletal: Negative. Skin: Negative. Neurological: Negative. Psychiatric/Behavioral: The patient has insomnia. Physical Exam  
Constitutional: He is oriented to person, place, and time. He appears well-developed and well-nourished. No distress. Well-appearing male. NAD HENT:  
Head: Normocephalic and atraumatic. Eyes: Conjunctivae and EOM are normal. Pupils are equal, round, and reactive to light. Neck: Normal range of motion. Neck supple. Cardiovascular: Normal rate, regular rhythm and normal heart sounds. Pulmonary/Chest: Effort normal and breath sounds normal. No respiratory distress. He has no wheezes. Lung sounds slightly coarse in bases. Greatly improved from previous visits. Abdominal: He exhibits no distension. Musculoskeletal: Normal range of motion. He exhibits no edema, tenderness or deformity. Neurological: He is alert and oriented to person, place, and time. Skin: Skin is warm and dry. No rash noted. He is not diaphoretic. No erythema. No pallor. Psychiatric: He has a normal mood and affect. His behavior is normal.  
Nursing note and vitals reviewed. Assessment/ Plan:  
Diagnoses and all orders for this visit: 1. Pulmonary emphysema, unspecified emphysema type ([facility] [address] Will order 
-     albuterol-ipratropium (DUO-NEB) 2.5 mg-0.5 mg/3 ml nebu; INHALE 1 VIAL VIA NEBULIZER EVERY 4 HOURS AS NEEDED 
-     CBC W/O DIFF 
-     METABOLIC PANEL, COMPREHENSIVE 
-     LIPID PANEL 
-     VITAMIN D, 25 HYDROXY 
-     TSH 3RD GENERATION 
-     HEPATITIS C AB 
-     QUANTIFERON TB GOLD 2. Primary insomnia Will order 
-     hydrOXYzine pamoate (VISTARIL) 25 mg capsule; Take 1 Cap by mouth nightly as needed for Itching for up to 14 days. Proper sleep hygiene ? Sleep as long as necessary to feel rested (usually seven to eight hours for adults) and then get out of bed ? Maintain a regular sleep schedule, particularly a regular wake-up time in the morning ? Try not to force sleep ? Avoid caffeinated beverages after lunch ? Avoid alcohol near bedtime (eg, late afternoon and evening) ? Avoid smoking or other nicotine intake, particularly during the evening ? Adjust the bedroom environment as needed to decrease stimuli (eg, reduce ambient light, turn off the television or radio) ? Avoid prolonged use of light-emitting screens (laptops, tablets, smartphones, [employer]) before  bedtime ?Resolve concerns or worries before bedtime ? Exercise regularly for at least 20 minutes, preferably more than four to five  hours prior to  bedtime ? Avoid daytime naps, especially if they are longer than 20 to 30 minutes or occur late in the day 3. Schizoaffective disorder, depressive type ([facility] [address] Will order 
-     CBC W/O DIFF 
-     METABOLIC PANEL, COMPREHENSIVE 
-     LIPID PANEL 
-     VITAMIN D, 25 HYDROXY 
-     TSH 3RD GENERATION 
-     HEPATITIS C AB 
-     QUANTIFERON TB GOLD 4. Cigarette nicotine dependence with nicotine-induced disorder Patient educated on the importance of smoking cessation and the health benefits associated with quitting. 5. Screening-pulmonary TB Will order -     QUANTIFERON TB GOLD Patient's plan of care has been reviewed with them. Patient and/or family have verbally conveyed their understanding and agreement of the patient's signs, symptoms, diagnosis, treatment and prognosis and additionally agree to follow up as recommended or return to [facility] should their condition change prior to follow-up. Discharge instructions have also been provided to the patient with some educational information regarding their diagnosis as well a list of reasons why they would want to return to the office prior to their follow-up appointment should their condition change. Follow-up in 6 months.

## 2018-09-11 NOTE — MR AVS SNAPSHOT
[address] [facility] 102 [address] [place] [address] 
[phone] Patient: Jonathan Crawford MRN: [id number] NHV:[id number] Visit Information Date & Time Provider Department Dept. Phone Encounter #  
 9/11/2018 10:20 AM Carolyn Cortez NP [facility] Internal Medicine [phone] [id number] Upcoming Health Maintenance Date Due Hepatitis C Screening 1960 DTaP/Tdap/Td series (1 - Tdap) 8/27/1980 Influenza Age 5 to Adult 8/1/2018 COLONOSCOPY 9/2/2024 Allergies as of 9/11/2018  Review Complete On: 9/11/2018 By: Merly Arambula RN No Known Allergies Current Immunizations  Reviewed on 7/17/2017 Name Date Influenza High Dose Vaccine PF 9/15/2017 Influenza Vaccine Intradermal PF 11/14/2016 PPD 1/31/2012 Pneumococcal Polysaccharide (PPSV-23) 11/14/2016 Not reviewed this visit You Were Diagnosed With   
  
 Codes Comments Primary insomnia    -  Primary ICD-10-CM: F51.01 
ICD-9-CM: 307.42 Pulmonary emphysema, unspecified emphysema type ([facility]     ICD-10-CM: J43.9 ICD-9-CM: 492.8 Vitals BP Pulse Temp Resp Height(growth percentile) Weight(growth percentile) 100/78 (BP 1 Location: Left arm, BP Patient Position: Sitting) (!) 58 98.2 °F (36.8 °C) (Oral) 18 5' 10\" (1.778 m) 143 lb (64.9 kg) SpO2 BMI Smoking Status 90% 20.52 kg/m2 Current Every Day Smoker Vitals History BMI and BSA Data Body Mass Index Body Surface Area 20.52 kg/m 2 1.79 m 2 Preferred Pharmacy Pharmacy Name Phone [facility] [phone] Your Updated Medication List  
  
   
This list is accurate as of 9/11/18 10:58 AM.  Always use your most recent med list.  
  
  
  
  
 albuterol-ipratropium 2.5 mg-0.5 mg/3 ml Nebu Commonly known as:  Lexii Megan INHALE 1 VIAL VIA NEBULIZER EVERY 4 HOURS AS NEEDED  
  
 amLODIPine 5 mg tablet Commonly known as:  Marybeth Raissa Take 1 Tab by mouth daily. benztropine 2 mg tablet Commonly known as:  COGENTIN Take 1 Tab by mouth daily. budesonide-formoterol 160-4.5 mcg/actuation Hfaa Commonly known as:  SYMBICORT Take 2 Puffs by inhalation two (2) times a day. folic acid 1 mg tablet Commonly known as:  FOLVITE  
  
 GENERLAC 10 gram/15 mL solution Generic drug:  lactulose  
  
 hydrOXYzine pamoate 25 mg capsule Commonly known as:  VISTARIL Take 1 Cap by mouth nightly as needed for Itching for up to 14 days. Nebulizer & Compressor machine Use as directed for J42 and J43.9 Nebulizer Accessories Kit Use as directed for J42 and J43.9 PARoxetine 20 mg tablet Commonly known as:  PAXIL Take 1 Tab by mouth daily. predniSONE 10 mg tablet Commonly known as:  Brooke Roers Take  by mouth two (2) times a day. Take 1 tab daily by mouth before breakfast, then take one tablet by mouth at bedtime QUEtiapine 50 mg tablet Commonly known as:  SEROquel Take 1 Tab by mouth nightly as needed (for agitation or anxiety (Use 1st)). risperiDONE 3 mg tablet Commonly known as:  RisperDAL Take 1 Tab by mouth daily. sodium chloride 1 gram tablet Take 1 Tab by mouth daily. Indications: hyponatremia  
  
 thiamine  mg tablet Commonly known as:  B-1 Take  by mouth daily. umeclidinium 62.5 mcg/actuation inhaler Commonly known as:  INCRUSE ELLIPTA Take 1 Puff by inhalation daily. Prescriptions Sent to Pharmacy Refills  
 albuterol-ipratropium (DUO-NEB) 2.5 mg-0.5 mg/3 ml nebu 6 Sig: INHALE 1 VIAL VIA NEBULIZER EVERY 4 HOURS AS NEEDED Class: Normal  
 Pharmacy: [address] [place] [address] Ph #: [phone]  
 hydrOXYzine pamoate (VISTARIL) 25 mg capsule 3 Sig: Take 1 Cap by mouth nightly as needed for Itching for up to 14 days. Class: Normal  
 Pharmacy: [address] [place] [address] Ph #: [phone] Route: Oral  
  
We Performed the Following CBC W/O DIFF [19571 CPT(R)] HEPATITIS C AB [89134 CPT(R)] LIPID PANEL [72523 CPT(R)] METABOLIC PANEL, COMPREHENSIVE [90285 CPT(R)] QUANTIFERON TB GOLD [[id number] Custom] TSH 3RD GENERATION [25361 CPT(R)] VITAMIN D, 25 HYDROXY [id number] CPT(R)] Introducing \[facility] [place] [facility]\"" & HEALTH SERVICES! [facility]: Thank you for requesting a [employer] account. Our records indicate that you have previously registered for a [employer] account but its currently inactive. Please call our [employer] support line at [phone]. Additional Information If you have questions, please visit the Frequently Asked Questions section of the [employer] website at [email or web address] Remember, [employer] is NOT to be used for urgent needs. For medical emergencies, dial 911. Now available from your iPhone and Android! Please provide this summary of care documentation to your next provider. Your primary care clinician is listed as Nelly Martines. If you have any questions after today's visit, please call [phone].

## 2018-10-20 LAB
25(OH)D3+25(OH)D2 SERPL-MCNC: 18.1 NG/ML (ref 30–100)
ALBUMIN SERPL-MCNC: 4.2 G/DL (ref 3.5–5.5)
ALBUMIN/GLOB SERPL: 1.5 {RATIO} (ref 1.2–2.2)
ALP SERPL-CCNC: 72 IU/L (ref 39–117)
ALT SERPL-CCNC: 8 IU/L (ref 0–44)
AST SERPL-CCNC: 13 IU/L (ref 0–40)
BILIRUB SERPL-MCNC: 0.3 MG/DL (ref 0–1.2)
BUN SERPL-MCNC: 14 MG/DL (ref 6–24)
BUN/CREAT SERPL: 16 (ref 9–20)
CALCIUM SERPL-MCNC: 9.5 MG/DL (ref 8.7–10.2)
CHLORIDE SERPL-SCNC: 96 MMOL/L (ref 96–106)
CHOLEST SERPL-MCNC: 181 MG/DL (ref 100–199)
CO2 SERPL-SCNC: 26 MMOL/L (ref 20–29)
CREAT SERPL-MCNC: 0.87 MG/DL (ref 0.76–1.27)
ERYTHROCYTE [DISTWIDTH] IN BLOOD BY AUTOMATED COUNT: 13.8 % (ref 12.3–15.4)
GLOBULIN SER CALC-MCNC: 2.8 G/DL (ref 1.5–4.5)
GLUCOSE SERPL-MCNC: 287 MG/DL (ref 65–99)
HCT VFR BLD AUTO: 49.2 % (ref 37.5–51)
HCV AB S/CO SERPL IA: <0.1 S/CO RATIO (ref 0–0.9)
HDLC SERPL-MCNC: 78 MG/DL
HGB BLD-MCNC: 16.4 G/DL (ref 13–17.7)
INTERPRETATION, 910389: NORMAL
LDLC SERPL CALC-MCNC: 89 MG/DL (ref 0–99)
MCH RBC QN AUTO: 33.6 PG (ref 26.6–33)
MCHC RBC AUTO-ENTMCNC: 33.3 G/DL (ref 31.5–35.7)
MCV RBC AUTO: 101 FL (ref 79–97)
PLATELET # BLD AUTO: 226 X10E3/UL (ref 150–379)
POTASSIUM SERPL-SCNC: 4.3 MMOL/L (ref 3.5–5.2)
PROT SERPL-MCNC: 7 G/DL (ref 6–8.5)
RBC # BLD AUTO: 4.88 X10E6/UL (ref 4.14–5.8)
SODIUM SERPL-SCNC: 137 MMOL/L (ref 134–144)
TRIGL SERPL-MCNC: 71 MG/DL (ref 0–149)
TSH SERPL DL<=0.005 MIU/L-ACNC: 6.98 UIU/ML (ref 0.45–4.5)
VLDLC SERPL CALC-MCNC: 14 MG/DL (ref 5–40)
WBC # BLD AUTO: 9.4 X10E3/UL (ref 3.4–10.8)

## 2018-10-22 DIAGNOSIS — R73.9 ELEVATED BLOOD SUGAR: ICD-10-CM

## 2018-10-22 DIAGNOSIS — E03.9 ACQUIRED HYPOTHYROIDISM: Primary | ICD-10-CM

## 2018-10-22 RX ORDER — ERGOCALCIFEROL 1.25 MG/1
50000 CAPSULE ORAL
Qty: 12 CAP | Refills: 0 | Status: SHIPPED | OUTPATIENT
Start: 2018-10-22 | End: 2019-04-04 | Stop reason: SDUPTHER

## 2018-10-22 NOTE — PROGRESS NOTES
1.   BS elevated. Watch sweets/carbs. Please call lab to add hgb a1c 
2. Vitamin D low. Start Vitamin D 50,000 units weekly x 12 weeks. After completion of 12 weeks, recommend OTC Vitamin D3 1000 units daily. 3.  TSH is high. Repeat in 4-6 weeks for correlation. 4.  All electrolytes are with in normal limits.

## 2018-10-22 NOTE — PROGRESS NOTES
Spoke with Supervisor Tyrone Nunez w/ facility,  after verifying name and  of patient. Provided lab results and recommendation from provider. Supervisor verbalized understanding and given a chance to ask questions. Letter mailed to patient.

## 2018-10-29 DIAGNOSIS — R76.12 POSITIVE QUANTIFERON-TB GOLD TEST: Primary | ICD-10-CM

## 2018-10-29 LAB
GAMMA INTERFERON BACKGROUND BLD IA-ACNC: 0.17 IU/ML
M TB IFN-G BLD-IMP: POSITIVE
M TB IFN-G CD4+ BCKGRND COR BLD-ACNC: 0.42 IU/ML
MITOGEN IGNF BLD-ACNC: >10 IU/ML
QUANTIFERON INCUBATION, QF1T: ABNORMAL
QUANTIFERON TB2 AG: 0.59 IU/ML
SERVICE CMNT-IMP: ABNORMAL

## 2018-11-02 LAB
HBA1C MFR BLD: 5.3 % (ref 4.8–5.6)
SPECIMEN STATUS REPORT, ROLRST: NORMAL

## 2018-12-08 DIAGNOSIS — R76.12 POSITIVE QUANTIFERON-TB GOLD TEST: Primary | ICD-10-CM

## 2019-01-08 RX ORDER — LACTULOSE 10 G/15ML
1 SOLUTION ORAL; RECTAL
Qty: 480 ML | Refills: 5 | Status: SHIPPED | OUTPATIENT
Start: 2019-01-08

## 2019-01-11 DIAGNOSIS — R63.1 PSYCHOGENIC POLYDIPSIA: ICD-10-CM

## 2019-01-11 DIAGNOSIS — E87.1 HYPONATREMIA: ICD-10-CM

## 2019-01-11 DIAGNOSIS — F54 PSYCHOGENIC POLYDIPSIA: ICD-10-CM

## 2019-01-11 RX ORDER — FOLIC ACID 1 MG/1
1 TABLET ORAL DAILY
Qty: 30 TAB | Refills: 5 | Status: SHIPPED | OUTPATIENT
Start: 2019-01-11 | End: 2019-06-20 | Stop reason: SDUPTHER

## 2019-01-11 RX ORDER — AMLODIPINE BESYLATE 5 MG/1
5 TABLET ORAL DAILY
Qty: 30 TAB | Refills: 0 | Status: SHIPPED | OUTPATIENT
Start: 2019-01-11 | End: 2019-02-20 | Stop reason: SDUPTHER

## 2019-01-11 RX ORDER — SODIUM CHLORIDE TAB 1 GM 1 G
1 TAB MISCELLANEOUS DAILY
Qty: 30 TAB | Refills: 0 | Status: SHIPPED | OUTPATIENT
Start: 2019-01-11 | End: 2019-02-20 | Stop reason: SDUPTHER

## 2019-01-11 RX ORDER — LANOLIN ALCOHOL/MO/W.PET/CERES
100 CREAM (GRAM) TOPICAL DAILY
Qty: 30 TAB | Refills: 5 | Status: SHIPPED | OUTPATIENT
Start: 2019-01-11 | End: 2019-06-20 | Stop reason: SDUPTHER

## 2019-01-11 RX ORDER — PAROXETINE HYDROCHLORIDE 20 MG/1
20 TABLET, FILM COATED ORAL DAILY
Qty: 30 TAB | Refills: 0 | Status: SHIPPED | OUTPATIENT
Start: 2019-01-11 | End: 2019-02-20 | Stop reason: SDUPTHER

## 2019-01-17 RX ORDER — ERGOCALCIFEROL 1.25 MG/1
50000 CAPSULE ORAL
Qty: 12 CAP | Refills: 0 | OUTPATIENT
Start: 2019-01-17

## 2019-02-15 ENCOUNTER — TELEPHONE (OUTPATIENT)
Dept: INTERNAL MEDICINE CLINIC | Age: 59
End: 2019-02-15

## 2019-02-15 NOTE — TELEPHONE ENCOUNTER
----- Message from Karan Garza sent at 2/15/2019  1:44 PM EST -----  Regarding: Ariel Baum - NP/ telephone   Pt's  Christina Chavez would like to know when she can come in a get the results to the Pts TB test.  [facility] [phone]

## 2019-02-19 NOTE — TELEPHONE ENCOUNTER
Call placed to pt  and advised that TB test (quantiferon gold) which was done 10/2018, was positive and SHI was advised that he needs a CXR to investigate, CXR has not been obtained as of this date.   provided with OP scheduling number to schedule CXR and  stated she would be transporting him for this

## 2019-02-20 DIAGNOSIS — F54 PSYCHOGENIC POLYDIPSIA: ICD-10-CM

## 2019-02-20 DIAGNOSIS — R63.1 PSYCHOGENIC POLYDIPSIA: ICD-10-CM

## 2019-02-20 DIAGNOSIS — E87.1 HYPONATREMIA: ICD-10-CM

## 2019-02-21 RX ORDER — AMLODIPINE BESYLATE 5 MG/1
5 TABLET ORAL DAILY
Qty: 30 TAB | Refills: 0 | Status: SHIPPED | OUTPATIENT
Start: 2019-02-21 | End: 2019-04-04 | Stop reason: SDUPTHER

## 2019-02-21 RX ORDER — PAROXETINE HYDROCHLORIDE 20 MG/1
20 TABLET, FILM COATED ORAL DAILY
Qty: 30 TAB | Refills: 0 | Status: SHIPPED | OUTPATIENT
Start: 2019-02-21 | End: 2019-04-04 | Stop reason: SDUPTHER

## 2019-02-21 RX ORDER — SODIUM CHLORIDE TAB 1 GM 1 G
1 TAB MISCELLANEOUS DAILY
Qty: 30 TAB | Refills: 0 | Status: SHIPPED | OUTPATIENT
Start: 2019-02-21

## 2019-02-28 ENCOUNTER — HOSPITAL ENCOUNTER (OUTPATIENT)
Dept: GENERAL RADIOLOGY | Age: 59
Discharge: HOME | End: 2019-02-28
Payer: COMMERCIAL

## 2019-02-28 DIAGNOSIS — R76.12 POSITIVE QUANTIFERON-TB GOLD TEST: ICD-10-CM

## 2019-02-28 PROCEDURE — 71046 X-RAY EXAM CHEST 2 VIEWS: CPT

## 2019-03-04 ENCOUNTER — TELEPHONE (OUTPATIENT)
Dept: INTERNAL MEDICINE CLINIC | Age: 59
End: 2019-03-04

## 2019-03-04 NOTE — PROGRESS NOTES
Jasmeet Stark's , return my call. Informed her that per Dr. Wes Patel pt needs to schedule and office visit to discuss his x-ray results. Cheryl Lopez verbalized understanding and then stated that pt has an upcoming appt on 3/11/2019 and asked if the results can wait and be discussed during that visit. Informed Cheryl Begin that yes the results can be discussed during his visit on 3/11/2019 but if anything occurs or there are any questions before that time to please call the office. She verbalized understanding and had no further questions at this time.

## 2019-03-04 NOTE — TELEPHONE ENCOUNTER
----- Message from Collin Coreas sent at 3/4/2019  1:47 PM EST -----  Contact: [phone]  Karan Inman is requesting a return call after a missed call from [phone] [place] [address] at the practice.

## 2019-03-04 NOTE — TELEPHONE ENCOUNTER
Adilene Sykes, Jasmeet Munguia's , return my call. Informed her that per Dr. Henry Yan pt needs to schedule and office visit to discuss his x-ray results. Guzman Lay verbalized understanding and then stated that pt has an upcoming appt on 3/11/2019 and asked if the results can wait and be discussed during that visit. Informed Guzman Lay that yes the results can be discussed during his visit on 3/11/2019 but if anything occurs or there are any questions before that time to please call the office. She verbalized understanding and had no further questions at this time.

## 2019-03-11 ENCOUNTER — HOSPITAL ENCOUNTER (OUTPATIENT)
Dept: GENERAL RADIOLOGY | Age: 59
Discharge: HOME | End: 2019-03-11
Payer: COMMERCIAL

## 2019-03-11 ENCOUNTER — OFFICE VISIT (OUTPATIENT)
Dept: INTERNAL MEDICINE CLINIC | Age: 59
End: 2019-03-11

## 2019-03-11 VITALS
TEMPERATURE: 96.1 F | RESPIRATION RATE: 22 BRPM | SYSTOLIC BLOOD PRESSURE: 102 MMHG | OXYGEN SATURATION: 93 % | WEIGHT: 140.8 LBS | BODY MASS INDEX: 20.16 KG/M2 | HEIGHT: 70 IN | DIASTOLIC BLOOD PRESSURE: 72 MMHG | HEART RATE: 95 BPM

## 2019-03-11 DIAGNOSIS — R93.89 ABNORMAL CXR: ICD-10-CM

## 2019-03-11 DIAGNOSIS — R76.12 POSITIVE QUANTIFERON-TB GOLD TEST: ICD-10-CM

## 2019-03-11 DIAGNOSIS — J43.9 PULMONARY EMPHYSEMA, UNSPECIFIED EMPHYSEMA TYPE (HCC): ICD-10-CM

## 2019-03-11 DIAGNOSIS — I10 ESSENTIAL HYPERTENSION: ICD-10-CM

## 2019-03-11 DIAGNOSIS — F20.0 PARANOID SCHIZOPHRENIA (HCC): ICD-10-CM

## 2019-03-11 DIAGNOSIS — E55.9 VITAMIN D DEFICIENCY: ICD-10-CM

## 2019-03-11 DIAGNOSIS — R79.89 TSH ELEVATION: ICD-10-CM

## 2019-03-11 DIAGNOSIS — R93.89 ABNORMAL CXR: Primary | ICD-10-CM

## 2019-03-11 PROCEDURE — 71046 X-RAY EXAM CHEST 2 VIEWS: CPT

## 2019-03-11 RX ORDER — DOXYCYCLINE 100 MG/1
100 TABLET ORAL 2 TIMES DAILY
Qty: 14 TAB | Refills: 0 | Status: SHIPPED | OUTPATIENT
Start: 2019-03-11 | End: 2019-03-18

## 2019-03-11 NOTE — PROGRESS NOTES
Health Maintenance Due   Topic Date Due    DTaP/Tdap/Td series (1 - Tdap) 08/27/1980    Shingrix Vaccine Age 50> (1 of 2) 08/27/2009       Chief Complaint   Patient presents with    Depression     Pt moving to new facility    Seizure    Mental Health Problem       1. Have you been to the ER, urgent care clinic since your last visit? Hospitalized since your last visit? No    2. Have you seen or consulted any other health care providers outside of the [address] [place] [address] since your last visit? Include any pap smears or colon screening. No    3) Do you have an Advance Directive on file? no    4) Are you interested in receiving information on Advance Directives? NO      Patient is accompanied by  I have received verbal consent from Brandon Parra to discuss any/all medical information while they are present in the room.

## 2019-03-11 NOTE — PROGRESS NOTES
Called and spoke with home manager and informed her that Doxycycline has been called into pts pharmacy on file. Verified with her it's the correct pharmacy. Informed her he is to take 1 pill twice a day for 7 days. She verbalized understanding and had no further questions at this time. Encouraged her to call with any further questions or concerns.

## 2019-03-14 LAB
25(OH)D3+25(OH)D2 SERPL-MCNC: 32.5 NG/ML (ref 30–100)
ALBUMIN SERPL-MCNC: 5 G/DL (ref 3.5–5.5)
ALBUMIN/GLOB SERPL: 2 {RATIO} (ref 1.2–2.2)
ALP SERPL-CCNC: 131 IU/L (ref 39–117)
ALT SERPL-CCNC: 14 IU/L (ref 0–44)
AST SERPL-CCNC: 17 IU/L (ref 0–40)
BILIRUB SERPL-MCNC: 0.4 MG/DL (ref 0–1.2)
BUN SERPL-MCNC: 5 MG/DL (ref 6–24)
BUN/CREAT SERPL: 7 (ref 9–20)
CALCIUM SERPL-MCNC: 9.6 MG/DL (ref 8.7–10.2)
CHLORIDE SERPL-SCNC: 98 MMOL/L (ref 96–106)
CO2 SERPL-SCNC: 22 MMOL/L (ref 20–29)
CREAT SERPL-MCNC: 0.75 MG/DL (ref 0.76–1.27)
ERYTHROCYTE [DISTWIDTH] IN BLOOD BY AUTOMATED COUNT: 13.9 % (ref 12.3–15.4)
GLOBULIN SER CALC-MCNC: 2.5 G/DL (ref 1.5–4.5)
GLUCOSE SERPL-MCNC: 83 MG/DL (ref 65–99)
HCT VFR BLD AUTO: 47.1 % (ref 37.5–51)
HGB BLD-MCNC: 16.5 G/DL (ref 13–17.7)
MCH RBC QN AUTO: 33.7 PG (ref 26.6–33)
MCHC RBC AUTO-ENTMCNC: 35 G/DL (ref 31.5–35.7)
MCV RBC AUTO: 96 FL (ref 79–97)
PLATELET # BLD AUTO: 231 X10E3/UL (ref 150–379)
POTASSIUM SERPL-SCNC: 3.4 MMOL/L (ref 3.5–5.2)
PROT SERPL-MCNC: 7.5 G/DL (ref 6–8.5)
RBC # BLD AUTO: 4.9 X10E6/UL (ref 4.14–5.8)
SODIUM SERPL-SCNC: 140 MMOL/L (ref 134–144)
T4 FREE SERPL-MCNC: 1.68 NG/DL (ref 0.82–1.77)
TSH SERPL DL<=0.005 MIU/L-ACNC: 0.77 UIU/ML (ref 0.45–4.5)
WBC # BLD AUTO: 7.8 X10E3/UL (ref 3.4–10.8)

## 2019-03-19 ENCOUNTER — TELEPHONE (OUTPATIENT)
Dept: INTERNAL MEDICINE CLINIC | Age: 59
End: 2019-03-19

## 2019-03-26 NOTE — TELEPHONE ENCOUNTER
Pt paperwork for TB screening and H&P has been completed and faxed to [facility] where the pt resides. Spoke with a staff member at the facility to make them aware of the incoming fax for pts chart. Confirmed Fax # [phone] and confirmation of \"transmission ok\" received. Also spoke with Ms. Sammie Ward, pts , and informed her of the above. She thanked me and verbalized understanding. No further questions and encouraged her to call with any questions or concerns.

## 2019-03-31 NOTE — PROGRESS NOTES
HISTORY OF PRESENT ILLNESS  James Alvares is a 60 y.o. male. Pt. comes in with his  for f/u. Has multiple medical problems. Reports having cold symptoms with a cough. Has COPD with chronic dyspnea/MALDONADO. Continues to smoke daily. Denies alcohol use. Is moving to a new facility. Recent chest x-ray for TB screening showed some abnormalities. Followed by psychiatrist for schizophrenia. Reports compliance with medications and diet. Med list and most recent labs/studies reviewed with pt. Trying to be active physically as tolerated. Needs med refills. Due for repeat labs. Reports no other new c/o. HPI    Review of Systems   Constitutional: Negative. HENT: Negative. Eyes: Negative. Respiratory: Positive for cough and shortness of breath. Negative for hemoptysis, sputum production and wheezing. Cardiovascular: Negative for chest pain, palpitations and leg swelling. Gastrointestinal: Negative for abdominal pain. Genitourinary: Negative. Musculoskeletal: Negative. Negative for falls. Skin: Negative. Neurological: Negative for dizziness, sensory change, focal weakness and headaches. Endo/Heme/Allergies: Negative. Psychiatric/Behavioral: Positive for depression. Negative for substance abuse and suicidal ideas. The patient is nervous/anxious and has insomnia. Physical Exam   Constitutional: He is oriented to person, place, and time. He appears well-developed and well-nourished. No distress. Cachectic man  Smell of cigarettes   HENT:   Head: Normocephalic and atraumatic. Mouth/Throat: Oropharynx is clear and moist.   Eyes: Conjunctivae are normal.   Neck: Normal range of motion. Neck supple. No JVD present. No thyromegaly present. Cardiovascular: Normal rate, regular rhythm, normal heart sounds and intact distal pulses. No murmur heard. Pulmonary/Chest: Effort normal. No respiratory distress. He has wheezes (few). He has no rales. He exhibits no tenderness. Diminished lung sounds   Abdominal: Soft. Bowel sounds are normal. He exhibits no distension. There is no tenderness. Musculoskeletal: Normal range of motion. He exhibits no edema or tenderness. Neurological: He is alert and oriented to person, place, and time. Coordination normal.   Skin: Skin is warm and dry. No rash noted. Psychiatric: He has a normal mood and affect. His behavior is normal.   Nursing note and vitals reviewed. ASSESSMENT and PLAN  Diagnoses and all orders for this visit:    1. Abnormal CXR  -     XR CHEST PA LAT; Future    2. Positive QuantiFERON-TB Gold test  -     XR CHEST PA LAT; Future    3. Pulmonary emphysema, unspecified emphysema type ([facility]    4. Essential hypertension  -     METABOLIC PANEL, COMPREHENSIVE  -     CBC W/O DIFF    5. Paranoid schizophrenia ([facility]    6. Vitamin D deficiency  -     VITAMIN D, 25 HYDROXY    7. TSH elevation  -     TSH 3RD GENERATION  -     T4, FREE    Other orders  -     doxycycline (ADOXA) 100 mg tablet; Take 1 Tab by mouth two (2) times a day for 7 days. Follow-up and Dispositions    · Return in about 4 weeks (around 4/8/2019).      lab results and schedule of future lab studies reviewed with patient  reviewed diet, exercise and weight control  reviewed medications and side effects in detail  F/u with other MD's as scheduled  Advised pt to stop smoking

## 2019-04-05 RX ORDER — PAROXETINE HYDROCHLORIDE 20 MG/1
20 TABLET, FILM COATED ORAL DAILY
Qty: 30 TAB | Refills: 5 | Status: SHIPPED | OUTPATIENT
Start: 2019-04-05 | End: 2020-04-17 | Stop reason: SDUPTHER

## 2019-04-05 RX ORDER — PREDNISONE 10 MG/1
TABLET ORAL 2 TIMES DAILY
OUTPATIENT
Start: 2019-04-05

## 2019-04-05 RX ORDER — ERGOCALCIFEROL 1.25 MG/1
50000 CAPSULE ORAL
Qty: 12 CAP | Refills: 0 | Status: SHIPPED | OUTPATIENT
Start: 2019-04-05 | End: 2019-07-26 | Stop reason: SDUPTHER

## 2019-04-05 RX ORDER — AMLODIPINE BESYLATE 5 MG/1
5 TABLET ORAL DAILY
Qty: 30 TAB | Refills: 5 | Status: SHIPPED | OUTPATIENT
Start: 2019-04-05 | End: 2020-04-17 | Stop reason: SDUPTHER

## 2019-05-28 ENCOUNTER — TELEPHONE (OUTPATIENT)
Dept: INTERNAL MEDICINE CLINIC | Age: 59
End: 2019-05-28

## 2019-05-28 ENCOUNTER — OFFICE VISIT (OUTPATIENT)
Dept: INTERNAL MEDICINE CLINIC | Age: 59
End: 2019-05-28

## 2019-05-28 ENCOUNTER — HOSPITAL ENCOUNTER (OUTPATIENT)
Dept: GENERAL RADIOLOGY | Age: 59
Discharge: HOME | End: 2019-05-28
Payer: MEDICAID

## 2019-05-28 VITALS
WEIGHT: 133.8 LBS | BODY MASS INDEX: 19.15 KG/M2 | TEMPERATURE: 98.3 F | SYSTOLIC BLOOD PRESSURE: 110 MMHG | RESPIRATION RATE: 18 BRPM | OXYGEN SATURATION: 100 % | DIASTOLIC BLOOD PRESSURE: 72 MMHG | HEIGHT: 70 IN | HEART RATE: 99 BPM

## 2019-05-28 DIAGNOSIS — J43.9 PULMONARY EMPHYSEMA, UNSPECIFIED EMPHYSEMA TYPE (HCC): Primary | ICD-10-CM

## 2019-05-28 DIAGNOSIS — F25.1 SCHIZOAFFECTIVE DISORDER, DEPRESSIVE TYPE (HCC): ICD-10-CM

## 2019-05-28 DIAGNOSIS — R91.8 ABNORMALITY OF LUNG ON CXR: ICD-10-CM

## 2019-05-28 DIAGNOSIS — F17.219 CIGARETTE NICOTINE DEPENDENCE WITH NICOTINE-INDUCED DISORDER: ICD-10-CM

## 2019-05-28 DIAGNOSIS — J43.9 PULMONARY EMPHYSEMA, UNSPECIFIED EMPHYSEMA TYPE (HCC): ICD-10-CM

## 2019-05-28 DIAGNOSIS — I10 ESSENTIAL HYPERTENSION: ICD-10-CM

## 2019-05-28 PROCEDURE — 71046 X-RAY EXAM CHEST 2 VIEWS: CPT

## 2019-05-28 NOTE — PROGRESS NOTES
Deepti Serrato is a 60 y.o. male     Chief Complaint   Patient presents with    Hypertension    Depression    Insomnia     1. Have you been to the ER, urgent care clinic since your last visit? Hospitalized since your last visit? No    2. Have you seen or consulted any other health care providers outside of the [address] [place] [address] since your last visit? Include any pap smears or colon screening.   No    Visit Vitals  /72 (BP 1 Location: Right arm, BP Patient Position: Sitting)   Pulse 99   Temp 98.3 °F (36.8 °C) (Oral)   Resp 18   Ht 5' 10\" (1.778 m)   Wt 133 lb 12.8 oz (60.7 kg)   SpO2 100%   BMI 19.20 kg/m²

## 2019-05-28 NOTE — PROGRESS NOTES
HISTORY OF PRESENT ILLNESS  Lito Singh is a 60 y.o. male. Pt. comes in with his  from his group home for f/u. Has multiple medical problems. Continues to smoke daily. Has known emphysema. Continues to have a congested cough. Denies dyspnea or MALDONADO. Recent CXR showed a right sided airspace disease. Patient was treated with antibiotics and prednisone and felt better. He has lost 7 lbs in 2 months. Denies fevers, hemoptysis, night sweats, and chest pain. Followed by psychiatrist for schizophrenia. Reports compliance with medications and diet. Med list and most recent labs/studies reviewed with pt. Trying to be active physically as tolerated. Reports no other new c/o. HPI    Review of Systems   Constitutional: Positive for weight loss. Negative for diaphoresis and fever. HENT: Negative. Eyes: Negative. Respiratory: Positive for cough and shortness of breath (MALDONADO, chronic). Negative for hemoptysis, sputum production and wheezing. Cardiovascular: Negative for chest pain, palpitations and leg swelling. Gastrointestinal: Negative for abdominal pain. Genitourinary: Negative. Musculoskeletal: Negative. Negative for falls. Skin: Negative. Neurological: Negative for dizziness, sensory change, focal weakness and headaches. Endo/Heme/Allergies: Negative. Psychiatric/Behavioral: Positive for depression. Negative for substance abuse and suicidal ideas. The patient is nervous/anxious and has insomnia. Physical Exam   Constitutional: He is oriented to person, place, and time. He appears well-developed and well-nourished. No distress. Cachectic man  Smell of cigarettes   HENT:   Head: Normocephalic and atraumatic. Mouth/Throat: Oropharynx is clear and moist.   Eyes: Conjunctivae are normal.   Neck: Normal range of motion. Neck supple. No JVD present. No thyromegaly present. Cardiovascular: Normal rate, regular rhythm, normal heart sounds and intact distal pulses.    No murmur heard. Pulmonary/Chest: Effort normal. No respiratory distress. He has no wheezes. He has no rales. He exhibits no tenderness. Diminished mildly congested lung sounds   Abdominal: Soft. Bowel sounds are normal. He exhibits no distension. There is no tenderness. Musculoskeletal: Normal range of motion. He exhibits no edema or tenderness. Neurological: He is alert and oriented to person, place, and time. Coordination normal.   Skin: Skin is warm and dry. No rash noted. Psychiatric: He has a normal mood and affect. His behavior is normal.   Nursing note and vitals reviewed. ASSESSMENT and PLAN  Diagnoses and all orders for this visit:    1. Pulmonary emphysema, unspecified emphysema type ([address]  -     XR CHEST PA LAT; Future    2. Essential hypertension    3. Schizoaffective disorder, depressive type ([address]    4. Cigarette nicotine dependence with nicotine-induced disorder  -     XR CHEST PA LAT; Future    5. Abnormality of lung on CXR  -     XR CHEST PA LAT; Future      Follow-up and Dispositions    · Return in about 3 months (around 8/28/2019).      lab results and schedule of future lab studies reviewed with patient  reviewed diet, exercise and weight control  reviewed medications and side effects in detail  F/u with other MD's as scheduled  Advised pt strongly to stop smoking   If repeat CXR is abnormal patient will need a chest CT

## 2019-05-28 NOTE — TELEPHONE ENCOUNTER
[facility] since 1 med was discontinued at patient's visit today

## 2019-05-29 DIAGNOSIS — R91.8 ABNORMALITY OF LUNG ON CXR: Primary | ICD-10-CM

## 2019-05-29 DIAGNOSIS — J43.9 PULMONARY EMPHYSEMA, UNSPECIFIED EMPHYSEMA TYPE (HCC): ICD-10-CM

## 2019-06-11 ENCOUNTER — HOSPITAL ENCOUNTER (OUTPATIENT)
Dept: CT IMAGING | Age: 59
Discharge: HOME | End: 2019-06-11
Attending: NURSE PRACTITIONER
Payer: COMMERCIAL

## 2019-06-11 DIAGNOSIS — J43.9 PULMONARY EMPHYSEMA, UNSPECIFIED EMPHYSEMA TYPE (HCC): ICD-10-CM

## 2019-06-11 DIAGNOSIS — R91.8 ABNORMALITY OF LUNG ON CXR: ICD-10-CM

## 2019-06-11 PROCEDURE — 74011000258 HC RX REV CODE- 258: Performed by: NURSE PRACTITIONER

## 2019-06-11 PROCEDURE — 74011636320 HC RX REV CODE- 636/320: Performed by: NURSE PRACTITIONER

## 2019-06-11 PROCEDURE — 71260 CT THORAX DX C+: CPT

## 2019-06-11 RX ORDER — SODIUM CHLORIDE 0.9 % (FLUSH) 0.9 %
10 SYRINGE (ML) INJECTION
Status: COMPLETED | OUTPATIENT
Start: 2019-06-11 | End: 2019-06-11

## 2019-06-11 RX ADMIN — SODIUM CHLORIDE 100 ML: 900 INJECTION, SOLUTION INTRAVENOUS at 10:14

## 2019-06-11 RX ADMIN — IOPAMIDOL 100 ML: 612 INJECTION, SOLUTION INTRAVENOUS at 10:14

## 2019-06-11 RX ADMIN — Medication 10 ML: at 10:14

## 2019-06-14 NOTE — PROGRESS NOTES
Called and spoke with the home pt resides and asked if they've spoken with [facility] to schedule pt's Chest CT being his CXR was abnormal. They weren't sure if this has been scheduled or not. Phone # for CS was given and they're calling to assure pt is scheduled ASAP for Chest CT. No further questions at this time.

## 2019-06-20 RX ORDER — LANOLIN ALCOHOL/MO/W.PET/CERES
100 CREAM (GRAM) TOPICAL DAILY
Qty: 30 TAB | Refills: 5 | Status: SHIPPED | OUTPATIENT
Start: 2019-06-20 | End: 2020-07-02

## 2019-06-20 RX ORDER — FOLIC ACID 1 MG/1
1 TABLET ORAL DAILY
Qty: 30 TAB | Refills: 5 | Status: SHIPPED | OUTPATIENT
Start: 2019-06-20 | End: 2020-07-02

## 2019-09-04 ENCOUNTER — OFFICE VISIT (OUTPATIENT)
Dept: CARDIOLOGY CLINIC | Age: 59
End: 2019-09-04

## 2019-09-04 VITALS
WEIGHT: 133 LBS | HEART RATE: 96 BPM | BODY MASS INDEX: 19.04 KG/M2 | OXYGEN SATURATION: 97 % | HEIGHT: 70 IN | RESPIRATION RATE: 20 BRPM

## 2019-09-04 DIAGNOSIS — I10 ESSENTIAL HYPERTENSION: Primary | ICD-10-CM

## 2019-09-04 NOTE — PROGRESS NOTES
Pt arrived in error. On review of chart, pt was referred to [facility] and not cardiology. Brief discussion with pt and  confirmed no cardiac complaints at this time. Only cardiac risk factors include 35-pk year smoking history and family h/o cardiac arrest in his father and paternal uncles. PSR confirmed with Dr. Ritu Sinclair office (PCP) that no cardiac referral was made at this time. Will reserve consultation with pt for a later date, if he would like to self-refer or develops any sx including chest pain, palpitations, SOB/MALDONADO changed from his baseline d/t emphysema, etc., and allow  can discuss with assisted living facility and PCP. Discussed with Dr. Maria Roberts. Rachel Roberts PA-C    Patient seen and examined. All pertinent data reviewed. I have reviewed detailed note as outlined by Salina Diaz. Case discussed with Nursing/medical assistant staff and Salina Diaz. Patient apparently in wrong office. No cardiac complaints and no cardiac referral confirmed with patient, and Dr. Ritu Sinclair office. Advised to return if needed. Plans as outlined. Mil Roberts MD,FACC,[facility]

## 2019-09-04 NOTE — PROGRESS NOTES
Chief Complaint   Patient presents with    New Patient     Abnormal CT    Hypertension     1. Have you been to the ER, urgent care clinic since your last visit? Hospitalized since your last visit? No    2. Have you seen or consulted any other health care providers outside of the [address] [place] [address] since your last visit? Include any pap smears or colon screening.  No

## 2019-09-24 NOTE — PROGRESS NOTES
Received phone call from [place] over at [facility] that stated the pt's Inhaler was not refilled/approved by the insurance and the pt was out of his inhaler. States that the pharmacy informed her they had faxed over the prior auth paperwork to the office and had not heard back yet. She is requesting if she faxed the information over, could writer please look at the information and attempt to get a response so medication can be refilled. Informed that the paperwork would be addressed. Confirmed fax number with [place] and instructed would check up in the front in approx 10 minutes. To please send over Attn: Roula Petersen. Will pass along to our prior auth nurse to address for today out of concern for the patient. Fax received. Prior authorization not necessary. Alternative medication is suggested. Breo Ellipta. Fax with alternative placed on MD's desk for signature and to be fax'd to pharmacy for completion of order by end of day. no

## 2019-10-16 DIAGNOSIS — J42 CHRONIC BRONCHITIS, UNSPECIFIED CHRONIC BRONCHITIS TYPE (HCC): ICD-10-CM

## 2019-10-16 DIAGNOSIS — J43.9 PULMONARY EMPHYSEMA, UNSPECIFIED EMPHYSEMA TYPE (HCC): ICD-10-CM

## 2019-10-17 RX ORDER — IPRATROPIUM BROMIDE AND ALBUTEROL SULFATE 2.5; .5 MG/3ML; MG/3ML
SOLUTION RESPIRATORY (INHALATION)
Qty: 75 ML | Refills: 6 | Status: SHIPPED | OUTPATIENT
Start: 2019-10-17

## 2019-11-01 ENCOUNTER — HOSPITAL ENCOUNTER (OUTPATIENT)
Dept: CT IMAGING | Age: 59
Discharge: HOME | End: 2019-11-01
Attending: INTERNAL MEDICINE
Payer: MEDICAID

## 2019-11-01 DIAGNOSIS — R91.1 SOLITARY PULMONARY NODULE: ICD-10-CM

## 2019-11-01 PROCEDURE — 71250 CT THORAX DX C-: CPT

## 2019-12-09 DIAGNOSIS — J42 CHRONIC BRONCHITIS, UNSPECIFIED CHRONIC BRONCHITIS TYPE (HCC): ICD-10-CM

## 2019-12-09 RX ORDER — BUDESONIDE AND FORMOTEROL FUMARATE DIHYDRATE 160; 4.5 UG/1; UG/1
2 AEROSOL RESPIRATORY (INHALATION) 2 TIMES DAILY
Qty: 1 INHALER | Refills: 11 | Status: SHIPPED | OUTPATIENT
Start: 2019-12-09 | End: 2021-04-14 | Stop reason: SDUPTHER

## 2020-03-04 ENCOUNTER — TELEPHONE (OUTPATIENT)
Dept: INTERNAL MEDICINE CLINIC | Age: 60
End: 2020-03-04

## 2020-03-04 NOTE — TELEPHONE ENCOUNTER
Ruba Bethea called stating that she is trying to help patient get his social security card and she needs some information to help her with this. Please call her back at [phone]

## 2020-03-05 NOTE — TELEPHONE ENCOUNTER
Call placed to Greg Hooper, patient counselor from [facility], trying to help patient get his SS card and birth certificate. She states at the social security office they told her a certified medical office visit note is one of the certifying options.  Patient LOV 5/2019, appointment scheduled for 3/13/2020 at 10:30am and OV note will be faxed after completion and Judi Coelho voiced understanding and appreciation

## 2020-03-13 ENCOUNTER — OFFICE VISIT (OUTPATIENT)
Dept: INTERNAL MEDICINE CLINIC | Age: 60
End: 2020-03-13

## 2020-03-13 VITALS
RESPIRATION RATE: 18 BRPM | HEIGHT: 70 IN | HEART RATE: 92 BPM | TEMPERATURE: 97.6 F | WEIGHT: 130 LBS | SYSTOLIC BLOOD PRESSURE: 102 MMHG | BODY MASS INDEX: 18.61 KG/M2 | DIASTOLIC BLOOD PRESSURE: 66 MMHG | OXYGEN SATURATION: 96 %

## 2020-03-13 DIAGNOSIS — J42 CHRONIC BRONCHITIS, UNSPECIFIED CHRONIC BRONCHITIS TYPE (HCC): ICD-10-CM

## 2020-03-13 DIAGNOSIS — R91.1 NODULE OF LOWER LOBE OF LEFT LUNG: ICD-10-CM

## 2020-03-13 DIAGNOSIS — J43.9 PULMONARY EMPHYSEMA, UNSPECIFIED EMPHYSEMA TYPE (HCC): Primary | ICD-10-CM

## 2020-03-13 DIAGNOSIS — F17.219 CIGARETTE NICOTINE DEPENDENCE WITH NICOTINE-INDUCED DISORDER: ICD-10-CM

## 2020-03-13 DIAGNOSIS — F20.0 PARANOID SCHIZOPHRENIA (HCC): ICD-10-CM

## 2020-03-13 DIAGNOSIS — I10 ESSENTIAL HYPERTENSION: ICD-10-CM

## 2020-03-14 LAB
ALBUMIN SERPL-MCNC: 4.1 G/DL (ref 3.8–4.9)
ALBUMIN/GLOB SERPL: 1.5 {RATIO} (ref 1.2–2.2)
ALP SERPL-CCNC: 105 IU/L (ref 39–117)
ALT SERPL-CCNC: 10 IU/L (ref 0–44)
AST SERPL-CCNC: 15 IU/L (ref 0–40)
BASOPHILS # BLD AUTO: 0.1 X10E3/UL (ref 0–0.2)
BASOPHILS NFR BLD AUTO: 1 %
BILIRUB SERPL-MCNC: 0.4 MG/DL (ref 0–1.2)
BUN SERPL-MCNC: 5 MG/DL (ref 8–27)
BUN/CREAT SERPL: 7 (ref 10–24)
CALCIUM SERPL-MCNC: 9.2 MG/DL (ref 8.6–10.2)
CHLORIDE SERPL-SCNC: 95 MMOL/L (ref 96–106)
CO2 SERPL-SCNC: 27 MMOL/L (ref 20–29)
CREAT SERPL-MCNC: 0.7 MG/DL (ref 0.76–1.27)
EOSINOPHIL # BLD AUTO: 0.1 X10E3/UL (ref 0–0.4)
EOSINOPHIL NFR BLD AUTO: 1 %
ERYTHROCYTE [DISTWIDTH] IN BLOOD BY AUTOMATED COUNT: 12.3 % (ref 11.6–15.4)
GLOBULIN SER CALC-MCNC: 2.7 G/DL (ref 1.5–4.5)
GLUCOSE SERPL-MCNC: 62 MG/DL (ref 65–99)
HCT VFR BLD AUTO: 44.5 % (ref 37.5–51)
HGB BLD-MCNC: 14.9 G/DL (ref 13–17.7)
IMM GRANULOCYTES # BLD AUTO: 0 X10E3/UL (ref 0–0.1)
IMM GRANULOCYTES NFR BLD AUTO: 0 %
LYMPHOCYTES # BLD AUTO: 1.5 X10E3/UL (ref 0.7–3.1)
LYMPHOCYTES NFR BLD AUTO: 18 %
MCH RBC QN AUTO: 33.5 PG (ref 26.6–33)
MCHC RBC AUTO-ENTMCNC: 33.5 G/DL (ref 31.5–35.7)
MCV RBC AUTO: 100 FL (ref 79–97)
MONOCYTES # BLD AUTO: 0.8 X10E3/UL (ref 0.1–0.9)
MONOCYTES NFR BLD AUTO: 10 %
NEUTROPHILS # BLD AUTO: 5.9 X10E3/UL (ref 1.4–7)
NEUTROPHILS NFR BLD AUTO: 70 %
PLATELET # BLD AUTO: 222 X10E3/UL (ref 150–450)
POTASSIUM SERPL-SCNC: 4.3 MMOL/L (ref 3.5–5.2)
PROT SERPL-MCNC: 6.8 G/DL (ref 6–8.5)
RBC # BLD AUTO: 4.45 X10E6/UL (ref 4.14–5.8)
SODIUM SERPL-SCNC: 136 MMOL/L (ref 134–144)
WBC # BLD AUTO: 8.2 X10E3/UL (ref 3.4–10.8)

## 2020-03-18 ENCOUNTER — TELEPHONE (OUTPATIENT)
Dept: INTERNAL MEDICINE CLINIC | Age: 60
End: 2020-03-18

## 2020-03-27 NOTE — PROGRESS NOTES
HISTORY OF PRESENT ILLNESS  Carole Bryan is a 60 y.o. male. Pt. comes in with his  for f/u. Has a few chronic medical issues as documented. BP is stable. He has COPD/chronic bronchitis. Has been smoking for a long time. Has chronic dyspnea and MALDONADO. Has a cough but denies any significant sputum production. Previous imaging showed left lower lobe nodule. Recent bronchoscopy was negative for malignancy. Followed by pulmonologist.  Leveda Pickup is fair. Has lost some weight. Denies chest pain. Followed by psychiatrist for schizophrenia. PMH/PSH/Allergies/Social History/medication list and most recent studies reviewed with patient. Tobacco use: Yes  Alcohol use: No    Reports compliance with medications and diet. Trying to be active physically as tolerated. Reports no other new c/o. HPI    Review of Systems   Constitutional: Negative. Negative for fever. HENT: Negative. Eyes: Negative. Respiratory: Positive for cough and shortness of breath (MALDONADO, chronic). Negative for hemoptysis, sputum production and wheezing. Cardiovascular: Negative for chest pain, palpitations and leg swelling. Gastrointestinal: Negative for abdominal pain. Genitourinary: Negative. Musculoskeletal: Negative. Negative for falls. Skin: Negative. Neurological: Negative for dizziness, sensory change, focal weakness and headaches. Endo/Heme/Allergies: Negative. Psychiatric/Behavioral: Positive for depression and substance abuse (smoker). Negative for suicidal ideas. The patient is nervous/anxious and has insomnia. Physical Exam  Vitals signs and nursing note reviewed. Constitutional:       General: He is not in acute distress. Appearance: He is well-developed. Comments: Cachectic man  Smell of cigarettes   HENT:      Head: Normocephalic and atraumatic. Mouth/Throat:      Mouth: Mucous membranes are moist.   Eyes:      General: No scleral icterus.      Conjunctiva/sclera: Conjunctivae normal.   Neck:      Musculoskeletal: Normal range of motion and neck supple. Thyroid: No thyromegaly. Vascular: No JVD. Cardiovascular:      Rate and Rhythm: Normal rate and regular rhythm. Heart sounds: Normal heart sounds. No murmur. Pulmonary:      Effort: Pulmonary effort is normal. No respiratory distress. Breath sounds: No wheezing or rales. Comments: Decreased sounds  Chest:      Chest wall: No tenderness. Abdominal:      General: Bowel sounds are normal. There is no distension. Palpations: Abdomen is soft. Tenderness: There is no abdominal tenderness. Musculoskeletal: Normal range of motion. General: No tenderness. Skin:     General: Skin is warm and dry. Findings: No rash. Neurological:      Mental Status: He is alert and oriented to person, place, and time. Coordination: Coordination normal.   Psychiatric:         Behavior: Behavior normal.         ASSESSMENT and PLAN  Diagnoses and all orders for this visit:    1. Pulmonary emphysema, unspecified emphysema type ([address]    2. Chronic bronchitis, unspecified chronic bronchitis type ([address]    3. Essential hypertension  -     METABOLIC PANEL, COMPREHENSIVE  -     CBC WITH AUTOMATED DIFF    4. Paranoid schizophrenia ([address]    5. Cigarette nicotine dependence with nicotine-induced disorder    6. Nodule of lower lobe of left lung  -     METABOLIC PANEL, COMPREHENSIVE  -     CBC WITH AUTOMATED DIFF      Follow-up and Dispositions    · Return in about 4 months (around 7/13/2020).      lab results and schedule of future lab studies reviewed with patient  reviewed diet, exercise and weight control  reviewed medications and side effects in detail  F/u with other MD's as scheduled  Advised pt strongly to stop smoking

## 2020-04-20 RX ORDER — PAROXETINE HYDROCHLORIDE 20 MG/1
20 TABLET, FILM COATED ORAL DAILY
Qty: 30 TAB | Refills: 5 | Status: SHIPPED | OUTPATIENT
Start: 2020-04-20 | End: 2020-11-06

## 2020-04-20 RX ORDER — ERGOCALCIFEROL 1.25 MG/1
50000 CAPSULE ORAL
Qty: 3 CAP | Refills: 3 | Status: SHIPPED | OUTPATIENT
Start: 2020-04-20 | End: 2021-08-26 | Stop reason: SDUPTHER

## 2020-04-20 RX ORDER — AMLODIPINE BESYLATE 5 MG/1
5 TABLET ORAL DAILY
Qty: 30 TAB | Refills: 5 | Status: SHIPPED | OUTPATIENT
Start: 2020-04-20 | End: 2020-11-06

## 2020-07-02 RX ORDER — FOLIC ACID 1 MG/1
TABLET ORAL
Qty: 31 TAB | Refills: 0 | Status: SHIPPED | OUTPATIENT
Start: 2020-07-02 | End: 2020-11-06

## 2020-07-02 RX ORDER — LANOLIN ALCOHOL/MO/W.PET/CERES
CREAM (GRAM) TOPICAL
Qty: 31 TAB | Refills: 0 | Status: SHIPPED | OUTPATIENT
Start: 2020-07-02 | End: 2020-11-06

## 2020-08-05 ENCOUNTER — TELEPHONE (OUTPATIENT)
Dept: INTERNAL MEDICINE CLINIC | Age: 60
End: 2020-08-05

## 2020-08-05 NOTE — TELEPHONE ENCOUNTER
----- Message from Hay Nash sent at 8/4/2020  5:57 PM EDT -----  Regarding: Dr Kaminski/Telephone  Patient return call    Caller's first and last name and relationship (if not the patient): sister Mclaughlin      Best contact number(s):[phone]      Whose call is being returned:not sure      Details to clarify the request:regarding schedule pt for in office visit for rapid weight loss.  Caller is requesting an appt tomorrow 08/05/20 due to concerns about pt's weight loss      Hay Nash

## 2020-08-05 NOTE — TELEPHONE ENCOUNTER
I spoke with Patients sister and pts FDC ,The  is out of the facility and an appt can not be made until I speak with her. I was advised to call back later this afternoon at talk with her.

## 2020-08-07 ENCOUNTER — VIRTUAL VISIT (OUTPATIENT)
Dept: INTERNAL MEDICINE CLINIC | Age: 60
End: 2020-08-07
Payer: COMMERCIAL

## 2020-08-07 DIAGNOSIS — J43.9 PULMONARY EMPHYSEMA, UNSPECIFIED EMPHYSEMA TYPE (HCC): Primary | ICD-10-CM

## 2020-08-07 DIAGNOSIS — F20.0 PARANOID SCHIZOPHRENIA (HCC): ICD-10-CM

## 2020-08-07 DIAGNOSIS — I10 ESSENTIAL HYPERTENSION: ICD-10-CM

## 2020-08-07 DIAGNOSIS — F17.219 CIGARETTE NICOTINE DEPENDENCE WITH NICOTINE-INDUCED DISORDER: ICD-10-CM

## 2020-08-07 DIAGNOSIS — J42 CHRONIC BRONCHITIS, UNSPECIFIED CHRONIC BRONCHITIS TYPE (HCC): ICD-10-CM

## 2020-08-07 PROCEDURE — 99214 OFFICE O/P EST MOD 30 MIN: CPT | Performed by: INTERNAL MEDICINE

## 2020-08-07 NOTE — PROGRESS NOTES
Emilio Biggs is a 60 y.o. male who was seen by synchronous (real-time) audio-video technology on 8/7/2020 for Weight Loss (pt states he is not hungery, ); Hypertension; Depression; and COPD        Assessment & Plan:   Diagnoses and all orders for this visit:    1. Pulmonary emphysema, unspecified emphysema type ([facility]    2. Chronic bronchitis, unspecified chronic bronchitis type ([facility]    3. Essential hypertension    4. Paranoid schizophrenia ([facility]    5. Cigarette nicotine dependence with nicotine-induced disorder        I spent at least 25 minutes on this visit with this established patient. Subjective:     Pt. is seen virtually from his group home for f/u. Has a few chronic medical issues as documented. Patient has COPD/bronchitis but continues to smoke a pack daily. Has a lung nodule with negative pathology for carcinoma. Reports continued cough with chronic phlegm. Has chronic dyspnea and MALDONADO. Appetite is poor. Has lost more weight. Followed by psychiatrist for schizoaffective disorder. Remains on medications. Denies any recent hospitalizations. Has been staying home mostly. Taking precautions for Covid 19. Denies any related signs or symptoms including fever, GI or  issues or CP. PMH/PSH/Allergies/Social History/medication list and most recent studies reviewed with patient. CMP and CBC were stable. Tobacco use: 1 pack daily  Alcohol use: No    Reports compliance with medications and diet. Trying to be active physically as tolerated. Reports no other new c/o. Plan:  Continue current meds  F/u with other MD's as scheduled  Advised pt strongly to stop smoking   Fall precautions discussed  COVID-19 precautions discussed with pt  Follow-up 4 months or as needed  Recheck labs in 4 months    Prior to Admission medications    Medication Sig Start Date End Date Taking?  Authorizing Provider   thiamine HCL (B-1) 100 mg tablet TAKE ONE TABLET BY MOUTH EVERY DAY 7/2/20  Yes Will Eleazar, DO folic acid (FOLVITE) 1 mg tablet TAKE ONE TABLET BY MOUTH EVERY DAY 7/2/20  Yes Tom Kaminski DO   amLODIPine (NORVASC) 5 mg tablet Take 1 Tab by mouth daily. 4/20/20  Yes Tom Kaminski DO   PARoxetine (PAXIL) 20 mg tablet Take 1 Tab by mouth daily. 4/20/20  Yes Joanna Shannon,    ergocalciferol (ERGOCALCIFEROL) 1,250 mcg (50,000 unit) capsule Take 1 Cap by mouth every thirty (30) days. 4/20/20  Yes Karlene Kaminski DO   budesonide-formoterol (SYMBICORT) 160-4.5 mcg/actuation HFAA Take 2 Puffs by inhalation two (2) times a day. 12/9/19  Yes Kimberly Castanon NP   albuterol-ipratropium (DUO-NEB) 2.5 mg-0.5 mg/3 ml nebu INHALE 1 VIAL VIA NEBULIZER EVERY 4 HOURS AS NEEDED 10/17/19  Yes Kimberly Castanon NP   sodium chloride 1 gram tablet Take 1 Tab by mouth daily. 2/21/19  Yes Tom Kaminski DO   GENERLAC 10 gram/15 mL solution Take 5 mL by mouth daily as needed. 1/8/19  Yes Joanna Shannon DO   OTHER May discontinue use of Nicotine patches 9/17/18  Yes Abbie Billy NP   benztropine (COGENTIN) 2 mg tablet Take 1 Tab by mouth daily. 6/28/18  Yes Abbie Billy NP   risperiDONE (RISPERDAL) 3 mg tablet Take 1 Tab by mouth daily. 2/5/18  Yes Sheron Billy NP   Nebulizer & Compressor machine Use as directed for J42 and J43.9 1/30/18  Yes Joanna Shannon DO   Nebulizer Accessories kit Use as directed for J42 and J43.9 1/30/18  Yes Tom Kaminski DO   QUEtiapine (SEROQUEL) 50 mg tablet Take 1 Tab by mouth nightly as needed (for agitation or anxiety (Use 1st)).  11/8/17  Yes Lillie Ware NP     Patient Active Problem List    Diagnosis Date Noted    Nodule of lower lobe of left lung 03/13/2020    Abnormality of lung on CXR 05/28/2019    Positive QuantiFERON-TB Gold test 03/11/2019    Essential hypertension 03/11/2019    Vitamin D deficiency 03/11/2019    Recurrent depression ([facility] 01/10/2018    Pulmonary emphysema ([facility] 08/07/2017    Hypoalbuminemia due to protein-calorie malnutrition ([facility] 08/07/2017    Schizoaffective disorder, depressive type ([facility] 08/07/2017    Cigarette nicotine dependence with nicotine-induced disorder 08/07/2017    Seizure ([facility] 07/14/2017    Benign prostatic hyperplasia without lower urinary tract symptoms 05/15/2017    Underweight 03/29/2016    Poor dentition 03/29/2016    Insomnia 03/29/2016    Bronchitis, chronic (HCC) 05/22/2014    Depression 01/31/2012     Current Outpatient Medications   Medication Sig Dispense Refill    thiamine HCL (B-1) 100 mg tablet TAKE ONE TABLET BY MOUTH EVERY DAY 31 Tab 0    folic acid (FOLVITE) 1 mg tablet TAKE ONE TABLET BY MOUTH EVERY DAY 31 Tab 0    amLODIPine (NORVASC) 5 mg tablet Take 1 Tab by mouth daily. 30 Tab 5    PARoxetine (PAXIL) 20 mg tablet Take 1 Tab by mouth daily. 30 Tab 5    ergocalciferol (ERGOCALCIFEROL) 1,250 mcg (50,000 unit) capsule Take 1 Cap by mouth every thirty (30) days. 3 Cap 3    budesonide-formoterol (SYMBICORT) 160-4.5 mcg/actuation HFAA Take 2 Puffs by inhalation two (2) times a day. 1 Inhaler 11    albuterol-ipratropium (DUO-NEB) 2.5 mg-0.5 mg/3 ml nebu INHALE 1 VIAL VIA NEBULIZER EVERY 4 HOURS AS NEEDED 75 mL 6    sodium chloride 1 gram tablet Take 1 Tab by mouth daily. 30 Tab 0    GENERLAC 10 gram/15 mL solution Take 5 mL by mouth daily as needed. 480 mL 5    OTHER May discontinue use of Nicotine patches 1 Patch 0    benztropine (COGENTIN) 2 mg tablet Take 1 Tab by mouth daily. 30 Tab 11    risperiDONE (RISPERDAL) 3 mg tablet Take 1 Tab by mouth daily. 30 Tab 0    Nebulizer & Compressor machine Use as directed for J42 and J43.9 1 Each 0    Nebulizer Accessories kit Use as directed for J42 and J43.9 1 Kit 0    QUEtiapine (SEROQUEL) 50 mg tablet Take 1 Tab by mouth nightly as needed (for agitation or anxiety (Use 1st)).  30 Tab 0     No Known Allergies  Past Medical History:   Diagnosis Date    Asthma     seldom,use inhaler    Bronchitis     [facility] obstructive pulmonary disease ([facility]     Depression     anxiety    Psychiatric disorder     paranoid schizophrenia    Psychiatric disorder     extrapyramidal disease    Psychotic disorder ([facility]     mental retardation     Past Surgical History:   Procedure Laterality Date    HX ORTHOPAEDIC      right knee     Social History     Tobacco Use    Smoking status: Current Every Day Smoker     Packs/day: 1.00     Years: 25.00     Pack years: 25.00    Smokeless tobacco: Never Used   Substance Use Topics    Alcohol use: No     Comment: socially       ROS    Objective:     Patient-Reported Vitals 8/7/2020   Patient-Reported Height 5'10        [INSTRUCTIONS:  \"[x]\" Indicates a positive item  \"[]\" Indicates a negative item  -- DELETE ALL ITEMS NOT EXAMINED]    Constitutional: [x] Appears well-developed and well-nourished [x] No apparent distress      [] Abnormal -     Mental status: [x] Alert and awake  [x] Oriented to person/place/time [x] Able to follow commands    [] Abnormal -     Eyes:   EOM    [x]  Normal    [] Abnormal -   Sclera  [x]  Normal    [] Abnormal -          Discharge [x]  None visible   [] Abnormal -     HENT: [x] Normocephalic, atraumatic  [] Abnormal -   [x] Mouth/Throat: Mucous membranes are moist    External Ears [x] Normal  [] Abnormal -    Neck: [x] No visualized mass [] Abnormal -     Pulmonary/Chest: [x] Respiratory effort normal   [x] No visualized signs of difficulty breathing or respiratory distress        [] Abnormal -      Musculoskeletal:   [x] Normal gait with no signs of ataxia         [x] Normal range of motion of neck        [] Abnormal -     Neurological:        [x] No Facial Asymmetry (Cranial nerve 7 motor function) (limited exam due to video visit)          [x] No gaze palsy        [] Abnormal -          Skin:        [x] No significant exanthematous lesions or discoloration noted on facial skin         [] Abnormal -            Psychiatric:       [x] Normal Affect [] Abnormal -        [x] No Hallucinations    Other pertinent observable physical exam findings:-        We discussed the expected course, resolution and complications of the diagnosis(es) in detail. Medication risks, benefits, costs, interactions, and alternatives were discussed as indicated. I advised him to contact the office if his condition worsens, changes or fails to improve as anticipated. He expressed understanding with the diagnosis(es) and plan. Dodie Neas, who was evaluated through a patient-initiated, synchronous (real-time) audio-video encounter, and/or his healthcare decision maker, is aware that it is a billable service, with coverage as determined by his insurance carrier. He provided verbal consent to proceed: Yes, and patient identification was verified. It was conducted pursuant to the emergency declaration under the [address] [place] [address], [address] [place] [address] authority and the [employer]. A caregiver was present when appropriate. Ability to conduct physical exam was limited. I was at home. The patient was at home.       Peace Benitez DO

## 2020-08-07 NOTE — PROGRESS NOTES
ADVISED PATIENT OF THE FOLLOWING HEALTH MAINTAINCE DUE  Health Maintenance Due   Topic Date Due    DTaP/Tdap/Td series (1 - Tdap) 08/27/1980    Shingrix Vaccine Age 50> (1 of 2) 08/27/2009    Influenza Age 5 to Adult  08/01/2020      Chief Complaint   Patient presents with    Weight Loss     pt states he is not hungery,     Hypertension    Depression    COPD       1. Have you been to the ER, urgent care clinic since your last visit? Hospitalized since your last visit? No    2. Have you seen or consulted any other health care providers outside of the [address] [place] [address] since your last visit? Include any DEXA scan, mammography  or colon screening. No    3. Do you have an Advance Directive on file? no    4. Do you have a DNR on file? no    Patient is accompanied by self I have received verbal consent from Gómez Pratt to discuss any/all medical information while they are present in the room. Advance Care Planning 10/26/2017   Patient's Healthcare Decision Maker is: Legal Next of Kin   Primary Decision Maker Name jane cheekb   Primary Decision Maker Phone Number [phone]   Primary Decision Maker Relationship to Patient -   Secondary Decision Maker Name -   Secondary Decision [address] [place] [address] Ave Phone Number -   Secondary Decision Maker Relationship to Patient -   Confirm Advance Directive None   Patient Would Like to Complete Advance Directive -         [facility] [place], [place] - [address] [place] [address]  Phone: [phone] Fax: Karmen , [facility] [address]  Phone: [phone] Fax: [phone]        Patient reminded during visit to bring all medication bottles, OTC medications to all appointments.

## 2020-08-10 ENCOUNTER — TELEPHONE (OUTPATIENT)
Dept: INTERNAL MEDICINE CLINIC | Age: 60
End: 2020-08-10

## 2020-08-10 NOTE — TELEPHONE ENCOUNTER
----- Message from Micky Case sent at 8/8/2020  8:02 AM EDT -----  Regarding: Dr. Santo Viera first and last name:  Kyrie Choudhury,     Reason for call:  Regarding video visit     Callback required yes/no and why:  Yes    Best contact number(s):  [phone]    Details to clarify the request:      Micky Case

## 2020-08-11 NOTE — TELEPHONE ENCOUNTER
Spoke with pts sister this morning, she is aware of providers discuss from visit last week and pts next f/u

## 2020-08-24 ENCOUNTER — TELEPHONE (OUTPATIENT)
Dept: INTERNAL MEDICINE CLINIC | Age: 60
End: 2020-08-24

## 2020-08-24 NOTE — TELEPHONE ENCOUNTER
Patient's sister called wanting to know if patient's labs have come back yet. please call her back at [phone]

## 2020-08-27 NOTE — TELEPHONE ENCOUNTER
Called the pt's sister and left a VM advising that the labs are in Dr. Perlita Rivera to review and provide guidance. Advised that a call would be placed once the results have been reviewed and provided to the nurse.

## 2020-08-31 ENCOUNTER — TELEPHONE (OUTPATIENT)
Dept: INTERNAL MEDICINE CLINIC | Age: 60
End: 2020-08-31

## 2020-08-31 NOTE — TELEPHONE ENCOUNTER
Patient's brother called to check on patients sodium and potassium levels. please call back to [phone]

## 2020-08-31 NOTE — TELEPHONE ENCOUNTER
Call placed to brother of patient and made aware this office does not have any recent labs, family member stated that the facility he is in did labs recently, made family member aware that they would have to contact facility for those results

## 2020-12-07 ENCOUNTER — VIRTUAL VISIT (OUTPATIENT)
Dept: INTERNAL MEDICINE CLINIC | Age: 60
End: 2020-12-07
Payer: COMMERCIAL

## 2020-12-07 DIAGNOSIS — J43.9 PULMONARY EMPHYSEMA, UNSPECIFIED EMPHYSEMA TYPE (HCC): Primary | ICD-10-CM

## 2020-12-07 DIAGNOSIS — G47.00 INSOMNIA, UNSPECIFIED TYPE: ICD-10-CM

## 2020-12-07 DIAGNOSIS — R63.6 UNDERWEIGHT: ICD-10-CM

## 2020-12-07 DIAGNOSIS — R93.89 ABNORMAL CXR: ICD-10-CM

## 2020-12-07 DIAGNOSIS — F20.0 PARANOID SCHIZOPHRENIA (HCC): ICD-10-CM

## 2020-12-07 DIAGNOSIS — K21.9 GASTROESOPHAGEAL REFLUX DISEASE WITHOUT ESOPHAGITIS: ICD-10-CM

## 2020-12-07 DIAGNOSIS — I10 ESSENTIAL HYPERTENSION: ICD-10-CM

## 2020-12-07 PROCEDURE — 99214 OFFICE O/P EST MOD 30 MIN: CPT | Performed by: INTERNAL MEDICINE

## 2020-12-07 RX ORDER — TRAZODONE HYDROCHLORIDE 50 MG/1
50 TABLET ORAL
Qty: 30 TAB | Refills: 1 | Status: SHIPPED | OUTPATIENT
Start: 2020-12-07 | End: 2021-11-03 | Stop reason: SDUPTHER

## 2020-12-07 RX ORDER — OMEPRAZOLE 40 MG/1
40 CAPSULE, DELAYED RELEASE ORAL DAILY
Qty: 30 CAP | Refills: 1 | Status: SHIPPED | OUTPATIENT
Start: 2020-12-07 | End: 2020-12-18 | Stop reason: SDUPTHER

## 2020-12-07 NOTE — PROGRESS NOTES
Kayla Chua is a 63 y.o. male who was seen by synchronous (real-time) audio-video technology on 12/7/2020 for Hypertension (4 month ); Benign Prostatic Hypertrophy; Seizure; and Other (Schizoaffective disorder. )        Assessment & Plan:   Diagnoses and all orders for this visit:    1. Pulmonary emphysema, unspecified emphysema type ([address]    2. Essential hypertension    3. Paranoid schizophrenia ([address]    4. Abnormal CXR    5. Underweight    6. Insomnia, unspecified type    7. Gastroesophageal reflux disease without esophagitis    Other orders  -     traZODone (DESYREL) 50 mg tablet; Take 1 Tab by mouth nightly. -     omeprazole (PRILOSEC) 40 mg capsule; Take 1 Cap by mouth daily. I spent at least 25 minutes on this visit with this established patient. Subjective:      Pt. is seen virtually with a staff member at his adult home for f/u. Has a few chronic medical issues as documented. BP has been stable. He has COPD with chronic dyspnea and MALDONADO. Continues to smoke daily. History of abnormal CXR with negative biopsy for cancer. Reports GERD issues. PPI helped in the past.  Also reports issues with insomnia. Followed by psychiatrist for schizophrenia. Remains on medications. Denies any recent ER or hospital visits. Depends on staff for some ADLs. Taking precautions for Covid 19. Denies any related signs or symptoms including fever, cough, SOB or CP. PMH/PSH/Allergies/Social History/medication list and most recent studies reviewed with patient. Tobacco use: Daily  Alcohol use: No  Reports compliance with medications and diet. Trying to be active physically as tolerated. Reports no other new c/o.     Plan:  Start trazodone for insomnia  Start omeprazole for GERD  Continue current meds  F/u with other MD's as scheduled  Advised pt strongly to stop smoking   Fall precautions discussed  COVID-19 precautions discussed with pt  Follow-up 1 month or as needed  Prior to Admission medications Medication Sig Start Date End Date Taking? Authorizing Provider   traZODone (DESYREL) 50 mg tablet Take 1 Tab by mouth nightly. 12/7/20  Yes Mallika Kaminski DO   omeprazole (PRILOSEC) 40 mg capsule Take 1 Cap by mouth daily. 12/7/20  Yes Sunitha Willis DO   folic acid (FOLVITE) 1 mg tablet TAKE ONE TABLET BY MOUTH EVERY DAY 11/6/20  Yes Sandra Moyer NP   amLODIPine (NORVASC) 5 mg tablet TAKE 1 TABLET BY MOUTH ONCE A DAY 11/6/20  Yes Sandra Moyer NP   PARoxetine (PAXIL) 20 mg tablet TAKE 1 TABLET BY MOUTH ONCE A DAY 11/6/20  Yes Sandra Moyer NP   thiamine HCL (B-1) 100 mg tablet TAKE ONE TABLET BY MOUTH EVERY DAY 11/6/20  Yes Sandra Moyer NP   ergocalciferol (ERGOCALCIFEROL) 1,250 mcg (50,000 unit) capsule Take 1 Cap by mouth every thirty (30) days. 4/20/20  Yes Mallika Kaminski DO   budesonide-formoterol (SYMBICORT) 160-4.5 mcg/actuation HFAA Take 2 Puffs by inhalation two (2) times a day. 12/9/19  Yes Kimberly Castanon NP   albuterol-ipratropium (DUO-NEB) 2.5 mg-0.5 mg/3 ml nebu INHALE 1 VIAL VIA NEBULIZER EVERY 4 HOURS AS NEEDED 10/17/19  Yes Kimberly Castanon NP   sodium chloride 1 gram tablet Take 1 Tab by mouth daily. 2/21/19  Yes Tom Kaminski DO   GENERLAC 10 gram/15 mL solution Take 5 mL by mouth daily as needed. 1/8/19  Yes Sunitha Willis DO   OTHER May discontinue use of Nicotine patches 9/17/18  Yes Abbie Billy NP   benztropine (COGENTIN) 2 mg tablet Take 1 Tab by mouth daily. 6/28/18  Yes Abbie Billy NP   risperiDONE (RISPERDAL) 3 mg tablet Take 1 Tab by mouth daily. 2/5/18  Yes Eloina Billy NP   Nebulizer & Compressor machine Use as directed for J42 and J43.9 1/30/18  Yes Sunitha Willis, DO   Nebulizer Accessories kit Use as directed for J42 and J43.9 1/30/18  Yes Tom Kaminski, DO   QUEtiapine (SEROQUEL) 50 mg tablet Take 1 Tab by mouth nightly as needed (for agitation or anxiety (Use 1st)).  11/8/17  Yes Arron Ware, NP     Patient Active Problem List    Diagnosis Date Noted    Nodule of lower lobe of left lung 03/13/2020    Abnormality of lung on CXR 05/28/2019    Positive QuantiFERON-TB Gold test 03/11/2019    Essential hypertension 03/11/2019    Vitamin D deficiency 03/11/2019    Recurrent depression ([facility] 01/10/2018    Pulmonary emphysema ([facility] 08/07/2017    Hypoalbuminemia due to protein-calorie malnutrition ([facility] 08/07/2017    Schizoaffective disorder, depressive type ([facility] 08/07/2017    Cigarette nicotine dependence with nicotine-induced disorder 08/07/2017    Seizure ([facility] 07/14/2017    Benign prostatic hyperplasia without lower urinary tract symptoms 05/15/2017    Underweight 03/29/2016    Poor dentition 03/29/2016    Insomnia 03/29/2016    Bronchitis, chronic ([facility] 05/22/2014    Depression 01/31/2012     Current Outpatient Medications   Medication Sig Dispense Refill    traZODone (DESYREL) 50 mg tablet Take 1 Tab by mouth nightly. 30 Tab 1    omeprazole (PRILOSEC) 40 mg capsule Take 1 Cap by mouth daily. 30 Cap 1    folic acid (FOLVITE) 1 mg tablet TAKE ONE TABLET BY MOUTH EVERY DAY 31 Tab 3    amLODIPine (NORVASC) 5 mg tablet TAKE 1 TABLET BY MOUTH ONCE A DAY 31 Tab 3    PARoxetine (PAXIL) 20 mg tablet TAKE 1 TABLET BY MOUTH ONCE A DAY 31 Tab 3    thiamine HCL (B-1) 100 mg tablet TAKE ONE TABLET BY MOUTH EVERY DAY 31 Tab 3    ergocalciferol (ERGOCALCIFEROL) 1,250 mcg (50,000 unit) capsule Take 1 Cap by mouth every thirty (30) days. 3 Cap 3    budesonide-formoterol (SYMBICORT) 160-4.5 mcg/actuation HFAA Take 2 Puffs by inhalation two (2) times a day. 1 Inhaler 11    albuterol-ipratropium (DUO-NEB) 2.5 mg-0.5 mg/3 ml nebu INHALE 1 VIAL VIA NEBULIZER EVERY 4 HOURS AS NEEDED 75 mL 6    sodium chloride 1 gram tablet Take 1 Tab by mouth daily. 30 Tab 0    GENERLAC 10 gram/15 mL solution Take 5 mL by mouth daily as needed.  480 mL 5    OTHER May discontinue use of Nicotine patches 1 Patch 0    benztropine (COGENTIN) 2 mg tablet Take 1 Tab by mouth daily. 30 Tab 11    risperiDONE (RISPERDAL) 3 mg tablet Take 1 Tab by mouth daily. 30 Tab 0    Nebulizer & Compressor machine Use as directed for J42 and J43.9 1 Each 0    Nebulizer Accessories kit Use as directed for J42 and J43.9 1 Kit 0    QUEtiapine (SEROQUEL) 50 mg tablet Take 1 Tab by mouth nightly as needed (for agitation or anxiety (Use 1st)).  30 Tab 0     No Known Allergies  Past Medical History:   Diagnosis Date    Asthma     seldom,use inhaler    Bronchitis     Chronic obstructive pulmonary disease (HCC)     Depression     anxiety    Psychiatric disorder     paranoid schizophrenia    Psychiatric disorder     extrapyramidal disease    Psychotic disorder (HCC)     mental retardation     Past Surgical History:   Procedure Laterality Date    HX ORTHOPAEDIC      right knee     Family History   Problem Relation Age of Onset    Diabetes Mother     Heart Disease Father         CHF     Social History     Tobacco Use    Smoking status: Current Every Day Smoker     Packs/day: 1.00     Years: 25.00     Pack years: 25.00    Smokeless tobacco: Never Used   Substance Use Topics    Alcohol use: No     Comment: socially       ROS    Objective:     Patient-Reported Vitals 12/7/2020   Patient-Reported Weight 130lb   Patient-Reported Height -        [INSTRUCTIONS:  \"[x]\" Indicates a positive item  \"[]\" Indicates a negative item  -- DELETE ALL ITEMS NOT EXAMINED]    Constitutional: [x] Appears well-developed and well-nourished [x] No apparent distress      [] Abnormal -     Mental status: [x] Alert and awake  [x] Oriented to person/place/time [x] Able to follow commands    [] Abnormal -     Eyes:   EOM    [x]  Normal    [] Abnormal -   Sclera  [x]  Normal    [] Abnormal -          Discharge [x]  None visible   [] Abnormal -     HENT: [x] Normocephalic, atraumatic  [] Abnormal -   [x] Mouth/Throat: Mucous membranes are moist    External Ears [x] Normal  [] Abnormal -    Neck: [x] No visualized mass [] Abnormal -     Pulmonary/Chest: [x] Respiratory effort normal   [x] No visualized signs of difficulty breathing or respiratory distress        [] Abnormal -      Musculoskeletal:   [x] Normal gait with no signs of ataxia         [x] Normal range of motion of neck        [] Abnormal -     Neurological:        [x] No Facial Asymmetry (Cranial nerve 7 motor function) (limited exam due to video visit)          [x] No gaze palsy        [] Abnormal -          Skin:        [x] No significant exanthematous lesions or discoloration noted on facial skin         [] Abnormal -            Psychiatric:       [x] Normal Affect [] Abnormal -        [x] No Hallucinations    Other pertinent observable physical exam findings:-        We discussed the expected course, resolution and complications of the diagnosis(es) in detail. Medication risks, benefits, costs, interactions, and alternatives were discussed as indicated. I advised him to contact the office if his condition worsens, changes or fails to improve as anticipated. He expressed understanding with the diagnosis(es) and plan. Chris Henderson, who was evaluated through a patient-initiated, synchronous (real-time) audio-video encounter, and/or his healthcare decision maker, is aware that it is a billable service, with coverage as determined by his insurance carrier. He provided verbal consent to proceed: Yes, and patient identification was verified. It was conducted pursuant to the emergency declaration under the [address] [place] [address], [address] [place] [address] authority and the [employer] Act. A caregiver was present when appropriate. Ability to conduct physical exam was limited. I was at home. The patient was at home.       Shaina Merida DO

## 2020-12-07 NOTE — PROGRESS NOTES
ADVISED PATIENT OF THE FOLLOWING HEALTH MAINTAINCE DUE  Health Maintenance Due   Topic Date Due    DTaP/Tdap/Td series (1 - Tdap) 08/27/1980    Shingrix Vaccine Age 50> (1 of 2) 08/27/2009    Flu Vaccine (1) 09/01/2020      Chief Complaint   Patient presents with    Hypertension     4 month     Benign Prostatic Hypertrophy    Seizure    Other     Schizoaffective disorder. 1. Have you been to the ER, urgent care clinic since your last visit? Hospitalized since your last visit? No    2. Have you seen or consulted any other health care providers outside of the [address] [place] [address] since your last visit? Include any DEXA scan, mammography  or colon screening. No    3. Do you have an Advance Directive on file? no    4. Do you have a DNR on file? no    Patient is accompanied by self I have received verbal consent from Sang Hua to discuss any/all medical information while they are present in the room. Advance Care Planning 10/26/2017   Patient's Healthcare Decision Maker is: Legal Next of Kin   Primary Decision Maker Name jane jones   Primary Decision Maker Phone Number [phone]   Primary Decision Maker Relationship to Patient -   Secondary Decision Maker Name -   Secondary Decision [address] [place] [address] Phone Number -   Secondary Decision Maker Relationship to Patient -   Confirm Advance Directive None   Patient Would Like to Complete Advance Directive -         [facility] [place], [place] - [address] [place] [address]  Phone: [phone] Fax: Karmen , [facility] [address]  Phone: [phone] Fax: [phone]        Patient reminded during visit to bring all medication bottles, OTC medications to all appointments.

## 2021-03-05 DIAGNOSIS — F25.1 SCHIZOAFFECTIVE DISORDER, DEPRESSIVE TYPE (HCC): Primary | ICD-10-CM

## 2021-03-08 RX ORDER — RISPERIDONE 3 MG/1
3 TABLET, FILM COATED ORAL DAILY
Qty: 30 TAB | Refills: 0 | Status: SHIPPED | OUTPATIENT
Start: 2021-03-08 | End: 2022-03-16

## 2021-03-09 NOTE — TELEPHONE ENCOUNTER
Call placed to facility and will get further medication refills of this medication form psychiatrist.

## 2021-04-14 DIAGNOSIS — J42 CHRONIC BRONCHITIS, UNSPECIFIED CHRONIC BRONCHITIS TYPE (HCC): ICD-10-CM

## 2021-04-14 RX ORDER — BUDESONIDE AND FORMOTEROL FUMARATE DIHYDRATE 160; 4.5 UG/1; UG/1
2 AEROSOL RESPIRATORY (INHALATION) 2 TIMES DAILY
Qty: 1 INHALER | Refills: 2 | Status: SHIPPED | OUTPATIENT
Start: 2021-04-14 | End: 2021-11-01 | Stop reason: SDUPTHER

## 2021-05-24 ENCOUNTER — DOCUMENTATION ONLY (OUTPATIENT)
Dept: INTERNAL MEDICINE CLINIC | Age: 61
End: 2021-05-24

## 2021-05-24 NOTE — PROGRESS NOTES
We received your prior authorization request for Mateo Oneal, date of birth 1960, for  coverage of Omeprazole 40MG OR CPDR.   Decision:  After reviewing the prescription benefit plans authorization guidelines for this drug, the request  was approved for the following:  Approved Drug: Omeprazole Caps  Strength: 40mg  Quantity/Day Supply: #31/31  Duration: 12 Months  05/22/2021 - 05/22/2022

## 2021-07-12 ENCOUNTER — VIRTUAL VISIT (OUTPATIENT)
Dept: INTERNAL MEDICINE CLINIC | Age: 61
End: 2021-07-12
Payer: COMMERCIAL

## 2021-07-12 DIAGNOSIS — J42 CHRONIC BRONCHITIS, UNSPECIFIED CHRONIC BRONCHITIS TYPE (HCC): ICD-10-CM

## 2021-07-12 DIAGNOSIS — F25.1 SCHIZOAFFECTIVE DISORDER, DEPRESSIVE TYPE (HCC): ICD-10-CM

## 2021-07-12 DIAGNOSIS — I10 ESSENTIAL HYPERTENSION: ICD-10-CM

## 2021-07-12 DIAGNOSIS — J43.9 PULMONARY EMPHYSEMA, UNSPECIFIED EMPHYSEMA TYPE (HCC): Primary | ICD-10-CM

## 2021-07-12 DIAGNOSIS — R91.1 NODULE OF LOWER LOBE OF LEFT LUNG: ICD-10-CM

## 2021-07-12 PROCEDURE — 99214 OFFICE O/P EST MOD 30 MIN: CPT | Performed by: INTERNAL MEDICINE

## 2021-07-12 NOTE — PROGRESS NOTES
ADVISED PATIENT OF THE FOLLOWING HEALTH MAINTAINCE DUE  Health Maintenance Due   Topic Date Due    COVID-19 Vaccine (1) Never done    DTaP/Tdap/Td series (1 - Tdap) Never done    Shingrix Vaccine Age 50> (1 of 2) Never done    LDCT Smoker 30 Pack Years  Never done      Chief Complaint   Patient presents with    Hypertension     6 month f/u     Benign Prostatic Hypertrophy    Depression    Other     schizoaffective disorder. 1. Have you been to the ER, urgent care clinic since your last visit? Hospitalized since your last visit? No    2. Have you seen or consulted any other health care providers outside of the [address] [place] [address] since your last visit? Include any DEXA scan, mammography  or colon screening. No    3. Do you have an Advance Directive on file? no    4. Do you have a DNR on file? no    Patient is accompanied by self and adult caretaker I have received verbal consent from Mirian Mills to discuss any/all medical information while they are present in the room. Advance Care Planning 10/26/2017   Patient's Healthcare Decision Maker is: Legal Next of Kin   Primary Decision Maker Name jane jones   Primary Decision Maker Phone Number [phone]   Primary Decision Maker Relationship to Patient -   Secondary Decision Maker Name -   Secondary Decision [address] [place] [address] Ave Phone Number -   Secondary Decision Maker Relationship to Patient -   Confirm Advance Directive None   Patient Would Like to Complete Advance Directive -         [address] [facility], [facility] - [address]  [address] [facility] [facility] [address]  Phone: [phone] Fax: Karmen , [facility] [address]  Phone: [phone] Fax: [phone]        Patient reminded during visit to bring all medication bottles, OTC medications to all appointments.

## 2021-07-12 NOTE — PROGRESS NOTES
Levi Rogers (: 1960) is a 63 y.o. male, established patient, here for evaluation of the following chief complaint(s):   Hypertension (6 month f/u ), Benign Prostatic Hypertrophy, Depression, and Other (schizoaffective disorder. )       ASSESSMENT/PLAN:  Below is the assessment and plan developed based on review of pertinent labs, studies, and medications. 1. Pulmonary emphysema, unspecified emphysema type ([facility]  -     METABOLIC PANEL, COMPREHENSIVE; Future  -     CBC W/O DIFF; Future  2. Chronic bronchitis, unspecified chronic bronchitis type ([facility]  3. Essential hypertension  -     METABOLIC PANEL, COMPREHENSIVE; Future  -     CBC W/O DIFF; Future  4. Nodule of lower lobe of left lung  F/u with lung MD    5. Schizoaffective disorder, depressive type [facility])  See psychiatrist at facility where he lives    Return in about 4 months (around 2021). SUBJECTIVE/OBJECTIVE:  Pt. is seen virtually with caregiver at his assisted living for f/u. Has a few chronic medical issues as documented including COPD/chronic bronchitis. Continues to smoke daily. Reports a chronic cough. Occasional sputum. Denies dyspnea but has MALDONADO. Denies chest pain or GI/ issues. History of lung nodule. Biopsy was benign. Also has schizophrenia. Has not seen a psychiatrist in a while. Remains on medications. No recent ER or hospital visits. Has had his Covid vaccination. Taking precautions for Covid 19. Denies any related signs or symptoms including fever, cough, SOB or CP. PMH/PSH/Allergies/Social History/medication list and most recent studies reviewed with patient. Tobacco use: 1 PPD  Alcohol use: Social    Reports compliance with medications and diet. Trying to be active physically as tolerated. Reports no other new c/o.     Plan:  Continue current medications  Watch diet and remain active physically  Follow-up with other MDs/specialists as scheduled  COVID-19 precautions discussed with pt  Follow-up 4 months or as needed        Physical Exam    [INSTRUCTIONS:  \"[x]\" Indicates a positive item  \"[]\" Indicates a negative item  -- DELETE ALL ITEMS NOT EXAMINED]    Constitutional: [x] Appears well-developed and well-nourished [x] No apparent distress      [] Abnormal -     Mental status: [x] Alert and awake  [x] Oriented to person/place/time [x] Able to follow commands    [] Abnormal -     Eyes:   EOM    [x]  Normal    [] Abnormal -   Sclera  [x]  Normal    [] Abnormal -          Discharge [x]  None visible   [] Abnormal -     HENT: [x] Normocephalic, atraumatic  [] Abnormal -   [x] Mouth/Throat: Mucous membranes are moist    External Ears [x] Normal  [] Abnormal -    Neck: [x] No visualized mass [] Abnormal -     Pulmonary/Chest: [x] Respiratory effort normal   [x] No visualized signs of difficulty breathing or respiratory distress        [] Abnormal -      Musculoskeletal:   [x] Normal gait with no signs of ataxia         [x] Normal range of motion of neck        [] Abnormal -     Neurological:        [x] No Facial Asymmetry (Cranial nerve 7 motor function) (limited exam due to video visit)          [x] No gaze palsy        [] Abnormal -          Skin:        [x] No significant exanthematous lesions or discoloration noted on facial skin         [] Abnormal -            Psychiatric:       [x] Normal Affect [] Abnormal -        [x] No Hallucinations    Other pertinent observable physical exam findings:-        On this date 07/12/2021 I have spent 25 minutes reviewing previous notes, test results and face to face (virtual) with the patient discussing the diagnosis and importance of compliance with the treatment plan as well as documenting on the day of the visitAngel Cox was evaluated through a synchronous (real-time) audio-video encounter. The patient (or guardian if applicable) is aware that this is a billable service. Verbal consent to proceed has been obtained within the past 12 months.  The visit was conducted pursuant to the emergency declaration under the [facility], [address] [place] [address] authority and the [employer] General Act. Patient identification was verified, and a caregiver was present when appropriate. The patient was located in a state where the provider was credentialed to provide care. An electronic signature was used to authenticate this note.   -- Ramón Blackman, DO

## 2021-08-26 DIAGNOSIS — F33.9 RECURRENT DEPRESSION (HCC): ICD-10-CM

## 2021-08-26 DIAGNOSIS — E55.9 VITAMIN D DEFICIENCY: Primary | ICD-10-CM

## 2021-08-26 DIAGNOSIS — K21.9 GASTROESOPHAGEAL REFLUX DISEASE WITHOUT ESOPHAGITIS: ICD-10-CM

## 2021-08-26 DIAGNOSIS — E53.8 FOLIC ACID DEFICIENCY: ICD-10-CM

## 2021-08-26 DIAGNOSIS — I10 ESSENTIAL HYPERTENSION: ICD-10-CM

## 2021-08-26 RX ORDER — OMEPRAZOLE 40 MG/1
40 CAPSULE, DELAYED RELEASE ORAL DAILY
Qty: 30 CAPSULE | Refills: 3 | Status: SHIPPED | OUTPATIENT
Start: 2021-08-26

## 2021-08-26 RX ORDER — PAROXETINE HYDROCHLORIDE 20 MG/1
20 TABLET, FILM COATED ORAL DAILY
Qty: 31 TABLET | Refills: 4 | Status: SHIPPED | OUTPATIENT
Start: 2021-08-26 | End: 2022-03-16

## 2021-08-26 RX ORDER — FOLIC ACID 1 MG/1
1 TABLET ORAL DAILY
Qty: 31 TABLET | Refills: 4 | Status: SHIPPED | OUTPATIENT
Start: 2021-08-26

## 2021-08-26 RX ORDER — AMLODIPINE BESYLATE 5 MG/1
5 TABLET ORAL DAILY
Qty: 31 TABLET | Refills: 4 | Status: SHIPPED | OUTPATIENT
Start: 2021-08-26 | End: 2022-03-16

## 2021-08-26 RX ORDER — ERGOCALCIFEROL 1.25 MG/1
50000 CAPSULE ORAL
Qty: 3 CAPSULE | Refills: 3 | Status: SHIPPED | OUTPATIENT
Start: 2021-08-26

## 2021-11-01 DIAGNOSIS — J42 CHRONIC BRONCHITIS, UNSPECIFIED CHRONIC BRONCHITIS TYPE (HCC): ICD-10-CM

## 2021-11-02 RX ORDER — BUDESONIDE AND FORMOTEROL FUMARATE DIHYDRATE 160; 4.5 UG/1; UG/1
2 AEROSOL RESPIRATORY (INHALATION) 2 TIMES DAILY
Qty: 6 G | Refills: 5 | Status: SHIPPED | OUTPATIENT
Start: 2021-11-02

## 2021-11-03 ENCOUNTER — OFFICE VISIT (OUTPATIENT)
Dept: INTERNAL MEDICINE CLINIC | Age: 61
End: 2021-11-03
Payer: COMMERCIAL

## 2021-11-03 VITALS
TEMPERATURE: 98 F | SYSTOLIC BLOOD PRESSURE: 114 MMHG | WEIGHT: 120 LBS | HEART RATE: 104 BPM | OXYGEN SATURATION: 97 % | DIASTOLIC BLOOD PRESSURE: 72 MMHG | HEIGHT: 70 IN | BODY MASS INDEX: 17.18 KG/M2 | RESPIRATION RATE: 18 BRPM

## 2021-11-03 DIAGNOSIS — R91.1 NODULE OF LOWER LOBE OF LEFT LUNG: ICD-10-CM

## 2021-11-03 DIAGNOSIS — Z23 NEEDS FLU SHOT: ICD-10-CM

## 2021-11-03 DIAGNOSIS — R63.4 WEIGHT LOSS: ICD-10-CM

## 2021-11-03 DIAGNOSIS — R05.3 CHRONIC COUGH: Primary | ICD-10-CM

## 2021-11-03 DIAGNOSIS — F25.1 SCHIZOAFFECTIVE DISORDER, DEPRESSIVE TYPE (HCC): ICD-10-CM

## 2021-11-03 DIAGNOSIS — J43.9 PULMONARY EMPHYSEMA, UNSPECIFIED EMPHYSEMA TYPE (HCC): ICD-10-CM

## 2021-11-03 DIAGNOSIS — I10 ESSENTIAL HYPERTENSION: ICD-10-CM

## 2021-11-03 DIAGNOSIS — F17.200 TOBACCO DEPENDENCE: ICD-10-CM

## 2021-11-03 DIAGNOSIS — Z23 ENCOUNTER FOR IMMUNIZATION: ICD-10-CM

## 2021-11-03 PROCEDURE — 99214 OFFICE O/P EST MOD 30 MIN: CPT | Performed by: INTERNAL MEDICINE

## 2021-11-03 PROCEDURE — 90686 IIV4 VACC NO PRSV 0.5 ML IM: CPT | Performed by: INTERNAL MEDICINE

## 2021-11-03 RX ORDER — TRAZODONE HYDROCHLORIDE 100 MG/1
100 TABLET ORAL
Qty: 30 TABLET | Refills: 5 | Status: SHIPPED | OUTPATIENT
Start: 2021-11-03

## 2021-11-03 NOTE — PATIENT INSTRUCTIONS
Vaccine Information Statement    Influenza (Flu) Vaccine (Inactivated or Recombinant): What You Need to Know    Many vaccine information statements are available in [place] and other languages. See www.immunize.org/vis. Hojas de información sobre vacunas están disponibles en español y en muchos otros idiomas. Visite www.immunize.org/vis. 1. Why get vaccinated? Influenza vaccine can prevent influenza (flu). Flu is a contagious disease that spreads around the [facility] every year, usually between October and May. Anyone can get the flu, but it is more dangerous for some people. Infants and young children, people 72 years and older, pregnant people, and people with certain health conditions or a weakened immune system are at greatest risk of flu complications. Pneumonia, bronchitis, sinus infections, and ear infections are examples of flu-related complications. If you have a medical condition, such as heart disease, cancer, or diabetes, flu can make it worse. Flu can cause fever and chills, sore throat, muscle aches, fatigue, cough, headache, and runny or stuffy nose. Some people may have vomiting and diarrhea, though this is more common in children than adults. In an average year, thousands of people in the [facility] die from flu, and many more are hospitalized. Flu vaccine prevents millions of illnesses and flu-related visits to the doctor each year. 2. Influenza vaccines     CDC recommends everyone 6 months and older get vaccinated every flu season. Children 6 months through 6years of age may need 2 doses during a single flu season. Everyone else needs only 1 dose each flu season. It takes about 2 weeks for protection to develop after vaccination. There are many flu viruses, and they are always changing. Each year a new flu vaccine is made to protect against the influenza viruses believed to be likely to cause disease in the upcoming flu season.  Even when the vaccine doesnt exactly match these viruses, it may still provide some protection. Influenza vaccine does not cause flu. Influenza vaccine may be given at the same time as other vaccines. 3. Talk with your health care provider    Tell your vaccination provider if the person getting the vaccine:   Has had an allergic reaction after a previous dose of influenza vaccine, or has any severe, life-threatening allergies    Has ever had Guillain-Barré Syndrome (also called GBS)    In some cases, your health care provider may decide to postpone influenza vaccination until a future visit. Influenza vaccine can be administered at any time during pregnancy. People who are or will be pregnant during influenza season should receive inactivated influenza vaccine. People with minor illnesses, such as a cold, may be vaccinated. People who are moderately or severely ill should usually wait until they recover before getting influenza vaccine. Your health care provider can give you more information. 4. Risks of a vaccine reaction     Soreness, redness, and swelling where the shot is given, fever, muscle aches, and headache can happen after influenza vaccination.  There may be a very small increased risk of Guillain-Barré Syndrome (GBS) after inactivated influenza vaccine (the flu shot). Young Born children who get the flu shot along with pneumococcal vaccine (PCV13) and/or DTaP vaccine at the same time might be slightly more likely to have a seizure caused by fever. Tell your health care provider if a child who is getting flu vaccine has ever had a seizure. People sometimes faint after medical procedures, including vaccination. Tell your provider if you feel dizzy or have vision changes or ringing in the ears. As with any medicine, there is a very remote chance of a vaccine causing a severe allergic reaction, other serious injury, or death. 5. What if there is a serious problem?     An allergic reaction could occur after the vaccinated person leaves the clinic. If you see signs of a severe allergic reaction (hives, swelling of the face and throat, difficulty breathing, a fast heartbeat, dizziness, or weakness), call 9-1-1 and get the person to the nearest hospital.    For other signs that concern you, call your health care provider. Adverse reactions should be reported to the Vaccine Adverse Event Reporting System (VAERS). Your health care provider will usually file this report, or you can do it yourself. Visit the VAERS website at www.vaers. [facility].gov or call 3-843.971.2398. VAERS is only for reporting reactions, and VAERS staff members do not give medical advice. 6. The National Vaccine Injury Compensation Program    The [facility] Vaccine Injury Compensation Program (VICP) is a federal program that was created to compensate people who may have been injured by certain vaccines. Claims regarding alleged injury or death due to vaccination have a time limit for filing, which may be as short as two years. Visit the VICP website at www.[facility].gov/vaccinecompensation or call 0-340.952.3364 to learn about the program and about filing a claim. 7. How can I learn more?  Ask your health care provider.  Call your local or state health department.  Visit the website of the Food and Drug Administration (FDA) for vaccine package inserts and additional information at www.fda.gov/vaccines-blood-biologics/vaccines.  Contact the Centers for Disease Control and Prevention (CDC):  - Call 1-305.619.6791 (1-800-CDC-INFO) or  - Visit CDCs influenza website at www.cdc.gov/flu. Vaccine Information Statement   Inactivated Influenza Vaccine   8/6/2021  [address] [id number]   Department of Health and Human Services  Centers for Disease Control and Prevention    Office Use Only

## 2021-11-03 NOTE — PROGRESS NOTES
Health Maintenance Due   Topic Date Due    COVID-19 Vaccine (1) Never done    DTaP/Tdap/Td series (1 - Tdap) Never done    Shingrix Vaccine Age 50> (1 of 2) Never done    Low dose CT lung screening  Never done    Flu Vaccine (1) 09/01/2021       Chief Complaint   Patient presents with    Cough    Insomnia    Medication Evaluation    Other     Pt wants referal to pulmonary doctor       1. Have you been to the ER, urgent care clinic since your last visit? Hospitalized since your last visit? No    2. Have you seen or consulted any other health care providers outside of the [address] [place] [address] since your last visit? Include any pap smears or colon screening. No    3) Do you have an Advance Directive on file? no    4) Are you interested in receiving information on Advance Directives? NO      Patient is accompanied by Counselor I have received verbal consent from Alexei Merritt to discuss any/all medical information while they are present in the room.

## 2021-11-04 LAB
ALBUMIN SERPL-MCNC: 4.3 G/DL (ref 3.8–4.8)
ALBUMIN/GLOB SERPL: 1.8 {RATIO} (ref 1.2–2.2)
ALP SERPL-CCNC: 93 IU/L (ref 44–121)
ALT SERPL-CCNC: 8 IU/L (ref 0–44)
AST SERPL-CCNC: 15 IU/L (ref 0–40)
BILIRUB SERPL-MCNC: 0.4 MG/DL (ref 0–1.2)
BUN SERPL-MCNC: 5 MG/DL (ref 8–27)
BUN/CREAT SERPL: 8 (ref 10–24)
CALCIUM SERPL-MCNC: 9.7 MG/DL (ref 8.6–10.2)
CHLORIDE SERPL-SCNC: 93 MMOL/L (ref 96–106)
CO2 SERPL-SCNC: 32 MMOL/L (ref 20–29)
CREAT SERPL-MCNC: 0.64 MG/DL (ref 0.76–1.27)
ERYTHROCYTE [DISTWIDTH] IN BLOOD BY AUTOMATED COUNT: 12.5 % (ref 11.6–15.4)
GLOBULIN SER CALC-MCNC: 2.4 G/DL (ref 1.5–4.5)
GLUCOSE SERPL-MCNC: 97 MG/DL (ref 65–99)
HCT VFR BLD AUTO: 48.4 % (ref 37.5–51)
HGB BLD-MCNC: 16.3 G/DL (ref 13–17.7)
MCH RBC QN AUTO: 32.2 PG (ref 26.6–33)
MCHC RBC AUTO-ENTMCNC: 33.7 G/DL (ref 31.5–35.7)
MCV RBC AUTO: 96 FL (ref 79–97)
PLATELET # BLD AUTO: 218 X10E3/UL (ref 150–450)
POTASSIUM SERPL-SCNC: 4.6 MMOL/L (ref 3.5–5.2)
PROT SERPL-MCNC: 6.7 G/DL (ref 6–8.5)
RBC # BLD AUTO: 5.06 X10E6/UL (ref 4.14–5.8)
SODIUM SERPL-SCNC: 139 MMOL/L (ref 134–144)
TSH SERPL DL<=0.005 MIU/L-ACNC: 1.31 UIU/ML (ref 0.45–4.5)
WBC # BLD AUTO: 6.8 X10E3/UL (ref 3.4–10.8)

## 2021-11-29 NOTE — PROGRESS NOTES
HISTORY OF PRESENT ILLNESS  Sharla Rodas is a 57 y.o. male. Patient is here with his caregiver from assisted living for follow-up. Has a few chronic medical issues as documented including COPD/chronic bronchitis and lung nodule. Continues to smoke daily. Has a chronic cough. Occasional sputum. Has chronic dyspnea and MALDONADO. Has not seen pulmonologist in a while. Denies chest pain, GI/ issues. Previous lung nodule biopsy was reported as benign. Followed by psychiatrist for schizophrenia. Remains on medications. Has had his Covid vaccination. Taking precautions for Covid 19. Denies any related signs or symptoms including fever. PMH/PSH/Allergies/Social History/medication list and most recent studies reviewed with patient. Tobacco use: 1 PPD  Alcohol use: Social    HPI    Review of Systems   Constitutional: Positive for weight loss. Negative for diaphoresis and fever. HENT: Negative. Eyes: Negative. Respiratory: Positive for cough and shortness of breath (MALDONADO, chronic). Negative for hemoptysis, sputum production and wheezing. Cardiovascular: Negative for chest pain, palpitations and leg swelling. Gastrointestinal: Negative for abdominal pain. Genitourinary: Negative. Musculoskeletal: Negative. Negative for falls. Skin: Negative. Neurological: Negative for dizziness, sensory change, focal weakness and headaches. Endo/Heme/Allergies: Negative. Psychiatric/Behavioral: Positive for depression. Negative for substance abuse and suicidal ideas. The patient is nervous/anxious and has insomnia. Physical Exam  Vitals and nursing note reviewed. Constitutional:       General: He is not in acute distress. Appearance: He is well-developed. Comments: Cachectic man  Smell of cigarettes   HENT:      Head: Normocephalic and atraumatic. Mouth/Throat:      Mouth: Mucous membranes are moist.   Eyes:      General: No scleral icterus.      Conjunctiva/sclera: Conjunctivae normal.   Neck:      Thyroid: No thyromegaly. Vascular: No carotid bruit or JVD. Cardiovascular:      Rate and Rhythm: Normal rate and regular rhythm. Heart sounds: Normal heart sounds. No murmur heard. Pulmonary:      Effort: Pulmonary effort is normal. No respiratory distress. Breath sounds: No wheezing or rales. Comments: Decreased sounds  Chest:      Chest wall: No tenderness. Abdominal:      General: Bowel sounds are normal. There is no distension. Palpations: Abdomen is soft. Tenderness: There is no abdominal tenderness. Musculoskeletal:         General: No tenderness. Normal range of motion. Cervical back: Normal range of motion and neck supple. Lymphadenopathy:      Cervical: No cervical adenopathy. Skin:     General: Skin is warm and dry. Findings: No rash. Neurological:      Mental Status: He is alert and oriented to person, place, and time. Coordination: Coordination normal.   Psychiatric:         Behavior: Behavior normal.         ASSESSMENT and PLAN  Diagnoses and all orders for this visit:    1. Chronic cough  -     REFERRAL TO PULMONARY DISEASE  -     CT CHEST W WO CONT; Future    2. Pulmonary emphysema, unspecified emphysema type ([facility]  -     METABOLIC PANEL, COMPREHENSIVE; Future  -     CBC W/O DIFF; Future  -     TSH 3RD GENERATION; Future  -     CT CHEST W WO CONT; Future    3. Nodule of lower lobe of left lung  -     CT CHEST W WO CONT; Future    4. Weight loss  -     METABOLIC PANEL, COMPREHENSIVE; Future  -     CBC W/O DIFF; Future  -     TSH 3RD GENERATION; Future  -     CT CHEST W WO CONT; Future    5. Essential hypertension  Stable chronic condition. Continue current treatment/medications. Monitor BP at home with goal of 140/90 or less    6. Schizoaffective disorder, depressive type ([facility]    7. Encounter for immunization  -     INFLUENZA VIRUS VAC QUAD,SPLIT,PRESV FREE SYRINGE IM    8.  Tobacco dependence  Advised pt strongly to stop smoking   9. Needs flu shot    Other orders  -     traZODone (DESYREL) 100 mg tablet; Take 1 Tablet by mouth nightly. -     METABOLIC PANEL, COMPREHENSIVE  -     CBC W/O DIFF  -     TSH 3RD GENERATION      Follow-up and Dispositions    · Return in about 3 months (around 2/3/2022). All chronic medical problems are stable  Continue with current medical management and plan  lab results and schedule of future lab studies reviewed with patient  reviewed diet, exercise and weight control  reviewed medications and side effects in detail  F/u with other MD's/ providers as scheduled  COVID-19 precautions discussed with pt  An After Visit Summary was printed and given to the patient.

## 2022-02-22 ENCOUNTER — OFFICE VISIT (OUTPATIENT)
Dept: INTERNAL MEDICINE CLINIC | Age: 62
End: 2022-02-22
Payer: MEDICAID

## 2022-02-22 VITALS
WEIGHT: 141 LBS | HEIGHT: 70 IN | HEART RATE: 111 BPM | TEMPERATURE: 97.6 F | RESPIRATION RATE: 18 BRPM | DIASTOLIC BLOOD PRESSURE: 72 MMHG | OXYGEN SATURATION: 98 % | SYSTOLIC BLOOD PRESSURE: 112 MMHG | BODY MASS INDEX: 20.19 KG/M2

## 2022-02-22 DIAGNOSIS — R91.1 NODULE OF LOWER LOBE OF LEFT LUNG: ICD-10-CM

## 2022-02-22 DIAGNOSIS — E87.1 HYPONATREMIA: ICD-10-CM

## 2022-02-22 DIAGNOSIS — F17.200 TOBACCO DEPENDENCE: ICD-10-CM

## 2022-02-22 DIAGNOSIS — J43.9 PULMONARY EMPHYSEMA, UNSPECIFIED EMPHYSEMA TYPE (HCC): Primary | ICD-10-CM

## 2022-02-22 DIAGNOSIS — G47.00 INSOMNIA, UNSPECIFIED TYPE: ICD-10-CM

## 2022-02-22 DIAGNOSIS — I10 ESSENTIAL HYPERTENSION: ICD-10-CM

## 2022-02-22 DIAGNOSIS — F25.1 SCHIZOAFFECTIVE DISORDER, DEPRESSIVE TYPE (HCC): ICD-10-CM

## 2022-02-22 DIAGNOSIS — Z99.81 OXYGEN DEPENDENT: ICD-10-CM

## 2022-02-22 PROCEDURE — 99214 OFFICE O/P EST MOD 30 MIN: CPT | Performed by: INTERNAL MEDICINE

## 2022-02-22 RX ORDER — TIOTROPIUM BROMIDE 18 UG/1
CAPSULE ORAL; RESPIRATORY (INHALATION)
COMMUNITY
Start: 2022-02-03

## 2022-02-22 RX ORDER — DIVALPROEX SODIUM 500 MG/1
TABLET, DELAYED RELEASE ORAL
COMMUNITY
Start: 2022-02-03 | End: 2022-03-16

## 2022-02-22 NOTE — PROGRESS NOTES
Health Maintenance Due   Topic Date Due    COVID-19 Vaccine (1) Never done    DTaP/Tdap/Td series (1 - Tdap) Never done    Shingrix Vaccine Age 50> (1 of 2) Never done    Low dose CT lung screening  Never done       Chief Complaint   Patient presents with   [facility] Follow Up     Sepsis     Hypertension    Ear Pain     Pt       1. Have you been to the ER, urgent care clinic since your last visit? Hospitalized since your last visit? Yes, [facility], Sepsis ,1/27/22- 2/3/22    2. Have you seen or consulted any other health care providers outside of the [address] [place] [address] since your last visit? Include any pap smears or colon screening. No    3) Do you have an Advance Directive on file? no    4) Are you interested in receiving information on Advance Directives? NO      Patient is accompanied by  , daughter I have received verbal consent from Joanie Langley to discuss any/all medical information while they are present in the room.

## 2022-02-22 NOTE — PROGRESS NOTES
HISTORY OF PRESENT ILLNESS  Joe Millan is a 57 y.o. male. Pt. comes in with 2 caseworkers from his group home for f/u. Has a few chronic medical issues as documented including COPD, lung mass with negative biopsy in the past, insomnia, schizophrenia. Vital signs are stable. Has lost weight. Appetite is fair. Has chronic dyspnea and MALDONADO. Followed by pulmonologist.  Has been started on oxygen. Continues to smoke some. Denies any pain. Tells me he is feeling okay today. Has had Covid-19 vaccination. Reports taking proper precautions. Denies any related signs or symptoms. PMH/PSH/Allergies/Social History/medication list and most recent studies reviewed with patient. Tobacco use: Daily   alcohol use: No  Reports compliance with medications and diet. Not very active physically. Reports no other new c/o. HPI    Review of Systems   Constitutional: Positive for weight loss. Negative for diaphoresis and fever. HENT: Negative. Eyes: Negative. Respiratory: Positive for cough and shortness of breath (MALDONADO, chronic). Negative for hemoptysis, sputum production and wheezing. Cardiovascular: Negative for chest pain, palpitations and leg swelling. Gastrointestinal: Negative for abdominal pain. Genitourinary: Negative. Musculoskeletal: Negative. Negative for falls. Skin: Negative. Neurological: Negative for dizziness, sensory change, focal weakness and headaches. Endo/Heme/Allergies: Negative. Psychiatric/Behavioral: Positive for depression. Negative for substance abuse and suicidal ideas. The patient is nervous/anxious and has insomnia. Physical Exam    ASSESSMENT and PLAN  Diagnoses and all orders for this visit:    1. Pulmonary emphysema, unspecified emphysema type ([facility]  -     METABOLIC PANEL, COMPREHENSIVE; Future  -     CBC W/O DIFF; Future  Continue inhalers  Follow-up with neurologist as scheduled  2. Nodule of lower lobe of left lung  Stable on recent chest CT. Follow-up with pulmonologist.  3. Essential hypertension  Stable chronic condition. Continue current treatment/medications. Monitor BP at home with goal of 140/90 or less    4. Tobacco dependence  Advised pt strongly to stop smoking     5. Schizoaffective disorder, depressive type ([address]  Continue medications. Follow-up with psychiatrist as scheduled. 6. Insomnia, unspecified type  Advised patient to ask psychiatrist about this treatment option  7. Hyponatremia, multifactorial including medications, COPD/lung disease, and excessive water intake  -     METABOLIC PANEL, COMPREHENSIVE; Future  -     CBC W/O DIFF; Future  Advised patient to limit water intake  8. Oxygen dependent  Continue oxygen  Avoid smoking while on O2    Follow-up and Dispositions    · Return in about 3 months (around 5/22/2022). All chronic medical problems are stable  Continue with current medical management and plan  lab results and schedule of future lab studies reviewed with patient  reviewed diet, exercise and weight control  reviewed medications and side effects in detail  F/u with other MD's/ providers as scheduled  COVID-19 precautions discussed with pt  An After Visit Summary was printed and given to the patient.

## 2022-02-23 LAB
ALBUMIN SERPL-MCNC: 4 G/DL (ref 3.8–4.8)
ALBUMIN/GLOB SERPL: 1.5 {RATIO} (ref 1.2–2.2)
ALP SERPL-CCNC: 86 IU/L (ref 44–121)
ALT SERPL-CCNC: 7 IU/L (ref 0–44)
AST SERPL-CCNC: 12 IU/L (ref 0–40)
BILIRUB SERPL-MCNC: 0.3 MG/DL (ref 0–1.2)
BUN SERPL-MCNC: 4 MG/DL (ref 8–27)
BUN/CREAT SERPL: 7 (ref 10–24)
CALCIUM SERPL-MCNC: 9.2 MG/DL (ref 8.6–10.2)
CHLORIDE SERPL-SCNC: 96 MMOL/L (ref 96–106)
CO2 SERPL-SCNC: 29 MMOL/L (ref 20–29)
CREAT SERPL-MCNC: 0.56 MG/DL (ref 0.76–1.27)
ERYTHROCYTE [DISTWIDTH] IN BLOOD BY AUTOMATED COUNT: 13.8 % (ref 11.6–15.4)
GLOBULIN SER CALC-MCNC: 2.6 G/DL (ref 1.5–4.5)
GLUCOSE SERPL-MCNC: 108 MG/DL (ref 65–99)
HCT VFR BLD AUTO: 40.7 % (ref 37.5–51)
HGB BLD-MCNC: 13.3 G/DL (ref 13–17.7)
MCH RBC QN AUTO: 31.6 PG (ref 26.6–33)
MCHC RBC AUTO-ENTMCNC: 32.7 G/DL (ref 31.5–35.7)
MCV RBC AUTO: 97 FL (ref 79–97)
PLATELET # BLD AUTO: 217 X10E3/UL (ref 150–450)
POTASSIUM SERPL-SCNC: 4.2 MMOL/L (ref 3.5–5.2)
PROT SERPL-MCNC: 6.6 G/DL (ref 6–8.5)
RBC # BLD AUTO: 4.21 X10E6/UL (ref 4.14–5.8)
SODIUM SERPL-SCNC: 138 MMOL/L (ref 134–144)
WBC # BLD AUTO: 7.8 X10E3/UL (ref 3.4–10.8)

## 2022-02-26 ENCOUNTER — HOSPITAL ENCOUNTER (INPATIENT)
Age: 62
LOS: 18 days | Discharge: NURSING, REHAB OR LONG-TERM CARE | DRG: 139 | End: 2022-03-16
Attending: EMERGENCY MEDICINE | Admitting: STUDENT IN AN ORGANIZED HEALTH CARE EDUCATION/TRAINING PROGRAM
Payer: MEDICAID

## 2022-02-26 ENCOUNTER — APPOINTMENT (OUTPATIENT)
Dept: GENERAL RADIOLOGY | Age: 62
DRG: 139 | End: 2022-02-26
Attending: EMERGENCY MEDICINE
Payer: MEDICAID

## 2022-02-26 ENCOUNTER — APPOINTMENT (OUTPATIENT)
Dept: CT IMAGING | Age: 62
DRG: 139 | End: 2022-02-26
Attending: EMERGENCY MEDICINE
Payer: MEDICAID

## 2022-02-26 ENCOUNTER — APPOINTMENT (OUTPATIENT)
Dept: CT IMAGING | Age: 62
DRG: 139 | End: 2022-02-26
Attending: STUDENT IN AN ORGANIZED HEALTH CARE EDUCATION/TRAINING PROGRAM
Payer: MEDICAID

## 2022-02-26 ENCOUNTER — APPOINTMENT (OUTPATIENT)
Dept: GENERAL RADIOLOGY | Age: 62
DRG: 139 | End: 2022-02-26
Attending: STUDENT IN AN ORGANIZED HEALTH CARE EDUCATION/TRAINING PROGRAM
Payer: MEDICAID

## 2022-02-26 DIAGNOSIS — E87.1 HYPONATREMIA: ICD-10-CM

## 2022-02-26 DIAGNOSIS — J18.9 PNEUMONIA OF LEFT LOWER LOBE DUE TO INFECTIOUS ORGANISM: ICD-10-CM

## 2022-02-26 DIAGNOSIS — R41.82 ALTERED MENTAL STATUS, UNSPECIFIED ALTERED MENTAL STATUS TYPE: Primary | ICD-10-CM

## 2022-02-26 LAB
ALBUMIN SERPL-MCNC: 2.6 G/DL (ref 3.5–5)
ALBUMIN/GLOB SERPL: 0.7 {RATIO} (ref 1.1–2.2)
ALP SERPL-CCNC: 68 U/L (ref 45–117)
ALT SERPL-CCNC: 10 U/L (ref 12–78)
ANION GAP SERPL CALC-SCNC: 1 MMOL/L (ref 5–15)
ANION GAP SERPL CALC-SCNC: 4 MMOL/L (ref 5–15)
APPEARANCE UR: CLEAR
ARTERIAL PATENCY WRIST A: POSITIVE
AST SERPL-CCNC: 6 U/L (ref 15–37)
ATRIAL RATE: 117 BPM
BACTERIA URNS QL MICRO: NEGATIVE /HPF
BASE EXCESS BLD CALC-SCNC: 6.2 MMOL/L
BASE EXCESS BLD CALC-SCNC: 6.3 MMOL/L
BASOPHILS # BLD: 0 K/UL (ref 0–0.1)
BASOPHILS NFR BLD: 0 % (ref 0–1)
BDY SITE: ABNORMAL
BILIRUB SERPL-MCNC: 0.5 MG/DL (ref 0.2–1)
BILIRUB UR QL: NEGATIVE
BNP SERPL-MCNC: 115 PG/ML
BUN SERPL-MCNC: 6 MG/DL (ref 6–20)
BUN SERPL-MCNC: 7 MG/DL (ref 6–20)
BUN/CREAT SERPL: 13 (ref 12–20)
BUN/CREAT SERPL: 16 (ref 12–20)
CA-I BLD-MCNC: 1.19 MMOL/L (ref 1.12–1.32)
CALCIUM SERPL-MCNC: 7.9 MG/DL (ref 8.5–10.1)
CALCIUM SERPL-MCNC: 8.3 MG/DL (ref 8.5–10.1)
CALCULATED P AXIS, ECG09: 75 DEGREES
CALCULATED R AXIS, ECG10: 18 DEGREES
CALCULATED T AXIS, ECG11: 61 DEGREES
CHLORIDE BLD-SCNC: 90 MMOL/L (ref 100–108)
CHLORIDE SERPL-SCNC: 92 MMOL/L (ref 97–108)
CHLORIDE SERPL-SCNC: 95 MMOL/L (ref 97–108)
CO2 BLD-SCNC: 36 MMOL/L (ref 19–24)
CO2 SERPL-SCNC: 32 MMOL/L (ref 21–32)
CO2 SERPL-SCNC: 34 MMOL/L (ref 21–32)
COLOR UR: ABNORMAL
COMMENT, HOLDF: NORMAL
COMMENT, HOLDF: NORMAL
COVID-19 RAPID TEST, COVR: NOT DETECTED
CREAT SERPL-MCNC: 0.45 MG/DL (ref 0.7–1.3)
CREAT SERPL-MCNC: 0.46 MG/DL (ref 0.7–1.3)
CREAT UR-MCNC: 0.4 MG/DL (ref 0.6–1.3)
DIAGNOSIS, 93000: NORMAL
DIFFERENTIAL METHOD BLD: ABNORMAL
EOSINOPHIL # BLD: 0 K/UL (ref 0–0.4)
EOSINOPHIL NFR BLD: 0 % (ref 0–7)
EPITH CASTS URNS QL MICRO: ABNORMAL /LPF
ERYTHROCYTE [DISTWIDTH] IN BLOOD BY AUTOMATED COUNT: 14.9 % (ref 11.5–14.5)
FLUAV AG NPH QL IA: NEGATIVE
FLUBV AG NOSE QL IA: NEGATIVE
FOLATE SERPL-MCNC: 27.2 NG/ML (ref 5–21)
GAS FLOW.O2 O2 DELIVERY SYS: ABNORMAL L/MIN
GLOBULIN SER CALC-MCNC: 3.8 G/DL (ref 2–4)
GLUCOSE BLD STRIP.AUTO-MCNC: 92 MG/DL (ref 74–106)
GLUCOSE SERPL-MCNC: 90 MG/DL (ref 65–100)
GLUCOSE SERPL-MCNC: 92 MG/DL (ref 65–100)
GLUCOSE UR STRIP.AUTO-MCNC: NEGATIVE MG/DL
HCO3 BLD-SCNC: 32.3 MMOL/L (ref 22–26)
HCO3 BLDA-SCNC: 35 MMOL/L
HCT VFR BLD AUTO: 37.1 % (ref 36.6–50.3)
HGB BLD-MCNC: 12.1 G/DL (ref 12.1–17)
HGB UR QL STRIP: NEGATIVE
HYALINE CASTS URNS QL MICRO: ABNORMAL /LPF (ref 0–5)
IMM GRANULOCYTES # BLD AUTO: 0.1 K/UL (ref 0–0.04)
IMM GRANULOCYTES NFR BLD AUTO: 1 % (ref 0–0.5)
KETONES UR QL STRIP.AUTO: ABNORMAL MG/DL
LACTATE BLD-SCNC: 0.61 MMOL/L (ref 0.4–2)
LACTATE SERPL-SCNC: 0.4 MMOL/L (ref 0.4–2)
LEUKOCYTE ESTERASE UR QL STRIP.AUTO: NEGATIVE
LYMPHOCYTES # BLD: 0.9 K/UL (ref 0.8–3.5)
LYMPHOCYTES NFR BLD: 6 % (ref 12–49)
M PNEUMO IGM SER IA-ACNC: NONREACTIVE
MCH RBC QN AUTO: 31.5 PG (ref 26–34)
MCHC RBC AUTO-ENTMCNC: 32.6 G/DL (ref 30–36.5)
MCV RBC AUTO: 96.6 FL (ref 80–99)
MONOCYTES # BLD: 1.5 K/UL (ref 0–1)
MONOCYTES NFR BLD: 11 % (ref 5–13)
NEUTS SEG # BLD: 11 K/UL (ref 1.8–8)
NEUTS SEG NFR BLD: 82 % (ref 32–75)
NITRITE UR QL STRIP.AUTO: NEGATIVE
NRBC # BLD: 0 K/UL (ref 0–0.01)
NRBC BLD-RTO: 0 PER 100 WBC
O2/TOTAL GAS SETTING VFR VENT: 2 %
OSMOLALITY SERPL: 264 MOSM/KG H2O
OSMOLALITY UR: 429 MOSM/KG H2O
P-R INTERVAL, ECG05: 142 MS
PCO2 BLD: 51.1 MMHG (ref 35–45)
PCO2 BLDV: 67.7 MMHG (ref 41–51)
PH BLD: 7.41 [PH] (ref 7.35–7.45)
PH BLDV: 7.32 [PH] (ref 7.32–7.42)
PH UR STRIP: 7 [PH] (ref 5–8)
PLATELET # BLD AUTO: 179 K/UL (ref 150–400)
PMV BLD AUTO: 9.1 FL (ref 8.9–12.9)
PO2 BLD: 54 MMHG (ref 80–100)
PO2 BLDV: 64 MMHG (ref 25–40)
POTASSIUM BLD-SCNC: 4.3 MMOL/L (ref 3.5–5.5)
POTASSIUM SERPL-SCNC: 3.9 MMOL/L (ref 3.5–5.1)
POTASSIUM SERPL-SCNC: 4.2 MMOL/L (ref 3.5–5.1)
PROCALCITONIN SERPL-MCNC: 0.18 NG/ML
PROT SERPL-MCNC: 6.4 G/DL (ref 6.4–8.2)
PROT UR STRIP-MCNC: NEGATIVE MG/DL
Q-T INTERVAL, ECG07: 308 MS
QRS DURATION, ECG06: 78 MS
QTC CALCULATION (BEZET), ECG08: 429 MS
RBC # BLD AUTO: 3.84 M/UL (ref 4.1–5.7)
RBC #/AREA URNS HPF: ABNORMAL /HPF (ref 0–5)
SAMPLES BEING HELD,HOLD: NORMAL
SAMPLES BEING HELD,HOLD: NORMAL
SAO2 % BLD: 87 % (ref 92–97)
SODIUM BLD-SCNC: 133 MMOL/L (ref 136–145)
SODIUM SERPL-SCNC: 128 MMOL/L (ref 136–145)
SODIUM SERPL-SCNC: 130 MMOL/L (ref 136–145)
SODIUM UR-SCNC: 61 MMOL/L
SOURCE, COVRS: NORMAL
SP GR UR REFRACTOMETRY: 1.01 (ref 1–1.03)
SPECIMEN SITE: ABNORMAL
SPECIMEN TYPE: ABNORMAL
TSH SERPL DL<=0.05 MIU/L-ACNC: 0.41 UIU/ML (ref 0.36–3.74)
UA: UC IF INDICATED,UAUC: ABNORMAL
UROBILINOGEN UR QL STRIP.AUTO: 1 EU/DL (ref 0.2–1)
VALPROATE SERPL-MCNC: 81 UG/ML (ref 50–100)
VENTRICULAR RATE, ECG03: 117 BPM
VIT B12 SERPL-MCNC: 360 PG/ML (ref 193–986)
WBC # BLD AUTO: 13.5 K/UL (ref 4.1–11.1)
WBC URNS QL MICRO: ABNORMAL /HPF (ref 0–4)

## 2022-02-26 PROCEDURE — 94664 DEMO&/EVAL PT USE INHALER: CPT

## 2022-02-26 PROCEDURE — 77010033711 HC HIGH FLOW OXYGEN

## 2022-02-26 PROCEDURE — 86738 MYCOPLASMA ANTIBODY: CPT

## 2022-02-26 PROCEDURE — 80164 ASSAY DIPROPYLACETIC ACD TOT: CPT

## 2022-02-26 PROCEDURE — 74011250637 HC RX REV CODE- 250/637: Performed by: STUDENT IN AN ORGANIZED HEALTH CARE EDUCATION/TRAINING PROGRAM

## 2022-02-26 PROCEDURE — 84300 ASSAY OF URINE SODIUM: CPT

## 2022-02-26 PROCEDURE — 74011000258 HC RX REV CODE- 258: Performed by: EMERGENCY MEDICINE

## 2022-02-26 PROCEDURE — 80053 COMPREHEN METABOLIC PANEL: CPT

## 2022-02-26 PROCEDURE — 83930 ASSAY OF BLOOD OSMOLALITY: CPT

## 2022-02-26 PROCEDURE — 36415 COLL VENOUS BLD VENIPUNCTURE: CPT

## 2022-02-26 PROCEDURE — 94640 AIRWAY INHALATION TREATMENT: CPT

## 2022-02-26 PROCEDURE — 85025 COMPLETE CBC W/AUTO DIFF WBC: CPT

## 2022-02-26 PROCEDURE — 74011000250 HC RX REV CODE- 250: Performed by: STUDENT IN AN ORGANIZED HEALTH CARE EDUCATION/TRAINING PROGRAM

## 2022-02-26 PROCEDURE — 70450 CT HEAD/BRAIN W/O DYE: CPT

## 2022-02-26 PROCEDURE — 71275 CT ANGIOGRAPHY CHEST: CPT

## 2022-02-26 PROCEDURE — 74011250636 HC RX REV CODE- 250/636: Performed by: EMERGENCY MEDICINE

## 2022-02-26 PROCEDURE — 65660000000 HC RM CCU STEPDOWN

## 2022-02-26 PROCEDURE — 83935 ASSAY OF URINE OSMOLALITY: CPT

## 2022-02-26 PROCEDURE — 83605 ASSAY OF LACTIC ACID: CPT

## 2022-02-26 PROCEDURE — 84443 ASSAY THYROID STIM HORMONE: CPT

## 2022-02-26 PROCEDURE — 82746 ASSAY OF FOLIC ACID SERUM: CPT

## 2022-02-26 PROCEDURE — 87449 NOS EACH ORGANISM AG IA: CPT

## 2022-02-26 PROCEDURE — 74011250636 HC RX REV CODE- 250/636: Performed by: STUDENT IN AN ORGANIZED HEALTH CARE EDUCATION/TRAINING PROGRAM

## 2022-02-26 PROCEDURE — 71045 X-RAY EXAM CHEST 1 VIEW: CPT

## 2022-02-26 PROCEDURE — 82803 BLOOD GASES ANY COMBINATION: CPT

## 2022-02-26 PROCEDURE — 74018 RADEX ABDOMEN 1 VIEW: CPT

## 2022-02-26 PROCEDURE — 93005 ELECTROCARDIOGRAM TRACING: CPT

## 2022-02-26 PROCEDURE — 82947 ASSAY GLUCOSE BLOOD QUANT: CPT

## 2022-02-26 PROCEDURE — 81001 URINALYSIS AUTO W/SCOPE: CPT

## 2022-02-26 PROCEDURE — 82607 VITAMIN B-12: CPT

## 2022-02-26 PROCEDURE — 87635 SARS-COV-2 COVID-19 AMP PRB: CPT

## 2022-02-26 PROCEDURE — 84145 PROCALCITONIN (PCT): CPT

## 2022-02-26 PROCEDURE — 99285 EMERGENCY DEPT VISIT HI MDM: CPT

## 2022-02-26 PROCEDURE — 87040 BLOOD CULTURE FOR BACTERIA: CPT

## 2022-02-26 PROCEDURE — 74011250637 HC RX REV CODE- 250/637: Performed by: EMERGENCY MEDICINE

## 2022-02-26 PROCEDURE — 87804 INFLUENZA ASSAY W/OPTIC: CPT

## 2022-02-26 PROCEDURE — 83880 ASSAY OF NATRIURETIC PEPTIDE: CPT

## 2022-02-26 PROCEDURE — 74011000636 HC RX REV CODE- 636: Performed by: RADIOLOGY

## 2022-02-26 PROCEDURE — 96365 THER/PROPH/DIAG IV INF INIT: CPT

## 2022-02-26 PROCEDURE — 36600 WITHDRAWAL OF ARTERIAL BLOOD: CPT

## 2022-02-26 RX ORDER — IPRATROPIUM BROMIDE AND ALBUTEROL SULFATE 2.5; .5 MG/3ML; MG/3ML
3 SOLUTION RESPIRATORY (INHALATION)
Status: DISCONTINUED | OUTPATIENT
Start: 2022-02-26 | End: 2022-02-27

## 2022-02-26 RX ORDER — BENZTROPINE MESYLATE 1 MG/1
2 TABLET ORAL DAILY
Status: DISCONTINUED | OUTPATIENT
Start: 2022-02-26 | End: 2022-03-16 | Stop reason: HOSPADM

## 2022-02-26 RX ORDER — GUAIFENESIN 600 MG/1
600 TABLET, EXTENDED RELEASE ORAL EVERY 12 HOURS
Status: DISCONTINUED | OUTPATIENT
Start: 2022-02-26 | End: 2022-03-16

## 2022-02-26 RX ORDER — PAROXETINE HYDROCHLORIDE 20 MG/1
20 TABLET, FILM COATED ORAL DAILY
Status: DISCONTINUED | OUTPATIENT
Start: 2022-02-26 | End: 2022-03-03

## 2022-02-26 RX ORDER — ACETAMINOPHEN 325 MG/1
650 TABLET ORAL
Status: DISCONTINUED | OUTPATIENT
Start: 2022-02-26 | End: 2022-03-16 | Stop reason: HOSPADM

## 2022-02-26 RX ORDER — SODIUM CHLORIDE TAB 1 GM 1 G
1 TAB MISCELLANEOUS DAILY
Status: DISCONTINUED | OUTPATIENT
Start: 2022-02-26 | End: 2022-02-26

## 2022-02-26 RX ORDER — PANTOPRAZOLE SODIUM 40 MG/1
40 TABLET, DELAYED RELEASE ORAL
Status: DISCONTINUED | OUTPATIENT
Start: 2022-02-27 | End: 2022-03-16 | Stop reason: HOSPADM

## 2022-02-26 RX ORDER — AMLODIPINE BESYLATE 5 MG/1
5 TABLET ORAL DAILY
Status: DISCONTINUED | OUTPATIENT
Start: 2022-02-26 | End: 2022-03-16

## 2022-02-26 RX ORDER — ONDANSETRON 4 MG/1
4 TABLET, ORALLY DISINTEGRATING ORAL
Status: DISCONTINUED | OUTPATIENT
Start: 2022-02-26 | End: 2022-03-16 | Stop reason: HOSPADM

## 2022-02-26 RX ORDER — SODIUM CHLORIDE 0.9 % (FLUSH) 0.9 %
5-10 SYRINGE (ML) INJECTION AS NEEDED
Status: DISCONTINUED | OUTPATIENT
Start: 2022-02-26 | End: 2022-03-16 | Stop reason: HOSPADM

## 2022-02-26 RX ORDER — LANOLIN ALCOHOL/MO/W.PET/CERES
100 CREAM (GRAM) TOPICAL DAILY
Status: DISCONTINUED | OUTPATIENT
Start: 2022-02-26 | End: 2022-03-16 | Stop reason: HOSPADM

## 2022-02-26 RX ORDER — ACETAMINOPHEN 650 MG/1
650 SUPPOSITORY RECTAL
Status: DISCONTINUED | OUTPATIENT
Start: 2022-02-26 | End: 2022-03-16 | Stop reason: HOSPADM

## 2022-02-26 RX ORDER — BENZONATATE 100 MG/1
100 CAPSULE ORAL
Status: DISCONTINUED | OUTPATIENT
Start: 2022-02-26 | End: 2022-03-16 | Stop reason: HOSPADM

## 2022-02-26 RX ORDER — ACETAMINOPHEN 650 MG/1
975 SUPPOSITORY RECTAL
Status: COMPLETED | OUTPATIENT
Start: 2022-02-26 | End: 2022-02-26

## 2022-02-26 RX ORDER — ONDANSETRON 2 MG/ML
4 INJECTION INTRAMUSCULAR; INTRAVENOUS
Status: DISCONTINUED | OUTPATIENT
Start: 2022-02-26 | End: 2022-03-16 | Stop reason: HOSPADM

## 2022-02-26 RX ORDER — SODIUM CHLORIDE 0.9 % (FLUSH) 0.9 %
5-40 SYRINGE (ML) INJECTION EVERY 8 HOURS
Status: DISCONTINUED | OUTPATIENT
Start: 2022-02-26 | End: 2022-03-16

## 2022-02-26 RX ORDER — TRAZODONE HYDROCHLORIDE 100 MG/1
100 TABLET ORAL
Status: DISCONTINUED | OUTPATIENT
Start: 2022-02-26 | End: 2022-03-16 | Stop reason: HOSPADM

## 2022-02-26 RX ORDER — IPRATROPIUM BROMIDE AND ALBUTEROL SULFATE 2.5; .5 MG/3ML; MG/3ML
3 SOLUTION RESPIRATORY (INHALATION)
Status: DISCONTINUED | OUTPATIENT
Start: 2022-02-26 | End: 2022-03-16 | Stop reason: HOSPADM

## 2022-02-26 RX ORDER — ERGOCALCIFEROL 1.25 MG/1
50000 CAPSULE ORAL
Status: DISCONTINUED | OUTPATIENT
Start: 2022-03-01 | End: 2022-03-16 | Stop reason: HOSPADM

## 2022-02-26 RX ORDER — FOLIC ACID 1 MG/1
1 TABLET ORAL DAILY
Status: DISCONTINUED | OUTPATIENT
Start: 2022-02-27 | End: 2022-03-16 | Stop reason: HOSPADM

## 2022-02-26 RX ORDER — BUDESONIDE 0.5 MG/2ML
500 INHALANT ORAL
Status: DISCONTINUED | OUTPATIENT
Start: 2022-02-26 | End: 2022-03-16 | Stop reason: HOSPADM

## 2022-02-26 RX ORDER — SODIUM CHLORIDE 9 MG/ML
75 INJECTION, SOLUTION INTRAVENOUS CONTINUOUS
Status: DISCONTINUED | OUTPATIENT
Start: 2022-02-26 | End: 2022-02-26

## 2022-02-26 RX ORDER — ACETAMINOPHEN 500 MG
1000 TABLET ORAL ONCE
Status: DISCONTINUED | OUTPATIENT
Start: 2022-02-26 | End: 2022-02-26

## 2022-02-26 RX ORDER — DIVALPROEX SODIUM 500 MG/1
500 TABLET, DELAYED RELEASE ORAL DAILY
Status: DISCONTINUED | OUTPATIENT
Start: 2022-02-26 | End: 2022-03-16 | Stop reason: HOSPADM

## 2022-02-26 RX ORDER — POLYETHYLENE GLYCOL 3350 17 G/17G
17 POWDER, FOR SOLUTION ORAL DAILY PRN
Status: DISCONTINUED | OUTPATIENT
Start: 2022-02-26 | End: 2022-03-16 | Stop reason: HOSPADM

## 2022-02-26 RX ORDER — LACTULOSE 10 G/15ML
5 SOLUTION ORAL; RECTAL
Status: DISCONTINUED | OUTPATIENT
Start: 2022-02-26 | End: 2022-03-16 | Stop reason: HOSPADM

## 2022-02-26 RX ORDER — SODIUM CHLORIDE 0.9 % (FLUSH) 0.9 %
5-40 SYRINGE (ML) INJECTION AS NEEDED
Status: DISCONTINUED | OUTPATIENT
Start: 2022-02-26 | End: 2022-03-16 | Stop reason: HOSPADM

## 2022-02-26 RX ORDER — SODIUM CHLORIDE TAB 1 GM 1 G
1 TAB MISCELLANEOUS DAILY
Status: DISCONTINUED | OUTPATIENT
Start: 2022-02-26 | End: 2022-03-12

## 2022-02-26 RX ORDER — LEVOFLOXACIN 5 MG/ML
750 INJECTION, SOLUTION INTRAVENOUS EVERY 24 HOURS
Status: DISCONTINUED | OUTPATIENT
Start: 2022-02-26 | End: 2022-03-01

## 2022-02-26 RX ORDER — ENOXAPARIN SODIUM 100 MG/ML
40 INJECTION SUBCUTANEOUS DAILY
Status: DISCONTINUED | OUTPATIENT
Start: 2022-02-27 | End: 2022-03-16 | Stop reason: HOSPADM

## 2022-02-26 RX ORDER — BUDESONIDE 0.5 MG/2ML
500 INHALANT ORAL
Status: DISCONTINUED | OUTPATIENT
Start: 2022-02-26 | End: 2022-02-26

## 2022-02-26 RX ADMIN — PIPERACILLIN AND TAZOBACTAM 3.38 G: 3; .375 INJECTION, POWDER, LYOPHILIZED, FOR SOLUTION INTRAVENOUS at 10:07

## 2022-02-26 RX ADMIN — LEVOFLOXACIN 750 MG: 750 INJECTION, SOLUTION INTRAVENOUS at 15:57

## 2022-02-26 RX ADMIN — IOPAMIDOL 80 ML: 755 INJECTION, SOLUTION INTRAVENOUS at 12:23

## 2022-02-26 RX ADMIN — SODIUM CHLORIDE, PRESERVATIVE FREE 10 ML: 5 INJECTION INTRAVENOUS at 23:11

## 2022-02-26 RX ADMIN — IPRATROPIUM BROMIDE AND ALBUTEROL SULFATE 3 ML: .5; 3 SOLUTION RESPIRATORY (INHALATION) at 19:28

## 2022-02-26 RX ADMIN — ACETAMINOPHEN 975 MG: 650 SUPPOSITORY RECTAL at 10:18

## 2022-02-26 RX ADMIN — SODIUM CHLORIDE, PRESERVATIVE FREE 10 ML: 5 INJECTION INTRAVENOUS at 15:59

## 2022-02-26 RX ADMIN — BUDESONIDE 500 MCG: 0.5 INHALANT RESPIRATORY (INHALATION) at 19:28

## 2022-02-26 RX ADMIN — METHYLPREDNISOLONE SODIUM SUCCINATE 40 MG: 40 INJECTION, POWDER, FOR SOLUTION INTRAMUSCULAR; INTRAVENOUS at 15:57

## 2022-02-26 RX ADMIN — SODIUM CHLORIDE 500 ML: 9 INJECTION, SOLUTION INTRAVENOUS at 17:29

## 2022-02-26 RX ADMIN — SODIUM CHLORIDE, PRESERVATIVE FREE 10 ML: 5 INJECTION INTRAVENOUS at 17:29

## 2022-02-26 NOTE — ED NOTES
RN to bedside to assess patient. Patient remains drowsy, eyes open to voice, but unable to take PO medications at this time. Patient temperature 101.6F orally. Gabriele Knox MD notified.

## 2022-02-26 NOTE — CONSULTS
Pulmonary    Reason: COPD  Requesting:  Dr René Ackerman reviewed / images viewed / labs and studies reviewed    Hx:  56 yo male  has a past medical history of Asthma, Bronchitis, Chronic obstructive pulmonary disease ([facility] Depression, Psychiatric disorder, Psychiatric disorder, and Psychotic disorder ([facility] Followed by Dr Toby Parra as an outpatient. Has a h/o COPD and also being followed for lung nodule - prior navigational bronchoscopy negative for malgnancy. Maintained on symbicort, spiriva and duonebs  Presented from adult home with confusion and congestion. Has been refusing to wear jhis O2 at 4 L/min      Objective:  Patient Vitals for the past 4 hrs:   BP Temp Pulse Resp SpO2 Height Weight   22 1135 (!) 96/52 (!) 101.6 °F (38.7 °C) (!) 110 24 95 %     22 1015 106/69 (!) 101.6 °F (38.7 °C) (!) 115 23 91 %     22 0945 107/68  (!) 116 22 95 %     22 0915 108/64  (!) 116 22 98 %     22 0850     93 %     22 0817     96 %     22 0815 112/67 (!) 102.1 °F (38.9 °C) (!) 134 (!) 36 (!) 86 % 5' 10\" (1.778 m) 64 kg (141 lb)   Temp (24hrs), Av.8 °F (38.8 °C), Min:101.6 °F (38.7 °C), Max:102.1 °F (38.9 °C)      Intake/Output Summary (Last 24 hours) at 2022 1207  Last data filed at 2022 1044  Gross per 24 hour   Intake    Output 500 ml   Net -500 ml     CXR - LLL asdz  CTA chest - pnd  WBC 13.5, Hgb  12.1  HCO3 32  VBG 7.32 /   Rapid COVID negative        Pulmonary Impression / Recommendations:  1. Acute on chronic resp failure with hypoxemia and hypercarbia 2/2 underlying copd and LLL asdz. COVID negative. O2 dependent - noncompliant  2. H/o lung nodule - previous navigational bronchoscopy negative. Was to have a follow up CT done in 2022 - followed by Dr Toby Parra  3. Fever leukocytosis 2/2 above  4.  Psychiatric disorder    --empiric abx for CAP  --follow up cultures  --O2 to keep sats 88-92%  --nebs / steroids  --CT pnd  --Follow up with Dr Susanna Levine post discharge  --group to follow on Monday    Stephanie Ni MD

## 2022-02-26 NOTE — H&P
[facility] Adult  Hospitalist Group  History and Physical    Primary Care Provider: Bisi Sher DO  Date of Service:  2/26/2022    Chief Complaint: ams, cough     Subjective:     Inez Dougherty is a 57 y.o. male smoker history of schizo affective disorder. Bipolar depression, COPD, asthma emphysema/bronchitis, history of psychogenic polydipsia, resides in a group home, GERD presents with altered mental status. Unable to obtain history from patient as he is altered. Reported confusion and congestive cough from his group home prompted ED arrival.  In the ER patient was placed on 4 L nasal cannula desatted to mid 80s stat ABG showed compensated respiratory acidosis w/ hypoxia, patient was placed on mid flow currently stable 5L midflow sats low 90s. Patient encephalopathic on my exam.  Notable for WBC count of 13, T-max 102.1F, lactic 0.4. BP soft but maps greater than 60. Review of Systems:    Review of systems not obtained due to patient factors. Past Medical History:   Diagnosis Date    Asthma     seldom,use inhaler    Bronchitis     Chronic obstructive pulmonary disease ([facility] [address]     Depression     anxiety    Psychiatric disorder     paranoid schizophrenia    Psychiatric disorder     extrapyramidal disease    Psychotic disorder ([facility] [address]     mental retardation      Past Surgical History:   Procedure Laterality Date    HX ORTHOPAEDIC      right knee     Prior to Admission medications    Medication Sig Start Date End Date Taking? Authorizing Provider   divalproex DR (DEPAKOTE) 500 mg tablet  2/3/22   Provider, Historical   Spiriva with HandiHaler 18 mcg inhalation capsule  2/3/22   Provider, Historical   traZODone (DESYREL) 100 mg tablet Take 1 Tablet by mouth nightly. 11/3/21   Bisi Sher DO   budesonide-formoteroL (SYMBICORT) 160-4.5 mcg/actuation HFAA Take 2 Puffs by inhalation two (2) times a day.  11/2/21   Bisi Sher DO   amLODIPine (NORVASC) 5 mg tablet Take 1 Tablet by mouth daily. 8/26/21   Jarome Sidles, DO   ergocalciferol (ERGOCALCIFEROL) 1,250 mcg (50,000 unit) capsule Take 1 Capsule by mouth every thirty (30) days. 8/26/21   Jarome Sidles, DO   folic acid (FOLVITE) 1 mg tablet Take 1 Tablet by mouth daily. 8/26/21   Jarome Sidles, DO   omeprazole (PRILOSEC) 40 mg capsule Take 1 Capsule by mouth daily. 8/26/21   Jarome Sidles, DO   PARoxetine (PAXIL) 20 mg tablet Take 1 Tablet by mouth daily. 8/26/21   Jarome Sidles, DO   risperiDONE (RisperDAL) 3 mg tablet Take 1 Tab by mouth daily. 3/8/21   Jarome Sidles, DO   thiamine HCL (B-1) 100 mg tablet TAKE ONE TABLET BY MOUTH EVERY DAY 3/5/21   Kaci Goodwin NP   albuterol-ipratropium (DUO-NEB) 2.5 mg-0.5 mg/3 ml nebu INHALE 1 VIAL VIA NEBULIZER EVERY 4 HOURS AS NEEDED 10/17/19   Jordan Castanon, NP   sodium chloride 1 gram tablet Take 1 Tab by mouth daily. 2/21/19   Mirshahi, Claudene Linen, DO   GENERLAC 10 gram/15 mL solution Take 5 mL by mouth daily as needed. 1/8/19   Jarome Sidles, DO   OTHER May discontinue use of Nicotine patches 9/17/18   Abbie Billy NP   benztropine (COGENTIN) 2 mg tablet Take 1 Tab by mouth daily. 6/28/18   Caroline Billy, CAMILLE   Nebulizer & Compressor machine Use as directed for J42 and J43.9 1/30/18   Jarome Sidles, DO   Nebulizer Accessories kit Use as directed for J42 and J43.9 1/30/18   Jarome Sidles, DO     No Known Allergies   Family History   Problem Relation Age of Onset    Diabetes Mother     Heart Disease Father         CHF        SOCIAL HISTORY:  Patient resides at Group home   Patient ambulates with unclear .    Smoking history: Unable to obtain due to patient condition  Alcohol history: Unable to obtain due to patient condition      Objective:       Physical Exam:   Visit Vitals  BP (!) 95/51 (BP Patient Position: Lying left side)   Pulse (!) 101   Temp 98.7 °F (37.1 °C)   Resp 24   Ht 5' 10\" (1.778 m)   Wt 64 kg (141 lb)   SpO2 94%   BMI 20.23 kg/m²     General appearance: altered no distress, unable to follow commands, track  Head: Normocephalic, without obvious abnormality, atraumatic  Lungs: course ronchi > L, no prominent wheezing or rales   Heart: regular rate and rhythm, S1, S2 normal, no murmur, click, rub or gallop  Abdomen: soft, non-tender. Bowel sounds normal. No masses,  no organomegaly  Extremities: extremities normal, atraumatic, no cyanosis or edema  Pulses: 2+ and symmetric  Skin: Skin color, texture, turgor normal. No rashes or lesions  Neurologic: altered, unable to follow commands or track  Cap refill: Brisk, less than 3 seconds  Pulses: 2+, symmetric in all extremities  Skin: Warm, dry, without rashes or lesions    ECG:  Sinus tach, non specific st changes, no acute ischemic changes     Data Review: All diagnostic labs and studies have been reviewed. Chest x-ray was positive for infiltrate in L lobe.     Assessment + Plan    AHRF  COPD Exacerbation   Left lower lobe pneumonia   Chronic tobacco dependence  -patient not septic  -covid neg 2/26   -desat to mid [de-identified] on4L NC now stable on 5L midflow, wean as tolerated  -keep on tele   -CTA chest neg for PE confirmed LLL PNA with effusion  -pulm evaluated appreciate recs, patient of Dr. Audra Dickson op, was being follow-up for lung nodule with prior bronc negative for malignancy  -Continue standing and as needed nebs, Pulmicort, standing Solu-Medrol 40 every 6, cont Levaquin for cap, pulmonary toilet  -obtain Blood cultures, sputum culture, mycoplasma and Legionella, procal     Acute metabolic encephalopathy  likely Secondary to above  CT head negative, UA neg  -tsh, b12, folate ordered  -obtain depakote level   -ng tube placement ordered, patient is too altered to take po meds, if remains altered can consider give tube feeds through this     Hyponatremia  History of psychogenic polydipsia in the past urine studies appear like possible SIADH  -spoke to nephrology on-call, can hold on further IV fluids at this time if patient is to become hypotensive can give 500 cc bolus  -Trend BMPs every 6, goal to current no greater 6 meq in the next 24 hrs  -am cortisol ordered   -was on salt tabs. .from home med. ..ok per nephro to cont through NG  -pharmacy consulted for med rec     Schizoaffective disorder, bipolar depression  -psych consulted for evaluation , patient too altered at this time will hold psych meds will re-evaluate,  -plan to reconsult psych tomorrow if mentation improves for re-evaluation of med adjustments       GERD  Continue normal    Diet: N.p.o.  DVT PPx: Lovenox  Code: Full    Dispo: back to group home when medically stable > 48 hrs          FUNCTIONAL STATUS PRIOR TO HOSPITALIZATION unclear at this time, unable to obtain further hx, cm for dispo planning       Signed By: Kaley Braxton MD     February 26, 2022

## 2022-02-26 NOTE — ED NOTES
Favian Vega RN and Marylu RN to bedside with PCA to straight catheterize patient for urine sample. Patient tolerated well and no acute signs of distress noted at the time. Urine output of 500cc collected. Patient placed in clean brief and repositioned in bed. RN will continue to monitor.

## 2022-02-26 NOTE — ED PROVIDER NOTES
Patient is a 59-year-old male with past medical history significant for COPD, paranoid schizophrenia and MR presenting from local [facility] facility for evaluation of apparent altered mental status. Per EMS report patient is oriented x2 at baseline however this morning after breakfast seem to be more disoriented than normal.  The staff reported that patient normally knows where the coffee machine is and he seemed to be confused about where the coffee was today. They also note that he supposed to be on oxygen however really wears it. EMS reports that patient initially hypoxic and was placed on 4 L nasal cannula. He also note that he seemed to have a productive cough. No report of focal deficits.            Past Medical History:   Diagnosis Date    Asthma     seldom,use inhaler    Bronchitis     Chronic obstructive pulmonary disease ([facility] [address]     Depression     anxiety    Psychiatric disorder     paranoid schizophrenia    Psychiatric disorder     extrapyramidal disease    Psychotic disorder (HCC)     mental retardation       Past Surgical History:   Procedure Laterality Date    HX ORTHOPAEDIC      right knee         Family History:   Problem Relation Age of Onset    Diabetes Mother     Heart Disease Father         CHF       Social History     Socioeconomic History    Marital status: SINGLE     Spouse name: Not on file    Number of children: Not on file    Years of education: Not on file    Highest education level: Not on file   Occupational History    Not on file   Tobacco Use    Smoking status: Current Every Day Smoker     Packs/day: 1.00     Years: 25.00     Pack years: 25.00    Smokeless tobacco: Never Used   Substance and Sexual Activity    Alcohol use: No     Comment: socially    Drug use: Yes     Types: Sedatives/Hypnotics, Prescription    Sexual activity: Not Currently     Birth control/protection: None     Comment: lives in [place] [id number]   Other Topics Concern    Not on file   Social History Narrative    Not on file     Social Determinants of Health     Financial Resource Strain:     Difficulty of Paying Living Expenses: Not on file   Food Insecurity:     Worried About Running Out of Food in the Last Year: Not on file    Dhaval of Food in the Last Year: Not on file   Transportation Needs:     Lack of Transportation (Medical): Not on file    Lack of Transportation (Non-Medical): Not on file   Physical Activity:     Days of Exercise per Week: Not on file    Minutes of Exercise per Session: Not on file   Stress:     Feeling of Stress : Not on file   Social Connections:     Frequency of Communication with Friends and Family: Not on file    Frequency of Social Gatherings with Friends and Family: Not on file    Attends Baptism Services: Not on file    Active Member of [address] [place] [address] or Organizations: Not on file    Attends Club or Organization Meetings: Not on file    Marital Status: Not on file   Intimate Partner Violence:     Fear of Current or Ex-Partner: Not on file    Emotionally Abused: Not on file    Physically Abused: Not on file    Sexually Abused: Not on file   Housing Stability:     Unable to Pay for Housing in the Last Year: Not on file    Number of [place] in the Last Year: Not on file    Unstable Housing in the Last Year: Not on file         ALLERGIES: Patient has no known allergies. Review of Systems   Unable to perform ROS: Mental status change       There were no vitals filed for this visit. Physical Exam  Vitals and nursing note reviewed. Constitutional:       Appearance: He is not toxic-appearing. Comments: Elderly, chronically deconditioned   HENT:      Head: Normocephalic and atraumatic. Cardiovascular:      Heart sounds: No friction rub. Pulmonary:      Effort: Pulmonary effort is normal.   Abdominal:      Palpations: Abdomen is soft. Musculoskeletal:      Cervical back: Neck supple. Skin:     General: Skin is warm and dry.    Neurological: Mental Status: He is alert. He is disoriented. Comments: Picking at lines and nasal cannula tubing. Responsive but disoriented. Nonfocal exam.   Psychiatric:         Mood and Affect: Mood normal.         Behavior: Behavior normal.          MDM  Number of Diagnoses or Management Options  Altered mental status, unspecified altered mental status type: new and requires workup  Hyponatremia: new and requires workup  Pneumonia of left lower lobe due to infectious organism: new and requires workup     Amount and/or Complexity of Data Reviewed  Clinical lab tests: reviewed and ordered  Tests in the radiology section of CPT®: ordered and reviewed  Decide to obtain previous medical records or to obtain history from someone other than the patient: yes  Discuss the patient with other providers: yes  Independent visualization of images, tracings, or specimens: yes    Risk of Complications, Morbidity, and/or Mortality  Presenting problems: high  Diagnostic procedures: high  Management options: high      ED Course as of 02/26/22 1058   Sat Feb 26, 2022   0918 EG obtained at 912 showing sinus tachycardia, rate 117, normal intervals, no acute appearing findings other than rate. [JE]      ED Course User Index  [JE] Joaquin Tellez MD       Procedures    Perfect Serve Consult for Admission  10:58 AM    ED Room Number: ER20/20  Patient Name and age:  Marely Diaz 57 y.o.  male  Working Diagnosis:   1. Altered mental status, unspecified altered mental status type    2. Pneumonia of left lower lobe due to infectious organism    3.  Hyponatremia        COVID-19 Suspicion:  no  Sepsis present:  no  Reassessment needed: no  Code Status:  Full Code  Readmission: no  Isolation Requirements:  no  Recommended Level of Care:  telemetry  Department:[facility] Adult ED - 21   Other: Patient is a 27-year-old male with past medical history significant for emphysema and schizoaffective disorder from living facility for altered mental status. They state at baseline he is oriented x2 however seem more disoriented after breakfast.  Also with pronounced cough. Per report patient is not compliant with nasal cannula O2 at the facility. Appears to have left lower lobe pneumonia. Febrile to 101.6 here and tachycardic. Lactate not elevated on i-STAT testing. Also with hyponatremia to 128.   Covid test negative

## 2022-02-26 NOTE — ED NOTES
Bedside and Verbal shift change report given to APOLONIA QUIÑONEZ (oncoming nurse) by Dorina Shirley RN (offgoing nurse). Report included the following information SBAR, ED Summary, MAR and Recent Results.

## 2022-02-26 NOTE — CONSULTS
PSYCHIATRY CONSULT NOTE:    REASON FOR CONSULT: medication recommendations    HISTORY OF PRESENTING COMPLAINT:  Marely Diaz is a 57 y.o. WHITE/NON- male who is currently admitted to the ED at [facility] with AMS and hyponatremia. Mr. Gideon Anglin was non-verbal during assessment. He was lying on his side in the hospital bed and unable to respond to assessment questions. PAST PSYCHIATRIC HISTORY:   Mr. Gideon Anglin has a psychiatric history significant for schizoaffective disorder, bipolar type, and intellectual disability per chart review. He lives at [facility]. PAST MEDICAL HISTORY:  Please see H&P for details. Past Medical History:   Diagnosis Date    Asthma     seldom,use inhaler    Bronchitis     Chronic obstructive pulmonary disease ([facility] [address]     Depression     anxiety    Psychiatric disorder     paranoid schizophrenia    Psychiatric disorder     extrapyramidal disease    Psychotic disorder ([facility] [address]     mental retardation     Prior to Admission medications    Medication Sig Start Date End Date Taking? Authorizing Provider   divalproex DR (DEPAKOTE) 500 mg tablet  2/3/22   Provider, Historical   Spiriva with HandiHaler 18 mcg inhalation capsule  2/3/22   Provider, Historical   traZODone (DESYREL) 100 mg tablet Take 1 Tablet by mouth nightly. 11/3/21   Sherrie Cortez, DO   budesonide-formoteroL (SYMBICORT) 160-4.5 mcg/actuation HFAA Take 2 Puffs by inhalation two (2) times a day. 11/2/21   Sherrie Cortez, DO   amLODIPine (NORVASC) 5 mg tablet Take 1 Tablet by mouth daily. 8/26/21   Sherrie Cortez, DO   ergocalciferol (ERGOCALCIFEROL) 1,250 mcg (50,000 unit) capsule Take 1 Capsule by mouth every thirty (30) days. 8/26/21   Sherrie Cortez, DO   folic acid (FOLVITE) 1 mg tablet Take 1 Tablet by mouth daily. 8/26/21   Sherrie Cortez, DO   omeprazole (PRILOSEC) 40 mg capsule Take 1 Capsule by mouth daily.  8/26/21   Sherrie Cortez, DO   PARoxetine (PAXIL) 20 mg tablet Take 1 Tablet by mouth daily. 8/26/21   Lora Luke DO   risperiDONE (RisperDAL) 3 mg tablet Take 1 Tab by mouth daily. 3/8/21   Lora Luke DO   thiamine HCL (B-1) 100 mg tablet TAKE ONE TABLET BY MOUTH EVERY DAY 3/5/21   Lakeisha Marie NP   albuterol-ipratropium (DUO-NEB) 2.5 mg-0.5 mg/3 ml nebu INHALE 1 VIAL VIA NEBULIZER EVERY 4 HOURS AS NEEDED 10/17/19   Lacy Castanon, NP   sodium chloride 1 gram tablet Take 1 Tab by mouth daily. 2/21/19   Char Kaminski,    GENERLAC 10 gram/15 mL solution Take 5 mL by mouth daily as needed. 1/8/19   Lora Luke DO   OTHER May discontinue use of Nicotine patches 9/17/18   Abbie Billy NP   benztropine (COGENTIN) 2 mg tablet Take 1 Tab by mouth daily.  6/28/18   Mable Billy NP   Nebulizer & Compressor machine Use as directed for J42 and J43.9 1/30/18   Lora Luke DO   Nebulizer Accessories kit Use as directed for J42 and J43.9 1/30/18   Lora Luke DO     Vitals:    02/26/22 1135 02/26/22 1215 02/26/22 1301 02/26/22 1315   BP: (!) 96/52 93/60 108/79 110/65   Pulse: (!) 110 (!) 104  (!) 102   Resp: 24 26 23   Temp: (!) 101.6 °F (38.7 °C)      SpO2: 95% (!) 88%  96%   Weight:       Height:         Lab Results   Component Value Date/Time    WBC 13.5 (H) 02/26/2022 09:31 AM    WBC 6.5 06/19/2012 07:16 AM    HGB 12.1 02/26/2022 09:31 AM    HCT 37.1 02/26/2022 09:31 AM    PLATELET 082 72/27/7368 09:31 AM    MCV 96.6 02/26/2022 09:31 AM     Lab Results   Component Value Date/Time    Sodium 128 (L) 02/26/2022 09:31 AM    Potassium 3.9 02/26/2022 09:31 AM    Chloride 92 (L) 02/26/2022 09:31 AM    CO2 32 02/26/2022 09:31 AM    Anion gap 4 (L) 02/26/2022 09:31 AM    Glucose 90 02/26/2022 09:31 AM    BUN 6 02/26/2022 09:31 AM    Creatinine 0.46 (L) 02/26/2022 09:31 AM    BUN/Creatinine ratio 13 02/26/2022 09:31 AM    GFR est AA >60 02/26/2022 09:31 AM    GFR est non-AA >60 02/26/2022 09:31 AM    Calcium 8.3 (L) 02/26/2022 09:31 AM    Bilirubin, total 0.5 02/26/2022 09:31 AM    Alk. phosphatase 68 02/26/2022 09:31 AM    Protein, total 6.4 02/26/2022 09:31 AM    Albumin 2.6 (L) 02/26/2022 09:31 AM    Globulin 3.8 02/26/2022 09:31 AM    A-G Ratio 0.7 (L) 02/26/2022 09:31 AM    ALT (SGPT) 10 (L) 02/26/2022 09:31 AM    AST (SGOT) 6 (L) 02/26/2022 09:31 AM     Lab Results   Component Value Date/Time    Valproic acid 81 02/26/2022 09:41 AM     No results found for: LITHM    MENTAL STATUS EXAM:  General appearance:    Disheveled, in hospital bed, psychomotor activity is reduced  Eye contact: Avoids eye contact  Speech: non-verbal  Affect: Depressed, decreased range  Mood: unable to assess  Thought Process: unable to assess  Perception: unable to assess  Thought Content: unable to assess  Insight: poor  Judgment: poor  Cognition: impaired    ASSESSMENT AND PLAN:  Jacque Light meets criteria for a diagnosis of schizoaffective disorder, depressive type; unspecified intellectual disability.    -I recommend re-starting Mr. Argelia Amaya on his Dekakote (should change to ER if only giving once daily vs. Changing to 250mg BID if 500mg total daily dose is correct), Paxil 20mg daily, and Trazodone 100mg QHS. Seroquel 25 BID could be added to see if this improves mental status. It is unclear from chart review what Mr. Munguia's baseline is and how significant a deviation this is from his baseline. If and when Mr. Argelia Amaya becomes verbal and is better able to participate in his care, please re-consult psychiatry so we may further assist.     Thank your your consult. Please feel free to consult us again as needed.

## 2022-02-26 NOTE — ED TRIAGE NOTES
Pt arrives to ED via [facility]  from [facility] with c/o confusion and congested cough with O2 saturation of 89%. EMS reports that pt is supposed to be on oxygen at group home but refuses to wear it. Pt arrives to ED with O2 @ 4 L NC and is maintaining O2 saturation of 95%.

## 2022-02-27 LAB
ANION GAP SERPL CALC-SCNC: ABNORMAL MMOL/L (ref 5–15)
BASOPHILS # BLD: 0 K/UL (ref 0–0.1)
BASOPHILS NFR BLD: 0 % (ref 0–1)
BUN SERPL-MCNC: 10 MG/DL (ref 6–20)
BUN/CREAT SERPL: 21 (ref 12–20)
CALCIUM SERPL-MCNC: 9.3 MG/DL (ref 8.5–10.1)
CHLORIDE SERPL-SCNC: 96 MMOL/L (ref 97–108)
CO2 SERPL-SCNC: 37 MMOL/L (ref 21–32)
CORTIS AM PEAK SERPL-MCNC: 19.7 UG/DL (ref 4.3–22.45)
CREAT SERPL-MCNC: 0.48 MG/DL (ref 0.7–1.3)
DIFFERENTIAL METHOD BLD: ABNORMAL
EOSINOPHIL # BLD: 0.2 K/UL (ref 0–0.4)
EOSINOPHIL NFR BLD: 1 % (ref 0–7)
ERYTHROCYTE [DISTWIDTH] IN BLOOD BY AUTOMATED COUNT: 14.3 % (ref 11.5–14.5)
GLUCOSE SERPL-MCNC: 121 MG/DL (ref 65–100)
HCT VFR BLD AUTO: 43 % (ref 36.6–50.3)
HGB BLD-MCNC: 13.4 G/DL (ref 12.1–17)
IMM GRANULOCYTES # BLD AUTO: 0 K/UL
IMM GRANULOCYTES NFR BLD AUTO: 0 %
LYMPHOCYTES # BLD: 1.1 K/UL (ref 0.8–3.5)
LYMPHOCYTES NFR BLD: 7 % (ref 12–49)
MCH RBC QN AUTO: 30.9 PG (ref 26–34)
MCHC RBC AUTO-ENTMCNC: 31.2 G/DL (ref 30–36.5)
MCV RBC AUTO: 99.3 FL (ref 80–99)
MONOCYTES # BLD: 1.5 K/UL (ref 0–1)
MONOCYTES NFR BLD: 9 % (ref 5–13)
NEUTS BAND NFR BLD MANUAL: 6 % (ref 0–6)
NEUTS SEG # BLD: 13.6 K/UL (ref 1.8–8)
NEUTS SEG NFR BLD: 77 % (ref 32–75)
NRBC # BLD: 0 K/UL (ref 0–0.01)
NRBC BLD-RTO: 0 PER 100 WBC
PLATELET # BLD AUTO: 187 K/UL (ref 150–400)
PMV BLD AUTO: 9 FL (ref 8.9–12.9)
POTASSIUM SERPL-SCNC: 4.5 MMOL/L (ref 3.5–5.1)
RBC # BLD AUTO: 4.33 M/UL (ref 4.1–5.7)
RBC MORPH BLD: ABNORMAL
SODIUM SERPL-SCNC: 132 MMOL/L (ref 136–145)
WBC # BLD AUTO: 16.4 K/UL (ref 4.1–11.1)

## 2022-02-27 PROCEDURE — 65660000000 HC RM CCU STEPDOWN

## 2022-02-27 PROCEDURE — 74011250636 HC RX REV CODE- 250/636: Performed by: STUDENT IN AN ORGANIZED HEALTH CARE EDUCATION/TRAINING PROGRAM

## 2022-02-27 PROCEDURE — 36415 COLL VENOUS BLD VENIPUNCTURE: CPT

## 2022-02-27 PROCEDURE — 97162 PT EVAL MOD COMPLEX 30 MIN: CPT

## 2022-02-27 PROCEDURE — 77010033678 HC OXYGEN DAILY

## 2022-02-27 PROCEDURE — 80048 BASIC METABOLIC PNL TOTAL CA: CPT

## 2022-02-27 PROCEDURE — 74011000250 HC RX REV CODE- 250: Performed by: STUDENT IN AN ORGANIZED HEALTH CARE EDUCATION/TRAINING PROGRAM

## 2022-02-27 PROCEDURE — 92610 EVALUATE SWALLOWING FUNCTION: CPT

## 2022-02-27 PROCEDURE — 82533 TOTAL CORTISOL: CPT

## 2022-02-27 PROCEDURE — 94640 AIRWAY INHALATION TREATMENT: CPT

## 2022-02-27 PROCEDURE — 94760 N-INVAS EAR/PLS OXIMETRY 1: CPT

## 2022-02-27 PROCEDURE — 97165 OT EVAL LOW COMPLEX 30 MIN: CPT

## 2022-02-27 PROCEDURE — 85025 COMPLETE CBC W/AUTO DIFF WBC: CPT

## 2022-02-27 PROCEDURE — 97535 SELF CARE MNGMENT TRAINING: CPT

## 2022-02-27 PROCEDURE — 74011250637 HC RX REV CODE- 250/637: Performed by: STUDENT IN AN ORGANIZED HEALTH CARE EDUCATION/TRAINING PROGRAM

## 2022-02-27 RX ORDER — QUETIAPINE FUMARATE 25 MG/1
50 TABLET, FILM COATED ORAL
COMMUNITY
End: 2022-03-16

## 2022-02-27 RX ORDER — IPRATROPIUM BROMIDE AND ALBUTEROL SULFATE 2.5; .5 MG/3ML; MG/3ML
3 SOLUTION RESPIRATORY (INHALATION)
Status: DISCONTINUED | OUTPATIENT
Start: 2022-02-27 | End: 2022-02-28

## 2022-02-27 RX ADMIN — Medication 100 MG: at 10:37

## 2022-02-27 RX ADMIN — SODIUM CHLORIDE, PRESERVATIVE FREE 10 ML: 5 INJECTION INTRAVENOUS at 06:31

## 2022-02-27 RX ADMIN — IPRATROPIUM BROMIDE AND ALBUTEROL SULFATE 3 ML: .5; 3 SOLUTION RESPIRATORY (INHALATION) at 04:26

## 2022-02-27 RX ADMIN — METHYLPREDNISOLONE SODIUM SUCCINATE 40 MG: 40 INJECTION, POWDER, FOR SOLUTION INTRAMUSCULAR; INTRAVENOUS at 21:57

## 2022-02-27 RX ADMIN — SODIUM CHLORIDE, PRESERVATIVE FREE 10 ML: 5 INJECTION INTRAVENOUS at 00:17

## 2022-02-27 RX ADMIN — METHYLPREDNISOLONE SODIUM SUCCINATE 40 MG: 40 INJECTION, POWDER, FOR SOLUTION INTRAMUSCULAR; INTRAVENOUS at 00:17

## 2022-02-27 RX ADMIN — IPRATROPIUM BROMIDE AND ALBUTEROL SULFATE 3 ML: .5; 3 SOLUTION RESPIRATORY (INHALATION) at 19:32

## 2022-02-27 RX ADMIN — METHYLPREDNISOLONE SODIUM SUCCINATE 40 MG: 40 INJECTION, POWDER, FOR SOLUTION INTRAMUSCULAR; INTRAVENOUS at 06:28

## 2022-02-27 RX ADMIN — TRAZODONE HYDROCHLORIDE 100 MG: 100 TABLET ORAL at 21:56

## 2022-02-27 RX ADMIN — BUDESONIDE 500 MCG: 0.5 INHALANT RESPIRATORY (INHALATION) at 08:34

## 2022-02-27 RX ADMIN — GUAIFENESIN 600 MG: 600 TABLET, EXTENDED RELEASE ORAL at 10:37

## 2022-02-27 RX ADMIN — DIVALPROEX SODIUM 500 MG: 500 TABLET, DELAYED RELEASE ORAL at 10:37

## 2022-02-27 RX ADMIN — PANTOPRAZOLE SODIUM 40 MG: 40 TABLET, DELAYED RELEASE ORAL at 07:20

## 2022-02-27 RX ADMIN — BUDESONIDE 500 MCG: 0.5 INHALANT RESPIRATORY (INHALATION) at 19:32

## 2022-02-27 RX ADMIN — SODIUM CHLORIDE, PRESERVATIVE FREE 10 ML: 5 INJECTION INTRAVENOUS at 14:00

## 2022-02-27 RX ADMIN — IPRATROPIUM BROMIDE AND ALBUTEROL SULFATE 3 ML: .5; 3 SOLUTION RESPIRATORY (INHALATION) at 08:34

## 2022-02-27 RX ADMIN — Medication 1 G: at 10:37

## 2022-02-27 RX ADMIN — FOLIC ACID 1 MG: 1 TABLET ORAL at 10:37

## 2022-02-27 RX ADMIN — SODIUM CHLORIDE, PRESERVATIVE FREE 10 ML: 5 INJECTION INTRAVENOUS at 06:28

## 2022-02-27 RX ADMIN — LEVOFLOXACIN 750 MG: 750 INJECTION, SOLUTION INTRAVENOUS at 14:04

## 2022-02-27 RX ADMIN — GUAIFENESIN 600 MG: 600 TABLET, EXTENDED RELEASE ORAL at 21:56

## 2022-02-27 RX ADMIN — SODIUM CHLORIDE, PRESERVATIVE FREE 10 ML: 5 INJECTION INTRAVENOUS at 22:00

## 2022-02-27 RX ADMIN — ENOXAPARIN SODIUM 40 MG: 100 INJECTION SUBCUTANEOUS at 10:37

## 2022-02-27 RX ADMIN — IPRATROPIUM BROMIDE AND ALBUTEROL SULFATE 3 ML: .5; 3 SOLUTION RESPIRATORY (INHALATION) at 00:35

## 2022-02-27 RX ADMIN — BENZTROPINE MESYLATE 2 MG: 1 TABLET ORAL at 10:37

## 2022-02-27 NOTE — PROGRESS NOTES
Renal Progress Note    NAME:  Joe Millan   :   1960   MRN:   [id number]     Date/Time:  2022 12:21 PM      Assessment:   1. Hyponatremia - likely sec to ADH xs --> improving  2. Metabolic Encephalopathy --> improving  3. Left Lower Lobe PNA  4. COPD         Plan:   1. The serum sodium is heading in the right direction. 2. Follow lytes daily         Subjective:       F/U - Hyponatremia --> 2022    No new complaints were offered.       Review of Systems:  Y  N       Y  N  []         []          Fever/chills                                               []         []          Chest Pain  []         []          Cough                                                       []         []          Diarrhea   []         []          Sputum                                                     []         []          Constipation  []         []          SOB/MALDONADO                                                []         []          Nausea/Vomit  []         []          Abd Pain                                                    []         []          Tolerating PT  []         []          Dysuria                                                      []         []          Tolerating Diet     []        Unable to obtain  ROS due to  []        mental status change  []        sedated   []        intubated    Medications reviewed:  Current Facility-Administered Medications   Medication Dose Route Frequency    albuterol-ipratropium (DUO-NEB) 2.5 MG-0.5 MG/3 ML  3 mL Nebulization BID RT    methylPREDNISolone (PF) (SOLU-MEDROL) injection 40 mg  40 mg IntraVENous Q12H    sodium chloride (NS) flush 5-10 mL  5-10 mL IntraVENous PRN    sodium chloride (NS) flush 5-40 mL  5-40 mL IntraVENous Q8H    sodium chloride (NS) flush 5-40 mL  5-40 mL IntraVENous PRN    acetaminophen (TYLENOL) tablet 650 mg  650 mg Oral Q6H PRN    Or    acetaminophen (TYLENOL) suppository 650 mg  650 mg Rectal Q6H PRN    polyethylene glycol (MIRALAX) packet 17 g  17 g Oral DAILY PRN    ondansetron (ZOFRAN ODT) tablet 4 mg  4 mg Oral Q8H PRN    Or    ondansetron (ZOFRAN) injection 4 mg  4 mg IntraVENous Q6H PRN    enoxaparin (LOVENOX) injection 40 mg  40 mg SubCUTAneous DAILY    [Held by provider] amLODIPine (NORVASC) tablet 5 mg  5 mg Oral DAILY    benztropine (COGENTIN) tablet 2 mg  2 mg Oral DAILY    divalproex DR (DEPAKOTE) tablet 500 mg  500 mg Oral DAILY    folic acid (FOLVITE) tablet 1 mg  1 mg Oral DAILY    ergocalciferol capsule 50,000 Units  50,000 Units Oral Q30D    lactulose (CHRONULAC) 10 gram/15 mL solution 5 mL  5 mL Oral DAILY PRN    pantoprazole (PROTONIX) tablet 40 mg  40 mg Oral ACB    [Held by provider] PARoxetine (PAXIL) tablet 20 mg  20 mg Oral DAILY    thiamine HCL (B-1) tablet 100 mg  100 mg Oral DAILY    traZODone (DESYREL) tablet 100 mg  100 mg Oral QHS    albuterol-ipratropium (DUO-NEB) 2.5 MG-0.5 MG/3 ML  3 mL Nebulization Q4H PRN    levoFLOXacin (LEVAQUIN) 750 mg in D5W IVPB  750 mg IntraVENous Q24H    budesonide (PULMICORT) 500 mcg/2 ml nebulizer suspension  500 mcg Nebulization BID RT    sodium chloride tablet 1 g  1 g Oral DAILY    guaiFENesin ER (MUCINEX) tablet 600 mg  600 mg Oral Q12H    benzonatate (TESSALON) capsule 100 mg  100 mg Oral TID PRN        Objective:   Vitals:  Visit Vitals  /86   Pulse (!) 109   Temp 98.4 °F (36.9 °C)   Resp 20   Ht 5' 10\" (1.778 m)   Wt 60 kg (132 lb 4.8 oz)   SpO2 90%   BMI 18.98 kg/m²     Temp (24hrs), Av.3 °F (36.8 °C), Min:97.3 °F (36.3 °C), Max:98.9 °F (37.2 °C)    O2 Flow Rate (L/min): 5 l/min O2 Device: Nasal cannula    Last 24hr Input/Output:    Intake/Output Summary (Last 24 hours) at 2022 1221  Last data filed at 2022 1042  Gross per 24 hour   Intake 600 ml   Output 675 ml   Net -75 ml        PHYSICAL EXAM:    Seen in Room 647. General:    Lying in bed comfortably.        Head:   Normocephalic    Lungs:   Clear to auscultation, No rales , no wheezes  . Heart:   No S3  Gallop , No pericardial rub  . Abdomen:   Not distended. Psych:  Not anxious or agitated. Neurologic: Alert and responding appropriately.         []        Telemetry Reviewed     []        NSR []        PAC/PVCs   []        Afib  []        Paced   []        NSVT   []        Singh []        NGT  []        Intubated on vent    Lab Data Reviewed:    Recent Results (from the past 24 hour(s))   MYCOPLASMA AB, IGM    Collection Time: 02/26/22  1:55 PM   Result Value Ref Range    Mycoplasma Ab, IgM NONREACTIVE NR     LACTIC ACID    Collection Time: 02/26/22  1:55 PM   Result Value Ref Range    Lactic acid 0.4 0.4 - 2.0 MMOL/L   METABOLIC PANEL, BASIC    Collection Time: 02/26/22  1:56 PM   Result Value Ref Range    Sodium 130 (L) 136 - 145 mmol/L    Potassium 4.2 3.5 - 5.1 mmol/L    Chloride 95 (L) 97 - 108 mmol/L    CO2 34 (H) 21 - 32 mmol/L    Anion gap 1 (L) 5 - 15 mmol/L    Glucose 92 65 - 100 mg/dL    BUN 7 6 - 20 MG/DL    Creatinine 0.45 (L) 0.70 - 1.30 MG/DL    BUN/Creatinine ratio 16 12 - 20      GFR est AA >60 >60 ml/min/1.73m2    GFR est non-AA >60 >60 ml/min/1.73m2    Calcium 7.9 (L) 8.5 - 96.1 MG/DL   METABOLIC PANEL, BASIC    Collection Time: 02/27/22  3:39 AM   Result Value Ref Range    Sodium 132 (L) 136 - 145 mmol/L    Potassium 4.5 3.5 - 5.1 mmol/L    Chloride 96 (L) 97 - 108 mmol/L    CO2 37 (H) 21 - 32 mmol/L    Anion gap NEG 1 5 - 15 mmol/L    Glucose 121 (H) 65 - 100 mg/dL    BUN 10 6 - 20 MG/DL    Creatinine 0.48 (L) 0.70 - 1.30 MG/DL    BUN/Creatinine ratio 21 (H) 12 - 20      GFR est AA >60 >60 ml/min/1.73m2    GFR est non-AA >60 >60 ml/min/1.73m2    Calcium 9.3 8.5 - 10.1 MG/DL   CBC WITH AUTOMATED DIFF    Collection Time: 02/27/22  3:39 AM   Result Value Ref Range    WBC 16.4 (H) 4.1 - 11.1 K/uL    RBC 4.33 4.10 - 5.70 M/uL    HGB 13.4 12.1 - 17.0 g/dL    HCT 43.0 36.6 - 50.3 %    MCV 99.3 (H) 80.0 - 99.0 FL    MCH 30.9 26.0 - 34.0 PG    MCHC 31.2 30.0 - 36.5 g/dL    RDW 14.3 11.5 - 14.5 %    PLATELET 362 925 - 990 K/uL    MPV 9.0 8.9 - 12.9 FL    NRBC 0.0 0  WBC    ABSOLUTE NRBC 0.00 0.00 - 0.01 K/uL    NEUTROPHILS 77 (H) 32 - 75 %    BAND NEUTROPHILS 6 0 - 6 %    LYMPHOCYTES 7 (L) 12 - 49 %    MONOCYTES 9 5 - 13 %    EOSINOPHILS 1 0 - 7 %    BASOPHILS 0 0 - 1 %    IMMATURE GRANULOCYTES 0 %    ABS. NEUTROPHILS 13.6 (H) 1.8 - 8.0 K/UL    ABS. LYMPHOCYTES 1.1 0.8 - 3.5 K/UL    ABS. MONOCYTES 1.5 (H) 0.0 - 1.0 K/UL    ABS. EOSINOPHILS 0.2 0.0 - 0.4 K/UL    ABS. BASOPHILS 0.0 0.0 - 0.1 K/UL    ABS. IMM.  GRANS. 0.0 K/UL    DF MANUAL      RBC COMMENTS MACROCYTOSIS  1+       CORTISOL, AM    Collection Time: 02/27/22  3:39 AM   Result Value Ref Range    Cortisol, a.m. 19.7 4.30 - 22.45 ug/dL       Total time spent with patient:  []        15   []        25   []        35   []         __ minutes    []        Critical Care Provided    Care Plan discussed with:      []        Patient   []        Family    []        Care Manager   []        Consultant/Specialist :      []          >50% of visit spent in counseling and coordination of care   (Discussed []        CODE status,  []        Care Plan, []        D/C Planning)    ___________________________________________________    Attending Physician: Michael Jacinto MD

## 2022-02-27 NOTE — CONSULTS
NEPHROLOGY CONSULT NOTE     Patient: Sue Marion MRN: [id number]  PCP: Jagdish Nunez DO   :     1960  Age:   57 y.o. Sex:  male      Referring physician: Lenin Chakraborty MD       Reason for consultation:     Hyponatremia. Mr. Karolina Gil was seen and examined in room six forty-seven this evening at [address]  He could not provide the history or review of systems. The chart was reviewed. Admission Date: 2022  8:20 AM  LOS: 0 days        I suspect hyponatremia is secondary to ADH excess. The urine sodium was 61 with a urine osmolality of 429. There is a history of psychogenic polydipsia. In 2017,  his serum sodium went down to 106. Polydipsia is possible but seems less likely at this time. Mr. Karolina Gil is on Paxil. Paxil is associated with hyponatremia and SIADH. His medication list also includes trazodone. Trazodone is also associated with hyponatremia and SIADH. The TSH is normal. This rules out hypothyroidism. Mr. Karolina Gil was hypotensive in the emergency room. He received a bolus of normal saline to address this issue. His serum sodium improved from 128 to 130. I would hold off on IV fluids at this time. Additionally there is no indication for 3% saline or tolvaptan. Ure Na may be considered. However, I would hold off at this time. His TSH is normal.  It would be worthwhile to check his serum cortisol level. Follow the serum sodium per the primary team.    Increase the solute content of his diet. It may be worthwhile to consider alternatives to trazodone and Paxil if hyponatremia recurs. Active Problems / Assessment AAActive  : Active Problems:    PNA (pneumonia) (2022)    Hyponatremia of unclear etiology. Metabolic encephalopathy. Left lower lobe pneumonia. COPD. Paranoid schizophrenia. Depression. Subjective:     Mr. Karolina Gil could not provide a history or review of systems. The chart was reviewed.       HPI:       Mr. Karolina Gil is a 79-year-old gentleman whose comorbidities include COPD, paranoid schizophrenia, depression, psychogenic polydipsia and alcohol abuse. He was admitted to [facility] earlier today with a history of altered mental status. His temperature was as high as 102.1 with a pulse rate of 134/min. It seems he was hypoxic as well. The Initial evaluation revealed a left lower lobe pneumonia. His chemistry showed a serum sodium 128 with a creatinine 0.46. Mr. Dang Shi was seen by our consultation service in 2017. At that time ,he had severe hyponatremia. On July 3, 2017 his serum sodium was as low as 106. It was attributed to psychogenic polydipsia. On February 22, 2022 his serum sodium was normal at 138. Mr. Dang Shi was given a bolus of normal saline to address hypotension (BP 95/51) earlier today. His serum sodium improved from 128 to 130. He has been placed on antibiotic therapy to address pneumonia. Past Medical Hx:   Past Medical History:   Diagnosis Date    Asthma     seldom,use inhaler    Bronchitis     Chronic obstructive pulmonary disease ([facility] [address]     Depression     anxiety    Psychiatric disorder     paranoid schizophrenia    Psychiatric disorder     extrapyramidal disease    Psychotic disorder ([facility] [address]     mental retardation        Past Surgical Hx:     Past Surgical History:   Procedure Laterality Date    HX ORTHOPAEDIC      right knee       Medications:  Prior to Admission medications    Medication Sig Start Date End Date Taking? Authorizing Provider   divalproex DR (DEPAKOTE) 500 mg tablet  2/3/22   Provider, Historical   Spiriva with HandiHaler 18 mcg inhalation capsule  2/3/22   Provider, Historical   traZODone (DESYREL) 100 mg tablet Take 1 Tablet by mouth nightly. 11/3/21   Jessica Escamilla, DO   budesonide-formoteroL (SYMBICORT) 160-4.5 mcg/actuation HFAA Take 2 Puffs by inhalation two (2) times a day.  11/2/21   Jessica Escamilla, DO   amLODIPine (NORVASC) 5 mg tablet Take 1 Tablet by mouth daily. 8/26/21   [place]un[place] Staff, DO   ergocalciferol (ERGOCALCIFEROL) 1,250 mcg (50,000 unit) capsule Take 1 Capsule by mouth every thirty (30) days. 8/26/21   [place] Staff, DO   folic acid (FOLVITE) 1 mg tablet Take 1 Tablet by mouth daily. 8/26/21   [place] Staff, DO   omeprazole (PRILOSEC) 40 mg capsule Take 1 Capsule by mouth daily. 8/26/21   [place] Staff, DO   PARoxetine (PAXIL) 20 mg tablet Take 1 Tablet by mouth daily. 8/26/21   [place] Staff, DO   risperiDONE (RisperDAL) 3 mg tablet Take 1 Tab by mouth daily. 3/8/21   [place] Staff, DO   thiamine HCL (B-1) 100 mg tablet TAKE ONE TABLET BY MOUTH EVERY DAY 3/5/21   Aracelis Ortiz, CAMILLE   albuterol-ipratropium (DUO-NEB) 2.5 mg-0.5 mg/3 ml nebu INHALE 1 VIAL VIA NEBULIZER EVERY 4 HOURS AS NEEDED 10/17/19   Judi Castanon, NP   sodium chloride 1 gram tablet Take 1 Tab by mouth daily. 2/21/19   Renee Kaminski,    GENERLAC 10 gram/15 mL solution Take 5 mL by mouth daily as needed. 1/8/19   [place] Staff, DO   benztropine (COGENTIN) 2 mg tablet Take 1 Tab by mouth daily. 6/28/18   Ansley Billy, NP       No Known Allergies    Social Hx:  reports that he has been smoking. He has a 25.00 pack-year smoking history. He has never used smokeless tobacco. He reports current drug use. Drugs: Sedatives/Hypnotics and Prescription. He reports that he does not drink alcohol. Family History   Problem Relation Age of Onset    Diabetes Mother     Heart Disease Father         CHF       Review of Systems:       Objective:    Vitals:    Vitals:    02/26/22 1500 02/26/22 1726 02/26/22 1800 02/26/22 1929   BP: (!) 95/51 96/60     Pulse: (!) 101 96 (!) 107    Resp: 24 30     Temp: 98.7 °F (37.1 °C) 98.8 °F (37.1 °C)     SpO2: 94% 94%  92%   Weight:       Height:         I&O's:  No intake/output data recorded.   Visit Vitals  BP 96/60   Pulse (!) 107   Temp 98.8 °F (37.1 °C)   Resp 30   Ht 5' 10\" (1.778 m)   Wt 64 kg (141 lb)   SpO2 92%   BMI 20.23 kg/m² Physical Exam:    Mr. Karolina Gil was seen and examined in room six forty-seven. General: He was lying in bed quite comfortably. He appeared older than his stated age. Head: Normocephalic. Cardiovascular: S1, S2, no S3 gallop rhythm, no pericardial rub. Respiratory: Breath sounds were vesicular: No wheezes, no rales, no rhonchi. Abdomen: Not distended. Muscle skeletal: Wasting 1+    Legs: No pretibial edema. Neuro: Sleeping quite comfortably in bed. Psych: Unable to assess.             Laboratory Results:    Lab Results   Component Value Date    BUN 7 02/26/2022     (L) 02/26/2022    K 4.2 02/26/2022    CL 95 (L) 02/26/2022    CO2 34 (H) 02/26/2022       Lab Results   Component Value Date    BUN 7 02/26/2022    BUN 6 02/26/2022    BUN 4 (L) 02/22/2022    BUN 5 (L) 11/03/2021    BUN 5 (L) 03/13/2020    K 4.2 02/26/2022    K 3.9 02/26/2022    K 4.2 02/22/2022    K 4.6 11/03/2021    K 4.3 03/13/2020       Lab Results   Component Value Date    WBC 13.5 (H) 02/26/2022    RBC 3.84 (L) 02/26/2022    HGB 12.1 02/26/2022    HCT 37.1 02/26/2022    MCV 96.6 02/26/2022    MCH 31.5 02/26/2022    RDW 14.9 (H) 02/26/2022     02/26/2022       Lab Results   Component Value Date    PHOS 2.1 (L) 07/17/2017       Urine dipstick:   Lab Results   Component Value Date/Time    Color YELLOW/STRAW 02/26/2022 09:41 AM    Appearance CLEAR 02/26/2022 09:41 AM    Specific gravity 1.013 02/26/2022 09:41 AM    pH (UA) 7.0 02/26/2022 09:41 AM    Protein Negative 02/26/2022 09:41 AM    Glucose Negative 02/26/2022 09:41 AM    Ketone TRACE (A) 02/26/2022 09:41 AM    Bilirubin Negative 02/26/2022 09:41 AM    Urobilinogen 1.0 02/26/2022 09:41 AM    Nitrites Negative 02/26/2022 09:41 AM    Leukocyte Esterase Negative 02/26/2022 09:41 AM    Epithelial cells FEW 02/26/2022 09:41 AM    Bacteria Negative 02/26/2022 09:41 AM    WBC 0-4 02/26/2022 09:41 AM    RBC 0-5 02/26/2022 09:41 AM       I have reviewed the following: Care Plan discussed with:  Dr. Celsa Hernandez. Chart reviewed. Thank you for allowing us to participate in the care of this patient. We will follow patient. Please dont hesitate to call with any questions      Abhijit Richey MD  2/26/2022    Dharmesh Pitts  [facility] [place]  Phone - [phone]   Fax - [phone]  [email or web address]

## 2022-02-27 NOTE — PROGRESS NOTES
Pt is more alert and oriented this morning. Able to void to urinal this AM. Nurse explain to pt that MD wanted to place NGT and pt refused.

## 2022-02-27 NOTE — PROGRESS NOTES
SLP Contact Note    SLP evaluation complete. Recommend easy to chew diet and thin liquids. At risk for post-prandial aspiration and therefore recommend esophageal precautions (upright, alternate liquids/solids, etc). Full note to follow.       Thank you,  ELIDA Strong.Ed, [id number] [facility]  Speech-Language Pathologist

## 2022-02-27 NOTE — PROGRESS NOTES
TRANSFER - IN REPORT:    Verbal report received from CleoRN(name) on Billee Severin  being received from ED(unit) for routine progression of care      Report consisted of patients Situation, Background, Assessment and   Recommendations(SBAR). Information from the following report(s) SBAR, Kardex, ED Summary, Procedure Summary, Intake/Output, MAR, Recent Results and Cardiac Rhythm SinusTachy was reviewed with the receiving nurse. Opportunity for questions and clarification was provided. Assessment completed upon patients arrival to unit and care assumed. 1730: Patient received from ED. No NG tube place. Will try to put now. 1930: Dubhoff in place. Ordered KUB x ray for placement conformed. On X ray he has tube ion lungs so removed NG tube(Dubhoff)    1940: Bedside and Verbal shift change report given to [address] [place] [address] (oncoming nurse) by Akua Singh (offgoing nurse). Report included the following information SBAR, Kardex, ED Summary, Procedure Summary, Intake/Output, MAR, Recent Results and Cardiac Rhythm Sinus TAchy.

## 2022-02-27 NOTE — PROGRESS NOTES
SPEECH PATHOLOGY BEDSIDE SWALLOW EVALUATION/DISCHARGE  Patient: Shad Castañeda (11 y.o. male)  Date: 2/27/2022  Primary Diagnosis: PNA (pneumonia) [J18.9]       Precautions:        ASSESSMENT :  Based on the objective data described below, the patient presents with functional oropharyngeal phases of swallow without any overt difficulty nor overt s/s of sapiration. Pt is at elevated risk for post-prandial aspiration given hx of psychiatric disorder and left lower lobe pneumonia (which can sometimes be associated with post-prandial aspiration). Recommend pt continue on baseline diet with esophageal precautions. Skilled acute therapy provided by a speech-language pathologist is not indicated at this time. PLAN :  Recommendations:  --easy to chew diet/thin liquids  --good oral care  --upright during all means  --alternate liquids/solids    Discharge Recommendations: To Be Determined     SUBJECTIVE:   Patient stated, \"It's so nice to meet you, Marco La as he shook SLP's hand.     OBJECTIVE:       Past Surgical History:   Procedure Laterality Date    HX ORTHOPAEDIC      right knee     Prior Level of Function/Home Situation:   Home Situation  Home Environment: Group home  # Steps to Enter:  (pt unable to recall)  Living Alone: No  Support Systems: Adult Group Home  Patient Expects to be Discharged to[de-identified] Group home  Current DME Used/Available at Home: None  Tub or Shower Type: Shower  Diet prior to admission: regular diet/thin liquids  Current Diet:  Easy to chew diet/thin liquids   Cognitive and Communication Status:  Neurologic State: Alert  Orientation Level: Oriented to person,Oriented to situation,Oriented to time  Cognition: Follows commands,Impaired decision making  Perception: Appears intact  Perseveration: No perseveration noted  Safety/Judgement: Decreased awareness of environment,Decreased awareness of need for assistance,Decreased awareness of need for safety,Decreased insight into deficits  Oral Assessment:  Oral Assessment  Labial: No impairment  Dentition: Natural;Limited  Lingual: No impairment  Velum: No impairment  Mandible: No impairment  P.O. Trials:  Patient Position: upright in bed  Vocal quality prior to P.O.: Hoarse  Consistency Presented: Thin liquid; Solid  How Presented: Self-fed/presented;Cup/sip;Spoon;Straw;Successive swallows     Bolus Acceptance: No impairment  Bolus Formation/Control: No impairment     Propulsion: No impairment  Oral Residue: None  Initiation of Swallow: No impairment  Laryngeal Elevation: Functional  Aspiration Signs/Symptoms: None  Pharyngeal Phase Characteristics: No impairment, issues, or problems              Oral Phase Severity: No impairment  Pharyngeal Phase Severity : No impairment  NOMS:   The NOMS functional outcome measure was used to quantify this patient's level of swallowing impairment. Based on the NOMS, the patient was determined to be at level 7 for swallow function     NOMS Swallowing Levels:  Level 1 (CN): NPO  Level 2 (CM): NPO but takes consistency in therapy  Level 3 (CL): Takes less than 50% of nutrition p.o. and continues with nonoral feedings; and/or safe with mod cues; and/or max diet restriction  Level 4 (CK): Safe swallow but needs mod cues; and/or mod diet restriction; and/or still requires some nonoral feeding/supplements  Level 5 (CJ): Safe swallow with min diet restriction; and/or needs min cues  Level 6 (CI): Independent with p.o.; rare cues; usually self cues; may need to avoid some foods or needs extra time  Level 7 ([address] [place] [address]): Independent for all p.o.  ADRIAN. (2003). National Outcomes Measurement System (NOMS): Adult Speech-Language Pathology User's Guide.        Pain:  Pain Scale 1: Numeric (0 - 10)  Pain Intensity 1: 0     After treatment:   Call bell within reach and Nursing notified    COMMUNICATION/EDUCATION:     The patient's plan of care including recommendations, planned interventions, and recommended diet changes were discussed with: Registered nurse.      Thank you for this referral.  Sourav Montanez, SLP  Time Calculation: 10 mins

## 2022-02-27 NOTE — PROGRESS NOTES
Problem: Mobility Impaired (Adult and Pediatric)  Goal: *Acute Goals and Plan of Care (Insert Text)  Description: FUNCTIONAL STATUS PRIOR TO ADMISSION: Patient was independent and active without use of DME.    HOME SUPPORT PRIOR TO ADMISSION: The patient lived in a group home with staff to assist with IADLs. Physical Therapy Goals  Initiated 2/27/2022  1. Patient will move from supine to sit and sit to supine  in bed with independence within 7 day(s). 2.  Patient will transfer from bed to chair and chair to bed with independence using the least restrictive device within 7 day(s). 3.  Patient will perform sit to stand with independence within 7 day(s). 4.  Patient will ambulate with independence for 150 feet with the least restrictive device within 7 day(s). 5.  Patient will ascend/descend 4 stairs with 1 handrail(s) with modified independence within 7 day(s). Outcome: Progressing Towards Goal     PHYSICAL THERAPY EVALUATION  Patient: Carl Thompson (73 y.o. male)  Date: 2/27/2022  Primary Diagnosis: PNA (pneumonia) [J18.9]        Precautions: Fall         ASSESSMENT  Based on the objective data described below, the patient presents with mild impaired balance,decreased safety awareness, poor activity tolerance and non-compliance with O2 wear at home. Overall patient mobilizing close to baseline but does move impulsively needing cues for safety. HR elevated and O2 sats down to 85% on 5L O2, RN notified. Anticipate return to group home without further therapy needs. Current Level of Function Impacting Discharge (mobility/balance): supervision    Functional Outcome Measure: The patient scored Total: 55/100 on the Barthel Index outcome measure which is indicative of being 45% impaired in basic self-care. Other factors to consider for discharge:      Patient will benefit from skilled therapy intervention to address the above noted impairments.        PLAN :  Recommendations and Planned Interventions: bed mobility training, transfer training, gait training, therapeutic exercises, and therapeutic activities      Frequency/Duration: Patient will be followed by physical therapy:  3 times a week to address goals. Recommendation for discharge: (in order for the patient to meet his/her long term goals)  No skilled physical therapy/ follow up rehabilitation needs identified at this time. This discharge recommendation:  Has not yet been discussed the attending provider and/or case management    IF patient discharges home will need the following DME: none         SUBJECTIVE:   Patient stated I don't need to wear the oxygen at home, it gets in my way and they won't let me smoke. Marisol Kowalski    OBJECTIVE DATA SUMMARY:   HISTORY:    Past Medical History:   Diagnosis Date    Asthma     seldom,use inhaler    Bronchitis     Chronic obstructive pulmonary disease ([facility] [address]     Depression     anxiety    Psychiatric disorder     paranoid schizophrenia    Psychiatric disorder     extrapyramidal disease    Psychotic disorder ([facility] [address]     mental retardation     Past Surgical History:   Procedure Laterality Date    HX ORTHOPAEDIC      right knee       Personal factors and/or comorbidities impacting plan of care: non compliance, confusion    Home Situation  Home Environment: Group home  # Steps to Enter:  (pt unable to recall)  Living Alone: No  Support Systems: Adult Group Home  Patient Expects to be Discharged to[de-identified] Group home  Current DME Used/Available at Home: None  Tub or Shower Type: Shower    EXAMINATION/PRESENTATION/DECISION MAKING:   Critical Behavior:  Neurologic State: Alert,Confused  Orientation Level: Disoriented to place,Disoriented to situation,Oriented to person,Oriented to time  Cognition: Follows commands,Impaired decision making,Impulsive,Poor safety awareness  Safety/Judgement: Decreased awareness of environment,Decreased awareness of need for assistance,Decreased awareness of need for safety,Decreased insight into deficits  Hearing: Auditory  Auditory Impairment: None  Skin:    Edema:   Range Of Motion:  AROM: Within functional limits           PROM: Within functional limits           Strength:    Strength: Within functional limits                    Tone & Sensation:   Tone: Normal              Sensation: Intact               Coordination:  Coordination: Within functional limits  Vision:      Functional Mobility:  Bed Mobility:  Rolling: Modified independent  Supine to Sit: Supervision  Sit to Supine: Supervision  Scooting: Supervision  Transfers:  Sit to Stand: Contact guard assistance  Stand to Sit: Contact guard assistance        Bed to Chair: Contact guard assistance              Balance:   Sitting: Intact  Standing: Impaired; Without support  Standing - Static: Good;Fair  Standing - Dynamic : Fair  Ambulation/Gait Training:  Distance (ft): 15 Feet (ft)  Assistive Device:  (no device)  Ambulation - Level of Assistance: Contact guard assistance;Minimal assistance        Gait Abnormalities: Decreased step clearance              Speed/Sabina: Pace decreased (<100 feet/min)  Step Length: Right shortened;Left shortened                  Functional Measure:  Barthel Index:    Bathin  Bladder: 10  Bowels: 10  Groomin  Dressin  Feeding: 10  Mobility: 0  Stairs: 0  Toilet Use: 5  Transfer (Bed to Chair and Back): 10  Total: 55/100       The Barthel ADL Index: Guidelines  1. The index should be used as a record of what a patient does, not as a record of what a patient could do. 2. The main aim is to establish degree of independence from any help, physical or verbal, however minor and for whatever reason. 3. The need for supervision renders the patient not independent. 4. A patient's performance should be established using the best available evidence. Asking the patient, friends/relatives and nurses are the usual sources, but direct observation and common sense are also important.  However direct testing is not needed. 5. Usually the patient's performance over the preceding 24-48 hours is important, but occasionally longer periods will be relevant. 6. Middle categories imply that the patient supplies over 50 per cent of the effort. 7. Use of aids to be independent is allowed. Score Interpretation (from [address])    Independent   60-79 Minimally independent   40-59 Partially dependent   20-39 Very dependent   <20 Totally dependent     -Sirena Seay., Barthel, DMELANI. (1965). Functional evaluation: the Barthel Index. [address] ([address] (1997). The Barthel activities of daily living index: self-reporting versus actual performance in the old (> or = 75 years). Journal of [address] [place] [address] 45(7), [facility], RAMIRO, Jerry Jasso., Ivette Peres. (1999). Measuring the change in disability after inpatient rehabilitation; comparison of the responsiveness of the Barthel Index and Functional [place] Measure. Journal of Neurology, Neurosurgery, and Psychiatry, 66(4), 691-038. Nathaniel Palmer, N.J.A, NATE Young, & Erich Marie MAngelA. (2004) Assessment of post-stroke quality of life in cost-effectiveness studies: The usefulness of the Barthel Index and the EuroQoL-5D.  Quality of Life Research, 15, 845-30        Physical Therapy Evaluation Charge Determination   History Examination Presentation Decision-Making   HIGH Complexity :3+ comorbidities / personal factors will impact the outcome/ POC  MEDIUM Complexity : 3 Standardized tests and measures addressing body structure, function, activity limitation and / or participation in recreation  MEDIUM Complexity : Evolving with changing characteristics  MEDIUM Complexity : FOTO score of 26-74      Based on the above components, the patient evaluation is determined to be of the following complexity level: MEDIUM    Pain Rating:  No pain reported    Activity Tolerance:   Fair and desaturates with exertion and requires oxygen    After treatment patient left in no apparent distress:   Supine in bed, Call bell within reach, and Bed / chair alarm activated    COMMUNICATION/EDUCATION:   The patients plan of care was discussed with: Registered nurse. Fall prevention education was provided and the patient/caregiver indicated understanding., Patient/family have participated as able in goal setting and plan of care. , and Patient/family agree to work toward stated goals and plan of care.     Thank you for this referral.  Kayley Bucio, PT   Time Calculation: 20 mins

## 2022-02-27 NOTE — PROGRESS NOTES
2 nurse attempted to place NGT and was not able to place. CCU, Critical Care Transport, Supervisors, and ICU was made aware to help place NGT and was unable to come help staff.

## 2022-02-27 NOTE — PROGRESS NOTES
[facility] Adult  Hospitalist Group                                                                                          Hospitalist Progress Note  Lyubov Leary MD  Answering service: [phone] from in house phone        Date of Service:  2022  NAME:  Savi Lovett  :  1960  MRN:  [id number]      Admission Summary:   HPI: Anju Villagomez is a 57 y.o. male smoker history of schizo affective disorder. Bipolar depression, COPD, asthma emphysema/bronchitis, history of psychogenic polydipsia, resides in a group home, GERD presents with altered mental status. Unable to obtain history from patient as he is altered. Reported confusion and congestive cough from his group home prompted ED arrival.  In the ER patient was placed on 4 L nasal cannula desatted to mid 80s stat ABG showed compensated respiratory acidosis w/ hypoxia, patient was placed on mid flow currently stable 5L midflow sats low 90s. Patient encephalopathic on my exam.  Notable for WBC count of 13, T-max 102.1F, lactic 0.4. BP soft but maps greater than 60.\"    Interval history / Subjective:   Patient seen examined at bedside earlier. Mentation much improved today, refused NG (doesn't need now that he can take po).  5L NC sats mid 90s     Assessment & Plan:     AHRF  COPD Exacerbation   Left lower lobe pneumonia   Chronic tobacco dependence  -patient not septic  -covid neg    -desat to mid [de-identified] on4L NC now stable on 5L NC, wean as tolerated, asked nurse to call group home to see what his baseline 02 requrirement were there   -keep on tele   -CTA chest neg for PE confirmed LLL PNA with effusion   -pulm evaluated appreciate recs, patient of Dr. Luis Schaeffer op, was being follow-up for lung nodule with prior bronc negative for malignancy  -Continue standing and as needed nebs, Pulmicort, standing Solu-Medrol 40 space out to q12, cont Levaquin for cap, pulmonary toilet  -bld cx ngtd, sputum culture pending , mycoplasma neg and Legionella pending, pct 0.18     Acute metabolic encephalopathy -improved   likely Secondary to above  CT head negative, UA neg  -tsh, b12, folate wnl  -depakote level normal   -speech eval, passed soft and easy to chew      Hyponatremia  History of psychogenic polydipsia in the past urine studies appear like possible SIADH  -nephro following, cont to follow bmps   -am cortisol wnl   -was on salt tabs. .from home med. ..ok per nephro to cont   -pharmacy consulted for med rec      Schizoaffective disorder,  Depression subtype   -psych consulted for evaluation , restart depakote, trazodone, cogentin, hold paxil, will ask psych whether we can hold this and use something else given hx of recurrent hyponatremia          GERD  Continue normal    Ask nurse to call group home to get baseline 02 and confirm meds       Code status: full  DVT prophylaxis: lovenox     Care Plan discussed with: Patient/Family and Nurse  Anticipated [facility] home   Anticipated Discharge: 24 hours to 50 hours     Hospital Problems  Date Reviewed: 2/22/2022          Codes Class Noted POA    PNA (pneumonia) ICD-10-CM: J18.9  ICD-9-CM: 395  2/26/2022 Unknown                Review of Systems:   A comprehensive review of systems was negative except for that written in the HPI. Vital Signs:    Last 24hrs VS reviewed since prior progress note. Most recent are:  Visit Vitals  /86   Pulse (!) 109   Temp 98.4 °F (36.9 °C)   Resp 20   Ht 5' 10\" (1.778 m)   Wt 60 kg (132 lb 4.8 oz)   SpO2 90%   BMI 18.98 kg/m²         Intake/Output Summary (Last 24 hours) at 2/27/2022 1218  Last data filed at 2/27/2022 1042  Gross per 24 hour   Intake 600 ml   Output 675 ml   Net -75 ml        Physical Examination:     I had a face to face encounter with this patient and independently examined them on 2/27/2022 as outlined below:          Constitutional:  No acute distress, cooperative, pleasant    ENT:  Oral mucosa moist, oropharynx benign. Resp:  course breath sounds b/l. No wheezing/rhonchi/rales. No accessory muscle use   CV:  Regular rhythm, normal rate, no murmurs, gallops, rubs    GI:  Soft, non distended, non tender. normoactive bowel sounds, no hepatosplenomegaly     Musculoskeletal:  No edema, warm, 2+ pulses throughout    Neurologic:  Moves all extremities. AAOx3, CN II-XII reviewed            Data Review:    Review and/or order of clinical lab test  Review and/or order of tests in the radiology section of CPT  Review and/or order of tests in the medicine section of [facility]      Labs:     Recent Labs     02/27/22  0339 02/26/22  0931   WBC 16.4* 13.5*   HGB 13.4 12.1   HCT 43.0 37.1    179     Recent Labs     02/27/22  0339 02/26/22  1356 02/26/22  0931   * 130* 128*   K 4.5 4.2 3.9   CL 96* 95* 92*   CO2 37* 34* 32   BUN 10 7 6   CREA 0.48* 0.45* 0.46*   * 92 90   CA 9.3 7.9* 8.3*     Recent Labs     02/26/22  0931   ALT 10*   AP 68   TBILI 0.5   TP 6.4   ALB 2.6*   GLOB 3.8     No results for input(s): INR, PTP, APTT, INREXT in the last 72 hours. No results for input(s): FE, TIBC, PSAT, FERR in the last 72 hours. Lab Results   Component Value Date/Time    Folate 27.2 (H) 02/26/2022 09:31 AM      No results for input(s): PH, PCO2, PO2 in the last 72 hours. No results for input(s): CPK, CKNDX, TROIQ in the last 72 hours.     No lab exists for component: CPKMB  Lab Results   Component Value Date/Time    Cholesterol, total 181 10/17/2018 10:48 AM    HDL Cholesterol 78 10/17/2018 10:48 AM    LDL, calculated 89 10/17/2018 10:48 AM    Triglyceride 71 10/17/2018 10:48 AM     Lab Results   Component Value Date/Time    Glucose (POC) 132 (H) 07/04/2017 04:37 PM    Glucose, POC 92 02/26/2022 09:23 AM     Lab Results   Component Value Date/Time    Color YELLOW/STRAW 02/26/2022 09:41 AM    Appearance CLEAR 02/26/2022 09:41 AM    Specific gravity 1.013 02/26/2022 09:41 AM    pH (UA) 7.0 02/26/2022 09:41 AM    Protein Negative 02/26/2022 09:41 AM    Glucose Negative 02/26/2022 09:41 AM    Ketone TRACE (A) 02/26/2022 09:41 AM    Bilirubin Negative 02/26/2022 09:41 AM    Urobilinogen 1.0 02/26/2022 09:41 AM    Nitrites Negative 02/26/2022 09:41 AM    Leukocyte Esterase Negative 02/26/2022 09:41 AM    Epithelial cells FEW 02/26/2022 09:41 AM    Bacteria Negative 02/26/2022 09:41 AM    WBC 0-4 02/26/2022 09:41 AM    RBC 0-5 02/26/2022 09:41 AM         Medications Reviewed:     Current Facility-Administered Medications   Medication Dose Route Frequency    albuterol-ipratropium (DUO-NEB) 2.5 MG-0.5 MG/3 ML  3 mL Nebulization BID RT    sodium chloride (NS) flush 5-10 mL  5-10 mL IntraVENous PRN    sodium chloride (NS) flush 5-40 mL  5-40 mL IntraVENous Q8H    sodium chloride (NS) flush 5-40 mL  5-40 mL IntraVENous PRN    acetaminophen (TYLENOL) tablet 650 mg  650 mg Oral Q6H PRN    Or    acetaminophen (TYLENOL) suppository 650 mg  650 mg Rectal Q6H PRN    polyethylene glycol (MIRALAX) packet 17 g  17 g Oral DAILY PRN    ondansetron (ZOFRAN ODT) tablet 4 mg  4 mg Oral Q8H PRN    Or    ondansetron (ZOFRAN) injection 4 mg  4 mg IntraVENous Q6H PRN    enoxaparin (LOVENOX) injection 40 mg  40 mg SubCUTAneous DAILY    [Held by provider] amLODIPine (NORVASC) tablet 5 mg  5 mg Oral DAILY    benztropine (COGENTIN) tablet 2 mg  2 mg Oral DAILY    divalproex DR (DEPAKOTE) tablet 500 mg  500 mg Oral DAILY    folic acid (FOLVITE) tablet 1 mg  1 mg Oral DAILY    ergocalciferol capsule 50,000 Units  50,000 Units Oral Q30D    lactulose (CHRONULAC) 10 gram/15 mL solution 5 mL  5 mL Oral DAILY PRN    pantoprazole (PROTONIX) tablet 40 mg  40 mg Oral ACB    [Held by provider] PARoxetine (PAXIL) tablet 20 mg  20 mg Oral DAILY    thiamine HCL (B-1) tablet 100 mg  100 mg Oral DAILY    traZODone (DESYREL) tablet 100 mg  100 mg Oral QHS    albuterol-ipratropium (DUO-NEB) 2.5 MG-0.5 MG/3 ML  3 mL Nebulization Q4H PRN    methylPREDNISolone (PF) (SOLU-MEDROL) injection 40 mg  40 mg IntraVENous Q6H    levoFLOXacin (LEVAQUIN) 750 mg in D5W IVPB  750 mg IntraVENous Q24H    budesonide (PULMICORT) 500 mcg/2 ml nebulizer suspension  500 mcg Nebulization BID RT    sodium chloride tablet 1 g  1 g Oral DAILY    guaiFENesin ER (MUCINEX) tablet 600 mg  600 mg Oral Q12H    benzonatate (TESSALON) capsule 100 mg  100 mg Oral TID PRN     ______________________________________________________________________  EXPECTED LENGTH OF STAY: - - -  ACTUAL LENGTH OF STAY:          1                 Jose Segura MD

## 2022-02-27 NOTE — PROGRESS NOTES
Problem: Self Care Deficits Care Plan (Adult)  Goal: *Acute Goals and Plan of Care (Insert Text)  Description: FUNCTIONAL STATUS PRIOR TO ADMISSION: Pt AxO at baseline and unable to provide accurate PLOF; however, chart indicates pt resides at adult group home. Pt reports no DME usage at baseline. HOME SUPPORT: The patient lived with group home staffing to provide ADL/IADL assist.    Occupational Therapy Goals  Initiated 2/27/2022  1. Patient will perform grooming with supervision within 7 day(s). 2.  Patient will perform lower body dressing with supervision within 7 day(s). 3.  Patient will perform bathing with supervision/set-up within 7 day(s). 4.  Patient will perform toilet transfers with supervision within 7 day(s). 5.  Patient will perform all aspects of toileting with supervision within 7 day(s). Outcome: Not Met    OCCUPATIONAL THERAPY EVALUATION  Patient: Shad Castañeda (64 y.o. male)  Date: 2/27/2022  Primary Diagnosis: PNA (pneumonia) [J18.9]        Precautions: Confusion, 02 needs       ASSESSMENT  Based on the objective data described below, the patient presents with decreased safety awareness/insight into deficits, decreased mobility/balance/activity tolerance and increased 02 needs with poor PLB technique (rec'd on 5 liters with 02 90-92% throughout eval/treatment), all of which limit pt's safe ADL independence. Currently, pt benefits from Max safety cues and CGA for dynamic mobility/balance challenges. Pt noted with good ability to manage distal ADLs at EOB with crossed leg technique; however, impulsive with poor awareness to lines/leads management. Pt benefits from Max verbal cues for PLB technique as he was noted to mouth breathe, with chart indicating pt non-compliant with 02 needs at baseline.   Pt benefits from skilled OT to address functional deficits during acute hospitalization, with reporting therapist believing pt would benefit from return to group home, with no anticipated future OT needs once discharged. Of note, HR noted to peak at 115 during EOB ADLs; RN notified and following    Current Level of Function Impacting Discharge (ADLs/self-care): CGA    Functional Outcome Measure: The patient scored 55/100 on the Barthel Index outcome measure. Other factors to consider for discharge: impulsive, poor PLB technique     Patient will benefit from skilled therapy intervention to address the above noted impairments. PLAN :  Recommendations and Planned Interventions: self care training, functional mobility training, therapeutic exercise, balance training, therapeutic activities, endurance activities, and patient education    Frequency/Duration: Patient will be followed by occupational therapy 2 times a week to address goals. Recommendation for discharge: (in order for the patient to meet his/her long term goals)  No skilled occupational therapy/ follow up rehabilitation needs identified at this time. This discharge recommendation:  Has not yet been discussed the attending provider and/or case management    IF patient discharges home will need the following DME: patient owns DME required for discharge       SUBJECTIVE:   Patient stated i'm right handed but my twin brother, Louis Solomon, is left handed.     OBJECTIVE DATA SUMMARY:   HISTORY:   Past Medical History:   Diagnosis Date    Asthma     seldom,use inhaler    Bronchitis     Chronic obstructive pulmonary disease ([address]     Depression     anxiety    Psychiatric disorder     paranoid schizophrenia    Psychiatric disorder     extrapyramidal disease    Psychotic disorder ([address]     mental retardation     Past Surgical History:   Procedure Laterality Date    HX ORTHOPAEDIC      right knee       Expanded or extensive additional review of patient history:     Home Situation  Home Environment: Group home  # Steps to Enter:  (pt unable to recall)  Living Alone: No  Support Systems: Adult Group Home  Patient Expects to be Discharged to[de-identified] Group home  Current DME Used/Available at Home: None  Tub or Shower Type: Shower    Hand dominance: Right    EXAMINATION OF PERFORMANCE DEFICITS:  Cognitive/Behavioral Status:  Neurologic State: Alert;Confused  Orientation Level: Oriented to person;Oriented to time;Disoriented to place; Disoriented to situation  Cognition: Follows commands; Impaired decision making; Impulsive;Poor safety awareness  Perception: Appears intact  Perseveration: No perseveration noted  Safety/Judgement: Decreased awareness of environment;Decreased awareness of need for assistance;Decreased awareness of need for safety;Decreased insight into deficits    Hearing: Auditory  Auditory Impairment: None      Range of Motion:  AROM: Within functional limits  PROM: Within functional limits                      Strength:  Strength: Within functional limits                Coordination:  Coordination: Within functional limits  Fine Motor Skills-Upper: Left Intact; Right Intact    Gross Motor Skills-Upper: Left Intact; Right Intact    Tone & Sensation:  Tone: Normal  Sensation: Intact                      Balance:  Sitting: Intact  Standing: Impaired; Without support  Standing - Static: Good;Fair  Standing - Dynamic : Fair    Functional Mobility and Transfers for ADLs:  Bed Mobility:  Rolling: Modified independent  Supine to Sit: Supervision  Sit to Supine: Supervision  Scooting: Supervision    Transfers:  Sit to Stand: Contact guard assistance  Stand to Sit: Contact guard assistance  Bed to Chair: Contact guard assistance    ADL Assessment:  Feeding: Independent    Oral Facial Hygiene/Grooming: Contact guard assistance    Bathing: Supervision    Upper Body Dressing: Independent    Lower Body Dressing: Contact guard assistance    Toileting: Contact guard assistance      ADL Intervention and task modifications:  Patient instructed and indicated understanding the benefits of maintaining activity tolerance, functional mobility, and independence with self care tasks during acute stay  to ensure safe return home and to baseline. Encouraged patient to increase frequency and duration OOB, be out of bed for all meals, perform daily ADLs (as approved by RN/MD regarding bathing etc), and performing functional mobility to/from bathroom. Pt educated on safe transfer techniques, with specific emphasis on proper hand placement to push up from seated surface rather than attempt to pull self up, fully positioning self in-front of desired seated location, feeling chair on back of legs and reaching back with 1-2 UE to slowly lower self to seated position. Provided verbal cuing for education for breathing techniques including pursed lip breathing techniques (PLB) and circulatory breathing. Additionally, patient was educated on energy conservation techniques, including how they specifically apply to functional activities; This includes pacing, rest breaks, and planning. Patient with good verbal understanding however needing additional cuing through out tasks to use techniques. Additional time needed during tasks. Cognitive Retraining  Safety/Judgement: Decreased awareness of environment;Decreased awareness of need for assistance;Decreased awareness of need for safety;Decreased insight into deficits    Functional Measure:    Barthel Index:  Bathin  Bladder: 10  Bowels: 10  Groomin  Dressin  Feeding: 10  Mobility: 0  Stairs: 0  Toilet Use: 5  Transfer (Bed to Chair and Back): 10  Total: 55/100      The Barthel ADL Index: Guidelines  1. The index should be used as a record of what a patient does, not as a record of what a patient could do. 2. The main aim is to establish degree of independence from any help, physical or verbal, however minor and for whatever reason. 3. The need for supervision renders the patient not independent. 4. A patient's performance should be established using the best available evidence.  Asking the patient, friends/relatives and nurses are the usual sources, but direct observation and common sense are also important. However direct testing is not needed. 5. Usually the patient's performance over the preceding 24-48 hours is important, but occasionally longer periods will be relevant. 6. Middle categories imply that the patient supplies over 50 per cent of the effort. 7. Use of aids to be independent is allowed. Score Interpretation (from [address] [facility] [address])    Independent   60-79 Minimally independent   40-59 Partially dependent   20-39 Very dependent   <20 Totally dependent     -Sirena Seay., Barthel, D.W. (1965). Functional evaluation: the Barthel Index. [address] ([address] (). The Barthel activities of daily living index: self-reporting versus actual performance in the old (> or = 75 years). Journal of [address] [place] [address] 45(7), [address] [facility], AngelAngelBONNIE, Jocelin Obrien., Yary Rueda. (1999). Measuring the change in disability after inpatient rehabilitation; comparison of the responsiveness of the Barthel Index and Functional Cassia Measure. Journal of Neurology, Neurosurgery, and Psychiatry, 66(4), 295-030. Tashia Musa, N.J.A, FEI Young.ELIDA, & Suzan Perez, M.A. (2004) Assessment of post-stroke quality of life in cost-effectiveness studies: The usefulness of the Barthel Index and the EuroQoL-5D.  Quality of Life Research, 15, 410-38        Occupational Therapy Evaluation Charge Determination   History Examination Decision-Making   LOW Complexity : Brief history review  MEDIUM Complexity : 3-5 performance deficits relating to physical, cognitive , or psychosocial skils that result in activity limitations and / or participation restrictions LOW Complexity : No comorbidities that affect functional and no verbal or physical assistance needed to complete eval tasks       Based on the above components, the patient evaluation is determined to be of the following complexity level: LOW   Pain Rating:  No c/o pain    Activity Tolerance:   Good, Fair, desaturates with exertion and requires oxygen, and requires rest breaks    After treatment patient left in no apparent distress:    Supine in bed, Call bell within reach, Bed / chair alarm activated, and Side rails x 3    COMMUNICATION/EDUCATION:   The patients plan of care was discussed with: Registered nurse. Patient is unable to participate in goal setting and plan of care. This patients plan of care is appropriate for delegation to [facility].     Thank you for this referral.  Nickie Andujar OT  Time Calculation: 23 mins

## 2022-02-27 NOTE — CONSULTS
PSYCHIATRY CONSULT NOTE:    REASON FOR CONSULT: medication recommendations    HISTORY OF PRESENTING COMPLAINT:  Delta Pack is a 57 y.o. WHITE/NON- male who is currently admitted to the medical floor at [facility] with AMS and hyponatremia. Mr. Nova Kaye was seen yesterday in the ED and was non-verbal and non-responsive, but today he is much better. He was in good spirits, calm and cooperative during assessment. He stated his mood is \"pretty good. \" He was oriented to person only: he believed he was in \"some kind of play house,\" stated today's date was August 27, 1960 (his birthday), and was unaware of events. He doesn't have a memory of the events that led to his hospitalization but stated \"I think it's because I drank too much water. \" He denies SI/plan/intent or HI/plan/intent. He endorses auditory hallucinations but states \"they don't tell me anything, but I hear them all the time. \"     PAST PSYCHIATRIC HISTORY:   Mr. Nova Kaye has a psychiatric history significant for schizoaffective disorder, bipolar type, and intellectual disability per chart review. He lives at [facility]. PAST MEDICAL HISTORY:  Please see H&P for details. Past Medical History:   Diagnosis Date    Asthma     seldom,use inhaler    Bronchitis     [address] [address]     Depression     anxiety    Psychiatric disorder     paranoid schizophrenia    Psychiatric disorder     extrapyramidal disease    Psychotic disorder ([facility] [address]     mental retardation     Prior to Admission medications    Medication Sig Start Date End Date Taking? Authorizing Provider   divalproex DR (DEPAKOTE) 500 mg tablet  2/3/22   Provider, Historical   Spiriva with HandiHaler 18 mcg inhalation capsule  2/3/22   Provider, Historical   traZODone (DESYREL) 100 mg tablet Take 1 Tablet by mouth nightly.  11/3/21   Lurdes Conroy, DO   budesonide-formoteroL (SYMBICORT) 160-4.5 mcg/actuation HFAA Take 2 Puffs by inhalation two (2) times a day. 11/2/21   Germán Faria, DO   amLODIPine (NORVASC) 5 mg tablet Take 1 Tablet by mouth daily. 8/26/21   Germán Faria, DO   ergocalciferol (ERGOCALCIFEROL) 1,250 mcg (50,000 unit) capsule Take 1 Capsule by mouth every thirty (30) days. 8/26/21   Germán Faria, DO   folic acid (FOLVITE) 1 mg tablet Take 1 Tablet by mouth daily. 8/26/21   Germán Faria, DO   omeprazole (PRILOSEC) 40 mg capsule Take 1 Capsule by mouth daily. 8/26/21   Germán Faria, DO   PARoxetine (PAXIL) 20 mg tablet Take 1 Tablet by mouth daily. 8/26/21   Germán Faria, DO   risperiDONE (RisperDAL) 3 mg tablet Take 1 Tab by mouth daily. 3/8/21   Germán Faria, DO   thiamine HCL (B-1) 100 mg tablet TAKE ONE TABLET BY MOUTH EVERY DAY 3/5/21   Benjy Montes, CAMILLE   albuterol-ipratropium (DUO-NEB) 2.5 mg-0.5 mg/3 ml nebu INHALE 1 VIAL VIA NEBULIZER EVERY 4 HOURS AS NEEDED 10/17/19   Wesly Castanon, CAMILLE   sodium chloride 1 gram tablet Take 1 Tab by mouth daily. 2/21/19   Mary Kaminski, DO   GENERLAC 10 gram/15 mL solution Take 5 mL by mouth daily as needed. 1/8/19   Germán Faria,    benztropine (COGENTIN) 2 mg tablet Take 1 Tab by mouth daily.  6/28/18   Abbie Billy NP     Vitals:    02/27/22 0400 02/27/22 0427 02/27/22 0600 02/27/22 0632   BP: 111/73      Pulse: 99  94    Resp: 17      Temp:       SpO2: 100% 94%     Weight:    60 kg (132 lb 4.8 oz)   Height:         Lab Results   Component Value Date/Time    WBC 16.4 (H) 02/27/2022 03:39 AM    WBC 6.5 06/19/2012 07:16 AM    HGB 13.4 02/27/2022 03:39 AM    HCT 43.0 02/27/2022 03:39 AM    PLATELET 584 87/92/8918 03:39 AM    MCV 99.3 (H) 02/27/2022 03:39 AM     Lab Results   Component Value Date/Time    Sodium 132 (L) 02/27/2022 03:39 AM    Potassium 4.5 02/27/2022 03:39 AM    Chloride 96 (L) 02/27/2022 03:39 AM    CO2 37 (H) 02/27/2022 03:39 AM    Anion gap NEG 1 02/27/2022 03:39 AM    Glucose 121 (H) 02/27/2022 03:39 AM    BUN 10 02/27/2022 03:39 AM    Creatinine 0.48 (L) 02/27/2022 03:39 AM    BUN/Creatinine ratio 21 (H) 02/27/2022 03:39 AM    GFR est AA >60 02/27/2022 03:39 AM    GFR est non-AA >60 02/27/2022 03:39 AM    Calcium 9.3 02/27/2022 03:39 AM    Bilirubin, total 0.5 02/26/2022 09:31 AM    Alk. phosphatase 68 02/26/2022 09:31 AM    Protein, total 6.4 02/26/2022 09:31 AM    Albumin 2.6 (L) 02/26/2022 09:31 AM    Globulin 3.8 02/26/2022 09:31 AM    A-G Ratio 0.7 (L) 02/26/2022 09:31 AM    ALT (SGPT) 10 (L) 02/26/2022 09:31 AM    AST (SGOT) 6 (L) 02/26/2022 09:31 AM     Lab Results   Component Value Date/Time    Valproic acid 81 02/26/2022 09:41 AM     No results found for: [facility]    MENTAL STATUS EXAM:  General appearance:  Disheveled, in hospital bed, psychomotor activity is reduced  Eye contact: good  Speech: spontaneous, coherent  Affect: reactive  Mood: \"pretty good\"  Thought Process: loose  Perception: + AH  Thought Content: no SI/plan/intent, no HI/plan/intent  Insight: poor  Judgment: poor  Cognition: impaired    ASSESSMENT AND PLAN:  Stephen Bradley meets criteria for a diagnosis of schizoaffective disorder, depressive type; unspecified intellectual disability.    -Mr. Rc Modi is much improved since yesterday. His Depakote has been re-started. I recommend continuing to hold Paxil until hyponatremia fully resolves but Trazodone can be restarted. He does not require inpatient psychiatry and can return to his group home once medically cleared. Thank your your consult. Please feel free to consult us again as needed.

## 2022-02-28 LAB
ANION GAP SERPL CALC-SCNC: 3 MMOL/L (ref 5–15)
ANION GAP SERPL CALC-SCNC: 4 MMOL/L (ref 5–15)
BASOPHILS # BLD: 0 K/UL (ref 0–0.1)
BASOPHILS NFR BLD: 0 % (ref 0–1)
BUN SERPL-MCNC: 11 MG/DL (ref 6–20)
BUN SERPL-MCNC: 13 MG/DL (ref 6–20)
BUN/CREAT SERPL: 17 (ref 12–20)
BUN/CREAT SERPL: 19 (ref 12–20)
CALCIUM SERPL-MCNC: 8.6 MG/DL (ref 8.5–10.1)
CALCIUM SERPL-MCNC: 9 MG/DL (ref 8.5–10.1)
CHLORIDE SERPL-SCNC: 91 MMOL/L (ref 97–108)
CHLORIDE SERPL-SCNC: 91 MMOL/L (ref 97–108)
CO2 SERPL-SCNC: 30 MMOL/L (ref 21–32)
CO2 SERPL-SCNC: 34 MMOL/L (ref 21–32)
CREAT SERPL-MCNC: 0.64 MG/DL (ref 0.7–1.3)
CREAT SERPL-MCNC: 0.68 MG/DL (ref 0.7–1.3)
DIFFERENTIAL METHOD BLD: ABNORMAL
EOSINOPHIL # BLD: 0 K/UL (ref 0–0.4)
EOSINOPHIL NFR BLD: 0 % (ref 0–7)
ERYTHROCYTE [DISTWIDTH] IN BLOOD BY AUTOMATED COUNT: 13.7 % (ref 11.5–14.5)
GLUCOSE SERPL-MCNC: 131 MG/DL (ref 65–100)
GLUCOSE SERPL-MCNC: 178 MG/DL (ref 65–100)
HCT VFR BLD AUTO: 41 % (ref 36.6–50.3)
HGB BLD-MCNC: 13.6 G/DL (ref 12.1–17)
IMM GRANULOCYTES # BLD AUTO: 0 K/UL (ref 0–0.04)
IMM GRANULOCYTES NFR BLD AUTO: 0 % (ref 0–0.5)
L PNEUMO1 AG UR QL IA: NEGATIVE
LYMPHOCYTES # BLD: 0.5 K/UL (ref 0.8–3.5)
LYMPHOCYTES NFR BLD: 3 % (ref 12–49)
MCH RBC QN AUTO: 31.6 PG (ref 26–34)
MCHC RBC AUTO-ENTMCNC: 33.2 G/DL (ref 30–36.5)
MCV RBC AUTO: 95.1 FL (ref 80–99)
MONOCYTES # BLD: 0.8 K/UL (ref 0–1)
MONOCYTES NFR BLD: 5 % (ref 5–13)
NEUTS SEG # BLD: 15.1 K/UL (ref 1.8–8)
NEUTS SEG NFR BLD: 92 % (ref 32–75)
NRBC # BLD: 0 K/UL (ref 0–0.01)
NRBC BLD-RTO: 0 PER 100 WBC
PLATELET # BLD AUTO: 187 K/UL (ref 150–400)
PMV BLD AUTO: 8.8 FL (ref 8.9–12.9)
POTASSIUM SERPL-SCNC: 4.1 MMOL/L (ref 3.5–5.1)
POTASSIUM SERPL-SCNC: 4.4 MMOL/L (ref 3.5–5.1)
RBC # BLD AUTO: 4.31 M/UL (ref 4.1–5.7)
RBC MORPH BLD: ABNORMAL
SODIUM SERPL-SCNC: 125 MMOL/L (ref 136–145)
SODIUM SERPL-SCNC: 128 MMOL/L (ref 136–145)
SPECIMEN SOURCE: NORMAL
WBC # BLD AUTO: 16.4 K/UL (ref 4.1–11.1)

## 2022-02-28 PROCEDURE — 36415 COLL VENOUS BLD VENIPUNCTURE: CPT

## 2022-02-28 PROCEDURE — 74011250636 HC RX REV CODE- 250/636: Performed by: PHYSICIAN ASSISTANT

## 2022-02-28 PROCEDURE — 77010033711 HC HIGH FLOW OXYGEN

## 2022-02-28 PROCEDURE — 94640 AIRWAY INHALATION TREATMENT: CPT

## 2022-02-28 PROCEDURE — 85025 COMPLETE CBC W/AUTO DIFF WBC: CPT

## 2022-02-28 PROCEDURE — 94760 N-INVAS EAR/PLS OXIMETRY 1: CPT

## 2022-02-28 PROCEDURE — 80048 BASIC METABOLIC PNL TOTAL CA: CPT

## 2022-02-28 PROCEDURE — 74011000250 HC RX REV CODE- 250: Performed by: STUDENT IN AN ORGANIZED HEALTH CARE EDUCATION/TRAINING PROGRAM

## 2022-02-28 PROCEDURE — 74011000258 HC RX REV CODE- 258: Performed by: PHYSICIAN ASSISTANT

## 2022-02-28 PROCEDURE — 65660000000 HC RM CCU STEPDOWN

## 2022-02-28 PROCEDURE — 74011250636 HC RX REV CODE- 250/636: Performed by: STUDENT IN AN ORGANIZED HEALTH CARE EDUCATION/TRAINING PROGRAM

## 2022-02-28 PROCEDURE — 74011250637 HC RX REV CODE- 250/637: Performed by: STUDENT IN AN ORGANIZED HEALTH CARE EDUCATION/TRAINING PROGRAM

## 2022-02-28 PROCEDURE — 74011000250 HC RX REV CODE- 250: Performed by: PHYSICIAN ASSISTANT

## 2022-02-28 RX ORDER — VANCOMYCIN/0.9 % SOD CHLORIDE 1.5G/250ML
1500 PLASTIC BAG, INJECTION (ML) INTRAVENOUS ONCE
Status: COMPLETED | OUTPATIENT
Start: 2022-02-28 | End: 2022-02-28

## 2022-02-28 RX ORDER — IPRATROPIUM BROMIDE AND ALBUTEROL SULFATE 2.5; .5 MG/3ML; MG/3ML
3 SOLUTION RESPIRATORY (INHALATION)
Status: DISCONTINUED | OUTPATIENT
Start: 2022-02-28 | End: 2022-02-28

## 2022-02-28 RX ORDER — ACETYLCYSTEINE 200 MG/ML
2 SOLUTION ORAL; RESPIRATORY (INHALATION)
Status: DISCONTINUED | OUTPATIENT
Start: 2022-02-28 | End: 2022-02-28

## 2022-02-28 RX ORDER — ACETYLCYSTEINE 200 MG/ML
2 SOLUTION ORAL; RESPIRATORY (INHALATION)
Status: DISPENSED | OUTPATIENT
Start: 2022-02-28 | End: 2022-03-02

## 2022-02-28 RX ORDER — IPRATROPIUM BROMIDE AND ALBUTEROL SULFATE 2.5; .5 MG/3ML; MG/3ML
3 SOLUTION RESPIRATORY (INHALATION)
Status: DISCONTINUED | OUTPATIENT
Start: 2022-02-28 | End: 2022-03-02

## 2022-02-28 RX ORDER — METRONIDAZOLE 500 MG/100ML
500 INJECTION, SOLUTION INTRAVENOUS EVERY 12 HOURS
Status: DISCONTINUED | OUTPATIENT
Start: 2022-02-28 | End: 2022-02-28

## 2022-02-28 RX ADMIN — METHYLPREDNISOLONE SODIUM SUCCINATE 40 MG: 40 INJECTION, POWDER, FOR SOLUTION INTRAMUSCULAR; INTRAVENOUS at 23:12

## 2022-02-28 RX ADMIN — SODIUM CHLORIDE, PRESERVATIVE FREE 10 ML: 5 INJECTION INTRAVENOUS at 23:10

## 2022-02-28 RX ADMIN — METHYLPREDNISOLONE SODIUM SUCCINATE 40 MG: 40 INJECTION, POWDER, FOR SOLUTION INTRAMUSCULAR; INTRAVENOUS at 08:28

## 2022-02-28 RX ADMIN — METRONIDAZOLE 500 MG: 500 INJECTION, SOLUTION INTRAVENOUS at 08:33

## 2022-02-28 RX ADMIN — BUDESONIDE 500 MCG: 0.5 INHALANT RESPIRATORY (INHALATION) at 08:35

## 2022-02-28 RX ADMIN — FOLIC ACID 1 MG: 1 TABLET ORAL at 08:28

## 2022-02-28 RX ADMIN — PIPERACILLIN AND TAZOBACTAM 3.38 G: 3; .375 INJECTION, POWDER, LYOPHILIZED, FOR SOLUTION INTRAVENOUS at 15:10

## 2022-02-28 RX ADMIN — DIVALPROEX SODIUM 500 MG: 500 TABLET, DELAYED RELEASE ORAL at 08:28

## 2022-02-28 RX ADMIN — IPRATROPIUM BROMIDE AND ALBUTEROL SULFATE 3 ML: .5; 3 SOLUTION RESPIRATORY (INHALATION) at 08:35

## 2022-02-28 RX ADMIN — PANTOPRAZOLE SODIUM 40 MG: 40 TABLET, DELAYED RELEASE ORAL at 06:56

## 2022-02-28 RX ADMIN — METHYLPREDNISOLONE SODIUM SUCCINATE 40 MG: 40 INJECTION, POWDER, FOR SOLUTION INTRAMUSCULAR; INTRAVENOUS at 17:17

## 2022-02-28 RX ADMIN — ENOXAPARIN SODIUM 40 MG: 100 INJECTION SUBCUTANEOUS at 08:29

## 2022-02-28 RX ADMIN — Medication 1 G: at 08:28

## 2022-02-28 RX ADMIN — SODIUM CHLORIDE, PRESERVATIVE FREE 10 ML: 5 INJECTION INTRAVENOUS at 15:00

## 2022-02-28 RX ADMIN — PIPERACILLIN AND TAZOBACTAM 3.38 G: 3; .375 INJECTION, POWDER, LYOPHILIZED, FOR SOLUTION INTRAVENOUS at 23:16

## 2022-02-28 RX ADMIN — GUAIFENESIN 600 MG: 600 TABLET, EXTENDED RELEASE ORAL at 08:28

## 2022-02-28 RX ADMIN — IPRATROPIUM BROMIDE AND ALBUTEROL SULFATE 3 ML: .5; 3 SOLUTION RESPIRATORY (INHALATION) at 19:26

## 2022-02-28 RX ADMIN — TRAZODONE HYDROCHLORIDE 100 MG: 100 TABLET ORAL at 21:15

## 2022-02-28 RX ADMIN — LEVOFLOXACIN 750 MG: 750 INJECTION, SOLUTION INTRAVENOUS at 12:30

## 2022-02-28 RX ADMIN — BENZTROPINE MESYLATE 2 MG: 1 TABLET ORAL at 08:29

## 2022-02-28 RX ADMIN — BUDESONIDE 500 MCG: 0.5 INHALANT RESPIRATORY (INHALATION) at 19:26

## 2022-02-28 RX ADMIN — GUAIFENESIN 600 MG: 600 TABLET, EXTENDED RELEASE ORAL at 21:15

## 2022-02-28 RX ADMIN — Medication 100 MG: at 08:28

## 2022-02-28 RX ADMIN — VANCOMYCIN HYDROCHLORIDE 1500 MG: 5 INJECTION, POWDER, LYOPHILIZED, FOR SOLUTION INTRAVENOUS at 17:17

## 2022-02-28 RX ADMIN — SODIUM CHLORIDE, PRESERVATIVE FREE 10 ML: 5 INJECTION INTRAVENOUS at 06:00

## 2022-02-28 RX ADMIN — ACETYLCYSTEINE 400 MG: 200 SOLUTION ORAL; RESPIRATORY (INHALATION) at 19:26

## 2022-02-28 NOTE — PROGRESS NOTES
Bedside shift change report given to RN [employer] (oncoming nurse) by Liliam Luke (offgoing nurse). Report included the following information SBAR, Kardex, Intake/Output, MAR and Recent Results.

## 2022-02-28 NOTE — PROGRESS NOTES
Zosyn dose adjusment  Indication:  pneumonia  Current regimen:  3.375gm q6h  Abx regimen:  vancomycin  Recent Labs     22  0104 22  0339 22  1356 22  0931 22  0931   WBC 16.4* 16.4*  --   --  13.5*   CREA 0.68* 0.48* 0.45*   < > 0.46*   BUN 13 10 7   < > 6    < > = values in this interval not displayed.      Est CrCl: 94 ml/min  Temp (24hrs), Av.8 °F (36.6 °C), Min:97.5 °F (36.4 °C), Max:98.3 °F (36.8 °C)    Cultures: pending    Plan: change to 3.375gm q8h with extended infusion time

## 2022-02-28 NOTE — PROGRESS NOTES
[facility]                                                                                          Hospitalist Progress Note  Yasemin Moreira MD  Answering service: [phone] OR [phone] from in house phone        Date of Service:  2022  NAME:  Jesi Sauer  :  1960  MRN:  [id number]      Admission Summary:   HPI: Jayy Mac is a 57 y.o. male smoker history of schizo affective disorder. Bipolar depression, COPD, asthma emphysema/bronchitis, history of psychogenic polydipsia, resides in a group home, GERD presents with altered mental status. Unable to obtain history from patient as he is altered. Reported confusion and congestive cough from his group home prompted ED arrival.  In the ER patient was placed on 4 L nasal cannula desatted to mid 80s stat ABG showed compensated respiratory acidosis w/ hypoxia, patient was placed on mid flow currently stable 5L midflow sats low 90s. Patient encephalopathic on my exam.  Notable for WBC count of 13, T-max 102.1F, lactic 0.4. BP soft but maps greater than 60.\"    Interval history / Subjective:   Patient seen examined at bedside earlier.   Now on 10 L mid flow sats low 90s up from 5 L nasal cannula yesterday     Assessment & Plan:     Acute on chronic hypoxic respiratory failure  COPD Exacerbation   Left lower lobe pneumonia   Chronic tobacco dependence  -patient not septic  -covid neg    -desat to mid 80s on4L NC > now on midflow 10L, wean as tolerated, baseline 2L 02   -keep on tele   -CTA chest neg for PE confirmed LLL PNA with effusion   -pulm evaluated appreciate recs, patient of Dr. Froilan Vo op, was being follow-up for lung nodule with prior bronc negative for malignancy  -Continue standing and as needed neb (increase frequency), Pulmicort, standing Solu-Medrol 40 q12, cont Levaquin > will broaden abx to zosyn and vanco   -bld cx ngtd, sputum culture pending , mycoplasma neg and Legionella pending, pct 0.18  -pulm following, appreciate recs      Acute metabolic encephalopathy -improved   likely Secondary to above  CT head negative, UA neg  -tsh, b12, folate wnl  -depakote level normal   -speech eval, passed soft and easy to chew      Hyponatremia  -132>128  History of psychogenic polydipsia in the past urine studies appear like possible SIADH  -nephro following, cont to follow bmps   -am cortisol wnl   -800ml fluid restriction, start ure-na 2/28     Schizoaffective disorder,  Depression subtype   -psych consulted for evaluation , restart depakote, trazodone, cogentin, hold paxil, will ask psych whether we can hold this and use something else given hx of recurrent hyponatremia          GERD  Continue home med     Patient has very poor insight on his clinical condition. Code status: full  DVT prophylaxis: lovenox     PT/OT following - no needs     Care Plan discussed with: Patient/Family and Nurse  Anticipated [facility] home   Anticipated Discharge: >48 hrs      Hospital Problems  Date Reviewed: 2/22/2022          Codes Class Noted POA    PNA (pneumonia) ICD-10-CM: J18.9  ICD-9-CM: 962  2/26/2022 Unknown                Review of Systems:   A comprehensive review of systems was negative except for that written in the HPI. Vital Signs:    Last 24hrs VS reviewed since prior progress note.  Most recent are:  Visit Vitals  /87 (BP 1 Location: Right upper arm, BP Patient Position: At rest)   Pulse (!) 104   Temp 98.3 °F (36.8 °C)   Resp 28   Ht 5' 10\" (1.778 m)   Wt 61.2 kg (135 lb)   SpO2 92%   BMI 19.37 kg/m²         Intake/Output Summary (Last 24 hours) at 2/28/2022 1404  Last data filed at 2/28/2022 0401  Gross per 24 hour   Intake 480 ml   Output 2850 ml   Net -2370 ml        Physical Examination:     I had a face to face encounter with this patient and independently examined them on 2/28/2022 as outlined below:          Constitutional:  No acute distress, cooperative, pleasant    ENT:  Oral mucosa moist, oropharynx benign. Resp:  course breath sounds b/l. No wheezing/rhonchi/rales. No accessory muscle use   CV:  Regular rhythm, normal rate, no murmurs, gallops, rubs    GI:  Soft, non distended, non tender. normoactive bowel sounds, no hepatosplenomegaly     Musculoskeletal:  No edema, warm, 2+ pulses throughout    Neurologic:  Moves all extremities. AAOx3, CN II-XII reviewed            Data Review:    Review and/or order of clinical lab test  Review and/or order of tests in the radiology section of CPT  Review and/or order of tests in the medicine section of [facility]      Labs:     Recent Labs     02/28/22  0104 02/27/22 0339   WBC 16.4* 16.4*   HGB 13.6 13.4   HCT 41.0 43.0    187     Recent Labs     02/28/22  0104 02/27/22  0339 02/26/22  1356   * 132* 130*   K 4.4 4.5 4.2   CL 91* 96* 95*   CO2 34* 37* 34*   BUN 13 10 7   CREA 0.68* 0.48* 0.45*   * 121* 92   CA 9.0 9.3 7.9*     Recent Labs     02/26/22  0931   ALT 10*   AP 68   TBILI 0.5   TP 6.4   ALB 2.6*   GLOB 3.8     No results for input(s): INR, PTP, APTT, INREXT, INREXT in the last 72 hours. No results for input(s): FE, TIBC, PSAT, FERR in the last 72 hours. Lab Results   Component Value Date/Time    Folate 27.2 (H) 02/26/2022 09:31 AM      No results for input(s): PH, PCO2, PO2 in the last 72 hours. No results for input(s): CPK, CKNDX, TROIQ in the last 72 hours.     No lab exists for component: CPKMB  Lab Results   Component Value Date/Time    Cholesterol, total 181 10/17/2018 10:48 AM    HDL Cholesterol 78 10/17/2018 10:48 AM    LDL, calculated 89 10/17/2018 10:48 AM    Triglyceride 71 10/17/2018 10:48 AM     Lab Results   Component Value Date/Time    Glucose (POC) 132 (H) 07/04/2017 04:37 PM    Glucose, POC 92 02/26/2022 09:23 AM     Lab Results   Component Value Date/Time    Color YELLOW/STRAW 02/26/2022 09:41 AM    Appearance CLEAR 02/26/2022 09:41 AM    Specific gravity 1.013 02/26/2022 09:41 AM    pH (UA) 7.0 02/26/2022 09:41 AM    Protein Negative 02/26/2022 09:41 AM    Glucose Negative 02/26/2022 09:41 AM    Ketone TRACE (A) 02/26/2022 09:41 AM    Bilirubin Negative 02/26/2022 09:41 AM    Urobilinogen 1.0 02/26/2022 09:41 AM    Nitrites Negative 02/26/2022 09:41 AM    Leukocyte Esterase Negative 02/26/2022 09:41 AM    Epithelial cells FEW 02/26/2022 09:41 AM    Bacteria Negative 02/26/2022 09:41 AM    WBC 0-4 02/26/2022 09:41 AM    RBC 0-5 02/26/2022 09:41 AM         Medications Reviewed:     Current Facility-Administered Medications   Medication Dose Route Frequency    metroNIDAZOLE (FLAGYL) IVPB premix 500 mg  500 mg IntraVENous Q12H    [START ON 3/1/2022] urea (URE-NA) 15 gram packet 2 Packet  2 Packet Oral DAILY    albuterol-ipratropium (DUO-NEB) 2.5 MG-0.5 MG/3 ML  3 mL Nebulization BID RT    methylPREDNISolone (PF) (SOLU-MEDROL) injection 40 mg  40 mg IntraVENous Q12H    sodium chloride (NS) flush 5-10 mL  5-10 mL IntraVENous PRN    sodium chloride (NS) flush 5-40 mL  5-40 mL IntraVENous Q8H    sodium chloride (NS) flush 5-40 mL  5-40 mL IntraVENous PRN    acetaminophen (TYLENOL) tablet 650 mg  650 mg Oral Q6H PRN    Or    acetaminophen (TYLENOL) suppository 650 mg  650 mg Rectal Q6H PRN    polyethylene glycol (MIRALAX) packet 17 g  17 g Oral DAILY PRN    ondansetron (ZOFRAN ODT) tablet 4 mg  4 mg Oral Q8H PRN    Or    ondansetron (ZOFRAN) injection 4 mg  4 mg IntraVENous Q6H PRN    enoxaparin (LOVENOX) injection 40 mg  40 mg SubCUTAneous DAILY    [Held by provider] amLODIPine (NORVASC) tablet 5 mg  5 mg Oral DAILY    benztropine (COGENTIN) tablet 2 mg  2 mg Oral DAILY    divalproex DR (DEPAKOTE) tablet 500 mg  500 mg Oral DAILY    folic acid (FOLVITE) tablet 1 mg  1 mg Oral DAILY    ergocalciferol capsule 50,000 Units  50,000 Units Oral Q30D    lactulose (CHRONULAC) 10 gram/15 mL solution 5 mL  5 mL Oral DAILY PRN    pantoprazole (PROTONIX) tablet 40 mg  40 mg Oral ACB    [Held by provider] PARoxetine (PAXIL) tablet 20 mg  20 mg Oral DAILY    thiamine HCL (B-1) tablet 100 mg  100 mg Oral DAILY    traZODone (DESYREL) tablet 100 mg  100 mg Oral QHS    albuterol-ipratropium (DUO-NEB) 2.5 MG-0.5 MG/3 ML  3 mL Nebulization Q4H PRN    levoFLOXacin (LEVAQUIN) 750 mg in D5W IVPB  750 mg IntraVENous Q24H    budesonide (PULMICORT) 500 mcg/2 ml nebulizer suspension  500 mcg Nebulization BID RT    sodium chloride tablet 1 g  1 g Oral DAILY    guaiFENesin ER (MUCINEX) tablet 600 mg  600 mg Oral Q12H    benzonatate (TESSALON) capsule 100 mg  100 mg Oral TID PRN     ______________________________________________________________________  EXPECTED LENGTH OF STAY: 4d 2h  ACTUAL LENGTH OF STAY:          2                 Karan Jiang MD

## 2022-02-28 NOTE — PROGRESS NOTES
Clinical Pharmacy Note: Metronidazole Dosing    Please note that the metronidazole dose for Joanie Langley has been changed to 500 mg IV q12h per [facility]-approved protocol. Please contact the pharmacy with any questions.     GABBY KelleyD

## 2022-02-28 NOTE — PROGRESS NOTES
Name: Carl Thompson MRN: [id number]   : 1960 [facility]   Date: 2022        IMPRESSION:   · True Hyponatremia. The constellation of his labs are consistent with SIADH. Patient's Na is down. He is drinking again. He has a big water pitcher on his table. · H/O psychogenic polydipsia in the past  · Normal renal function  · Schizophrenia       PLAN:   · Oral fluid restriction - 800 ml/day  · Start Ure Na  · Recheck the BMP in AM.  · Patient cannot be sufficiently counseled about his water intake due to his psychiatric condition. Subjective/Interval History:   I have reviewed the flowsheet and previous days notes. ROS:Pertinent items are noted in HPI. Denies complaints. States he drinks a lot of water because he likes it. Objective:   Vital Signs:    Visit Vitals  /80 (BP 1 Location: Right upper arm, BP Patient Position: At rest)   Pulse (!) 109   Temp 98 °F (36.7 °C)   Resp 22   Ht 5' 10\" (1.778 m)   Wt 61.2 kg (135 lb)   SpO2 92%   BMI 19.37 kg/m²       O2 Device: Hi flow nasal cannula   O2 Flow Rate (L/min): 10 l/min   Temp (24hrs), Av.8 °F (36.6 °C), Min:97.5 °F (36.4 °C), Max:98.4 °F (36.9 °C)       Intake/Output:   Last shift:      No intake/output data recorded. Last 3 shifts:  1901 -  0700  In: 870 [P.O.:720; I.V.:150]  Out: 3725 [Urine:3725]    Intake/Output Summary (Last 24 hours) at 2022 1102  Last data filed at 2022 0401  Gross per 24 hour   Intake 870 ml   Output 3050 ml   Net -2180 ml        Physical Exam:  General:    Alert, cooperative, no distress, appears stated age. Thin   Head:   Normocephalic, without obvious abnormality, atraumatic. Eyes:   Conjunctivae/corneas clear. Nose:  Nares normal. No drainage or sinus tenderness. Throat:    Poor dentition  Neck:  Supple, symmetrical,  no adenopathy, thyroid: non tender    no carotid bruit and no JVD. Lungs:   Clear to auscultation bilaterally. No Wheezing or Rhonchi.  No rales.  Chest wall:  No tenderness or deformity. No Accessory muscle use. Heart:   Regular rate and rhythm,  no murmur, rub or gallop. Abdomen:   Soft, non-tender. Not distended. Bowel sounds normal.  Extremities: Extremities normal, atraumatic, No cyanosis. No edema. No clubbing  Skin:     Texture, turgor normal. No rashes or lesions. Not Jaundiced  Psych:  Poor insight. Not depressed. Not anxious or agitated. DATA:  Labs:  Recent Labs     02/28/22  0104 02/27/22  0339 02/26/22  1356 02/26/22  0931 02/26/22  0931   * 132* 130*   < > 128*   K 4.4 4.5 4.2   < > 3.9   CL 91* 96* 95*   < > 92*   CO2 34* 37* 34*   < > 32   BUN 13 10 7   < > 6   CREA 0.68* 0.48* 0.45*   < > 0.46*   CA 9.0 9.3 7.9*   < > 8.3*   ALB  --   --   --   --  2.6*    < > = values in this interval not displayed.      Recent Labs     02/28/22  0104 02/27/22  0339 02/26/22  0931   WBC 16.4* 16.4* 13.5*   HGB 13.6 13.4 12.1   HCT 41.0 43.0 37.1    187 179     Recent Labs     02/26/22  0931   ZECHARIAH 61       Total time spent with patient:  35 minutes    [] Critical Care Provided    Care Plan discussed with:   Staff, Medical Team    Saúl Fitzgerald MD

## 2022-02-28 NOTE — PROGRESS NOTES
Bedside shift change report given to Marlen Buckley (oncoming nurse) by Prema Rivas RN (offgoing nurse). Report included the following information SBAR, Kardex, Intake/Output, MAR, Accordion, Recent Results and Cardiac Rhythm Sinus rhythm/Sinus tachy .

## 2022-02-28 NOTE — PROGRESS NOTES
Reason for Admission:  AMS, cough                     RUR Score:      11% Low               Plan for utilizing home health:      PT/OT following    PCP: First and Last name:  Georgia Diaz DO     Name of Practice: [facility]   Are you a current patient: Yes/No: Yes   Approximate date of last visit: 2/2/22   Can you participate in a virtual visit with your PCP: VICENTA                    Current Advanced Directive/Advance Care Plan: Full Code      Healthcare Decision Maker:  NA  Click here to complete [address] including selection of the [address] Relationship (ie \"Primary\")                             Transition of Care Plan:  CM received phone call from Yarely, nurse at [facility] [phone]. Per Yarely, they can not accept patient back as he has had an overall decline and has been more confused and urinating on himself. CM met with patient. Patient alert and oriented. He consented to CM contacting his twin brother Radha Muse [phone] and his sister Naye Chang (not on file). CM to monitor.        Jef Smith MS

## 2022-02-28 NOTE — PROGRESS NOTES
Physician Progress Note      PATIENT:               Virginia Novak  CSN #:                  [id number]  :                       1960  ADMIT DATE:       2022 8:20 AM  DISCH DATE:  RESPONDING  PROVIDER #:        Maryam Gay MD          QUERY TEXT:    Pt admitted with left lower lobe pneumonia. SLP consult ordered and completed. Flagyl IV was added to Levaquin IV for antibiotic coverage on . If possible, please document in the progress notes and discharge summary if you are evaluating and/or treating any of the following:    Note: CAP and HCAP indicate where the pneumonia was acquired, not a specific type. The medical record reflects the following:  Risk Factors: 61M resident of group home, smoker history of schizo affective disorder, Bipolar depression, COPD, asthma emphysema/bronchitis, AMS  Clinical Indicators:     Celestino SLP  ASSESSMENT :  Based on the objective data described below, the patient presents with functional oropharyngeal phases of swallow without any overt difficulty nor overt s/s of sapiration. Pt is at elevated risk for post-prandial aspiration given hx of psychiatric disorder and left lower lobe pneumonia (which can sometimes be associated with post-prandial aspiration). Recommend pt continue on baseline diet with esophageal precautions. Skilled acute therapy provided by a speech-language pathologist is not indicated at this time. ? PLAN :  Recommendations:  --easy to chew diet/thin liquids  --good oral care  --upright during all means  --alternate liquids/solids           Joseph Salomon MD  [facility]  COPD Exacerbation  Left lower lobe pneumonia? Chronic?tobacco dependence  -patient not septic  -covid neg   -desat to mid 80s on4L NC now stable on 5L NC, wean as tolerated, asked nurse to call group home to see what his baseline 02 requirement were there  -keep on tele? -CTA chest neg for PE confirmed LLL? PNA? with effusion  -pulm evaluated appreciate recs, patient of Dr. Milan Mehta op, was being?follow-up for lung nodule with prior bronc negative for malignancy  -Continue standing and as needed nebs, Pulmicort, standing Solu-Medrol 40 space out to q12, cont? Levaquin?for cap,?pulmonary toilet  -bld cx ngtd, sputum culture pending , mycoplasma neg and Legionella pending, pct 0.18  ? Acute metabolic encephalopathy -improved  likely? Secondary to above  -CT head negative, UA neg  -tsh, b12, folate wnl  -depakote level normal  -speech eval, passed soft and easy to chew    2/26 Mycoplasma Ab, IgM NONREACTIVE      Treatment: IV Levaquin  750mg IV q 24hours 2/26-3/5, Flagyl 500mg IV q 12hrs 2/28 start, CTA, CXR, SLP, PT/OT, Aspiration precautions, Daily lab monitoring, sputum culture, duo neb, Pulmicort, Mucinex, solumedrol, Protonix, SLP Consult, easy to chew diet/thin liquids, good oral care, upright during all means, alternate liquids/solids, IS    Thank you  Kusum Guido BSN RN [facility]  Clinical Documentation Improvement Specialist  [phone]  Options provided:  -- Aspiration pneumonia  -- Bacterial pneumonia  -- Hypostatic pneumonia  -- Other - I will add my own diagnosis  -- Disagree - Not applicable / Not valid  -- Disagree - Clinically unable to determine / Unknown  -- Refer to Clinical Documentation Reviewer    PROVIDER RESPONSE TEXT:    This patient has bacterial pneumonia.     Query created by: Govind Rivers on 2/28/2022 12:04 PM      Electronically signed by:  Scooter Olvera MD 2/28/2022 3:03 PM

## 2022-02-28 NOTE — PROGRESS NOTES
Pulmonary, Critical Care, and Sleep Medicine~Progress Note    Name: Rosaura Segundo MRN: [id number]   : 1960 Hospital[facility]    Date: 2022 10:31 AM Admission: 2022     Impression Plan   1. Acute on chronic hypoxic/hypercapnic resp failure  2. COPD, O2 dependent. Followed by Dr Reid Foley   3. CAP  4. Known Pulm nodule that has been bx; negative  5. Paranoid schizophrenia  1. IV steroids, probably reduce tomorrow   2. Dvt/pud proph  3. On levaquin for CAP  4. On flagyl as well   5. Duonebs  6. Will need close follow up with Dr Reid Foley      Daily Progression:    Feeling a little better     I have reviewed the labs and previous days notes. Pertinent items are noted in HPI. OBJECTIVE:     Vital Signs:       Visit Vitals  /80 (BP 1 Location: Right upper arm, BP Patient Position: At rest)   Pulse (!) 109   Temp 98 °F (36.7 °C)   Resp 22   Ht 5' 10\" (1.778 m)   Wt 61.2 kg (135 lb)   SpO2 92%   BMI 19.37 kg/m²      Temp (24hrs), Av.8 °F (36.6 °C), Min:97.5 °F (36.4 °C), Max:98.4 °F (36.9 °C)     Intake/Output:     Last shift: No intake/output data recorded. Last 3 shifts:  1901 -  0700  In: 870 [P.O.:720;  I.V.:150]  Out: 3725 [Urine:3725]          Intake/Output Summary (Last 24 hours) at 2022 1031  Last data filed at 2022 0401  Gross per 24 hour   Intake 870 ml   Output 3375 ml   Net -2505 ml       Physical Exam:                                        Exam Findings Other   General: No resp distress noted, appears stated age    [de-identified]:  No ulcers, JVD not elevated, no cervical LAD    Chest: No pectus deformity, normal chest rise b/l    HEART:  RRR, no murmurs/rubs/gallops    Lungs:  CTA b/l, no rhonchi/crackles/wheeze, diminished BS at bases    ABD: Soft/NT, non rigid mildly distended    EXT: No cyanosis/clubbing/edema, normal peripheral pulses    Skin: No rashes or ulcers, no mottling Neuro: Alert         Medications:  Current Facility-Administered Medications   Medication Dose Route Frequency    metroNIDAZOLE (FLAGYL) IVPB premix 500 mg  500 mg IntraVENous Q12H    albuterol-ipratropium (DUO-NEB) 2.5 MG-0.5 MG/3 ML  3 mL Nebulization BID RT    methylPREDNISolone (PF) (SOLU-MEDROL) injection 40 mg  40 mg IntraVENous Q12H    sodium chloride (NS) flush 5-10 mL  5-10 mL IntraVENous PRN    sodium chloride (NS) flush 5-40 mL  5-40 mL IntraVENous Q8H    sodium chloride (NS) flush 5-40 mL  5-40 mL IntraVENous PRN    acetaminophen (TYLENOL) tablet 650 mg  650 mg Oral Q6H PRN    Or    acetaminophen (TYLENOL) suppository 650 mg  650 mg Rectal Q6H PRN    polyethylene glycol (MIRALAX) packet 17 g  17 g Oral DAILY PRN    ondansetron (ZOFRAN ODT) tablet 4 mg  4 mg Oral Q8H PRN    Or    ondansetron (ZOFRAN) injection 4 mg  4 mg IntraVENous Q6H PRN    enoxaparin (LOVENOX) injection 40 mg  40 mg SubCUTAneous DAILY    [Held by provider] amLODIPine (NORVASC) tablet 5 mg  5 mg Oral DAILY    benztropine (COGENTIN) tablet 2 mg  2 mg Oral DAILY    divalproex DR (DEPAKOTE) tablet 500 mg  500 mg Oral DAILY    folic acid (FOLVITE) tablet 1 mg  1 mg Oral DAILY    ergocalciferol capsule 50,000 Units  50,000 Units Oral Q30D    lactulose (CHRONULAC) 10 gram/15 mL solution 5 mL  5 mL Oral DAILY PRN    pantoprazole (PROTONIX) tablet 40 mg  40 mg Oral ACB    [Held by provider] PARoxetine (PAXIL) tablet 20 mg  20 mg Oral DAILY    thiamine HCL (B-1) tablet 100 mg  100 mg Oral DAILY    traZODone (DESYREL) tablet 100 mg  100 mg Oral QHS    albuterol-ipratropium (DUO-NEB) 2.5 MG-0.5 MG/3 ML  3 mL Nebulization Q4H PRN    levoFLOXacin (LEVAQUIN) 750 mg in D5W IVPB  750 mg IntraVENous Q24H    budesonide (PULMICORT) 500 mcg/2 ml nebulizer suspension  500 mcg Nebulization BID RT    sodium chloride tablet 1 g  1 g Oral DAILY    guaiFENesin ER (MUCINEX) tablet 600 mg  600 mg Oral Q12H    benzonatate (TESSALON) capsule 100 mg  100 mg Oral TID PRN       Labs:  ABG Recent Labs     02/26/22  0841   PHI 7.41   PCO2I 51.1*   PO2I 54*   HCO3I 32.3*   SO2I 87.0*   FIO2I 2        CBC Recent Labs     02/28/22  0104 02/27/22  0339 02/26/22  0931   WBC 16.4* 16.4* 13.5*   HGB 13.6 13.4 12.1   HCT 41.0 43.0 37.1    187 179   MCV 95.1 99.3* 96.6   MCH 31.6 30.9 41.0        Metabolic  Panel Recent Labs     02/28/22  0104 02/27/22  0339 02/26/22  1356 02/26/22  0931 02/26/22  0931   * 132* 130*   < > 128*   K 4.4 4.5 4.2   < > 3.9   CL 91* 96* 95*   < > 92*   CO2 34* 37* 34*   < > 32   * 121* 92   < > 90   BUN 13 10 7   < > 6   CREA 0.68* 0.48* 0.45*   < > 0.46*   CA 9.0 9.3 7.9*   < > 8.3*   ALB  --   --   --   --  2.6*   ALT  --   --   --   --  10*    < > = values in this interval not displayed.         Pertinent Labs                Amie Garcia PA-C  2/28/2022

## 2022-03-01 ENCOUNTER — APPOINTMENT (OUTPATIENT)
Dept: GENERAL RADIOLOGY | Age: 62
DRG: 139 | End: 2022-03-01
Attending: PHYSICIAN ASSISTANT
Payer: MEDICAID

## 2022-03-01 LAB
ANION GAP SERPL CALC-SCNC: 4 MMOL/L (ref 5–15)
BACTERIA SPEC CULT: NORMAL
BACTERIA SPEC CULT: NORMAL
BASOPHILS # BLD: 0 K/UL (ref 0–0.1)
BASOPHILS NFR BLD: 0 % (ref 0–1)
BUN SERPL-MCNC: 10 MG/DL (ref 6–20)
BUN/CREAT SERPL: 16 (ref 12–20)
CALCIUM SERPL-MCNC: 8.8 MG/DL (ref 8.5–10.1)
CHLORIDE SERPL-SCNC: 100 MMOL/L (ref 97–108)
CO2 SERPL-SCNC: 27 MMOL/L (ref 21–32)
CREAT SERPL-MCNC: 0.63 MG/DL (ref 0.7–1.3)
DIFFERENTIAL METHOD BLD: ABNORMAL
EOSINOPHIL # BLD: 0 K/UL (ref 0–0.4)
EOSINOPHIL NFR BLD: 0 % (ref 0–7)
ERYTHROCYTE [DISTWIDTH] IN BLOOD BY AUTOMATED COUNT: 14.3 % (ref 11.5–14.5)
GLUCOSE SERPL-MCNC: 116 MG/DL (ref 65–100)
HCT VFR BLD AUTO: 41.7 % (ref 36.6–50.3)
HGB BLD-MCNC: 13.5 G/DL (ref 12.1–17)
IMM GRANULOCYTES # BLD AUTO: 0.2 K/UL (ref 0–0.04)
IMM GRANULOCYTES NFR BLD AUTO: 1 % (ref 0–0.5)
LYMPHOCYTES # BLD: 0.7 K/UL (ref 0.8–3.5)
LYMPHOCYTES NFR BLD: 4 % (ref 12–49)
MCH RBC QN AUTO: 30.8 PG (ref 26–34)
MCHC RBC AUTO-ENTMCNC: 32.4 G/DL (ref 30–36.5)
MCV RBC AUTO: 95 FL (ref 80–99)
MONOCYTES # BLD: 0.9 K/UL (ref 0–1)
MONOCYTES NFR BLD: 5 % (ref 5–13)
NEUTS SEG # BLD: 15.2 K/UL (ref 1.8–8)
NEUTS SEG NFR BLD: 90 % (ref 32–75)
NRBC # BLD: 0 K/UL (ref 0–0.01)
NRBC BLD-RTO: 0 PER 100 WBC
PLATELET # BLD AUTO: 245 K/UL (ref 150–400)
PMV BLD AUTO: 9.5 FL (ref 8.9–12.9)
POTASSIUM SERPL-SCNC: 4.7 MMOL/L (ref 3.5–5.1)
RBC # BLD AUTO: 4.39 M/UL (ref 4.1–5.7)
RBC MORPH BLD: ABNORMAL
SERVICE CMNT-IMP: NORMAL
SODIUM SERPL-SCNC: 131 MMOL/L (ref 136–145)
WBC # BLD AUTO: 17 K/UL (ref 4.1–11.1)

## 2022-03-01 PROCEDURE — 77010033678 HC OXYGEN DAILY

## 2022-03-01 PROCEDURE — 74011000250 HC RX REV CODE- 250: Performed by: STUDENT IN AN ORGANIZED HEALTH CARE EDUCATION/TRAINING PROGRAM

## 2022-03-01 PROCEDURE — 74011250637 HC RX REV CODE- 250/637: Performed by: STUDENT IN AN ORGANIZED HEALTH CARE EDUCATION/TRAINING PROGRAM

## 2022-03-01 PROCEDURE — 74011000250 HC RX REV CODE- 250: Performed by: PHYSICIAN ASSISTANT

## 2022-03-01 PROCEDURE — 71045 X-RAY EXAM CHEST 1 VIEW: CPT

## 2022-03-01 PROCEDURE — 94664 DEMO&/EVAL PT USE INHALER: CPT

## 2022-03-01 PROCEDURE — 74011250636 HC RX REV CODE- 250/636: Performed by: STUDENT IN AN ORGANIZED HEALTH CARE EDUCATION/TRAINING PROGRAM

## 2022-03-01 PROCEDURE — 36415 COLL VENOUS BLD VENIPUNCTURE: CPT

## 2022-03-01 PROCEDURE — 85025 COMPLETE CBC W/AUTO DIFF WBC: CPT

## 2022-03-01 PROCEDURE — 77010033711 HC HIGH FLOW OXYGEN

## 2022-03-01 PROCEDURE — 94640 AIRWAY INHALATION TREATMENT: CPT

## 2022-03-01 PROCEDURE — 65660000000 HC RM CCU STEPDOWN

## 2022-03-01 PROCEDURE — 74011000258 HC RX REV CODE- 258: Performed by: PHYSICIAN ASSISTANT

## 2022-03-01 PROCEDURE — 74011250637 HC RX REV CODE- 250/637: Performed by: INTERNAL MEDICINE

## 2022-03-01 PROCEDURE — 80048 BASIC METABOLIC PNL TOTAL CA: CPT

## 2022-03-01 PROCEDURE — 97530 THERAPEUTIC ACTIVITIES: CPT

## 2022-03-01 PROCEDURE — 97535 SELF CARE MNGMENT TRAINING: CPT

## 2022-03-01 PROCEDURE — 74011250636 HC RX REV CODE- 250/636: Performed by: PHYSICIAN ASSISTANT

## 2022-03-01 RX ORDER — BALSAM PERU/CASTOR OIL
OINTMENT (GRAM) TOPICAL 2 TIMES DAILY
Status: DISCONTINUED | OUTPATIENT
Start: 2022-03-01 | End: 2022-03-15

## 2022-03-01 RX ADMIN — SODIUM CHLORIDE, PRESERVATIVE FREE 10 ML: 5 INJECTION INTRAVENOUS at 22:18

## 2022-03-01 RX ADMIN — VANCOMYCIN HYDROCHLORIDE 1000 MG: 1 INJECTION, POWDER, LYOPHILIZED, FOR SOLUTION INTRAVENOUS at 15:47

## 2022-03-01 RX ADMIN — GUAIFENESIN 600 MG: 600 TABLET, EXTENDED RELEASE ORAL at 08:32

## 2022-03-01 RX ADMIN — PIPERACILLIN AND TAZOBACTAM 3.38 G: 3; .375 INJECTION, POWDER, LYOPHILIZED, FOR SOLUTION INTRAVENOUS at 15:48

## 2022-03-01 RX ADMIN — Medication 1 G: at 08:32

## 2022-03-01 RX ADMIN — Medication 2 PACKET: at 08:36

## 2022-03-01 RX ADMIN — SODIUM CHLORIDE, PRESERVATIVE FREE 10 ML: 5 INJECTION INTRAVENOUS at 07:06

## 2022-03-01 RX ADMIN — TRAZODONE HYDROCHLORIDE 100 MG: 100 TABLET ORAL at 22:13

## 2022-03-01 RX ADMIN — ENOXAPARIN SODIUM 40 MG: 100 INJECTION SUBCUTANEOUS at 08:31

## 2022-03-01 RX ADMIN — PIPERACILLIN AND TAZOBACTAM 3.38 G: 3; .375 INJECTION, POWDER, LYOPHILIZED, FOR SOLUTION INTRAVENOUS at 07:02

## 2022-03-01 RX ADMIN — PANTOPRAZOLE SODIUM 40 MG: 40 TABLET, DELAYED RELEASE ORAL at 07:01

## 2022-03-01 RX ADMIN — BENZTROPINE MESYLATE 2 MG: 1 TABLET ORAL at 08:32

## 2022-03-01 RX ADMIN — ACETYLCYSTEINE 400 MG: 200 SOLUTION ORAL; RESPIRATORY (INHALATION) at 09:37

## 2022-03-01 RX ADMIN — VANCOMYCIN HYDROCHLORIDE 1000 MG: 1 INJECTION, POWDER, LYOPHILIZED, FOR SOLUTION INTRAVENOUS at 04:10

## 2022-03-01 RX ADMIN — IPRATROPIUM BROMIDE AND ALBUTEROL SULFATE 3 ML: .5; 3 SOLUTION RESPIRATORY (INHALATION) at 17:12

## 2022-03-01 RX ADMIN — ERGOCALCIFEROL 50000 UNITS: 1.25 CAPSULE ORAL at 11:18

## 2022-03-01 RX ADMIN — GUAIFENESIN 600 MG: 600 TABLET, EXTENDED RELEASE ORAL at 22:13

## 2022-03-01 RX ADMIN — BUDESONIDE 500 MCG: 0.5 INHALANT RESPIRATORY (INHALATION) at 09:37

## 2022-03-01 RX ADMIN — ACETYLCYSTEINE 400 MG: 200 SOLUTION ORAL; RESPIRATORY (INHALATION) at 17:13

## 2022-03-01 RX ADMIN — PIPERACILLIN AND TAZOBACTAM 3.38 G: 3; .375 INJECTION, POWDER, LYOPHILIZED, FOR SOLUTION INTRAVENOUS at 22:13

## 2022-03-01 RX ADMIN — IPRATROPIUM BROMIDE AND ALBUTEROL SULFATE 3 ML: .5; 3 SOLUTION RESPIRATORY (INHALATION) at 09:37

## 2022-03-01 RX ADMIN — DIVALPROEX SODIUM 500 MG: 500 TABLET, DELAYED RELEASE ORAL at 08:32

## 2022-03-01 RX ADMIN — Medication 100 MG: at 08:32

## 2022-03-01 RX ADMIN — FOLIC ACID 1 MG: 1 TABLET ORAL at 08:32

## 2022-03-01 RX ADMIN — METHYLPREDNISOLONE SODIUM SUCCINATE 40 MG: 40 INJECTION, POWDER, FOR SOLUTION INTRAMUSCULAR; INTRAVENOUS at 19:12

## 2022-03-01 RX ADMIN — METHYLPREDNISOLONE SODIUM SUCCINATE 40 MG: 40 INJECTION, POWDER, FOR SOLUTION INTRAMUSCULAR; INTRAVENOUS at 11:29

## 2022-03-01 RX ADMIN — METHYLPREDNISOLONE SODIUM SUCCINATE 40 MG: 40 INJECTION, POWDER, FOR SOLUTION INTRAMUSCULAR; INTRAVENOUS at 07:00

## 2022-03-01 RX ADMIN — Medication: at 13:52

## 2022-03-01 NOTE — PROGRESS NOTES
Physician Progress Note      Renae Turk  CSN #:                  [id number]  :                       1960  ADMIT DATE:       2022 8:20 AM  DISCH DATE:  RESPONDING  PROVIDER #:        Ju Ozuna MD          QUERY TEXT:    Patient admitted with LLL Bacterial Pneumonia and COPD exacerbation. Patient 5'10\" 134lbs with BMI 19.36. SLP consult was completed with diet subsequent diet changes made. PT and OT evaluations noted with recommendations for assisting patient with ADLs. Patient noted to be on daily multivitamin therapy. If possible, please document in progress notes and discharge summary if you are evaluating and /or treating any of the following. If Malnutrition is being treated or is suspected, a consult with the Registered Dietician is recommended to establish severity and assist with treatment.     The medical record reflects the following:  Risk Factors: 61M Schizoaffective disorder,  Depression subtype, resident of group home, GERD, COPD, poor insight on clinical condition as documented by Attending, Score of 10 on Barthel Index for feeding, SIADH necessitating free water restriction    Clinical Indicators: 5'10\" 134lbs with BMI 19.36    Bee MOSQUEDA  ED Provider Note  Physical Exam  Comments: Elderly, chronically deconditioned    Noel Velez MD  PN  speech eval, passed soft and easy to chew  Patient has very poor insight on his clinical condition    SLP Emory Boothe  PN  Diet prior to admission: regular diet/thin liquids  Current Diet:  Easy to chew diet/thin liquids  Safety/Judgement: Decreased awareness of environment,Decreased awareness of need for assistance,Decreased awareness of need for safety,Decreased insight into deficits    Kerley OT  PN  Barthel Index  Feeding 10  Score Interpretation  <20 Totally dependent    Faheem Person MD 3/1 Nephrology PN  Patient cannot be sufficiently counseled about his water intake due to his psychiatric condition. 2/26 Na 128, Cl 92, Ca 8.3, Albumin 2.6    Treatment: PT/OT and SLP consults, Diet modification to soft/easy to chew, continued daily work with PT/OT, free water restriction, thiamine 830CO PO daily, folic acid 1mg PO daily, daily lab monitoring    Thank you  [address] Specialist  [phone]  [phone]:  https://[email or web address] onlinelibrary. rogers. com/doi/full/10.1177/0855753045209306  Options provided:  -- Underweight: :This patient is underweight with a BMI of 19.36.  -- Malnutrition  -- BMI not clinically significant  -- Other - I will add my own diagnosis  -- Disagree - Not applicable / Not valid  -- Disagree - Clinically unable to determine / Unknown  -- Refer to Clinical Documentation Reviewer    PROVIDER RESPONSE TEXT:    Underweight: :This patient is underweight with a BMI of 19.36.     Query created by: Russ Calvo on 3/1/2022 10:58 AM      Electronically signed by:  Mini Upton MD 3/1/2022 5:39 PM

## 2022-03-01 NOTE — WOUND CARE
WOCN Note:     New consult placed for assessment of posterior ears. Chart reviewed. Assessed in room 647. Admitted DX:  PNA (pneumonia)  Past Medical History:   Diagnosis Date    Asthma     seldom,use inhaler    Bronchitis     Chronic obstructive pulmonary disease ([facility] [address]     Depression     anxiety    Psychiatric disorder     paranoid schizophrenia    Psychiatric disorder     extrapyramidal disease    Psychotic disorder ([facility] [address]     mental retardation     Assessment:   Patient is alert, communicative, continent and sitting up in chair with bed alarm. Bed: foam mattress  Patient reports no pain. Sacrum and buttocks intact without erythema. 1. POA Left posterior ear, dry scab: 0.6 x 0.6 x 0 cm  100% maroon; no exudate; no odor. Periwound without erythema. Applied grey foam oxygen tubing covers. Wound, Pressure Prevention & Skin Care Recommendations:    1. Minimize layers of linen/pads under patient to optimize support surface. 2.  Turn/reposition approximately every 2 hours and offload heels. 3.  Manage moisture/ Keep skin folds clean and dry/minimize brief usage. 4.  posterior ears:  keep protected with grey foam oxygen tubing covers; apply Venelex BID. 5.  Use waffle cushion over bed alarm. 6.  Sacrum:  Large sacral foam dressing. Discussed above plan with patient and Herlinda BARKSDALE.     Transition of Care:   Plan to follow as needed while admitted to hospital.    RAJINDER LandrumN RN [facility] [place] [facility]  Available on [employer]  Office [phone]

## 2022-03-01 NOTE — PROGRESS NOTES
Bedside shift change report given to Andrea Cartwright (oncoming nurse) by Norah Moon (offgoing nurse). Report included the following information SBAR, Kardex, Recent Results, Med Rec Status and Cardiac Rhythm St-NSR.

## 2022-03-01 NOTE — PROGRESS NOTES
Bedside shift change report given to Amberly Baker RN (oncoming nurse) by Reyes Fries, RN (offgoing nurse). Report included the following information SBAR, Kardex, Intake/Output and Cardiac Rhythm Sinus rhythm.

## 2022-03-01 NOTE — PROGRESS NOTES
Name: Sue Marion MRN: [id number]   : 1960 Hospital: [facility]   Date: 3/1/2022        IMPRESSION:   · True Hyponatremia. The constellation of his labs are consistent with SIADH. Patient's Na is now above 130  · H/O psychogenic polydipsia in the past  · Normal renal function  · Schizophrenia       PLAN:   · Continue oral fluid restriction - 800 ml/day  · Continue Ure Na  · Recheck the BMP in AM.  · Patient cannot be sufficiently counseled about his water intake due to his psychiatric condition. Subjective/Interval History:   I have reviewed the flowsheet and previous days notes. ROS:Pertinent items are noted in HPI. Denies complaints. States he drinks a lot of water because he likes it. Objective:   Vital Signs:    Visit Vitals  /88 (BP 1 Location: Right arm, BP Patient Position: At rest)   Pulse 92   Temp 98.5 °F (36.9 °C)   Resp 28   Ht 5' 10\" (1.778 m)   Wt 61.2 kg (134 lb 14.7 oz)   SpO2 97%   BMI 19.36 kg/m²       O2 Device: Hi flow nasal cannula   O2 Flow Rate (L/min): 8 l/min   Temp (24hrs), Av.8 °F (36.6 °C), Min:97.4 °F (36.3 °C), Max:98.5 °F (36.9 °C)       Intake/Output:   Last shift:      701 -  190  In: 2388 [P.O.:1074]  Out: 700 [Urine:700]  Last 3 shifts: 1901 -  0700  In: 2020 [P.O.:1920; I.V.:100]  Out: 7050 [Urine:7050]    Intake/Output Summary (Last 24 hours) at 3/1/2022 1030  Last data filed at 3/1/2022 8064  Gross per 24 hour   Intake 3094 ml   Output 5200 ml   Net -2106 ml        Physical Exam:  General:    Alert, cooperative, no distress, appears stated age. Thin   Head:   Normocephalic, without obvious abnormality, atraumatic. Eyes:   Conjunctivae/corneas clear. Nose:  Nares normal. No drainage or sinus tenderness. Throat:    Poor dentition  Neck:  Supple, symmetrical,  no adenopathy, thyroid: non tender    no carotid bruit and no JVD. Lungs:   Clear to auscultation bilaterally. No Wheezing or Rhonchi.  No rales.  Chest wall:  No tenderness or deformity. No Accessory muscle use. Heart:   Regular rate and rhythm,  no murmur, rub or gallop. Abdomen:   Soft, non-tender. Not distended. Bowel sounds normal.  Extremities: Extremities normal, atraumatic, No cyanosis. No edema. No clubbing  Skin:     Texture, turgor normal. No rashes or lesions. Not Jaundiced  Psych:  Poor insight. Not depressed. Not anxious or agitated. DATA:  Labs:  Recent Labs     03/01/22  0337 02/28/22  1600 02/28/22  0104   * 125* 128*   K 4.7 4.1 4.4    91* 91*   CO2 27 30 34*   BUN 10 11 13   CREA 0.63* 0.64* 0.68*   CA 8.8 8.6 9.0     Recent Labs     03/01/22  0337 02/28/22  0104 02/27/22  0339   WBC 17.0* 16.4* 16.4*   HGB 13.5 13.6 13.4   HCT 41.7 41.0 43.0    187 187     No results for input(s): ZECHARIAH, KU, CLU, CREAU in the last 72 hours.     No lab exists for component: PROU    Total time spent with patient:  35 minutes    [] Critical Care Provided    Care Plan discussed with:   Staff, Medical Team    Shawna Vizcaino MD

## 2022-03-01 NOTE — PROGRESS NOTES
Problem: Self Care Deficits Care Plan (Adult)  Goal: *Acute Goals and Plan of Care (Insert Text)  Description: FUNCTIONAL STATUS PRIOR TO ADMISSION: Pt AxO at baseline and unable to provide accurate PLOF; however, chart indicates pt resides at adult group home. Pt reports no DME usage at baseline. HOME SUPPORT: The patient lived with group home staffing to provide ADL/IADL assist.    Occupational Therapy Goals  Initiated 2/27/2022  1. Patient will perform grooming with supervision within 7 day(s). 2.  Patient will perform lower body dressing with supervision within 7 day(s). 3.  Patient will perform bathing with supervision/set-up within 7 day(s). 4.  Patient will perform toilet transfers with supervision within 7 day(s). 5.  Patient will perform all aspects of toileting with supervision within 7 day(s). Outcome: Progressing Towards Goal    OCCUPATIONAL THERAPY TREATMENT  Patient: Alannah Person (26 y.o. male)  Date: 3/1/2022  Diagnosis: PNA (pneumonia) [J18.9] <principal problem not specified>       Precautions:  fall, impulsive, O2  Chart, occupational therapy assessment, plan of care, and goals were reviewed. ASSESSMENT  Patient continues with skilled OT services and is progressing towards goals. This patient is following command appropriately today yet is very impulsive during session. He is tachycardic at rest in semi-fowlers position which further increased when in chair. BP was stable and O2 turned up from 5 to 6 lpm with HFNC. He required max cues for pursed lip breathing and external cues to pace self for HR/O2 stabilization during session. Patient's nurse notified of tachycardia --he was getting breathing treatment when OT arrived which was removed when complete during session. A&O x person, place, month, date, year and not day of week (one day off).   No further ADL were addressed in chair due to tachycardia--just sitting is enough challenge for him today ESPECIALLY after OT starting DURING breathing treatment. Safety and call bell use reviewed with patient. He would benefit from return to group home with [place] OT recommended YET understand group home will not take him back currently. He would benefit from rehab preferred as he was I PTA without AD for ADl and mobility--is progressing to chair level today for first time OOB--expect him to progress nicely as he is motivated and cooperative. Vitals:    03/01/22 0830 03/01/22 0938 03/01/22 1022 03/01/22 1046   BP: 133/88   (!) 135/92   BP 1 Location: Right arm  Right upper arm Right upper arm   BP Patient Position: At rest  Semi fowlers Sitting   Pulse: 92  (!) 115 (!) 121   Temp: 98.5 °F (36.9 °C)      Resp: 28      Height:       Weight:       SpO2: 97% 97% 93% 93%        Current Level of Function Impacting Discharge (ADLs): tachycardia and impulsivity, not tolerating ADL due to tachycardia--see below    Other factors to consider for discharge: group home         PLAN :  Patient continues to benefit from skilled intervention to address the above impairments. Continue treatment per established plan of care to address goals. Recommendation for discharge: (in order for the patient to meet his/her long term goals)  Occupational therapy at least 2 days/week in the home     This discharge recommendation:  Has not yet been discussed the attending provider and/or case management    IF patient discharges home will need the following DME: TBD       SUBJECTIVE:   Patient stated I stand to shower in the walk in shower.     OBJECTIVE DATA SUMMARY:   Cognitive/Behavioral Status:  Neurologic State: Alert  Orientation Level: Disoriented to time                Functional Mobility and Transfers for ADLs:  Bed Mobility:  Supine to Sit: Supervision  Scooting: Supervision    Transfers:  Sit to Stand: Contact guard assistance     Bed to Chair: Contact guard assistance    Balance:  Sitting: Intact; Without support  Standing: Impaired; Without support  Standing - Static: Good;Fair  Standing - Dynamic : Fair    ADL Intervention:       Grooming  Grooming Assistance: Stand-by assistance  Position Performed: Seated in chair  Washing Face: Stand-by assistance  Washing Hands: Stand-by assistance                   Lower Body Dressing Assistance  Socks: Stand-by assistance  Shoes with Cloth Laces: Stand-by assistance              Therapeutic Exercises:       Pain:  No c/o    Activity Tolerance:   Fair, Poor, desaturates with exertion and requires oxygen, and tachycardia! After treatment patient left in no apparent distress:   Sitting in chair, Call bell within reach, and Bed / chair alarm activated    COMMUNICATION/COLLABORATION:   The patients plan of care was discussed with: Registered nurse.      HENRY Sexton/L  Time Calculation: 28 mins

## 2022-03-01 NOTE — PROGRESS NOTES
[facility] Adult  Hospitalist Group                                                                                          Hospitalist Progress Note  Gisele Woodall MD  Answering service: [phone] from in house phone        Date of Service:  3/1/2022  NAME:  Marely Diaz  :  1960  MRN:  [id number]      Admission Summary:   HPI: Sandy  is a 57 y.o. male smoker history of schizo affective disorder. Bipolar depression, COPD, asthma emphysema/bronchitis, history of psychogenic polydipsia, resides in a group home, GERD presents with altered mental status. Unable to obtain history from patient as he is altered. Reported confusion and congestive cough from his group home prompted ED arrival.  In the ER patient was placed on 4 L nasal cannula desatted to mid 80s stat ABG showed compensated respiratory acidosis w/ hypoxia, patient was placed on mid flow currently stable 5L midflow sats low 90s. Patient encephalopathic on my exam.  Notable for WBC count of 13, T-max 102.1F, lactic 0.4. BP soft but maps greater than 60.\"    Interval history / Subjective:   Patient seen examined at bedside earlier.  8L mid flow sats low 90s , feels well otherwise     Assessment & Plan:     Acute on chronic hypoxic respiratory failure  COPD Exacerbation   Left lower lobe pneumonia   Chronic tobacco dependence  -patient not septic  -covid neg    -desat to mid 80s on4L NC > now on midflow 8L, wean as tolerated, baseline 2L 02   -keep on tele   -CTA chest neg for PE confirmed LLL PNA with effusion   -Chest x-ray repeated 3/1 with improvement  -pulm evaluated appreciate recs, patient of Dr. Milan Mehta op, was being follow-up for lung nodule with prior bronc negative for malignancy  -Continue standing and as needed neb (increase frequency), Pulmicort, standing Solu-Medrol 40 q12, DC Levaquin 3/1> continue with Zosyn, Vanco  MRSA test if negative can de-escalate Vanco per pulm  -bld cx ngtd, sputum culture pending , mycoplasma neg and Legionella pending, pct 0.18  -pulm following, appreciate recs      Acute metabolic encephalopathy -improved   likely Secondary to above  CT head negative, UA neg  -tsh, b12, folate wnl  -depakote level normal   -speech eval, passed soft and easy to chew      Hyponatremia  -132>128 > 131  History of psychogenic polydipsia in the past urine studies appear like possible SIADH  -nephro following, appreciate input, cont to follow bmps   -am cortisol wnl   -800ml fluid restriction, started ure-na 2/28, cont same course      Schizoaffective disorder,  Depression subtype   -psych consulted for evaluation , restart depakote, trazodone, cogentin, hold paxil, will ask psych whether we can hold this and use something else given hx of recurrent hyponatremia          GERD  Continue home med     Patient has very poor insight on his clinical condition. Code status: full  DVT prophylaxis: lovenox     PT/OT following - no needs     Care Plan discussed with: Patient/Family and Nurse  Anticipated [facility] [address] home   Anticipated Discharge: >48 hrs      Hospital Problems  Date Reviewed: 2/22/2022          Codes Class Noted POA    PNA (pneumonia) ICD-10-CM: J18.9  ICD-9-CM: 190  2/26/2022 Unknown                Review of Systems:   A comprehensive review of systems was negative except for that written in the HPI. Vital Signs:    Last 24hrs VS reviewed since prior progress note.  Most recent are:  Visit Vitals  /89 (BP 1 Location: Right upper arm, BP Patient Position: At rest)   Pulse 82   Temp 98.1 °F (36.7 °C)   Resp (!) 32   Ht 5' 10\" (1.778 m)   Wt 61.2 kg (134 lb 14.7 oz)   SpO2 96%   BMI 19.36 kg/m²         Intake/Output Summary (Last 24 hours) at 3/1/2022 1612  Last data filed at 3/1/2022 5619  Gross per 24 hour   Intake 3094 ml   Output 4200 ml   Net -1106 ml        Physical Examination:     I had a face to face encounter with this patient and independently examined them on 3/1/2022 as outlined below:          Constitutional:  No acute distress, cooperative, pleasant    ENT:  Oral mucosa moist, oropharynx benign. Resp:  course breath sounds b/l. No wheezing/rhonchi/rales. No accessory muscle use   CV:  Regular rhythm, normal rate, no murmurs, gallops, rubs    GI:  Soft, non distended, non tender. normoactive bowel sounds, no hepatosplenomegaly     Musculoskeletal:  No edema, warm, 2+ pulses throughout    Neurologic:  Moves all extremities. AAOx3, CN II-XII reviewed            Data Review:    Review and/or order of clinical lab test  Review and/or order of tests in the radiology section of CPT  Review and/or order of tests in the medicine section of CPT      Labs:     Recent Labs     03/01/22  0337 02/28/22  0104   WBC 17.0* 16.4*   HGB 13.5 13.6   HCT 41.7 41.0    187     Recent Labs     03/01/22  0337 02/28/22  1600 02/28/22  0104   * 125* 128*   K 4.7 4.1 4.4    91* 91*   CO2 27 30 34*   BUN 10 11 13   CREA 0.63* 0.64* 0.68*   * 178* 131*   CA 8.8 8.6 9.0     No results for input(s): ALT, AP, TBIL, TBILI, TP, ALB, GLOB, GGT, AML, LPSE in the last 72 hours. No lab exists for component: SGOT, GPT, AMYP, HLPSE  No results for input(s): INR, PTP, APTT, INREXT, INREXT in the last 72 hours. No results for input(s): FE, TIBC, PSAT, FERR in the last 72 hours. Lab Results   Component Value Date/Time    Folate 27.2 (H) 02/26/2022 09:31 AM      No results for input(s): PH, PCO2, PO2 in the last 72 hours. No results for input(s): CPK, CKNDX, TROIQ in the last 72 hours.     No lab exists for component: CPKMB  Lab Results   Component Value Date/Time    Cholesterol, total 181 10/17/2018 10:48 AM    HDL Cholesterol 78 10/17/2018 10:48 AM    LDL, calculated 89 10/17/2018 10:48 AM    Triglyceride 71 10/17/2018 10:48 AM     Lab Results   Component Value Date/Time    Glucose (POC) 132 (H) 07/04/2017 04:37 PM    Glucose, POC 92 02/26/2022 09:23 AM     Lab Results Component Value Date/Time    Color YELLOW/STRAW 02/26/2022 09:41 AM    Appearance CLEAR 02/26/2022 09:41 AM    Specific gravity 1.013 02/26/2022 09:41 AM    pH (UA) 7.0 02/26/2022 09:41 AM    Protein Negative 02/26/2022 09:41 AM    Glucose Negative 02/26/2022 09:41 AM    Ketone TRACE (A) 02/26/2022 09:41 AM    Bilirubin Negative 02/26/2022 09:41 AM    Urobilinogen 1.0 02/26/2022 09:41 AM    Nitrites Negative 02/26/2022 09:41 AM    Leukocyte Esterase Negative 02/26/2022 09:41 AM    Epithelial cells FEW 02/26/2022 09:41 AM    Bacteria Negative 02/26/2022 09:41 AM    WBC 0-4 02/26/2022 09:41 AM    RBC 0-5 02/26/2022 09:41 AM         Medications Reviewed:     Current Facility-Administered Medications   Medication Dose Route Frequency    [START ON 3/2/2022] Vancomycin random with AM labs Wednesday 3/2   Other ONCE    balsam peru-castor oiL (VENELEX) ointment   Topical BID    urea (URE-NA) 15 gram packet 2 Packet  2 Packet Oral DAILY    vancomycin (VANCOCIN) 1,000 mg in 0.9% sodium chloride 250 mL (VIAL-MATE)  1,000 mg IntraVENous Q12H    methylPREDNISolone (PF) (SOLU-MEDROL) injection 40 mg  40 mg IntraVENous Q6H    Vancomycin- Pharmacy to Dose   Other Rx Dosing/Monitoring    piperacillin-tazobactam (ZOSYN) 3.375 g in 0.9% sodium chloride (MBP/ADV) 100 mL MBP  3.375 g IntraVENous Q8H    albuterol-ipratropium (DUO-NEB) 2.5 MG-0.5 MG/3 ML  3 mL Nebulization QID RT    acetylcysteine (MUCOMYST) 200 mg/mL (20 %) solution 400 mg  2 mL Nebulization TID RT    sodium chloride (NS) flush 5-10 mL  5-10 mL IntraVENous PRN    sodium chloride (NS) flush 5-40 mL  5-40 mL IntraVENous Q8H    sodium chloride (NS) flush 5-40 mL  5-40 mL IntraVENous PRN    acetaminophen (TYLENOL) tablet 650 mg  650 mg Oral Q6H PRN    Or    acetaminophen (TYLENOL) suppository 650 mg  650 mg Rectal Q6H PRN    polyethylene glycol (MIRALAX) packet 17 g  17 g Oral DAILY PRN    ondansetron (ZOFRAN ODT) tablet 4 mg  4 mg Oral Q8H PRN    Or    ondansetron (ZOFRAN) injection 4 mg  4 mg IntraVENous Q6H PRN    enoxaparin (LOVENOX) injection 40 mg  40 mg SubCUTAneous DAILY    [Held by provider] amLODIPine (NORVASC) tablet 5 mg  5 mg Oral DAILY    benztropine (COGENTIN) tablet 2 mg  2 mg Oral DAILY    divalproex DR (DEPAKOTE) tablet 500 mg  500 mg Oral DAILY    folic acid (FOLVITE) tablet 1 mg  1 mg Oral DAILY    ergocalciferol capsule 50,000 Units  50,000 Units Oral Q30D    lactulose (CHRONULAC) 10 gram/15 mL solution 5 mL  5 mL Oral DAILY PRN    pantoprazole (PROTONIX) tablet 40 mg  40 mg Oral ACB    [Held by provider] PARoxetine (PAXIL) tablet 20 mg  20 mg Oral DAILY    thiamine HCL (B-1) tablet 100 mg  100 mg Oral DAILY    traZODone (DESYREL) tablet 100 mg  100 mg Oral QHS    albuterol-ipratropium (DUO-NEB) 2.5 MG-0.5 MG/3 ML  3 mL Nebulization Q4H PRN    budesonide (PULMICORT) 500 mcg/2 ml nebulizer suspension  500 mcg Nebulization BID RT    sodium chloride tablet 1 g  1 g Oral DAILY    guaiFENesin ER (MUCINEX) tablet 600 mg  600 mg Oral Q12H    benzonatate (TESSALON) capsule 100 mg  100 mg Oral TID PRN     ______________________________________________________________________  EXPECTED LENGTH OF STAY: 4d 2h  ACTUAL LENGTH OF STAY:          3                 Eusebio Interiano MD

## 2022-03-01 NOTE — PROGRESS NOTES
Pulmonary, Critical Care, and Sleep Medicine~Progress Note    Name: Jennifer Mackay MRN: [id number]   : 1960 Hospital[facility]   Date: 3/1/2022 10:31 AM Admission: 2022     Impression Plan   1. Acute on chronic hypoxic/hypercapnic resp failure  2. COPD, O2 dependent. Followed by Dr Shawna Novak   3. CAP  4. Known Pulm nodule that has been bx; negative  5. Paranoid schizophrenia  1. IV steroids, probably reduce tomorrow   2. Dvt/pud proph  3. On levaquin/ zosyn/ vanc. Stop levaquin. Once MRSA comes back negative can stop vanc   4. Duonebs/mucomyst   5. Chest film pending   6. Will need close follow up with Dr Shawna Novak      Daily Progression:    Feeling better today     I have reviewed the labs and previous days notes. Pertinent items are noted in HPI. OBJECTIVE:     Vital Signs:       Visit Vitals  BP (!) 135/92 (BP 1 Location: Right upper arm, BP Patient Position: Sitting)   Pulse (!) 121   Temp 98.5 °F (36.9 °C)   Resp 28   Ht 5' 10\" (1.778 m)   Wt 61.2 kg (134 lb 14.7 oz)   SpO2 93%   BMI 19.36 kg/m²      Temp (24hrs), Av.8 °F (36.6 °C), Min:97.4 °F (36.3 °C), Max:98.5 °F (36.9 °C)     Intake/Output:     Last shift: 701 - 1900  In: 3878 [P.O.:1074]  Out: 700 [Urine:700]    Last 3 shifts: 1901 -  0700  In: 2020 [P.O.:1920;  I.V.:100]  Out: 7050 [Urine:7050]          Intake/Output Summary (Last 24 hours) at 3/1/2022 1111  Last data filed at 3/1/2022 6379  Gross per 24 hour   Intake 3094 ml   Output 5200 ml   Net -2106 ml       Physical Exam:                                        Exam Findings Other   General: No resp distress noted, appears stated age    HEENT:  No ulcers, JVD not elevated, no cervical LAD    Chest: No pectus deformity, normal chest rise b/l    HEART:  RRR, no murmurs/rubs/gallops    Lungs:  CTA b/l, no rhonchi/crackles/wheeze, diminished BS at bases    ABD: Soft/NT, non rigid mildly distended    EXT: No cyanosis/clubbing/edema, normal peripheral pulses    Skin: No rashes or ulcers, no mottling    Neuro: Alert         Medications:  Current Facility-Administered Medications   Medication Dose Route Frequency    [START ON 3/2/2022] Vancomycin random with AM labs Wednesday 3/2   Other ONCE    urea (URE-NA) 15 gram packet 2 Packet  2 Packet Oral DAILY    vancomycin (VANCOCIN) 1,000 mg in 0.9% sodium chloride 250 mL (VIAL-MATE)  1,000 mg IntraVENous Q12H    methylPREDNISolone (PF) (SOLU-MEDROL) injection 40 mg  40 mg IntraVENous Q6H    Vancomycin- Pharmacy to Dose   Other Rx Dosing/Monitoring    piperacillin-tazobactam (ZOSYN) 3.375 g in 0.9% sodium chloride (MBP/ADV) 100 mL MBP  3.375 g IntraVENous Q8H    albuterol-ipratropium (DUO-NEB) 2.5 MG-0.5 MG/3 ML  3 mL Nebulization QID RT    acetylcysteine (MUCOMYST) 200 mg/mL (20 %) solution 400 mg  2 mL Nebulization TID RT    sodium chloride (NS) flush 5-10 mL  5-10 mL IntraVENous PRN    sodium chloride (NS) flush 5-40 mL  5-40 mL IntraVENous Q8H    sodium chloride (NS) flush 5-40 mL  5-40 mL IntraVENous PRN    acetaminophen (TYLENOL) tablet 650 mg  650 mg Oral Q6H PRN    Or    acetaminophen (TYLENOL) suppository 650 mg  650 mg Rectal Q6H PRN    polyethylene glycol (MIRALAX) packet 17 g  17 g Oral DAILY PRN    ondansetron (ZOFRAN ODT) tablet 4 mg  4 mg Oral Q8H PRN    Or    ondansetron (ZOFRAN) injection 4 mg  4 mg IntraVENous Q6H PRN    enoxaparin (LOVENOX) injection 40 mg  40 mg SubCUTAneous DAILY    [Held by provider] amLODIPine (NORVASC) tablet 5 mg  5 mg Oral DAILY    benztropine (COGENTIN) tablet 2 mg  2 mg Oral DAILY    divalproex DR (DEPAKOTE) tablet 500 mg  500 mg Oral DAILY    folic acid (FOLVITE) tablet 1 mg  1 mg Oral DAILY    ergocalciferol capsule 50,000 Units  50,000 Units Oral Q30D    lactulose (CHRONULAC) 10 gram/15 mL solution 5 mL  5 mL Oral DAILY PRN    pantoprazole (PROTONIX) tablet 40 mg  40 mg Oral ACB  [Held by provider] PARoxetine (PAXIL) tablet 20 mg  20 mg Oral DAILY    thiamine HCL (B-1) tablet 100 mg  100 mg Oral DAILY    traZODone (DESYREL) tablet 100 mg  100 mg Oral QHS    albuterol-ipratropium (DUO-NEB) 2.5 MG-0.5 MG/3 ML  3 mL Nebulization Q4H PRN    levoFLOXacin (LEVAQUIN) 750 mg in D5W IVPB  750 mg IntraVENous Q24H    budesonide (PULMICORT) 500 mcg/2 ml nebulizer suspension  500 mcg Nebulization BID RT    sodium chloride tablet 1 g  1 g Oral DAILY    guaiFENesin ER (MUCINEX) tablet 600 mg  600 mg Oral Q12H    benzonatate (TESSALON) capsule 100 mg  100 mg Oral TID PRN       Labs:  ABG No results for input(s): PHI, PCO2I, PO2I, HCO3I, SO2I, FIO2I in the last 72 hours.      CBC Recent Labs     03/01/22  0337 02/28/22  0104 02/27/22  0339   WBC 17.0* 16.4* 16.4*   HGB 13.5 13.6 13.4   HCT 41.7 41.0 43.0    187 187   MCV 95.0 95.1 99.3*   MCH 30.8 31.6 49.0        Metabolic  Panel Recent Labs     03/01/22  0337 02/28/22  1600 02/28/22  0104   * 125* 128*   K 4.7 4.1 4.4    91* 91*   CO2 27 30 34*   * 178* 131*   BUN 10 11 13   CREA 0.63* 0.64* 0.68*   CA 8.8 8.6 9.0        Pertinent Labs                Aubrey Nieves PA-C  3/1/2022

## 2022-03-02 LAB
ANION GAP SERPL CALC-SCNC: 3 MMOL/L (ref 5–15)
BASOPHILS # BLD: 0 K/UL (ref 0–0.1)
BASOPHILS NFR BLD: 0 % (ref 0–1)
BUN SERPL-MCNC: 15 MG/DL (ref 6–20)
BUN/CREAT SERPL: 28 (ref 12–20)
CALCIUM SERPL-MCNC: 9.5 MG/DL (ref 8.5–10.1)
CHLORIDE SERPL-SCNC: 99 MMOL/L (ref 97–108)
CO2 SERPL-SCNC: 31 MMOL/L (ref 21–32)
CREAT SERPL-MCNC: 0.54 MG/DL (ref 0.7–1.3)
DIFFERENTIAL METHOD BLD: ABNORMAL
EOSINOPHIL # BLD: 0 K/UL (ref 0–0.4)
EOSINOPHIL NFR BLD: 0 % (ref 0–7)
ERYTHROCYTE [DISTWIDTH] IN BLOOD BY AUTOMATED COUNT: 14.6 % (ref 11.5–14.5)
GLUCOSE SERPL-MCNC: 133 MG/DL (ref 65–100)
HCT VFR BLD AUTO: 45.8 % (ref 36.6–50.3)
HGB BLD-MCNC: 14.6 G/DL (ref 12.1–17)
IMM GRANULOCYTES # BLD AUTO: 0.2 K/UL (ref 0–0.04)
IMM GRANULOCYTES NFR BLD AUTO: 1 % (ref 0–0.5)
LYMPHOCYTES # BLD: 0.6 K/UL (ref 0.8–3.5)
LYMPHOCYTES NFR BLD: 4 % (ref 12–49)
MCH RBC QN AUTO: 31.1 PG (ref 26–34)
MCHC RBC AUTO-ENTMCNC: 31.9 G/DL (ref 30–36.5)
MCV RBC AUTO: 97.7 FL (ref 80–99)
MONOCYTES # BLD: 0.6 K/UL (ref 0–1)
MONOCYTES NFR BLD: 4 % (ref 5–13)
NEUTS SEG # BLD: 14 K/UL (ref 1.8–8)
NEUTS SEG NFR BLD: 91 % (ref 32–75)
NRBC # BLD: 0 K/UL (ref 0–0.01)
NRBC BLD-RTO: 0 PER 100 WBC
PLATELET # BLD AUTO: 236 K/UL (ref 150–400)
POTASSIUM SERPL-SCNC: 4.8 MMOL/L (ref 3.5–5.1)
RBC # BLD AUTO: 4.69 M/UL (ref 4.1–5.7)
RBC MORPH BLD: ABNORMAL
RBC MORPH BLD: ABNORMAL
SODIUM SERPL-SCNC: 133 MMOL/L (ref 136–145)
VANCOMYCIN SERPL-MCNC: 11.6 UG/ML
WBC # BLD AUTO: 15.4 K/UL (ref 4.1–11.1)

## 2022-03-02 PROCEDURE — 80048 BASIC METABOLIC PNL TOTAL CA: CPT

## 2022-03-02 PROCEDURE — 74011000250 HC RX REV CODE- 250: Performed by: INTERNAL MEDICINE

## 2022-03-02 PROCEDURE — 74011250636 HC RX REV CODE- 250/636: Performed by: PHYSICIAN ASSISTANT

## 2022-03-02 PROCEDURE — 74011000258 HC RX REV CODE- 258: Performed by: PHYSICIAN ASSISTANT

## 2022-03-02 PROCEDURE — 74011000250 HC RX REV CODE- 250: Performed by: STUDENT IN AN ORGANIZED HEALTH CARE EDUCATION/TRAINING PROGRAM

## 2022-03-02 PROCEDURE — 94640 AIRWAY INHALATION TREATMENT: CPT

## 2022-03-02 PROCEDURE — 85025 COMPLETE CBC W/AUTO DIFF WBC: CPT

## 2022-03-02 PROCEDURE — 74011250636 HC RX REV CODE- 250/636: Performed by: STUDENT IN AN ORGANIZED HEALTH CARE EDUCATION/TRAINING PROGRAM

## 2022-03-02 PROCEDURE — 74011250637 HC RX REV CODE- 250/637: Performed by: STUDENT IN AN ORGANIZED HEALTH CARE EDUCATION/TRAINING PROGRAM

## 2022-03-02 PROCEDURE — 74011000250 HC RX REV CODE- 250: Performed by: PHYSICIAN ASSISTANT

## 2022-03-02 PROCEDURE — 36415 COLL VENOUS BLD VENIPUNCTURE: CPT

## 2022-03-02 PROCEDURE — 74011250637 HC RX REV CODE- 250/637: Performed by: INTERNAL MEDICINE

## 2022-03-02 PROCEDURE — 80202 ASSAY OF VANCOMYCIN: CPT

## 2022-03-02 PROCEDURE — 65660000000 HC RM CCU STEPDOWN

## 2022-03-02 RX ORDER — IPRATROPIUM BROMIDE AND ALBUTEROL SULFATE 2.5; .5 MG/3ML; MG/3ML
3 SOLUTION RESPIRATORY (INHALATION)
Status: DISCONTINUED | OUTPATIENT
Start: 2022-03-02 | End: 2022-03-16 | Stop reason: HOSPADM

## 2022-03-02 RX ORDER — PREDNISONE 20 MG/1
40 TABLET ORAL
Status: DISCONTINUED | OUTPATIENT
Start: 2022-03-03 | End: 2022-03-04

## 2022-03-02 RX ADMIN — PIPERACILLIN AND TAZOBACTAM 3.38 G: 3; .375 INJECTION, POWDER, LYOPHILIZED, FOR SOLUTION INTRAVENOUS at 06:35

## 2022-03-02 RX ADMIN — SODIUM CHLORIDE, PRESERVATIVE FREE 10 ML: 5 INJECTION INTRAVENOUS at 06:34

## 2022-03-02 RX ADMIN — VANCOMYCIN HYDROCHLORIDE 1000 MG: 1 INJECTION, POWDER, LYOPHILIZED, FOR SOLUTION INTRAVENOUS at 04:52

## 2022-03-02 RX ADMIN — TRAZODONE HYDROCHLORIDE 100 MG: 100 TABLET ORAL at 22:13

## 2022-03-02 RX ADMIN — BENZTROPINE MESYLATE 2 MG: 1 TABLET ORAL at 09:03

## 2022-03-02 RX ADMIN — METHYLPREDNISOLONE SODIUM SUCCINATE 40 MG: 40 INJECTION, POWDER, FOR SOLUTION INTRAMUSCULAR; INTRAVENOUS at 12:43

## 2022-03-02 RX ADMIN — Medication 2 PACKET: at 09:04

## 2022-03-02 RX ADMIN — Medication: at 09:03

## 2022-03-02 RX ADMIN — DIVALPROEX SODIUM 500 MG: 500 TABLET, DELAYED RELEASE ORAL at 09:03

## 2022-03-02 RX ADMIN — METHYLPREDNISOLONE SODIUM SUCCINATE 40 MG: 40 INJECTION, POWDER, FOR SOLUTION INTRAMUSCULAR; INTRAVENOUS at 00:19

## 2022-03-02 RX ADMIN — SODIUM CHLORIDE, PRESERVATIVE FREE 10 ML: 5 INJECTION INTRAVENOUS at 22:23

## 2022-03-02 RX ADMIN — Medication: at 17:33

## 2022-03-02 RX ADMIN — ACETYLCYSTEINE 400 MG: 200 SOLUTION ORAL; RESPIRATORY (INHALATION) at 09:00

## 2022-03-02 RX ADMIN — IPRATROPIUM BROMIDE AND ALBUTEROL SULFATE 3 ML: .5; 3 SOLUTION RESPIRATORY (INHALATION) at 19:48

## 2022-03-02 RX ADMIN — METHYLPREDNISOLONE SODIUM SUCCINATE 40 MG: 40 INJECTION, POWDER, FOR SOLUTION INTRAMUSCULAR; INTRAVENOUS at 17:30

## 2022-03-02 RX ADMIN — ENOXAPARIN SODIUM 40 MG: 100 INJECTION SUBCUTANEOUS at 09:03

## 2022-03-02 RX ADMIN — PANTOPRAZOLE SODIUM 40 MG: 40 TABLET, DELAYED RELEASE ORAL at 07:30

## 2022-03-02 RX ADMIN — Medication 100 MG: at 09:03

## 2022-03-02 RX ADMIN — FOLIC ACID 1 MG: 1 TABLET ORAL at 09:03

## 2022-03-02 RX ADMIN — METHYLPREDNISOLONE SODIUM SUCCINATE 40 MG: 40 INJECTION, POWDER, FOR SOLUTION INTRAMUSCULAR; INTRAVENOUS at 06:34

## 2022-03-02 RX ADMIN — BUDESONIDE 500 MCG: 0.5 INHALANT RESPIRATORY (INHALATION) at 19:48

## 2022-03-02 RX ADMIN — BUDESONIDE 500 MCG: 0.5 INHALANT RESPIRATORY (INHALATION) at 08:59

## 2022-03-02 RX ADMIN — GUAIFENESIN 600 MG: 600 TABLET, EXTENDED RELEASE ORAL at 22:13

## 2022-03-02 RX ADMIN — Medication 1 G: at 09:03

## 2022-03-02 RX ADMIN — PIPERACILLIN AND TAZOBACTAM 3.38 G: 3; .375 INJECTION, POWDER, LYOPHILIZED, FOR SOLUTION INTRAVENOUS at 15:38

## 2022-03-02 RX ADMIN — GUAIFENESIN 600 MG: 600 TABLET, EXTENDED RELEASE ORAL at 09:03

## 2022-03-02 RX ADMIN — IPRATROPIUM BROMIDE AND ALBUTEROL SULFATE 3 ML: .5; 3 SOLUTION RESPIRATORY (INHALATION) at 08:59

## 2022-03-02 RX ADMIN — PIPERACILLIN AND TAZOBACTAM 3.38 G: 3; .375 INJECTION, POWDER, LYOPHILIZED, FOR SOLUTION INTRAVENOUS at 22:18

## 2022-03-02 NOTE — PROGRESS NOTES
I have been weaning his O2 down by using his ear lobe for accurate reading. Pt is on 3L right now and saturating 95- 97 for past 1/2 hr.  Will continue monitoring him. 1922 Pt is still on 3L and O2 is 92, I put special earlobe O2 senser so that we can measure accurately. Keo,RN night RN notified.

## 2022-03-02 NOTE — PROGRESS NOTES
Bedside shift change report given to Alfredo Mohan (oncoming nurse) by Christina Hunter (offgoing nurse).  Report included the following information SBAR, Kardex, Recent Results, Med Rec Status and Cardiac Rhythm

## 2022-03-02 NOTE — PROGRESS NOTES
Name: Cuco Scott MRN: [id number]   : 1960 Hospital: [facility] 54   Date: 3/2/2022        IMPRESSION:   · True Hyponatremia. The constellation of his labs are consistent with SIADH. Patient's Na is now 133, normal  · H/O psychogenic polydipsia in the past  · Normal renal function  · Schizophrenia       PLAN:   · Continue oral fluid restriction - 800 ml/day  · Continue Ure Na  · Recheck the BMP in AM.  · Patient is at risk for hyponatremia in the future when he will have access to free water. Subjective/Interval History:   I have reviewed the flowsheet and previous days notes. ROS:Pertinent items are noted in HPI. Denies complaints. States he drinks a lot of water because he likes it. Objective:   Vital Signs:    Visit Vitals  BP (!) 139/94 (BP 1 Location: Right upper arm, BP Patient Position: At rest)   Pulse 75   Temp 98.5 °F (36.9 °C)   Resp 17   Ht 5' 10\" (1.778 m)   Wt 59.7 kg (131 lb 9.6 oz)   SpO2 96%   BMI 18.88 kg/m²       O2 Device: Other (comment) (mid flow)   O2 Flow Rate (L/min): 7 l/min   Temp (24hrs), Av.2 °F (36.8 °C), Min:98.1 °F (36.7 °C), Max:98.5 °F (36.9 °C)       Intake/Output:   Last shift:      No intake/output data recorded. Last 3 shifts:  1901 -  0700  In: 3094 [P.O.:2994; I.V.:100]  Out: 5850 [Urine:5850]    Intake/Output Summary (Last 24 hours) at 3/2/2022 1034  Last data filed at 3/2/2022 0524  Gross per 24 hour   Intake 1200 ml   Output 2450 ml   Net -1250 ml        Physical Exam:  General:    Alert, cooperative, no distress, appears stated age. Thin   Head:   Normocephalic, without obvious abnormality, atraumatic. Eyes:   Conjunctivae/corneas clear. Nose:  Nares normal. No drainage or sinus tenderness. Throat:    Poor dentition  Neck:  Supple, symmetrical,  no adenopathy, thyroid: non tender    no carotid bruit and no JVD. Lungs:   Clear to auscultation bilaterally. No Wheezing or Rhonchi. No rales.   Chest wall:  No tenderness or deformity. No Accessory muscle use. Heart:   Regular rate and rhythm,  no murmur, rub or gallop. Abdomen:   Soft, non-tender. Not distended. Bowel sounds normal.  Extremities: Extremities normal, atraumatic, No cyanosis. No edema. No clubbing  Skin:     Texture, turgor normal. No rashes or lesions. Not Jaundiced  Psych:  Poor insight. Not depressed. Not anxious or agitated. DATA:  Labs:  Recent Labs     03/02/22 0349 03/01/22 0337 02/28/22  1600   * 131* 125*   K 4.8 4.7 4.1   CL 99 100 91*   CO2 31 27 30   BUN 15 10 11   CREA 0.54* 0.63* 0.64*   CA 9.5 8.8 8.6     Recent Labs     03/02/22 0349 03/01/22 0337 02/28/22  0104   WBC 15.4* 17.0* 16.4*   HGB 14.6 13.5 13.6   HCT 45.8 41.7 41.0    245 187     No results for input(s): ZECHARIAH, KU, CLU, CREAU in the last 72 hours.     No lab exists for component: PROU    Total time spent with patient:  35 minutes    [] Critical Care Provided    Care Plan discussed with:   Staff, Medical Team    Radha Samano MD

## 2022-03-02 NOTE — PROGRESS NOTES
Pharmacist Note - Vancomycin Dosing  Therapy day 3  Indication: Pneumonia  Current regimen: 1000 mg every 12 hours    Recent Labs     03/02/22  0349 03/01/22  0337 02/28/22  1600 02/28/22  0104 02/28/22  0104   WBC 15.4* 17.0*  --   --  16.4*   CREA 0.54* 0.63* 0.64*   < > 0.68*   BUN 15 10 11   < > 13    < > = values in this interval not displayed. A random vancomycin level of 11.6 mcg/mL was obtained and from this level, the patient's AUC24 is calculated to be 415 with the current regimen. Goal target range AUC/SUMMER 400-600      Plan: Continue current regimen for now. Recommend stopping vancomycin due to negative MRSA nasal swab. *Random vancomycin levels are used to calculate AUC/SUMMER, this level should not be interpreted as a trough. Vancomycin has been dosed using Bayesian kinetics software to target an AUC24:SUMMER of 400-600, which provides adequate exposure for as assumed infection due to MRSA with an SUMMER of 1 or less while reducing the risk of nephrotoxicity as seen with traditional trough based dosing goals. no

## 2022-03-02 NOTE — PROGRESS NOTES
Pulmonary, Critical Care, and Sleep Medicine~Progress Note    Name: Marely Diaz MRN: [id number]   : 1960 Hospital: [facility]    Date: 3/2/2022 10:31 AM Admission: 2022     Impression Plan   1. Acute on chronic hypoxic/hypercapnic resp failure  2. COPD, O2 dependent. Followed by Dr Sho Gilbert   3. CAP  4. Known Pulm nodule that has been bx; negative  5. Paranoid schizophrenia  1. IV steroids, to PO tomorrow   2. Dvt/pud proph  3.  zosyn/ vanc. MRSA swab negative; can stop vanc   4. Suggest augmentin at discharge to complete abx course   5. Duonebs   6. Will need close follow up with Dr Sho Gilbert      Daily Progression:    No congestion    3/ chest x-ray      IMPRESSION  Improved left lower lobe pneumonia    I have reviewed the labs and previous days notes. Pertinent items are noted in HPI. OBJECTIVE:     Vital Signs:       Visit Vitals  BP (!) 139/94 (BP 1 Location: Right upper arm, BP Patient Position: At rest)   Pulse 75   Temp 98.5 °F (36.9 °C)   Resp 17   Ht 5' 10\" (1.778 m)   Wt 59.7 kg (131 lb 9.6 oz)   SpO2 96%   BMI 18.88 kg/m²      Temp (24hrs), Av.2 °F (36.8 °C), Min:98.1 °F (36.7 °C), Max:98.5 °F (36.9 °C)     Intake/Output:     Last shift: No intake/output data recorded.     Last 3 shifts: 1901 -  0700  In: 3094 [P.O.:2994; I.V.:100]  Out: 5850 [Urine:5850]          Intake/Output Summary (Last 24 hours) at 3/2/2022 1104  Last data filed at 3/2/2022 0524  Gross per 24 hour   Intake 1200 ml   Output 2450 ml   Net -1250 ml       Physical Exam:                                        Exam Findings Other   General: No resp distress noted, appears stated age    HEENT:  No ulcers, JVD not elevated, no cervical LAD    Chest: No pectus deformity, normal chest rise b/l    HEART:  RRR, no murmurs/rubs/gallops    Lungs:  CTA b/l, no rhonchi/crackles/wheeze, diminished BS at bases    ABD: Soft/NT, non rigid mildly distended    EXT: No cyanosis/clubbing/edema, normal peripheral pulses    Skin: No rashes or ulcers, no mottling    Neuro: Alert         Medications:  Current Facility-Administered Medications   Medication Dose Route Frequency    albuterol-ipratropium (DUO-NEB) 2.5 MG-0.5 MG/3 ML  3 mL Nebulization BID RT    Vancomycin random with AM labs Wednesday 3/2   Other ONCE    balsam peru-castor oiL (VENELEX) ointment   Topical BID    urea (URE-NA) 15 gram packet 2 Packet  2 Packet Oral DAILY    vancomycin (VANCOCIN) 1,000 mg in 0.9% sodium chloride 250 mL (VIAL-MATE)  1,000 mg IntraVENous Q12H    methylPREDNISolone (PF) (SOLU-MEDROL) injection 40 mg  40 mg IntraVENous Q6H    Vancomycin- Pharmacy to Dose   Other Rx Dosing/Monitoring    piperacillin-tazobactam (ZOSYN) 3.375 g in 0.9% sodium chloride (MBP/ADV) 100 mL MBP  3.375 g IntraVENous Q8H    acetylcysteine (MUCOMYST) 200 mg/mL (20 %) solution 400 mg  2 mL Nebulization TID RT    sodium chloride (NS) flush 5-10 mL  5-10 mL IntraVENous PRN    sodium chloride (NS) flush 5-40 mL  5-40 mL IntraVENous Q8H    sodium chloride (NS) flush 5-40 mL  5-40 mL IntraVENous PRN    acetaminophen (TYLENOL) tablet 650 mg  650 mg Oral Q6H PRN    Or    acetaminophen (TYLENOL) suppository 650 mg  650 mg Rectal Q6H PRN    polyethylene glycol (MIRALAX) packet 17 g  17 g Oral DAILY PRN    ondansetron (ZOFRAN ODT) tablet 4 mg  4 mg Oral Q8H PRN    Or    ondansetron (ZOFRAN) injection 4 mg  4 mg IntraVENous Q6H PRN    enoxaparin (LOVENOX) injection 40 mg  40 mg SubCUTAneous DAILY    [Held by provider] amLODIPine (NORVASC) tablet 5 mg  5 mg Oral DAILY    benztropine (COGENTIN) tablet 2 mg  2 mg Oral DAILY    divalproex DR (DEPAKOTE) tablet 500 mg  500 mg Oral DAILY    folic acid (FOLVITE) tablet 1 mg  1 mg Oral DAILY    ergocalciferol capsule 50,000 Units  50,000 Units Oral Q30D    lactulose (CHRONULAC) 10 gram/15 mL solution 5 mL  5 mL Oral DAILY PRN    pantoprazole (PROTONIX) tablet 40 mg  40 mg Oral ACB    [Held by provider] PARoxetine (PAXIL) tablet 20 mg  20 mg Oral DAILY    thiamine HCL (B-1) tablet 100 mg  100 mg Oral DAILY    traZODone (DESYREL) tablet 100 mg  100 mg Oral QHS    albuterol-ipratropium (DUO-NEB) 2.5 MG-0.5 MG/3 ML  3 mL Nebulization Q4H PRN    budesonide (PULMICORT) 500 mcg/2 ml nebulizer suspension  500 mcg Nebulization BID RT    sodium chloride tablet 1 g  1 g Oral DAILY    guaiFENesin ER (MUCINEX) tablet 600 mg  600 mg Oral Q12H    benzonatate (TESSALON) capsule 100 mg  100 mg Oral TID PRN       Labs:  ABG No results for input(s): PHI, PCO2I, PO2I, HCO3I, SO2I, FIO2I in the last 72 hours.      CBC Recent Labs     03/02/22  0349 03/01/22  0337 02/28/22  0104   WBC 15.4* 17.0* 16.4*   HGB 14.6 13.5 13.6   HCT 45.8 41.7 41.0    245 187   MCV 97.7 95.0 95.1   MCH 31.1 30.8 61.7        Metabolic  Panel Recent Labs     03/02/22  0349 03/01/22  0337 02/28/22  1600   * 131* 125*   K 4.8 4.7 4.1   CL 99 100 91*   CO2 31 27 30   * 116* 178*   BUN 15 10 11   CREA 0.54* 0.63* 0.64*   CA 9.5 8.8 8.6        Pertinent Labs                Aubrey Velasquez PA-C  3/2/2022

## 2022-03-02 NOTE — PROGRESS NOTES
Bedside shift change report given to Ann-Marie Salmon RN (oncoming nurse) by Tosin Moore RN (offgoing nurse). Report included the following information SBAR, Kardex, Intake/Output and Cardiac Rhythm sinus rhythm.

## 2022-03-02 NOTE — PROGRESS NOTES
[facility]  Hospitalist Group                                                                                          Hospitalist Progress Note  Beata Arango MD  Answering service: [phone] from in house phone        Date of Service:  3/2/2022  NAME:  Chau Aparicio  :  1960  MRN:  [id number]      Admission Summary:   HPI: Omayra Chang is a 57 y.o. male smoker history of schizo affective disorder. Bipolar depression, COPD, asthma emphysema/bronchitis, history of psychogenic polydipsia, resides in a group home, GERD presents with altered mental status. Unable to obtain history from patient as he is altered. Reported confusion and congestive cough from his group home prompted ED arrival.  In the ER patient was placed on 4 L nasal cannula desatted to mid 80s stat ABG showed compensated respiratory acidosis w/ hypoxia, patient was placed on mid flow currently stable 5L midflow sats low 90s. Patient encephalopathic on my exam.  Notable for WBC count of 13, T-max 102.1F, lactic 0.4. BP soft but maps greater than 60.\"    Interval history / Subjective:   Patient seen examined at bedside earlier.  6L mid flow sats low 90s , feels well otherwise     Assessment & Plan:     Acute on chronic hypoxic respiratory failure  COPD Exacerbation   Left lower lobe pneumonia   Chronic tobacco dependence  -patient not septic  -covid neg    -desat to mid 80s on 4L NC > now on midflow 6L, wean as tolerated, baseline 2L 02   -keep on tele   -CTA chest neg for PE confirmed LLL PNA with effusion   -Chest x-ray repeated 3/1 with improvement  -pulm evaluated appreciate recs, patient of Dr. Audra Dickson op, was being follow-up for lung nodule with prior bronc negative for malignancy  -Continue standing and as needed neb , Pulmicort, standing Solu-Medrol 40 q6 > can probably transition to po tomorrow, DC Levaquin 3/ and vanco 3/2 (MRSA neg)> continue with Zosyn IV can probably switch to Augmentin tomorrow   -bld cx ngtd, sputum culture pending , mycoplasma/legionella neg,  pct 0.18  -pulm following, appreciate recs      Acute metabolic encephalopathy -improved   likely Secondary to above  CT head negative, UA neg  -tsh, b12, folate wnl  -depakote level normal   -speech eval, passed soft and easy to chew      Hyponatremia -improved  -132>128 > 131 >133  History of psychogenic polydipsia in the past urine studies appear like possible SIADH  -nephro following, appreciate input, cont to follow bmps   -am cortisol wnl   -800ml fluid restriction, started ure-na 2/28, cont same course   -will be difficult to control and discharge when he has access to free water     Schizoaffective disorder,  Depression subtype   -psych consulted for evaluation , restart depakote, trazodone, cogentin, hold paxil, reconsulted psycg today to ask if we cab use something else given hx of recurrent hyponatremia      GERD  Continue home med     Patient has very poor insight on his clinical condition. Code status: full  DVT prophylaxis: lovenox     PT/OT following - no needs     Care Plan discussed with: Patient/Family and Nurse  Anticipated [facility] [address] home   Anticipated Discharge: >48 hrs      Hospital Problems  Date Reviewed: 2/22/2022          Codes Class Noted POA    PNA (pneumonia) ICD-10-CM: J18.9  ICD-9-CM: 750  2/26/2022 Unknown                Review of Systems:   A comprehensive review of systems was negative except for that written in the HPI. Vital Signs:    Last 24hrs VS reviewed since prior progress note.  Most recent are:  Visit Vitals  /82 (BP 1 Location: Right upper arm, BP Patient Position: At rest)   Pulse 69   Temp 98.1 °F (36.7 °C)   Resp 21   Ht 5' 10\" (1.778 m)   Wt 59.7 kg (131 lb 9.6 oz)   SpO2 99%   BMI 18.88 kg/m²         Intake/Output Summary (Last 24 hours) at 3/2/2022 1641  Last data filed at 3/2/2022 0524  Gross per 24 hour   Intake 1200 ml   Output 1950 ml   Net -750 ml Physical Examination:     I had a face to face encounter with this patient and independently examined them on 3/2/2022 as outlined below:          Constitutional:  No acute distress, cooperative, pleasant    ENT:  Oral mucosa moist, oropharynx benign. Resp:  course breath sounds b/l. No wheezing/rhonchi/rales. No accessory muscle use   CV:  Regular rhythm, normal rate, no murmurs, gallops, rubs    GI:  Soft, non distended, non tender. normoactive bowel sounds, no hepatosplenomegaly     Musculoskeletal:  No edema, warm, 2+ pulses throughout    Neurologic:  Moves all extremities. AAOx3, CN II-XII reviewed            Data Review:    Review and/or order of clinical lab test  Review and/or order of tests in the radiology section of CPT  Review and/or order of tests in the medicine section of CPT      Labs:     Recent Labs     03/02/22 0349 03/01/22 0337   WBC 15.4* 17.0*   HGB 14.6 13.5   HCT 45.8 41.7    245     Recent Labs     03/02/22 0349 03/01/22 0337 02/28/22  1600   * 131* 125*   K 4.8 4.7 4.1   CL 99 100 91*   CO2 31 27 30   BUN 15 10 11   CREA 0.54* 0.63* 0.64*   * 116* 178*   CA 9.5 8.8 8.6     No results for input(s): ALT, AP, TBIL, TBILI, TP, ALB, GLOB, GGT, AML, LPSE in the last 72 hours. No lab exists for component: SGOT, GPT, AMYP, HLPSE  No results for input(s): INR, PTP, APTT, INREXT, INREXT in the last 72 hours. No results for input(s): FE, TIBC, PSAT, FERR in the last 72 hours. Lab Results   Component Value Date/Time    Folate 27.2 (H) 02/26/2022 09:31 AM      No results for input(s): PH, PCO2, PO2 in the last 72 hours. No results for input(s): CPK, CKNDX, TROIQ in the last 72 hours.     No lab exists for component: CPKMB  Lab Results   Component Value Date/Time    Cholesterol, total 181 10/17/2018 10:48 AM    HDL Cholesterol 78 10/17/2018 10:48 AM    LDL, calculated 89 10/17/2018 10:48 AM    Triglyceride 71 10/17/2018 10:48 AM     Lab Results   Component Value Date/Time    Glucose (POC) 132 (H) 07/04/2017 04:37 PM    Glucose, POC 92 02/26/2022 09:23 AM     Lab Results   Component Value Date/Time    Color YELLOW/STRAW 02/26/2022 09:41 AM    Appearance CLEAR 02/26/2022 09:41 AM    Specific gravity 1.013 02/26/2022 09:41 AM    pH (UA) 7.0 02/26/2022 09:41 AM    Protein Negative 02/26/2022 09:41 AM    Glucose Negative 02/26/2022 09:41 AM    Ketone TRACE (A) 02/26/2022 09:41 AM    Bilirubin Negative 02/26/2022 09:41 AM    Urobilinogen 1.0 02/26/2022 09:41 AM    Nitrites Negative 02/26/2022 09:41 AM    Leukocyte Esterase Negative 02/26/2022 09:41 AM    Epithelial cells FEW 02/26/2022 09:41 AM    Bacteria Negative 02/26/2022 09:41 AM    WBC 0-4 02/26/2022 09:41 AM    RBC 0-5 02/26/2022 09:41 AM         Medications Reviewed:     Current Facility-Administered Medications   Medication Dose Route Frequency    albuterol-ipratropium (DUO-NEB) 2.5 MG-0.5 MG/3 ML  3 mL Nebulization BID RT    [START ON 3/3/2022] predniSONE (DELTASONE) tablet 40 mg  40 mg Oral DAILY WITH BREAKFAST    balsam peru-castor oiL (VENELEX) ointment   Topical BID    urea (URE-NA) 15 gram packet 2 Packet  2 Packet Oral DAILY    methylPREDNISolone (PF) (SOLU-MEDROL) injection 40 mg  40 mg IntraVENous Q6H    piperacillin-tazobactam (ZOSYN) 3.375 g in 0.9% sodium chloride (MBP/ADV) 100 mL MBP  3.375 g IntraVENous Q8H    sodium chloride (NS) flush 5-10 mL  5-10 mL IntraVENous PRN    sodium chloride (NS) flush 5-40 mL  5-40 mL IntraVENous Q8H    sodium chloride (NS) flush 5-40 mL  5-40 mL IntraVENous PRN    acetaminophen (TYLENOL) tablet 650 mg  650 mg Oral Q6H PRN    Or    acetaminophen (TYLENOL) suppository 650 mg  650 mg Rectal Q6H PRN    polyethylene glycol (MIRALAX) packet 17 g  17 g Oral DAILY PRN    ondansetron (ZOFRAN ODT) tablet 4 mg  4 mg Oral Q8H PRN    Or    ondansetron (ZOFRAN) injection 4 mg  4 mg IntraVENous Q6H PRN    enoxaparin (LOVENOX) injection 40 mg  40 mg SubCUTAneous DAILY    [Held by provider] amLODIPine (NORVASC) tablet 5 mg  5 mg Oral DAILY    benztropine (COGENTIN) tablet 2 mg  2 mg Oral DAILY    divalproex DR (DEPAKOTE) tablet 500 mg  500 mg Oral DAILY    folic acid (FOLVITE) tablet 1 mg  1 mg Oral DAILY    ergocalciferol capsule 50,000 Units  50,000 Units Oral Q30D    lactulose (CHRONULAC) 10 gram/15 mL solution 5 mL  5 mL Oral DAILY PRN    pantoprazole (PROTONIX) tablet 40 mg  40 mg Oral ACB    [Held by provider] PARoxetine (PAXIL) tablet 20 mg  20 mg Oral DAILY    thiamine HCL (B-1) tablet 100 mg  100 mg Oral DAILY    traZODone (DESYREL) tablet 100 mg  100 mg Oral QHS    albuterol-ipratropium (DUO-NEB) 2.5 MG-0.5 MG/3 ML  3 mL Nebulization Q4H PRN    budesonide (PULMICORT) 500 mcg/2 ml nebulizer suspension  500 mcg Nebulization BID RT    sodium chloride tablet 1 g  1 g Oral DAILY    guaiFENesin ER (MUCINEX) tablet 600 mg  600 mg Oral Q12H    benzonatate (TESSALON) capsule 100 mg  100 mg Oral TID PRN     ______________________________________________________________________  EXPECTED LENGTH OF STAY: 4d 2h  ACTUAL LENGTH OF STAY:          4                 Daniella Anaya MD

## 2022-03-03 LAB
BACTERIA SPEC CULT: NORMAL
BACTERIA SPEC CULT: NORMAL
SERVICE CMNT-IMP: NORMAL
SERVICE CMNT-IMP: NORMAL

## 2022-03-03 PROCEDURE — 74011250636 HC RX REV CODE- 250/636: Performed by: PHYSICIAN ASSISTANT

## 2022-03-03 PROCEDURE — 74011250636 HC RX REV CODE- 250/636: Performed by: STUDENT IN AN ORGANIZED HEALTH CARE EDUCATION/TRAINING PROGRAM

## 2022-03-03 PROCEDURE — 74011000250 HC RX REV CODE- 250: Performed by: INTERNAL MEDICINE

## 2022-03-03 PROCEDURE — 74011636637 HC RX REV CODE- 636/637: Performed by: PHYSICIAN ASSISTANT

## 2022-03-03 PROCEDURE — 74011000250 HC RX REV CODE- 250: Performed by: STUDENT IN AN ORGANIZED HEALTH CARE EDUCATION/TRAINING PROGRAM

## 2022-03-03 PROCEDURE — 74011000258 HC RX REV CODE- 258: Performed by: PHYSICIAN ASSISTANT

## 2022-03-03 PROCEDURE — 74011250637 HC RX REV CODE- 250/637: Performed by: INTERNAL MEDICINE

## 2022-03-03 PROCEDURE — 65660000000 HC RM CCU STEPDOWN

## 2022-03-03 PROCEDURE — 94640 AIRWAY INHALATION TREATMENT: CPT

## 2022-03-03 PROCEDURE — 97530 THERAPEUTIC ACTIVITIES: CPT

## 2022-03-03 PROCEDURE — 74011250637 HC RX REV CODE- 250/637: Performed by: STUDENT IN AN ORGANIZED HEALTH CARE EDUCATION/TRAINING PROGRAM

## 2022-03-03 PROCEDURE — 74011250637 HC RX REV CODE- 250/637: Performed by: NURSE PRACTITIONER

## 2022-03-03 PROCEDURE — 97116 GAIT TRAINING THERAPY: CPT

## 2022-03-03 RX ORDER — MIRTAZAPINE 15 MG/1
15 TABLET, FILM COATED ORAL
Status: DISCONTINUED | OUTPATIENT
Start: 2022-03-03 | End: 2022-03-16 | Stop reason: HOSPADM

## 2022-03-03 RX ADMIN — PIPERACILLIN AND TAZOBACTAM 3.38 G: 3; .375 INJECTION, POWDER, LYOPHILIZED, FOR SOLUTION INTRAVENOUS at 07:54

## 2022-03-03 RX ADMIN — TRAZODONE HYDROCHLORIDE 100 MG: 100 TABLET ORAL at 22:46

## 2022-03-03 RX ADMIN — SODIUM CHLORIDE, PRESERVATIVE FREE 10 ML: 5 INJECTION INTRAVENOUS at 16:55

## 2022-03-03 RX ADMIN — BUDESONIDE 500 MCG: 0.5 INHALANT RESPIRATORY (INHALATION) at 21:20

## 2022-03-03 RX ADMIN — IPRATROPIUM BROMIDE AND ALBUTEROL SULFATE 3 ML: .5; 3 SOLUTION RESPIRATORY (INHALATION) at 09:01

## 2022-03-03 RX ADMIN — Medication 1 G: at 10:25

## 2022-03-03 RX ADMIN — BUDESONIDE 500 MCG: 0.5 INHALANT RESPIRATORY (INHALATION) at 09:01

## 2022-03-03 RX ADMIN — PIPERACILLIN AND TAZOBACTAM 3.38 G: 3; .375 INJECTION, POWDER, LYOPHILIZED, FOR SOLUTION INTRAVENOUS at 16:54

## 2022-03-03 RX ADMIN — PIPERACILLIN AND TAZOBACTAM 3.38 G: 3; .375 INJECTION, POWDER, LYOPHILIZED, FOR SOLUTION INTRAVENOUS at 22:46

## 2022-03-03 RX ADMIN — SODIUM CHLORIDE, PRESERVATIVE FREE 10 ML: 5 INJECTION INTRAVENOUS at 07:56

## 2022-03-03 RX ADMIN — ENOXAPARIN SODIUM 40 MG: 100 INJECTION SUBCUTANEOUS at 10:24

## 2022-03-03 RX ADMIN — BENZTROPINE MESYLATE 2 MG: 1 TABLET ORAL at 10:25

## 2022-03-03 RX ADMIN — FOLIC ACID 1 MG: 1 TABLET ORAL at 10:25

## 2022-03-03 RX ADMIN — DIVALPROEX SODIUM 500 MG: 500 TABLET, DELAYED RELEASE ORAL at 10:25

## 2022-03-03 RX ADMIN — Medication 100 MG: at 10:25

## 2022-03-03 RX ADMIN — MIRTAZAPINE 15 MG: 15 TABLET ORAL at 22:46

## 2022-03-03 RX ADMIN — IPRATROPIUM BROMIDE AND ALBUTEROL SULFATE 3 ML: .5; 3 SOLUTION RESPIRATORY (INHALATION) at 21:20

## 2022-03-03 RX ADMIN — PREDNISONE 40 MG: 20 TABLET ORAL at 10:25

## 2022-03-03 RX ADMIN — GUAIFENESIN 600 MG: 600 TABLET, EXTENDED RELEASE ORAL at 10:25

## 2022-03-03 RX ADMIN — Medication 2 PACKET: at 10:31

## 2022-03-03 RX ADMIN — Medication: at 10:25

## 2022-03-03 RX ADMIN — PANTOPRAZOLE SODIUM 40 MG: 40 TABLET, DELAYED RELEASE ORAL at 07:53

## 2022-03-03 RX ADMIN — GUAIFENESIN 600 MG: 600 TABLET, EXTENDED RELEASE ORAL at 22:46

## 2022-03-03 NOTE — PROGRESS NOTES
Bedside shift change report given to [facility], RN (oncoming nurse) by Terese Staton RN (offgoing nurse). Report included the following information SBAR, Kardex, Intake/Output, MAR and Cardiac Rhythm sinus rhythm.

## 2022-03-03 NOTE — PROGRESS NOTES
[address] [facility] Adult  Hospitalist Group                                                                                          Hospitalist Progress Note  Kaycee Barreto MD  Answering service: [phone] from in house phone        Date of Service:  3/3/2022  NAME:  Kathie Kang  :  1960  MRN:  [id number]      Admission Summary:   HPI: Jeffrey Ortega is a 57 y.o. male smoker history of schizo affective disorder. Bipolar depression, COPD, asthma emphysema/bronchitis, history of psychogenic polydipsia, resides in a group home, GERD presents with altered mental status. Unable to obtain history from patient as he is altered. Reported confusion and congestive cough from his group home prompted ED arrival.  In the ER patient was placed on 4 L nasal cannula desatted to mid 80s stat ABG showed compensated respiratory acidosis w/ hypoxia, patient was placed on mid flow currently stable 5L midflow sats low 90s. Patient encephalopathic on my exam.  Notable for WBC count of 13, T-max 102.1F, lactic 0.4.  BP soft but maps greater than 60.\"    Interval history / Subjective:   Patient seen examined at bedside earlier  3L NC  feels well  NAD     Assessment & Plan:     Acute on chronic hypoxic respiratory failure, improving  Back to baseline 2-3L NC  COPD Exacerbation  Left lower lobe pneumonia   Chronic tobacco dependence  -patient not septic  -covid neg    -CTA chest neg for PE confirmed LLL PNA with effusion   -Chest x-ray repeated 3/1 with improvement  -pulm evaluated appreciate recs, patient of Dr. Nye Cancer op, was being follow-up for lung nodule with prior bronc negative for malignancy  -Continue neb, Pulmicort, steroids  -IV abx switch to PO augmentin at dc to complete course  -f/u with Dr Brina Smalls  -bld cx ng, mycoplasma/legionella neg,  pct 0.18  -pulm following, appreciate recs      Acute metabolic encephalopathy -improved   likely Secondary to above  CT head negative, UA neg  -tsh, b12, folate wnl  -depakote level normal   -speech eval, passed soft and easy to chew      Hyponatremia -improving/stable  History of psychogenic polydipsia in the past urine studies appear like possible SIADH  -nephro following, appreciate input  -am cortisol wnl   -800ml fluid restriction, started ure-na 2/28, cont same course   -will be difficult to control and discharge when he has access to free water  Ok to Dc SSRI per psych     Schizoaffective disorder,  Depression subtype   -psych consulted for evaluation, cont depakote, trazodone, cogentin; remeron started    GERD  Continue home med     Patient has very poor insight on his clinical condition    Code status: full  DVT prophylaxis: lovenox     PT/OT following - no needs     Care Plan discussed with: Patient/Family, Nurse and   Anticipated [facility] awaiting placement  Anticipated Discharge: <24h      Hospital Problems  Date Reviewed: 2/22/2022          Codes Class Noted POA    PNA (pneumonia) ICD-10-CM: J18.9  ICD-9-CM: 877  2/26/2022 Unknown                Review of Systems:   A comprehensive review of systems was negative except for that written in the HPI. Vital Signs:    Last 24hrs VS reviewed since prior progress note. Most recent are:  Visit Vitals  /87   Pulse 84   Temp 98 °F (36.7 °C)   Resp 22   Ht 5' 10\" (1.778 m)   Wt 58.3 kg (128 lb 8 oz)   SpO2 94%   BMI 18.44 kg/m²         Intake/Output Summary (Last 24 hours) at 3/3/2022 1309  Last data filed at 3/3/2022 0546  Gross per 24 hour   Intake 720 ml   Output 2850 ml   Net -2130 ml        Physical Examination:     I had a face to face encounter with this patient and independently examined them on 3/3/2022 as outlined below:          Constitutional:  No acute distress, cooperative, pleasant    ENT:  Oral mucosa moist, oropharynx benign   Resp:  course breath sounds b/l. No wheezing/rhonchi/rales.  No accessory muscle use   CV:  Regular rhythm, normal rate, no murmurs GI:  Soft, non distended, non tender    Musculoskeletal:  No edema, warm    Neurologic:  Moves all extremities. AAOx3, CN II-XII reviewed            Data Review:    Review and/or order of clinical lab test  Review and/or order of tests in the radiology section of CPT  Review and/or order of tests in the medicine section of [facility]      Labs:     Recent Labs     03/02/22 0349 03/01/22 0337   WBC 15.4* 17.0*   HGB 14.6 13.5   HCT 45.8 41.7    245     Recent Labs     03/02/22 0349 03/01/22 0337 02/28/22  1600   * 131* 125*   K 4.8 4.7 4.1   CL 99 100 91*   CO2 31 27 30   BUN 15 10 11   CREA 0.54* 0.63* 0.64*   * 116* 178*   CA 9.5 8.8 8.6     No results for input(s): ALT, AP, TBIL, TBILI, TP, ALB, GLOB, GGT, AML, LPSE in the last 72 hours. No lab exists for component: SGOT, GPT, AMYP, HLPSE  No results for input(s): INR, PTP, APTT, INREXT, INREXT in the last 72 hours. No results for input(s): FE, TIBC, PSAT, FERR in the last 72 hours. Lab Results   Component Value Date/Time    Folate 27.2 (H) 02/26/2022 09:31 AM      No results for input(s): PH, PCO2, PO2 in the last 72 hours. No results for input(s): CPK, CKNDX, TROIQ in the last 72 hours.     No lab exists for component: CPKMB  Lab Results   Component Value Date/Time    Cholesterol, total 181 10/17/2018 10:48 AM    HDL Cholesterol 78 10/17/2018 10:48 AM    LDL, calculated 89 10/17/2018 10:48 AM    Triglyceride 71 10/17/2018 10:48 AM     Lab Results   Component Value Date/Time    Glucose (POC) 132 (H) 07/04/2017 04:37 PM    Glucose, POC 92 02/26/2022 09:23 AM     Lab Results   Component Value Date/Time    Color YELLOW/STRAW 02/26/2022 09:41 AM    Appearance CLEAR 02/26/2022 09:41 AM    Specific gravity 1.013 02/26/2022 09:41 AM    pH (UA) 7.0 02/26/2022 09:41 AM    Protein Negative 02/26/2022 09:41 AM    Glucose Negative 02/26/2022 09:41 AM    Ketone TRACE (A) 02/26/2022 09:41 AM    Bilirubin Negative 02/26/2022 09:41 AM    Urobilinogen 1.0 02/26/2022 09:41 AM    Nitrites Negative 02/26/2022 09:41 AM    Leukocyte Esterase Negative 02/26/2022 09:41 AM    Epithelial cells FEW 02/26/2022 09:41 AM    Bacteria Negative 02/26/2022 09:41 AM    WBC 0-4 02/26/2022 09:41 AM    RBC 0-5 02/26/2022 09:41 AM         Medications Reviewed:     Current Facility-Administered Medications   Medication Dose Route Frequency    mirtazapine (REMERON) tablet 15 mg  15 mg Oral QHS    albuterol-ipratropium (DUO-NEB) 2.5 MG-0.5 MG/3 ML  3 mL Nebulization BID RT    predniSONE (DELTASONE) tablet 40 mg  40 mg Oral DAILY WITH BREAKFAST    balsam peru-castor oiL (VENELEX) ointment   Topical BID    urea (URE-NA) 15 gram packet 2 Packet  2 Packet Oral DAILY    piperacillin-tazobactam (ZOSYN) 3.375 g in 0.9% sodium chloride (MBP/ADV) 100 mL MBP  3.375 g IntraVENous Q8H    sodium chloride (NS) flush 5-10 mL  5-10 mL IntraVENous PRN    sodium chloride (NS) flush 5-40 mL  5-40 mL IntraVENous Q8H    sodium chloride (NS) flush 5-40 mL  5-40 mL IntraVENous PRN    acetaminophen (TYLENOL) tablet 650 mg  650 mg Oral Q6H PRN    Or    acetaminophen (TYLENOL) suppository 650 mg  650 mg Rectal Q6H PRN    polyethylene glycol (MIRALAX) packet 17 g  17 g Oral DAILY PRN    ondansetron (ZOFRAN ODT) tablet 4 mg  4 mg Oral Q8H PRN    Or    ondansetron (ZOFRAN) injection 4 mg  4 mg IntraVENous Q6H PRN    enoxaparin (LOVENOX) injection 40 mg  40 mg SubCUTAneous DAILY    [Held by provider] amLODIPine (NORVASC) tablet 5 mg  5 mg Oral DAILY    benztropine (COGENTIN) tablet 2 mg  2 mg Oral DAILY    divalproex DR (DEPAKOTE) tablet 500 mg  500 mg Oral DAILY    folic acid (FOLVITE) tablet 1 mg  1 mg Oral DAILY    ergocalciferol capsule 50,000 Units  50,000 Units Oral Q30D    lactulose (CHRONULAC) 10 gram/15 mL solution 5 mL  5 mL Oral DAILY PRN    pantoprazole (PROTONIX) tablet 40 mg  40 mg Oral ACB    thiamine HCL (B-1) tablet 100 mg  100 mg Oral DAILY    traZODone (DESYREL) tablet 100 mg 100 mg Oral QHS    albuterol-ipratropium (DUO-NEB) 2.5 MG-0.5 MG/3 ML  3 mL Nebulization Q4H PRN    budesonide (PULMICORT) 500 mcg/2 ml nebulizer suspension  500 mcg Nebulization BID RT    sodium chloride tablet 1 g  1 g Oral DAILY    guaiFENesin ER (MUCINEX) tablet 600 mg  600 mg Oral Q12H    benzonatate (TESSALON) capsule 100 mg  100 mg Oral TID PRN     ______________________________________________________________________  EXPECTED LENGTH OF STAY: 4d 2h  ACTUAL LENGTH OF STAY:          5                 Carlo Elizabeth MD

## 2022-03-03 NOTE — PROGRESS NOTES
Bedside shift change report given to Keo (oncoming nurse) by APOLONIA Pate (offgoing nurse).  Report included the following information SBAR, Kardex, Recent Results, Med Rec Status and Cardiac Rhythm

## 2022-03-03 NOTE — PROGRESS NOTES
Problem: Mobility Impaired (Adult and Pediatric)  Goal: *Acute Goals and Plan of Care (Insert Text)  Description: FUNCTIONAL STATUS PRIOR TO ADMISSION: Patient was independent and active without use of DME.    HOME SUPPORT PRIOR TO ADMISSION: The patient lived in a group home with staff to assist with IADLs. Physical Therapy Goals  Initiated 2/27/2022  1. Patient will move from supine to sit and sit to supine  in bed with independence within 7 day(s). 2.  Patient will transfer from bed to chair and chair to bed with independence using the least restrictive device within 7 day(s). 3.  Patient will perform sit to stand with independence within 7 day(s). 4.  Patient will ambulate with independence for 150 feet with the least restrictive device within 7 day(s). 5.  Patient will ascend/descend 4 stairs with 1 handrail(s) with modified independence within 7 day(s). Outcome: Progressing Towards Goal       PHYSICAL THERAPY TREATMENT  Patient: Nahun Cannon (89 y.o. male)  Date: 3/3/2022  Diagnosis: PNA (pneumonia) [J18.9] <principal problem not specified>       Precautions:    Chart, physical therapy assessment, plan of care and goals were reviewed. ASSESSMENT  Patient continues with skilled PT services and is progressing towards goals. Pt was agreeable to therapy. Pt was able to increase gait tolerance. Pt had slight LOB but able to self recover. Pt had no c/o SOB. -93 bpm SpO2 83%on 3 L O2. Questionable accuracy due to pt not expressing SOB and and is able to talk with no SOB. Placed on oximeter on ear eventually pt SpO2 94% on 3 L O2. Current Level of Function Impacting Discharge (mobility/balance): CGA    Other factors to consider for discharge: group home, Spo2, oxygen needs         PLAN :  Patient continues to benefit from skilled intervention to address the above impairments. Continue treatment per established plan of care. to address goals.     Recommendation for discharge: (in order for the patient to meet his/her long term goals)  Therapy 3 hours per day 5-7 days per week to optimize mobility, balance and independence, if unable then HHPT at group home    This discharge recommendation:  Has not yet been discussed the attending provider and/or case management    IF patient discharges home will need the following DME: none       SUBJECTIVE:   Patient stated I haven't walked  .     OBJECTIVE DATA SUMMARY:   Critical Behavior:  Neurologic State: Alert  Orientation Level: Oriented X4  Cognition: Follows commands  Safety/Judgement: Decreased awareness of environment,Decreased awareness of need for assistance,Decreased awareness of need for safety,Decreased insight into deficits  Functional Mobility Training:  Bed Mobility:     Supine to Sit: Supervision              Transfers:  Sit to Stand: Stand-by assistance  Stand to Sit: Stand-by assistance                             Balance:  Standing: Impaired  Standing - Static: Good  Standing - Dynamic : Fair  Ambulation/Gait Training:  Distance (ft): 150 Feet (ft)     Ambulation - Level of Assistance: Contact guard assistance        Gait Abnormalities: Decreased step clearance              Speed/Sabina: Slow  Step Length: Left shortened;Right shortened          Stairs: Therapeutic Exercises:     Pain Rating:  none    Activity Tolerance:   desaturates with exertion and requires oxygen    After treatment patient left in no apparent distress:   Sitting in chair, Call bell within reach, and Bed / chair alarm activated    COMMUNICATION/COLLABORATION:   The patients plan of care was discussed with: Registered nurse.      Kalee Maguire PTA   Time Calculation: 25 mins

## 2022-03-03 NOTE — PROGRESS NOTES
Pulmonary, Critical Care, and Sleep Medicine~Progress Note    Name: Sue Marion MRN: [id number]   : 1960 Hospital: [address]   Date: 3/3/2022 10:31 AM Admission: 2022     Impression Plan   1. Acute on chronic hypoxic/hypercapnic resp failure  2. COPD, O2 dependent. Followed by Dr Reggie Garcia   3. CAP  4. Known Pulm nodule that has been bx; negative  5. Paranoid schizophrenia  1. PO steroids   2. Dvt/pud proph  3.  zosyn; Suggest augmentin at discharge to complete abx course   4. Seems to benefit better with with nebulized medications  5. Will need close follow up with Dr Reggie Garcia   6. We will be available again to see if needed      Daily Progression:    Doing well     3/1 chest x-ray      IMPRESSION  Improved left lower lobe pneumonia    I have reviewed the labs and previous days notes. Pertinent items are noted in HPI. OBJECTIVE:     Vital Signs:       Visit Vitals  /87   Pulse 79   Temp 98 °F (36.7 °C)   Resp 22   Ht 5' 10\" (1.778 m)   Wt 58.3 kg (128 lb 8 oz)   SpO2 94%   BMI 18.44 kg/m²      Temp (24hrs), Av °F (36.7 °C), Min:97.8 °F (36.6 °C), Max:98.2 °F (36.8 °C)     Intake/Output:     Last shift: No intake/output data recorded.     Last 3 shifts:  1901 -  0700  In: 1920 [P.O.:1920]  Out: 4800 [Urine:4800]          Intake/Output Summary (Last 24 hours) at 3/3/2022 1216  Last data filed at 3/3/2022 0546  Gross per 24 hour   Intake 720 ml   Output 2850 ml   Net -2130 ml       Physical Exam:                                        Exam Findings Other   General: No resp distress noted, appears stated age    HEENT:  No ulcers, JVD not elevated, no cervical LAD    Chest: No pectus deformity, normal chest rise b/l    HEART:  RRR, no murmurs/rubs/gallops    Lungs:  CTA b/l, no rhonchi/crackles/wheeze, diminished BS at bases    ABD: Soft/NT, non rigid mildly distended    EXT: No cyanosis/clubbing/edema, normal peripheral pulses    Skin: No rashes or ulcers, no mottling    Neuro: Alert         Medications:  Current Facility-Administered Medications   Medication Dose Route Frequency    mirtazapine (REMERON) tablet 15 mg  15 mg Oral QHS    albuterol-ipratropium (DUO-NEB) 2.5 MG-0.5 MG/3 ML  3 mL Nebulization BID RT    predniSONE (DELTASONE) tablet 40 mg  40 mg Oral DAILY WITH BREAKFAST    balsam peru-castor oiL (VENELEX) ointment   Topical BID    urea (URE-NA) 15 gram packet 2 Packet  2 Packet Oral DAILY    piperacillin-tazobactam (ZOSYN) 3.375 g in 0.9% sodium chloride (MBP/ADV) 100 mL MBP  3.375 g IntraVENous Q8H    sodium chloride (NS) flush 5-10 mL  5-10 mL IntraVENous PRN    sodium chloride (NS) flush 5-40 mL  5-40 mL IntraVENous Q8H    sodium chloride (NS) flush 5-40 mL  5-40 mL IntraVENous PRN    acetaminophen (TYLENOL) tablet 650 mg  650 mg Oral Q6H PRN    Or    acetaminophen (TYLENOL) suppository 650 mg  650 mg Rectal Q6H PRN    polyethylene glycol (MIRALAX) packet 17 g  17 g Oral DAILY PRN    ondansetron (ZOFRAN ODT) tablet 4 mg  4 mg Oral Q8H PRN    Or    ondansetron (ZOFRAN) injection 4 mg  4 mg IntraVENous Q6H PRN    enoxaparin (LOVENOX) injection 40 mg  40 mg SubCUTAneous DAILY    [Held by provider] amLODIPine (NORVASC) tablet 5 mg  5 mg Oral DAILY    benztropine (COGENTIN) tablet 2 mg  2 mg Oral DAILY    divalproex DR (DEPAKOTE) tablet 500 mg  500 mg Oral DAILY    folic acid (FOLVITE) tablet 1 mg  1 mg Oral DAILY    ergocalciferol capsule 50,000 Units  50,000 Units Oral Q30D    lactulose (CHRONULAC) 10 gram/15 mL solution 5 mL  5 mL Oral DAILY PRN    pantoprazole (PROTONIX) tablet 40 mg  40 mg Oral ACB    thiamine HCL (B-1) tablet 100 mg  100 mg Oral DAILY    traZODone (DESYREL) tablet 100 mg  100 mg Oral QHS    albuterol-ipratropium (DUO-NEB) 2.5 MG-0.5 MG/3 ML  3 mL Nebulization Q4H PRN    budesonide (PULMICORT) 500 mcg/2 ml nebulizer suspension  500 mcg Nebulization BID RT    sodium chloride tablet 1 g  1 g Oral DAILY    guaiFENesin ER (MUCINEX) tablet 600 mg  600 mg Oral Q12H    benzonatate (TESSALON) capsule 100 mg  100 mg Oral TID PRN       Labs:  ABG No results for input(s): PHI, PCO2I, PO2I, HCO3I, SO2I, FIO2I in the last 72 hours.      CBC Recent Labs     03/02/22  0349 03/01/22 0337   WBC 15.4* 17.0*   HGB 14.6 13.5   HCT 45.8 41.7    245   MCV 97.7 95.0   MCH 31.1 26.7        Metabolic  Panel Recent Labs     03/02/22  0349 03/01/22  0337 02/28/22  1600   * 131* 125*   K 4.8 4.7 4.1   CL 99 100 91*   CO2 31 27 30   * 116* 178*   BUN 15 10 11   CREA 0.54* 0.63* 0.64*   CA 9.5 8.8 8.6        Pertinent Labs                Aubrey Adam PA-C  3/3/2022

## 2022-03-04 LAB
ANION GAP SERPL CALC-SCNC: 3 MMOL/L (ref 5–15)
BASOPHILS # BLD: 0 K/UL (ref 0–0.1)
BASOPHILS NFR BLD: 0 % (ref 0–1)
BUN SERPL-MCNC: 18 MG/DL (ref 6–20)
BUN/CREAT SERPL: 32 (ref 12–20)
CALCIUM SERPL-MCNC: 8.8 MG/DL (ref 8.5–10.1)
CHLORIDE SERPL-SCNC: 99 MMOL/L (ref 97–108)
CO2 SERPL-SCNC: 34 MMOL/L (ref 21–32)
CREAT SERPL-MCNC: 0.57 MG/DL (ref 0.7–1.3)
DIFFERENTIAL METHOD BLD: ABNORMAL
EOSINOPHIL # BLD: 0.1 K/UL (ref 0–0.4)
EOSINOPHIL NFR BLD: 1 % (ref 0–7)
ERYTHROCYTE [DISTWIDTH] IN BLOOD BY AUTOMATED COUNT: 14.6 % (ref 11.5–14.5)
GLUCOSE SERPL-MCNC: 109 MG/DL (ref 65–100)
HCT VFR BLD AUTO: 41.5 % (ref 36.6–50.3)
HGB BLD-MCNC: 13.7 G/DL (ref 12.1–17)
IMM GRANULOCYTES # BLD AUTO: 0.1 K/UL (ref 0–0.04)
IMM GRANULOCYTES NFR BLD AUTO: 1 % (ref 0–0.5)
LYMPHOCYTES # BLD: 2.3 K/UL (ref 0.8–3.5)
LYMPHOCYTES NFR BLD: 18 % (ref 12–49)
MCH RBC QN AUTO: 31 PG (ref 26–34)
MCHC RBC AUTO-ENTMCNC: 33 G/DL (ref 30–36.5)
MCV RBC AUTO: 93.9 FL (ref 80–99)
MONOCYTES # BLD: 1.4 K/UL (ref 0–1)
MONOCYTES NFR BLD: 11 % (ref 5–13)
NEUTS SEG # BLD: 9 K/UL (ref 1.8–8)
NEUTS SEG NFR BLD: 69 % (ref 32–75)
NRBC # BLD: 0 K/UL (ref 0–0.01)
NRBC BLD-RTO: 0 PER 100 WBC
PLATELET # BLD AUTO: 277 K/UL (ref 150–400)
PMV BLD AUTO: 9.2 FL (ref 8.9–12.9)
POTASSIUM SERPL-SCNC: 3.8 MMOL/L (ref 3.5–5.1)
RBC # BLD AUTO: 4.42 M/UL (ref 4.1–5.7)
SODIUM SERPL-SCNC: 136 MMOL/L (ref 136–145)
WBC # BLD AUTO: 12.9 K/UL (ref 4.1–11.1)

## 2022-03-04 PROCEDURE — 74011000250 HC RX REV CODE- 250: Performed by: INTERNAL MEDICINE

## 2022-03-04 PROCEDURE — 85025 COMPLETE CBC W/AUTO DIFF WBC: CPT

## 2022-03-04 PROCEDURE — 65660000000 HC RM CCU STEPDOWN

## 2022-03-04 PROCEDURE — 74011250636 HC RX REV CODE- 250/636: Performed by: PHYSICIAN ASSISTANT

## 2022-03-04 PROCEDURE — 74011250636 HC RX REV CODE- 250/636: Performed by: INTERNAL MEDICINE

## 2022-03-04 PROCEDURE — 74011250637 HC RX REV CODE- 250/637: Performed by: STUDENT IN AN ORGANIZED HEALTH CARE EDUCATION/TRAINING PROGRAM

## 2022-03-04 PROCEDURE — 94640 AIRWAY INHALATION TREATMENT: CPT

## 2022-03-04 PROCEDURE — 80048 BASIC METABOLIC PNL TOTAL CA: CPT

## 2022-03-04 PROCEDURE — 74011000258 HC RX REV CODE- 258: Performed by: PHYSICIAN ASSISTANT

## 2022-03-04 PROCEDURE — 94760 N-INVAS EAR/PLS OXIMETRY 1: CPT

## 2022-03-04 PROCEDURE — 74011250636 HC RX REV CODE- 250/636: Performed by: STUDENT IN AN ORGANIZED HEALTH CARE EDUCATION/TRAINING PROGRAM

## 2022-03-04 PROCEDURE — 74011000250 HC RX REV CODE- 250: Performed by: STUDENT IN AN ORGANIZED HEALTH CARE EDUCATION/TRAINING PROGRAM

## 2022-03-04 PROCEDURE — 74011250637 HC RX REV CODE- 250/637: Performed by: NURSE PRACTITIONER

## 2022-03-04 PROCEDURE — 74011250637 HC RX REV CODE- 250/637: Performed by: INTERNAL MEDICINE

## 2022-03-04 PROCEDURE — 74011636637 HC RX REV CODE- 636/637: Performed by: PHYSICIAN ASSISTANT

## 2022-03-04 PROCEDURE — 36415 COLL VENOUS BLD VENIPUNCTURE: CPT

## 2022-03-04 PROCEDURE — 74011000258 HC RX REV CODE- 258: Performed by: INTERNAL MEDICINE

## 2022-03-04 RX ADMIN — Medication 1 G: at 08:16

## 2022-03-04 RX ADMIN — GUAIFENESIN 600 MG: 600 TABLET, EXTENDED RELEASE ORAL at 21:38

## 2022-03-04 RX ADMIN — Medication 2 PACKET: at 08:17

## 2022-03-04 RX ADMIN — DIVALPROEX SODIUM 500 MG: 500 TABLET, DELAYED RELEASE ORAL at 08:16

## 2022-03-04 RX ADMIN — SODIUM CHLORIDE, PRESERVATIVE FREE 10 ML: 5 INJECTION INTRAVENOUS at 06:00

## 2022-03-04 RX ADMIN — TRAZODONE HYDROCHLORIDE 100 MG: 100 TABLET ORAL at 21:38

## 2022-03-04 RX ADMIN — SODIUM CHLORIDE, PRESERVATIVE FREE 10 ML: 5 INJECTION INTRAVENOUS at 21:39

## 2022-03-04 RX ADMIN — Medication 100 MG: at 08:16

## 2022-03-04 RX ADMIN — PANTOPRAZOLE SODIUM 40 MG: 40 TABLET, DELAYED RELEASE ORAL at 06:36

## 2022-03-04 RX ADMIN — ENOXAPARIN SODIUM 40 MG: 100 INJECTION SUBCUTANEOUS at 08:17

## 2022-03-04 RX ADMIN — BUDESONIDE 500 MCG: 0.5 INHALANT RESPIRATORY (INHALATION) at 08:09

## 2022-03-04 RX ADMIN — BUDESONIDE 500 MCG: 0.5 INHALANT RESPIRATORY (INHALATION) at 19:46

## 2022-03-04 RX ADMIN — BENZTROPINE MESYLATE 2 MG: 1 TABLET ORAL at 08:16

## 2022-03-04 RX ADMIN — FOLIC ACID 1 MG: 1 TABLET ORAL at 08:16

## 2022-03-04 RX ADMIN — IPRATROPIUM BROMIDE AND ALBUTEROL SULFATE 3 ML: .5; 3 SOLUTION RESPIRATORY (INHALATION) at 19:45

## 2022-03-04 RX ADMIN — PIPERACILLIN AND TAZOBACTAM 3.38 G: 3; .375 INJECTION, POWDER, LYOPHILIZED, FOR SOLUTION INTRAVENOUS at 06:36

## 2022-03-04 RX ADMIN — PIPERACILLIN AND TAZOBACTAM 3.38 G: 3; .375 INJECTION, POWDER, LYOPHILIZED, FOR SOLUTION INTRAVENOUS at 14:28

## 2022-03-04 RX ADMIN — MIRTAZAPINE 15 MG: 15 TABLET ORAL at 21:38

## 2022-03-04 RX ADMIN — Medication: at 08:17

## 2022-03-04 RX ADMIN — IPRATROPIUM BROMIDE AND ALBUTEROL SULFATE 3 ML: .5; 3 SOLUTION RESPIRATORY (INHALATION) at 08:09

## 2022-03-04 RX ADMIN — Medication: at 18:22

## 2022-03-04 RX ADMIN — PREDNISONE 40 MG: 20 TABLET ORAL at 08:16

## 2022-03-04 RX ADMIN — GUAIFENESIN 600 MG: 600 TABLET, EXTENDED RELEASE ORAL at 08:16

## 2022-03-04 RX ADMIN — SODIUM CHLORIDE, PRESERVATIVE FREE 10 ML: 5 INJECTION INTRAVENOUS at 14:28

## 2022-03-04 NOTE — CONSULTS
PSYCHIATRY CONSULT NOTE:    REASON FOR CONSULT: medication recommendations    INTERVAL HISTORY:  3/3/2022: Gertrude Cho states hes doing OK. He shares that he has not been sleeping well. Hes feeling somewhat depressed from his medical issues. He is calm and pleasant during the interview. He shares that he has been on Paxil for a long time, and he also states that he had a problem with hyponatremia in the past. Paxil has been held. He denies suicidal ideation or homicidal ideation, or auditory-visual hallucinations. He does not appear internally preoccupied. He has been compliant with his medications, no adverse effects are noted. HISTORY OF PRESENTING COMPLAINT:  Gertrude Cho is a 57 y.o. WHITE/NON- male who is currently admitted to the medical floor at [facility] with AMS and hyponatremia. Mr. Harshil Morrow was seen yesterday in the ED and was non-verbal and non-responsive, but today he is much better. He was in good spirits, calm and cooperative during assessment. He stated his mood is \"pretty good. \" He was oriented to person only: he believed he was in \"some kind of play house,\" stated today's date was August 27, 1960 (his birthday), and was unaware of events. He doesn't have a memory of the events that led to his hospitalization but stated \"I think it's because I drank too much water. \" He denies SI/plan/intent or HI/plan/intent. He endorses auditory hallucinations but states \"they don't tell me anything, but I hear them all the time. \"     PAST PSYCHIATRIC HISTORY:   Mr. Harshil Morrow has a psychiatric history significant for schizoaffective disorder, bipolar type, and intellectual disability per chart review. He lives at [facility]. PAST MEDICAL HISTORY:  Please see H&P for details.      Past Medical History:   Diagnosis Date    Asthma     seldom,use inhaler    Bronchitis     Chronic obstructive pulmonary disease ([address]     Depression     anxiety    Psychiatric disorder     paranoid schizophrenia    Psychiatric disorder     extrapyramidal disease    Psychotic disorder ([facility] [address]     mental retardation     Prior to Admission medications    Medication Sig Start Date End Date Taking? Authorizing Provider   QUEtiapine (SEROqueL) 25 mg tablet Take 50 mg by mouth nightly. Yes Provider, Historical   traZODone (DESYREL) 100 mg tablet Take 1 Tablet by mouth nightly. 11/3/21  Yes Candida Herrera DO   budesonide-formoteroL (SYMBICORT) 160-4.5 mcg/actuation HFAA Take 2 Puffs by inhalation two (2) times a day. 11/2/21  Yes Tom Kaminski DO   amLODIPine (NORVASC) 5 mg tablet Take 1 Tablet by mouth daily. 8/26/21  Yes Candida Herrera DO   ergocalciferol (ERGOCALCIFEROL) 1,250 mcg (50,000 unit) capsule Take 1 Capsule by mouth every thirty (30) days. 8/26/21  Yes Tom Kaminski DO   folic acid (FOLVITE) 1 mg tablet Take 1 Tablet by mouth daily. 8/26/21  Yes Nina Kaminski DO   omeprazole (PRILOSEC) 40 mg capsule Take 1 Capsule by mouth daily. 8/26/21  Yes Tom Kaminski DO   PARoxetine (PAXIL) 20 mg tablet Take 1 Tablet by mouth daily. 8/26/21  Yes Tom Kaminski DO   risperiDONE (RisperDAL) 3 mg tablet Take 1 Tab by mouth daily. 3/8/21  Yes Candida Herrera DO   thiamine HCL (B-1) 100 mg tablet TAKE ONE TABLET BY MOUTH EVERY DAY 3/5/21  Yes Evi Wilson NP   [facility] 10 gram/15 mL solution Take 5 mL by mouth daily as needed. 1/8/19  Yes Tom Kaminski DO   benztropine (COGENTIN) 2 mg tablet Take 1 Tab by mouth daily. 6/28/18  Yes Abbie Billy NP   divalproex DR (DEPAKOTE) 500 mg tablet  2/3/22   Provider, Historical   Spiriva with HandiHaler 18 mcg inhalation capsule  2/3/22   Provider, Historical   albuterol-ipratropium (DUO-NEB) 2.5 mg-0.5 mg/3 ml nebu INHALE 1 VIAL VIA NEBULIZER EVERY 4 HOURS AS NEEDED 10/17/19   Kinga Castanon, NP   sodium chloride 1 gram tablet Take 1 Tab by mouth daily.  2/21/19   Candida Herrera DO     Vitals:    03/03/22 1200 03/03/22 1400 03/03/22 1546 03/03/22 1800   BP:   (!) 142/95    Pulse: 84 87 84 79   Resp:   21    Temp:   98.5 °F (36.9 °C)    SpO2:   92%    Weight:       Height:         Lab Results   Component Value Date/Time    WBC 15.4 (H) 03/02/2022 03:49 AM    WBC 6.5 06/19/2012 07:16 AM    HGB 14.6 03/02/2022 03:49 AM    HCT 45.8 03/02/2022 03:49 AM    PLATELET 728 71/04/6612 03:49 AM    MCV 97.7 03/02/2022 03:49 AM     Lab Results   Component Value Date/Time    Sodium 133 (L) 03/02/2022 03:49 AM    Potassium 4.8 03/02/2022 03:49 AM    Chloride 99 03/02/2022 03:49 AM    CO2 31 03/02/2022 03:49 AM    Anion gap 3 (L) 03/02/2022 03:49 AM    Glucose 133 (H) 03/02/2022 03:49 AM    BUN 15 03/02/2022 03:49 AM    Creatinine 0.54 (L) 03/02/2022 03:49 AM    BUN/Creatinine ratio 28 (H) 03/02/2022 03:49 AM    GFR est AA >60 03/02/2022 03:49 AM    GFR est non-AA >60 03/02/2022 03:49 AM    Calcium 9.5 03/02/2022 03:49 AM    Bilirubin, total 0.5 02/26/2022 09:31 AM    Alk. phosphatase 68 02/26/2022 09:31 AM    Protein, total 6.4 02/26/2022 09:31 AM    Albumin 2.6 (L) 02/26/2022 09:31 AM    Globulin 3.8 02/26/2022 09:31 AM    A-G Ratio 0.7 (L) 02/26/2022 09:31 AM    ALT (SGPT) 10 (L) 02/26/2022 09:31 AM    AST (SGOT) 6 (L) 02/26/2022 09:31 AM     Lab Results   Component Value Date/Time    Valproic acid 81 02/26/2022 09:41 AM     No results found for: LITHM    MENTAL STATUS EXAM:  General appearance:  Disheveled, in hospital bed, psychomotor activity is reduced  Eye contact: good  Speech: spontaneous, coherent  Affect: reactive  Mood: \"pretty good\"  Thought Process: organized  Perception: denies AVH  Thought Content: no SI/plan/intent, no HI/plan/intent  Insight: poor  Judgment: poor  Cognition: impaired      ASSESSMENT AND PLAN:  Dewayne Pretty meets criteria for a diagnosis of schizoaffective disorder, depressive type; unspecified intellectual disability. We will discontinue Paxil. We will start Remeron 15 mg at bedtime, which helps with depressed mood and poor sleep. Remeron has lower hyponatremia chances than the typical anti-depressants. Follow up with outpatient provider after discharge. Continue the rest of his medications. Thank you for this kind consult. Please call with questions.

## 2022-03-04 NOTE — PROGRESS NOTES
Problem: Pressure Injury - Risk of  Goal: *Prevention of pressure injury  Description: Document Sanya Scale and appropriate interventions in the flowsheet. Outcome: Progressing Towards Goal  Note: Pressure Injury Interventions:  Sensory Interventions: Assess changes in LOC,Assess need for specialty bed    Moisture Interventions: Absorbent underpads    Activity Interventions: PT/OT evaluation    Mobility Interventions: PT/OT evaluation    Nutrition Interventions: Document food/fluid/supplement intake    Friction and Shear Interventions: Apply protective barrier, creams and emollients                Problem: Patient Education: Go to Patient Education Activity  Goal: Patient/Family Education  Outcome: Progressing Towards Goal     Problem: Falls - Risk of  Goal: *Absence of Falls  Description: Document Joanie Fall Risk and appropriate interventions in the flowsheet.   Outcome: Progressing Towards Goal  Note: Fall Risk Interventions:  Mobility Interventions: Bed/chair exit alarm,Patient to call before getting OOB    Mentation Interventions: Adequate sleep, hydration, pain control,Bed/chair exit alarm    Medication Interventions: Bed/chair exit alarm,Evaluate medications/consider consulting pharmacy,Patient to call before getting OOB    Elimination Interventions: Bed/chair exit alarm              Problem: Patient Education: Go to Patient Education Activity  Goal: Patient/Family Education  Outcome: Progressing Towards Goal     Problem: Patient Education: Go to Patient Education Activity  Goal: Patient/Family Education  Outcome: Progressing Towards Goal     Problem: Patient Education: Go to Patient Education Activity  Goal: Patient/Family Education  Outcome: Progressing Towards Goal     Problem: Discharge Planning  Goal: *Discharge to safe environment  Outcome: Progressing Towards Goal

## 2022-03-04 NOTE — PROGRESS NOTES
Transition of Care  1. RUR 12% low  2. Disposition TBD  3. DME 2L oxygen-[facility]  4. Transportation TBD  5. Follow Up PCP, Specialist      CM completed IPR referrals on 3/3/22 to [facility], Chloe Issa-attending approved. [facility] has declined to accept patient as he is too high level. Referrals still pending with Chloe Issa. CM attempted follow up with family, twin brother Juju Watson and sister Gera Stone but no answer. CM placed call to Josefa Dover, nurse at [employer]. Elvis Lau states that the patient can not return as his needs are too high level. Patient admitted to [facility] on 12/19/21 from [facility]. Patient's brother is believed to be primary POA and sister secondary. Josefa Dover states that he has talked with the patient's sister Gera Stone about the patient not returning and allegedly Gera Stone was in agreement. CM requested for Josefa Dover to provide Jaqui's contact info as the number on file is likely not accurate. Luisito to also email CM copy of [facility]. Josefa Dover states that it is not safe for patient to return as they are an unlocked facility and patient is overall not safe. He is non compliant with wearing his oxygen and often smokes and is near others who smoke outside with his oxygen equipment. Patient reportedly has been more confused and would benefit from LTC at a nursing facility where there will be more supervision. Lucio Henderson also mentioned that patient has a /mental health skill building worker but he is not sure of the agency name in which they are affiliated with. CM followed up with patient who was not aware that he couldn't return. He states that prior to [facility] he has also lived at [facility]. Patient does not know his sister's contact info. CM to continue to attempt reaching out to patient's family to further plan for DC. Shalonda Dodd, MS      2:35 CM met with patient's sister Gera Stone [phone].  Gera Stone states that she is currently living in supportive living. Katia Hernandez attempted to call their brother Sparkle Humphreys but no answer. CM left voice message. Katia Tellezs states that Sparkle Humphreys is  and shares a home with his wife but she is not sure if he would be able to take the patient home or not. CM to monitor.      Stefania Lange MS

## 2022-03-04 NOTE — PROGRESS NOTES
Bedside shift change report given to Zachary Smith (oncoming nurse) by Bahman Banks (offgoing nurse). Report included the following information SBAR, Kardex, Intake/Output, MAR, Accordion, Recent Results and Cardiac Rhythm Sinus Rhythm.

## 2022-03-04 NOTE — PROGRESS NOTES
Comprehensive Nutrition Assessment    Type and Reason for Visit: Initial (Low BMI)    Nutrition Recommendations/Plan:      1. Continue with Easy to [employer] order. 2. RD will order strawberry Ensure Enlive (high calorie, high protein) BID. Nutrition Assessment:    58 yo male admitted for pneumonia. PMhx: schizoaffective disorder, psychogenic polydipsia, bipolar depression, COPD, bronchitis, GERD. Lives in a group home. Weight hx in EMR indicates weight fluctuates between 120 - 140 lbs. S/P SLP eval who recommended easy to chew diet. Pt states he is tolerating this well with no difficulty chewing and is eating everything we send him. Discussed ONS options due to low wt, pt states he likes Ensure and would drink strawberry flavor. Labs reviewed. Malnutrition Assessment:  Malnutrition Status:  Mild malnutrition    Context:  Chronic illness     Findings of the 6 clinical characteristics of malnutrition:   Energy Intake:  No significant decrease in energy intake  Weight Loss:  No significant weight loss     Body Fat Loss:  1 - Mild body fat loss, Orbital,Buccal region   Muscle Mass Loss:  1 - Mild muscle mass loss, Clavicles (pectoralis &deltoids)  Fluid Accumulation:  No significant fluid accumulation,     Strength:  Not performed       Nutritionally Significant Medications: Vit D, folic acid, Remeron, Prednisone, thiamine    Estimated Daily Nutrient Needs:  Energy (kcal): 7471-0571 (30-35 kcals/kg); Weight Used for Energy Requirements: Current  Protein (g): 85 (1.5 gm/kg);  Weight Used for Protein Requirements: Current  Fluid (ml/day): 1700; Method Used for Fluid Requirements: 1 ml/kcal    Nutrition Related Findings:       BM: 3/2  Edema: none  Wounds:   (wound to left ear)       Current Nutrition Therapies:   Diet: Easy to Chew  Supplements: none  Additional Caloric Sources: none    Meal intake: Patient Vitals for the past 168 hrs:   % Diet Eaten   03/01/22 0939 76 - 100%   02/28/22 1947 0% 02/27/22 1800 76 - 100%   02/27/22 1400 51 - 75%     Supplement Intake: No data found.     Anthropometric Measures:  · Height:  5' 10\" (177.8 cm)  · Current Body Wt:  55.7 kg (122 lb 12.7 oz)   · Admission Body Wt:       · Usual Body Wt:        · Ideal Body Wt:  166:  74 %   · Adjusted Body Weight:   ; Weight Adjustment for: No adjustment   · Adjusted BMI:       · BMI Categories:  Underweight (BMI less than 18.5)     Wt Readings from Last 10 Encounters:   03/04/22 55.7 kg (122 lb 12.7 oz)   02/22/22 64 kg (141 lb)   11/03/21 54.4 kg (120 lb)   03/13/20 59 kg (130 lb)   09/04/19 60.3 kg (133 lb)   05/28/19 60.7 kg (133 lb 12.8 oz)   03/11/19 63.9 kg (140 lb 12.8 oz)   09/11/18 64.9 kg (143 lb)   01/29/18 69.8 kg (153 lb 12.8 oz)   01/10/18 67 kg (147 lb 12.8 oz)       Nutrition Diagnosis:   · Underweight related to inadequate protein-energy intake as evidenced by BMI (17.6)      Nutrition Interventions:   Food and/or Nutrient Delivery: Continue current diet,Start oral nutrition supplement  Nutrition Education and Counseling: No recommendations at this time  Coordination of Nutrition Care: Continue to monitor while inpatient    Goals:  PO intakes > 75% meals + ONS daily within next 5-7 days       Nutrition Monitoring and Evaluation:   Behavioral-Environmental Outcomes: None identified  Food/Nutrient Intake Outcomes: Food and nutrient intake,Supplement intake  Physical Signs/Symptoms Outcomes: Biochemical data,GI status,Weight    Discharge Planning:    Continue current diet,Continue oral nutrition supplement     Hilary Duffy RD  Contact via [facility]

## 2022-03-04 NOTE — PROGRESS NOTES
[facility]  Hospitalist Group                                                                                          Hospitalist Progress Note  Megan Doherty MD  Answering service: [phone] from in house phone        Date of Service:  3/4/2022  NAME:  Rosaura Segundo  :  1960  MRN:  [id number]      Admission Summary:   Mina Elias is a 57 y.o. male smoker history of schizo affective disorder. Bipolar depression, COPD, asthma emphysema/bronchitis, history of psychogenic polydipsia, resides in a group home, GERD presents with altered mental status. Unable to obtain history from patient as he is altered. Reported confusion and congestive cough from his group home prompted ED arrival.  In the ER patient was placed on 4 L nasal cannula desatted to mid 80s stat ABG showed compensated respiratory acidosis w/ hypoxia, patient was placed on mid flow currently stable 5L midflow sats low 90s. Patient encephalopathic on my exam.  Notable for WBC count of 13, T-max 102.1F, lactic 0.4.  BP soft but maps greater than 60.\"    Interval history / Subjective:   Patient seen examined at bedside  3L NC  feels well  NAD  Pending placement     Assessment & Plan:     Acute on chronic hypoxic respiratory failure, improving  Back to baseline 2-3L NC  COPD Exacerbation  Left lower lobe pneumonia   Chronic tobacco dependence  -patient not septic  -covid neg    -CTA chest neg for PE confirmed LLL PNA with effusion   -Chest x-ray repeated 3/1 with improvement  -pulm evaluated appreciate recs, patient of Dr. Leighton Parker op, was being follow-up for lung nodule with prior bronc negative for malignancy  -Continue neb, Pulmicort, s/p steroids  -IV abx d7 complete today  -f/u with Dr Reid Foley  -bld cx ng, mycoplasma/legionella neg,  pct 0.18  -pulm following, appreciate recs      Acute metabolic encephalopathy -improved   likely Secondary to above  CT head negative, UA neg  -tsh, b12, folate wnl  -depakote level normal   -speech eval, passed soft and easy to chew      Hyponatremia -improving/stable  History of psychogenic polydipsia in the past urine studies appear like possible SIADH  -nephro following, appreciate input  -am cortisol wnl   -800ml fluid restriction, started ure-na 2/28, cont same course   -will be difficult to control and discharge when he has access to free water  Ok to Dc SSRI per psych     Schizoaffective disorder,  Depression subtype   -psych consulted for evaluation, cont depakote, trazodone, cogentin; remeron started    GERD  Continue home med     Patient has very poor insight on his clinical condition    Code status: full  DVT prophylaxis: lovenox     PT/OT following - no needs     Care Plan discussed with: Patient/Family, Nurse and   Anticipated [address] home awaiting placement  Anticipated Discharge: [facility] Problems  Date Reviewed: 2/22/2022          Codes Class Noted POA    PNA (pneumonia) ICD-10-CM: J18.9  ICD-9-CM: 792  2/26/2022 Unknown                Review of Systems:   A comprehensive review of systems was negative except for that written in the HPI. Vital Signs:    Last 24hrs VS reviewed since prior progress note. Most recent are:  Visit Vitals  BP (!) 128/91   Pulse 83   Temp 98.2 °F (36.8 °C)   Resp 18   Ht 5' 10\" (1.778 m)   Wt 55.7 kg (122 lb 12.7 oz)   SpO2 90%   BMI 17.62 kg/m²         Intake/Output Summary (Last 24 hours) at 3/4/2022 1323  Last data filed at 3/4/2022 1314  Gross per 24 hour   Intake 444 ml   Output 2400 ml   Net -1956 ml        Physical Examination:     I had a face to face encounter with this patient and independently examined them on 3/4/2022 as outlined below:          Constitutional:  No acute distress, cooperative, pleasant    ENT:  Oral mucosa moist, oropharynx benign   Resp:  good AE, grossly clear, No wheezing/rhonchi/rales.  No accessory muscle use   CV:  Regular rhythm, normal rate, no murmurs    GI:  Soft, non distended, non tender    Musculoskeletal:  No edema, warm    Neurologic:  Moves all extremities. AAOx3, CN II-XII reviewed            Data Review:    Review and/or order of clinical lab test  Review and/or order of tests in the radiology section of CPT  Review and/or order of tests in the medicine section of [facility]      Labs:     Recent Labs     03/04/22 0442 03/02/22 0349   WBC 12.9* 15.4*   HGB 13.7 14.6   HCT 41.5 45.8    236     Recent Labs     03/04/22 0442 03/02/22 0349    133*   K 3.8 4.8   CL 99 99   CO2 34* 31   BUN 18 15   CREA 0.57* 0.54*   * 133*   CA 8.8 9.5     No results for input(s): ALT, AP, TBIL, TBILI, TP, ALB, GLOB, GGT, AML, LPSE in the last 72 hours. No lab exists for component: SGOT, GPT, AMYP, HLPSE  No results for input(s): INR, PTP, APTT, INREXT, INREXT in the last 72 hours. No results for input(s): FE, TIBC, PSAT, FERR in the last 72 hours. Lab Results   Component Value Date/Time    Folate 27.2 (H) 02/26/2022 09:31 AM      No results for input(s): PH, PCO2, PO2 in the last 72 hours. No results for input(s): CPK, CKNDX, TROIQ in the last 72 hours.     No lab exists for component: CPKMB  Lab Results   Component Value Date/Time    Cholesterol, total 181 10/17/2018 10:48 AM    HDL Cholesterol 78 10/17/2018 10:48 AM    LDL, calculated 89 10/17/2018 10:48 AM    Triglyceride 71 10/17/2018 10:48 AM     Lab Results   Component Value Date/Time    Glucose (POC) 132 (H) 07/04/2017 04:37 PM    Glucose, POC 92 02/26/2022 09:23 AM     Lab Results   Component Value Date/Time    Color YELLOW/STRAW 02/26/2022 09:41 AM    Appearance CLEAR 02/26/2022 09:41 AM    Specific gravity 1.013 02/26/2022 09:41 AM    pH (UA) 7.0 02/26/2022 09:41 AM    Protein Negative 02/26/2022 09:41 AM    Glucose Negative 02/26/2022 09:41 AM    Ketone TRACE (A) 02/26/2022 09:41 AM    Bilirubin Negative 02/26/2022 09:41 AM    Urobilinogen 1.0 02/26/2022 09:41 AM    Nitrites Negative 02/26/2022 09:41 AM Leukocyte Esterase Negative 02/26/2022 09:41 AM    Epithelial cells FEW 02/26/2022 09:41 AM    Bacteria Negative 02/26/2022 09:41 AM    WBC 0-4 02/26/2022 09:41 AM    RBC 0-5 02/26/2022 09:41 AM         Medications Reviewed:     Current Facility-Administered Medications   Medication Dose Route Frequency    mirtazapine (REMERON) tablet 15 mg  15 mg Oral QHS    albuterol-ipratropium (DUO-NEB) 2.5 MG-0.5 MG/3 ML  3 mL Nebulization BID RT    predniSONE (DELTASONE) tablet 40 mg  40 mg Oral DAILY WITH BREAKFAST    balsam peru-castor oiL (VENELEX) ointment   Topical BID    urea (URE-NA) 15 gram packet 2 Packet  2 Packet Oral DAILY    piperacillin-tazobactam (ZOSYN) 3.375 g in 0.9% sodium chloride (MBP/ADV) 100 mL MBP  3.375 g IntraVENous Q8H    sodium chloride (NS) flush 5-10 mL  5-10 mL IntraVENous PRN    sodium chloride (NS) flush 5-40 mL  5-40 mL IntraVENous Q8H    sodium chloride (NS) flush 5-40 mL  5-40 mL IntraVENous PRN    acetaminophen (TYLENOL) tablet 650 mg  650 mg Oral Q6H PRN    Or    acetaminophen (TYLENOL) suppository 650 mg  650 mg Rectal Q6H PRN    polyethylene glycol (MIRALAX) packet 17 g  17 g Oral DAILY PRN    ondansetron (ZOFRAN ODT) tablet 4 mg  4 mg Oral Q8H PRN    Or    ondansetron (ZOFRAN) injection 4 mg  4 mg IntraVENous Q6H PRN    enoxaparin (LOVENOX) injection 40 mg  40 mg SubCUTAneous DAILY    [Held by provider] amLODIPine (NORVASC) tablet 5 mg  5 mg Oral DAILY    benztropine (COGENTIN) tablet 2 mg  2 mg Oral DAILY    divalproex DR (DEPAKOTE) tablet 500 mg  500 mg Oral DAILY    folic acid (FOLVITE) tablet 1 mg  1 mg Oral DAILY    ergocalciferol capsule 50,000 Units  50,000 Units Oral Q30D    lactulose (CHRONULAC) 10 gram/15 mL solution 5 mL  5 mL Oral DAILY PRN    pantoprazole (PROTONIX) tablet 40 mg  40 mg Oral ACB    thiamine HCL (B-1) tablet 100 mg  100 mg Oral DAILY    traZODone (DESYREL) tablet 100 mg  100 mg Oral QHS    albuterol-ipratropium (DUO-NEB) 2.5 MG-0.5 MG/3 ML  3 mL Nebulization Q4H PRN    budesonide (PULMICORT) 500 mcg/2 ml nebulizer suspension  500 mcg Nebulization BID RT    sodium chloride tablet 1 g  1 g Oral DAILY    guaiFENesin ER (MUCINEX) tablet 600 mg  600 mg Oral Q12H    benzonatate (TESSALON) capsule 100 mg  100 mg Oral TID PRN     ______________________________________________________________________  EXPECTED LENGTH OF STAY: 4d 2h  ACTUAL LENGTH OF STAY:          6                 Megan Doherty MD

## 2022-03-05 LAB
BASOPHILS # BLD: 0 K/UL (ref 0–0.1)
BASOPHILS NFR BLD: 0 % (ref 0–1)
DIFFERENTIAL METHOD BLD: ABNORMAL
EOSINOPHIL # BLD: 0.2 K/UL (ref 0–0.4)
EOSINOPHIL NFR BLD: 2 % (ref 0–7)
ERYTHROCYTE [DISTWIDTH] IN BLOOD BY AUTOMATED COUNT: 14.7 % (ref 11.5–14.5)
HCT VFR BLD AUTO: 42 % (ref 36.6–50.3)
HGB BLD-MCNC: 13.7 G/DL (ref 12.1–17)
IMM GRANULOCYTES # BLD AUTO: 0.2 K/UL (ref 0–0.04)
IMM GRANULOCYTES NFR BLD AUTO: 1 % (ref 0–0.5)
LYMPHOCYTES # BLD: 3.2 K/UL (ref 0.8–3.5)
LYMPHOCYTES NFR BLD: 23 % (ref 12–49)
MCH RBC QN AUTO: 30.3 PG (ref 26–34)
MCHC RBC AUTO-ENTMCNC: 32.6 G/DL (ref 30–36.5)
MCV RBC AUTO: 92.9 FL (ref 80–99)
MONOCYTES # BLD: 1.6 K/UL (ref 0–1)
MONOCYTES NFR BLD: 11 % (ref 5–13)
NEUTS SEG # BLD: 8.6 K/UL (ref 1.8–8)
NEUTS SEG NFR BLD: 63 % (ref 32–75)
NRBC # BLD: 0 K/UL (ref 0–0.01)
NRBC BLD-RTO: 0 PER 100 WBC
PLATELET # BLD AUTO: 310 K/UL (ref 150–400)
PMV BLD AUTO: 9.3 FL (ref 8.9–12.9)
RBC # BLD AUTO: 4.52 M/UL (ref 4.1–5.7)
WBC # BLD AUTO: 13.7 K/UL (ref 4.1–11.1)

## 2022-03-05 PROCEDURE — 74011000258 HC RX REV CODE- 258: Performed by: INTERNAL MEDICINE

## 2022-03-05 PROCEDURE — 77010033678 HC OXYGEN DAILY

## 2022-03-05 PROCEDURE — 74011250637 HC RX REV CODE- 250/637: Performed by: INTERNAL MEDICINE

## 2022-03-05 PROCEDURE — 36415 COLL VENOUS BLD VENIPUNCTURE: CPT

## 2022-03-05 PROCEDURE — 74011250637 HC RX REV CODE- 250/637: Performed by: STUDENT IN AN ORGANIZED HEALTH CARE EDUCATION/TRAINING PROGRAM

## 2022-03-05 PROCEDURE — 65660000000 HC RM CCU STEPDOWN

## 2022-03-05 PROCEDURE — 94760 N-INVAS EAR/PLS OXIMETRY 1: CPT

## 2022-03-05 PROCEDURE — 74011000250 HC RX REV CODE- 250: Performed by: INTERNAL MEDICINE

## 2022-03-05 PROCEDURE — 74011250636 HC RX REV CODE- 250/636: Performed by: INTERNAL MEDICINE

## 2022-03-05 PROCEDURE — 94640 AIRWAY INHALATION TREATMENT: CPT

## 2022-03-05 PROCEDURE — 85025 COMPLETE CBC W/AUTO DIFF WBC: CPT

## 2022-03-05 PROCEDURE — 74011250637 HC RX REV CODE- 250/637: Performed by: NURSE PRACTITIONER

## 2022-03-05 PROCEDURE — 74011000250 HC RX REV CODE- 250: Performed by: STUDENT IN AN ORGANIZED HEALTH CARE EDUCATION/TRAINING PROGRAM

## 2022-03-05 PROCEDURE — 74011250636 HC RX REV CODE- 250/636: Performed by: STUDENT IN AN ORGANIZED HEALTH CARE EDUCATION/TRAINING PROGRAM

## 2022-03-05 RX ADMIN — ENOXAPARIN SODIUM 40 MG: 100 INJECTION SUBCUTANEOUS at 08:36

## 2022-03-05 RX ADMIN — GUAIFENESIN 600 MG: 600 TABLET, EXTENDED RELEASE ORAL at 08:37

## 2022-03-05 RX ADMIN — TRAZODONE HYDROCHLORIDE 100 MG: 100 TABLET ORAL at 21:34

## 2022-03-05 RX ADMIN — SODIUM CHLORIDE, PRESERVATIVE FREE 10 ML: 5 INJECTION INTRAVENOUS at 21:34

## 2022-03-05 RX ADMIN — Medication 100 MG: at 08:37

## 2022-03-05 RX ADMIN — BUDESONIDE 500 MCG: 0.5 INHALANT RESPIRATORY (INHALATION) at 20:06

## 2022-03-05 RX ADMIN — Medication: at 08:37

## 2022-03-05 RX ADMIN — PANTOPRAZOLE SODIUM 40 MG: 40 TABLET, DELAYED RELEASE ORAL at 06:48

## 2022-03-05 RX ADMIN — IPRATROPIUM BROMIDE AND ALBUTEROL SULFATE 3 ML: .5; 3 SOLUTION RESPIRATORY (INHALATION) at 20:06

## 2022-03-05 RX ADMIN — Medication 1 G: at 08:37

## 2022-03-05 RX ADMIN — MIRTAZAPINE 15 MG: 15 TABLET ORAL at 21:34

## 2022-03-05 RX ADMIN — BUDESONIDE 500 MCG: 0.5 INHALANT RESPIRATORY (INHALATION) at 08:18

## 2022-03-05 RX ADMIN — FOLIC ACID 1 MG: 1 TABLET ORAL at 08:37

## 2022-03-05 RX ADMIN — Medication: at 16:38

## 2022-03-05 RX ADMIN — BENZTROPINE MESYLATE 2 MG: 1 TABLET ORAL at 08:37

## 2022-03-05 RX ADMIN — DIVALPROEX SODIUM 500 MG: 500 TABLET, DELAYED RELEASE ORAL at 08:37

## 2022-03-05 RX ADMIN — Medication 2 PACKET: at 09:47

## 2022-03-05 RX ADMIN — PIPERACILLIN AND TAZOBACTAM 3.38 G: 3; .375 INJECTION, POWDER, LYOPHILIZED, FOR SOLUTION INTRAVENOUS at 04:24

## 2022-03-05 RX ADMIN — IPRATROPIUM BROMIDE AND ALBUTEROL SULFATE 3 ML: .5; 3 SOLUTION RESPIRATORY (INHALATION) at 08:18

## 2022-03-05 RX ADMIN — GUAIFENESIN 600 MG: 600 TABLET, EXTENDED RELEASE ORAL at 21:33

## 2022-03-05 NOTE — PROGRESS NOTES
[facility] [facility] Adult  Hospitalist Group                                                                                          Hospitalist Progress Note  Lebron Dolan MD  Answering service: [phone] from in house phone        Date of Service:  3/5/2022  NAME:  Socorro Waldron  :  1960  MRN:  [id number]      Admission Summary:   Alexis Merlin is a 57 y.o. male smoker history of schizo affective disorder. Bipolar depression, COPD, asthma emphysema/bronchitis, history of psychogenic polydipsia, resides in a group home, GERD presents with altered mental status. Unable to obtain history from patient as he is altered. Reported confusion and congestive cough from his group home prompted ED arrival.  In the ER patient was placed on 4 L nasal cannula desatted to mid 80s stat ABG showed compensated respiratory acidosis w/ hypoxia, patient was placed on mid flow currently stable 5L midflow sats low 90s. Patient encephalopathic on my exam.  Notable for WBC count of 13, T-max 102.1F, lactic 0.4.  BP soft but maps greater than 60.\"    Interval history / Subjective:   Patient seen examined at bedside  3L NC  feels well  NAD  Pending placement     Assessment & Plan:     Acute on chronic hypoxic respiratory failure, improving  Back to baseline 2-3L NC  COPD Exacerbation  Left lower lobe pneumonia   Chronic tobacco dependence  -patient not septic  -covid neg    -CTA chest neg for PE confirmed LLL PNA with effusion   -Chest x-ray repeated 3/1 with improvement  -pulm evaluated appreciate recs, patient of Dr. Ruth Cunha, was being follow-up for lung nodule with prior bronc negative for malignancy  -Continue neb, Pulmicort, s/p steroids  -s/p IV abx x7d  -f/u with Dr Ruth Cunha  -bld cx ng, mycoplasma/legionella neg,  pct 0.18     Acute metabolic encephalopathy -improved   likely Secondary to above  CT head negative, UA neg  -tsh, b12, folate wnl  -depakote level normal   -speech eval, passed soft and easy to chew      Hyponatremia -improving  History of psychogenic polydipsia in the past urine studies appear like possible SIADH  -nephro following, appreciate input  -am cortisol wnl   -800ml fluid restriction, started ure-na 2/28, cont same course   -will be difficult to control and discharge when he has access to free water  Ok to Dc SSRI per psych    HTN controlled holding med    Schizoaffective disorder,  Depression subtype   -psych consulted for evaluation, cont depakote, trazodone, cogentin; remeron started    GERD  Continue home med     Patient has very poor insight on his clinical condition    Code status: full  DVT prophylaxis: lovenox     PT/OT following - no needs     Care Plan discussed with: Patient/Family  Anticipated [address] awaiting placement  Anticipated Discharge: [place] Problems  Date Reviewed: 2/22/2022          Codes Class Noted POA    PNA (pneumonia) ICD-10-CM: J18.9  ICD-9-CM: 708  2/26/2022 Unknown                Review of Systems:   A comprehensive review of systems was negative except for that written in the HPI. Vital Signs:    Last 24hrs VS reviewed since prior progress note. Most recent are:  Visit Vitals  /75 (BP 1 Location: Right upper arm, BP Patient Position: At rest)   Pulse 99   Temp 97.9 °F (36.6 °C)   Resp 20   Ht 5' 10\" (1.778 m)   Wt 55.6 kg (122 lb 8 oz)   SpO2 93%   BMI 17.58 kg/m²         Intake/Output Summary (Last 24 hours) at 3/5/2022 1255  Last data filed at 3/4/2022 2359  Gross per 24 hour   Intake 680 ml   Output 4175 ml   Net -3495 ml        Physical Examination:     I had a face to face encounter with this patient and independently examined them on 3/5/2022 as outlined below:          Constitutional:  No acute distress, cooperative, pleasant    ENT:  Oral mucosa moist, oropharynx benign   Resp:  good AE, grossly clear, No wheezing/rhonchi/rales.  No accessory muscle use   CV:  Regular rhythm, normal rate, no murmurs    GI: Soft, non distended, non tender    Musculoskeletal:  No edema, warm    Neurologic:  Moves all extremities. SUNNY BRASHER II-XII reviewed            Data Review:    Review and/or order of clinical lab test  Review and/or order of tests in the radiology section of CPT  Review and/or order of tests in the medicine section of CPT      Labs:     Recent Labs     03/05/22  0353 03/04/22 0442   WBC 13.7* 12.9*   HGB 13.7 13.7   HCT 42.0 41.5    277     Recent Labs     03/04/22 0442      K 3.8   CL 99   CO2 34*   BUN 18   CREA 0.57*   *   CA 8.8     No results for input(s): ALT, AP, TBIL, TBILI, TP, ALB, GLOB, GGT, AML, LPSE in the last 72 hours. No lab exists for component: SGOT, GPT, AMYP, HLPSE  No results for input(s): INR, PTP, APTT, INREXT, INREXT in the last 72 hours. No results for input(s): FE, TIBC, PSAT, FERR in the last 72 hours. Lab Results   Component Value Date/Time    Folate 27.2 (H) 02/26/2022 09:31 AM      No results for input(s): PH, PCO2, PO2 in the last 72 hours. No results for input(s): CPK, CKNDX, TROIQ in the last 72 hours.     No lab exists for component: CPKMB  Lab Results   Component Value Date/Time    Cholesterol, total 181 10/17/2018 10:48 AM    HDL Cholesterol 78 10/17/2018 10:48 AM    LDL, calculated 89 10/17/2018 10:48 AM    Triglyceride 71 10/17/2018 10:48 AM     Lab Results   Component Value Date/Time    Glucose (POC) 132 (H) 07/04/2017 04:37 PM    Glucose, POC 92 02/26/2022 09:23 AM     Lab Results   Component Value Date/Time    Color YELLOW/STRAW 02/26/2022 09:41 AM    Appearance CLEAR 02/26/2022 09:41 AM    Specific gravity 1.013 02/26/2022 09:41 AM    pH (UA) 7.0 02/26/2022 09:41 AM    Protein Negative 02/26/2022 09:41 AM    Glucose Negative 02/26/2022 09:41 AM    Ketone TRACE (A) 02/26/2022 09:41 AM    Bilirubin Negative 02/26/2022 09:41 AM    Urobilinogen 1.0 02/26/2022 09:41 AM    Nitrites Negative 02/26/2022 09:41 AM    Leukocyte Esterase Negative 02/26/2022 09:41 AM    Epithelial cells FEW 02/26/2022 09:41 AM    Bacteria Negative 02/26/2022 09:41 AM    WBC 0-4 02/26/2022 09:41 AM    RBC 0-5 02/26/2022 09:41 AM         Medications Reviewed:     Current Facility-Administered Medications   Medication Dose Route Frequency    mirtazapine (REMERON) tablet 15 mg  15 mg Oral QHS    albuterol-ipratropium (DUO-NEB) 2.5 MG-0.5 MG/3 ML  3 mL Nebulization BID RT    balsam peru-castor oiL (VENELEX) ointment   Topical BID    urea (URE-NA) 15 gram packet 2 Packet  2 Packet Oral DAILY    sodium chloride (NS) flush 5-10 mL  5-10 mL IntraVENous PRN    sodium chloride (NS) flush 5-40 mL  5-40 mL IntraVENous Q8H    sodium chloride (NS) flush 5-40 mL  5-40 mL IntraVENous PRN    acetaminophen (TYLENOL) tablet 650 mg  650 mg Oral Q6H PRN    Or    acetaminophen (TYLENOL) suppository 650 mg  650 mg Rectal Q6H PRN    polyethylene glycol (MIRALAX) packet 17 g  17 g Oral DAILY PRN    ondansetron (ZOFRAN ODT) tablet 4 mg  4 mg Oral Q8H PRN    Or    ondansetron (ZOFRAN) injection 4 mg  4 mg IntraVENous Q6H PRN    enoxaparin (LOVENOX) injection 40 mg  40 mg SubCUTAneous DAILY    [Held by provider] amLODIPine (NORVASC) tablet 5 mg  5 mg Oral DAILY    benztropine (COGENTIN) tablet 2 mg  2 mg Oral DAILY    divalproex DR (DEPAKOTE) tablet 500 mg  500 mg Oral DAILY    folic acid (FOLVITE) tablet 1 mg  1 mg Oral DAILY    ergocalciferol capsule 50,000 Units  50,000 Units Oral Q30D    lactulose (CHRONULAC) 10 gram/15 mL solution 5 mL  5 mL Oral DAILY PRN    pantoprazole (PROTONIX) tablet 40 mg  40 mg Oral ACB    thiamine HCL (B-1) tablet 100 mg  100 mg Oral DAILY    traZODone (DESYREL) tablet 100 mg  100 mg Oral QHS    albuterol-ipratropium (DUO-NEB) 2.5 MG-0.5 MG/3 ML  3 mL Nebulization Q4H PRN    budesonide (PULMICORT) 500 mcg/2 ml nebulizer suspension  500 mcg Nebulization BID RT    sodium chloride tablet 1 g  1 g Oral DAILY    guaiFENesin ER (MUCINEX) tablet 600 mg  600 mg Oral Q12H    benzonatate (TESSALON) capsule 100 mg  100 mg Oral TID PRN     ______________________________________________________________________  EXPECTED LENGTH OF STAY: 4d 2h  ACTUAL LENGTH OF STAY:          7                 Mary Marion MD

## 2022-03-05 NOTE — PROGRESS NOTES
Bedside shift change report given to APOLONIA Frausto (oncoming nurse) by Juan Luis Moreno (offgoing nurse). Report included the following information SBAR, Kardex, MAR and Recent Results.

## 2022-03-06 LAB
ANION GAP SERPL CALC-SCNC: 3 MMOL/L (ref 5–15)
BUN SERPL-MCNC: 16 MG/DL (ref 6–20)
BUN/CREAT SERPL: 27 (ref 12–20)
CALCIUM SERPL-MCNC: 8.7 MG/DL (ref 8.5–10.1)
CHLORIDE SERPL-SCNC: 101 MMOL/L (ref 97–108)
CO2 SERPL-SCNC: 30 MMOL/L (ref 21–32)
CREAT SERPL-MCNC: 0.59 MG/DL (ref 0.7–1.3)
GLUCOSE SERPL-MCNC: 105 MG/DL (ref 65–100)
POTASSIUM SERPL-SCNC: 4.5 MMOL/L (ref 3.5–5.1)
SODIUM SERPL-SCNC: 134 MMOL/L (ref 136–145)

## 2022-03-06 PROCEDURE — 74011000250 HC RX REV CODE- 250: Performed by: INTERNAL MEDICINE

## 2022-03-06 PROCEDURE — 74011250637 HC RX REV CODE- 250/637: Performed by: NURSE PRACTITIONER

## 2022-03-06 PROCEDURE — 36415 COLL VENOUS BLD VENIPUNCTURE: CPT

## 2022-03-06 PROCEDURE — 74011250637 HC RX REV CODE- 250/637: Performed by: INTERNAL MEDICINE

## 2022-03-06 PROCEDURE — 80048 BASIC METABOLIC PNL TOTAL CA: CPT

## 2022-03-06 PROCEDURE — 65270000029 HC RM PRIVATE

## 2022-03-06 PROCEDURE — 74011250636 HC RX REV CODE- 250/636: Performed by: STUDENT IN AN ORGANIZED HEALTH CARE EDUCATION/TRAINING PROGRAM

## 2022-03-06 PROCEDURE — 74011250637 HC RX REV CODE- 250/637: Performed by: STUDENT IN AN ORGANIZED HEALTH CARE EDUCATION/TRAINING PROGRAM

## 2022-03-06 PROCEDURE — 74011000250 HC RX REV CODE- 250: Performed by: STUDENT IN AN ORGANIZED HEALTH CARE EDUCATION/TRAINING PROGRAM

## 2022-03-06 PROCEDURE — 94640 AIRWAY INHALATION TREATMENT: CPT

## 2022-03-06 RX ADMIN — BUDESONIDE 500 MCG: 0.5 INHALANT RESPIRATORY (INHALATION) at 21:04

## 2022-03-06 RX ADMIN — ENOXAPARIN SODIUM 40 MG: 100 INJECTION SUBCUTANEOUS at 08:47

## 2022-03-06 RX ADMIN — BUDESONIDE 500 MCG: 0.5 INHALANT RESPIRATORY (INHALATION) at 07:58

## 2022-03-06 RX ADMIN — DIVALPROEX SODIUM 500 MG: 500 TABLET, DELAYED RELEASE ORAL at 08:47

## 2022-03-06 RX ADMIN — Medication 100 MG: at 08:47

## 2022-03-06 RX ADMIN — IPRATROPIUM BROMIDE AND ALBUTEROL SULFATE 3 ML: .5; 3 SOLUTION RESPIRATORY (INHALATION) at 21:04

## 2022-03-06 RX ADMIN — TRAZODONE HYDROCHLORIDE 100 MG: 100 TABLET ORAL at 22:39

## 2022-03-06 RX ADMIN — PANTOPRAZOLE SODIUM 40 MG: 40 TABLET, DELAYED RELEASE ORAL at 06:53

## 2022-03-06 RX ADMIN — BENZTROPINE MESYLATE 2 MG: 1 TABLET ORAL at 08:47

## 2022-03-06 RX ADMIN — Medication: at 08:47

## 2022-03-06 RX ADMIN — SODIUM CHLORIDE, PRESERVATIVE FREE 10 ML: 5 INJECTION INTRAVENOUS at 22:40

## 2022-03-06 RX ADMIN — MIRTAZAPINE 15 MG: 15 TABLET ORAL at 22:39

## 2022-03-06 RX ADMIN — Medication 2 PACKET: at 09:44

## 2022-03-06 RX ADMIN — Medication: at 16:11

## 2022-03-06 RX ADMIN — GUAIFENESIN 600 MG: 600 TABLET, EXTENDED RELEASE ORAL at 08:47

## 2022-03-06 RX ADMIN — Medication 1 G: at 08:47

## 2022-03-06 RX ADMIN — GUAIFENESIN 600 MG: 600 TABLET, EXTENDED RELEASE ORAL at 22:39

## 2022-03-06 RX ADMIN — SODIUM CHLORIDE, PRESERVATIVE FREE 10 ML: 5 INJECTION INTRAVENOUS at 16:11

## 2022-03-06 RX ADMIN — FOLIC ACID 1 MG: 1 TABLET ORAL at 08:47

## 2022-03-06 RX ADMIN — IPRATROPIUM BROMIDE AND ALBUTEROL SULFATE 3 ML: .5; 3 SOLUTION RESPIRATORY (INHALATION) at 07:58

## 2022-03-06 NOTE — PROGRESS NOTES
Bedside and Verbal shift change report given to APOLONIA Strong (oncoming nurse) by Kena Rao (offgoing nurse). Report included the following information SBAR, Kardex, OR Summary, Procedure Summary, Intake/Output, MAR, Recent Results and Cardiac Rhythm NSR.

## 2022-03-06 NOTE — PROGRESS NOTES
Bedside and Verbal shift change report given to Debbie Sharma RN (oncoming nurse) by Shirin Cooper RN (offgoing nurse). Report included the following information SBAR, Kardex, ED Summary, Procedure Summary, Intake/Output, MAR, Recent Results and Cardiac Rhythm SR/ST.

## 2022-03-06 NOTE — PROGRESS NOTES
[facility] Adult  Hospitalist Group                                                                                          Hospitalist Progress Note  Carlo Elizabeth MD  Answering service: [phone] from in house phone        Date of Service:  3/6/2022  NAME:  Jacque Light  :  1960  MRN:  [id number]      Admission Summary:   Trey Chakraborty is a 57 y.o. male smoker history of schizo affective disorder. Bipolar depression, COPD, asthma emphysema/bronchitis, history of psychogenic polydipsia, resides in a group home, GERD presents with altered mental status. Unable to obtain history from patient as he is altered. Reported confusion and congestive cough from his group home prompted ED arrival.  In the ER patient was placed on 4 L nasal cannula desatted to mid 80s stat ABG showed compensated respiratory acidosis w/ hypoxia, patient was placed on mid flow currently stable 5L midflow sats low 90s. Patient encephalopathic on my exam.  Notable for WBC count of 13, T-max 102.1F, lactic 0.4.  BP soft but maps greater than 60.\"    Interval history / Subjective:   Patient seen examined at bedside  3L NC  feels well  NAD  Pending placement     Assessment & Plan:     Acute on chronic hypoxic respiratory failure, improved, back to baseline 2-3L NC  COPD Exacerbation  Left lower lobe pneumonia   Chronic tobacco dependence  -patient not septic  -covid neg    -CTA chest neg for PE confirmed LLL PNA with effusion   -Chest x-ray repeated 3/1 with improvement  -pulm evaluated appreciate recs, patient of Dr. Toby Parra, was being follow-up for lung nodule with prior bron negative for malignancy  -Continue neb, Pulmicort, s/p steroids  -s/p IV abx x7d  -f/u with Dr Toby Parra  -bld cx ng, mycoplasma/legionella neg,  pct 0.18     Acute metabolic encephalopathy -improved   likely Secondary to above  CT head negative, UA neg  -tsh, b12, folate wnl  -depakote level normal   -speech eval, passed soft and easy to chew      Hyponatremia -improving/stable  History of psychogenic polydipsia in the past urine studies appear like possible SIADH  -nephro following, appreciate input  -am cortisol wnl   -800ml fluid restriction, started ure-na 2/28, cont same course   -will be difficult to control and discharge when he has access to free water  Ok to Dc SSRI per psych    HTN controlled off med    Schizoaffective disorder,  Depression subtype   -psych consulted for evaluation, cont depakote, trazodone, cogentin; remeron started    GERD  Continue home med     Patient has very poor insight on his clinical condition    Code status: full  DVT prophylaxis: lovenox     PT/OT following - no needs     Care Plan discussed with: Patient/Family  [address]ticipated [address] awaiting [address]  Anticipated Discharge: [place] Problems  Date Reviewed: 2/22/2022          Codes Class Noted POA    PNA (pneumonia) ICD-10-CM: J18.9  ICD-9-CM: 486  2/26/2022 Unknown                Review of Systems:   A comprehensive review of systems was negative except for that written in the HPI. Vital Signs:    Last 24hrs VS reviewed since prior progress note. Most recent are:  Visit Vitals  /75 (BP 1 Location: Right upper arm, BP Patient Position: At rest)   Pulse (!) 105   Temp 98.3 °F (36.8 °C)   Resp 23   Ht 5' 10\" (1.778 m)   Wt 55.8 kg (123 lb 1.6 oz)   SpO2 98%   BMI 17.66 kg/m²         Intake/Output Summary (Last 24 hours) at 3/6/2022 1337  Last data filed at 3/6/2022 1054  Gross per 24 hour   Intake 2760 ml   Output 3550 ml   Net -790 ml        Physical Examination:     I had a face to face encounter with this patient and independently examined them on 3/6/2022 as outlined below:          Constitutional:  No acute distress, cooperative, pleasant    ENT:  Oral mucosa moist, oropharynx benign   Resp:  good AE, clear, No wheezing/rhonchi/rales.  No accessory muscle use   CV:  Regular rhythm, normal rate, no murmurs GI:  Soft, non distended, non tender    Musculoskeletal:  No edema, warm    Neurologic:  Moves all extremities. AAO, CN II-XII reviewed            Data Review:    Review and/or order of clinical lab test  Review and/or order of tests in the radiology section of CPT  Review and/or order of tests in the medicine section of CPT      Labs:     Recent Labs     03/05/22  0353 03/04/22  0442   WBC 13.7* 12.9*   HGB 13.7 13.7   HCT 42.0 41.5    277     Recent Labs     03/06/22  0413 03/04/22  0442   * 136   K 4.5 3.8    99   CO2 30 34*   BUN 16 18   CREA 0.59* 0.57*   * 109*   CA 8.7 8.8     No results for input(s): ALT, AP, TBIL, TBILI, TP, ALB, GLOB, GGT, AML, LPSE in the last 72 hours. No lab exists for component: SGOT, GPT, AMYP, HLPSE  No results for input(s): INR, PTP, APTT, INREXT, INREXT in the last 72 hours. No results for input(s): FE, TIBC, PSAT, FERR in the last 72 hours. Lab Results   Component Value Date/Time    Folate 27.2 (H) 02/26/2022 09:31 AM      No results for input(s): PH, PCO2, PO2 in the last 72 hours. No results for input(s): CPK, CKNDX, TROIQ in the last 72 hours.     No lab exists for component: CPKMB  Lab Results   Component Value Date/Time    Cholesterol, total 181 10/17/2018 10:48 AM    HDL Cholesterol 78 10/17/2018 10:48 AM    LDL, calculated 89 10/17/2018 10:48 AM    Triglyceride 71 10/17/2018 10:48 AM     Lab Results   Component Value Date/Time    Glucose (POC) 132 (H) 07/04/2017 04:37 PM    Glucose, POC 92 02/26/2022 09:23 AM     Lab Results   Component Value Date/Time    Color YELLOW/STRAW 02/26/2022 09:41 AM    Appearance CLEAR 02/26/2022 09:41 AM    Specific gravity 1.013 02/26/2022 09:41 AM    pH (UA) 7.0 02/26/2022 09:41 AM    Protein Negative 02/26/2022 09:41 AM    Glucose Negative 02/26/2022 09:41 AM    Ketone TRACE (A) 02/26/2022 09:41 AM    Bilirubin Negative 02/26/2022 09:41 AM    Urobilinogen 1.0 02/26/2022 09:41 AM    Nitrites Negative 02/26/2022 09:41 AM    Leukocyte Esterase Negative 02/26/2022 09:41 AM    Epithelial cells FEW 02/26/2022 09:41 AM    Bacteria Negative 02/26/2022 09:41 AM    WBC 0-4 02/26/2022 09:41 AM    RBC 0-5 02/26/2022 09:41 AM         Medications Reviewed:     Current Facility-Administered Medications   Medication Dose Route Frequency    mirtazapine (REMERON) tablet 15 mg  15 mg Oral QHS    albuterol-ipratropium (DUO-NEB) 2.5 MG-0.5 MG/3 ML  3 mL Nebulization BID RT    balsam peru-castor oiL (VENELEX) ointment   Topical BID    urea (URE-NA) 15 gram packet 2 Packet  2 Packet Oral DAILY    sodium chloride (NS) flush 5-10 mL  5-10 mL IntraVENous PRN    sodium chloride (NS) flush 5-40 mL  5-40 mL IntraVENous Q8H    sodium chloride (NS) flush 5-40 mL  5-40 mL IntraVENous PRN    acetaminophen (TYLENOL) tablet 650 mg  650 mg Oral Q6H PRN    Or    acetaminophen (TYLENOL) suppository 650 mg  650 mg Rectal Q6H PRN    polyethylene glycol (MIRALAX) packet 17 g  17 g Oral DAILY PRN    ondansetron (ZOFRAN ODT) tablet 4 mg  4 mg Oral Q8H PRN    Or    ondansetron (ZOFRAN) injection 4 mg  4 mg IntraVENous Q6H PRN    enoxaparin (LOVENOX) injection 40 mg  40 mg SubCUTAneous DAILY    [Held by provider] amLODIPine (NORVASC) tablet 5 mg  5 mg Oral DAILY    benztropine (COGENTIN) tablet 2 mg  2 mg Oral DAILY    divalproex DR (DEPAKOTE) tablet 500 mg  500 mg Oral DAILY    folic acid (FOLVITE) tablet 1 mg  1 mg Oral DAILY    ergocalciferol capsule 50,000 Units  50,000 Units Oral Q30D    lactulose (CHRONULAC) 10 gram/15 mL solution 5 mL  5 mL Oral DAILY PRN    pantoprazole (PROTONIX) tablet 40 mg  40 mg Oral ACB    thiamine HCL (B-1) tablet 100 mg  100 mg Oral DAILY    traZODone (DESYREL) tablet 100 mg  100 mg Oral QHS    albuterol-ipratropium (DUO-NEB) 2.5 MG-0.5 MG/3 ML  3 mL Nebulization Q4H PRN    budesonide (PULMICORT) 500 mcg/2 ml nebulizer suspension  500 mcg Nebulization BID RT    sodium chloride tablet 1 g  1 g Oral DAILY    guaiFENesin ER (MUCINEX) tablet 600 mg  600 mg Oral Q12H    benzonatate (TESSALON) capsule 100 mg  100 mg Oral TID PRN     ______________________________________________________________________  EXPECTED LENGTH OF STAY: 4d 2h  ACTUAL LENGTH OF STAY:          8                 Megan Doherty MD

## 2022-03-06 NOTE — PROGRESS NOTES
TRANSFER - OUT REPORT:    Verbal report given to AshleighRN(name) on Shad Castañeda  being transferred to (unit) for routine progression of care       Report consisted of patients Situation, Background, Assessment and   Recommendations(SBAR). Information from the following report(s) SBAR, Kardex, ED Summary, Procedure Summary, Intake/Output, MAR, Recent Results and Cardiac Rhythm NSR was reviewed with the receiving nurse. Lines:   Peripheral IV 03/01/22 Anterior;Right Forearm (Active)   Site Assessment Clean, dry, & intact 03/06/22 0755   Phlebitis Assessment 0 03/06/22 0755   Infiltration Assessment 0 03/06/22 0755   Dressing Status Clean, dry, & intact 03/06/22 0755   Dressing Type Tape;Transparent 03/06/22 0755   Hub Color/Line Status Pink;Capped 03/06/22 0755   Action Taken Open ports on tubing capped 03/06/22 0755   Alcohol Cap Used Yes 03/06/22 0755        Opportunity for questions and clarification was provided.       Patient transported with:   [employer]

## 2022-03-07 PROCEDURE — 74011250637 HC RX REV CODE- 250/637: Performed by: STUDENT IN AN ORGANIZED HEALTH CARE EDUCATION/TRAINING PROGRAM

## 2022-03-07 PROCEDURE — 94640 AIRWAY INHALATION TREATMENT: CPT

## 2022-03-07 PROCEDURE — 74011250637 HC RX REV CODE- 250/637: Performed by: INTERNAL MEDICINE

## 2022-03-07 PROCEDURE — 74011000250 HC RX REV CODE- 250: Performed by: STUDENT IN AN ORGANIZED HEALTH CARE EDUCATION/TRAINING PROGRAM

## 2022-03-07 PROCEDURE — 97530 THERAPEUTIC ACTIVITIES: CPT

## 2022-03-07 PROCEDURE — 97116 GAIT TRAINING THERAPY: CPT

## 2022-03-07 PROCEDURE — 74011250637 HC RX REV CODE- 250/637: Performed by: NURSE PRACTITIONER

## 2022-03-07 PROCEDURE — 94760 N-INVAS EAR/PLS OXIMETRY 1: CPT

## 2022-03-07 PROCEDURE — 65270000029 HC RM PRIVATE

## 2022-03-07 PROCEDURE — 97535 SELF CARE MNGMENT TRAINING: CPT

## 2022-03-07 PROCEDURE — 74011000250 HC RX REV CODE- 250: Performed by: INTERNAL MEDICINE

## 2022-03-07 PROCEDURE — 74011250636 HC RX REV CODE- 250/636: Performed by: STUDENT IN AN ORGANIZED HEALTH CARE EDUCATION/TRAINING PROGRAM

## 2022-03-07 PROCEDURE — 77010033678 HC OXYGEN DAILY

## 2022-03-07 RX ADMIN — TRAZODONE HYDROCHLORIDE 100 MG: 100 TABLET ORAL at 21:58

## 2022-03-07 RX ADMIN — IPRATROPIUM BROMIDE AND ALBUTEROL SULFATE 3 ML: .5; 3 SOLUTION RESPIRATORY (INHALATION) at 07:13

## 2022-03-07 RX ADMIN — SODIUM CHLORIDE, PRESERVATIVE FREE 10 ML: 5 INJECTION INTRAVENOUS at 14:30

## 2022-03-07 RX ADMIN — IPRATROPIUM BROMIDE AND ALBUTEROL SULFATE 3 ML: .5; 3 SOLUTION RESPIRATORY (INHALATION) at 19:48

## 2022-03-07 RX ADMIN — Medication 100 MG: at 09:43

## 2022-03-07 RX ADMIN — Medication: at 17:29

## 2022-03-07 RX ADMIN — SODIUM CHLORIDE, PRESERVATIVE FREE 10 ML: 5 INJECTION INTRAVENOUS at 07:08

## 2022-03-07 RX ADMIN — ENOXAPARIN SODIUM 40 MG: 100 INJECTION SUBCUTANEOUS at 09:42

## 2022-03-07 RX ADMIN — Medication 1 G: at 09:43

## 2022-03-07 RX ADMIN — BUDESONIDE 500 MCG: 0.5 INHALANT RESPIRATORY (INHALATION) at 19:48

## 2022-03-07 RX ADMIN — BUDESONIDE 500 MCG: 0.5 INHALANT RESPIRATORY (INHALATION) at 07:13

## 2022-03-07 RX ADMIN — GUAIFENESIN 600 MG: 600 TABLET, EXTENDED RELEASE ORAL at 21:58

## 2022-03-07 RX ADMIN — Medication 2 PACKET: at 10:28

## 2022-03-07 RX ADMIN — MIRTAZAPINE 15 MG: 15 TABLET ORAL at 21:58

## 2022-03-07 RX ADMIN — GUAIFENESIN 600 MG: 600 TABLET, EXTENDED RELEASE ORAL at 09:43

## 2022-03-07 RX ADMIN — Medication: at 14:30

## 2022-03-07 RX ADMIN — PANTOPRAZOLE SODIUM 40 MG: 40 TABLET, DELAYED RELEASE ORAL at 07:10

## 2022-03-07 RX ADMIN — DIVALPROEX SODIUM 500 MG: 500 TABLET, DELAYED RELEASE ORAL at 09:43

## 2022-03-07 RX ADMIN — FOLIC ACID 1 MG: 1 TABLET ORAL at 09:43

## 2022-03-07 RX ADMIN — BENZTROPINE MESYLATE 2 MG: 1 TABLET ORAL at 09:43

## 2022-03-07 RX ADMIN — SODIUM CHLORIDE, PRESERVATIVE FREE 10 ML: 5 INJECTION INTRAVENOUS at 21:59

## 2022-03-07 NOTE — PROGRESS NOTES
Problem: Self Care Deficits Care Plan (Adult)  Goal: *Acute Goals and Plan of Care (Insert Text)  Description: FUNCTIONAL STATUS PRIOR TO ADMISSION: Pt AxO at baseline and unable to provide accurate PLOF; however, chart indicates pt resides at adult group home. Pt reports no DME usage at baseline. HOME SUPPORT: The patient lived with group home staffing to provide ADL/IADL assist.    Occupational Therapy Goals  Initiated 2/27/2022, continued 3/7/2022  1. Patient will perform grooming with supervision within 7 day(s). 2.  Patient will perform lower body dressing with supervision within 7 day(s). 3.  Patient will perform bathing with supervision/set-up within 7 day(s). 4.  Patient will perform toilet transfers with supervision within 7 day(s). 5.  Patient will perform all aspects of toileting with supervision within 7 day(s). 3/7/2022 1455 by Eugenio Larios OT  Outcome: Progressing Towards Goal   OCCUPATIONAL THERAPY TREATMENT/WEEKLY RE-ASSESSMENT  Patient: Savi Lovett (89 y.o. male)  Date: 3/7/2022  Diagnosis: PNA (pneumonia) [J18.9] <principal problem not specified>       Precautions:  fall   Chart, occupational therapy assessment, plan of care, and goals were reviewed. ASSESSMENT  Patient continues with skilled OT services and is progressing towards goals. Pt's functional independence and performance of ADL/IADL tasks continues to be limited by impaired balance, decreased cardiopulmonary function (now on 2L NC), and impulsivity resulting in impaired safety awareness. Pt received sitting upright in bed and agreeable to participation in therapy session. He performed seated LB dressing with SBA. Following, pt ambulated to bathroom where he completed toileting and standing grooming with contact guard assistance. SpO2 stable on 2L NC throughout session 90-95%, and HR remaining in 90s-low 100s. Pt returned to supine and positioned for comfort.      Current Level of Function Impacting Discharge (ADLs): CGA toileting/standing grooming, SBA seated LB dressing    Other factors to consider for discharge: PLOF        PLAN :  Goals have been updated based on progression since last assessment. Patient continues to benefit from skilled intervention to address the above impairments. Continue to follow patient 2 times a week to address goals. Recommendation for discharge: (in order for the patient to meet his/her long term goals)  Therapy 3 hours per day 5-7 days per week, if not then SNF. If SNF not an option then return to group home with [facility]. This discharge recommendation:  Has been made in collaboration with the attending provider and/or case management    IF patient discharges home will need the following DME: TBD       SUBJECTIVE:   Patient stated I'll wash my hands now.     OBJECTIVE DATA SUMMARY:   Cognitive/Behavioral Status:  Neurologic State: Alert  Orientation Level: Oriented to person;Oriented to place; Disoriented to situation;Disoriented to time  Cognition: Follows commands             Functional Mobility and Transfers for ADLs:  Bed Mobility:  Supine to Sit: Supervision  Sit to Supine: Supervision  Scooting: Supervision    Transfers:  Sit to Stand: Contact guard assistance  Functional Transfers  Bathroom Mobility: Contact guard assistance  Toilet Transfer : Contact guard assistance       Balance:  Sitting: Intact  Standing: Impaired  Standing - Static: Good  Standing - Dynamic : Fair    ADL Intervention:       Grooming  Position Performed: Standing  Washing Hands: Contact guard assistance                   Lower Body Dressing Assistance  Socks: Stand-by assistance  Shoes with Cloth Laces: Stand-by assistance  Leg Crossed Method Used: No  Position Performed: Seated edge of bed    Toileting  Toileting Assistance: Contact guard assistance  Bowel Hygiene: Contact guard assistance  Clothing Management: Contact guard assistance           Pain:  None reported     Activity Tolerance:   Good and desaturates with exertion and requires oxygen    After treatment patient left in no apparent distress:   Supine in bed, Call bell within reach and Side rails x 3, PCT present to take vitals     COMMUNICATION/COLLABORATION:   The patients plan of care was discussed with: Registered nurse.      Santhosh Bojorquez OT  Time Calculation: 18 mins

## 2022-03-07 NOTE — PROGRESS NOTES
Name: Jennifer Mackay MRN: [id number]   : 1960 Hospital: [facility]   Date: 3/7/2022        IMPRESSION:   · True Hyponatremia. The constellation of his labs are consistent with SIADH. Patient's Na is now  normal  · H/O psychogenic polydipsia in the past  · Normal renal function  · Schizophrenia       PLAN:   · Continue oral fluid restriction - 800 ml/day  · Continue Ure Na  · Recheck the BMP in AM.  · Patient is at risk for hyponatremia in the future when he will have access to free water. Subjective/Interval History:   I have reviewed the flowsheet and previous days notes. ROS:Pertinent items are noted in HPI. Denies complaints. States he drinks a lot of water because he likes it. Objective:   Vital Signs:    Visit Vitals  BP (!) 157/95 (BP 1 Location: Left arm, BP Patient Position: At rest)   Pulse 90   Temp 97.5 °F (36.4 °C)   Resp 18   Ht 5' 10\" (1.778 m)   Wt 57.8 kg (127 lb 6.8 oz)   SpO2 95%   BMI 18.28 kg/m²       O2 Device: Nasal cannula   O2 Flow Rate (L/min): 2 l/min   Temp (24hrs), Av °F (36.7 °C), Min:97.5 °F (36.4 °C), Max:98.5 °F (36.9 °C)       Intake/Output:   Last shift:       07 -  190  In: 240 [P.O.:240]  Out: 500 [Urine:500]  Last 3 shifts:  190 -  0700  In: 0505 [P.O.:2760]  Out: 5200 [Urine:5200]    Intake/Output Summary (Last 24 hours) at 3/7/2022 1541  Last data filed at 3/7/2022 1123  Gross per 24 hour   Intake 720 ml   Output 2750 ml   Net -2030 ml        Physical Exam:  General:    Alert, cooperative, no distress, appears stated age. Thin   Head:   Normocephalic, without obvious abnormality, atraumatic. Eyes:   Conjunctivae/corneas clear. Nose:  Nares normal. No drainage or sinus tenderness. Throat:    Poor dentition  Neck:  Supple, symmetrical,  no adenopathy, thyroid: non tender    no carotid bruit and no JVD. Lungs:   Clear to auscultation bilaterally. No Wheezing or Rhonchi. No rales.   Chest wall:  No tenderness or deformity. No Accessory muscle use. Heart:   Regular rate and rhythm,  no murmur, rub or gallop. Abdomen:   Soft, non-tender. Not distended. Bowel sounds normal.  Extremities: Extremities normal, atraumatic, No cyanosis. No edema. No clubbing  Skin:     Texture, turgor normal. No rashes or lesions. Not Jaundiced  Psych:  Poor insight. Not depressed. Not anxious or agitated. DATA:  Labs:  Recent Labs     03/06/22  0413   *   K 4.5      CO2 30   BUN 16   CREA 0.59*   CA 8.7     Recent Labs     03/05/22  0353   WBC 13.7*   HGB 13.7   HCT 42.0        No results for input(s): ZECHARIAH, KU, CLU, CREAU in the last 72 hours.     No lab exists for component: PROU    Total time spent with patient:  35 minutes    [] Critical Care Provided    Care Plan discussed with:   Staff, Medical Team    Yimi Christopher MD

## 2022-03-07 NOTE — PROGRESS NOTES
RIGO:  Anticipate discharge to LTC bed in nursing home. RUR 11%    CM noted pt's transfer to [facility] over weekend and chart reviewed. Pt admitted from the [facility] home and had been there since 12/19/2021. Upon admission, staff at facility advised that pt is unable to return to the [facility] as they are unable to safely meet his care. Pt was unaware of this when CM reviewed with him. Prior to the [facility], he had lives at [facility] and prior to that [place]. Pt has diagnosis of intellectual disability and will require level 2 once Level 1 UAI screening completed. CM will complete this today and send request in. Chart indicates that pt's brother has not been able to be reached. Pt's sister Mina Rodney has been the family contact (representative), no ACP on file. CM will meet with pt to discuss discharge planning.     CORNELIA Redd

## 2022-03-07 NOTE — PROGRESS NOTES
Problem: Mobility Impaired (Adult and Pediatric)  Goal: *Acute Goals and Plan of Care (Insert Text)  Description: FUNCTIONAL STATUS PRIOR TO ADMISSION: Patient was independent and active without use of DME.    HOME SUPPORT PRIOR TO ADMISSION: The patient lived in a group home with staff to assist with IADLs. Physical Therapy Goals  Initiated 2/27/2022- Goals reassessed and remain appropriate 3/7/2022  1. Patient will move from supine to sit and sit to supine  in bed with independence within 7 day(s). 2.  Patient will transfer from bed to chair and chair to bed with independence using the least restrictive device within 7 day(s). 3.  Patient will perform sit to stand with independence within 7 day(s). 4.  Patient will ambulate with independence for 150 feet with the least restrictive device within 7 day(s). 5.  Patient will ascend/descend 4 stairs with 1 handrail(s) with modified independence within 7 day(s). Outcome: Progressing Towards Goal     PHYSICAL THERAPY TREATMENT: WEEKLY REASSESSMENT  Patient: Savi Lovett (88 y.o. male)  Date: 3/7/2022  Primary Diagnosis: PNA (pneumonia) [J18.9]        Precautions: Fall         ASSESSMENT  Patient continues with skilled PT services and is progressing towards goals. Pt greeted sitting cross legged in bed, agreeable to participate in therapy but denies ambulating in the halls or in his room over the weekend. Pt is currently requiring supervision for bed mobility and CGA for transfers and gait w/o AD. Pt greeted and left on 2L throughout activity with SpO2 >90% throughout ambulation trial. Encourage pt to ambulate 2-3 x/day with RN staff to maintain strength and functional endurance. Acute PT will continue to follow, rec additional rehab in the IPR vs SNF setting once medically stable.      Patient's progression toward goals since last assessment: improved medically, decreased supplemental O2 needs, now requiring 2L    Current Level of Function Impacting Discharge (mobility/balance): CGA transfers and gait    Functional Outcome Measure: The patient scored 70/100 on the Barthel Index outcome measure which is indicative of 30% functional impairment. Other factors to consider for discharge:          PLAN :  Goals have been updated based on progression since last assessment. Patient continues to benefit from skilled intervention to address the above impairments. Recommendations and Planned Interventions: bed mobility training, transfer training, gait training, therapeutic exercises, patient and family training/education, and therapeutic activities      Frequency/Duration: Patient will be followed by physical therapy:  3 times a week to address goals. Recommendation for discharge: (in order for the patient to meet his/her long term goals)  Therapy 3 hours per day 5-7 days per week    This discharge recommendation:  Has been made in collaboration with the attending provider and/or case management    IF patient discharges home will need the following DME: none         SUBJECTIVE:   Patient stated Jorge Luis Combs.     OBJECTIVE DATA SUMMARY:   HISTORY:    Past Medical History:   Diagnosis Date    Asthma     seldom,use inhaler    Bronchitis     Chronic obstructive pulmonary disease ([address]     Depression     anxiety    Psychiatric disorder     paranoid schizophrenia    Psychiatric disorder     extrapyramidal disease    Psychotic disorder ([facility] [address]     mental retardation     Past Surgical History:   Procedure Laterality Date    HX ORTHOPAEDIC      right knee       Personal factors and/or comorbidities impacting plan of care:     Home Situation  Home Environment: Group home  # Steps to Enter:  (pt unable to recall)  Living Alone: No  Support Systems: Other Family Member(s),Assisted Living  Patient Expects to be Discharged to[de-identified] Group home  Current DME Used/Available at Home: None  Tub or Shower Type: Shower    EXAMINATION/PRESENTATION/DECISION MAKING:   Critical Behavior:  Neurologic State: Alert  Orientation Level: Disoriented to situation,Oriented to person,Oriented to place,Oriented to time  Cognition: Follows commands  Safety/Judgement: Decreased awareness of environment,Decreased awareness of need for assistance,Decreased awareness of need for safety,Decreased insight into deficits  Hearing: Auditory  Auditory Impairment: None  Skin:  Defer to RN notes  Edema: Defer to RN notes  Range Of Motion:  AROM: Within functional limits           PROM: Within functional limits           Strength:    Strength: Generally decreased, functional                    Tone & Sensation:   Tone: Normal              Sensation: Intact               Coordination:  Coordination: Within functional limits  Vision:      Functional Mobility:  Bed Mobility:     Supine to Sit: Supervision  Sit to Supine: Supervision  Scooting: Supervision  Transfers:  Sit to Stand: Contact guard assistance  Stand to Sit: Contact guard assistance                       Balance:   Sitting: Intact  Standing: Impaired  Standing - Static: Good  Standing - Dynamic : Fair  Ambulation/Gait Training:  Distance (ft): 160 Feet (ft)  Assistive Device: Gait belt  Ambulation - Level of Assistance: Contact guard assistance        Gait Abnormalities: Decreased step clearance;Trunk sway increased              Speed/Sabina: Slow       Functional Measure:  Barthel Index:    Bathin  Bladder: 10  Bowels: 10  Groomin  Dressing: 10  Feedin  Mobility: 10  Stairs: 5  Toilet Use: 5  Transfer (Bed to Chair and Back): 10  Total: 70/100       The Barthel ADL Index: Guidelines  1. The index should be used as a record of what a patient does, not as a record of what a patient could do. 2. The main aim is to establish degree of independence from any help, physical or verbal, however minor and for whatever reason. 3. The need for supervision renders the patient not independent.   4. A patient's performance should be established using the best available evidence. Asking the patient, friends/relatives and nurses are the usual sources, but direct observation and common sense are also important. However direct testing is not needed. 5. Usually the patient's performance over the preceding 24-48 hours is important, but occasionally longer periods will be relevant. 6. Middle categories imply that the patient supplies over 50 per cent of the effort. 7. Use of aids to be independent is allowed. Score Interpretation (from [facility])    Independent   60-79 Minimally independent   40-59 Partially dependent   20-39 Very dependent   <20 Totally dependent     -Sirena Seay., Barthel, D.W. (1965). Functional evaluation: the Barthel Index. [address] ([address] (1997). The Barthel activities of daily living index: self-reporting versus actual performance in the old (> or = 75 years). Journal of [address] [place] [address] 45(7), [facility], RAMIRO, George Macias., Kyra Bennett. (1999). Measuring the change in disability after inpatient rehabilitation; comparison of the responsiveness of the Barthel Index and Functional [place] Measure. Journal of Neurology, Neurosurgery, and Psychiatry, 66(4), 710-173. Mainor Garnica, N.J.A, NATE Young, & Percy Montelongo M.A. (2004) Assessment of post-stroke quality of life in cost-effectiveness studies: The usefulness of the Barthel Index and the EuroQoL-5D. Quality of Life Research, 13, 427-43          Pain Rating:  Denied pain    Activity Tolerance:   Good and desaturates with exertion and requires oxygen    After treatment patient left in no apparent distress:   Supine in bed and Call bell within reach    COMMUNICATION/EDUCATION:   The patients plan of care was discussed with: Registered nurse. Fall prevention education was provided and the patient/caregiver indicated understanding.  and Patient/family have participated as able in goal setting and plan of care.     Thank you for this referral.  Hilda Bryant PT, DPT   Time Calculation: 23 mins

## 2022-03-07 NOTE — PROGRESS NOTES
[facility]                                                                                          Hospitalist Progress Note  Steven Reed MD  Answering service: [phone] from in house phone        Date of Service:  3/7/2022  NAME:  Dewayne Mcdowell  :  1960  MRN:  [id number]      Admission Summary:   Tesfaye Hartman is a 57 y.o. male smoker history of schizo affective disorder. Bipolar depression, COPD, asthma emphysema/bronchitis, history of psychogenic polydipsia, resides in a group home, GERD presents with altered mental status. Unable to obtain history from patient as he is altered. Reported confusion and congestive cough from his group home prompted ED arrival.  In the ER patient was placed on 4 L nasal cannula desatted to mid 80s stat ABG showed compensated respiratory acidosis w/ hypoxia, patient was placed on mid flow currently stable 5L midflow sats low 90s. Patient encephalopathic on my exam.  Notable for WBC count of 13, T-max 102.1F, lactic 0.4.  BP soft but maps greater than 60.\"    Interval history / Subjective:   Patient seen examined at bedside  2L NC  feels well  NAD  Pending placement     Assessment & Plan:     Acute on chronic hypoxic respiratory failure, improved, back to baseline 2-3L NC  COPD Exacerbation  Left lower lobe pneumonia   Chronic tobacco dependence  -patient not septic  -covid neg    -CTA chest neg for PE confirmed LLL PNA with effusion   -Chest x-ray repeated 3/1 with improvement  -pulm evaluated appreciate recs, patient of Dr. Roman Domingo, was being follow-up for lung nodule with prior bronc negative for malignancy  -Continue neb, Pulmicort, s/p steroids  -s/p IV abx x7d  -f/u with Dr Roman Domingo  -bld cx ng, mycoplasma/legionella neg,  pct 0.18     Acute metabolic encephalopathy -improved   likely Secondary to above  CT head negative, UA neg  -tsh, b12, folate wnl  -depakote level normal   -speech eval, passed soft and easy to chew      Hyponatremia -improving/stable  History of psychogenic polydipsia in the past urine studies appear like possible SIADH  -nephro following, appreciate input  -am cortisol wnl   -800ml fluid restriction, started ure-na 2/28, cont same course   -will be difficult to control and discharge when he has access to free water  Ok to Dc SSRI per psych    HTN controlled off med    Schizoaffective disorder,  Depression subtype   -psych consulted for evaluation, cont depakote, trazodone, cogentin; remeron started    GERD  Continue home med     Patient has very poor insight on his clinical condition    Code status: full  DVT prophylaxis: lovenox     PT/OT following - no needs     Care Plan discussed with: Patient/Family  Anticipated [address] home awaiting placement  Anticipated Discharge: [facility]  Date Reviewed: 2/22/2022          Codes Class Noted POA    PNA (pneumonia) ICD-10-CM: J18.9  ICD-9-CM: 486  2/26/2022 Unknown                Review of Systems:   A comprehensive review of systems was negative except for that written in the HPI. Vital Signs:    Last 24hrs VS reviewed since prior progress note. Most recent are:  Visit Vitals  BP (!) 157/95 (BP 1 Location: Left arm, BP Patient Position: At rest)   Pulse 90   Temp 97.5 °F (36.4 °C)   Resp 18   Ht 5' 10\" (1.778 m)   Wt 57.8 kg (127 lb 6.8 oz)   SpO2 95%   BMI 18.28 kg/m²         Intake/Output Summary (Last 24 hours) at 3/7/2022 1456  Last data filed at 3/7/2022 1123  Gross per 24 hour   Intake 720 ml   Output 2750 ml   Net -2030 ml        Physical Examination:     I had a face to face encounter with this patient and independently examined them on 3/7/2022 as outlined below:          Constitutional:  No acute distress, cooperative, pleasant    ENT:  Oral mucosa moist, oropharynx benign   Resp:  good AE, clear, No wheezing/rhonchi/rales.  No accessory muscle use   CV:  Regular rhythm, normal rate, no murmurs    GI:  Soft, non distended, non tender    Musculoskeletal:  No edema, warm    Neurologic:  Moves all extremities. SUNNY BRASHER II-XII reviewed            Data Review:    Review and/or order of clinical lab test  Review and/or order of tests in the radiology section of CPT  Review and/or order of tests in the medicine section of CPT      Labs:     Recent Labs     03/05/22  0353   WBC 13.7*   HGB 13.7   HCT 42.0        Recent Labs     03/06/22  0413   *   K 4.5      CO2 30   BUN 16   CREA 0.59*   *   CA 8.7     No results for input(s): ALT, AP, TBIL, TBILI, TP, ALB, GLOB, GGT, AML, LPSE in the last 72 hours. No lab exists for component: SGOT, GPT, AMYP, HLPSE  No results for input(s): INR, PTP, APTT, INREXT, INREXT in the last 72 hours. No results for input(s): FE, TIBC, PSAT, FERR in the last 72 hours. Lab Results   Component Value Date/Time    Folate 27.2 (H) 02/26/2022 09:31 AM      No results for input(s): PH, PCO2, PO2 in the last 72 hours. No results for input(s): CPK, CKNDX, TROIQ in the last 72 hours.     No lab exists for component: CPKMB  Lab Results   Component Value Date/Time    Cholesterol, total 181 10/17/2018 10:48 AM    HDL Cholesterol 78 10/17/2018 10:48 AM    LDL, calculated 89 10/17/2018 10:48 AM    Triglyceride 71 10/17/2018 10:48 AM     Lab Results   Component Value Date/Time    Glucose (POC) 132 (H) 07/04/2017 04:37 PM    Glucose, POC 92 02/26/2022 09:23 AM     Lab Results   Component Value Date/Time    Color YELLOW/STRAW 02/26/2022 09:41 AM    Appearance CLEAR 02/26/2022 09:41 AM    Specific gravity 1.013 02/26/2022 09:41 AM    pH (UA) 7.0 02/26/2022 09:41 AM    Protein Negative 02/26/2022 09:41 AM    Glucose Negative 02/26/2022 09:41 AM    Ketone TRACE (A) 02/26/2022 09:41 AM    Bilirubin Negative 02/26/2022 09:41 AM    Urobilinogen 1.0 02/26/2022 09:41 AM    Nitrites Negative 02/26/2022 09:41 AM    Leukocyte Esterase Negative 02/26/2022 09:41 AM    Epithelial cells FEW 02/26/2022 09:41 AM    Bacteria Negative 02/26/2022 09:41 AM    WBC 0-4 02/26/2022 09:41 AM    RBC 0-5 02/26/2022 09:41 AM         Medications Reviewed:     Current Facility-Administered Medications   Medication Dose Route Frequency    mirtazapine (REMERON) tablet 15 mg  15 mg Oral QHS    albuterol-ipratropium (DUO-NEB) 2.5 MG-0.5 MG/3 ML  3 mL Nebulization BID RT    balsam peru-castor oiL (VENELEX) ointment   Topical BID    urea (URE-NA) 15 gram packet 2 Packet  2 Packet Oral DAILY    sodium chloride (NS) flush 5-10 mL  5-10 mL IntraVENous PRN    sodium chloride (NS) flush 5-40 mL  5-40 mL IntraVENous Q8H    sodium chloride (NS) flush 5-40 mL  5-40 mL IntraVENous PRN    acetaminophen (TYLENOL) tablet 650 mg  650 mg Oral Q6H PRN    Or    acetaminophen (TYLENOL) suppository 650 mg  650 mg Rectal Q6H PRN    polyethylene glycol (MIRALAX) packet 17 g  17 g Oral DAILY PRN    ondansetron (ZOFRAN ODT) tablet 4 mg  4 mg Oral Q8H PRN    Or    ondansetron (ZOFRAN) injection 4 mg  4 mg IntraVENous Q6H PRN    enoxaparin (LOVENOX) injection 40 mg  40 mg SubCUTAneous DAILY    [Held by provider] amLODIPine (NORVASC) tablet 5 mg  5 mg Oral DAILY    benztropine (COGENTIN) tablet 2 mg  2 mg Oral DAILY    divalproex DR (DEPAKOTE) tablet 500 mg  500 mg Oral DAILY    folic acid (FOLVITE) tablet 1 mg  1 mg Oral DAILY    ergocalciferol capsule 50,000 Units  50,000 Units Oral Q30D    lactulose (CHRONULAC) 10 gram/15 mL solution 5 mL  5 mL Oral DAILY PRN    pantoprazole (PROTONIX) tablet 40 mg  40 mg Oral ACB    thiamine HCL (B-1) tablet 100 mg  100 mg Oral DAILY    traZODone (DESYREL) tablet 100 mg  100 mg Oral QHS    albuterol-ipratropium (DUO-NEB) 2.5 MG-0.5 MG/3 ML  3 mL Nebulization Q4H PRN    budesonide (PULMICORT) 500 mcg/2 ml nebulizer suspension  500 mcg Nebulization BID RT    sodium chloride tablet 1 g  1 g Oral DAILY    guaiFENesin ER (MUCINEX) tablet 600 mg  600 mg Oral Q12H    benzonatate (TESSALON) capsule 100 mg  100 mg Oral TID PRN     ______________________________________________________________________  EXPECTED LENGTH OF STAY: 4d 2h  ACTUAL LENGTH OF STAY:          Raven Haynes MD

## 2022-03-08 PROCEDURE — 65270000029 HC RM PRIVATE

## 2022-03-08 PROCEDURE — 74011250637 HC RX REV CODE- 250/637: Performed by: NURSE PRACTITIONER

## 2022-03-08 PROCEDURE — 74011250637 HC RX REV CODE- 250/637: Performed by: STUDENT IN AN ORGANIZED HEALTH CARE EDUCATION/TRAINING PROGRAM

## 2022-03-08 PROCEDURE — 94640 AIRWAY INHALATION TREATMENT: CPT

## 2022-03-08 PROCEDURE — 74011250637 HC RX REV CODE- 250/637: Performed by: INTERNAL MEDICINE

## 2022-03-08 PROCEDURE — 74011250636 HC RX REV CODE- 250/636: Performed by: STUDENT IN AN ORGANIZED HEALTH CARE EDUCATION/TRAINING PROGRAM

## 2022-03-08 PROCEDURE — 74011000250 HC RX REV CODE- 250: Performed by: INTERNAL MEDICINE

## 2022-03-08 PROCEDURE — 74011000250 HC RX REV CODE- 250: Performed by: STUDENT IN AN ORGANIZED HEALTH CARE EDUCATION/TRAINING PROGRAM

## 2022-03-08 RX ORDER — METOPROLOL TARTRATE 25 MG/1
12.5 TABLET, FILM COATED ORAL EVERY 12 HOURS
Status: DISCONTINUED | OUTPATIENT
Start: 2022-03-08 | End: 2022-03-16 | Stop reason: HOSPADM

## 2022-03-08 RX ADMIN — BUDESONIDE 500 MCG: 0.5 INHALANT RESPIRATORY (INHALATION) at 19:47

## 2022-03-08 RX ADMIN — METOPROLOL TARTRATE 12.5 MG: 50 TABLET, FILM COATED ORAL at 22:18

## 2022-03-08 RX ADMIN — IPRATROPIUM BROMIDE AND ALBUTEROL SULFATE 3 ML: .5; 3 SOLUTION RESPIRATORY (INHALATION) at 08:22

## 2022-03-08 RX ADMIN — Medication 1 G: at 08:35

## 2022-03-08 RX ADMIN — IPRATROPIUM BROMIDE AND ALBUTEROL SULFATE 3 ML: .5; 3 SOLUTION RESPIRATORY (INHALATION) at 19:47

## 2022-03-08 RX ADMIN — SODIUM CHLORIDE, PRESERVATIVE FREE 10 ML: 5 INJECTION INTRAVENOUS at 22:18

## 2022-03-08 RX ADMIN — GUAIFENESIN 600 MG: 600 TABLET, EXTENDED RELEASE ORAL at 08:35

## 2022-03-08 RX ADMIN — SODIUM CHLORIDE, PRESERVATIVE FREE 10 ML: 5 INJECTION INTRAVENOUS at 06:48

## 2022-03-08 RX ADMIN — SODIUM CHLORIDE, PRESERVATIVE FREE 10 ML: 5 INJECTION INTRAVENOUS at 13:24

## 2022-03-08 RX ADMIN — BENZTROPINE MESYLATE 2 MG: 1 TABLET ORAL at 08:35

## 2022-03-08 RX ADMIN — Medication: at 08:34

## 2022-03-08 RX ADMIN — Medication 100 MG: at 08:35

## 2022-03-08 RX ADMIN — GUAIFENESIN 600 MG: 600 TABLET, EXTENDED RELEASE ORAL at 22:18

## 2022-03-08 RX ADMIN — FOLIC ACID 1 MG: 1 TABLET ORAL at 08:35

## 2022-03-08 RX ADMIN — Medication: at 17:22

## 2022-03-08 RX ADMIN — DIVALPROEX SODIUM 500 MG: 500 TABLET, DELAYED RELEASE ORAL at 08:35

## 2022-03-08 RX ADMIN — ENOXAPARIN SODIUM 40 MG: 100 INJECTION SUBCUTANEOUS at 08:35

## 2022-03-08 RX ADMIN — METOPROLOL TARTRATE 12.5 MG: 50 TABLET, FILM COATED ORAL at 13:27

## 2022-03-08 RX ADMIN — Medication 2 PACKET: at 08:34

## 2022-03-08 RX ADMIN — MIRTAZAPINE 15 MG: 15 TABLET ORAL at 22:18

## 2022-03-08 RX ADMIN — PANTOPRAZOLE SODIUM 40 MG: 40 TABLET, DELAYED RELEASE ORAL at 06:48

## 2022-03-08 RX ADMIN — TRAZODONE HYDROCHLORIDE 100 MG: 100 TABLET ORAL at 22:18

## 2022-03-08 RX ADMIN — BUDESONIDE 500 MCG: 0.5 INHALANT RESPIRATORY (INHALATION) at 08:22

## 2022-03-08 NOTE — PROGRESS NOTES
RIGO:  Anticipate discharge to LTC bed in nursing home. Level 2 has been submitted 3/7 and  assigned for pt evaluation today 3/8.      RUR 11%    CM received call from [facility] staff, Destini Taylor, phone [phone] and an  has been assigned to complete level 2 assessment. This is necessary for nursing home admission due to pt's diagnosis of intellectual disability. CM will continue to follow. Scot Schaefer, MSW      2:15pm  CM spoke with Rayne Tan, phone [phone] multiple times this afternoon. Spoke of discussions of [facility] placement vs nursing home placement. At first brother had reservations about nursing facility placement. Discussed different level of care needs. Also pt has been hospitalized 3 times in the past month, [facility] and [facility] due to hyponatremia and required intubation. After discussion with bedside nurse as well as this CM, brother agrees that pt needs full time nursing care related to use of oxygen, previously was smoking with oxygen at [facility] as well as managing chronic hyponatremia (requiring intubation earlier this month) and fluid restriction. Level 2 is pending. Referrals sent to [facility], [facility], [facility] SNF. Expect pt may be here until this Friday vs following Mon/Tues pending result of level 2. CM also called Marek Leos, phone [phone], mental health skill building counselor at [facility]. Provided updates on discharge planning. Knowing pt, she is in agreement with nursing home placement due to increasing care needs.       Scot Schaefer, MSW

## 2022-03-08 NOTE — PROGRESS NOTES
[facility] Adult  Hospitalist Group                                                                                          Hospitalist Progress Note  Fabricio Gage MD  Answering service: [phone] from in house phone        Date of Service:  3/8/2022  NAME:  Alton Montalvo  :  1960  MRN:  [id number]      Admission Summary:   Alexander Cannon is a 57 y.o. male smoker history of schizo affective disorder. Bipolar depression, COPD, asthma emphysema/bronchitis, history of psychogenic polydipsia, resides in a group home, GERD presents with altered mental status. Unable to obtain history from patient as he is altered. Reported confusion and congestive cough from his group home prompted ED arrival.  In the ER patient was placed on 4 L nasal cannula desatted to mid 80s stat ABG showed compensated respiratory acidosis w/ hypoxia, patient was placed on mid flow currently stable 5L midflow sats low 90s. Patient encephalopathic on my exam.  Notable for WBC count of 13, T-max 102.1F, lactic 0.4.  BP soft but maps greater than 60.\"    Interval history / Subjective:   Patient seen examined at bedside  sats 96% on RA at rest, will check with ambulation to see if still need O2 at dc  Pt feels well, no sob  NAD  Pending placement  Mild sinus tachy ongoing     Assessment & Plan:     Acute on chronic hypoxic respiratory failure, resolved, back to baseline 2-3L NC  But may be able to wean off completely- eval sats on RA with ambulation  COPD Exacerbation  Left lower lobe pneumonia   Chronic tobacco dependence  -patient not septic  -covid neg    -CTA chest neg for PE confirmed LLL PNA with effusion   -Chest x-ray repeated 3/1 with improvement  -pulm evaluated appreciate recs, patient of Dr. Rea Johnson, was being follow-up for lung nodule with prior bronc negative for malignancy  -Continue neb, Pulmicort, s/p steroids  -s/p IV abx x7d  -f/u with Dr Rea Johnson  -bld cx ng, mycoplasma/legionella neg,  pct 0.18    Mild sinus tachycardia ongoing  Start low dose metoprolol  monitor     Acute metabolic encephalopathy -improved   likely Secondary to above  CT head negative, UA neg  -tsh, b12, folate wnl  -depakote level normal   -speech eval, passed soft and easy to chew      Hyponatremia -improving/stable  History of psychogenic polydipsia in the past urine studies appear like possible SIADH  -nephro following, appreciate input  -am cortisol wnl   -800ml fluid restriction, started ure-na 2/28, cont same course   -will be difficult to control and discharge when he has access to free water  Ok to Dc SSRI per psych    HTN overall controlled holding norvasc    Schizoaffective disorder,  Depression subtype   -psych consulted for evaluation, cont depakote, trazodone, cogentin; remeron started    GERD  Continue home med     Patient has poor insight on his clinical condition    Code status: full  DVT prophylaxis: lovenox     PT/OT following    Care Plan discussed with: Patient/Family and Nurse  Anticipated Disposition: awaiting placement  Anticipated Discharge: 24-48h      Hospital Problems  Date Reviewed: 2/22/2022          Codes Class Noted POA    PNA (pneumonia) ICD-10-CM: J18.9  ICD-9-CM: 785  2/26/2022 Unknown              Review of Systems:   A comprehensive review of systems was negative except for that written in the HPI. Vital Signs:    Last 24hrs VS reviewed since prior progress note.  Most recent are:  Visit Vitals  /73 (BP 1 Location: Left upper arm, BP Patient Position: At rest)   Pulse (!) 117   Temp 97.7 °F (36.5 °C)   Resp 18   Ht 5' 10\" (1.778 m)   Wt 57.8 kg (127 lb 6.8 oz)   SpO2 95%   BMI 18.28 kg/m²         Intake/Output Summary (Last 24 hours) at 3/8/2022 1233  Last data filed at 3/8/2022 6841  Gross per 24 hour   Intake 240 ml   Output 850 ml   Net -610 ml        Physical Examination:     I had a face to face encounter with this patient and independently examined them on 3/8/2022 as outlined below:          Constitutional:  No acute distress, cooperative, pleasant    ENT:  Oral mucosa moist, oropharynx benign   Resp:  good AE, clear, No wheezing/rhonchi/rales. No accessory muscle use   CV:  Regular rhythm, normal rate    GI:  Soft, non distended, non tender    Musculoskeletal:  No edema, warm    Neurologic:  Moves all extremities. SUNNY BRASHER II-XII reviewed            Data Review:    Review and/or order of clinical lab test  Review and/or order of tests in the radiology section of CPT  Review and/or order of tests in the medicine section of CPT      Labs:     No results for input(s): WBC, HGB, HCT, PLT, HGBEXT, HCTEXT, PLTEXT, HGBEXT, HCTEXT, PLTEXT in the last 72 hours. Recent Labs     03/06/22  0413   *   K 4.5      CO2 30   BUN 16   CREA 0.59*   *   CA 8.7     No results for input(s): ALT, AP, TBIL, TBILI, TP, ALB, GLOB, GGT, AML, LPSE in the last 72 hours. No lab exists for component: SGOT, GPT, AMYP, HLPSE  No results for input(s): INR, PTP, APTT, INREXT, INREXT in the last 72 hours. No results for input(s): FE, TIBC, PSAT, FERR in the last 72 hours. Lab Results   Component Value Date/Time    Folate 27.2 (H) 02/26/2022 09:31 AM      No results for input(s): PH, PCO2, PO2 in the last 72 hours. No results for input(s): CPK, CKNDX, TROIQ in the last 72 hours.     No lab exists for component: CPKMB  Lab Results   Component Value Date/Time    Cholesterol, total 181 10/17/2018 10:48 AM    HDL Cholesterol 78 10/17/2018 10:48 AM    LDL, calculated 89 10/17/2018 10:48 AM    Triglyceride 71 10/17/2018 10:48 AM     Lab Results   Component Value Date/Time    Glucose (POC) 132 (H) 07/04/2017 04:37 PM    Glucose, POC 92 02/26/2022 09:23 AM     Lab Results   Component Value Date/Time    Color YELLOW/STRAW 02/26/2022 09:41 AM    Appearance CLEAR 02/26/2022 09:41 AM    Specific gravity 1.013 02/26/2022 09:41 AM    pH (UA) 7.0 02/26/2022 09:41 AM    Protein Negative 02/26/2022 09:41 AM Glucose Negative 02/26/2022 09:41 AM    Ketone TRACE (A) 02/26/2022 09:41 AM    Bilirubin Negative 02/26/2022 09:41 AM    Urobilinogen 1.0 02/26/2022 09:41 AM    Nitrites Negative 02/26/2022 09:41 AM    Leukocyte Esterase Negative 02/26/2022 09:41 AM    Epithelial cells FEW 02/26/2022 09:41 AM    Bacteria Negative 02/26/2022 09:41 AM    WBC 0-4 02/26/2022 09:41 AM    RBC 0-5 02/26/2022 09:41 AM         Medications Reviewed:     Current Facility-Administered Medications   Medication Dose Route Frequency    mirtazapine (REMERON) tablet 15 mg  15 mg Oral QHS    albuterol-ipratropium (DUO-NEB) 2.5 MG-0.5 MG/3 ML  3 mL Nebulization BID RT    balsam peru-castor oiL (VENELEX) ointment   Topical BID    urea (URE-NA) 15 gram packet 2 Packet  2 Packet Oral DAILY    sodium chloride (NS) flush 5-10 mL  5-10 mL IntraVENous PRN    sodium chloride (NS) flush 5-40 mL  5-40 mL IntraVENous Q8H    sodium chloride (NS) flush 5-40 mL  5-40 mL IntraVENous PRN    acetaminophen (TYLENOL) tablet 650 mg  650 mg Oral Q6H PRN    Or    acetaminophen (TYLENOL) suppository 650 mg  650 mg Rectal Q6H PRN    polyethylene glycol (MIRALAX) packet 17 g  17 g Oral DAILY PRN    ondansetron (ZOFRAN ODT) tablet 4 mg  4 mg Oral Q8H PRN    Or    ondansetron (ZOFRAN) injection 4 mg  4 mg IntraVENous Q6H PRN    enoxaparin (LOVENOX) injection 40 mg  40 mg SubCUTAneous DAILY    [Held by provider] amLODIPine (NORVASC) tablet 5 mg  5 mg Oral DAILY    benztropine (COGENTIN) tablet 2 mg  2 mg Oral DAILY    divalproex DR (DEPAKOTE) tablet 500 mg  500 mg Oral DAILY    folic acid (FOLVITE) tablet 1 mg  1 mg Oral DAILY    ergocalciferol capsule 50,000 Units  50,000 Units Oral Q30D    lactulose (CHRONULAC) 10 gram/15 mL solution 5 mL  5 mL Oral DAILY PRN    pantoprazole (PROTONIX) tablet 40 mg  40 mg Oral ACB    thiamine HCL (B-1) tablet 100 mg  100 mg Oral DAILY    traZODone (DESYREL) tablet 100 mg  100 mg Oral QHS    albuterol-ipratropium (DUO-NEB) 2.5 MG-0.5 MG/3 ML  3 mL Nebulization Q4H PRN    budesonide (PULMICORT) 500 mcg/2 ml nebulizer suspension  500 mcg Nebulization BID RT    sodium chloride tablet 1 g  1 g Oral DAILY    guaiFENesin ER (MUCINEX) tablet 600 mg  600 mg Oral Q12H    benzonatate (TESSALON) capsule 100 mg  100 mg Oral TID PRN     ______________________________________________________________________  EXPECTED LENGTH OF STAY: 4d 2h  ACTUAL LENGTH OF STAY:          10                 Lam Joe MD

## 2022-03-08 NOTE — WOUND CARE
WOCN Note:      Follow up for assessment of posterior ears.     Chart reviewed. Assessed in room 623. Admitted DX:  PNA (pneumonia)       Past Medical History:   Diagnosis Date    Asthma       seldom,use inhaler    Bronchitis      Chronic obstructive pulmonary disease ([facility] [address]      Depression       anxiety    Psychiatric disorder       paranoid schizophrenia    Psychiatric disorder       extrapyramidal disease    Psychotic disorder ([facility] [address]       mental retardation      Assessment:   Patient is alert, communicative, continent and independently changes position. Bed: foam mattress  Patient reports no pain. Sacrum and buttocks intact without erythema.      1. POA Left posterior ear, dry scab: 0.5 x 0.5 x 0 cm 100% red; no exudate; no odor. Periwound without erythema.       Wound, Pressure Prevention & Skin Care Recommendations:    1. Minimize layers of linen/pads under patient to optimize support surface. 2.  Turn/reposition approximately every 2 hours and offload heels. 3.  Manage moisture/ Keep skin folds clean and dry/minimize brief usage. 4.  posterior ears:  keep protected with grey foam oxygen tubing covers when using oxygen; apply Venelex BID. 5.  Use waffle cushion over bed alarm.   6.  Sacrum:  Large sacral foam dressing.     Discussed above plan with patient and Susan Rosas RN.     Transition of Care:   Plan to follow as needed while admitted to hospital.     RAJINDER GuzmanN APOLONIA [facility]  Available on [employer]  Office [phone]

## 2022-03-08 NOTE — PROGRESS NOTES
Name: Rosaura Segundo MRN: [id number]   : 1960 Hospital: [facility]   Date: 3/8/2022        IMPRESSION:   · True Hyponatremia. The constellation of his labs are consistent with SIADH. Patient's Na is now  normal  · H/O psychogenic polydipsia in the past  · Normal renal function  · Schizophrenia       PLAN:   · Continue oral fluid restriction - 800 ml/day  · Continue Ure Na  · Recheck the BMP in AM.  · Patient is at risk for hyponatremia in the future when he will have access to free water. Subjective/Interval History:   I have reviewed the flowsheet and previous days notes. ROS:Pertinent items are noted in HPI. Denies complaints. States he drinks a lot of water because he likes it. Objective:   Vital Signs:    Visit Vitals  BP (!) 142/97 (BP 1 Location: Left upper arm, BP Patient Position: At rest)   Pulse 91   Temp 98.8 °F (37.1 °C)   Resp 18   Ht 5' 10\" (1.778 m)   Wt 57.8 kg (127 lb 6.8 oz)   SpO2 96%   BMI 18.28 kg/m²       O2 Device: None (Room air)   O2 Flow Rate (L/min): 2 l/min   Temp (24hrs), Av.1 °F (36.7 °C), Min:97.7 °F (36.5 °C), Max:98.8 °F (37.1 °C)       Intake/Output:   Last shift:       07 -  190  In: 240 [P.O.:240]  Out: -   Last 3 shifts:  190 -  0700  In: 240 [P.O.:240]  Out: 2800 [Urine:2800]    Intake/Output Summary (Last 24 hours) at 3/8/2022 1517  Last data filed at 3/8/2022 6498  Gross per 24 hour   Intake 240 ml   Output 850 ml   Net -610 ml        Physical Exam:  General:    Alert, cooperative, no distress, appears stated age. Thin   Head:   Normocephalic, without obvious abnormality, atraumatic. Eyes:   Conjunctivae/corneas clear. Nose:  Nares normal. No drainage or sinus tenderness. Throat:    Poor dentition  Neck:  Supple, symmetrical,  no adenopathy, thyroid: non tender    no carotid bruit and no JVD. Lungs:   Clear to auscultation bilaterally. No Wheezing or Rhonchi. No rales. Chest wall:  No tenderness or deformity. No Accessory muscle use. Heart:   Regular rate and rhythm,  no murmur, rub or gallop. Abdomen:   Soft, non-tender. Not distended. Bowel sounds normal.  Extremities: Extremities normal, atraumatic, No cyanosis. No edema. No clubbing  Skin:     Texture, turgor normal. No rashes or lesions. Not Jaundiced  Psych:  Poor insight. Not depressed. Not anxious or agitated. DATA:  Labs:  Recent Labs     03/06/22  0413   *   K 4.5      CO2 30   BUN 16   CREA 0.59*   CA 8.7     No results for input(s): WBC, HGB, HCT, PLT, HGBEXT, HCTEXT, PLTEXT, HGBEXT, HCTEXT, PLTEXT in the last 72 hours. No results for input(s): ZECHARIAH, KU, CLU, CREAU in the last 72 hours.     No lab exists for component: PROU    Total time spent with patient:  35 minutes    [] Critical Care Provided    Care Plan discussed with:   Staff, Medical Team    Kelsie Sexton MD

## 2022-03-09 LAB
ANION GAP SERPL CALC-SCNC: 7 MMOL/L (ref 5–15)
BUN SERPL-MCNC: 15 MG/DL (ref 6–20)
BUN/CREAT SERPL: 26 (ref 12–20)
CALCIUM SERPL-MCNC: 8.8 MG/DL (ref 8.5–10.1)
CHLORIDE SERPL-SCNC: 93 MMOL/L (ref 97–108)
CO2 SERPL-SCNC: 26 MMOL/L (ref 21–32)
CREAT SERPL-MCNC: 0.57 MG/DL (ref 0.7–1.3)
GLUCOSE SERPL-MCNC: 87 MG/DL (ref 65–100)
MAGNESIUM SERPL-MCNC: 2.1 MG/DL (ref 1.6–2.4)
POTASSIUM SERPL-SCNC: 3.9 MMOL/L (ref 3.5–5.1)
SODIUM SERPL-SCNC: 126 MMOL/L (ref 136–145)

## 2022-03-09 PROCEDURE — 74011250637 HC RX REV CODE- 250/637: Performed by: INTERNAL MEDICINE

## 2022-03-09 PROCEDURE — 36415 COLL VENOUS BLD VENIPUNCTURE: CPT

## 2022-03-09 PROCEDURE — 65270000029 HC RM PRIVATE

## 2022-03-09 PROCEDURE — 80048 BASIC METABOLIC PNL TOTAL CA: CPT

## 2022-03-09 PROCEDURE — 74011000250 HC RX REV CODE- 250: Performed by: INTERNAL MEDICINE

## 2022-03-09 PROCEDURE — 97116 GAIT TRAINING THERAPY: CPT | Performed by: PHYSICAL THERAPIST

## 2022-03-09 PROCEDURE — 97535 SELF CARE MNGMENT TRAINING: CPT

## 2022-03-09 PROCEDURE — 74011250636 HC RX REV CODE- 250/636: Performed by: STUDENT IN AN ORGANIZED HEALTH CARE EDUCATION/TRAINING PROGRAM

## 2022-03-09 PROCEDURE — 74011250637 HC RX REV CODE- 250/637: Performed by: STUDENT IN AN ORGANIZED HEALTH CARE EDUCATION/TRAINING PROGRAM

## 2022-03-09 PROCEDURE — 94640 AIRWAY INHALATION TREATMENT: CPT

## 2022-03-09 PROCEDURE — 74011250637 HC RX REV CODE- 250/637: Performed by: NURSE PRACTITIONER

## 2022-03-09 PROCEDURE — 83735 ASSAY OF MAGNESIUM: CPT

## 2022-03-09 PROCEDURE — 74011000250 HC RX REV CODE- 250: Performed by: STUDENT IN AN ORGANIZED HEALTH CARE EDUCATION/TRAINING PROGRAM

## 2022-03-09 RX ADMIN — FOLIC ACID 1 MG: 1 TABLET ORAL at 08:44

## 2022-03-09 RX ADMIN — BUDESONIDE 500 MCG: 0.5 INHALANT RESPIRATORY (INHALATION) at 19:59

## 2022-03-09 RX ADMIN — BUDESONIDE 500 MCG: 0.5 INHALANT RESPIRATORY (INHALATION) at 07:55

## 2022-03-09 RX ADMIN — TRAZODONE HYDROCHLORIDE 100 MG: 100 TABLET ORAL at 21:25

## 2022-03-09 RX ADMIN — IPRATROPIUM BROMIDE AND ALBUTEROL SULFATE 3 ML: .5; 3 SOLUTION RESPIRATORY (INHALATION) at 07:55

## 2022-03-09 RX ADMIN — GUAIFENESIN 600 MG: 600 TABLET, EXTENDED RELEASE ORAL at 08:45

## 2022-03-09 RX ADMIN — IPRATROPIUM BROMIDE AND ALBUTEROL SULFATE 3 ML: .5; 3 SOLUTION RESPIRATORY (INHALATION) at 19:59

## 2022-03-09 RX ADMIN — BENZTROPINE MESYLATE 2 MG: 1 TABLET ORAL at 08:45

## 2022-03-09 RX ADMIN — DIVALPROEX SODIUM 500 MG: 500 TABLET, DELAYED RELEASE ORAL at 08:45

## 2022-03-09 RX ADMIN — SODIUM CHLORIDE, PRESERVATIVE FREE 10 ML: 5 INJECTION INTRAVENOUS at 13:25

## 2022-03-09 RX ADMIN — METOPROLOL TARTRATE 12.5 MG: 50 TABLET, FILM COATED ORAL at 21:25

## 2022-03-09 RX ADMIN — Medication 2 PACKET: at 08:55

## 2022-03-09 RX ADMIN — Medication 1 G: at 09:00

## 2022-03-09 RX ADMIN — Medication: at 17:06

## 2022-03-09 RX ADMIN — PANTOPRAZOLE SODIUM 40 MG: 40 TABLET, DELAYED RELEASE ORAL at 06:56

## 2022-03-09 RX ADMIN — MIRTAZAPINE 15 MG: 15 TABLET ORAL at 21:25

## 2022-03-09 RX ADMIN — Medication: at 08:48

## 2022-03-09 RX ADMIN — METOPROLOL TARTRATE 12.5 MG: 50 TABLET, FILM COATED ORAL at 08:44

## 2022-03-09 RX ADMIN — GUAIFENESIN 600 MG: 600 TABLET, EXTENDED RELEASE ORAL at 21:25

## 2022-03-09 RX ADMIN — Medication 100 MG: at 08:44

## 2022-03-09 RX ADMIN — ENOXAPARIN SODIUM 40 MG: 100 INJECTION SUBCUTANEOUS at 08:45

## 2022-03-09 RX ADMIN — SODIUM CHLORIDE, PRESERVATIVE FREE 10 ML: 5 INJECTION INTRAVENOUS at 06:58

## 2022-03-09 RX ADMIN — SODIUM CHLORIDE, PRESERVATIVE FREE 10 ML: 5 INJECTION INTRAVENOUS at 21:26

## 2022-03-09 NOTE — PROGRESS NOTES
Name: Stephen Bradley MRN: [id number]   : 1960 Hospital: [facility]   Date: 3/9/2022        IMPRESSION:   · True Hyponatremia. The constellation of his labs are consistent with SIADH. Patient's Na is now  Low again. Patient admits drinking a lot of fluid after he was transfer on the medical floor. He states he drinks at least 3 pitcher of water, although he is not a reliable historian. · H/O psychogenic polydipsia in the past  · Normal renal function  · Schizophrenia       PLAN:   · Continue oral fluid restriction - 800 ml/day. This is imprtant to keel his Na at normal level. · Continue Ure Na  · Recheck the BMP in AM.  · Patient is at risk for hyponatremia in the future when he will have access to free water. Subjective/Interval History:   I have reviewed the flowsheet and previous days notes. ROS:Pertinent items are noted in HPI. Denies complaints. States he drinks a lot of water because he likes it. Objective:   Vital Signs:    Visit Vitals  /79 (BP 1 Location: Left upper arm, BP Patient Position: At rest)   Pulse 85   Temp 97.3 °F (36.3 °C)   Resp 18   Ht 5' 10\" (1.778 m)   Wt 56.2 kg (124 lb)   SpO2 92%   BMI 17.79 kg/m²       O2 Device: None (Room air)   O2 Flow Rate (L/min): 2 l/min   Temp (24hrs), Av.6 °F (36.4 °C), Min:97.3 °F (36.3 °C), Max:97.8 °F (36.6 °C)       Intake/Output:   Last shift:      No intake/output data recorded. Last 3 shifts:  1901 -  0700  In: 240 [P.O.:240]  Out: 1150 [Urine:1150]    Intake/Output Summary (Last 24 hours) at 3/9/2022 1602  Last data filed at 3/9/2022 9976  Gross per 24 hour   Intake    Output 300 ml   Net -300 ml        Physical Exam:  General:    Alert, cooperative, no distress, appears stated age. Thin   Head:   Normocephalic, without obvious abnormality, atraumatic. Eyes:   Conjunctivae/corneas clear. Nose:  Nares normal. No drainage or sinus tenderness.   Throat:    Poor dentition  Neck:  Supple, symmetrical, no adenopathy, thyroid: non tender    no carotid bruit and no JVD. Lungs:   Clear to auscultation bilaterally. No Wheezing or Rhonchi. No rales. Chest wall:  No tenderness or deformity. No Accessory muscle use. Heart:   Regular rate and rhythm,  no murmur, rub or gallop. Abdomen:   Soft, non-tender. Not distended. Bowel sounds normal.  Extremities: Extremities normal, atraumatic, No cyanosis. No edema. No clubbing  Skin:     Texture, turgor normal. No rashes or lesions. Not Jaundiced  Psych:  Poor insight. Not depressed. Not anxious or agitated. DATA:  Labs:  Recent Labs     03/09/22  0036   *   K 3.9   CL 93*   CO2 26   BUN 15   CREA 0.57*   CA 8.8   MG 2.1     No results for input(s): WBC, HGB, HCT, PLT, HGBEXT, HCTEXT, PLTEXT, HGBEXT, HCTEXT, PLTEXT in the last 72 hours. No results for input(s): ZECHARIAH, KU, CLU, CREAU in the last 72 hours.     No lab exists for component: PROU    Total time spent with patient:  35 minutes    [] Critical Care Provided    Care Plan discussed with:   Staff, Medical Team    Alicia Park MD

## 2022-03-09 NOTE — PROGRESS NOTES
Problem: Mobility Impaired (Adult and Pediatric)  Goal: *Acute Goals and Plan of Care (Insert Text)  Description: FUNCTIONAL STATUS PRIOR TO ADMISSION: Patient was independent and active without use of DME.    HOME SUPPORT PRIOR TO ADMISSION: The patient lived in a group home with staff to assist with IADLs. Physical Therapy Goals  Initiated 2/27/2022- Goals reassessed and remain appropriate 3/7/2022  1. Patient will move from supine to sit and sit to supine  in bed with independence within 7 day(s). 2.  Patient will transfer from bed to chair and chair to bed with independence using the least restrictive device within 7 day(s). 3.  Patient will perform sit to stand with independence within 7 day(s). 4.  Patient will ambulate with independence for 150 feet with the least restrictive device within 7 day(s). 5.  Patient will ascend/descend 4 stairs with 1 handrail(s) with modified independence within 7 day(s). Outcome: Progressing Towards Goal   PHYSICAL THERAPY TREATMENT  Patient: Chau Aparicio (17 y.o. male)  Date: 3/9/2022  Diagnosis: PNA (pneumonia) [J18.9] <principal problem not specified>       Precautions:    Chart, physical therapy assessment, plan of care and goals were reviewed. ASSESSMENT  Patient continues with skilled PT services and is progressing towards goals. Patient overall limited by gait instability and impulsivity. Otherwise moving well. Currently needing CGA for transfers and amb approx 200 feet with no overt LOB but intermittent path deviations. SpO2 maintained 92% on room air with ambulation. 90% 104 bpm at rest on room air  90-92% 96 bpm with ambulation on room air     Other factors to consider for discharge: at risk for falls, below baseline         PLAN :  Patient continues to benefit from skilled intervention to address the above impairments. Continue treatment per established plan of care. to address goals.     Recommendation for discharge: (in order for the patient to meet his/her long term goals)  Therapy up to 5 days/week in SNF setting        IF patient discharges home will need the following DME: to be determined (TBD)       SUBJECTIVE:   Patient stated I'm doing better.     OBJECTIVE DATA SUMMARY:   Critical Behavior:  Neurologic State: Alert (periodic confusion)  Orientation Level: Oriented to person,Oriented to place,Oriented to time  Cognition: Follows commands  Safety/Judgement: Decreased awareness of environment,Decreased awareness of need for assistance,Decreased awareness of need for safety,Decreased insight into deficits  Functional Mobility Training:  Bed Mobility:  Rolling: Modified independent  Supine to Sit: Modified independent  Sit to Supine: Modified independent  Scooting: Modified independent        Transfers:  Sit to Stand: Contact guard assistance  Stand to Sit: Contact guard assistance                             Balance:  Sitting: Intact  Standing: Impaired  Standing - Static: Good  Standing - Dynamic : Fair  Ambulation/Gait Training:  Distance (ft): 200 Feet (ft)  Assistive Device: Gait belt  Ambulation - Level of Assistance: Contact guard assistance        Gait Abnormalities: Decreased step clearance              Speed/Sabina: Pace decreased (<100 feet/min); Slow  Step Length: Left shortened;Right shortened             Pain Rating:  No c/o pain    Activity Tolerance:   Fair and requires rest breaks    After treatment patient left in no apparent distress:   Supine in bed and Call bell within reach    COMMUNICATION/COLLABORATION:   The patients plan of care was discussed with: Physical therapist, Occupational therapist, and Registered nurse.      Roberto Alfaro PT, DPT   Time Calculation: 9 mins

## 2022-03-09 NOTE — PROGRESS NOTES
03/08/22 2222   Joanie Fall Risk   Mobility 1.0   Mobility Interventions Patient to call before getting OOB; Communicate number of staff needed for ambulation/transfer   Mentation 1   Mentation Interventions Adequate sleep, hydration, pain control;Door open when patient unattended   Medication 1   Medication Interventions Evaluate medications/consider consulting pharmacy   Elimination 1.0   Elimination Interventions Call light in reach;Urinal in reach   Prior Fall History 0   Total Score 4   Standard Fall Precautions Yes   High Fall Risk Yes     PT is high fall risk, please coordinate with PT for six minute walk test.

## 2022-03-09 NOTE — PROGRESS NOTES
[address] [facility] Adult  Hospitalist Group                                                                                          Hospitalist Progress Note  Arleen Lau MD  Answering service: [phone] from in house phone        Date of Service:  3/9/2022  NAME:  Evie Nunez  :  1960  MRN:  [id number]      Admission Summary:   Justen Latham is a 57 y.o. male smoker history of schizo affective disorder. Bipolar depression, COPD, asthma emphysema/bronchitis, history of psychogenic polydipsia, resides in a group home, GERD presents with altered mental status. Unable to obtain history from patient as he is altered. Reported confusion and congestive cough from his group home prompted ED arrival.  In the ER patient was placed on 4 L nasal cannula desatted to mid 80s stat ABG showed compensated respiratory acidosis w/ hypoxia, patient was placed on mid flow currently stable 5L midflow sats low 90s. Patient encephalopathic on my exam.  Notable for WBC count of 13, T-max 102.1F, lactic 0.4.  BP soft but maps greater than 60.\"    Interval history / Subjective:   Patient seen examined at bedside  Pt remains on RA, satting well  check with ambulation to see if still need O2 at dc  Pt feels well, no sob  NAD  Pending placement  Mild sinus tachy improving     Assessment & Plan:     Acute on chronic hypoxic respiratory failure, was on 2-3L NC PTA- resolved, now on RA  Per PT eval at rest and ambulation pt no longer needs O2  COPD Exacerbation  Left lower lobe pneumonia   Chronic tobacco dependence  -patient not septic  -covid neg    -CTA chest neg for PE confirmed LLL PNA with effusion   -Chest x-ray repeated 3/1 with improvement  -pulm evaluated appreciate recs, patient of Dr. 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  History of psychogenic polydipsia  -nephro following, appreciate input  -am cortisol wnl  -cont fluid restriction, started ure-na 2/28, cont same  -will be difficult to control and discharge when he has access to free water  Ok to Dc SSRI per psych    HTN controlled off norvasc    Schizoaffective disorder,  Depression subtype   -psych consulted for evaluation, cont depakote, trazodone, cogentin; remeron started    GERD  Continue home med     Patient has poor insight on his clinical condition    Code status: full  DVT prophylaxis: lovenox     PT/OT following    Care Plan discussed with: Patient/Family and Nurse  Anticipated Disposition: awaiting placement  Anticipated Discharge: whenever bed available     Hospital Problems  Date Reviewed: 2/22/2022          Codes Class Noted POA    PNA (pneumonia) ICD-10-CM: J18.9  ICD-9-CM: 486  2/26/2022 Unknown              Review of Systems:   A comprehensive review of systems was negative except for that written in the HPI. Vital Signs:    Last 24hrs VS reviewed since prior progress note.  Most recent are:  Visit Vitals  /79 (BP 1 Location: Left upper arm, BP Patient Position: At rest)   Pulse 85   Temp 97.3 °F (36.3 °C)   Resp 18   Ht 5' 10\" (1.778 m)   Wt 56.2 kg (124 lb)   SpO2 92%   BMI 17.79 kg/m²         Intake/Output Summary (Last 24 hours) at 3/9/2022 1606  Last data filed at 3/9/2022 5675  Gross per 24 hour   Intake    Output 300 ml   Net -300 ml        Physical Examination:     I had a face to face encounter with this patient and independently examined them on 3/9/2022 as outlined below:          Constitutional:  No acute distress, cooperative, pleasant    ENT:  Oral mucosa moist, oropharynx benign   Resp:  good AE, clear, No wheezing/rhonchi/rales. No accessory muscle use   CV:  Regular rhythm, normal rate    GI:  Soft, non distended, non tender    Musculoskeletal:  No edema, warm    Neurologic:  Moves all extremities. SUNNY BRASHER II-XII reviewed            Data Review:    Review and/or order of clinical lab test  Review and/or order of tests in the radiology section of CPT  Review and/or order of tests in the medicine section of CPT      Labs:     No results for input(s): WBC, HGB, HCT, PLT, HGBEXT, HCTEXT, PLTEXT, HGBEXT, HCTEXT, PLTEXT in the last 72 hours. Recent Labs     03/09/22  0036   *   K 3.9   CL 93*   CO2 26   BUN 15   CREA 0.57*   GLU 87   CA 8.8   MG 2.1     No results for input(s): ALT, AP, TBIL, TBILI, TP, ALB, GLOB, GGT, AML, LPSE in the last 72 hours. No lab exists for component: SGOT, GPT, AMYP, HLPSE  No results for input(s): INR, PTP, APTT, INREXT, INREXT in the last 72 hours. No results for input(s): FE, TIBC, PSAT, FERR in the last 72 hours. Lab Results   Component Value Date/Time    Folate 27.2 (H) 02/26/2022 09:31 AM      No results for input(s): PH, PCO2, PO2 in the last 72 hours. No results for input(s): CPK, CKNDX, TROIQ in the last 72 hours.     No lab exists for component: CPKMB  Lab Results   Component Value Date/Time    Cholesterol, total 181 10/17/2018 10:48 AM    HDL Cholesterol 78 10/17/2018 10:48 AM    LDL, calculated 89 10/17/2018 10:48 AM    Triglyceride 71 10/17/2018 10:48 AM     Lab Results   Component Value Date/Time    Glucose (POC) 132 (H) 07/04/2017 04:37 PM    Glucose, POC 92 02/26/2022 09:23 AM     Lab Results   Component Value Date/Time    Color YELLOW/STRAW 02/26/2022 09:41 AM    Appearance CLEAR 02/26/2022 09:41 AM    Specific gravity 1.013 02/26/2022 09:41 AM    pH (UA) 7.0 02/26/2022 09:41 AM    Protein Negative 02/26/2022 09:41 AM    Glucose Negative 02/26/2022 09:41 AM    Ketone TRACE (A) 02/26/2022 09:41 AM    Bilirubin Negative 02/26/2022 09:41 AM    Urobilinogen 1.0 02/26/2022 09:41 AM    Nitrites Negative 02/26/2022 09:41 AM    Leukocyte Esterase Negative 02/26/2022 09:41 AM    Epithelial cells FEW 02/26/2022 09:41 AM    Bacteria Negative 02/26/2022 09:41 AM    WBC 0-4 02/26/2022 09:41 AM    RBC 0-5 02/26/2022 09:41 AM         Medications Reviewed:     Current Facility-Administered Medications   Medication Dose Route Frequency    [START ON 3/10/2022] urea (URE-NA) 15 gram packet 2 Packet  2 Packet Oral DAILY    metoprolol tartrate (LOPRESSOR) tablet 12.5 mg  12.5 mg Oral Q12H    mirtazapine (REMERON) tablet 15 mg  15 mg Oral QHS    albuterol-ipratropium (DUO-NEB) 2.5 MG-0.5 MG/3 ML  3 mL Nebulization BID RT    balsam peru-castor oiL (VENELEX) ointment   Topical BID    sodium chloride (NS) flush 5-10 mL  5-10 mL IntraVENous PRN    sodium chloride (NS) flush 5-40 mL  5-40 mL IntraVENous Q8H    sodium chloride (NS) flush 5-40 mL  5-40 mL IntraVENous PRN    acetaminophen (TYLENOL) tablet 650 mg  650 mg Oral Q6H PRN    Or    acetaminophen (TYLENOL) suppository 650 mg  650 mg Rectal Q6H PRN    polyethylene glycol (MIRALAX) packet 17 g  17 g Oral DAILY PRN    ondansetron (ZOFRAN ODT) tablet 4 mg  4 mg Oral Q8H PRN    Or    ondansetron (ZOFRAN) injection 4 mg  4 mg IntraVENous Q6H PRN    enoxaparin (LOVENOX) injection 40 mg  40 mg SubCUTAneous DAILY    [Held by provider] amLODIPine (NORVASC) tablet 5 mg  5 mg Oral DAILY    benztropine (COGENTIN) tablet 2 mg  2 mg Oral DAILY    divalproex DR (DEPAKOTE) tablet 500 mg  500 mg Oral DAILY    folic acid (FOLVITE) tablet 1 mg  1 mg Oral DAILY    ergocalciferol capsule 50,000 Units  50,000 Units Oral Q30D    lactulose (CHRONULAC) 10 gram/15 mL solution 5 mL  5 mL Oral DAILY PRN    pantoprazole (PROTONIX) tablet 40 mg  40 mg Oral ACB    thiamine HCL (B-1) tablet 100 mg  100 mg Oral DAILY    traZODone (DESYREL) tablet 100 mg  100 mg Oral QHS  albuterol-ipratropium (DUO-NEB) 2.5 MG-0.5 MG/3 ML  3 mL Nebulization Q4H PRN    budesonide (PULMICORT) 500 mcg/2 ml nebulizer suspension  500 mcg Nebulization BID RT    sodium chloride tablet 1 g  1 g Oral DAILY    guaiFENesin ER (MUCINEX) tablet 600 mg  600 mg Oral Q12H    benzonatate (TESSALON) capsule 100 mg  100 mg Oral TID PRN     ______________________________________________________________________  EXPECTED LENGTH OF STAY: 4d 2h  ACTUAL LENGTH OF STAY:          Liliam Rosas MD

## 2022-03-09 NOTE — PROGRESS NOTES
RIGO:  Anticipate discharge to NH LTC once Level 2 PASR completed. CM received call from independent evaluator, James Jackson. Phone [phone] to conduct interview to ensure all clinical information received. Also facilitated her speaking with pt by phone as well as provided pt's brother Stewart's number for her to contact. Likely earliest discharge would be Monday 3/14 if Level 2 determination received from [facility] approving LTC nursing home placement. At this time, [facility] has accepted.       CORNELIA Fuentes

## 2022-03-09 NOTE — PROGRESS NOTES
Problem: Pressure Injury - Risk of  Goal: *Prevention of pressure injury  Description: Document Sanya Scale and appropriate interventions in the flowsheet. Outcome: Progressing Towards Goal  Note: Pressure Injury Interventions:  Sensory Interventions: Assess changes in LOC    Moisture Interventions: Absorbent underpads    Activity Interventions: Assess need for specialty bed    Mobility Interventions: Pressure redistribution bed/mattress (bed type)    Nutrition Interventions: Document food/fluid/supplement intake    Friction and Shear Interventions: Apply protective barrier, creams and emollients                Problem: Patient Education: Go to Patient Education Activity  Goal: Patient/Family Education  Outcome: Progressing Towards Goal     Problem: Falls - Risk of  Goal: *Absence of Falls  Description: Document Joanie Fall Risk and appropriate interventions in the flowsheet.   Outcome: Progressing Towards Goal  Note: Fall Risk Interventions:  Mobility Interventions: Patient to call before getting OOB,Communicate number of staff needed for ambulation/transfer    Mentation Interventions: Adequate sleep, hydration, pain control,Door open when patient unattended    Medication Interventions: Evaluate medications/consider consulting pharmacy    Elimination Interventions: Call light in reach,Urinal in reach              Problem: Patient Education: Go to Patient Education Activity  Goal: Patient/Family Education  Outcome: Progressing Towards Goal

## 2022-03-09 NOTE — PROGRESS NOTES
Problem: Self Care Deficits Care Plan (Adult)  Goal: *Acute Goals and Plan of Care (Insert Text)  Description: FUNCTIONAL STATUS PRIOR TO ADMISSION: Pt AxO at baseline and unable to provide accurate PLOF; however, chart indicates pt resides at adult group home. Pt reports no DME usage at baseline. HOME SUPPORT: The patient lived with group home staffing to provide ADL/IADL assist.    Occupational Therapy Goals  Initiated 2/27/2022, continued 3/7/2022  1. Patient will perform grooming with supervision within 7 day(s). 2.  Patient will perform lower body dressing with supervision within 7 day(s). 3.  Patient will perform bathing with supervision/set-up within 7 day(s). 4.  Patient will perform toilet transfers with supervision within 7 day(s). 5.  Patient will perform all aspects of toileting with supervision within 7 day(s). Outcome: Progressing Towards Goal   OCCUPATIONAL THERAPY TREATMENT  Patient: Dewayne Mcdowell (74 y.o. male)  Date: 3/9/2022  Diagnosis: PNA (pneumonia) [J18.9] <principal problem not specified>       Precautions:  fall   Chart, occupational therapy assessment, plan of care, and goals were reviewed. ASSESSMENT  Patient continues with skilled OT services and is progressing towards goals. Pt received sitting up in bed and agreeable to participation in therapy session. Pt demonstrated bed mobility and functional transfers with modified independence to stand by assist for safety. Following, he demonstrated seated LB dressing with modified independence. VSS throughout session on RA. At this time, pt with no further acute OT needs, will sign off. Recommend d/c to SNF vs LTC for supervision/safety with ADL/IADL tasks. Current Level of Function Impacting Discharge (ADLs): modified independence to stand by assist     Other factors to consider for discharge: PLOF, from group home          PLAN :  Patient continues to benefit from skilled intervention to address the above impairments. Continue treatment per established plan of care to address goals. Recommendation for discharge: (in order for the patient to meet his/her long term goals)  SNF vs LTC     This discharge recommendation:  Has been made in collaboration with the attending provider and/or case management    IF patient discharges home will need the following DME: none        SUBJECTIVE:   Patient stated Thank you.     OBJECTIVE DATA SUMMARY:   Cognitive/Behavioral Status:  Neurologic State: Alert  Orientation Level: Oriented to place;Oriented to person;Oriented to situation;Oriented to time  Cognition: Follows commands             Functional Mobility and Transfers for ADLs:  Bed Mobility:  Rolling: Modified independent  Supine to Sit: Modified independent  Sit to Supine: Modified independent  Scooting: Modified independent    Transfers:  Sit to Stand: Stand-by assistance          Balance:  Sitting: Intact  Standing: Impaired  Standing - Static: Good  Standing - Dynamic : Fair    ADL Intervention:  Feeding  Feeding Assistance: Independent  Container Management: Independent  Cutting Food: Independent  Utensil Management: Independent  Food to Mouth: Independent  Drink to Mouth: Independent                        Lower Body Dressing Assistance  Socks: Modified independent  Shoes with Cloth Laces: Modified independent  Position Performed: Seated edge of bed              Pain:  None     Activity Tolerance:   Good    After treatment patient left in no apparent distress:   Supine in bed, Call bell within reach and Side rails x 3    COMMUNICATION/COLLABORATION:   The patients plan of care was discussed with: Physical therapist and Registered nurse.      Maixmus Harding OT  Time Calculation: 9 mins

## 2022-03-10 LAB
ANION GAP SERPL CALC-SCNC: 5 MMOL/L (ref 5–15)
BUN SERPL-MCNC: 13 MG/DL (ref 6–20)
BUN/CREAT SERPL: 28 (ref 12–20)
CALCIUM SERPL-MCNC: 8.8 MG/DL (ref 8.5–10.1)
CHLORIDE SERPL-SCNC: 93 MMOL/L (ref 97–108)
CO2 SERPL-SCNC: 28 MMOL/L (ref 21–32)
CREAT SERPL-MCNC: 0.47 MG/DL (ref 0.7–1.3)
GLUCOSE SERPL-MCNC: 80 MG/DL (ref 65–100)
POTASSIUM SERPL-SCNC: 4.3 MMOL/L (ref 3.5–5.1)
SODIUM SERPL-SCNC: 126 MMOL/L (ref 136–145)

## 2022-03-10 PROCEDURE — 74011000250 HC RX REV CODE- 250: Performed by: INTERNAL MEDICINE

## 2022-03-10 PROCEDURE — 74011250636 HC RX REV CODE- 250/636: Performed by: STUDENT IN AN ORGANIZED HEALTH CARE EDUCATION/TRAINING PROGRAM

## 2022-03-10 PROCEDURE — 80048 BASIC METABOLIC PNL TOTAL CA: CPT

## 2022-03-10 PROCEDURE — 97116 GAIT TRAINING THERAPY: CPT

## 2022-03-10 PROCEDURE — 74011250637 HC RX REV CODE- 250/637: Performed by: INTERNAL MEDICINE

## 2022-03-10 PROCEDURE — 36415 COLL VENOUS BLD VENIPUNCTURE: CPT

## 2022-03-10 PROCEDURE — 74011000250 HC RX REV CODE- 250: Performed by: STUDENT IN AN ORGANIZED HEALTH CARE EDUCATION/TRAINING PROGRAM

## 2022-03-10 PROCEDURE — 94640 AIRWAY INHALATION TREATMENT: CPT

## 2022-03-10 PROCEDURE — 74011250637 HC RX REV CODE- 250/637: Performed by: NURSE PRACTITIONER

## 2022-03-10 PROCEDURE — 74011250637 HC RX REV CODE- 250/637: Performed by: STUDENT IN AN ORGANIZED HEALTH CARE EDUCATION/TRAINING PROGRAM

## 2022-03-10 PROCEDURE — 65270000029 HC RM PRIVATE

## 2022-03-10 RX ADMIN — BUDESONIDE 500 MCG: 0.5 INHALANT RESPIRATORY (INHALATION) at 07:39

## 2022-03-10 RX ADMIN — SODIUM CHLORIDE, PRESERVATIVE FREE 10 ML: 5 INJECTION INTRAVENOUS at 06:29

## 2022-03-10 RX ADMIN — METOPROLOL TARTRATE 12.5 MG: 50 TABLET, FILM COATED ORAL at 08:10

## 2022-03-10 RX ADMIN — Medication 1 G: at 09:00

## 2022-03-10 RX ADMIN — Medication: at 17:58

## 2022-03-10 RX ADMIN — FOLIC ACID 1 MG: 1 TABLET ORAL at 08:10

## 2022-03-10 RX ADMIN — Medication 15 MG: at 15:50

## 2022-03-10 RX ADMIN — Medication 2 PACKET: at 08:11

## 2022-03-10 RX ADMIN — MIRTAZAPINE 15 MG: 15 TABLET ORAL at 21:34

## 2022-03-10 RX ADMIN — SODIUM CHLORIDE, PRESERVATIVE FREE 10 ML: 5 INJECTION INTRAVENOUS at 21:34

## 2022-03-10 RX ADMIN — PANTOPRAZOLE SODIUM 40 MG: 40 TABLET, DELAYED RELEASE ORAL at 06:30

## 2022-03-10 RX ADMIN — DIVALPROEX SODIUM 500 MG: 500 TABLET, DELAYED RELEASE ORAL at 08:10

## 2022-03-10 RX ADMIN — ENOXAPARIN SODIUM 40 MG: 100 INJECTION SUBCUTANEOUS at 08:11

## 2022-03-10 RX ADMIN — METOPROLOL TARTRATE 12.5 MG: 50 TABLET, FILM COATED ORAL at 21:34

## 2022-03-10 RX ADMIN — Medication: at 08:12

## 2022-03-10 RX ADMIN — GUAIFENESIN 600 MG: 600 TABLET, EXTENDED RELEASE ORAL at 21:34

## 2022-03-10 RX ADMIN — GUAIFENESIN 600 MG: 600 TABLET, EXTENDED RELEASE ORAL at 08:11

## 2022-03-10 RX ADMIN — Medication 100 MG: at 08:10

## 2022-03-10 RX ADMIN — SODIUM CHLORIDE, PRESERVATIVE FREE 10 ML: 5 INJECTION INTRAVENOUS at 15:52

## 2022-03-10 RX ADMIN — TRAZODONE HYDROCHLORIDE 100 MG: 100 TABLET ORAL at 21:34

## 2022-03-10 RX ADMIN — BUDESONIDE 500 MCG: 0.5 INHALANT RESPIRATORY (INHALATION) at 20:10

## 2022-03-10 RX ADMIN — IPRATROPIUM BROMIDE AND ALBUTEROL SULFATE 3 ML: .5; 3 SOLUTION RESPIRATORY (INHALATION) at 07:39

## 2022-03-10 RX ADMIN — IPRATROPIUM BROMIDE AND ALBUTEROL SULFATE 3 ML: .5; 3 SOLUTION RESPIRATORY (INHALATION) at 20:10

## 2022-03-10 RX ADMIN — BENZTROPINE MESYLATE 2 MG: 1 TABLET ORAL at 08:10

## 2022-03-10 NOTE — PROGRESS NOTES
Name: Delta Pack MRN: [id number]   : 1960 [facility] 54   Date: 3/10/2022        IMPRESSION:   · True Hyponatremia. The constellation of his labs are consistent with SIADH. Patient's Na is now  Low again. Patient admits drinking a lot of fluid after he was transfer on the medical floor. He  Is on oral fluid restriction. H/O psychogenic polydipsia in the past  · Normal renal function  · Schizophrenia       PLAN:   · Continue oral fluid restriction - 800 ml/day. This is imprtant to keel his Na at normal level. · Continue Ure Na  · Will give a dose of Tolvaptan to facilitate Na correction and allow patient's safe discharge from the hospital.   · Recheck the BMP in AM.  · Patient is at risk for hyponatremia in the future when he will have access to free water. Subjective/Interval History:   I have reviewed the flowsheet and previous days notes. ROS:Pertinent items are noted in HPI. Denies complaints. States he drinks a lot of water because he likes it. Objective:   Vital Signs:    Visit Vitals  /60 (BP 1 Location: Left upper arm, BP Patient Position: At rest)   Pulse 95   Temp 97.5 °F (36.4 °C)   Resp 16   Ht 5' 10\" (1.778 m)   Wt 58 kg (127 lb 13.9 oz)   SpO2 95%   BMI 18.35 kg/m²       O2 Device: None   O2 Flow Rate (L/min): 2 l/min   Temp (24hrs), Av.9 °F (36.6 °C), Min:97.3 °F (36.3 °C), Max:98.8 °F (37.1 °C)       Intake/Output:   Last shift:      03/10 0701 - 03/10 1900  In: 1020 [P.O.:1020]  Out: -   Last 3 shifts: 1901 - 03/10 07  In: -   Out: 1500 [Urine:1500]    Intake/Output Summary (Last 24 hours) at 3/10/2022 1329  Last data filed at 3/10/2022 1128  Gross per 24 hour   Intake 1020 ml   Output 1200 ml   Net -180 ml        Physical Exam:  General:    Alert, cooperative, no distress, appears stated age. Thin   Head:   Normocephalic, without obvious abnormality, atraumatic. Eyes:   Conjunctivae/corneas clear.     Nose:  Nares normal. No drainage or sinus tenderness. Throat:    Poor dentition  Neck:  Supple, symmetrical,  no adenopathy, thyroid: non tender    no carotid bruit and no JVD. Lungs:   Clear to auscultation bilaterally. No Wheezing or Rhonchi. No rales. Chest wall:  No tenderness or deformity. No Accessory muscle use. Heart:   Regular rate and rhythm,  no murmur, rub or gallop. Abdomen:   Soft, non-tender. Not distended. Bowel sounds normal.  Extremities: Extremities normal, atraumatic, No cyanosis. No edema. No clubbing  Skin:     Texture, turgor normal. No rashes or lesions. Not Jaundiced  Psych:  Poor insight. Not depressed. Not anxious or agitated. DATA:  Labs:  Recent Labs     03/10/22  0100 03/09/22  0036   * 126*   K 4.3 3.9   CL 93* 93*   CO2 28 26   BUN 13 15   CREA 0.47* 0.57*   CA 8.8 8.8   MG  --  2.1     No results for input(s): WBC, HGB, HCT, PLT, HGBEXT, HCTEXT, PLTEXT, HGBEXT, HCTEXT, PLTEXT in the last 72 hours. No results for input(s): ZECHARIAH, KU, CLU, CREAU in the last 72 hours.     No lab exists for component: PROU    Total time spent with patient:  35 minutes    [] Critical Care Provided    Care Plan discussed with:   Staff, Medical Team    Tatiana Henry MD

## 2022-03-10 NOTE — PROGRESS NOTES
Problem: Mobility Impaired (Adult and Pediatric)  Goal: *Acute Goals and Plan of Care (Insert Text)  Description: FUNCTIONAL STATUS PRIOR TO ADMISSION: Patient was independent and active without use of DME.    HOME SUPPORT PRIOR TO ADMISSION: The patient lived in a group home with staff to assist with IADLs. Physical Therapy Goals  Initiated 2/27/2022- Goals reassessed and remain appropriate 3/7/2022  1. Patient will move from supine to sit and sit to supine  in bed with independence within 7 day(s). 2.  Patient will transfer from bed to chair and chair to bed with independence using the least restrictive device within 7 day(s). 3.  Patient will perform sit to stand with independence within 7 day(s). 4.  Patient will ambulate with independence for 150 feet with the least restrictive device within 7 day(s). 5.  Patient will ascend/descend 4 stairs with 1 handrail(s) with modified independence within 7 day(s). Outcome: Progressing Towards Goal   PHYSICAL THERAPY TREATMENT  Patient: Marely Diaz (38 y.o. male)  Date: 3/10/2022  Diagnosis: PNA (pneumonia) [J18.9] <principal problem not specified>       Precautions:    Chart, physical therapy assessment, plan of care and goals were reviewed. ASSESSMENT  Patient continues with skilled PT services and is progressing towards goals. Patient moving well today. All mobility at independent to supervision level. No loss of balance or major deviations with gait today. Assessed on flight of steps and did well with use of one rail and verbal cue to ensure foot fully on step as ascending. Safe to mobilize more with staff and encourage walking in marie several times a day with staff as he awaits LTC placement. .     Current Level of Function Impacting Discharge (mobility/balance): supervision to modified independent    Other factors to consider for discharge: unable to return to group home         PLAN :  Patient continues to benefit from skilled intervention to address the above impairments. Will follow 1-2x per week     Recommendation for discharge: (in order for the patient to meet his/her long term goals)  No skilled physical therapy/ follow up rehabilitation needs identified at this time. This discharge recommendation:  Has been made in collaboration with the attending provider and/or case management    IF patient discharges home will need the following DME: none       SUBJECTIVE:   Patient stated Sueanne Forge.  in response to all directions    OBJECTIVE DATA SUMMARY:   Critical Behavior:  Neurologic State: Alert  Orientation Level: Oriented X4  Cognition: Follows commands  Safety/Judgement: Decreased awareness of environment,Decreased awareness of need for assistance,Decreased awareness of need for safety,Decreased insight into deficits  Functional Mobility Training:  Bed Mobility:  Rolling: Independent  Supine to Sit: Independent  Sit to Supine: Independent           Transfers:  Sit to Stand: Independent  Stand to Sit: Independent                             Balance:  Sitting: Intact  Standing: Impaired; Without support  Standing - Static: Good  Standing - Dynamic : Good  Ambulation/Gait Training:  Distance (ft): 300 Feet (ft)  Assistive Device: Gait belt  Ambulation - Level of Assistance: Supervision         Stairs:  Number of Stairs Trained: 12  Stairs - Level of Assistance: Supervision   Rail Use: Right   Activity Tolerance:   Good    After treatment patient left in no apparent distress:   Call bell within reach and sitting up in bed    COMMUNICATION/COLLABORATION:   The patients plan of care was discussed with: Registered nurse.      Wojciech Britton, PT   Time Calculation: 9 mins

## 2022-03-10 NOTE — PROGRESS NOTES
[address] [facility] Adult  Hospitalist Group                                                                                          Hospitalist Progress Note  Tam Kim MD  Answering service: [phone] from in house phone        Date of Service:  3/10/2022  NAME:  Dewayne Mcdowell  :  1960  MRN:  [id number]      Admission Summary:   Tesfaye Hartman is a 57 y.o. male smoker history of schizo affective disorder. Bipolar depression, COPD, asthma emphysema/bronchitis, history of psychogenic polydipsia, resides in a group home, GERD presents with altered mental status. Unable to obtain history from patient as he is altered. Reported confusion and congestive cough from his group home prompted ED arrival.  In the ER patient was placed on 4 L nasal cannula desatted to mid 80s stat ABG showed compensated respiratory acidosis w/ hypoxia, patient was placed on mid flow currently stable 5L midflow sats low 90s. Patient encephalopathic on my exam.  Notable for WBC count of 13, T-max 102.1F, lactic 0.4.  BP soft but maps greater than 60.\"    Interval history / Subjective:   Patient seen examined at bedside  Denies complaints, just want to drink water      Assessment & Plan:     Acute on chronic hypoxic respiratory failure, was on 2-3L NC PTA- resolved, now on RA  Per PT eval at rest and ambulation pt no longer needs O2  COPD Exacerbation  Left lower lobe pneumonia   Chronic tobacco dependence  -patient not septic  -covid neg    -CTA chest neg for PE confirmed LLL PNA with effusion   -Chest x-ray repeated 3/1 with improvement  -pulm evaluated appreciate recs, patient of Dr. 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  History of psychogenic polydipsia  -nephro following, appreciate input  -am cortisol wnl  -cont fluid restriction, started ure-na 2/28, cont same  -will be difficult to control and discharge when he has access to free water  Ok to Dc SSRI per psych  - tolvaptan, per nephro today, , ure Na    HTN controlled off norvasc    Schizoaffective disorder,  Depression subtype   -psych consulted for evaluation, cont depakote, trazodone, cogentin; remeron started    GERD  Continue home med     Patient has poor insight on his clinical condition    Code status: full  DVT prophylaxis: lovenox     PT/OT following    Care Plan discussed with: Patient/Family and Nurse  Anticipated Disposition: awaiting placement, per CM tentatively on 3/14  Anticipated Discharge: whenever bed available     Hospital Problems  Date Reviewed: 2/22/2022          Codes Class Noted POA    PNA (pneumonia) ICD-10-CM: J18.9  ICD-9-CM: 486  2/26/2022 Unknown              Review of Systems:   A comprehensive review of systems was negative except for that written in the HPI. Vital Signs:    Last 24hrs VS reviewed since prior progress note.  Most recent are:  Visit Vitals  BP 95/60 (BP 1 Location: Left arm, BP Patient Position: At rest)   Pulse 79   Temp 98.8 °F (37.1 °C)   Resp 16   Ht 5' 10\" (1.778 m)   Wt 58 kg (127 lb 13.9 oz)   SpO2 95%   BMI 18.35 kg/m²         Intake/Output Summary (Last 24 hours) at 3/10/2022 0743  Last data filed at 3/9/2022 1707  Gross per 24 hour   Intake    Output 1200 ml   Net -1200 ml        Physical Examination:     I had a face to face encounter with this patient and independently examined them on 3/10/2022 as outlined below:          Constitutional:  No acute distress, cooperative, pleasant    ENT:  Oral mucosa moist, oropharynx benign Resp:  good AE, clear, No wheezing/rhonchi/rales. No accessory muscle use   CV:  Regular rhythm, normal rate    GI:  Soft, non distended, non tender    Musculoskeletal:  No edema, warm    Neurologic:  Moves all extremities. SUNNY BRASHER II-XII reviewed            Data Review:    Review and/or order of clinical lab test  Review and/or order of tests in the radiology section of CPT  Review and/or order of tests in the medicine section of CPT      Labs:     No results for input(s): WBC, HGB, HCT, PLT, HGBEXT, HCTEXT, PLTEXT, HGBEXT, HCTEXT, PLTEXT in the last 72 hours. Recent Labs     03/10/22  0100 03/09/22  0036   * 126*   K 4.3 3.9   CL 93* 93*   CO2 28 26   BUN 13 15   CREA 0.47* 0.57*   GLU 80 87   CA 8.8 8.8   MG  --  2.1     No results for input(s): ALT, AP, TBIL, TBILI, TP, ALB, GLOB, GGT, AML, LPSE in the last 72 hours. No lab exists for component: SGOT, GPT, AMYP, HLPSE  No results for input(s): INR, PTP, APTT, INREXT, INREXT in the last 72 hours. No results for input(s): FE, TIBC, PSAT, FERR in the last 72 hours. Lab Results   Component Value Date/Time    Folate 27.2 (H) 02/26/2022 09:31 AM      No results for input(s): PH, PCO2, PO2 in the last 72 hours. No results for input(s): CPK, CKNDX, TROIQ in the last 72 hours.     No lab exists for component: CPKMB  Lab Results   Component Value Date/Time    Cholesterol, total 181 10/17/2018 10:48 AM    HDL Cholesterol 78 10/17/2018 10:48 AM    LDL, calculated 89 10/17/2018 10:48 AM    Triglyceride 71 10/17/2018 10:48 AM     Lab Results   Component Value Date/Time    Glucose (POC) 132 (H) 07/04/2017 04:37 PM    Glucose, POC 92 02/26/2022 09:23 AM     Lab Results   Component Value Date/Time    Color YELLOW/STRAW 02/26/2022 09:41 AM    Appearance CLEAR 02/26/2022 09:41 AM    Specific gravity 1.013 02/26/2022 09:41 AM    pH (UA) 7.0 02/26/2022 09:41 AM    Protein Negative 02/26/2022 09:41 AM    Glucose Negative 02/26/2022 09:41 AM    Ketone TRACE (A) 02/26/2022 09:41 AM    Bilirubin Negative 02/26/2022 09:41 AM    Urobilinogen 1.0 02/26/2022 09:41 AM    Nitrites Negative 02/26/2022 09:41 AM    Leukocyte Esterase Negative 02/26/2022 09:41 AM    Epithelial cells FEW 02/26/2022 09:41 AM    Bacteria Negative 02/26/2022 09:41 AM    WBC 0-4 02/26/2022 09:41 AM    RBC 0-5 02/26/2022 09:41 AM         Medications Reviewed:     Current Facility-Administered Medications   Medication Dose Route Frequency    urea (URE-NA) 15 gram packet 2 Packet  2 Packet Oral DAILY    metoprolol tartrate (LOPRESSOR) tablet 12.5 mg  12.5 mg Oral Q12H    mirtazapine (REMERON) tablet 15 mg  15 mg Oral QHS    albuterol-ipratropium (DUO-NEB) 2.5 MG-0.5 MG/3 ML  3 mL Nebulization BID RT    balsam peru-castor oiL (VENELEX) ointment   Topical BID    sodium chloride (NS) flush 5-10 mL  5-10 mL IntraVENous PRN    sodium chloride (NS) flush 5-40 mL  5-40 mL IntraVENous Q8H    sodium chloride (NS) flush 5-40 mL  5-40 mL IntraVENous PRN    acetaminophen (TYLENOL) tablet 650 mg  650 mg Oral Q6H PRN    Or    acetaminophen (TYLENOL) suppository 650 mg  650 mg Rectal Q6H PRN    polyethylene glycol (MIRALAX) packet 17 g  17 g Oral DAILY PRN    ondansetron (ZOFRAN ODT) tablet 4 mg  4 mg Oral Q8H PRN    Or    ondansetron (ZOFRAN) injection 4 mg  4 mg IntraVENous Q6H PRN    enoxaparin (LOVENOX) injection 40 mg  40 mg SubCUTAneous DAILY    [Held by provider] amLODIPine (NORVASC) tablet 5 mg  5 mg Oral DAILY    benztropine (COGENTIN) tablet 2 mg  2 mg Oral DAILY    divalproex DR (DEPAKOTE) tablet 500 mg  500 mg Oral DAILY    folic acid (FOLVITE) tablet 1 mg  1 mg Oral DAILY    ergocalciferol capsule 50,000 Units  50,000 Units Oral Q30D    lactulose (CHRONULAC) 10 gram/15 mL solution 5 mL  5 mL Oral DAILY PRN    pantoprazole (PROTONIX) tablet 40 mg  40 mg Oral ACB    thiamine HCL (B-1) tablet 100 mg  100 mg Oral DAILY    traZODone (DESYREL) tablet 100 mg  100 mg Oral QHS    albuterol-ipratropium (DUO-NEB) 2.5 MG-0.5 MG/3 ML  3 mL Nebulization Q4H PRN    budesonide (PULMICORT) 500 mcg/2 ml nebulizer suspension  500 mcg Nebulization BID RT    sodium chloride tablet 1 g  1 g Oral DAILY    guaiFENesin ER (MUCINEX) tablet 600 mg  600 mg Oral Q12H    benzonatate (TESSALON) capsule 100 mg  100 mg Oral TID PRN     ______________________________________________________________________  EXPECTED LENGTH OF STAY: 4d 2h  ACTUAL LENGTH OF STAY:          12                 Dru Turcios MD

## 2022-03-11 LAB
ANION GAP SERPL CALC-SCNC: 6 MMOL/L (ref 5–15)
BUN SERPL-MCNC: 15 MG/DL (ref 6–20)
BUN/CREAT SERPL: 23 (ref 12–20)
CALCIUM SERPL-MCNC: 9.7 MG/DL (ref 8.5–10.1)
CHLORIDE SERPL-SCNC: 99 MMOL/L (ref 97–108)
CO2 SERPL-SCNC: 29 MMOL/L (ref 21–32)
CREAT SERPL-MCNC: 0.64 MG/DL (ref 0.7–1.3)
GLUCOSE SERPL-MCNC: 91 MG/DL (ref 65–100)
POTASSIUM SERPL-SCNC: 4.8 MMOL/L (ref 3.5–5.1)
SODIUM SERPL-SCNC: 130 MMOL/L (ref 136–145)
SODIUM SERPL-SCNC: 131 MMOL/L (ref 136–145)
SODIUM SERPL-SCNC: 134 MMOL/L (ref 136–145)
SODIUM SERPL-SCNC: 134 MMOL/L (ref 136–145)

## 2022-03-11 PROCEDURE — 84295 ASSAY OF SERUM SODIUM: CPT

## 2022-03-11 PROCEDURE — 74011250637 HC RX REV CODE- 250/637: Performed by: INTERNAL MEDICINE

## 2022-03-11 PROCEDURE — 74011250637 HC RX REV CODE- 250/637: Performed by: NURSE PRACTITIONER

## 2022-03-11 PROCEDURE — 36415 COLL VENOUS BLD VENIPUNCTURE: CPT

## 2022-03-11 PROCEDURE — 94640 AIRWAY INHALATION TREATMENT: CPT

## 2022-03-11 PROCEDURE — 74011250636 HC RX REV CODE- 250/636: Performed by: STUDENT IN AN ORGANIZED HEALTH CARE EDUCATION/TRAINING PROGRAM

## 2022-03-11 PROCEDURE — 74011000250 HC RX REV CODE- 250: Performed by: INTERNAL MEDICINE

## 2022-03-11 PROCEDURE — 65270000029 HC RM PRIVATE

## 2022-03-11 PROCEDURE — 80048 BASIC METABOLIC PNL TOTAL CA: CPT

## 2022-03-11 PROCEDURE — 74011250637 HC RX REV CODE- 250/637: Performed by: STUDENT IN AN ORGANIZED HEALTH CARE EDUCATION/TRAINING PROGRAM

## 2022-03-11 PROCEDURE — 74011000250 HC RX REV CODE- 250: Performed by: STUDENT IN AN ORGANIZED HEALTH CARE EDUCATION/TRAINING PROGRAM

## 2022-03-11 RX ADMIN — MIRTAZAPINE 15 MG: 15 TABLET ORAL at 21:22

## 2022-03-11 RX ADMIN — BENZTROPINE MESYLATE 2 MG: 1 TABLET ORAL at 08:31

## 2022-03-11 RX ADMIN — IPRATROPIUM BROMIDE AND ALBUTEROL SULFATE 3 ML: .5; 3 SOLUTION RESPIRATORY (INHALATION) at 19:43

## 2022-03-11 RX ADMIN — METOPROLOL TARTRATE 12.5 MG: 50 TABLET, FILM COATED ORAL at 21:19

## 2022-03-11 RX ADMIN — Medication 1 G: at 09:00

## 2022-03-11 RX ADMIN — GUAIFENESIN 600 MG: 600 TABLET, EXTENDED RELEASE ORAL at 08:31

## 2022-03-11 RX ADMIN — SODIUM CHLORIDE, PRESERVATIVE FREE 10 ML: 5 INJECTION INTRAVENOUS at 13:57

## 2022-03-11 RX ADMIN — ENOXAPARIN SODIUM 40 MG: 100 INJECTION SUBCUTANEOUS at 08:38

## 2022-03-11 RX ADMIN — PANTOPRAZOLE SODIUM 40 MG: 40 TABLET, DELAYED RELEASE ORAL at 06:07

## 2022-03-11 RX ADMIN — METOPROLOL TARTRATE 12.5 MG: 50 TABLET, FILM COATED ORAL at 08:31

## 2022-03-11 RX ADMIN — Medication: at 19:32

## 2022-03-11 RX ADMIN — Medication: at 08:31

## 2022-03-11 RX ADMIN — DIVALPROEX SODIUM 500 MG: 500 TABLET, DELAYED RELEASE ORAL at 08:32

## 2022-03-11 RX ADMIN — FOLIC ACID 1 MG: 1 TABLET ORAL at 08:31

## 2022-03-11 RX ADMIN — SODIUM CHLORIDE, PRESERVATIVE FREE 10 ML: 5 INJECTION INTRAVENOUS at 06:08

## 2022-03-11 RX ADMIN — SODIUM CHLORIDE, PRESERVATIVE FREE 10 ML: 5 INJECTION INTRAVENOUS at 21:24

## 2022-03-11 RX ADMIN — BUDESONIDE 500 MCG: 0.5 INHALANT RESPIRATORY (INHALATION) at 07:49

## 2022-03-11 RX ADMIN — IPRATROPIUM BROMIDE AND ALBUTEROL SULFATE 3 ML: .5; 3 SOLUTION RESPIRATORY (INHALATION) at 07:49

## 2022-03-11 RX ADMIN — TRAZODONE HYDROCHLORIDE 100 MG: 100 TABLET ORAL at 22:00

## 2022-03-11 RX ADMIN — GUAIFENESIN 600 MG: 600 TABLET, EXTENDED RELEASE ORAL at 21:19

## 2022-03-11 RX ADMIN — BUDESONIDE 500 MCG: 0.5 INHALANT RESPIRATORY (INHALATION) at 19:43

## 2022-03-11 RX ADMIN — Medication 100 MG: at 08:31

## 2022-03-11 RX ADMIN — Medication 2 PACKET: at 08:32

## 2022-03-11 NOTE — PROGRESS NOTES
Name: Mario Saucedo MRN: [id number]   : 1960 Hospital: [facility] [address] 54   Date: 3/11/2022        IMPRESSION:   · True Hyponatremia. The constellation of his labs are consistent with SIADH. Patient's Na is now  Low again. Patient admits drinking a lot of fluid after he was transfer on the medical floor. He  Is on oral fluid restriction. H/O psychogenic polydipsia in the past. Na is somewhat up after Tolvaptan, but it is trending down again. Patient continues to drink large amount of water. I found him at the room sink drinking. · Normal renal function  · Schizophrenia       PLAN:   · Continue oral fluid restriction - 800 ml/day. This is imprtant to keel his Na at normal level. I don't believe that a strict oral fluid restriction is possible due to patient's little insight of the importance of compliance with fluid restriction and his ability to access water on his own. · Continue Ure Na  · At this point patient is stable for discharge from renal stand point. · Recheck the BMP in AM.  · Patient is at risk for hyponatremia in the future when he will have access to free water. Subjective/Interval History:   I have reviewed the flowsheet and previous days notes. ROS:Pertinent items are noted in HPI. Denies complaints. States he drinks a lot of water because he likes it.     Objective:   Vital Signs:    Visit Vitals  BP 96/66 (BP 1 Location: Left arm, BP Patient Position: At rest)   Pulse 80   Temp 98.4 °F (36.9 °C)   Resp 16   Ht 5' 10\" (1.778 m)   Wt 57.8 kg (127 lb 6.8 oz)   SpO2 93%   BMI 18.28 kg/m²       O2 Device: None (Room air)   O2 Flow Rate (L/min): 2 l/min   Temp (24hrs), Av.1 °F (36.7 °C), Min:97.6 °F (36.4 °C), Max:98.4 °F (36.9 °C)       Intake/Output:   Last shift:      701 - 1900  In: 360 [P.O.:360]  Out: -   Last 3 shifts: 1901 - 700  In: 2060 [P.O.:]  Out: 950 [Urine:950]    Intake/Output Summary (Last 24 hours) at 3/11/2022 1424  Last data filed at 3/11/2022 0851  Gross per 24 hour   Intake 780 ml   Output 950 ml   Net -170 ml        Physical Exam:  General:    Alert, cooperative, no distress, appears stated age. Thin. Standing up next to the sink, drinking water. Head:   Normocephalic, without obvious abnormality, atraumatic. Eyes:   Conjunctivae/corneas clear. Nose:  Nares normal. No drainage or sinus tenderness. Throat:    Poor dentition  Neck:  Supple, symmetrical,  no adenopathy, thyroid: non tender    no carotid bruit and no JVD. Lungs:   Clear to auscultation bilaterally. No Wheezing or Rhonchi. No rales. Chest wall:  No tenderness or deformity. No Accessory muscle use. Heart:   Regular rate and rhythm,  no murmur, rub or gallop. Abdomen:   Soft, non-tender. Not distended. Bowel sounds normal.  Extremities: Extremities normal, atraumatic, No cyanosis. No edema. No clubbing  Skin:     Texture, turgor normal. No rashes or lesions. Not Jaundiced  Psych:  Poor insight. Not depressed. Not anxious or agitated. DATA:  Labs:  Recent Labs     03/11/22  0655 03/11/22  0017 03/11/22  0013 03/10/22  0100 03/10/22  0100 03/09/22  0036 03/09/22  0036   * 134* 134*   < > 126*   < > 126*   K  --   --  4.8  --  4.3  --  3.9   CL  --   --  99  --  93*  --  93*   CO2  --   --  29  --  28  --  26   BUN  --   --  15  --  13  --  15   CREA  --   --  0.64*  --  0.47*  --  0.57*   CA  --   --  9.7  --  8.8  --  8.8   MG  --   --   --   --   --   --  2.1    < > = values in this interval not displayed. No results for input(s): WBC, HGB, HCT, PLT, HGBEXT, HCTEXT, PLTEXT, HGBEXT, HCTEXT, PLTEXT in the last 72 hours. No results for input(s): ZECHARIAH, KU, CLU, CREAU in the last 72 hours.     No lab exists for component: PROU    Total time spent with patient:  35 minutes    [] Critical Care Provided    Care Plan discussed with:   Staff, Medical Team    Kaylie Lombardo MD

## 2022-03-12 LAB
ERYTHROCYTE [DISTWIDTH] IN BLOOD BY AUTOMATED COUNT: 15.8 % (ref 11.5–14.5)
HCT VFR BLD AUTO: 43.3 % (ref 36.6–50.3)
HGB BLD-MCNC: 13.8 G/DL (ref 12.1–17)
MCH RBC QN AUTO: 31.2 PG (ref 26–34)
MCHC RBC AUTO-ENTMCNC: 31.9 G/DL (ref 30–36.5)
MCV RBC AUTO: 97.7 FL (ref 80–99)
NRBC # BLD: 0 K/UL (ref 0–0.01)
NRBC BLD-RTO: 0 PER 100 WBC
PLATELET # BLD AUTO: 188 K/UL (ref 150–400)
PMV BLD AUTO: 9.7 FL (ref 8.9–12.9)
RBC # BLD AUTO: 4.43 M/UL (ref 4.1–5.7)
WBC # BLD AUTO: 13.8 K/UL (ref 4.1–11.1)

## 2022-03-12 PROCEDURE — 85027 COMPLETE CBC AUTOMATED: CPT

## 2022-03-12 PROCEDURE — 74011250636 HC RX REV CODE- 250/636: Performed by: STUDENT IN AN ORGANIZED HEALTH CARE EDUCATION/TRAINING PROGRAM

## 2022-03-12 PROCEDURE — 74011250637 HC RX REV CODE- 250/637: Performed by: STUDENT IN AN ORGANIZED HEALTH CARE EDUCATION/TRAINING PROGRAM

## 2022-03-12 PROCEDURE — 94640 AIRWAY INHALATION TREATMENT: CPT

## 2022-03-12 PROCEDURE — 74011000250 HC RX REV CODE- 250: Performed by: INTERNAL MEDICINE

## 2022-03-12 PROCEDURE — 74011250637 HC RX REV CODE- 250/637: Performed by: INTERNAL MEDICINE

## 2022-03-12 PROCEDURE — 36415 COLL VENOUS BLD VENIPUNCTURE: CPT

## 2022-03-12 PROCEDURE — 65270000029 HC RM PRIVATE

## 2022-03-12 PROCEDURE — 74011000250 HC RX REV CODE- 250: Performed by: STUDENT IN AN ORGANIZED HEALTH CARE EDUCATION/TRAINING PROGRAM

## 2022-03-12 PROCEDURE — 74011250637 HC RX REV CODE- 250/637: Performed by: NURSE PRACTITIONER

## 2022-03-12 RX ORDER — SODIUM CHLORIDE 1 G/1
1 TABLET ORAL DAILY
Status: DISCONTINUED | OUTPATIENT
Start: 2022-03-12 | End: 2022-03-16 | Stop reason: HOSPADM

## 2022-03-12 RX ADMIN — ENOXAPARIN SODIUM 40 MG: 100 INJECTION SUBCUTANEOUS at 08:24

## 2022-03-12 RX ADMIN — SODIUM CHLORIDE, PRESERVATIVE FREE 10 ML: 5 INJECTION INTRAVENOUS at 21:26

## 2022-03-12 RX ADMIN — SODIUM CHLORIDE, PRESERVATIVE FREE 10 ML: 5 INJECTION INTRAVENOUS at 06:45

## 2022-03-12 RX ADMIN — GUAIFENESIN 600 MG: 600 TABLET, EXTENDED RELEASE ORAL at 21:23

## 2022-03-12 RX ADMIN — Medication 1 G: at 09:00

## 2022-03-12 RX ADMIN — Medication 2 PACKET: at 08:23

## 2022-03-12 RX ADMIN — SODIUM CHLORIDE, PRESERVATIVE FREE 10 ML: 5 INJECTION INTRAVENOUS at 13:42

## 2022-03-12 RX ADMIN — BUDESONIDE 500 MCG: 0.5 INHALANT RESPIRATORY (INHALATION) at 19:16

## 2022-03-12 RX ADMIN — PANTOPRAZOLE SODIUM 40 MG: 40 TABLET, DELAYED RELEASE ORAL at 06:44

## 2022-03-12 RX ADMIN — Medication 100 MG: at 08:25

## 2022-03-12 RX ADMIN — MIRTAZAPINE 15 MG: 15 TABLET ORAL at 21:23

## 2022-03-12 RX ADMIN — METOPROLOL TARTRATE 12.5 MG: 50 TABLET, FILM COATED ORAL at 08:24

## 2022-03-12 RX ADMIN — BENZTROPINE MESYLATE 2 MG: 1 TABLET ORAL at 08:24

## 2022-03-12 RX ADMIN — DIVALPROEX SODIUM 500 MG: 500 TABLET, DELAYED RELEASE ORAL at 08:24

## 2022-03-12 RX ADMIN — METOPROLOL TARTRATE 12.5 MG: 50 TABLET, FILM COATED ORAL at 21:24

## 2022-03-12 RX ADMIN — TRAZODONE HYDROCHLORIDE 100 MG: 100 TABLET ORAL at 21:23

## 2022-03-12 RX ADMIN — BUDESONIDE 500 MCG: 0.5 INHALANT RESPIRATORY (INHALATION) at 07:49

## 2022-03-12 RX ADMIN — IPRATROPIUM BROMIDE AND ALBUTEROL SULFATE 3 ML: .5; 3 SOLUTION RESPIRATORY (INHALATION) at 19:16

## 2022-03-12 RX ADMIN — GUAIFENESIN 600 MG: 600 TABLET, EXTENDED RELEASE ORAL at 08:24

## 2022-03-12 RX ADMIN — IPRATROPIUM BROMIDE AND ALBUTEROL SULFATE 3 ML: .5; 3 SOLUTION RESPIRATORY (INHALATION) at 07:49

## 2022-03-12 RX ADMIN — FOLIC ACID 1 MG: 1 TABLET ORAL at 08:25

## 2022-03-12 NOTE — PROGRESS NOTES
[address] [facility] Adult  Hospitalist Group                                                                                          Hospitalist Progress Note  Tiffany Vazquez MD  Answering service: [phone] OR [phone] from in house phone        Date of Service:  3/11/2022  NAME:  Anastasiia Quiroz  :  1960  MRN:  [id number]      Admission Summary:   Cole Luna is a 57 y.o. male smoker history of schizo affective disorder. Bipolar depression, COPD, asthma emphysema/bronchitis, history of psychogenic polydipsia, resides in a group home, GERD presents with altered mental status. Unable to obtain history from patient as he is altered. Reported confusion and congestive cough from his group home prompted ED arrival.  In the ER patient was placed on 4 L nasal cannula desatted to mid 80s stat ABG showed compensated respiratory acidosis w/ hypoxia, patient was placed on mid flow currently stable 5L midflow sats low 90s. Patient encephalopathic on my exam.  Notable for WBC count of 13, T-max 102.1F, lactic 0.4.  BP soft but maps greater than 60.\"    Interval history / Subjective:   Patient seen examined at bedside  Denies complaints, just want to drink water      Assessment & Plan:     Acute on chronic hypoxic respiratory failure, was on 2-3L NC PTA- resolved, now on RA  Per PT eval at rest and ambulation pt no longer needs O2  COPD Exacerbation  Left lower lobe pneumonia   Chronic tobacco dependence  -patient not septic  -covid neg    -CTA chest neg for PE confirmed LLL PNA with effusion   -Chest x-ray repeated 3/1 with improvement  -pulm evaluated appreciate recs, patient of Dr. 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  History of psychogenic polydipsia  -nephro following, appreciate input  -am cortisol wnl  -cont fluid restriction, started ure-na 2/28, cont same  -will be difficult to control and discharge when he has access to free water  Ok to Dc SSRI per psych  - tolvaptan, per nephro today, , ure Na    HTN controlled off norvasc    Schizoaffective disorder,  Depression subtype   -psych consulted for evaluation, cont depakote, trazodone, cogentin; remeron started    GERD  Continue home med     Patient has poor insight on his clinical condition    Code status: full  DVT prophylaxis: lovenox     PT/OT following    Care Plan discussed with: Patient/Family and Nurse  Anticipated Disposition: awaiting placement, per CM tentatively on 3/14  Anticipated Discharge: whenever bed available     Hospital Problems  Date Reviewed: 2/22/2022          Codes Class Noted POA    PNA (pneumonia) ICD-10-CM: J18.9  ICD-9-CM: 486  2/26/2022 Unknown              Review of Systems:   A comprehensive review of systems was negative except for that written in the HPI. Vital Signs:    Last 24hrs VS reviewed since prior progress note.  Most recent are:  Visit Vitals  /80 (BP 1 Location: Left arm, BP Patient Position: At rest)   Pulse (!) 104   Temp 98.3 °F (36.8 °C)   Resp 18   Ht 5' 10\" (1.778 m)   Wt 57.8 kg (127 lb 6.8 oz)   SpO2 94%   BMI 18.28 kg/m²         Intake/Output Summary (Last 24 hours) at 3/11/2022 2240  Last data filed at 3/11/2022 1418  Gross per 24 hour   Intake 720 ml   Output    Net 720 ml        Physical Examination:     I had a face to face encounter with this patient and independently examined them on 3/11/2022 as outlined below:          Constitutional:  No acute distress, cooperative, pleasant    ENT:  Oral mucosa moist, oropharynx benign   Resp:  good AE, clear, No wheezing/rhonchi/rales. No accessory muscle use   CV:  Regular rhythm, normal rate    GI:  Soft, non distended, non tender    Musculoskeletal:  No edema, warm    Neurologic:  Moves all extremities. SUNNY BRASHER II-XII reviewed            Data Review:    Review and/or order of clinical lab test  Review and/or order of tests in the radiology section of CPT  Review and/or order of tests in the medicine section of CPT      Labs:     No results for input(s): WBC, HGB, HCT, PLT, HGBEXT, HCTEXT, PLTEXT, HGBEXT, HCTEXT, PLTEXT in the last 72 hours. Recent Labs     03/11/22  1601 03/11/22  0655 03/11/22  0017 03/11/22  0013 03/11/22  0013 03/10/22  0100 03/10/22  0100 03/09/22  0036 03/09/22  0036   * 131* 134*   < > 134*   < > 126*   < > 126*   K  --   --   --   --  4.8  --  4.3  --  3.9   CL  --   --   --   --  99  --  93*  --  93*   CO2  --   --   --   --  29  --  28  --  26   BUN  --   --   --   --  15  --  13  --  15   CREA  --   --   --   --  0.64*  --  0.47*  --  0.57*   GLU  --   --   --   --  91  --  80  --  87   CA  --   --   --   --  9.7  --  8.8  --  8.8   MG  --   --   --   --   --   --   --   --  2.1    < > = values in this interval not displayed. No results for input(s): ALT, AP, TBIL, TBILI, TP, ALB, GLOB, GGT, AML, LPSE in the last 72 hours. No lab exists for component: SGOT, GPT, AMYP, HLPSE  No results for input(s): INR, PTP, APTT, INREXT, INREXT in the last 72 hours. No results for input(s): FE, TIBC, PSAT, FERR in the last 72 hours. Lab Results   Component Value Date/Time    Folate 27.2 (H) 02/26/2022 09:31 AM      No results for input(s): PH, PCO2, PO2 in the last 72 hours. No results for input(s): CPK, CKNDX, TROIQ in the last 72 hours.     No lab exists for component: CPKMB  Lab Results   Component Value Date/Time    Cholesterol, total 181 10/17/2018 10:48 AM    HDL Cholesterol 78 10/17/2018 10:48 AM    LDL, calculated 89 10/17/2018 10:48 AM Triglyceride 71 10/17/2018 10:48 AM     Lab Results   Component Value Date/Time    Glucose (POC) 132 (H) 07/04/2017 04:37 PM    Glucose, POC 92 02/26/2022 09:23 AM     Lab Results   Component Value Date/Time    Color YELLOW/STRAW 02/26/2022 09:41 AM    Appearance CLEAR 02/26/2022 09:41 AM    Specific gravity 1.013 02/26/2022 09:41 AM    pH (UA) 7.0 02/26/2022 09:41 AM    Protein Negative 02/26/2022 09:41 AM    Glucose Negative 02/26/2022 09:41 AM    Ketone TRACE (A) 02/26/2022 09:41 AM    Bilirubin Negative 02/26/2022 09:41 AM    Urobilinogen 1.0 02/26/2022 09:41 AM    Nitrites Negative 02/26/2022 09:41 AM    Leukocyte Esterase Negative 02/26/2022 09:41 AM    Epithelial cells FEW 02/26/2022 09:41 AM    Bacteria Negative 02/26/2022 09:41 AM    WBC 0-4 02/26/2022 09:41 AM    RBC 0-5 02/26/2022 09:41 AM         Medications Reviewed:     Current Facility-Administered Medications   Medication Dose Route Frequency    urea (URE-NA) 15 gram packet 2 Packet  2 Packet Oral DAILY    metoprolol tartrate (LOPRESSOR) tablet 12.5 mg  12.5 mg Oral Q12H    mirtazapine (REMERON) tablet 15 mg  15 mg Oral QHS    albuterol-ipratropium (DUO-NEB) 2.5 MG-0.5 MG/3 ML  3 mL Nebulization BID RT    balsam peru-castor oiL (VENELEX) ointment   Topical BID    sodium chloride (NS) flush 5-10 mL  5-10 mL IntraVENous PRN    sodium chloride (NS) flush 5-40 mL  5-40 mL IntraVENous Q8H    sodium chloride (NS) flush 5-40 mL  5-40 mL IntraVENous PRN    acetaminophen (TYLENOL) tablet 650 mg  650 mg Oral Q6H PRN    Or    acetaminophen (TYLENOL) suppository 650 mg  650 mg Rectal Q6H PRN    polyethylene glycol (MIRALAX) packet 17 g  17 g Oral DAILY PRN    ondansetron (ZOFRAN ODT) tablet 4 mg  4 mg Oral Q8H PRN    Or    ondansetron (ZOFRAN) injection 4 mg  4 mg IntraVENous Q6H PRN    enoxaparin (LOVENOX) injection 40 mg  40 mg SubCUTAneous DAILY    [Held by provider] amLODIPine (NORVASC) tablet 5 mg  5 mg Oral DAILY    benztropine (COGENTIN) tablet 2 mg  2 mg Oral DAILY    divalproex DR (DEPAKOTE) tablet 500 mg  500 mg Oral DAILY    folic acid (FOLVITE) tablet 1 mg  1 mg Oral DAILY    ergocalciferol capsule 50,000 Units  50,000 Units Oral Q30D    lactulose (CHRONULAC) 10 gram/15 mL solution 5 mL  5 mL Oral DAILY PRN    pantoprazole (PROTONIX) tablet 40 mg  40 mg Oral ACB    thiamine HCL (B-1) tablet 100 mg  100 mg Oral DAILY    traZODone (DESYREL) tablet 100 mg  100 mg Oral QHS    albuterol-ipratropium (DUO-NEB) 2.5 MG-0.5 MG/3 ML  3 mL Nebulization Q4H PRN    budesonide (PULMICORT) 500 mcg/2 ml nebulizer suspension  500 mcg Nebulization BID RT    sodium chloride tablet 1 g  1 g Oral DAILY    guaiFENesin ER (MUCINEX) tablet 600 mg  600 mg Oral Q12H    benzonatate (TESSALON) capsule 100 mg  100 mg Oral TID PRN     ______________________________________________________________________  EXPECTED LENGTH OF STAY: 4d 2h  ACTUAL LENGTH OF STAY:          13                 Camacho Joy MD

## 2022-03-12 NOTE — PROGRESS NOTES
Name: Delta Pack MRN: [id number]   : 1960 Hospital: [facility]   Date: 3/12/2022        IMPRESSION:   · True Hyponatremia. The constellation of his labs are consistent with SIADH. Patient's Na is now  Low again. Patient admits drinking a lot of fluid after he was transfer on the medical floor. He  Is on oral fluid restriction. H/O psychogenic polydipsia in the past. Na is somewhat up after Tolvaptan, but it is trending down again. Patient continues to drink large amount of water. No new chemistry today  · Normal renal function  · Schizophrenia       PLAN:   · Continue oral fluid restriction - 800 ml/day. This is imprtant to keel his Na at normal level. I don't believe that a strict oral fluid restriction is possible due to patient's little insight of the importance of compliance with fluid restriction and his ability to access water on his own. · Continue Ure Na  · At this point patient is stable for discharge from renal stand point. · Recheck the BMP in AM.  · Patient is at risk for hyponatremia in the future when he will have access to free water. Subjective/Interval History:   I have reviewed the flowsheet and previous days notes. ROS:Pertinent items are noted in HPI. Denies complaints. States he drinks a lot of water because he likes it. Objective:   Vital Signs:    Visit Vitals  /67 (BP 1 Location: Right arm, BP Patient Position: At rest)   Pulse (!) 104   Temp 97.5 °F (36.4 °C)   Resp 17   Ht 5' 10\" (1.778 m)   Wt 55 kg (121 lb 4.1 oz)   SpO2 93%   BMI 17.40 kg/m²       O2 Device: None (Room air)   O2 Flow Rate (L/min): 2 l/min   Temp (24hrs), Av.9 °F (36.6 °C), Min:97.5 °F (36.4 °C), Max:98.4 °F (36.9 °C)       Intake/Output:   Last shift:      No intake/output data recorded.   Last 3 shifts: 03/10 1901 -  0700  In: 920 [P.O.:920]  Out: 950 [Urine:950]    Intake/Output Summary (Last 24 hours) at 3/12/2022 1151  Last data filed at 3/12/2022 0140  Gross per 24 hour   Intake 560 ml   Output    Net 560 ml        Physical Exam:  General:    Alert, cooperative, no distress, appears stated age. Thin. Calm. Head:   Normocephalic, without obvious abnormality, atraumatic. Eyes:   Conjunctivae/corneas clear. Nose:  Nares normal. No drainage or sinus tenderness. Throat:    Poor dentition  Neck:  Supple, symmetrical,  no adenopathy, thyroid: non tender    no carotid bruit and no JVD. Lungs:   Clear to auscultation bilaterally. No Wheezing or Rhonchi. No rales. Chest wall:  No tenderness or deformity. No Accessory muscle use. Heart:   Regular rate and rhythm,  no murmur, rub or gallop. Abdomen:   Soft, non-tender. Not distended. Bowel sounds normal.  Extremities: Extremities normal, atraumatic, No cyanosis. No edema. No clubbing  Skin:     Texture, turgor normal. No rashes or lesions. Not Jaundiced  Psych:  Poor insight. Not depressed. Not anxious or agitated. DATA:  Labs:  Recent Labs     03/11/22  1601 03/11/22  0655 03/11/22  0017 03/11/22  0013 03/11/22  0013 03/10/22  0100 03/10/22  0100   * 131* 134*   < > 134*   < > 126*   K  --   --   --   --  4.8  --  4.3   CL  --   --   --   --  99  --  93*   CO2  --   --   --   --  29  --  28   BUN  --   --   --   --  15  --  13   CREA  --   --   --   --  0.64*  --  0.47*   CA  --   --   --   --  9.7  --  8.8    < > = values in this interval not displayed. Recent Labs     03/12/22  0135   WBC 13.8*   HGB 13.8   HCT 43.3        No results for input(s): ZECHARIAH, KU, CLU, CREAU in the last 72 hours.     No lab exists for component: PROU    Total time spent with patient:  35 minutes    [] Critical Care Provided    Care Plan discussed with:   Staff, Medical Team    Yovany Green MD

## 2022-03-12 NOTE — PROGRESS NOTES
[facility]  Hospitalist Group                                                                                          Hospitalist Progress Note  Maximino Simon MD  Answering service: [phone] from in house phone        Date of Service:  3/12/2022  NAME:  Stephen Bradley  :  1960  MRN:  [id number]      Admission Summary:   Maylin Harris is a 57 y.o. male smoker history of schizo affective disorder. Bipolar depression, COPD, asthma emphysema/bronchitis, history of psychogenic polydipsia, resides in a group home, GERD presents with altered mental status. Unable to obtain history from patient as he is altered. Reported confusion and congestive cough from his group home prompted ED arrival.  In the ER patient was placed on 4 L nasal cannula desatted to mid 80s stat ABG showed compensated respiratory acidosis w/ hypoxia, patient was placed on mid flow currently stable 5L midflow sats low 90s. Patient encephalopathic on my exam.  Notable for WBC count of 13, T-max 102.1F, lactic 0.4.  BP soft but maps greater than 60.\"    Interval history / Subjective:   Patient seen examined at bedside  Denies complaints, just wants to drink water   Nursing reports patient drinking more water than allowed     Assessment & Plan:     Acute on chronic hypoxic respiratory failure, was on 2-3L NC PTA- resolved, now on RA  Per PT eval at rest and ambulation pt no longer needs O2  COPD Exacerbation  Left lower lobe pneumonia   Chronic tobacco dependence  -patient not septic  -covid neg    -CTA chest neg for PE confirmed LLL PNA with effusion   -Chest x-ray repeated 3/1 with improvement  -pulm evaluated appreciate recs, patient of Dr. Eddie Hunt, was being follow-up for lung nodule with prior bronc negative for malignancy  -Continue neb, Pulmicort, s/p steroids  -s/p IV abx x7d  -f/u with Dr Eddie Hunt  -bld cx ng, mycoplasma/legionella neg,  pct 0.18    Mild sinus tachycardia improving  On  low dose metoprolol  monitor     Acute metabolic encephalopathy -improved   likely Secondary to above  CT head negative, UA neg  -tsh, b12, folate wnl  -depakote level normal   -speech eval, passed soft and easy to chew      Hyponatremia, SIADH- recurred 3/9/22  History of psychogenic polydipsia  -nephro following, appreciate input  -am cortisol wnl  -cont fluid restriction, started ure-na 2/28, cont same  -will be difficult to control and discharge when he has access to free water  Ok to Dc SSRI per psych  - tolvaptan given this admission, per nephro     HTN controlled off norvasc    Schizoaffective disorder,  Depression subtype   -psych consulted for evaluation, cont depakote, trazodone, cogentin; remeron started    GERD  Continue home med     Patient has poor insight on his clinical condition    Code status: full  DVT prophylaxis: lovenox     PT/OT following    Care Plan discussed with: Patient/Family and Nurse  Anticipated Disposition: awaiting placement, per CM tentatively on 3/14  Anticipated Discharge: whenever bed available     Hospital Problems  Date Reviewed: 2/22/2022          Codes Class Noted POA    PNA (pneumonia) ICD-10-CM: J18.9  ICD-9-CM: 486  2/26/2022 Unknown              Review of Systems:   A comprehensive review of systems was negative except for that written in the HPI. Vital Signs:    Last 24hrs VS reviewed since prior progress note.  Most recent are:  Visit Vitals  BP (!) 142/84 (BP 1 Location: Right arm, BP Patient Position: At rest)   Pulse 79   Temp 98.3 °F (36.8 °C)   Resp 19   Ht 5' 10\" (1.778 m)   Wt 56 kg (123 lb 7.3 oz)   SpO2 95%   BMI 17.71 kg/m²         Intake/Output Summary (Last 24 hours) at 3/12/2022 1735  Last data filed at 3/12/2022 0140  Gross per 24 hour   Intake 200 ml   Output    Net 200 ml        Physical Examination:     I had a face to face encounter with this patient and independently examined them on 3/12/2022 as outlined below:          Constitutional:  No acute distress, cooperative, pleasant    ENT:  Oral mucosa moist, oropharynx benign   Resp:  good AE, clear, No wheezing/rhonchi/rales. No accessory muscle use   CV:  Regular rhythm, normal rate    GI:  Soft, non distended, non tender    Musculoskeletal:  No edema, warm    Neurologic:  Moves all extremities. SUNNY BRASHER II-XII reviewed            Data Review:    Review and/or order of clinical lab test  Review and/or order of tests in the radiology section of CPT  Review and/or order of tests in the medicine section of CPT      Labs:     Recent Labs     03/12/22  0135   WBC 13.8*   HGB 13.8   HCT 43.3        Recent Labs     03/11/22  1601 03/11/22  0655 03/11/22  0017 03/11/22  0013 03/11/22  0013 03/10/22  0100 03/10/22  0100   * 131* 134*   < > 134*   < > 126*   K  --   --   --   --  4.8  --  4.3   CL  --   --   --   --  99  --  93*   CO2  --   --   --   --  29  --  28   BUN  --   --   --   --  15  --  13   CREA  --   --   --   --  0.64*  --  0.47*   GLU  --   --   --   --  91  --  80   CA  --   --   --   --  9.7  --  8.8    < > = values in this interval not displayed. No results for input(s): ALT, AP, TBIL, TBILI, TP, ALB, GLOB, GGT, AML, LPSE in the last 72 hours. No lab exists for component: SGOT, GPT, AMYP, HLPSE  No results for input(s): INR, PTP, APTT, INREXT, INREXT in the last 72 hours. No results for input(s): FE, TIBC, PSAT, FERR in the last 72 hours. Lab Results   Component Value Date/Time    Folate 27.2 (H) 02/26/2022 09:31 AM      No results for input(s): PH, PCO2, PO2 in the last 72 hours. No results for input(s): CPK, CKNDX, TROIQ in the last 72 hours.     No lab exists for component: CPKMB  Lab Results   Component Value Date/Time    Cholesterol, total 181 10/17/2018 10:48 AM    HDL Cholesterol 78 10/17/2018 10:48 AM    LDL, calculated 89 10/17/2018 10:48 AM    Triglyceride 71 10/17/2018 10:48 AM     Lab Results   Component Value Date/Time    Glucose (POC) 132 (H) 07/04/2017 04:37 PM Glucose, POC 92 02/26/2022 09:23 AM     Lab Results   Component Value Date/Time    Color YELLOW/STRAW 02/26/2022 09:41 AM    Appearance CLEAR 02/26/2022 09:41 AM    Specific gravity 1.013 02/26/2022 09:41 AM    pH (UA) 7.0 02/26/2022 09:41 AM    Protein Negative 02/26/2022 09:41 AM    Glucose Negative 02/26/2022 09:41 AM    Ketone TRACE (A) 02/26/2022 09:41 AM    Bilirubin Negative 02/26/2022 09:41 AM    Urobilinogen 1.0 02/26/2022 09:41 AM    Nitrites Negative 02/26/2022 09:41 AM    Leukocyte Esterase Negative 02/26/2022 09:41 AM    Epithelial cells FEW 02/26/2022 09:41 AM    Bacteria Negative 02/26/2022 09:41 AM    WBC 0-4 02/26/2022 09:41 AM    RBC 0-5 02/26/2022 09:41 AM         Medications Reviewed:     Current Facility-Administered Medications   Medication Dose Route Frequency    sodium chloride soluble tablet 1,000 mg  1 g Oral DAILY    urea (URE-NA) 15 gram packet 2 Packet  2 Packet Oral DAILY    metoprolol tartrate (LOPRESSOR) tablet 12.5 mg  12.5 mg Oral Q12H    mirtazapine (REMERON) tablet 15 mg  15 mg Oral QHS    albuterol-ipratropium (DUO-NEB) 2.5 MG-0.5 MG/3 ML  3 mL Nebulization BID RT    balsam peru-castor oiL (VENELEX) ointment   Topical BID    sodium chloride (NS) flush 5-10 mL  5-10 mL IntraVENous PRN    sodium chloride (NS) flush 5-40 mL  5-40 mL IntraVENous Q8H    sodium chloride (NS) flush 5-40 mL  5-40 mL IntraVENous PRN    acetaminophen (TYLENOL) tablet 650 mg  650 mg Oral Q6H PRN    Or    acetaminophen (TYLENOL) suppository 650 mg  650 mg Rectal Q6H PRN    polyethylene glycol (MIRALAX) packet 17 g  17 g Oral DAILY PRN    ondansetron (ZOFRAN ODT) tablet 4 mg  4 mg Oral Q8H PRN    Or    ondansetron (ZOFRAN) injection 4 mg  4 mg IntraVENous Q6H PRN    enoxaparin (LOVENOX) injection 40 mg  40 mg SubCUTAneous DAILY    [Held by provider] amLODIPine (NORVASC) tablet 5 mg  5 mg Oral DAILY    benztropine (COGENTIN) tablet 2 mg  2 mg Oral DAILY    divalproex DR (DEPAKOTE) tablet 500 mg  500 mg Oral DAILY    folic acid (FOLVITE) tablet 1 mg  1 mg Oral DAILY    ergocalciferol capsule 50,000 Units  50,000 Units Oral Q30D    lactulose (CHRONULAC) 10 gram/15 mL solution 5 mL  5 mL Oral DAILY PRN    pantoprazole (PROTONIX) tablet 40 mg  40 mg Oral ACB    thiamine HCL (B-1) tablet 100 mg  100 mg Oral DAILY    traZODone (DESYREL) tablet 100 mg  100 mg Oral QHS    albuterol-ipratropium (DUO-NEB) 2.5 MG-0.5 MG/3 ML  3 mL Nebulization Q4H PRN    budesonide (PULMICORT) 500 mcg/2 ml nebulizer suspension  500 mcg Nebulization BID RT    guaiFENesin ER (MUCINEX) tablet 600 mg  600 mg Oral Q12H    benzonatate (TESSALON) capsule 100 mg  100 mg Oral TID PRN     ______________________________________________________________________  EXPECTED LENGTH OF STAY: 4d 2h  ACTUAL LENGTH OF STAY:          14                 Thaddeus Bruno MD

## 2022-03-12 NOTE — PROGRESS NOTES
Spiritual Care Assessment/Progress Note  [facility]      NAME: Socorro Waldron      MRN: [id number]  AGE: 57 y.o.  SEX: male  Adventism Affiliation: Druze   Language: English     3/12/2022     Total Time (in minutes): 14     Spiritual Assessment begun in [facility] through conversation with:         [x]Patient        [] Family    [] Friend(s)        Reason for Consult: Initial/Spiritual assessment, patient floor     Spiritual beliefs: (Please include comment if needed)     [x] Identifies with a renetta tradition:         [] Supported by a renetta community:            [] Claims no spiritual orientation:           [] Seeking spiritual identity:                [] Adheres to an individual form of spirituality:           [] Not able to assess:                           Identified resources for coping:      [x] Prayer                               [] Music                  [] Guided Imagery     [] Family/friends                 [] Pet visits     [] Devotional reading                         [] Unknown     [] Other:                                              Interventions offered during this visit: (See comments for more details)    Patient Interventions: Affirmation of emotions/emotional suffering,Affirmation of renetta,Catharsis/review of pertinent events in supportive environment,Coping skills reviewed/reinforced,Normalization of emotional/spiritual concerns,Prayer (assurance of)           Plan of Care:     [] Support spiritual and/or cultural needs    [] Support AMD and/or advance care planning process      [] Support grieving process   [] Coordinate Rites and/or Rituals    [] Coordination with community clergy   [] No spiritual needs identified at this time   [] Detailed Plan of Care below (See Comments)  [] Make referral to Music Therapy  [] Make referral to Pet Therapy     [] Make referral to Addiction services  [] Make referral to [facility]  [] Make referral to Spiritual Care Partner  [] No future visits requested        [x] Contact Spiritual Care for further referrals     Comments:  for initial visit. Pt was up walking to chair. He was appreciative of  visit. Let him know of  availability. Provided pastoral listening, support and assurance of prayer. Please contact [id number] [facility] for further support.      [phone]ios, MACE   [phone]

## 2022-03-12 NOTE — PROGRESS NOTES
Problem: Pressure Injury - Risk of  Goal: *Prevention of pressure injury  Description: Document Sanya Scale and appropriate interventions in the flowsheet. Outcome: Progressing Towards Goal  Note: Pressure Injury Interventions:  Sensory Interventions: Assess changes in LOC,Minimize linen layers    Moisture Interventions: Absorbent underpads,Apply protective barrier, creams and emollients    Activity Interventions: Assess need for specialty bed,Increase time out of bed    Mobility Interventions: Assess need for specialty bed,Float heels    Nutrition Interventions: Document food/fluid/supplement intake,Offer support with meals,snacks and hydration    Friction and Shear Interventions: Apply protective barrier, creams and emollients,Minimize layers                Problem: Patient Education: Go to Patient Education Activity  Goal: Patient/Family Education  Outcome: Progressing Towards Goal     Problem: Falls - Risk of  Goal: *Absence of Falls  Description: Document Virlinda Gamma Fall Risk and appropriate interventions in the flowsheet.   Outcome: Progressing Towards Goal  Note: Fall Risk Interventions:  Mobility Interventions: Communicate number of staff needed for ambulation/transfer    Mentation Interventions: Adequate sleep, hydration, pain control,Evaluate medications/consider consulting pharmacy    Medication Interventions: Evaluate medications/consider consulting pharmacy,Patient to call before getting OOB,Teach patient to arise slowly    Elimination Interventions: Call light in reach              Problem: Patient Education: Go to Patient Education Activity  Goal: Patient/Family Education  Outcome: Progressing Towards Goal     Problem: Patient Education: Go to Patient Education Activity  Goal: Patient/Family Education  Outcome: Progressing Towards Goal     Problem: Patient Education: Go to Patient Education Activity  Goal: Patient/Family Education  Outcome: Progressing Towards Goal     Problem: Discharge Planning  Goal: *Discharge to safe environment  Outcome: Progressing Towards Goal     Problem: Breathing Pattern - Ineffective  Goal: *Absence of hypoxia  Outcome: Progressing Towards Goal  Goal: *Use of effective breathing techniques  Outcome: Progressing Towards Goal     Problem: Patient Education: Go to Patient Education Activity  Goal: Patient/Family Education  Outcome: Progressing Towards Goal

## 2022-03-13 LAB
ALBUMIN SERPL-MCNC: 3.1 G/DL (ref 3.5–5)
ANION GAP SERPL CALC-SCNC: 5 MMOL/L (ref 5–15)
BUN SERPL-MCNC: 15 MG/DL (ref 6–20)
BUN/CREAT SERPL: 26 (ref 12–20)
CALCIUM SERPL-MCNC: 9.1 MG/DL (ref 8.5–10.1)
CHLORIDE SERPL-SCNC: 99 MMOL/L (ref 97–108)
CO2 SERPL-SCNC: 28 MMOL/L (ref 21–32)
CREAT SERPL-MCNC: 0.58 MG/DL (ref 0.7–1.3)
GLUCOSE SERPL-MCNC: 90 MG/DL (ref 65–100)
MAGNESIUM SERPL-MCNC: 2.3 MG/DL (ref 1.6–2.4)
PHOSPHATE SERPL-MCNC: 4.5 MG/DL (ref 2.6–4.7)
POTASSIUM SERPL-SCNC: 4.2 MMOL/L (ref 3.5–5.1)
SODIUM SERPL-SCNC: 132 MMOL/L (ref 136–145)

## 2022-03-13 PROCEDURE — 36415 COLL VENOUS BLD VENIPUNCTURE: CPT

## 2022-03-13 PROCEDURE — 65270000029 HC RM PRIVATE

## 2022-03-13 PROCEDURE — 80069 RENAL FUNCTION PANEL: CPT

## 2022-03-13 PROCEDURE — 74011250636 HC RX REV CODE- 250/636: Performed by: STUDENT IN AN ORGANIZED HEALTH CARE EDUCATION/TRAINING PROGRAM

## 2022-03-13 PROCEDURE — 94640 AIRWAY INHALATION TREATMENT: CPT

## 2022-03-13 PROCEDURE — 74011250637 HC RX REV CODE- 250/637: Performed by: STUDENT IN AN ORGANIZED HEALTH CARE EDUCATION/TRAINING PROGRAM

## 2022-03-13 PROCEDURE — 83735 ASSAY OF MAGNESIUM: CPT

## 2022-03-13 PROCEDURE — 74011250637 HC RX REV CODE- 250/637: Performed by: INTERNAL MEDICINE

## 2022-03-13 PROCEDURE — 74011250637 HC RX REV CODE- 250/637: Performed by: NURSE PRACTITIONER

## 2022-03-13 PROCEDURE — 74011000250 HC RX REV CODE- 250: Performed by: STUDENT IN AN ORGANIZED HEALTH CARE EDUCATION/TRAINING PROGRAM

## 2022-03-13 PROCEDURE — 74011000250 HC RX REV CODE- 250: Performed by: INTERNAL MEDICINE

## 2022-03-13 RX ADMIN — ENOXAPARIN SODIUM 40 MG: 100 INJECTION SUBCUTANEOUS at 08:26

## 2022-03-13 RX ADMIN — Medication 1000 MG: at 08:26

## 2022-03-13 RX ADMIN — SODIUM CHLORIDE, PRESERVATIVE FREE 10 ML: 5 INJECTION INTRAVENOUS at 06:47

## 2022-03-13 RX ADMIN — TRAZODONE HYDROCHLORIDE 100 MG: 100 TABLET ORAL at 21:32

## 2022-03-13 RX ADMIN — BUDESONIDE 500 MCG: 0.5 INHALANT RESPIRATORY (INHALATION) at 07:50

## 2022-03-13 RX ADMIN — IPRATROPIUM BROMIDE AND ALBUTEROL SULFATE 3 ML: .5; 3 SOLUTION RESPIRATORY (INHALATION) at 19:41

## 2022-03-13 RX ADMIN — FOLIC ACID 1 MG: 1 TABLET ORAL at 08:26

## 2022-03-13 RX ADMIN — GUAIFENESIN 600 MG: 600 TABLET, EXTENDED RELEASE ORAL at 21:33

## 2022-03-13 RX ADMIN — DIVALPROEX SODIUM 500 MG: 500 TABLET, DELAYED RELEASE ORAL at 08:26

## 2022-03-13 RX ADMIN — Medication: at 08:25

## 2022-03-13 RX ADMIN — METOPROLOL TARTRATE 12.5 MG: 50 TABLET, FILM COATED ORAL at 21:32

## 2022-03-13 RX ADMIN — Medication 2 PACKET: at 08:26

## 2022-03-13 RX ADMIN — MIRTAZAPINE 15 MG: 15 TABLET ORAL at 21:31

## 2022-03-13 RX ADMIN — METOPROLOL TARTRATE 12.5 MG: 50 TABLET, FILM COATED ORAL at 08:26

## 2022-03-13 RX ADMIN — IPRATROPIUM BROMIDE AND ALBUTEROL SULFATE 3 ML: .5; 3 SOLUTION RESPIRATORY (INHALATION) at 07:50

## 2022-03-13 RX ADMIN — SODIUM CHLORIDE, PRESERVATIVE FREE 10 ML: 5 INJECTION INTRAVENOUS at 15:30

## 2022-03-13 RX ADMIN — BUDESONIDE 500 MCG: 0.5 INHALANT RESPIRATORY (INHALATION) at 19:41

## 2022-03-13 RX ADMIN — BENZTROPINE MESYLATE 2 MG: 1 TABLET ORAL at 08:26

## 2022-03-13 RX ADMIN — PANTOPRAZOLE SODIUM 40 MG: 40 TABLET, DELAYED RELEASE ORAL at 06:47

## 2022-03-13 RX ADMIN — SODIUM CHLORIDE, PRESERVATIVE FREE 10 ML: 5 INJECTION INTRAVENOUS at 21:35

## 2022-03-13 RX ADMIN — GUAIFENESIN 600 MG: 600 TABLET, EXTENDED RELEASE ORAL at 08:26

## 2022-03-13 RX ADMIN — Medication 100 MG: at 08:26

## 2022-03-13 NOTE — PROGRESS NOTES
Pt is laying in bed with even and unlabored respirations on room air. No complaints of pain at this time. No distress noted. Pt is on a fluid restriction. Pt is noncompliant with fluid restriction. RN explained the fluid restriction to the pt and the pt continued to ask for fluids. Every time pt asked about fluids RN informed the pt about the fluid restrict. Pt was found drinking water from the sink faucet Dr. Chloe Crow was made aware. Dr. Chloe Crow said to do the best that we can we the fluid restrict. Wound dressing was placed on pts sacrum. pts wound behind the left ear has healed cream was not placed due to wound being healed. All safety precautions are in place. Bed in low position and locked. Call bell within reach. Problem: Pressure Injury - Risk of  Goal: *Prevention of pressure injury  Description: Document Sanya Scale and appropriate interventions in the flowsheet. Outcome: Progressing Towards Goal  Note: Pressure Injury Interventions:  Sensory Interventions: Avoid rigorous massage over bony prominences,Assess changes in LOC    Moisture Interventions: Absorbent underpads,Apply protective barrier, creams and emollients    Activity Interventions: Increase time out of bed    Mobility Interventions: HOB 30 degrees or less    Nutrition Interventions: Document food/fluid/supplement intake,Offer support with meals,snacks and hydration    Friction and Shear Interventions: Apply protective barrier, creams and emollients,Minimize layers                Problem: Patient Education: Go to Patient Education Activity  Goal: Patient/Family Education  Outcome: Progressing Towards Goal     Problem: Falls - Risk of  Goal: *Absence of Falls  Description: Document Joanie Fall Risk and appropriate interventions in the flowsheet.   Outcome: Progressing Towards Goal  Note: Fall Risk Interventions:  Mobility Interventions: Communicate number of staff needed for ambulation/transfer    Mentation Interventions: Adequate sleep, hydration, pain control,Evaluate medications/consider consulting pharmacy    Medication Interventions: Evaluate medications/consider consulting pharmacy    Elimination Interventions: Call light in reach              Problem: Patient Education: Go to Patient Education Activity  Goal: Patient/Family Education  Outcome: Progressing Towards Goal     Problem: Patient Education: Go to Patient Education Activity  Goal: Patient/Family Education  Outcome: Progressing Towards Goal     Problem: Patient Education: Go to Patient Education Activity  Goal: Patient/Family Education  Outcome: Progressing Towards Goal     Problem: Discharge Planning  Goal: *Discharge to safe environment  Outcome: Progressing Towards Goal     Problem: Breathing Pattern - Ineffective  Goal: *Absence of hypoxia  Outcome: Progressing Towards Goal  Goal: *Use of effective breathing techniques  Outcome: Progressing Towards Goal     Problem: Patient Education: Go to Patient Education Activity  Goal: Patient/Family Education  Outcome: Progressing Towards Goal

## 2022-03-13 NOTE — PROGRESS NOTES
Problem: Pressure Injury - Risk of  Goal: *Prevention of pressure injury  Description: Document Sanya Scale and appropriate interventions in the flowsheet. Outcome: Progressing Towards Goal  Note: Pressure Injury Interventions:  Sensory Interventions: Avoid rigorous massage over bony prominences,Assess changes in LOC    Moisture Interventions: Absorbent underpads,Apply protective barrier, creams and emollients    Activity Interventions: Increase time out of bed    Mobility Interventions: HOB 30 degrees or less    Nutrition Interventions: Document food/fluid/supplement intake,Offer support with meals,snacks and hydration    Friction and Shear Interventions: Apply protective barrier, creams and emollients,Minimize layers                Problem: Patient Education: Go to Patient Education Activity  Goal: Patient/Family Education  Outcome: Progressing Towards Goal     Problem: Falls - Risk of  Goal: *Absence of Falls  Description: Document Joanie Fall Risk and appropriate interventions in the flowsheet.   Outcome: Progressing Towards Goal  Note: Fall Risk Interventions:  Mobility Interventions: Communicate number of staff needed for ambulation/transfer    Mentation Interventions: Adequate sleep, hydration, pain control    Medication Interventions: Evaluate medications/consider consulting pharmacy    Elimination Interventions: Call light in reach              Problem: Patient Education: Go to Patient Education Activity  Goal: Patient/Family Education  Outcome: Progressing Towards Goal     Problem: Patient Education: Go to Patient Education Activity  Goal: Patient/Family Education  Outcome: Progressing Towards Goal     Problem: Patient Education: Go to Patient Education Activity  Goal: Patient/Family Education  Outcome: Progressing Towards Goal     Problem: Discharge Planning  Goal: *Discharge to safe environment  Outcome: Progressing Towards Goal     Problem: Breathing Pattern - Ineffective  Goal: *Absence of hypoxia  Outcome: Progressing Towards Goal  Goal: *Use of effective breathing techniques  Outcome: Progressing Towards Goal     Problem: Patient Education: Go to Patient Education Activity  Goal: Patient/Family Education  Outcome: Progressing Towards Goal

## 2022-03-14 PROCEDURE — 74011250636 HC RX REV CODE- 250/636: Performed by: STUDENT IN AN ORGANIZED HEALTH CARE EDUCATION/TRAINING PROGRAM

## 2022-03-14 PROCEDURE — 74011250637 HC RX REV CODE- 250/637: Performed by: STUDENT IN AN ORGANIZED HEALTH CARE EDUCATION/TRAINING PROGRAM

## 2022-03-14 PROCEDURE — 74011250637 HC RX REV CODE- 250/637: Performed by: INTERNAL MEDICINE

## 2022-03-14 PROCEDURE — 74011000250 HC RX REV CODE- 250: Performed by: INTERNAL MEDICINE

## 2022-03-14 PROCEDURE — 74011000250 HC RX REV CODE- 250: Performed by: STUDENT IN AN ORGANIZED HEALTH CARE EDUCATION/TRAINING PROGRAM

## 2022-03-14 PROCEDURE — 94640 AIRWAY INHALATION TREATMENT: CPT

## 2022-03-14 PROCEDURE — 97116 GAIT TRAINING THERAPY: CPT

## 2022-03-14 PROCEDURE — 65270000029 HC RM PRIVATE

## 2022-03-14 PROCEDURE — 74011250637 HC RX REV CODE- 250/637: Performed by: NURSE PRACTITIONER

## 2022-03-14 RX ADMIN — Medication: at 10:08

## 2022-03-14 RX ADMIN — IPRATROPIUM BROMIDE AND ALBUTEROL SULFATE 3 ML: .5; 3 SOLUTION RESPIRATORY (INHALATION) at 07:13

## 2022-03-14 RX ADMIN — GUAIFENESIN 600 MG: 600 TABLET, EXTENDED RELEASE ORAL at 10:05

## 2022-03-14 RX ADMIN — Medication 1000 MG: at 10:04

## 2022-03-14 RX ADMIN — MIRTAZAPINE 15 MG: 15 TABLET ORAL at 21:31

## 2022-03-14 RX ADMIN — PANTOPRAZOLE SODIUM 40 MG: 40 TABLET, DELAYED RELEASE ORAL at 07:06

## 2022-03-14 RX ADMIN — SODIUM CHLORIDE, PRESERVATIVE FREE 10 ML: 5 INJECTION INTRAVENOUS at 21:33

## 2022-03-14 RX ADMIN — METOPROLOL TARTRATE 12.5 MG: 50 TABLET, FILM COATED ORAL at 21:32

## 2022-03-14 RX ADMIN — BUDESONIDE 500 MCG: 0.5 INHALANT RESPIRATORY (INHALATION) at 20:05

## 2022-03-14 RX ADMIN — FOLIC ACID 1 MG: 1 TABLET ORAL at 10:04

## 2022-03-14 RX ADMIN — GUAIFENESIN 600 MG: 600 TABLET, EXTENDED RELEASE ORAL at 21:32

## 2022-03-14 RX ADMIN — IPRATROPIUM BROMIDE AND ALBUTEROL SULFATE 3 ML: .5; 3 SOLUTION RESPIRATORY (INHALATION) at 20:05

## 2022-03-14 RX ADMIN — BENZTROPINE MESYLATE 2 MG: 1 TABLET ORAL at 10:05

## 2022-03-14 RX ADMIN — TRAZODONE HYDROCHLORIDE 100 MG: 100 TABLET ORAL at 21:32

## 2022-03-14 RX ADMIN — ENOXAPARIN SODIUM 40 MG: 100 INJECTION SUBCUTANEOUS at 10:05

## 2022-03-14 RX ADMIN — METOPROLOL TARTRATE 12.5 MG: 50 TABLET, FILM COATED ORAL at 10:05

## 2022-03-14 RX ADMIN — Medication 100 MG: at 10:05

## 2022-03-14 RX ADMIN — SODIUM CHLORIDE, PRESERVATIVE FREE 10 ML: 5 INJECTION INTRAVENOUS at 16:43

## 2022-03-14 RX ADMIN — DIVALPROEX SODIUM 500 MG: 500 TABLET, DELAYED RELEASE ORAL at 10:05

## 2022-03-14 RX ADMIN — Medication 2 PACKET: at 10:05

## 2022-03-14 RX ADMIN — Medication: at 19:27

## 2022-03-14 RX ADMIN — SODIUM CHLORIDE, PRESERVATIVE FREE 10 ML: 5 INJECTION INTRAVENOUS at 07:06

## 2022-03-14 RX ADMIN — BUDESONIDE 500 MCG: 0.5 INHALANT RESPIRATORY (INHALATION) at 07:13

## 2022-03-14 NOTE — PROGRESS NOTES
Problem: Discharge Planning  Goal: *Discharge to safe environment  Outcome: Progressing Towards Goal     Problem: Breathing Pattern - Ineffective  Goal: *Absence of hypoxia  Outcome: Progressing Towards Goal  Goal: *Use of effective breathing techniques  Outcome: Progressing Towards Goal     Problem: Patient Education: Go to Patient Education Activity  Goal: Patient/Family Education  Outcome: Progressing Towards Goal

## 2022-03-14 NOTE — PROGRESS NOTES
Progressing:    Problem: Discharge Planning  Goal: *Discharge to safe environment  Outcome: Progressing Towards Goal     Problem: Breathing Pattern - Ineffective  Goal: *Absence of hypoxia  Outcome: Progressing Towards Goal  Goal: *Use of effective breathing techniques  Outcome: Progressing Towards Goal     Problem: Patient Education: Go to Patient Education Activity  Goal: Patient/Family Education  Outcome: Progressing Towards Goal       Resolved:    Problem: Pressure Injury - Risk of  Goal: *Prevention of pressure injury  Description: Document Sanya Scale and appropriate interventions in the flowsheet. Outcome: Resolved/Met  Note: Pressure Injury Interventions:  Sensory Interventions: Avoid rigorous massage over bony prominences,Assess changes in LOC    Moisture Interventions: Absorbent underpads,Apply protective barrier, creams and emollients    Activity Interventions: Increase time out of bed    Mobility Interventions: HOB 30 degrees or less    Nutrition Interventions: Document food/fluid/supplement intake,Offer support with meals,snacks and hydration    Friction and Shear Interventions: Apply protective barrier, creams and emollients,Minimize layers      Problem: Patient Education: Go to Patient Education Activity  Goal: Patient/Family Education  Outcome: Resolved/Met     Problem: Falls - Risk of  Goal: *Absence of Falls  Description: Document Joanie Fall Risk and appropriate interventions in the flowsheet.   Outcome: Resolved/Met  Note: Fall Risk Interventions:  Mobility Interventions: Communicate number of staff needed for ambulation/transfer    Mentation Interventions: Adequate sleep, hydration, pain control    Medication Interventions: Evaluate medications/consider consulting pharmacy    Elimination Interventions: Call light in reach

## 2022-03-14 NOTE — PROGRESS NOTES
[facility]  Hospitalist Group                                                                                          Hospitalist Progress Note  Maikel Hassan MD  Answering service: [phone] OR [phone] from in house phone     NAME:  Joe Millan  :  1960  MRN:  [id number]      Admission Summary:   Argenis Cruz is a 57 y.o. male smoker history of schizo affective disorder. Bipolar depression, COPD, asthma emphysema/bronchitis, history of psychogenic polydipsia, resides in a group home, GERD presents with altered mental status. Unable to obtain history from patient as he is altered. Reported confusion and congestive cough from his group home prompted ED arrival.  In the ER patient was placed on 4 L nasal cannula desatted to mid 80s stat ABG showed compensated respiratory acidosis w/ hypoxia, patient was placed on mid flow currently stable 5L midflow sats low 90s. Patient encephalopathic on my exam.  Notable for WBC count of 13, T-max 102.1F, lactic 0.4.  BP soft but maps greater than 60.\"    Interval history / Subjective:   Patient seen examined at bedside  Denies complaints, just wants to drink water   Nursing reports patient drinking more water than allowed     Assessment & Plan:     Acute on chronic hypoxic respiratory failure, was on 2-3L NC PTA- resolved, now on RA  Per PT eval at rest and ambulation pt no longer needs O2  COPD Exacerbation  Left lower lobe pneumonia   Chronic tobacco dependence  -patient not septic  -covid neg    -CTA chest neg for PE confirmed LLL PNA with effusion   -Chest x-ray repeated 3/1 with improvement  -pulm evaluated appreciate recs, patient of Dr. 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  History of psychogenic polydipsia  -nephro following, appreciate input  -am cortisol wnl  -cont fluid restriction, started ure-na 2/28, cont same  -will be difficult to control and discharge when he has access to free water  Ok to Dc SSRI per psych  - tolvaptan given this admission, per nephro     HTN controlled off norvasc    Schizoaffective disorder,  Depression subtype   -psych consulted for evaluation, cont depakote, trazodone, cogentin; remeron started    GERD  Continue home med     Patient has poor insight on his clinical condition    Code status: full  DVT prophylaxis: lovenox     PT/OT following    Care Plan discussed with: Patient/Family and Nurse  Anticipated Disposition: awaiting placement, per CM tentatively on 3/14  Anticipated Discharge: whenever bed available     Hospital Problems  Date Reviewed: 2/22/2022          Codes Class Noted POA    PNA (pneumonia) ICD-10-CM: J18.9  ICD-9-CM: 486  2/26/2022 Unknown              Review of Systems:   A comprehensive review of systems was negative except for that written in the HPI. Vital Signs:    Last 24hrs VS reviewed since prior progress note. Most recent are:  Visit Vitals  BP (!) 142/86 (BP 1 Location: Left upper arm, BP Patient Position: At rest)   Pulse 82   Temp 98.1 °F (36.7 °C)   Resp 18   Ht 5' 10\" (1.778 m)   Wt 55 kg (121 lb 4.1 oz)   SpO2 92%   BMI 17.40 kg/m²         Intake/Output Summary (Last 24 hours) at 3/14/2022 0001  Last data filed at 3/13/2022 1941  Gross per 24 hour   Intake 900 ml   Output 650 ml   Net 250 ml        Physical Examination:         Constitutional:  No acute distress, cooperative, pleasant    ENT:  Oral mucosa moist, oropharynx benign   Resp:  good AE, clear, No wheezing/rhonchi/rales.  No accessory muscle use   CV:  Regular rhythm, normal rate    GI:  Soft, non distended, non tender    Musculoskeletal:  No edema, warm    Neurologic:  Moves all extremities. TAYLAO, CN II-XII reviewed            Data Review:    Review and/or order of clinical lab test  Review and/or order of tests in the radiology section of CPT  Review and/or order of tests in the medicine section of [facility]      Labs:     Recent Labs     03/12/22  0135   WBC 13.8*   HGB 13.8   HCT 43.3        Recent Labs     03/13/22  0307 03/11/22  1601 03/11/22  0655 03/11/22  0017 03/11/22  0013   * 130* 131*   < > 134*   K 4.2  --   --   --  4.8   CL 99  --   --   --  99   CO2 28  --   --   --  29   BUN 15  --   --   --  15   CREA 0.58*  --   --   --  0.64*   GLU 90  --   --   --  91   CA 9.1  --   --   --  9.7   MG 2.3  --   --   --   --    PHOS 4.5  --   --   --   --     < > = values in this interval not displayed. Recent Labs     03/13/22  0307   ALB 3.1*     No results for input(s): INR, PTP, APTT, INREXT, INREXT in the last 72 hours. No results for input(s): FE, TIBC, PSAT, FERR in the last 72 hours. Lab Results   Component Value Date/Time    Folate 27.2 (H) 02/26/2022 09:31 AM      No results for input(s): PH, PCO2, PO2 in the last 72 hours. No results for input(s): CPK, CKNDX, TROIQ in the last 72 hours.     No lab exists for component: CPKMB  Lab Results   Component Value Date/Time    Cholesterol, total 181 10/17/2018 10:48 AM    HDL Cholesterol 78 10/17/2018 10:48 AM    LDL, calculated 89 10/17/2018 10:48 AM    Triglyceride 71 10/17/2018 10:48 AM     Lab Results   Component Value Date/Time    Glucose (POC) 132 (H) 07/04/2017 04:37 PM    Glucose, POC 92 02/26/2022 09:23 AM     Lab Results   Component Value Date/Time    Color YELLOW/STRAW 02/26/2022 09:41 AM    Appearance CLEAR 02/26/2022 09:41 AM    Specific gravity 1.013 02/26/2022 09:41 AM    pH (UA) 7.0 02/26/2022 09:41 AM    Protein Negative 02/26/2022 09:41 AM    Glucose Negative 02/26/2022 09:41 AM    Ketone TRACE (A) 02/26/2022 09:41 AM    Bilirubin Negative 02/26/2022 09:41 AM    Urobilinogen 1.0 02/26/2022 09:41 AM    Nitrites Negative 02/26/2022 09:41 AM    Leukocyte Esterase Negative 02/26/2022 09:41 AM    Epithelial cells FEW 02/26/2022 09:41 AM    Bacteria Negative 02/26/2022 09:41 AM    WBC 0-4 02/26/2022 09:41 AM    RBC 0-5 02/26/2022 09:41 AM         Medications Reviewed:     Current Facility-Administered Medications   Medication Dose Route Frequency    sodium chloride soluble tablet 1,000 mg  1 g Oral DAILY    urea (URE-NA) 15 gram packet 2 Packet  2 Packet Oral DAILY    metoprolol tartrate (LOPRESSOR) tablet 12.5 mg  12.5 mg Oral Q12H    mirtazapine (REMERON) tablet 15 mg  15 mg Oral QHS    albuterol-ipratropium (DUO-NEB) 2.5 MG-0.5 MG/3 ML  3 mL Nebulization BID RT    balsam peru-castor oiL (VENELEX) ointment   Topical BID    sodium chloride (NS) flush 5-10 mL  5-10 mL IntraVENous PRN    sodium chloride (NS) flush 5-40 mL  5-40 mL IntraVENous Q8H    sodium chloride (NS) flush 5-40 mL  5-40 mL IntraVENous PRN    acetaminophen (TYLENOL) tablet 650 mg  650 mg Oral Q6H PRN    Or    acetaminophen (TYLENOL) suppository 650 mg  650 mg Rectal Q6H PRN    polyethylene glycol (MIRALAX) packet 17 g  17 g Oral DAILY PRN    ondansetron (ZOFRAN ODT) tablet 4 mg  4 mg Oral Q8H PRN    Or    ondansetron (ZOFRAN) injection 4 mg  4 mg IntraVENous Q6H PRN    enoxaparin (LOVENOX) injection 40 mg  40 mg SubCUTAneous DAILY    [Held by provider] amLODIPine (NORVASC) tablet 5 mg  5 mg Oral DAILY    benztropine (COGENTIN) tablet 2 mg  2 mg Oral DAILY    divalproex DR (DEPAKOTE) tablet 500 mg  500 mg Oral DAILY    folic acid (FOLVITE) tablet 1 mg  1 mg Oral DAILY    ergocalciferol capsule 50,000 Units  50,000 Units Oral Q30D    lactulose (CHRONULAC) 10 gram/15 mL solution 5 mL  5 mL Oral DAILY PRN    pantoprazole (PROTONIX) tablet 40 mg  40 mg Oral ACB    thiamine HCL (B-1) tablet 100 mg  100 mg Oral DAILY    traZODone (DESYREL) tablet 100 mg  100 mg Oral QHS    albuterol-ipratropium (DUO-NEB) 2.5 MG-0.5 MG/3 ML  3 mL Nebulization Q4H PRN    budesonide (PULMICORT) 500 mcg/2 ml nebulizer suspension  500 mcg Nebulization BID RT    guaiFENesin ER (MUCINEX) tablet 600 mg  600 mg Oral Q12H    benzonatate (TESSALON) capsule 100 mg  100 mg Oral TID PRN     ______________________________________________________________________  EXPECTED LENGTH OF STAY: 4d 2h  ACTUAL LENGTH OF STAY:          16                 Thaddeus Bruno MD

## 2022-03-14 NOTE — PROGRESS NOTES
Problem: Discharge Planning  Goal: *Discharge to safe environment  3/14/2022 0456 by Saleem Kuhn RN  Outcome: Progressing Towards Goal  3/14/2022 0201 by Saleem Kuhn RN  Outcome: Progressing Towards Goal     Problem: Breathing Pattern - Ineffective  Goal: *Absence of hypoxia  3/14/2022 0456 by Saleem Kuhn RN  Outcome: Progressing Towards Goal  3/14/2022 0201 by Saleem Kuhn RN  Outcome: Progressing Towards Goal  Goal: *Use of effective breathing techniques  3/14/2022 0456 by Saleem Kuhn RN  Outcome: Progressing Towards Goal  3/14/2022 0201 by Saleem Kuhn RN  Outcome: Progressing Towards Goal     Problem: Patient Education: Go to Patient Education Activity  Goal: Patient/Family Education  3/14/2022 0456 by Saleem Kuhn RN  Outcome: Progressing Towards Goal  3/14/2022 0201 by Saleem Kuhn RN  Outcome: Progressing Towards Goal

## 2022-03-14 NOTE — PROGRESS NOTES
Name: Stephen Bradley MRN: [id number]   : 1960 [facility]: [facility]   Date: 3/14/2022        IMPRESSION:   · True Hyponatremia. The constellation of his labs are consistent with SIADH. Patient's Na is now  Low again. Patient admits drinking a lot of fluid after he was transfer on the medical floor. He  Is on oral fluid restriction. H/O psychogenic polydipsia in the past. Na is somewhat up after Tolvaptan, Na is stable above 130  · Normal renal function  · Schizophrenia       PLAN:   · Continue oral fluid restriction - 800 ml/day. This is imprtant to keel his Na at normal level. I don't believe that a strict oral fluid restriction is possible due to patient's little insight of the importance of compliance with fluid restriction and his ability to access water on his own. · Continue Ure Na  · At this point patient is stable for discharge from renal stand point. · Recheck the BMP in AM.  · Patient is at risk for hyponatremia in the future when he will have access to free water. Subjective/Interval History:   I have reviewed the flowsheet and previous days notes. ROS:Pertinent items are noted in HPI. Denies complaints. States he drinks a lot of water because he likes it.     Objective:   Vital Signs:    Visit Vitals  /70 (BP 1 Location: Left upper arm, BP Patient Position: At rest)   Pulse (!) 105   Temp 97.5 °F (36.4 °C)   Resp 16   Ht 5' 10\" (1.778 m)   Wt 54.8 kg (120 lb 13 oz)   SpO2 91%   BMI 17.33 kg/m²       O2 Device: None (Room air)   O2 Flow Rate (L/min): 2 l/min   Temp (24hrs), Av.9 °F (36.6 °C), Min:97.5 °F (36.4 °C), Max:98.2 °F (36.8 °C)       Intake/Output:   Last shift:       07 - 0  In: 240 [P.O.:240]  Out: -   Last 3 shifts:  1901 -  0700  In: 1900 [P.O.:1900]  Out: 650 [Urine:650]    Intake/Output Summary (Last 24 hours) at 3/14/2022 1157  Last data filed at 3/14/2022 1003  Gross per 24 hour   Intake 940 ml   Output 250 ml   Net 690 ml Physical Exam:  General:    Alert, cooperative, no distress, appears stated age. Thin. Calm. Head:   Normocephalic, without obvious abnormality, atraumatic. Eyes:   Conjunctivae/corneas clear. Nose:  Nares normal. No drainage or sinus tenderness. Throat:    Poor dentition  Neck:  Supple, symmetrical,  no adenopathy, thyroid: non tender    no carotid bruit and no JVD. Lungs:   Clear to auscultation bilaterally. No Wheezing or Rhonchi. No rales. Chest wall:  No tenderness or deformity. No Accessory muscle use. Heart:   Regular rate and rhythm,  no murmur, rub or gallop. Abdomen:   Soft, non-tender. Not distended. Bowel sounds normal.  Extremities: Extremities normal, atraumatic, No cyanosis. No edema. No clubbing  Skin:     Texture, turgor normal. No rashes or lesions. Not Jaundiced  Psych:  Poor insight. Not depressed. Not anxious or agitated. DATA:  Labs:  Recent Labs     03/13/22  0307 03/11/22  1601   * 130*   K 4.2  --    CL 99  --    CO2 28  --    BUN 15  --    CREA 0.58*  --    CA 9.1  --    ALB 3.1*  --    PHOS 4.5  --    MG 2.3  --      Recent Labs     03/12/22  0135   WBC 13.8*   HGB 13.8   HCT 43.3        No results for input(s): ZECHARIAH, KU, CLU, CREAU in the last 72 hours.     No lab exists for component: PROU    Total time spent with patient:  35 minutes    [] Critical Care Provided    Care Plan discussed with:   Staff, Medical Team    Joan James MD

## 2022-03-14 NOTE — PROGRESS NOTES
Problem: Mobility Impaired (Adult and Pediatric)  Goal: *Acute Goals and Plan of Care (Insert Text)  Description: FUNCTIONAL STATUS PRIOR TO ADMISSION: Patient was independent and active without use of DME.    HOME SUPPORT PRIOR TO ADMISSION: The patient lived in a group home with staff to assist with IADLs. Physical Therapy Goals  Initiated 2/27/2022- Goals reassessed and remain appropriate 3/7/2022  1. Patient will move from supine to sit and sit to supine  in bed with independence within 7 day(s). 2.  Patient will transfer from bed to chair and chair to bed with independence using the least restrictive device within 7 day(s). 3.  Patient will perform sit to stand with independence within 7 day(s). 4.  Patient will ambulate with independence for 150 feet with the least restrictive device within 7 day(s). 5.  Patient will ascend/descend 4 stairs with 1 handrail(s) with modified independence within 7 day(s). Outcome: Progressing Towards Goal    PHYSICAL THERAPY TREATMENT  Patient: Cuco Scott (68 y.o. male)  Date: 3/14/2022  Diagnosis: PNA (pneumonia) [J18.9] <principal problem not specified>       Precautions:    Chart, physical therapy assessment, plan of care and goals were reviewed. ASSESSMENT  Patient continues with skilled PT services and is progressing towards goals. Pt received supine in bed and willing to work with therapy. Pt continues to perform WNL or close to his baseline with amb with no AD up to 350', 12 steps. Noted somewhat accelerated with slightly steppage gait, no LOB or rest breaks needed. Pt able to return to room, sit in chair with no assistance with call bell within reach and all needs met at the time. Rn notified of session. Current Level of Function Impacting Discharge (mobility/balance): independent/suprv for all activity, amb up to 350'    Other factors to consider for discharge: social needs and supervision.           PLAN :  Patient continues to benefit from skilled intervention to address the above impairments. Continue treatment per established plan of care. to address goals. Recommendation for discharge: (in order for the patient to meet his/her long term goals)  To be determined: supervision with     This discharge recommendation:  Has not yet been discussed the attending provider and/or case management    IF patient discharges home will need the following DME: to be determined (TBD)       SUBJECTIVE:   Patient stated My  is trying to find me a place.     OBJECTIVE DATA SUMMARY:   Critical Behavior:  Neurologic State: Alert  Orientation Level: Oriented X4  Cognition: Follows commands  Safety/Judgement: Decreased awareness of environment,Decreased awareness of need for assistance,Decreased awareness of need for safety,Decreased insight into deficits  Functional Mobility Training:  Bed Mobility:  Rolling: Independent  Supine to Sit: Independent  Sit to Supine: Independent     Transfers:  Sit to Stand: Independent  Stand to Sit: Independent    Balance:  Sitting: Intact  Standing: Impaired; Without support  Standing - Static: Good  Standing - Dynamic : Good;Fair    Ambulation/Gait Training:  Distance (ft): 350 Feet (ft)  Assistive Device: Gait belt  Ambulation - Level of Assistance: Supervision  Gait Abnormalities: Path deviations  Speed/Sabina: Accelerated  Step Length: Left shortened;Right shortened    Stairs:  Number of Stairs Trained: 12  Stairs - Level of Assistance: Supervision   Rail Use: Right       Pain Rating:  No pain reported    Activity Tolerance:   WNL and Good    After treatment patient left in no apparent distress:   Sitting in chair and Call bell within reach    COMMUNICATION/COLLABORATION:   The patients plan of care was discussed with: Registered nurse.      Mattie Carranza PTA    Time Calculation: 11 mins

## 2022-03-15 PROCEDURE — 74011250637 HC RX REV CODE- 250/637: Performed by: INTERNAL MEDICINE

## 2022-03-15 PROCEDURE — 74011250637 HC RX REV CODE- 250/637: Performed by: NURSE PRACTITIONER

## 2022-03-15 PROCEDURE — 74011000250 HC RX REV CODE- 250: Performed by: INTERNAL MEDICINE

## 2022-03-15 PROCEDURE — 65270000029 HC RM PRIVATE

## 2022-03-15 PROCEDURE — 94640 AIRWAY INHALATION TREATMENT: CPT

## 2022-03-15 PROCEDURE — 74011250636 HC RX REV CODE- 250/636: Performed by: STUDENT IN AN ORGANIZED HEALTH CARE EDUCATION/TRAINING PROGRAM

## 2022-03-15 PROCEDURE — 74011000250 HC RX REV CODE- 250: Performed by: STUDENT IN AN ORGANIZED HEALTH CARE EDUCATION/TRAINING PROGRAM

## 2022-03-15 PROCEDURE — 74011250637 HC RX REV CODE- 250/637: Performed by: STUDENT IN AN ORGANIZED HEALTH CARE EDUCATION/TRAINING PROGRAM

## 2022-03-15 RX ADMIN — MIRTAZAPINE 15 MG: 15 TABLET ORAL at 22:01

## 2022-03-15 RX ADMIN — METOPROLOL TARTRATE 12.5 MG: 50 TABLET, FILM COATED ORAL at 10:05

## 2022-03-15 RX ADMIN — PANTOPRAZOLE SODIUM 40 MG: 40 TABLET, DELAYED RELEASE ORAL at 06:38

## 2022-03-15 RX ADMIN — Medication 2 PACKET: at 10:13

## 2022-03-15 RX ADMIN — Medication 100 MG: at 10:05

## 2022-03-15 RX ADMIN — IPRATROPIUM BROMIDE AND ALBUTEROL SULFATE 3 ML: .5; 3 SOLUTION RESPIRATORY (INHALATION) at 20:55

## 2022-03-15 RX ADMIN — TRAZODONE HYDROCHLORIDE 100 MG: 100 TABLET ORAL at 22:01

## 2022-03-15 RX ADMIN — IPRATROPIUM BROMIDE AND ALBUTEROL SULFATE 3 ML: .5; 3 SOLUTION RESPIRATORY (INHALATION) at 09:57

## 2022-03-15 RX ADMIN — GUAIFENESIN 600 MG: 600 TABLET, EXTENDED RELEASE ORAL at 10:08

## 2022-03-15 RX ADMIN — METOPROLOL TARTRATE 12.5 MG: 50 TABLET, FILM COATED ORAL at 22:01

## 2022-03-15 RX ADMIN — DIVALPROEX SODIUM 500 MG: 500 TABLET, DELAYED RELEASE ORAL at 10:08

## 2022-03-15 RX ADMIN — SODIUM CHLORIDE, PRESERVATIVE FREE 10 ML: 5 INJECTION INTRAVENOUS at 14:24

## 2022-03-15 RX ADMIN — SODIUM CHLORIDE, PRESERVATIVE FREE 10 ML: 5 INJECTION INTRAVENOUS at 06:38

## 2022-03-15 RX ADMIN — FOLIC ACID 1 MG: 1 TABLET ORAL at 10:05

## 2022-03-15 RX ADMIN — BENZTROPINE MESYLATE 2 MG: 1 TABLET ORAL at 10:08

## 2022-03-15 RX ADMIN — Medication 1000 MG: at 10:13

## 2022-03-15 RX ADMIN — Medication: at 10:03

## 2022-03-15 RX ADMIN — BUDESONIDE 500 MCG: 0.5 INHALANT RESPIRATORY (INHALATION) at 09:57

## 2022-03-15 RX ADMIN — BUDESONIDE 500 MCG: 0.5 INHALANT RESPIRATORY (INHALATION) at 20:55

## 2022-03-15 RX ADMIN — SODIUM CHLORIDE, PRESERVATIVE FREE 10 ML: 5 INJECTION INTRAVENOUS at 22:02

## 2022-03-15 RX ADMIN — ENOXAPARIN SODIUM 40 MG: 100 INJECTION SUBCUTANEOUS at 10:02

## 2022-03-15 RX ADMIN — GUAIFENESIN 600 MG: 600 TABLET, EXTENDED RELEASE ORAL at 22:01

## 2022-03-15 NOTE — PROGRESS NOTES
Bedside shift change report given to [address] rn (oncoming nurse) by José Antonio Thompson (offgoing nurse). Report included the following information SBAR, Kardex, Intake/Output, MAR and Recent Results.

## 2022-03-15 NOTE — PROGRESS NOTES
RIGO:  Still awaiting completion of Level 2 (from [facility]) in order to place pt at nursing facility. 4:30pm CM received level 2 completed, approving NH placement. Planned discharge tomorrow AM.      CM called [facility] nursing facility admissions who confirmed that they do have a medicaid male bed. Need to confirm pt's vaccination status. CM continues to follow. Updated physician and hopeful discharge soon. CM called Tereasa Cheadle, phone [phone] to provide updates in process, had to leave a message. CORNELIA Geller      2:30pm  PIPER received update that pt has been fully vaxxed and boosted:  Updated [facility]. Patient had [employer] vaccine on 2/07/21 &  2/27/21. [employer] booster received 11/26/21. CORNELIA Geller      4:45pm  PIPER heard back from pt's brother Neema Al who is aware that Level 2 is completed and pt can likely discharge to [address] [place] [address] tomorrow. He is in agreement.         CORNELIA Disla

## 2022-03-15 NOTE — PROGRESS NOTES
Name: Billee Severin MRN: [id number]   : 1960 Hospital: [facility]   Date: 3/15/2022        IMPRESSION:   · True Hyponatremia. The constellation of his labs are consistent with SIADH. Patient's Na is now  Low again. Patient admits drinking a lot of fluid after he was transfer on the medical floor. He  Is on oral fluid restriction. H/O psychogenic polydipsia in the past. Na is somewhat up after Tolvaptan, Na is stable above 130  · Normal renal function  · Schizophrenia       PLAN:   · Continue oral fluid restriction - 800 ml/day. This is imprtant to keel his Na at normal level. I don't believe that a strict oral fluid restriction is possible due to patient's little insight of the importance of compliance with fluid restriction and his ability to access water on his own. · Continue Ure Na  · At this point patient is stable for discharge from renal stand point. · Recheck the BMP in AM.  · Patient is at risk for hyponatremia in the future when he will have access to free water. Subjective/Interval History:   I have reviewed the flowsheet and previous days notes. ROS:Pertinent items are noted in HPI. Denies complaints. States he drinks a lot of water because he likes it. 3/15/22 Quite, no complaints.     Objective:   Vital Signs:    Visit Vitals  /71   Pulse (!) 103   Temp 98.1 °F (36.7 °C)   Resp 18   Ht 5' 10\" (1.778 m)   Wt 54.8 kg (120 lb 13 oz)   SpO2 96%   BMI 17.33 kg/m²       O2 Device: None (Room air)   O2 Flow Rate (L/min): 2 l/min   Temp (24hrs), Av.7 °F (36.5 °C), Min:97.4 °F (36.3 °C), Max:98.1 °F (36.7 °C)       Intake/Output:   Last shift:      03/15 07 - 03/15 1900  In: 240 [P.O.:240]  Out: -   Last 3 shifts: 1901 - 03/15 0700  In: 1600 [P.O.:1600]  Out: -     Intake/Output Summary (Last 24 hours) at 3/15/2022 1129  Last data filed at 3/15/2022 1005  Gross per 24 hour   Intake 1200 ml   Output    Net 1200 ml        Physical Exam:  General:    Alert, cooperative, no distress, appears stated age. Thin. Calm. Head:   Normocephalic, without obvious abnormality, atraumatic. Eyes:   Conjunctivae/corneas clear. Nose:  Nares normal. No drainage or sinus tenderness. Throat:    Poor dentition  Neck:  Supple, symmetrical,  no adenopathy, thyroid: non tender    no carotid bruit and no JVD. Lungs:   Clear to auscultation bilaterally. No Wheezing or Rhonchi. No rales. Chest wall:  No tenderness or deformity. No Accessory muscle use. Heart:   Regular rate and rhythm,  no murmur, rub or gallop. Abdomen:   Soft, non-tender. Not distended. Bowel sounds normal.  Extremities: Extremities normal, atraumatic, No cyanosis. No edema. No clubbing  Skin:     Texture, turgor normal. No rashes or lesions. Not Jaundiced  Psych:  Poor insight. Not depressed. Not anxious or agitated. DATA:  Labs:  Recent Labs     03/13/22  0307   *   K 4.2   CL 99   CO2 28   BUN 15   CREA 0.58*   CA 9.1   ALB 3.1*   PHOS 4.5   MG 2.3     No results for input(s): WBC, HGB, HCT, PLT, HGBEXT, HCTEXT, PLTEXT, HGBEXT, HCTEXT, PLTEXT in the last 72 hours. No results for input(s): ZECHARIAH, KU, CLU, CREAU in the last 72 hours.     No lab exists for component: PROU    Total time spent with patient:  35 minutes    [] Critical Care Provided    Care Plan discussed with:   Staff, Medical Team    Shelly Sánchez MD

## 2022-03-15 NOTE — PROGRESS NOTES
[address] [facility] Adult  Hospitalist Group                                                                                          Hospitalist Progress Note  Paola Hernandez MD  Answering service: [phone] from in house phone     NAME:  Chau Aparicio  :  1960  MRN:  [id number]      Admission Summary:   Omayra Chang is a 57 y.o. male smoker history of schizo affective disorder. Bipolar depression, COPD, asthma emphysema/bronchitis, history of psychogenic polydipsia, resides in a group home, GERD presents with altered mental status. Unable to obtain history from patient as he is altered. Reported confusion and congestive cough from his group home prompted ED arrival.  In the ER patient was placed on 4 L nasal cannula desatted to mid 80s stat ABG showed compensated respiratory acidosis w/ hypoxia, patient was placed on mid flow currently stable 5L midflow sats low 90s. Patient encephalopathic on my exam.  Notable for WBC count of 13, T-max 102.1F, lactic 0.4.  BP soft but maps greater than 60.\"    Interval history / Subjective:   Patient seen examined at bedside  Denies complaints, just wants to drink water   Nursing reports patient drinking more water than allowed     Assessment & Plan:     Acute on chronic hypoxic respiratory failure, was on 2-3L NC PTA- resolved, now on RA  Per PT eval at rest and ambulation pt no longer needs O2  COPD Exacerbation  Left lower lobe pneumonia   Chronic tobacco dependence  -patient not septic  -covid neg    -CTA chest neg for PE confirmed LLL PNA with effusion   -Chest x-ray repeated 3/1 with improvement  -pulm evaluated appreciate recs, patient of Dr. Amol Mathew, was being follow-up for lung nodule with prior bronc negative for malignancy  -Continue neb, Pulmicort, s/p steroids  -s/p IV abx x7d  -f/u with Dr Amol Mathew  -bld cx ng, mycoplasma/legionella neg,  pct 0.18    Mild sinus tachycardia improving  On  low dose metoprolol  monitor     Acute metabolic encephalopathy -improved   likely Secondary to above  CT head negative, UA neg  -tsh, b12, folate wnl  -depakote level normal   -speech eval, passed soft and easy to chew      Hyponatremia, SIADH- recurred 3/9/22  History of psychogenic polydipsia  -nephro following, appreciate input  -am cortisol wnl  -cont fluid restriction, started ure-na 2/28, cont same  -will be difficult to control and discharge when he has access to free water  Ok to Dc SSRI per psych  - tolvaptan given this admission, per nephro     HTN controlled off norvasc    Schizoaffective disorder,  Depression subtype   -psych consulted for evaluation, cont depakote, trazodone, cogentin; remeron started    GERD  Continue home med     Patient has poor insight on his clinical condition    Code status: full  DVT prophylaxis: lovenox     PT/OT following    Care Plan discussed with: Patient/Family and Nurse  Anticipated Disposition: awaiting placement, per CM tentatively on 3/14  Anticipated Discharge: whenever bed available     Hospital Problems  Date Reviewed: 2/22/2022          Codes Class Noted POA    PNA (pneumonia) ICD-10-CM: J18.9  ICD-9-CM: 486  2/26/2022 Unknown              Review of Systems:   A comprehensive review of systems was negative except for that written in the HPI. Vital Signs:    Last 24hrs VS reviewed since prior progress note. Most recent are:  Visit Vitals  /76 (BP 1 Location: Right upper arm, BP Patient Position: Sitting)   Pulse (!) 115   Temp 97.6 °F (36.4 °C)   Resp 18   Ht 5' 10\" (1.778 m)   Wt 54.8 kg (120 lb 13 oz)   SpO2 93%   BMI 17.33 kg/m²         Intake/Output Summary (Last 24 hours) at 3/14/2022 2340  Last data filed at 3/14/2022 1928  Gross per 24 hour   Intake 720 ml   Output    Net 720 ml        Physical Examination:         Constitutional:  No acute distress, cooperative, pleasant    ENT:  Oral mucosa moist, oropharynx benign   Resp:  good AE, clear, No wheezing/rhonchi/rales.  No accessory muscle use   CV:  Regular rhythm, normal rate    GI:  Soft, non distended, non tender    Musculoskeletal:  No edema, warm    Neurologic:  Moves all extremities. AAO, CN II-XII reviewed            Data Review:    Review and/or order of clinical lab test  Review and/or order of tests in the radiology section of CPT  Review and/or order of tests in the medicine section of CPT      Labs:     Recent Labs     03/12/22  0135   WBC 13.8*   HGB 13.8   HCT 43.3        Recent Labs     03/13/22  0307   *   K 4.2   CL 99   CO2 28   BUN 15   CREA 0.58*   GLU 90   CA 9.1   MG 2.3   PHOS 4.5     Recent Labs     03/13/22  0307   ALB 3.1*     No results for input(s): INR, PTP, APTT, INREXT, INREXT in the last 72 hours. No results for input(s): FE, TIBC, PSAT, FERR in the last 72 hours. Lab Results   Component Value Date/Time    Folate 27.2 (H) 02/26/2022 09:31 AM      No results for input(s): PH, PCO2, PO2 in the last 72 hours. No results for input(s): CPK, CKNDX, TROIQ in the last 72 hours.     No lab exists for component: CPKMB  Lab Results   Component Value Date/Time    Cholesterol, total 181 10/17/2018 10:48 AM    HDL Cholesterol 78 10/17/2018 10:48 AM    LDL, calculated 89 10/17/2018 10:48 AM    Triglyceride 71 10/17/2018 10:48 AM     Lab Results   Component Value Date/Time    Glucose (POC) 132 (H) 07/04/2017 04:37 PM    Glucose, POC 92 02/26/2022 09:23 AM     Lab Results   Component Value Date/Time    Color YELLOW/STRAW 02/26/2022 09:41 AM    Appearance CLEAR 02/26/2022 09:41 AM    Specific gravity 1.013 02/26/2022 09:41 AM    pH (UA) 7.0 02/26/2022 09:41 AM    Protein Negative 02/26/2022 09:41 AM    Glucose Negative 02/26/2022 09:41 AM    Ketone TRACE (A) 02/26/2022 09:41 AM    Bilirubin Negative 02/26/2022 09:41 AM    Urobilinogen 1.0 02/26/2022 09:41 AM    Nitrites Negative 02/26/2022 09:41 AM    Leukocyte Esterase Negative 02/26/2022 09:41 AM    Epithelial cells FEW 02/26/2022 09:41 AM    Bacteria Negative 02/26/2022 09:41 AM    WBC 0-4 02/26/2022 09:41 AM    RBC 0-5 02/26/2022 09:41 AM         Medications Reviewed:     Current Facility-Administered Medications   Medication Dose Route Frequency    sodium chloride soluble tablet 1,000 mg  1 g Oral DAILY    urea (URE-NA) 15 gram packet 2 Packet  2 Packet Oral DAILY    metoprolol tartrate (LOPRESSOR) tablet 12.5 mg  12.5 mg Oral Q12H    mirtazapine (REMERON) tablet 15 mg  15 mg Oral QHS    albuterol-ipratropium (DUO-NEB) 2.5 MG-0.5 MG/3 ML  3 mL Nebulization BID RT    balsam peru-castor oiL (VENELEX) ointment   Topical BID    sodium chloride (NS) flush 5-10 mL  5-10 mL IntraVENous PRN    sodium chloride (NS) flush 5-40 mL  5-40 mL IntraVENous Q8H    sodium chloride (NS) flush 5-40 mL  5-40 mL IntraVENous PRN    acetaminophen (TYLENOL) tablet 650 mg  650 mg Oral Q6H PRN    Or    acetaminophen (TYLENOL) suppository 650 mg  650 mg Rectal Q6H PRN    polyethylene glycol (MIRALAX) packet 17 g  17 g Oral DAILY PRN    ondansetron (ZOFRAN ODT) tablet 4 mg  4 mg Oral Q8H PRN    Or    ondansetron (ZOFRAN) injection 4 mg  4 mg IntraVENous Q6H PRN    enoxaparin (LOVENOX) injection 40 mg  40 mg SubCUTAneous DAILY    [Held by provider] amLODIPine (NORVASC) tablet 5 mg  5 mg Oral DAILY    benztropine (COGENTIN) tablet 2 mg  2 mg Oral DAILY    divalproex DR (DEPAKOTE) tablet 500 mg  500 mg Oral DAILY    folic acid (FOLVITE) tablet 1 mg  1 mg Oral DAILY    ergocalciferol capsule 50,000 Units  50,000 Units Oral Q30D    lactulose (CHRONULAC) 10 gram/15 mL solution 5 mL  5 mL Oral DAILY PRN    pantoprazole (PROTONIX) tablet 40 mg  40 mg Oral ACB    thiamine HCL (B-1) tablet 100 mg  100 mg Oral DAILY    traZODone (DESYREL) tablet 100 mg  100 mg Oral QHS    albuterol-ipratropium (DUO-NEB) 2.5 MG-0.5 MG/3 ML  3 mL Nebulization Q4H PRN    budesonide (PULMICORT) 500 mcg/2 ml nebulizer suspension  500 mcg Nebulization BID RT    guaiFENesin ER (MUCINEX) tablet 600 mg  600 mg Oral Q12H    benzonatate (TESSALON) capsule 100 mg  100 mg Oral TID PRN     ______________________________________________________________________  EXPECTED LENGTH OF STAY: 5d 0h  ACTUAL LENGTH OF STAY:          900 Errol Sanchez MD

## 2022-03-15 NOTE — WOUND CARE
WOCN Note:      Follow up for assessment of posterior ears.     Chart reviewed. Assessed in room 623. Admitted DX:  PNA (pneumonia)          Past Medical History:   Diagnosis Date    Asthma       seldom,use inhaler    Bronchitis      Chronic obstructive pulmonary disease (HCC)      Depression       anxiety    Psychiatric disorder       paranoid schizophrenia    Psychiatric disorder       extrapyramidal disease    Psychotic disorder ([facility] [address]       mental retardation      Assessment:   Patient is alert, communicative, continent and independently changes position. [facility]  Patient reports no pain.    Sacrum and buttocks intact without erythema.      1. POA Left posterior ear, dry scab: resolved and not using oxygen.     Wound, Pressure Prevention & Skin Care Recommendations:    1.  Minimize layers of linen/pads under patient to optimize support surface.    2.  Turn/reposition approximately every 2 hours and offload heels.    3.  Manage moisture/ Keep skin folds clean and dry/minimize brief usage.     Discussed above plan with patient.     Transition of Care:   Plan to follow as needed while admitted to hospital.     RAJINDER BaezaN RN [address] Inpatient Wound Care  Available on [facility]  Office [phone]

## 2022-03-16 VITALS
SYSTOLIC BLOOD PRESSURE: 115 MMHG | HEART RATE: 83 BPM | WEIGHT: 124.7 LBS | OXYGEN SATURATION: 93 % | TEMPERATURE: 98.1 F | HEIGHT: 70 IN | BODY MASS INDEX: 17.85 KG/M2 | DIASTOLIC BLOOD PRESSURE: 67 MMHG | RESPIRATION RATE: 18 BRPM

## 2022-03-16 LAB
ANION GAP SERPL CALC-SCNC: 4 MMOL/L (ref 5–15)
BUN SERPL-MCNC: 23 MG/DL (ref 6–20)
BUN/CREAT SERPL: 36 (ref 12–20)
CALCIUM SERPL-MCNC: 9.3 MG/DL (ref 8.5–10.1)
CHLORIDE SERPL-SCNC: 102 MMOL/L (ref 97–108)
CO2 SERPL-SCNC: 29 MMOL/L (ref 21–32)
COVID-19 RAPID TEST, COVR: NOT DETECTED
CREAT SERPL-MCNC: 0.64 MG/DL (ref 0.7–1.3)
GLUCOSE SERPL-MCNC: 92 MG/DL (ref 65–100)
POTASSIUM SERPL-SCNC: 4.3 MMOL/L (ref 3.5–5.1)
SODIUM SERPL-SCNC: 135 MMOL/L (ref 136–145)
SOURCE, COVRS: NORMAL

## 2022-03-16 PROCEDURE — 94760 N-INVAS EAR/PLS OXIMETRY 1: CPT

## 2022-03-16 PROCEDURE — 74011250637 HC RX REV CODE- 250/637: Performed by: STUDENT IN AN ORGANIZED HEALTH CARE EDUCATION/TRAINING PROGRAM

## 2022-03-16 PROCEDURE — 74011250637 HC RX REV CODE- 250/637: Performed by: NURSE PRACTITIONER

## 2022-03-16 PROCEDURE — 74011250637 HC RX REV CODE- 250/637: Performed by: INTERNAL MEDICINE

## 2022-03-16 PROCEDURE — 74011000250 HC RX REV CODE- 250: Performed by: STUDENT IN AN ORGANIZED HEALTH CARE EDUCATION/TRAINING PROGRAM

## 2022-03-16 PROCEDURE — 94640 AIRWAY INHALATION TREATMENT: CPT

## 2022-03-16 PROCEDURE — 36415 COLL VENOUS BLD VENIPUNCTURE: CPT

## 2022-03-16 PROCEDURE — 74011000250 HC RX REV CODE- 250: Performed by: INTERNAL MEDICINE

## 2022-03-16 PROCEDURE — 87635 SARS-COV-2 COVID-19 AMP PRB: CPT

## 2022-03-16 PROCEDURE — 74011250636 HC RX REV CODE- 250/636: Performed by: STUDENT IN AN ORGANIZED HEALTH CARE EDUCATION/TRAINING PROGRAM

## 2022-03-16 PROCEDURE — 80048 BASIC METABOLIC PNL TOTAL CA: CPT

## 2022-03-16 RX ORDER — METOPROLOL TARTRATE 25 MG/1
12.5 TABLET, FILM COATED ORAL EVERY 12 HOURS
Qty: 60 TABLET | Refills: 2 | Status: SHIPPED | OUTPATIENT
Start: 2022-03-16

## 2022-03-16 RX ORDER — MIRTAZAPINE 15 MG/1
15 TABLET, FILM COATED ORAL
Qty: 30 TABLET | Refills: 2 | Status: SHIPPED | OUTPATIENT
Start: 2022-03-16

## 2022-03-16 RX ORDER — DIVALPROEX SODIUM 500 MG/1
500 TABLET, DELAYED RELEASE ORAL DAILY
Qty: 30 TABLET | Refills: 3 | Status: SHIPPED | OUTPATIENT
Start: 2022-03-17

## 2022-03-16 RX ADMIN — BENZTROPINE MESYLATE 2 MG: 1 TABLET ORAL at 11:19

## 2022-03-16 RX ADMIN — IPRATROPIUM BROMIDE AND ALBUTEROL SULFATE 3 ML: .5; 3 SOLUTION RESPIRATORY (INHALATION) at 07:19

## 2022-03-16 RX ADMIN — Medication 100 MG: at 11:19

## 2022-03-16 RX ADMIN — Medication 2 PACKET: at 11:20

## 2022-03-16 RX ADMIN — DIVALPROEX SODIUM 500 MG: 500 TABLET, DELAYED RELEASE ORAL at 11:20

## 2022-03-16 RX ADMIN — BUDESONIDE 500 MCG: 0.5 INHALANT RESPIRATORY (INHALATION) at 07:19

## 2022-03-16 RX ADMIN — Medication 1000 MG: at 11:19

## 2022-03-16 RX ADMIN — SODIUM CHLORIDE, PRESERVATIVE FREE 10 ML: 5 INJECTION INTRAVENOUS at 06:35

## 2022-03-16 RX ADMIN — FOLIC ACID 1 MG: 1 TABLET ORAL at 11:20

## 2022-03-16 RX ADMIN — GUAIFENESIN 600 MG: 600 TABLET, EXTENDED RELEASE ORAL at 11:20

## 2022-03-16 RX ADMIN — ENOXAPARIN SODIUM 40 MG: 100 INJECTION SUBCUTANEOUS at 11:20

## 2022-03-16 RX ADMIN — PANTOPRAZOLE SODIUM 40 MG: 40 TABLET, DELAYED RELEASE ORAL at 06:35

## 2022-03-16 RX ADMIN — METOPROLOL TARTRATE 12.5 MG: 50 TABLET, FILM COATED ORAL at 11:20

## 2022-03-16 NOTE — PROGRESS NOTES
[address] [facility] Adult  Hospitalist Group                                                                                          Hospitalist Progress Note  Brittanie López MD  Answering service: [phone] OR [phone] from in house phone     NAME:  Falguni Askew  :  1960  MRN:  [id number]      Admission Summary:   Oli Holt is a 57 y.o. male smoker history of schizo affective disorder. Bipolar depression, COPD, asthma emphysema/bronchitis, history of psychogenic polydipsia, resides in a group home, GERD presents with altered mental status. Unable to obtain history from patient as he is altered. Reported confusion and congestive cough from his group home prompted ED arrival.  In the ER patient was placed on 4 L nasal cannula desatted to mid 80s stat ABG showed compensated respiratory acidosis w/ hypoxia, patient was placed on mid flow currently stable 5L midflow sats low 90s. Patient encephalopathic on my exam.  Notable for WBC count of 13, T-max 102.1F, lactic 0.4.  BP soft but maps greater than 60.\"    Interval history / Subjective:   Patient seen examined at bedside  Denies complaints, just wants to drink water   Nursing reports patient drinking more water than allowed     Assessment & Plan:     Acute on chronic hypoxic respiratory failure, was on 2-3L NC PTA- resolved, now on RA  Per PT eval at rest and ambulation pt no longer needs O2  COPD Exacerbation  Left lower lobe pneumonia   Chronic tobacco dependence  -patient not septic  -covid neg    -CTA chest neg for PE confirmed LLL PNA with effusion   -Chest x-ray repeated 3/1 with improvement  -pulm evaluated appreciate recs, patient of Dr. 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  History of psychogenic polydipsia  -nephro following, appreciate input  -am cortisol wnl  -cont fluid restriction, started ure-na 2/28, cont same  -will be difficult to control and discharge when he has access to free water  Ok to Dc SSRI per psych  - tolvaptan given this admission, per nephro     HTN controlled off norvasc    Schizoaffective disorder,  Depression subtype   -psych consulted for evaluation, cont depakote, trazodone, cogentin; remeron started    GERD  Continue home med     Patient has poor insight on his clinical condition    Code status: full  DVT prophylaxis: lovenox     PT/OT following    Care Plan discussed with: Patient/Family and Nurse  Anticipated Disposition: awaiting placement, per CM tentatively on 3/14  Anticipated Discharge: whenever bed available     Hospital Problems  Date Reviewed: 2/22/2022          Codes Class Noted POA    PNA (pneumonia) ICD-10-CM: J18.9  ICD-9-CM: 486  2/26/2022 Unknown              Review of Systems:   A comprehensive review of systems was negative except for that written in the HPI. Vital Signs:    Last 24hrs VS reviewed since prior progress note. Most recent are:  Visit Vitals  /77 (BP 1 Location: Left arm, BP Patient Position: At rest)   Pulse 87   Temp 97.6 °F (36.4 °C)   Resp 18   Ht 5' 10\" (1.778 m)   Wt 54.8 kg (120 lb 13 oz)   SpO2 95%   BMI 17.33 kg/m²         Intake/Output Summary (Last 24 hours) at 3/15/2022 2323  Last data filed at 3/15/2022 1700  Gross per 24 hour   Intake 1140 ml   Output    Net 1140 ml        Physical Examination:         Constitutional:  No acute distress, cooperative, pleasant    ENT:  Oral mucosa moist, oropharynx benign   Resp:  good AE, clear, No wheezing/rhonchi/rales.  No accessory muscle use CV:  Regular rhythm, normal rate    GI:  Soft, non distended, non tender    Musculoskeletal:  No edema, warm    Neurologic:  Moves all extremities. TAYLAO, SUNNY II-XII reviewed            Data Review:    Review and/or order of clinical lab test  Review and/or order of tests in the radiology section of CPT  Review and/or order of tests in the medicine section of CPT      Labs:     No results for input(s): WBC, HGB, HCT, PLT, HGBEXT, HCTEXT, PLTEXT, HGBEXT, HCTEXT, PLTEXT in the last 72 hours. Recent Labs     03/13/22 0307   *   K 4.2   CL 99   CO2 28   BUN 15   CREA 0.58*   GLU 90   CA 9.1   MG 2.3   PHOS 4.5     Recent Labs     03/13/22 0307   ALB 3.1*     No results for input(s): INR, PTP, APTT, INREXT, INREXT in the last 72 hours. No results for input(s): FE, TIBC, PSAT, FERR in the last 72 hours. Lab Results   Component Value Date/Time    Folate 27.2 (H) 02/26/2022 09:31 AM      No results for input(s): PH, PCO2, PO2 in the last 72 hours. No results for input(s): CPK, CKNDX, TROIQ in the last 72 hours.     No lab exists for component: CPKMB  Lab Results   Component Value Date/Time    Cholesterol, total 181 10/17/2018 10:48 AM    HDL Cholesterol 78 10/17/2018 10:48 AM    LDL, calculated 89 10/17/2018 10:48 AM    Triglyceride 71 10/17/2018 10:48 AM     Lab Results   Component Value Date/Time    Glucose (POC) 132 (H) 07/04/2017 04:37 PM    Glucose, POC 92 02/26/2022 09:23 AM     Lab Results   Component Value Date/Time    Color YELLOW/STRAW 02/26/2022 09:41 AM    Appearance CLEAR 02/26/2022 09:41 AM    Specific gravity 1.013 02/26/2022 09:41 AM    pH (UA) 7.0 02/26/2022 09:41 AM    Protein Negative 02/26/2022 09:41 AM    Glucose Negative 02/26/2022 09:41 AM    Ketone TRACE (A) 02/26/2022 09:41 AM    Bilirubin Negative 02/26/2022 09:41 AM    Urobilinogen 1.0 02/26/2022 09:41 AM    Nitrites Negative 02/26/2022 09:41 AM    Leukocyte Esterase Negative 02/26/2022 09:41 AM    Epithelial cells FEW 02/26/2022 09:41 AM    Bacteria Negative 02/26/2022 09:41 AM    WBC 0-4 02/26/2022 09:41 AM    RBC 0-5 02/26/2022 09:41 AM         Medications Reviewed:     Current Facility-Administered Medications   Medication Dose Route Frequency    sodium chloride soluble tablet 1,000 mg  1 g Oral DAILY    urea (URE-NA) 15 gram packet 2 Packet  2 Packet Oral DAILY    metoprolol tartrate (LOPRESSOR) tablet 12.5 mg  12.5 mg Oral Q12H    mirtazapine (REMERON) tablet 15 mg  15 mg Oral QHS    albuterol-ipratropium (DUO-NEB) 2.5 MG-0.5 MG/3 ML  3 mL Nebulization BID RT    sodium chloride (NS) flush 5-10 mL  5-10 mL IntraVENous PRN    sodium chloride (NS) flush 5-40 mL  5-40 mL IntraVENous Q8H    sodium chloride (NS) flush 5-40 mL  5-40 mL IntraVENous PRN    acetaminophen (TYLENOL) tablet 650 mg  650 mg Oral Q6H PRN    Or    acetaminophen (TYLENOL) suppository 650 mg  650 mg Rectal Q6H PRN    polyethylene glycol (MIRALAX) packet 17 g  17 g Oral DAILY PRN    ondansetron (ZOFRAN ODT) tablet 4 mg  4 mg Oral Q8H PRN    Or    ondansetron (ZOFRAN) injection 4 mg  4 mg IntraVENous Q6H PRN    enoxaparin (LOVENOX) injection 40 mg  40 mg SubCUTAneous DAILY    [Held by provider] amLODIPine (NORVASC) tablet 5 mg  5 mg Oral DAILY    benztropine (COGENTIN) tablet 2 mg  2 mg Oral DAILY    divalproex DR (DEPAKOTE) tablet 500 mg  500 mg Oral DAILY    folic acid (FOLVITE) tablet 1 mg  1 mg Oral DAILY    ergocalciferol capsule 50,000 Units  50,000 Units Oral Q30D    lactulose (CHRONULAC) 10 gram/15 mL solution 5 mL  5 mL Oral DAILY PRN    pantoprazole (PROTONIX) tablet 40 mg  40 mg Oral ACB    thiamine HCL (B-1) tablet 100 mg  100 mg Oral DAILY    traZODone (DESYREL) tablet 100 mg  100 mg Oral QHS    albuterol-ipratropium (DUO-NEB) 2.5 MG-0.5 MG/3 ML  3 mL Nebulization Q4H PRN    budesonide (PULMICORT) 500 mcg/2 ml nebulizer suspension  500 mcg Nebulization BID RT    guaiFENesin ER (MUCINEX) tablet 600 mg  600 mg Oral Q12H    benzonatate (TESSALON) capsule 100 mg  100 mg Oral TID PRN     ______________________________________________________________________  EXPECTED LENGTH OF STAY: 5d 0h  ACTUAL LENGTH OF STAY:          16                 King Zackary MD

## 2022-03-16 NOTE — PROGRESS NOTES
RIGO:  Anticipate discharge to [facility] (level 2 placement approved 3/15). MD, pt and pt's brother Edison Born are aware and in agreement. CM called [facility] admissions this [phone] to confirm bed availability. Per Joselo Avalos, she is working to create a bed for pt to admit to today. Pt is not currently requiring oxygen. Brother Edison Born is out of town and not able to transport to facility. CM will set up trasnport once bed confirmed.     CORNELIA Mcwilliams

## 2022-03-16 NOTE — PROGRESS NOTES
Physical Therapy  Chart reviewed and patient awaiting bed and LTC facility. Last PT notes patient mobilizing with safe gait, managing transfers and steps. Observed patient today and gait. He demonstrates safe bed mobility, transfers and gait with good balance. Will sign off at this time and complete orders. Barthel Index:    Bathin  Bladder: 10  Bowels: 10  Groomin  Dressing: 10  Feeding: 10  Mobility: 15  Stairs: 5  Toilet Use: 10  Transfer (Bed to Chair and Back): 15  Total: 95/100       The Barthel ADL Index: Guidelines  1. The index should be used as a record of what a patient does, not as a record of what a patient could do. 2. The main aim is to establish degree of independence from any help, physical or verbal, however minor and for whatever reason. 3. The need for supervision renders the patient not independent. 4. A patient's performance should be established using the best available evidence. Asking the patient, friends/relatives and nurses are the usual sources, but direct observation and common sense are also important. However direct testing is not needed. 5. Usually the patient's performance over the preceding 24-48 hours is important, but occasionally longer periods will be relevant. 6. Middle categories imply that the patient supplies over 50 per cent of the effort. 7. Use of aids to be independent is allowed. Score Interpretation (from [address] [facility] [address])    Independent   60-79 Minimally independent   40-59 Partially dependent   20-39 Very dependent   <20 Totally dependent     -Sirena Seay., Barthel, D.W. (1965). Functional evaluation: the Barthel Index. [facility] ([address] (). The Barthel activities of daily living index: self-reporting versus actual performance in the old (> or = 75 years). Journal of [address] [place] [address] 45(7), [facility], RAMIRO, Argentina Ortiz, Viki Law. ().  Measuring the change in disability after inpatient rehabilitation; comparison of the responsiveness of the Barthel Index and Functional [place] Measure. Journal of Neurology, Neurosurgery, and Psychiatry, 66(4), 969-193. RAYMON Mitchell, NATE Young, & Alecia Ley M.A. (2004) Assessment of post-stroke quality of life in cost-effectiveness studies: The usefulness of the Barthel Index and the EuroQoL-5D.  Quality of Life Research, 15, [address] [facility],

## 2022-03-16 NOTE — DISCHARGE INSTRUCTIONS
You were treated with low sodium- recommendations from nephrology  Continue oral fluid restriction - 800-1000 ml/day. This is imprtant to keep sodium at normal level. strict oral fluid restriction is difficult due to patient's lack of insight   Continue new meds Urea and sodium tabs  Monitor metabolic labs periodically. Easy to chew- diet with protein calorie supplements    Acute on chronic resp failure with intermittent hypoxemia and hypercarbia 2/2 underlying copd   Per prior reports O2 dependent - and noncompliant, has has been on room air for the past week    History of lung nodule - previous navigational bronchoscopy negative. Was to have a follow up CT done in 4/2022 - followed by Dr Rashmi Ojeda- pulmonary associates    During this admission you were seen by psychiatry and disgnosed with \"schizoaffective disorder, depressive type; with a suspected/unspecified intellectual disability. Dekakote, Paxil ,and Trazodone 100mg QHS.  Seroquel 25 BID could be added to med regimen if needed but he has not needed that here

## 2022-03-16 NOTE — PROGRESS NOTES
RIGO:  Discharge to [address] [facility] bed today. CM confirmed acceptance with [facility] that bed is available today for pt admission to ICF/LTC medicaid bed. UAI (level 1 and level 2) already faxed to facility. Will be scanned into EMR. Nurse to call report to [phone]. Transport set up with hospital to home car transport (staff will come to pt's room) for 3pm transport time. Nursing updated. Rapid covid once resulted will be available on CC link for [facility].   Facility requested rapid covid for today prior to his arrival.      CORNELIA Vargas

## 2022-03-16 NOTE — PROGRESS NOTES
[place] [facility] reports patient received Pfizer vaccine on 2/7/21 & 2/27/21. Dharmesh Owen booster received 11/26/21.   Moreno Monroe RN Covid Vaccine Team

## 2022-03-16 NOTE — PROGRESS NOTES
DISCHARGE REPORT:    Verbal report given to [place] on Jacque Light  being transferred to [facility] for [address]. Report consisted of patients Situation, Background, Assessment and   Recommendations(SBAR). Information from the following report(s) SBAR, Kardex, Intake/Output, MAR and Recent Results was reviewed with the receiving nurse.

## 2022-03-16 NOTE — DISCHARGE SUMMARY
Discharge Summary       PATIENT ID: Stephen Bradley  MRN: [id number]   YOB: 1960    DATE OF ADMISSION: 2/26/2022  8:20 AM    DATE OF DISCHARGE: 3/16/22 1pm   PRIMARY CARE PROVIDER: Arcelia Cardenas DO     ATTENDING PHYSICIAN: BETTY  DISCHARGING PROVIDER: Maximino Simon MD    To contact this individual call [phone] and ask the  to page. If unavailable ask to be transferred the Adult Hospitalist Department. CONSULTATIONS: IP CONSULT TO PULMONOLOGY  IP CONSULT TO PSYCHIATRY  IP CONSULT TO NEPHROLOGY  IP CONSULT TO PSYCHIATRY  IP CONSULT TO PSYCHIATRY    PROCEDURES/SURGERIES: * No surgery found *    ADMISSION SUMMARY AND HOSPITAL COURSE:   \"Jasmeet Munguia is a 57 y. o. male smoker history of schizo affective disorder. Bipolar depression, COPD, asthma emphysema/bronchitis, history of psychogenic polydipsia, resides in a group home, GERD presents with altered mental status.  Unable to obtain history from patient as he is altered.  Reported confusion and congestive cough from his group home prompted ED arrival.  In the ER patient was placed on 4 L nasal cannula desatted to mid 80s stat ABG showed compensated respiratory acidosis w/ hypoxia, patient was placed on mid flow currently stable 5L midflow sats low 90s.   Patient encephalopathic on my exam.  Notable for WBC count of 13, T-max 102.1F, lactic 0.4. BP soft but maps greater than 60.\"    DISCHARGE DIAGNOSES / PLAN:      Acute  hypoxic respiratory failure, was on 2-3L NC PTA- resolved, now on RA.  Per PT eval at rest and ambulation pt no longer needs O2  Due to COPD Exacerbation- Left lower lobe pneumonia- stable/resolved   Chronic tobacco dependence  -patient not septic  -covid neg 2/26- repeat testing pending on DC   -CTA chest neg for PE confirmed LLL PNA with effusion   -Chest x-ray repeated 3/1 with improvement  -pulm evaluated appreciate recs, patient of Dr. 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  History of psychogenic polydipsia  -nephro following, appreciate input  -am cortisol wnl  -cont fluid restriction, started ure-na 2/28, cont same  -will be difficult to control and discharge when he has access to free water  Ok to Dc SSRI per psych  - tolvaptan given this admission, per nephro  DC on =urea and NaCl tabs      HTN controlled off amlodipine     Schizoaffective disorder,  Depression subtype   -psych consulted for evaluation, cont depakote, trazodone, cogentin; remeron started     GERD  Continue home med      Patient has poor insight on his clinical condition      PENDING TEST RESULTS:   At the time of discharge the following test results are still pending: none     ADDITIONAL CARE RECOMMENDATIONS:   You were treated with low sodium- recommendations from nephrology  Continue oral fluid restriction - 800-1000 ml/day. This is imprtant to keep sodium at normal level. strict oral fluid restriction is difficult due to patient's lack of insight   Continue new meds Urea and sodium tabs  Monitor metabolic labs periodically. Easy to chew- diet with protein calorie supplements    Acute on chronic resp failure with intermittent hypoxemia and hypercarbia 2/2 underlying copd   Per prior reports O2 dependent - and noncompliant, has has been on room air for the past week    History of lung nodule - previous navigational bronchoscopy negative.     Was to have a follow up CT done in 4/2022 - followed by Dr Roman Domingo- pulmonary associates    During this admission you were seen by psychiatry and disgnosed with \"schizoaffective disorder, depressive type; with a suspected/unspecified intellectual disability. Dekakote, Paxil ,and Trazodone 100mg QHS. Seroquel 25 BID could be added to med regimen if needed but he has not needed that here         NOTIFY [facility] FOLLOWING:   Fever over 101 degrees for 24 hours. Chest pain, shortness of breath, fever, chills, nausea, vomiting, diarrhea, change in mentation, falling, weakness, bleeding. Severe pain or pain not relieved by medications, as well as any other signs or symptoms that you may have questions about. FOLLOW UP APPOINTMENTS:    Follow-up Information     Follow up With Specialties Details Why [place] [facility]   [address]  [phone]    Jai Saeed DO Internal Medicine   [address]  [address]  [address] [place] [address]  [phone]           DIET: see above    ACTIVITY: Activity as tolerated    EQUIPMENT needed: none    DISCHARGE MEDICATIONS:  Current Discharge Medication List      START taking these medications    Details   metoprolol tartrate (LOPRESSOR) 25 mg tablet Take 0.5 Tablets by mouth every twelve (12) hours. Qty: 60 Tablet, Refills: 2  Start date: 3/16/2022      mirtazapine (REMERON) 15 mg tablet Take 1 Tablet by mouth nightly. Qty: 30 Tablet, Refills: 2  Start date: 3/16/2022      urea (URE-NA) 15 gram packet Take 2 Packets by mouth daily. Qty: 60 Packet, Refills: 2  Start date: 3/17/2022         CONTINUE these medications which have CHANGED    Details   divalproex DR (DEPAKOTE) 500 mg tablet Take 1 Tablet by mouth daily. Qty: 30 Tablet, Refills: 3  Start date: 3/17/2022         CONTINUE these medications which have NOT CHANGED    Details   traZODone (DESYREL) 100 mg tablet Take 1 Tablet by mouth nightly.   Qty: 30 Tablet, Refills: 5      budesonide-formoteroL (SYMBICORT) 160-4.5 mcg/actuation HFAA Take 2 Puffs by inhalation two (2) times a day. Qty: 6 g, Refills: 5    Associated Diagnoses: Chronic bronchitis, unspecified chronic bronchitis type (HCC)      ergocalciferol (ERGOCALCIFEROL) 1,250 mcg (50,000 unit) capsule Take 1 Capsule by mouth every thirty (30) days. Qty: 3 Capsule, Refills: 3    Associated Diagnoses: Vitamin D deficiency      folic acid (FOLVITE) 1 mg tablet Take 1 Tablet by mouth daily. Qty: 31 Tablet, Refills: 4    Associated Diagnoses: Folic acid deficiency      omeprazole (PRILOSEC) 40 mg capsule Take 1 Capsule by mouth daily. Qty: 30 Capsule, Refills: 3    Associated Diagnoses: Gastroesophageal reflux disease without esophagitis      thiamine HCL (B-1) 100 mg tablet TAKE ONE TABLET BY MOUTH EVERY DAY  Qty: 31 Tab, Refills: 4    Associated Diagnoses: Vitamin B1 deficiency      GENERLAC 10 gram/15 mL solution Take 5 mL by mouth daily as needed. Qty: 480 mL, Refills: 5      benztropine (COGENTIN) 2 mg tablet Take 1 Tab by mouth daily. Qty: 30 Tab, Refills: 11      Spiriva with HandiHaler 18 mcg inhalation capsule       albuterol-ipratropium (DUO-NEB) 2.5 mg-0.5 mg/3 ml nebu INHALE 1 VIAL VIA NEBULIZER EVERY 4 HOURS AS NEEDED  Qty: 75 mL, Refills: 6    Comments: NEEDS OFFICE VISIT FOR MORE REFILLS  Associated Diagnoses: Pulmonary emphysema, unspecified emphysema type (HCC)      sodium chloride 1 gram tablet Take 1 Tab by mouth daily.   Qty: 30 Tab, Refills: 0    Associated Diagnoses: Hyponatremia; Psychogenic polydipsia         STOP taking these medications       QUEtiapine (SEROqueL) 25 mg tablet Comments:   Reason for Stopping:         amLODIPine (NORVASC) 5 mg tablet Comments:   Reason for Stopping:         PARoxetine (PAXIL) 20 mg tablet Comments:   Reason for Stopping:         risperiDONE (RisperDAL) 3 mg tablet Comments:   Reason for Stopping:               DISPOSITION:    Home With:   OT  PT  HH  RN      X Long term SNF/Inpatient Rehab    Independent/assisted living    Hospice    Other: PATIENT CONDITION AT DISCHARGE:     Functional status    Poor    X Deconditioned     Independent      Cognition     Lucid    X Forgetful     Dementia      Catheters/lines (plus indication)    Singh     PICC     PEG    X None      Code status   X  Full code     DNR      PHYSICAL EXAMINATION AT DISCHARGE:  Patient Vitals for the past 24 hrs:   Temp Pulse Resp BP SpO2   03/16/22 0902 97.4 °F (36.3 °C) (!) 103 18 99/77 95 %   03/16/22 0224 97.3 °F (36.3 °C) 78 18 102/70 90 %   03/15/22 2024 97.6 °F (36.4 °C) 87 18 113/77 95 %   03/15/22 1404 97.7 °F (36.5 °C) 94 18 107/69 96 %                           Constitutional:  No acute distress, cooperative, pleasant    ENT:  Oral mucosa moist, oropharynx benign   Resp:  good AE, clear, No wheezing/rhonchi/rales. No accessory muscle use   CV:  Regular rhythm, normal rate    GI:  Soft, non distended, non tender    Musculoskeletal:  No edema, warm    Neurologic:  Moves all extremities. AAO, SUNNY II-XII reviewed                                  Data Review:    Review and/or order of clinical lab test  Review and/or order of tests in the radiology section of CPT  Review and/or order of tests in the medicine section of CPT        Labs:      No results for input(s): WBC, HGB, HCT, PLT, HGBEXT, HCTEXT, PLTEXT, HGBEXT, HCTEXT, PLTEXT in the last 72 hours. Recent Labs     03/13/22  0307   *   K 4.2   CL 99   CO2 28   BUN 15   CREA 0.58*   GLU 90   CA 9.1   MG 2.3   PHOS 4.5          Recent Labs     03/13/22  0307   ALB 3.1*      No results for input(s): INR, PTP, APTT, INREXT, INREXT in the last 72 hours. No results for input(s): FE, TIBC, PSAT, FERR in the last 72 hours. Lab Results   Component Value Date/Time     Folate 27.2 (H) 02/26/2022 09:31 AM      No results for input(s): PH, PCO2, PO2 in the last 72 hours.   No results for input(s): CPK, CKNDX, TROIQ in the last 72 hours.     No lab exists for component: CPKMB        Lab Results   Component Value Date/Time     Cholesterol, total 181 10/17/2018 10:48 AM     HDL Cholesterol 78 10/17/2018 10:48 AM     LDL, calculated 89 10/17/2018 10:48 AM     Triglyceride 71 10/17/2018 10:48 AM            Lab Results   Component Value Date/Time     Glucose (POC) 132 (H) 07/04/2017 04:37 PM     Glucose, POC 92 02/26/2022 09:23 AM            Lab Results   Component Value Date/Time     Color YELLOW/STRAW 02/26/2022 09:41 AM     Appearance CLEAR 02/26/2022 09:41 AM     Specific gravity 1.013 02/26/2022 09:41 AM     pH (UA) 7.0 02/26/2022 09:41 AM     Protein Negative 02/26/2022 09:41 AM     Glucose Negative 02/26/2022 09:41 AM     Ketone TRACE (A) 02/26/2022 09:41 AM     Bilirubin Negative 02/26/2022 09:41 AM     Urobilinogen 1.0 02/26/2022 09:41 AM     Nitrites Negative 02/26/2022 09:41 AM     Leukocyte Esterase Negative 02/26/2022 09:41 AM     Epithelial cells FEW 02/26/2022 09:41 AM     Bacteria Negative 02/26/2022 09:41 AM     WBC 0-4 02/26/2022 09:41 AM     RBC 0-5 02/26/2022 09:41 AM      Chest CTA- CTA CHEST W OR W WO CONT     INDICATION: LLL pneumonia. Evaluation for pulmonary embolism     COMPARISON: None.     CONTRAST: 100 mL of Isovue-370.     TECHNIQUE:   Precontrast  images were obtained to localize the volume for acquisition. Multislice helical CT arteriography was performed from the diaphragm to the  thoracic inlet during uneventful rapid bolus intravenous contrast  administration. Lung and soft tissue windows were generated. Coronal and  sagittal images were generated and 3D post processing consisting of coronal  maximum intensity images was performed. CT dose reduction was achieved through  use of a standardized protocol tailored for this examination and automatic  exposure control for dose modulation.     FINDINGS:  Dense consolidation is present in the left lower lobe. Mild to moderate  pericardial effusion. Tiny bilateral pleural effusions. Minimal right basilar  subsegmental atelectasis.  Extensive emphysematous change.     The pulmonary arteries are well enhanced and no pulmonary emboli are identified.     There is a 10 mm short axis diameter precarinal node. . The aorta enhances  normally without evidence of aneurysm or dissection.     The visualized portions of the upper abdominal organs are normal.     IMPRESSION     No evidence for pulmonary embolism.   Left lower lobe pneumonia  Multiple bilateral pleural effusions and small pericardial effusion  Underlying emphysema        CHRONIC MEDICAL DIAGNOSES:  Problem List as of 3/16/2022 Date Reviewed: 2/22/2022          Codes Class Noted - Resolved    PNA (pneumonia) ICD-10-CM: J18.9  ICD-9-CM: 486  2/26/2022 - Present        Oxygen dependent ICD-10-CM: Z99.81  ICD-9-CM: V46.2  2/22/2022 - Present        Chronic cough ICD-10-CM: R05.3  ICD-9-CM: 786.2  11/3/2021 - Present        Weight loss ICD-10-CM: R63.4  ICD-9-CM: 783.21  11/3/2021 - Present        Nodule of lower lobe of left lung ICD-10-CM: R91.1  ICD-9-CM: 793.11  3/13/2020 - Present        Abnormality of lung on CXR ICD-10-CM: R91.8  ICD-9-CM: 793.19  5/28/2019 - Present        Positive QuantiFERON-TB Gold test ICD-10-CM: R76.12  ICD-9-CM: 795.52  3/11/2019 - Present        Essential hypertension ICD-10-CM: I10  ICD-9-CM: 401.9  3/11/2019 - Present        Vitamin D deficiency ICD-10-CM: E55.9  ICD-9-CM: 268.9  3/11/2019 - Present        Recurrent depression ([facility] ICD-10-CM: F33.9  ICD-9-CM: 296.30  1/10/2018 - Present        Pulmonary emphysema ([facility] ICD-10-CM: J43.9  ICD-9-CM: 492.8  8/7/2017 - Present        Hypoalbuminemia due to protein-calorie malnutrition ([facility] ICD-10-CM: E88.09, E46  ICD-9-CM: 273.8, 263.9  8/7/2017 - Present        Schizoaffective disorder, depressive type ([facility] ICD-10-CM: F25.1  ICD-9-CM: 295.70  8/7/2017 - Present        Cigarette nicotine dependence with nicotine-induced disorder ICD-10-CM: F17.219  ICD-9-CM: 292.9  8/7/2017 - Present        Seizure ([facility] ICD-10-CM: R56.9  ICD-9-CM: 780.39  7/14/2017 - Present        Hyponatremia ICD-10-CM: E87.1  ICD-9-CM: 276.1  7/4/2017 - Present        Benign prostatic hyperplasia without lower urinary tract symptoms ICD-10-CM: N40.0  ICD-9-CM: 600.00  5/15/2017 - Present        Underweight ICD-10-CM: R63.6  ICD-9-CM: 783.22  3/29/2016 - Present        Poor dentition ICD-10-CM: K08.9  ICD-9-CM: 525.9  3/29/2016 - Present        Insomnia ICD-10-CM: G47.00  ICD-9-CM: 780.52  3/29/2016 - Present        Bronchitis, chronic ([facility] ICD-10-CM: J42  ICD-9-CM: 491.9  5/22/2014 - Present        Depression ICD-10-CM: F32. A  ICD-9-CM: 426  1/31/2012 - Present        RESOLVED: COPD exacerbation ([facility] ICD-10-CM: J44.1  ICD-9-CM: 491.21  10/26/2017 - 11/3/2017        RESOLVED: Shortness of breath ICD-10-CM: R06.02  ICD-9-CM: 786.05  8/16/2017 - 8/23/2017        RESOLVED: Acute respiratory failure (HCC) ICD-10-CM: J96.00  ICD-9-CM: 518.81  7/13/2017 - 7/20/2017        RESOLVED: Acute encephalopathy ICD-10-CM: G93.40  ICD-9-CM: 348.30  7/5/2017 - 7/20/2017        RESOLVED: Chronic obstructive pulmonary disease ([facility] ICD-10-CM: J44.9  ICD-9-CM: 385  3/29/2016 - 7/20/2017        RESOLVED: Tobacco dependence ICD-10-CM: F17.200  ICD-9-CM: 305.1  3/29/2016 - 7/20/2017        RESOLVED: Paranoid schizophrenia ([facility] ICD-10-CM: F20.0  ICD-9-CM: 295.30  3/29/2016 - 7/20/2017              Greater than 30 minutes were spent with the patient on counseling and coordination of care    Signed:   Blair Gabriel MD  3/16/2022  1:21 PM   .

## 2022-03-16 NOTE — PROGRESS NOTES
Name: Sue Marion MRN: [id number]   : 1960 Hospital: [facility]   Date: 3/16/2022        IMPRESSION:   · True Hyponatremia. The constellation of his labs are consistent with SIADH. Patient's Na is now  Low again. Patient admits drinking a lot of fluid after he was transfer on the medical floor. He  Is on oral fluid restriction. H/O psychogenic polydipsia in the past. Na is somewhat up after Tolvaptan, Na is stable at 135  · Normal renal function  · Schizophrenia       PLAN:   · Continue oral fluid restriction - 800 ml/day. This is imprtant to keel his Na at normal level. I don't believe that a strict oral fluid restriction is possible due to patient's little insight of the importance of compliance with fluid restriction and his ability to access water on his own. · Continue Ure Na  · At this point patient is stable for discharge from renal stand point. · Recheck the BMP in AM.  · Patient is at risk for hyponatremia in the future when he will have access to free water. Subjective/Interval History:   I have reviewed the flowsheet and previous days notes. ROS:Pertinent items are noted in HPI. Denies complaints. States he drinks a lot of water because he likes it. 3/15/22 Quite, no complaints. Objective:   Vital Signs:    Visit Vitals  BP 99/77 (BP 1 Location: Left arm, BP Patient Position: At rest)   Pulse (!) 103   Temp 97.4 °F (36.3 °C)   Resp 18   Ht 5' 10\" (1.778 m)   Wt 56.6 kg (124 lb 11.2 oz)   SpO2 95%   BMI 17.89 kg/m²       O2 Device: None (Room air)   O2 Flow Rate (L/min): 2 l/min   Temp (24hrs), Av.5 °F (36.4 °C), Min:97.3 °F (36.3 °C), Max:97.7 °F (36.5 °C)       Intake/Output:   Last shift:      No intake/output data recorded.   Last 3 shifts:  1901 -  0700  In: 1470 [P.O.:1470]  Out: -     Intake/Output Summary (Last 24 hours) at 3/16/2022 1139  Last data filed at 3/16/2022 0443  Gross per 24 hour   Intake 510 ml   Output    Net 510 ml Physical Exam:  General:    Alert, cooperative, no distress, appears stated age. Thin. Calm. Head:   Normocephalic, without obvious abnormality, atraumatic. Eyes:   Conjunctivae/corneas clear. Nose:  Nares normal. No drainage or sinus tenderness. Throat:    Poor dentition  Neck:  Supple, symmetrical,  no adenopathy, thyroid: non tender    no carotid bruit and no JVD. Lungs:   Clear to auscultation bilaterally. No Wheezing or Rhonchi. No rales. Chest wall:  No tenderness or deformity. No Accessory muscle use. Heart:   Regular rate and rhythm,  no murmur, rub or gallop. Abdomen:   Soft, non-tender. Not distended. Bowel sounds normal.  Extremities: Extremities normal, atraumatic, No cyanosis. No edema. No clubbing  Skin:     Texture, turgor normal. No rashes or lesions. Not Jaundiced  Psych:  Poor insight. Not depressed. Not anxious or agitated. DATA:  Labs:  Recent Labs     03/16/22  0102   *   K 4.3      CO2 29   BUN 23*   CREA 0.64*   CA 9.3     No results for input(s): WBC, HGB, HCT, PLT, HGBEXT, HCTEXT, PLTEXT, HGBEXT, HCTEXT, PLTEXT in the last 72 hours. No results for input(s): ZECHARIAH, KU, CLU, CREAU in the last 72 hours.     No lab exists for component: PROU    Total time spent with patient:  35 minutes    [] Critical Care Provided    Care Plan discussed with:   Staff, Medical Team    Winifred Mccray MD

## 2022-03-16 NOTE — PROGRESS NOTES
Problem: Discharge Planning  Goal: *Discharge to safe environment  Outcome: Resolved/Met     Problem: Breathing Pattern - Ineffective  Goal: *Absence of hypoxia  Outcome: Resolved/Met  Goal: *Use of effective breathing techniques  Outcome: Resolved/Met     Problem: Patient Education: Go to Patient Education Activity  Goal: Patient/Family Education  Outcome: Resolved/Met     Problem: Falls - Risk of  Goal: *Absence of Falls  Description: Document Joanie Fall Risk and appropriate interventions in the flowsheet.   Outcome: Resolved/Met     Problem: Patient Education: Go to Patient Education Activity  Goal: Patient/Family Education  Outcome: Resolved/Met

## 2022-03-18 PROBLEM — R05.3 CHRONIC COUGH: Status: ACTIVE | Noted: 2021-11-03

## 2022-03-18 PROBLEM — J43.9 PULMONARY EMPHYSEMA (HCC): Status: ACTIVE | Noted: 2017-08-07

## 2022-03-18 PROBLEM — E55.9 VITAMIN D DEFICIENCY: Status: ACTIVE | Noted: 2019-03-11

## 2022-03-18 PROBLEM — N40.0 BENIGN PROSTATIC HYPERPLASIA WITHOUT LOWER URINARY TRACT SYMPTOMS: Status: ACTIVE | Noted: 2017-05-15

## 2022-03-19 PROBLEM — J18.9 PNA (PNEUMONIA): Status: ACTIVE | Noted: 2022-02-26

## 2022-03-19 PROBLEM — E46 HYPOALBUMINEMIA DUE TO PROTEIN-CALORIE MALNUTRITION (HCC): Status: ACTIVE | Noted: 2017-08-07

## 2022-03-19 PROBLEM — Z99.81 OXYGEN DEPENDENT: Status: ACTIVE | Noted: 2022-02-22

## 2022-03-19 PROBLEM — R63.4 WEIGHT LOSS: Status: ACTIVE | Noted: 2021-11-03

## 2022-03-19 PROBLEM — F33.9 RECURRENT DEPRESSION (HCC): Status: ACTIVE | Noted: 2018-01-10

## 2022-03-19 PROBLEM — F25.1 SCHIZOAFFECTIVE DISORDER, DEPRESSIVE TYPE (HCC): Status: ACTIVE | Noted: 2017-08-07

## 2022-03-19 PROBLEM — E87.1 HYPONATREMIA: Status: ACTIVE | Noted: 2017-07-04

## 2022-03-19 PROBLEM — R91.1 NODULE OF LOWER LOBE OF LEFT LUNG: Status: ACTIVE | Noted: 2020-03-13

## 2022-03-19 PROBLEM — R76.12 POSITIVE QUANTIFERON-TB GOLD TEST: Status: ACTIVE | Noted: 2019-03-11

## 2022-03-19 PROBLEM — I10 ESSENTIAL HYPERTENSION: Status: ACTIVE | Noted: 2019-03-11

## 2022-03-19 PROBLEM — R56.9 SEIZURE (HCC): Status: ACTIVE | Noted: 2017-07-14

## 2022-03-19 PROBLEM — R91.8 ABNORMALITY OF LUNG ON CXR: Status: ACTIVE | Noted: 2019-05-28

## 2022-03-19 PROBLEM — F17.219 CIGARETTE NICOTINE DEPENDENCE WITH NICOTINE-INDUCED DISORDER: Status: ACTIVE | Noted: 2017-08-07

## 2022-03-19 PROBLEM — E88.09 HYPOALBUMINEMIA DUE TO PROTEIN-CALORIE MALNUTRITION (HCC): Status: ACTIVE | Noted: 2017-08-07

## 2023-01-04 NOTE — TELEPHONE ENCOUNTER
[employer] [facility] [facility]   Positive Test Results and Return to Work Information    [facility] received notice of your positive COVID-19 test. You will need to stay off work and keep out of public places, except to seek medical care.   You will receive an email titled “IMMEDIATE ATTENTION: Covid Positive Information”. [facility] requires that this information be gathered. Return to Employee Health in the next 24 hours.     According to [employer]’s new COVID-19 guidelines, you are to self-monitor your symptoms, and If symptoms become severe please seek medical attention from your primary care provider or the Emergency Room if needed.    When You Can (not must) Return to Work Criteria (all criteria must be met):  • A minimum of 5 days has passed since your symptoms started or positive test date if you do not have symptoms (day 0 is the first day, with return to work on day 6)  • Your temperature has remained less than 100.0°F for at least 24 hours without using any fever reducing medications (ibuprofen, acetaminophen, etc.)  • Significant improvement in respiratory symptoms and cough  •    No new or worsening symptoms     Remember this is guidance, and we do not encourage sick health care workers to be at work. Speak with your leader if you are still ill.     [facility] will not be sending communication for you to return to work. Be sure to follow the guidelines above to determine when you may return.    YOU DO NOT NEED TO RETEST TO RETURN TO WORK         If you do not meet the above criteria, you will need to alert your leader per your sick call policy and if your symptoms remain longer than 10 days, you will need to seek care from your primary care provider or Urgent Care for symptom management, and for your return to work.     Absence:   • If you continue to have a medical reason to remain off work, you will need to follow your department’s normal unscheduled absence process or seek care from your  Called to check the status of paperwork dropped off last weds primary care provider.   • If your absence requires a leave of absence you will need to apply by calling [facility] (in [place]) at [phone] or [facility] (in [place]) at [phone]. Medical certification by a Primary Care Provider is required to support the need for a medical leave of absence. [facility] is unable to provide this function. If you fail to provide the required medical certification, the leave will not be approved.    You must continue to adhere to strict use of appropriate personal protective equipment (PPE) while in the workplace.       Thank you,     [facility]  Email: [email or web address]  Hotline: [phone]    CC: Manager

## 2023-04-27 NOTE — MR AVS SNAPSHOT
Visit Information Date & Time Provider Department Dept. Phone Encounter #  
 9/28/2017 11:20 AM Nano Bills NP [facility] [phone] [id number] Your Appointments 12/12/2017 10:45 AM  
ROUTINE CARE with Maribel Matute DO [facility] ([facility]) Appt Note: 6 month f/u; 3 month follow up [address] [place] [address] [address] [facility] [address]  
[phone]  
  
   
 [facility] [address] Upcoming Health Maintenance Date Due Hepatitis C Screening 1960 DTaP/Tdap/Td series (1 - Tdap) 8/27/1980 COLONOSCOPY 9/2/2024 Allergies as of 9/28/2017  Review Complete On: 9/28/2017 By: Michelle Astorga LPN No Known Allergies Current Immunizations  Reviewed on 7/17/2017 Name Date Influenza High Dose Vaccine PF 9/15/2017 Influenza Vaccine Intradermal PF 11/14/2016 PPD 1/31/2012 Pneumococcal Polysaccharide (PPSV-23) 11/14/2016 Not reviewed this visit Vitals BP Pulse Temp Resp Height(growth percentile) Weight(growth percentile) 110/85 (BP 1 Location: Right arm, BP Patient Position: Sitting) 97 98.7 °F (37.1 °C) (Oral) 18 5' 10\" (1.778 m) 127 lb 9.6 oz (57.9 kg) SpO2 BMI Smoking Status 93% 18.31 kg/m2 Current Every Day Smoker Vitals History BMI and BSA Data Body Mass Index Body Surface Area  
 18.31 kg/m 2 1.69 m 2 Preferred Pharmacy Pharmacy Name Phone [address] [facility], [place] [address] [phone] Your Updated Medication List  
  
   
This list is accurate as of: 9/28/17 11:57 AM.  Always use your most recent med list.  
  
  
  
  
 ALPRAZolam 0.5 mg tablet Commonly known as:  Tami Ream Take 0.5 mg by mouth two (2) times daily as needed for Anxiety. benztropine 2 mg tablet Commonly known as:  COGENTIN Take 2 mg by mouth daily as needed. budesonide-formoterol 80-4.5 mcg/actuation Hfaa Commonly known as:  SYMBICORT Take 2 Puffs by inhalation two (2) times a day. guaiFENesin 100 mg/5 mL liquid Commonly known as:  Q-TUSSIN Take 10 mL by mouth every four (4) hours as needed for Cough. INCRUSE ELLIPTA 62.5 mcg/actuation inhaler Generic drug:  umeclidinium Take 1 Puff by inhalation daily. PARoxetine 20 mg tablet Commonly known as:  PAXIL Take 20 mg by mouth daily. predniSONE 10 mg tablet Commonly known as:  Payal Britta 80 mg x 3 days then 60 mg x 3 days then 40 mg x 3 days then 20 mg x 3 days then stop  
  
 risperiDONE 3 mg tablet Commonly known as:  RisperDAL Take 3 mg by mouth daily. SEROquel 50 mg tablet Generic drug:  QUEtiapine Take 50 mg by mouth nightly as needed (for agitation or anxiety (Use 1st)). temazepam 30 mg capsule Commonly known as:  RESTORIL Take 30 mg by mouth nightly as needed for Sleep.  
  
 traZODone 100 mg tablet Commonly known as:  Estefany Hesselbach Take 50 mg by mouth nightly as needed for Sleep. * VENTOLIN HFA 90 mcg/actuation inhaler Generic drug:  albuterol Take 2 Puffs by inhalation every four (4) hours as needed for Shortness of Breath. * albuterol 2.5 mg /3 mL (0.083 %) nebulizer solution Commonly known as:  PROVENTIL VENTOLIN  
2.5 mg by Nebulization route every six (6) hours as needed for Wheezing. * Notice: This list has 2 medication(s) that are the same as other medications prescribed for you. Read the directions carefully, and ask your doctor or other care provider to review them with you. Please provide this summary of care documentation to your next provider. Your primary care clinician is listed as Brittany Mcfarland. If you have any questions after today's visit, please call [phone]. [de-identified] : \par \par left knee\par TTP to medial and lateral joint\par mild effusion, no erythema, mildly antalgic gait\par \par left hip\par Pain with IR of left hip into the groin and restricted AROM\par

## 2023-05-25 RX ORDER — FOLIC ACID 1 MG/1
1 TABLET ORAL DAILY
COMMUNITY
Start: 2021-08-26

## 2023-05-25 RX ORDER — TRAZODONE HYDROCHLORIDE 100 MG/1
1 TABLET ORAL NIGHTLY
COMMUNITY
Start: 2021-11-03

## 2023-05-25 RX ORDER — LANOLIN ALCOHOL/MO/W.PET/CERES
1 CREAM (GRAM) TOPICAL DAILY
COMMUNITY
Start: 2021-03-05

## 2023-05-25 RX ORDER — OMEPRAZOLE 40 MG/1
40 CAPSULE, DELAYED RELEASE ORAL DAILY
COMMUNITY
Start: 2021-08-26

## 2023-05-25 RX ORDER — MIRTAZAPINE 15 MG/1
1 TABLET, FILM COATED ORAL NIGHTLY
COMMUNITY
Start: 2022-03-16

## 2023-05-25 RX ORDER — IPRATROPIUM BROMIDE AND ALBUTEROL SULFATE 2.5; .5 MG/3ML; MG/3ML
SOLUTION RESPIRATORY (INHALATION)
COMMUNITY
Start: 2019-10-17

## 2023-05-25 RX ORDER — TIOTROPIUM BROMIDE 18 UG/1
CAPSULE ORAL; RESPIRATORY (INHALATION)
COMMUNITY
Start: 2022-02-03

## 2023-05-25 RX ORDER — ERGOCALCIFEROL 1.25 MG/1
50000 CAPSULE ORAL
COMMUNITY
Start: 2021-08-26

## 2023-05-25 RX ORDER — DIVALPROEX SODIUM 500 MG/1
500 TABLET, DELAYED RELEASE ORAL DAILY
COMMUNITY
Start: 2022-03-17

## 2023-05-25 RX ORDER — SODIUM CHLORIDE 1 G/1
1 TABLET ORAL DAILY
COMMUNITY
Start: 2019-02-21

## 2023-05-25 RX ORDER — BENZTROPINE MESYLATE 2 MG/1
2 TABLET ORAL DAILY
COMMUNITY
Start: 2018-06-28

## 2023-05-25 RX ORDER — LACTULOSE 10 G/15ML
3 SOLUTION ORAL; RECTAL DAILY PRN
COMMUNITY
Start: 2019-01-08